# Patient Record
Sex: FEMALE | Race: WHITE | NOT HISPANIC OR LATINO
[De-identification: names, ages, dates, MRNs, and addresses within clinical notes are randomized per-mention and may not be internally consistent; named-entity substitution may affect disease eponyms.]

---

## 2017-03-09 ENCOUNTER — RX RENEWAL (OUTPATIENT)
Age: 77
End: 2017-03-09

## 2017-03-13 ENCOUNTER — RX RENEWAL (OUTPATIENT)
Age: 77
End: 2017-03-13

## 2017-04-03 ENCOUNTER — APPOINTMENT (OUTPATIENT)
Dept: FAMILY MEDICINE | Facility: CLINIC | Age: 77
End: 2017-04-03

## 2017-04-03 VITALS
HEIGHT: 70 IN | OXYGEN SATURATION: 98 % | RESPIRATION RATE: 15 BRPM | WEIGHT: 147 LBS | DIASTOLIC BLOOD PRESSURE: 79 MMHG | BODY MASS INDEX: 21.05 KG/M2 | SYSTOLIC BLOOD PRESSURE: 153 MMHG | HEART RATE: 56 BPM | TEMPERATURE: 98.1 F

## 2017-04-07 ENCOUNTER — RX RENEWAL (OUTPATIENT)
Age: 77
End: 2017-04-07

## 2017-04-24 ENCOUNTER — APPOINTMENT (OUTPATIENT)
Dept: CARDIOLOGY | Facility: CLINIC | Age: 77
End: 2017-04-24

## 2017-04-24 ENCOUNTER — NON-APPOINTMENT (OUTPATIENT)
Age: 77
End: 2017-04-24

## 2017-04-24 VITALS
SYSTOLIC BLOOD PRESSURE: 156 MMHG | BODY MASS INDEX: 20.47 KG/M2 | RESPIRATION RATE: 17 BRPM | WEIGHT: 143 LBS | DIASTOLIC BLOOD PRESSURE: 72 MMHG | OXYGEN SATURATION: 100 % | HEIGHT: 70 IN | HEART RATE: 50 BPM

## 2017-04-24 LAB
ALBUMIN SERPL ELPH-MCNC: 4.4 G/DL
ANION GAP SERPL CALC-SCNC: 15 MMOL/L
BUN SERPL-MCNC: 13 MG/DL
CALCIUM SERPL-MCNC: 10.5 MG/DL
CHLORIDE SERPL-SCNC: 100 MMOL/L
CO2 SERPL-SCNC: 26 MMOL/L
CREAT SERPL-MCNC: 0.98 MG/DL
GLUCOSE SERPL-MCNC: 116 MG/DL
HBA1C MFR BLD HPLC: 5.7 %
PHOSPHATE SERPL-MCNC: 3.6 MG/DL
POTASSIUM SERPL-SCNC: 4.6 MMOL/L
SODIUM SERPL-SCNC: 141 MMOL/L
TSH SERPL-ACNC: 1.65 UIU/ML

## 2017-06-14 ENCOUNTER — APPOINTMENT (OUTPATIENT)
Dept: OBGYN | Facility: CLINIC | Age: 77
End: 2017-06-14

## 2017-06-14 VITALS
HEIGHT: 70 IN | DIASTOLIC BLOOD PRESSURE: 82 MMHG | BODY MASS INDEX: 21.19 KG/M2 | OXYGEN SATURATION: 96 % | RESPIRATION RATE: 16 BRPM | SYSTOLIC BLOOD PRESSURE: 140 MMHG | WEIGHT: 148 LBS | HEART RATE: 67 BPM

## 2017-06-14 DIAGNOSIS — R45.4 IRRITABILITY AND ANGER: ICD-10-CM

## 2017-07-03 ENCOUNTER — RX RENEWAL (OUTPATIENT)
Age: 77
End: 2017-07-03

## 2017-07-06 ENCOUNTER — RX RENEWAL (OUTPATIENT)
Age: 77
End: 2017-07-06

## 2017-07-24 ENCOUNTER — APPOINTMENT (OUTPATIENT)
Dept: CARDIOLOGY | Facility: CLINIC | Age: 77
End: 2017-07-24

## 2017-07-24 ENCOUNTER — NON-APPOINTMENT (OUTPATIENT)
Age: 77
End: 2017-07-24

## 2017-07-24 VITALS — DIASTOLIC BLOOD PRESSURE: 80 MMHG | SYSTOLIC BLOOD PRESSURE: 158 MMHG

## 2017-07-25 LAB
ALBUMIN SERPL ELPH-MCNC: 4.7 G/DL
ALP BLD-CCNC: 69 U/L
ALT SERPL-CCNC: 21 U/L
AST SERPL-CCNC: 23 U/L
BILIRUB DIRECT SERPL-MCNC: 0.2 MG/DL
BILIRUB INDIRECT SERPL-MCNC: 0.5 MG/DL
BILIRUB SERPL-MCNC: 0.7 MG/DL
CHOLEST SERPL-MCNC: 173 MG/DL
CHOLEST/HDLC SERPL: 2.9 RATIO
HDLC SERPL-MCNC: 60 MG/DL
LDLC SERPL CALC-MCNC: 84 MG/DL
PROT SERPL-MCNC: 7.1 G/DL
TRIGL SERPL-MCNC: 146 MG/DL

## 2017-07-31 ENCOUNTER — RX RENEWAL (OUTPATIENT)
Age: 77
End: 2017-07-31

## 2017-09-06 ENCOUNTER — APPOINTMENT (OUTPATIENT)
Dept: FAMILY MEDICINE | Facility: CLINIC | Age: 77
End: 2017-09-06
Payer: MEDICARE

## 2017-09-06 VITALS
OXYGEN SATURATION: 97 % | DIASTOLIC BLOOD PRESSURE: 92 MMHG | RESPIRATION RATE: 16 BRPM | HEIGHT: 70 IN | WEIGHT: 156.31 LBS | HEART RATE: 64 BPM | SYSTOLIC BLOOD PRESSURE: 130 MMHG | BODY MASS INDEX: 22.38 KG/M2

## 2017-09-06 PROCEDURE — 99213 OFFICE O/P EST LOW 20 MIN: CPT

## 2017-09-11 ENCOUNTER — RX RENEWAL (OUTPATIENT)
Age: 77
End: 2017-09-11

## 2017-10-03 ENCOUNTER — RX RENEWAL (OUTPATIENT)
Age: 77
End: 2017-10-03

## 2017-10-04 ENCOUNTER — RX RENEWAL (OUTPATIENT)
Age: 77
End: 2017-10-04

## 2017-10-18 ENCOUNTER — APPOINTMENT (OUTPATIENT)
Dept: FAMILY MEDICINE | Facility: CLINIC | Age: 77
End: 2017-10-18
Payer: MEDICARE

## 2017-10-18 VITALS
WEIGHT: 156 LBS | OXYGEN SATURATION: 97 % | HEIGHT: 70 IN | TEMPERATURE: 97.9 F | RESPIRATION RATE: 12 BRPM | HEART RATE: 59 BPM | BODY MASS INDEX: 22.33 KG/M2 | DIASTOLIC BLOOD PRESSURE: 80 MMHG | SYSTOLIC BLOOD PRESSURE: 156 MMHG

## 2017-10-18 PROCEDURE — G0008: CPT

## 2017-10-18 PROCEDURE — 90686 IIV4 VACC NO PRSV 0.5 ML IM: CPT

## 2017-10-18 PROCEDURE — 99212 OFFICE O/P EST SF 10 MIN: CPT | Mod: 25

## 2017-10-24 LAB — BACTERIA SPEC CULT: NORMAL

## 2017-11-19 ENCOUNTER — FORM ENCOUNTER (OUTPATIENT)
Age: 77
End: 2017-11-19

## 2017-11-20 ENCOUNTER — OUTPATIENT (OUTPATIENT)
Dept: OUTPATIENT SERVICES | Facility: HOSPITAL | Age: 77
LOS: 1 days | End: 2017-11-20
Payer: MEDICARE

## 2017-11-20 ENCOUNTER — APPOINTMENT (OUTPATIENT)
Dept: MAMMOGRAPHY | Facility: HOSPITAL | Age: 77
End: 2017-11-20
Payer: MEDICARE

## 2017-11-20 ENCOUNTER — APPOINTMENT (OUTPATIENT)
Dept: ULTRASOUND IMAGING | Facility: HOSPITAL | Age: 77
End: 2017-11-20
Payer: MEDICARE

## 2017-11-20 DIAGNOSIS — Z98.89 OTHER SPECIFIED POSTPROCEDURAL STATES: Chronic | ICD-10-CM

## 2017-11-20 DIAGNOSIS — M53.9 DORSOPATHY, UNSPECIFIED: Chronic | ICD-10-CM

## 2017-11-20 LAB
BASOPHILS # BLD AUTO: 0.02 K/UL
BASOPHILS NFR BLD AUTO: 0.4 %
EOSINOPHIL # BLD AUTO: 0.11 K/UL
EOSINOPHIL NFR BLD AUTO: 2.1 %
ERYTHROCYTE [SEDIMENTATION RATE] IN BLOOD BY WESTERGREN METHOD: 15 MM/HR
HCT VFR BLD CALC: 41.9 %
HGB BLD-MCNC: 14 G/DL
IMM GRANULOCYTES NFR BLD AUTO: 0.2 %
LYMPHOCYTES # BLD AUTO: 1.44 K/UL
LYMPHOCYTES NFR BLD AUTO: 27.1 %
MAN DIFF?: NORMAL
MCHC RBC-ENTMCNC: 33.2 PG
MCHC RBC-ENTMCNC: 33.4 GM/DL
MCV RBC AUTO: 99.3 FL
MONOCYTES # BLD AUTO: 0.39 K/UL
MONOCYTES NFR BLD AUTO: 7.3 %
NEUTROPHILS # BLD AUTO: 3.34 K/UL
NEUTROPHILS NFR BLD AUTO: 62.9 %
PLATELET # BLD AUTO: 238 K/UL
RBC # BLD: 4.22 M/UL
RBC # FLD: 12.5 %
WBC # FLD AUTO: 5.31 K/UL

## 2017-11-20 PROCEDURE — 77063 BREAST TOMOSYNTHESIS BI: CPT | Mod: 26

## 2017-11-20 PROCEDURE — 77067 SCR MAMMO BI INCL CAD: CPT

## 2017-11-20 PROCEDURE — 76641 ULTRASOUND BREAST COMPLETE: CPT | Mod: 26

## 2017-11-20 PROCEDURE — G0202: CPT | Mod: 26

## 2017-11-20 PROCEDURE — 76641 ULTRASOUND BREAST COMPLETE: CPT

## 2017-11-20 PROCEDURE — 77063 BREAST TOMOSYNTHESIS BI: CPT

## 2017-11-21 LAB
ALBUMIN SERPL ELPH-MCNC: 4.6 G/DL
ANION GAP SERPL CALC-SCNC: 12 MMOL/L
BUN SERPL-MCNC: 12 MG/DL
CALCIUM SERPL-MCNC: 10.1 MG/DL
CHLORIDE SERPL-SCNC: 100 MMOL/L
CO2 SERPL-SCNC: 26 MMOL/L
CREAT SERPL-MCNC: 0.89 MG/DL
FOLATE SERPL-MCNC: >20 NG/ML
GLUCOSE SERPL-MCNC: 110 MG/DL
HBA1C MFR BLD HPLC: 5.5 %
PHOSPHATE SERPL-MCNC: 3.8 MG/DL
POTASSIUM SERPL-SCNC: 4 MMOL/L
SODIUM SERPL-SCNC: 138 MMOL/L
VIT B12 SERPL-MCNC: 737 PG/ML

## 2017-11-29 DIAGNOSIS — Z01.419 ENCOUNTER FOR GYNECOLOGICAL EXAMINATION (GENERAL) (ROUTINE) WITHOUT ABNORMAL FINDINGS: ICD-10-CM

## 2017-12-15 ENCOUNTER — APPOINTMENT (OUTPATIENT)
Dept: FAMILY MEDICINE | Facility: CLINIC | Age: 77
End: 2017-12-15
Payer: MEDICARE

## 2017-12-15 VITALS
WEIGHT: 157.56 LBS | HEIGHT: 70 IN | DIASTOLIC BLOOD PRESSURE: 80 MMHG | OXYGEN SATURATION: 97 % | SYSTOLIC BLOOD PRESSURE: 120 MMHG | RESPIRATION RATE: 16 BRPM | HEART RATE: 66 BPM | BODY MASS INDEX: 22.56 KG/M2

## 2017-12-15 PROCEDURE — 99213 OFFICE O/P EST LOW 20 MIN: CPT

## 2017-12-18 ENCOUNTER — APPOINTMENT (OUTPATIENT)
Dept: HEMATOLOGY ONCOLOGY | Facility: CLINIC | Age: 77
End: 2017-12-18

## 2017-12-19 ENCOUNTER — MEDICATION RENEWAL (OUTPATIENT)
Age: 77
End: 2017-12-19

## 2017-12-19 ENCOUNTER — APPOINTMENT (OUTPATIENT)
Dept: HEMATOLOGY ONCOLOGY | Facility: CLINIC | Age: 77
End: 2017-12-19

## 2017-12-19 ENCOUNTER — APPOINTMENT (OUTPATIENT)
Dept: INFUSION THERAPY | Facility: HOSPITAL | Age: 77
End: 2017-12-19

## 2017-12-20 ENCOUNTER — APPOINTMENT (OUTPATIENT)
Dept: INFUSION THERAPY | Facility: HOSPITAL | Age: 77
End: 2017-12-20

## 2018-01-02 ENCOUNTER — RX RENEWAL (OUTPATIENT)
Age: 78
End: 2018-01-02

## 2018-01-05 ENCOUNTER — RX RENEWAL (OUTPATIENT)
Age: 78
End: 2018-01-05

## 2018-01-29 ENCOUNTER — RX RENEWAL (OUTPATIENT)
Age: 78
End: 2018-01-29

## 2018-03-14 ENCOUNTER — RX RENEWAL (OUTPATIENT)
Age: 78
End: 2018-03-14

## 2018-04-02 ENCOUNTER — RX RENEWAL (OUTPATIENT)
Age: 78
End: 2018-04-02

## 2018-04-03 ENCOUNTER — RX RENEWAL (OUTPATIENT)
Age: 78
End: 2018-04-03

## 2018-04-11 ENCOUNTER — NON-APPOINTMENT (OUTPATIENT)
Age: 78
End: 2018-04-11

## 2018-04-11 ENCOUNTER — APPOINTMENT (OUTPATIENT)
Dept: CARDIOLOGY | Facility: CLINIC | Age: 78
End: 2018-04-11
Payer: MEDICARE

## 2018-04-11 VITALS
BODY MASS INDEX: 22.62 KG/M2 | WEIGHT: 158 LBS | RESPIRATION RATE: 17 BRPM | DIASTOLIC BLOOD PRESSURE: 80 MMHG | HEART RATE: 53 BPM | OXYGEN SATURATION: 99 % | SYSTOLIC BLOOD PRESSURE: 170 MMHG | HEIGHT: 70 IN

## 2018-04-11 LAB
ALBUMIN SERPL ELPH-MCNC: 4.4 G/DL
ALP BLD-CCNC: 80 U/L
ALT SERPL-CCNC: 22 U/L
ANION GAP SERPL CALC-SCNC: 11 MMOL/L
AST SERPL-CCNC: 25 U/L
BILIRUB SERPL-MCNC: 0.6 MG/DL
BUN SERPL-MCNC: 11 MG/DL
CALCIUM SERPL-MCNC: 10 MG/DL
CHLORIDE SERPL-SCNC: 101 MMOL/L
CHOLEST SERPL-MCNC: 157 MG/DL
CHOLEST/HDLC SERPL: 2.6 RATIO
CO2 SERPL-SCNC: 27 MMOL/L
CREAT SERPL-MCNC: 0.91 MG/DL
GLUCOSE SERPL-MCNC: 107 MG/DL
HDLC SERPL-MCNC: 61 MG/DL
LDLC SERPL CALC-MCNC: 74 MG/DL
POTASSIUM SERPL-SCNC: 4.7 MMOL/L
PROT SERPL-MCNC: 7 G/DL
SODIUM SERPL-SCNC: 139 MMOL/L
TRIGL SERPL-MCNC: 112 MG/DL

## 2018-04-11 PROCEDURE — 93000 ELECTROCARDIOGRAM COMPLETE: CPT

## 2018-04-11 PROCEDURE — 99214 OFFICE O/P EST MOD 30 MIN: CPT

## 2018-06-04 ENCOUNTER — APPOINTMENT (OUTPATIENT)
Dept: FAMILY MEDICINE | Facility: CLINIC | Age: 78
End: 2018-06-04
Payer: MEDICARE

## 2018-06-04 VITALS
SYSTOLIC BLOOD PRESSURE: 138 MMHG | RESPIRATION RATE: 14 BRPM | HEIGHT: 70 IN | DIASTOLIC BLOOD PRESSURE: 70 MMHG | BODY MASS INDEX: 23.41 KG/M2 | WEIGHT: 163.5 LBS | OXYGEN SATURATION: 98 % | HEART RATE: 65 BPM

## 2018-06-04 PROCEDURE — 99213 OFFICE O/P EST LOW 20 MIN: CPT

## 2018-06-04 NOTE — HISTORY OF PRESENT ILLNESS
[FreeTextEntry1] : hypothyroidism, hptn [de-identified] : Patient is here for a followup visit on her hypertension hyperlipidemia and hypothyroidism she feels well several months ago saw cardiology and had a good evaluation laboratory work done at that time showed good cholesterol control review of systems is unremarkable she is quite active has no new issues it has been about a year or so since we've checked her TSH level she is on thyroid replacement she is up-to-date nonproductive the procedures she had a colonoscopy approximately 3 years ago and due to multiple polyps has been on every five-year regimen.\par

## 2018-06-04 NOTE — PHYSICAL EXAM
[No Acute Distress] : no acute distress [Well Nourished] : well nourished [Well Developed] : well developed [Well-Appearing] : well-appearing [Normal Sclera/Conjunctiva] : normal sclera/conjunctiva [PERRL] : pupils equal round and reactive to light [Normal Oropharynx] : the oropharynx was normal [No JVD] : no jugular venous distention [Supple] : supple [No Lymphadenopathy] : no lymphadenopathy [Thyroid Normal, No Nodules] : the thyroid was normal and there were no nodules present [No Respiratory Distress] : no respiratory distress  [Clear to Auscultation] : lungs were clear to auscultation bilaterally [Normal Rate] : normal rate  [Regular Rhythm] : with a regular rhythm [Normal S1, S2] : normal S1 and S2 [No Murmur] : no murmur heard [Soft] : abdomen soft [Non-distended] : non-distended [No Masses] : no abdominal mass palpated [No HSM] : no HSM [Normal Bowel Sounds] : normal bowel sounds [Normal Supraclavicular Nodes] : no supraclavicular lymphadenopathy [Normal Posterior Cervical Nodes] : no posterior cervical lymphadenopathy [Normal Anterior Cervical Nodes] : no anterior cervical lymphadenopathy [No CVA Tenderness] : no CVA  tenderness [No Spinal Tenderness] : no spinal tenderness [No Rash] : no rash [de-identified] : Trace edema about the ankles

## 2018-06-04 NOTE — ASSESSMENT
[FreeTextEntry1] : The patient seems to be doing quite well she is very functional alert oriented going about all of her activities in instrumental activities of daily living well she looks younger than her stated age of 78. We will recheck her TSH should be followed up here in 3-4 months or as needed

## 2018-06-20 ENCOUNTER — APPOINTMENT (OUTPATIENT)
Dept: OBGYN | Facility: CLINIC | Age: 78
End: 2018-06-20
Payer: MEDICARE

## 2018-06-20 VITALS
HEIGHT: 70 IN | WEIGHT: 159 LBS | SYSTOLIC BLOOD PRESSURE: 136 MMHG | DIASTOLIC BLOOD PRESSURE: 80 MMHG | HEART RATE: 64 BPM | BODY MASS INDEX: 22.76 KG/M2 | OXYGEN SATURATION: 97 %

## 2018-06-20 DIAGNOSIS — L84 CORNS AND CALLOSITIES: ICD-10-CM

## 2018-06-20 DIAGNOSIS — Z01.419 ENCOUNTER FOR GYNECOLOGICAL EXAMINATION (GENERAL) (ROUTINE) W/OUT ABNORMAL FINDINGS: ICD-10-CM

## 2018-06-20 PROCEDURE — G0101: CPT

## 2018-06-21 LAB — TSH SERPL-ACNC: 1.29 UIU/ML

## 2018-06-22 LAB — HPV HIGH+LOW RISK DNA PNL CVX: NOT DETECTED

## 2018-06-25 ENCOUNTER — APPOINTMENT (OUTPATIENT)
Dept: MRI IMAGING | Facility: HOSPITAL | Age: 78
End: 2018-06-25
Payer: MEDICARE

## 2018-06-25 ENCOUNTER — OUTPATIENT (OUTPATIENT)
Dept: OUTPATIENT SERVICES | Facility: HOSPITAL | Age: 78
LOS: 1 days | End: 2018-06-25
Payer: MEDICARE

## 2018-06-25 DIAGNOSIS — Z98.89 OTHER SPECIFIED POSTPROCEDURAL STATES: Chronic | ICD-10-CM

## 2018-06-25 DIAGNOSIS — M53.9 DORSOPATHY, UNSPECIFIED: Chronic | ICD-10-CM

## 2018-06-25 DIAGNOSIS — Z00.8 ENCOUNTER FOR OTHER GENERAL EXAMINATION: ICD-10-CM

## 2018-06-25 PROCEDURE — 73720 MRI LWR EXTREMITY W/O&W/DYE: CPT

## 2018-06-25 PROCEDURE — 73720 MRI LWR EXTREMITY W/O&W/DYE: CPT | Mod: 26,RT

## 2018-06-25 PROCEDURE — A9579: CPT

## 2018-06-28 LAB — CYTOLOGY CVX/VAG DOC THIN PREP: NORMAL

## 2018-07-03 ENCOUNTER — RX RENEWAL (OUTPATIENT)
Age: 78
End: 2018-07-03

## 2018-07-26 ENCOUNTER — RX RENEWAL (OUTPATIENT)
Age: 78
End: 2018-07-26

## 2018-09-10 ENCOUNTER — RX RENEWAL (OUTPATIENT)
Age: 78
End: 2018-09-10

## 2018-10-02 ENCOUNTER — RX RENEWAL (OUTPATIENT)
Age: 78
End: 2018-10-02

## 2018-10-10 ENCOUNTER — APPOINTMENT (OUTPATIENT)
Dept: CARDIOLOGY | Facility: CLINIC | Age: 78
End: 2018-10-10
Payer: MEDICARE

## 2018-10-10 VITALS — DIASTOLIC BLOOD PRESSURE: 86 MMHG | SYSTOLIC BLOOD PRESSURE: 144 MMHG

## 2018-10-10 PROCEDURE — 93306 TTE W/DOPPLER COMPLETE: CPT

## 2018-10-10 PROCEDURE — 99214 OFFICE O/P EST MOD 30 MIN: CPT

## 2018-10-10 PROCEDURE — 93000 ELECTROCARDIOGRAM COMPLETE: CPT

## 2018-10-26 ENCOUNTER — RX RENEWAL (OUTPATIENT)
Age: 78
End: 2018-10-26

## 2018-10-26 ENCOUNTER — APPOINTMENT (OUTPATIENT)
Dept: FAMILY MEDICINE | Facility: CLINIC | Age: 78
End: 2018-10-26
Payer: MEDICARE

## 2018-10-26 VITALS
HEART RATE: 63 BPM | TEMPERATURE: 97.7 F | SYSTOLIC BLOOD PRESSURE: 168 MMHG | DIASTOLIC BLOOD PRESSURE: 75 MMHG | OXYGEN SATURATION: 97 % | HEIGHT: 70 IN | RESPIRATION RATE: 17 BRPM | WEIGHT: 157 LBS | BODY MASS INDEX: 22.48 KG/M2

## 2018-10-26 PROCEDURE — G0439: CPT

## 2018-10-26 RX ORDER — FUROSEMIDE 20 MG/1
20 TABLET ORAL
Qty: 30 | Refills: 3 | Status: DISCONTINUED | COMMUNITY
Start: 2018-04-11 | End: 2018-10-26

## 2018-10-26 NOTE — ASSESSMENT
[FreeTextEntry1] : Patient is doing quite well she describes her health as excellent and indeed it seems to be so she is up-to-date on all preventative services as recorded. She will be given a flu shot today her mental status is excellent no sign of cognitive dysfunction she takes care of all of her ADLs and IADLs her nutrition is excellent her weight is excellent and she continues to exercise regularly we have discussed the further plan which includes following up with her other physicians and Dr. Pacheco of cardiology Dr. Ramos gynecology, and Dr. Eric of podiatry

## 2018-10-26 NOTE — PHYSICAL EXAM
[No Acute Distress] : no acute distress [Well Nourished] : well nourished [Well Developed] : well developed [Well-Appearing] : well-appearing [Normal Sclera/Conjunctiva] : normal sclera/conjunctiva [PERRL] : pupils equal round and reactive to light [No JVD] : no jugular venous distention [Supple] : supple [No Respiratory Distress] : no respiratory distress  [Normal Rate] : normal rate  [Regular Rhythm] : with a regular rhythm

## 2018-10-26 NOTE — HEALTH RISK ASSESSMENT
[Excellent] : ~his/her~ current health as excellent [Very Good] : ~his/her~  mood as very good [No falls in past year] : Patient reported no falls in the past year [0] : 2) Feeling down, depressed, or hopeless: Not at all (0) [Patient reported mammogram was normal] : Patient reported mammogram was normal [Patient reported PAP Smear was normal] : Patient reported PAP Smear was normal [Patient reported bone density results were normal] : Patient reported bone density results were normal [Patient reported colonoscopy was normal] : Patient reported colonoscopy was normal [HIV test declined] : HIV test declined [Hepatitis C test declined] : Hepatitis C test declined [Alone] : lives alone [Single] : single [Fully functional (bathing, dressing, toileting, transferring, walking, feeding)] : Fully functional (bathing, dressing, toileting, transferring, walking, feeding) [Fully functional (using the telephone, shopping, preparing meals, housekeeping, doing laundry, using] : Fully functional and needs no help or supervision to perform IADLs (using the telephone, shopping, preparing meals, housekeeping, doing laundry, using transportation, managing medications and managing finances) [Smoke Detector] : smoke detector [Carbon Monoxide Detector] : carbon monoxide detector [Seat Belt] :  uses seat belt [Sunscreen] : uses sunscreen [Discussed at today's visit] : Advance Directives Discussed at today's visit [DNR] : DNR [DNI] : DNI [] : No [[id number]] : 0 [Change in mental status noted] : No change in mental status noted [Language] : denies difficulty with language [Behavior] : denies difficulty with behavior [Learning/Retaining New Information] : denies difficulty learning/retaining new information [Handling Complex Tasks] : denies difficulty handling complex tasks [Reasoning] : denies difficulty with reasoning [Spatial Ability and Orientation] : denies difficulty with spatial ability and orientation [Reports changes in hearing] : Reports no changes in hearing [Reports changes in vision] : Reports no changes in vision [Reports changes in dental health] : Reports no changes in dental health [Guns at Home] : no guns at home [MammogramDate] : 11/2017 [PapSmearDate] : 6/2018 [BoneDensityDate] : 6/2008 [ColonoscopyDate] : 5/2015

## 2018-11-20 ENCOUNTER — FORM ENCOUNTER (OUTPATIENT)
Age: 78
End: 2018-11-20

## 2018-11-21 ENCOUNTER — APPOINTMENT (OUTPATIENT)
Dept: ULTRASOUND IMAGING | Facility: HOSPITAL | Age: 78
End: 2018-11-21
Payer: MEDICARE

## 2018-11-21 ENCOUNTER — APPOINTMENT (OUTPATIENT)
Dept: MAMMOGRAPHY | Facility: HOSPITAL | Age: 78
End: 2018-11-21
Payer: MEDICARE

## 2018-11-21 ENCOUNTER — OUTPATIENT (OUTPATIENT)
Dept: OUTPATIENT SERVICES | Facility: HOSPITAL | Age: 78
LOS: 1 days | End: 2018-11-21
Payer: MEDICARE

## 2018-11-21 DIAGNOSIS — Z00.8 ENCOUNTER FOR OTHER GENERAL EXAMINATION: ICD-10-CM

## 2018-11-21 DIAGNOSIS — M53.9 DORSOPATHY, UNSPECIFIED: Chronic | ICD-10-CM

## 2018-11-21 DIAGNOSIS — Z98.89 OTHER SPECIFIED POSTPROCEDURAL STATES: Chronic | ICD-10-CM

## 2018-11-21 LAB
ALBUMIN SERPL ELPH-MCNC: 4.6 G/DL
ALP BLD-CCNC: 73 U/L
ALT SERPL-CCNC: 21 U/L
ANION GAP SERPL CALC-SCNC: 12 MMOL/L
AST SERPL-CCNC: 25 U/L
BASOPHILS # BLD AUTO: 0.03 K/UL
BASOPHILS NFR BLD AUTO: 0.5 %
BILIRUB SERPL-MCNC: 0.7 MG/DL
BUN SERPL-MCNC: 13 MG/DL
CALCIUM SERPL-MCNC: 10 MG/DL
CHLORIDE SERPL-SCNC: 100 MMOL/L
CO2 SERPL-SCNC: 26 MMOL/L
CREAT SERPL-MCNC: 0.97 MG/DL
EOSINOPHIL # BLD AUTO: 0.06 K/UL
EOSINOPHIL NFR BLD AUTO: 1.1 %
GLUCOSE SERPL-MCNC: 110 MG/DL
HBA1C MFR BLD HPLC: 5.6 %
HCT VFR BLD CALC: 43.2 %
HGB BLD-MCNC: 14.3 G/DL
IMM GRANULOCYTES NFR BLD AUTO: 0 %
LYMPHOCYTES # BLD AUTO: 1.55 K/UL
LYMPHOCYTES NFR BLD AUTO: 27.6 %
MAN DIFF?: NORMAL
MCHC RBC-ENTMCNC: 32.7 PG
MCHC RBC-ENTMCNC: 33.1 GM/DL
MCV RBC AUTO: 98.9 FL
MONOCYTES # BLD AUTO: 0.44 K/UL
MONOCYTES NFR BLD AUTO: 7.8 %
NEUTROPHILS # BLD AUTO: 3.54 K/UL
NEUTROPHILS NFR BLD AUTO: 63 %
PLATELET # BLD AUTO: 226 K/UL
POTASSIUM SERPL-SCNC: 4.3 MMOL/L
PROT SERPL-MCNC: 7.1 G/DL
RBC # BLD: 4.37 M/UL
RBC # FLD: 12.8 %
SODIUM SERPL-SCNC: 138 MMOL/L
TSH SERPL-ACNC: 2.47 UIU/ML
WBC # FLD AUTO: 5.62 K/UL

## 2018-11-21 PROCEDURE — 77065 DX MAMMO INCL CAD UNI: CPT | Mod: 26,GG,RT

## 2018-11-21 PROCEDURE — 77063 BREAST TOMOSYNTHESIS BI: CPT | Mod: 26,59

## 2018-11-21 PROCEDURE — 77063 BREAST TOMOSYNTHESIS BI: CPT

## 2018-11-21 PROCEDURE — 77067 SCR MAMMO BI INCL CAD: CPT | Mod: 26,59

## 2018-11-21 PROCEDURE — 76642 ULTRASOUND BREAST LIMITED: CPT | Mod: 26,50

## 2018-11-21 PROCEDURE — 77065 DX MAMMO INCL CAD UNI: CPT

## 2018-11-21 PROCEDURE — 77067 SCR MAMMO BI INCL CAD: CPT | Mod: XU

## 2018-11-21 PROCEDURE — 76641 ULTRASOUND BREAST COMPLETE: CPT

## 2019-01-04 ENCOUNTER — RX RENEWAL (OUTPATIENT)
Age: 79
End: 2019-01-04

## 2019-01-26 ENCOUNTER — RX RENEWAL (OUTPATIENT)
Age: 79
End: 2019-01-26

## 2019-03-14 ENCOUNTER — RX RENEWAL (OUTPATIENT)
Age: 79
End: 2019-03-14

## 2019-04-03 ENCOUNTER — RX RENEWAL (OUTPATIENT)
Age: 79
End: 2019-04-03

## 2019-04-05 ENCOUNTER — RX RENEWAL (OUTPATIENT)
Age: 79
End: 2019-04-05

## 2019-04-15 ENCOUNTER — NON-APPOINTMENT (OUTPATIENT)
Age: 79
End: 2019-04-15

## 2019-04-15 ENCOUNTER — APPOINTMENT (OUTPATIENT)
Dept: CARDIOLOGY | Facility: CLINIC | Age: 79
End: 2019-04-15
Payer: MEDICARE

## 2019-04-15 VITALS
SYSTOLIC BLOOD PRESSURE: 172 MMHG | RESPIRATION RATE: 17 BRPM | HEIGHT: 70 IN | WEIGHT: 155 LBS | DIASTOLIC BLOOD PRESSURE: 74 MMHG | HEART RATE: 66 BPM | BODY MASS INDEX: 22.19 KG/M2 | OXYGEN SATURATION: 95 %

## 2019-04-15 VITALS — DIASTOLIC BLOOD PRESSURE: 88 MMHG | SYSTOLIC BLOOD PRESSURE: 150 MMHG

## 2019-04-15 LAB
ALBUMIN SERPL ELPH-MCNC: 4.8 G/DL
ALP BLD-CCNC: 70 U/L
ALT SERPL-CCNC: 27 U/L
ANION GAP SERPL CALC-SCNC: 11 MMOL/L
AST SERPL-CCNC: 28 U/L
BILIRUB SERPL-MCNC: 0.8 MG/DL
BUN SERPL-MCNC: 12 MG/DL
CALCIUM SERPL-MCNC: 10.1 MG/DL
CHLORIDE SERPL-SCNC: 102 MMOL/L
CHOLEST SERPL-MCNC: 158 MG/DL
CHOLEST/HDLC SERPL: 2.7 RATIO
CO2 SERPL-SCNC: 28 MMOL/L
CREAT SERPL-MCNC: 0.81 MG/DL
GLUCOSE SERPL-MCNC: 108 MG/DL
HDLC SERPL-MCNC: 58 MG/DL
LDLC SERPL CALC-MCNC: 76 MG/DL
POTASSIUM SERPL-SCNC: 4.6 MMOL/L
PROT SERPL-MCNC: 7.3 G/DL
SODIUM SERPL-SCNC: 141 MMOL/L
TRIGL SERPL-MCNC: 119 MG/DL

## 2019-04-15 PROCEDURE — 93000 ELECTROCARDIOGRAM COMPLETE: CPT

## 2019-04-15 PROCEDURE — 99214 OFFICE O/P EST MOD 30 MIN: CPT

## 2019-04-15 NOTE — PHYSICAL EXAM
[General Appearance - Well Developed] : well developed [Normal Appearance] : normal appearance [Well Groomed] : well groomed [General Appearance - Well Nourished] : well nourished [No Deformities] : no deformities [General Appearance - In No Acute Distress] : no acute distress [Eyelids - No Xanthelasma] : the eyelids demonstrated no xanthelasmas [Normal Conjunctiva] : the conjunctiva exhibited no abnormalities [Normal Oral Mucosa] : normal oral mucosa [No Oral Pallor] : no oral pallor [Normal Jugular Venous A Waves Present] : normal jugular venous A waves present [No Oral Cyanosis] : no oral cyanosis [Normal Jugular Venous V Waves Present] : normal jugular venous V waves present [No Jugular Venous Rivas A Waves] : no jugular venous rivas A waves [Exaggerated Use Of Accessory Muscles For Inspiration] : no accessory muscle use [Respiration, Rhythm And Depth] : normal respiratory rhythm and effort [Auscultation Breath Sounds / Voice Sounds] : lungs were clear to auscultation bilaterally [Abdomen Soft] : soft [Abdomen Tenderness] : non-tender [Abdomen Mass (___ Cm)] : no abdominal mass palpated [Abnormal Walk] : normal gait [Gait - Sufficient For Exercise Testing] : the gait was sufficient for exercise testing [Nail Clubbing] : no clubbing of the fingernails [Cyanosis, Localized] : no localized cyanosis [Petechial Hemorrhages (___cm)] : no petechial hemorrhages [No Venous Stasis] : no venous stasis [] : no rash [Skin Color & Pigmentation] : normal skin color and pigmentation [Skin Lesions] : no skin lesions [No Xanthoma] : no  xanthoma was observed [No Skin Ulcers] : no skin ulcer [Oriented To Time, Place, And Person] : oriented to person, place, and time [Mood] : the mood was normal [Affect] : the affect was normal [No Anxiety] : not feeling anxious [Normal Rate] : normal [Normal S1] : normal S1 [Normal S2] : normal S2 [No Murmur] : no murmurs heard [No Abnormalities] : the abdominal aorta was not enlarged and no bruit was heard [2+] : left 2+ [___ +] : bilateral [unfilled]U+ pitting edema to the knees [S3] : no S3 [S4] : no S4 [Right Carotid Bruit] : no bruit heard over the right carotid [Left Carotid Bruit] : no bruit heard over the left carotid [Right Femoral Bruit] : no bruit heard over the right femoral artery [Left Femoral Bruit] : no bruit heard over the left femoral artery

## 2019-04-15 NOTE — DISCUSSION/SUMMARY
[ECG Normal Variant] : ECG normal variant [Non-specific ECG Changes] : abnormal ECG [Mild Aortic Stenosis] : mild aortic stenosis [Hypertension] : hypertension [Responding to Treatment] : responding to treatment [Ambulatory BP Monitoring] : ambulatory blood pressure monitoring [Echocardiogram] : echocardiogram [Sodium Restriction] : sodium restriction [PVCs] : ectopic ventricular beats [SVT] : paroxysmal supraventricular tachycardia [Improving] : improving [None] : There are no changes in medication management [Venous Insufficiency] : venous insufficiency [Doppler Lower Extremity] : a Doppler exam of the lower extremities [Stable] : stable [Patient] : the patient [de-identified] : anxiety [de-identified] : white coat syndrome, did not take losarten today [de-identified] : can take extra losarten 25mg prn [de-identified] : no recent palps off caffeine [de-identified] : less caffeine,green tea [de-identified] : ankle edema [de-identified] : pt defers [de-identified] : low salt

## 2019-04-30 ENCOUNTER — RX RENEWAL (OUTPATIENT)
Age: 79
End: 2019-04-30

## 2019-05-02 ENCOUNTER — APPOINTMENT (OUTPATIENT)
Dept: FAMILY MEDICINE | Facility: CLINIC | Age: 79
End: 2019-05-02
Payer: MEDICARE

## 2019-05-02 VITALS
HEIGHT: 70 IN | OXYGEN SATURATION: 99 % | HEART RATE: 66 BPM | SYSTOLIC BLOOD PRESSURE: 130 MMHG | WEIGHT: 159.06 LBS | BODY MASS INDEX: 22.77 KG/M2 | DIASTOLIC BLOOD PRESSURE: 72 MMHG | RESPIRATION RATE: 14 BRPM

## 2019-05-02 PROCEDURE — 99213 OFFICE O/P EST LOW 20 MIN: CPT

## 2019-05-02 NOTE — HISTORY OF PRESENT ILLNESS
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

## 2019-05-02 NOTE — PHYSICAL EXAM
[No Acute Distress] : no acute distress [Well Nourished] : well nourished [Well Developed] : well developed [Normal Sclera/Conjunctiva] : normal sclera/conjunctiva [Normal Outer Ear/Nose] : the outer ears and nose were normal in appearance [Normal Oropharynx] : the oropharynx was normal [No JVD] : no jugular venous distention [No Lymphadenopathy] : no lymphadenopathy [Thyroid Normal, No Nodules] : the thyroid was normal and there were no nodules present [No Respiratory Distress] : no respiratory distress  [Regular Rhythm] : with a regular rhythm [Normal Rate] : normal rate  [No Murmur] : no murmur heard [No Edema] : there was no peripheral edema [Soft] : abdomen soft [Non Tender] : non-tender [Non-distended] : non-distended [No HSM] : no HSM [Normal Bowel Sounds] : normal bowel sounds [Normal Supraclavicular Nodes] : no supraclavicular lymphadenopathy [No Hernias] : no hernias [Normal Inguinal Nodes] : no inguinal lymphadenopathy [Normal Anterior Cervical Nodes] : no anterior cervical lymphadenopathy [No Spinal Tenderness] : no spinal tenderness [No CVA Tenderness] : no CVA  tenderness [No Rash] : no rash

## 2019-05-02 NOTE — ASSESSMENT
[FreeTextEntry1] : The patient is doing well she is advised to continue her activity level as much as she can follow up visit here in 4 months or as needed

## 2019-07-02 ENCOUNTER — RX RENEWAL (OUTPATIENT)
Age: 79
End: 2019-07-02

## 2019-09-10 ENCOUNTER — RX RENEWAL (OUTPATIENT)
Age: 79
End: 2019-09-10

## 2019-09-30 ENCOUNTER — RX RENEWAL (OUTPATIENT)
Age: 79
End: 2019-09-30

## 2019-10-02 ENCOUNTER — RX RENEWAL (OUTPATIENT)
Age: 79
End: 2019-10-02

## 2019-10-07 ENCOUNTER — APPOINTMENT (OUTPATIENT)
Dept: FAMILY MEDICINE | Facility: CLINIC | Age: 79
End: 2019-10-07
Payer: MEDICARE

## 2019-10-07 PROCEDURE — 90686 IIV4 VACC NO PRSV 0.5 ML IM: CPT

## 2019-10-07 PROCEDURE — G0008: CPT

## 2019-10-14 ENCOUNTER — NON-APPOINTMENT (OUTPATIENT)
Age: 79
End: 2019-10-14

## 2019-10-14 ENCOUNTER — APPOINTMENT (OUTPATIENT)
Dept: CARDIOLOGY | Facility: CLINIC | Age: 79
End: 2019-10-14
Payer: MEDICARE

## 2019-10-14 VITALS
SYSTOLIC BLOOD PRESSURE: 156 MMHG | DIASTOLIC BLOOD PRESSURE: 78 MMHG | OXYGEN SATURATION: 97 % | RESPIRATION RATE: 17 BRPM | WEIGHT: 154 LBS | BODY MASS INDEX: 22.05 KG/M2 | HEIGHT: 70 IN | HEART RATE: 57 BPM

## 2019-10-14 LAB
ALBUMIN SERPL ELPH-MCNC: 4.7 G/DL
ALP BLD-CCNC: 74 U/L
ALT SERPL-CCNC: 23 U/L
ANION GAP SERPL CALC-SCNC: 12 MMOL/L
AST SERPL-CCNC: 25 U/L
BASOPHILS # BLD AUTO: 0.04 K/UL
BASOPHILS NFR BLD AUTO: 0.6 %
BILIRUB SERPL-MCNC: 0.7 MG/DL
BUN SERPL-MCNC: 16 MG/DL
CALCIUM SERPL-MCNC: 10.1 MG/DL
CHLORIDE SERPL-SCNC: 103 MMOL/L
CHOLEST SERPL-MCNC: 155 MG/DL
CHOLEST/HDLC SERPL: 2.7 RATIO
CO2 SERPL-SCNC: 26 MMOL/L
CREAT SERPL-MCNC: 0.93 MG/DL
EOSINOPHIL # BLD AUTO: 0.12 K/UL
EOSINOPHIL NFR BLD AUTO: 1.8 %
ESTIMATED AVERAGE GLUCOSE: 114 MG/DL
GLUCOSE SERPL-MCNC: 103 MG/DL
HBA1C MFR BLD HPLC: 5.6 %
HCT VFR BLD CALC: 43.7 %
HDLC SERPL-MCNC: 57 MG/DL
HGB BLD-MCNC: 14.3 G/DL
IMM GRANULOCYTES NFR BLD AUTO: 0.1 %
LDLC SERPL CALC-MCNC: 76 MG/DL
LYMPHOCYTES # BLD AUTO: 1.83 K/UL
LYMPHOCYTES NFR BLD AUTO: 27.4 %
MAN DIFF?: NORMAL
MCHC RBC-ENTMCNC: 32.5 PG
MCHC RBC-ENTMCNC: 32.7 GM/DL
MCV RBC AUTO: 99.3 FL
MONOCYTES # BLD AUTO: 0.68 K/UL
MONOCYTES NFR BLD AUTO: 10.2 %
NEUTROPHILS # BLD AUTO: 3.99 K/UL
NEUTROPHILS NFR BLD AUTO: 59.9 %
PLATELET # BLD AUTO: 223 K/UL
POTASSIUM SERPL-SCNC: 5.1 MMOL/L
PROT SERPL-MCNC: 6.8 G/DL
RBC # BLD: 4.4 M/UL
RBC # FLD: 12 %
SODIUM SERPL-SCNC: 141 MMOL/L
TRIGL SERPL-MCNC: 109 MG/DL
TSH SERPL-ACNC: 1.48 UIU/ML
WBC # FLD AUTO: 6.67 K/UL

## 2019-10-14 PROCEDURE — 93000 ELECTROCARDIOGRAM COMPLETE: CPT

## 2019-10-14 PROCEDURE — 99214 OFFICE O/P EST MOD 30 MIN: CPT

## 2019-10-15 NOTE — DISCUSSION/SUMMARY
[ECG Normal Variant] : ECG normal variant [Non-specific ECG Changes] : abnormal ECG [Mild Aortic Stenosis] : mild aortic stenosis [Hypertension] : hypertension [Responding to Treatment] : responding to treatment [Ambulatory BP Monitoring] : ambulatory blood pressure monitoring [Echocardiogram] : echocardiogram [Sodium Restriction] : sodium restriction [PVCs] : ectopic ventricular beats [SVT] : paroxysmal supraventricular tachycardia [Improving] : improving [None] : There are no changes in medication management [Venous Insufficiency] : venous insufficiency [Stable] : stable [Doppler Lower Extremity] : a Doppler exam of the lower extremities [Patient] : the patient [de-identified] : anxiety [de-identified] : white coat syndrome, did not take losarten today [de-identified] : no recent palps off caffeine [de-identified] : can take extra losarten 25mg prn [de-identified] : less caffeine,green tea [de-identified] : ankle edema [de-identified] : pt defers [de-identified] : low salt

## 2019-10-15 NOTE — PHYSICAL EXAM
[General Appearance - Well Developed] : well developed [Normal Appearance] : normal appearance [General Appearance - Well Nourished] : well nourished [Well Groomed] : well groomed [No Deformities] : no deformities [Normal Conjunctiva] : the conjunctiva exhibited no abnormalities [General Appearance - In No Acute Distress] : no acute distress [Eyelids - No Xanthelasma] : the eyelids demonstrated no xanthelasmas [Normal Oral Mucosa] : normal oral mucosa [No Oral Pallor] : no oral pallor [No Oral Cyanosis] : no oral cyanosis [Normal Jugular Venous A Waves Present] : normal jugular venous A waves present [Normal Jugular Venous V Waves Present] : normal jugular venous V waves present [No Jugular Venous Rivas A Waves] : no jugular venous rivas A waves [Exaggerated Use Of Accessory Muscles For Inspiration] : no accessory muscle use [Respiration, Rhythm And Depth] : normal respiratory rhythm and effort [Auscultation Breath Sounds / Voice Sounds] : lungs were clear to auscultation bilaterally [Abdomen Soft] : soft [Abdomen Tenderness] : non-tender [Abdomen Mass (___ Cm)] : no abdominal mass palpated [Abnormal Walk] : normal gait [Gait - Sufficient For Exercise Testing] : the gait was sufficient for exercise testing [Nail Clubbing] : no clubbing of the fingernails [Cyanosis, Localized] : no localized cyanosis [Petechial Hemorrhages (___cm)] : no petechial hemorrhages [Skin Color & Pigmentation] : normal skin color and pigmentation [] : no rash [No Venous Stasis] : no venous stasis [Skin Lesions] : no skin lesions [No Skin Ulcers] : no skin ulcer [No Xanthoma] : no  xanthoma was observed [Oriented To Time, Place, And Person] : oriented to person, place, and time [Affect] : the affect was normal [Mood] : the mood was normal [No Anxiety] : not feeling anxious [Normal Rate] : normal [Normal S1] : normal S1 [Normal S2] : normal S2 [No Murmur] : no murmurs heard [2+] : left 2+ [No Abnormalities] : the abdominal aorta was not enlarged and no bruit was heard [___ +] : bilateral [unfilled]U+ pitting edema to the knees [S3] : no S3 [S4] : no S4 [Right Carotid Bruit] : no bruit heard over the right carotid [Left Carotid Bruit] : no bruit heard over the left carotid [Right Femoral Bruit] : no bruit heard over the right femoral artery [Left Femoral Bruit] : no bruit heard over the left femoral artery

## 2019-10-15 NOTE — REASON FOR VISIT
[Follow-Up - Clinic] : a clinic follow-up of [Hypertension] : hypertension [FreeTextEntry1] : denies significant cp or sob

## 2019-10-30 ENCOUNTER — APPOINTMENT (OUTPATIENT)
Dept: FAMILY MEDICINE | Facility: CLINIC | Age: 79
End: 2019-10-30
Payer: MEDICARE

## 2019-10-30 VITALS
SYSTOLIC BLOOD PRESSURE: 110 MMHG | HEIGHT: 70 IN | OXYGEN SATURATION: 97 % | WEIGHT: 158.25 LBS | BODY MASS INDEX: 22.65 KG/M2 | HEART RATE: 67 BPM | DIASTOLIC BLOOD PRESSURE: 72 MMHG | RESPIRATION RATE: 14 BRPM

## 2019-10-30 DIAGNOSIS — Z00.00 ENCOUNTER FOR GENERAL ADULT MEDICAL EXAMINATION W/OUT ABNORMAL FINDINGS: ICD-10-CM

## 2019-10-30 PROCEDURE — G0439: CPT

## 2019-10-30 NOTE — HEALTH RISK ASSESSMENT
[Excellent] : ~his/her~ current health as excellent [Very Good] : ~his/her~  mood as very good [No] : In the past 12 months have you used drugs other than those required for medical reasons? No [No falls in past year] : Patient reported no falls in the past year [0] : 2) Feeling down, depressed, or hopeless: Not at all (0) [Patient reported mammogram was normal] : Patient reported mammogram was normal [Patient reported PAP Smear was normal] : Patient reported PAP Smear was normal [Patient reported bone density results were normal] : Patient reported bone density results were normal [Patient reported colonoscopy was normal] : Patient reported colonoscopy was normal [HIV test declined] : HIV test declined [Hepatitis C test declined] : Hepatitis C test declined [None] : None [Alone] : lives alone [Retired] : retired [Single] : single [Feels Safe at Home] : Feels safe at home [Fully functional (bathing, dressing, toileting, transferring, walking, feeding)] : Fully functional (bathing, dressing, toileting, transferring, walking, feeding) [Fully functional (using the telephone, shopping, preparing meals, housekeeping, doing laundry, using] : Fully functional and needs no help or supervision to perform IADLs (using the telephone, shopping, preparing meals, housekeeping, doing laundry, using transportation, managing medications and managing finances) [Smoke Detector] : smoke detector [Carbon Monoxide Detector] : carbon monoxide detector [Seat Belt] :  uses seat belt [With Patient/Caregiver] : With Patient/Caregiver [Designated Healthcare Proxy] : Designated healthcare proxy [Name: ___] : Health Care Proxy's Name: [unfilled]  [Relationship: ___] : Relationship: [unfilled] [DNR] : DNR [DNI] : DNI [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

## 2019-10-30 NOTE — PLAN
[FreeTextEntry1] : Patient is doing quite well medications and reconciled she is up-to-date on all of her preventive services and immunizations but recent laboratory work is all within acceptable levels she is very active her mental status is heard and there is no meaningful advice that needs to be given other than to continue her lifestyle as it is she is active with a good diet regular for function and excellent preventive care

## 2019-11-21 ENCOUNTER — FORM ENCOUNTER (OUTPATIENT)
Age: 79
End: 2019-11-21

## 2019-11-22 ENCOUNTER — APPOINTMENT (OUTPATIENT)
Dept: MAMMOGRAPHY | Facility: HOSPITAL | Age: 79
End: 2019-11-22
Payer: MEDICARE

## 2019-11-22 ENCOUNTER — OUTPATIENT (OUTPATIENT)
Dept: OUTPATIENT SERVICES | Facility: HOSPITAL | Age: 79
LOS: 1 days | End: 2019-11-22
Payer: MEDICARE

## 2019-11-22 ENCOUNTER — APPOINTMENT (OUTPATIENT)
Dept: ULTRASOUND IMAGING | Facility: HOSPITAL | Age: 79
End: 2019-11-22
Payer: MEDICARE

## 2019-11-22 DIAGNOSIS — Z98.89 OTHER SPECIFIED POSTPROCEDURAL STATES: Chronic | ICD-10-CM

## 2019-11-22 DIAGNOSIS — Z00.8 ENCOUNTER FOR OTHER GENERAL EXAMINATION: ICD-10-CM

## 2019-11-22 DIAGNOSIS — M53.9 DORSOPATHY, UNSPECIFIED: Chronic | ICD-10-CM

## 2019-11-22 PROCEDURE — 77063 BREAST TOMOSYNTHESIS BI: CPT | Mod: 26

## 2019-11-22 PROCEDURE — 77063 BREAST TOMOSYNTHESIS BI: CPT

## 2019-11-22 PROCEDURE — 77067 SCR MAMMO BI INCL CAD: CPT | Mod: 26

## 2019-11-22 PROCEDURE — 77067 SCR MAMMO BI INCL CAD: CPT

## 2020-08-12 ENCOUNTER — APPOINTMENT (OUTPATIENT)
Dept: CARDIOLOGY | Facility: CLINIC | Age: 80
End: 2020-08-12
Payer: MEDICARE

## 2020-08-12 ENCOUNTER — NON-APPOINTMENT (OUTPATIENT)
Age: 80
End: 2020-08-12

## 2020-08-12 VITALS
BODY MASS INDEX: 21.19 KG/M2 | HEIGHT: 70 IN | OXYGEN SATURATION: 98 % | DIASTOLIC BLOOD PRESSURE: 81 MMHG | HEART RATE: 50 BPM | WEIGHT: 148 LBS | SYSTOLIC BLOOD PRESSURE: 170 MMHG | TEMPERATURE: 97.8 F | RESPIRATION RATE: 19 BRPM

## 2020-08-12 LAB
ALBUMIN SERPL ELPH-MCNC: 4.8 G/DL
ALP BLD-CCNC: 74 U/L
ALT SERPL-CCNC: 18 U/L
ANION GAP SERPL CALC-SCNC: 11 MMOL/L
AST SERPL-CCNC: 23 U/L
BILIRUB SERPL-MCNC: 0.6 MG/DL
BUN SERPL-MCNC: 7 MG/DL
CALCIUM SERPL-MCNC: 9.9 MG/DL
CHLORIDE SERPL-SCNC: 105 MMOL/L
CHOLEST SERPL-MCNC: 158 MG/DL
CHOLEST/HDLC SERPL: 2.6 RATIO
CO2 SERPL-SCNC: 26 MMOL/L
CREAT SERPL-MCNC: 0.83 MG/DL
GLUCOSE SERPL-MCNC: 105 MG/DL
HDLC SERPL-MCNC: 60 MG/DL
LDLC SERPL CALC-MCNC: 72 MG/DL
POTASSIUM SERPL-SCNC: 4.4 MMOL/L
PROT SERPL-MCNC: 6.7 G/DL
SODIUM SERPL-SCNC: 142 MMOL/L
TRIGL SERPL-MCNC: 130 MG/DL

## 2020-08-12 PROCEDURE — 93000 ELECTROCARDIOGRAM COMPLETE: CPT

## 2020-08-12 PROCEDURE — 93306 TTE W/DOPPLER COMPLETE: CPT

## 2020-08-12 PROCEDURE — 99214 OFFICE O/P EST MOD 30 MIN: CPT

## 2020-08-12 NOTE — REASON FOR VISIT
[Follow-Up - Clinic] : a clinic follow-up of [Hypertension] : hypertension [FreeTextEntry1] : denies significant cp or sob [FreeTextEntry2] : no sscp or sob

## 2020-08-12 NOTE — PHYSICAL EXAM
[Normal Appearance] : normal appearance [General Appearance - Well Developed] : well developed [Well Groomed] : well groomed [General Appearance - Well Nourished] : well nourished [No Deformities] : no deformities [General Appearance - In No Acute Distress] : no acute distress [Normal Conjunctiva] : the conjunctiva exhibited no abnormalities [Eyelids - No Xanthelasma] : the eyelids demonstrated no xanthelasmas [No Oral Cyanosis] : no oral cyanosis [No Oral Pallor] : no oral pallor [Normal Oral Mucosa] : normal oral mucosa [No Jugular Venous Rivas A Waves] : no jugular venous rivas A waves [Normal Jugular Venous A Waves Present] : normal jugular venous A waves present [Normal Jugular Venous V Waves Present] : normal jugular venous V waves present [Respiration, Rhythm And Depth] : normal respiratory rhythm and effort [Exaggerated Use Of Accessory Muscles For Inspiration] : no accessory muscle use [Auscultation Breath Sounds / Voice Sounds] : lungs were clear to auscultation bilaterally [Abdomen Soft] : soft [Abdomen Tenderness] : non-tender [Abdomen Mass (___ Cm)] : no abdominal mass palpated [Abnormal Walk] : normal gait [Gait - Sufficient For Exercise Testing] : the gait was sufficient for exercise testing [Nail Clubbing] : no clubbing of the fingernails [Cyanosis, Localized] : no localized cyanosis [Petechial Hemorrhages (___cm)] : no petechial hemorrhages [Skin Color & Pigmentation] : normal skin color and pigmentation [] : no rash [No Venous Stasis] : no venous stasis [Skin Lesions] : no skin lesions [No Skin Ulcers] : no skin ulcer [No Xanthoma] : no  xanthoma was observed [Oriented To Time, Place, And Person] : oriented to person, place, and time [Affect] : the affect was normal [Mood] : the mood was normal [No Anxiety] : not feeling anxious [Normal S2] : normal S2 [Normal S1] : normal S1 [Normal Rate] : normal [No Murmur] : no murmurs heard [2+] : left 2+ [___ +] : bilateral [unfilled]U+ pitting edema to the knees [No Abnormalities] : the abdominal aorta was not enlarged and no bruit was heard [S3] : no S3 [Right Carotid Bruit] : no bruit heard over the right carotid [S4] : no S4 [Right Femoral Bruit] : no bruit heard over the right femoral artery [Left Carotid Bruit] : no bruit heard over the left carotid [Left Femoral Bruit] : no bruit heard over the left femoral artery

## 2020-08-12 NOTE — DISCUSSION/SUMMARY
[ECG Normal Variant] : ECG normal variant [Non-specific ECG Changes] : abnormal ECG [Mild Aortic Stenosis] : mild aortic stenosis [Hypertension] : hypertension [Responding to Treatment] : responding to treatment [Echocardiogram] : echocardiogram [Ambulatory BP Monitoring] : ambulatory blood pressure monitoring [Sodium Restriction] : sodium restriction [PVCs] : ectopic ventricular beats [Improving] : improving [SVT] : paroxysmal supraventricular tachycardia [Venous Insufficiency] : venous insufficiency [None] : There are no changes in medication management [Stable] : stable [Doppler Lower Extremity] : a Doppler exam of the lower extremities [Patient] : the patient [de-identified] : white coat syndrome [de-identified] : anxiety [de-identified] : no recent palps off caffeine [de-identified] : can take extra losarten 25mg prn [de-identified] : ankle edema [de-identified] : less caffeine,green tea [de-identified] : low salt [de-identified] : pt defers

## 2020-10-26 ENCOUNTER — RX RENEWAL (OUTPATIENT)
Age: 80
End: 2020-10-26

## 2020-10-30 ENCOUNTER — APPOINTMENT (OUTPATIENT)
Dept: FAMILY MEDICINE | Facility: CLINIC | Age: 80
End: 2020-10-30
Payer: MEDICARE

## 2020-10-30 VITALS
TEMPERATURE: 97.8 F | DIASTOLIC BLOOD PRESSURE: 78 MMHG | HEART RATE: 70 BPM | RESPIRATION RATE: 16 BRPM | SYSTOLIC BLOOD PRESSURE: 130 MMHG | OXYGEN SATURATION: 100 % | HEIGHT: 70 IN | WEIGHT: 150 LBS | BODY MASS INDEX: 21.47 KG/M2

## 2020-10-30 PROCEDURE — 90662 IIV NO PRSV INCREASED AG IM: CPT

## 2020-10-30 PROCEDURE — G0439: CPT

## 2020-10-30 PROCEDURE — G0008: CPT

## 2020-10-30 NOTE — HISTORY OF PRESENT ILLNESS
[FreeTextEntry1] : hypothyroidism,hptn [de-identified] : Patient with a history of hypertension hypothyroidism supraventricular tachycardia here for a annual wellness evaluation recently saw cardiology with a good report recent laboratory work is all within good levels cholesterolemia very well controlled review of systems is unremarkable she is in need of a flu shot

## 2020-10-30 NOTE — PHYSICAL EXAM
[No Acute Distress] : no acute distress [Well Nourished] : well nourished [Well Developed] : well developed [Well-Appearing] : well-appearing [Normal Sclera/Conjunctiva] : normal sclera/conjunctiva [PERRL] : pupils equal round and reactive to light [Normal Outer Ear/Nose] : the outer ears and nose were normal in appearance [Normal Oropharynx] : the oropharynx was normal [No JVD] : no jugular venous distention [No Lymphadenopathy] : no lymphadenopathy [No Respiratory Distress] : no respiratory distress  [Normal Rate] : normal rate  [Regular Rhythm] : with a regular rhythm [No Murmur] : no murmur heard [No Edema] : there was no peripheral edema [Soft] : abdomen soft [Non Tender] : non-tender [Non-distended] : non-distended [No HSM] : no HSM

## 2020-10-30 NOTE — HEALTH RISK ASSESSMENT
[Very Good] : ~his/her~  mood as very good [] : No [No] : In the past 12 months have you used drugs other than those required for medical reasons? No [No falls in past year] : Patient reported no falls in the past year [0] : 2) Feeling down, depressed, or hopeless: Not at all (0) [de-identified] : active [de-identified] : good [[id number]] : 0 [Patient reported mammogram was normal] : Patient reported mammogram was normal [Patient declined PAP Smear] : Patient declined PAP Smear [Patient reported colonoscopy was normal] : Patient reported colonoscopy was normal [Change in mental status noted] : No change in mental status noted [Language] : denies difficulty with language [Behavior] : denies difficulty with behavior [Learning/Retaining New Information] : denies difficulty learning/retaining new information [Handling Complex Tasks] : denies difficulty handling complex tasks [Reasoning] : denies difficulty with reasoning [Spatial Ability and Orientation] : denies difficulty with spatial ability and orientation [None] : None [Alone] : lives alone [Retired] : retired [Single] : single [Feels Safe at Home] : Feels safe at home [Fully functional (bathing, dressing, toileting, transferring, walking, feeding)] : Fully functional (bathing, dressing, toileting, transferring, walking, feeding) [Fully functional (using the telephone, shopping, preparing meals, housekeeping, doing laundry, using] : Fully functional and needs no help or supervision to perform IADLs (using the telephone, shopping, preparing meals, housekeeping, doing laundry, using transportation, managing medications and managing finances) [Reports changes in hearing] : Reports no changes in hearing [Reports changes in vision] : Reports no changes in vision [Reports changes in dental health] : Reports no changes in dental health [Smoke Detector] : smoke detector [Carbon Monoxide Detector] : carbon monoxide detector [Seat Belt] :  uses seat belt [MammogramDate] : 11/2019 [BoneDensityDate] : 11/08 [BoneDensityComments] : osteopenia [ColonoscopyDate] : 11/2015 [Designated Healthcare Proxy] : Designated healthcare proxy [Name: ___] : Health Care Proxy's Name: [unfilled]  [Relationship: ___] : Relationship: [unfilled] [AdvancecareDate] : 11/2020

## 2020-12-23 ENCOUNTER — RX RENEWAL (OUTPATIENT)
Age: 80
End: 2020-12-23

## 2021-02-10 ENCOUNTER — APPOINTMENT (OUTPATIENT)
Dept: CARDIOLOGY | Facility: CLINIC | Age: 81
End: 2021-02-10
Payer: MEDICARE

## 2021-02-10 ENCOUNTER — NON-APPOINTMENT (OUTPATIENT)
Age: 81
End: 2021-02-10

## 2021-02-10 VITALS
HEIGHT: 70 IN | OXYGEN SATURATION: 97 % | BODY MASS INDEX: 21.76 KG/M2 | WEIGHT: 152 LBS | SYSTOLIC BLOOD PRESSURE: 187 MMHG | TEMPERATURE: 96.3 F | RESPIRATION RATE: 20 BRPM | HEART RATE: 69 BPM | DIASTOLIC BLOOD PRESSURE: 84 MMHG

## 2021-02-10 PROCEDURE — 93000 ELECTROCARDIOGRAM COMPLETE: CPT

## 2021-02-10 PROCEDURE — 99214 OFFICE O/P EST MOD 30 MIN: CPT

## 2021-02-10 NOTE — PHYSICAL EXAM
[General Appearance - Well Developed] : well developed [Well Groomed] : well groomed [Normal Appearance] : normal appearance [General Appearance - Well Nourished] : well nourished [No Deformities] : no deformities [General Appearance - In No Acute Distress] : no acute distress [Normal Conjunctiva] : the conjunctiva exhibited no abnormalities [Eyelids - No Xanthelasma] : the eyelids demonstrated no xanthelasmas [Normal Oral Mucosa] : normal oral mucosa [No Oral Pallor] : no oral pallor [No Oral Cyanosis] : no oral cyanosis [Normal Jugular Venous A Waves Present] : normal jugular venous A waves present [Normal Jugular Venous V Waves Present] : normal jugular venous V waves present [No Jugular Venous Rivas A Waves] : no jugular venous rivas A waves [Respiration, Rhythm And Depth] : normal respiratory rhythm and effort [Exaggerated Use Of Accessory Muscles For Inspiration] : no accessory muscle use [Auscultation Breath Sounds / Voice Sounds] : lungs were clear to auscultation bilaterally [Abdomen Soft] : soft [Abdomen Tenderness] : non-tender [Abdomen Mass (___ Cm)] : no abdominal mass palpated [Abnormal Walk] : normal gait [Gait - Sufficient For Exercise Testing] : the gait was sufficient for exercise testing [Nail Clubbing] : no clubbing of the fingernails [Petechial Hemorrhages (___cm)] : no petechial hemorrhages [Cyanosis, Localized] : no localized cyanosis [Skin Color & Pigmentation] : normal skin color and pigmentation [] : no rash [No Venous Stasis] : no venous stasis [Skin Lesions] : no skin lesions [No Skin Ulcers] : no skin ulcer [No Xanthoma] : no  xanthoma was observed [Oriented To Time, Place, And Person] : oriented to person, place, and time [Affect] : the affect was normal [Mood] : the mood was normal [No Anxiety] : not feeling anxious [Normal Rate] : normal [Normal S1] : normal S1 [Normal S2] : normal S2 [No Murmur] : no murmurs heard [2+] : left 2+ [No Abnormalities] : the abdominal aorta was not enlarged and no bruit was heard [___ +] : bilateral [unfilled]U+ pitting edema to the knees [S4] : no S4 [S3] : no S3 [Right Carotid Bruit] : no bruit heard over the right carotid [Left Carotid Bruit] : no bruit heard over the left carotid [Right Femoral Bruit] : no bruit heard over the right femoral artery [Left Femoral Bruit] : no bruit heard over the left femoral artery

## 2021-02-10 NOTE — DISCUSSION/SUMMARY
[ECG Normal Variant] : ECG normal variant [Non-specific ECG Changes] : abnormal ECG [Mild Aortic Stenosis] : mild aortic stenosis [Hypertension] : hypertension [Responding to Treatment] : responding to treatment [Ambulatory BP Monitoring] : ambulatory blood pressure monitoring [Echocardiogram] : echocardiogram [Sodium Restriction] : sodium restriction [PVCs] : ectopic ventricular beats [SVT] : paroxysmal supraventricular tachycardia [Improving] : improving [None] : There are no changes in medication management [Venous Insufficiency] : venous insufficiency [Stable] : stable [Doppler Lower Extremity] : a Doppler exam of the lower extremities [Patient] : the patient [de-identified] : white coat syndrome [de-identified] : anxiety [de-identified] : can take extra losarten 25mg prn [de-identified] : no recent palps off caffeine [de-identified] : less caffeine,green tea [de-identified] : ankle edema [de-identified] : pt defers [de-identified] : low salt

## 2021-02-10 NOTE — REASON FOR VISIT
[Follow-Up - Clinic] : a clinic follow-up of [Hypertension] : hypertension [FreeTextEntry2] : no sscp or sob [FreeTextEntry1] : denies significant cp or sob

## 2021-05-13 ENCOUNTER — APPOINTMENT (OUTPATIENT)
Dept: MRI IMAGING | Facility: HOSPITAL | Age: 81
End: 2021-05-13
Payer: MEDICARE

## 2021-05-13 ENCOUNTER — OUTPATIENT (OUTPATIENT)
Dept: OUTPATIENT SERVICES | Facility: HOSPITAL | Age: 81
LOS: 1 days | End: 2021-05-13
Payer: MEDICARE

## 2021-05-13 DIAGNOSIS — Z98.89 OTHER SPECIFIED POSTPROCEDURAL STATES: Chronic | ICD-10-CM

## 2021-05-13 DIAGNOSIS — M53.9 DORSOPATHY, UNSPECIFIED: Chronic | ICD-10-CM

## 2021-05-13 DIAGNOSIS — Z00.8 ENCOUNTER FOR OTHER GENERAL EXAMINATION: ICD-10-CM

## 2021-05-13 PROCEDURE — 73720 MRI LWR EXTREMITY W/O&W/DYE: CPT | Mod: 26,RT,MH

## 2021-05-13 PROCEDURE — A9579: CPT

## 2021-05-13 PROCEDURE — 73720 MRI LWR EXTREMITY W/O&W/DYE: CPT

## 2021-05-24 ENCOUNTER — APPOINTMENT (OUTPATIENT)
Dept: FAMILY MEDICINE | Facility: CLINIC | Age: 81
End: 2021-05-24
Payer: MEDICARE

## 2021-05-24 VITALS
TEMPERATURE: 97.8 F | BODY MASS INDEX: 20.69 KG/M2 | WEIGHT: 144.56 LBS | HEART RATE: 83 BPM | DIASTOLIC BLOOD PRESSURE: 84 MMHG | OXYGEN SATURATION: 97 % | SYSTOLIC BLOOD PRESSURE: 132 MMHG | HEIGHT: 70 IN | RESPIRATION RATE: 14 BRPM

## 2021-05-24 DIAGNOSIS — I47.1 SUPRAVENTRICULAR TACHYCARDIA: Chronic | ICD-10-CM

## 2021-05-24 PROCEDURE — 99215 OFFICE O/P EST HI 40 MIN: CPT

## 2021-05-24 NOTE — REVIEW OF SYSTEMS
[Fever] : no fever [Chills] : no chills [Fatigue] : fatigue [Night Sweats] : no night sweats [Vision Problems] : no vision problems [Earache] : no earache [Nasal Discharge] : no nasal discharge [Sore Throat] : no sore throat [Chest Pain] : no chest pain [Palpitations] : no palpitations [Orthopnea] : no orthopnea [Paroxysmal Nocturnal Dyspnea] : no paroxysmal nocturnal dyspnea [Shortness Of Breath] : no shortness of breath [Wheezing] : no wheezing [Cough] : no cough [Dyspnea on Exertion] : no dyspnea on exertion [Abdominal Pain] : no abdominal pain [Nausea] : no nausea [Constipation] : constipation [Diarrhea] : diarrhea [Vomiting] : no vomiting [Heartburn] : no heartburn [Melena] : no melena [Dysuria] : dysuria [Joint Pain] : no joint pain [Joint Stiffness] : no joint stiffness [Muscle Pain] : no muscle pain [Skin Rash] : skin rash [Headache] : no headache [Dizziness] : no dizziness [Fainting] : no fainting [Confusion] : no confusion [Memory Loss] : no memory loss [Unsteady Walking] : no ataxia [Anxiety] : anxiety

## 2021-05-24 NOTE — PHYSICAL EXAM
[No Acute Distress] : no acute distress [PERRL] : pupils equal round and reactive to light [Normal Outer Ear/Nose] : the outer ears and nose were normal in appearance [Normal Oropharynx] : the oropharynx was normal [No JVD] : no jugular venous distention [No Lymphadenopathy] : no lymphadenopathy [Supple] : supple [Thyroid Normal, No Nodules] : the thyroid was normal and there were no nodules present [No Respiratory Distress] : no respiratory distress  [No Accessory Muscle Use] : no accessory muscle use [Clear to Auscultation] : lungs were clear to auscultation bilaterally [Normal Rate] : normal rate  [Regular Rhythm] : with a regular rhythm [No Edema] : there was no peripheral edema [No Murmur] : no murmur heard [Soft] : abdomen soft [Non Tender] : non-tender [Non-distended] : non-distended [No Masses] : no abdominal mass palpated [No HSM] : no HSM [Normal Bowel Sounds] : normal bowel sounds [Normal Supraclavicular Nodes] : no supraclavicular lymphadenopathy [Normal Anterior Cervical Nodes] : no anterior cervical lymphadenopathy [No CVA Tenderness] : no CVA  tenderness [No Spinal Tenderness] : no spinal tenderness [No Joint Swelling] : no joint swelling [Coordination Grossly Intact] : coordination grossly intact [No Focal Deficits] : no focal deficits [Normal Gait] : normal gait [Speech Grossly Normal] : speech grossly normal [Memory Grossly Normal] : memory grossly normal [de-identified] : Thin and in mild distress somewhat ill-appearing [de-identified] : Diffuse maculopapular erythematous rash on extremities

## 2021-05-24 NOTE — ASSESSMENT
[FreeTextEntry1] : Patient here with severe allergic reaction to perhaps multiple medications starting with linezolid and doxycycline which included a urogenital and oral discriminative type rash by history and now a skin rash after taking liquid she also has had systemic symptoms in the sense of weight loss of almost 10pounds and poor appetite she is able to consume fluids and stay hydrated her urogenital symptoms have subsided as have her oral symptoms we have const contacted her ID physician he is advised stopping the Levaquin which she had been continuing to take.  Recent laboratory has been reviewed there were minor elevations of platelet count and sed rate laboratory work will be repeated we have had a conversation with ID concerning her future management she will be seen in the ID office on Friday laboratory work will be performed tomorrow nothing will be given for the constipation as her abdomen is soft and nontender with no mass or other abnormality she has been encouraged to continue fluids and we will speak with her telephonically tomorrow after laboratory work is drawn

## 2021-05-24 NOTE — HISTORY OF PRESENT ILLNESS
[FreeTextEntry8] : Patient is here with issues revolving around recent treatment for osteomyelitis of the left foot about 10 days ago she was started on a combination of doxycycline and  linazelid by her podiatrist.  This resulted in profuse diarrhea and also burning in the vaginal area and blisters on the lips and mouth the medication was discontinued she was seen by an ID expert and started on Levaquin this has resulted in a diffuse maculopapular rash she also has not had a bowel movement she states in almost 2 weeks although there is no abdominal pain no cramping no nausea or vomiting she is able to consume fluids but she has very poor appetite her weight is down about 10 pounds no fever chills or other systemic symptoms laboratory work performed 5 days ago revealed elevated sed rate elevated platelet count otherwise was fairly unremarkable she apparently grew out Pseudomonas from culture

## 2021-05-25 ENCOUNTER — LABORATORY RESULT (OUTPATIENT)
Age: 81
End: 2021-05-25

## 2021-05-27 ENCOUNTER — APPOINTMENT (OUTPATIENT)
Dept: FAMILY MEDICINE | Facility: CLINIC | Age: 81
End: 2021-05-27
Payer: MEDICARE

## 2021-05-27 VITALS
TEMPERATURE: 98 F | HEART RATE: 72 BPM | RESPIRATION RATE: 14 BRPM | WEIGHT: 144 LBS | OXYGEN SATURATION: 98 % | DIASTOLIC BLOOD PRESSURE: 70 MMHG | HEIGHT: 70 IN | SYSTOLIC BLOOD PRESSURE: 130 MMHG | BODY MASS INDEX: 20.62 KG/M2

## 2021-05-27 PROCEDURE — 99213 OFFICE O/P EST LOW 20 MIN: CPT

## 2021-05-27 NOTE — ASSESSMENT
[FreeTextEntry1] : She has been reassured she will be revisit visit revisited by her infectious disease physician tomorrow we have given her a copy of her lab work to take to him she will be is be prescribed MiraLAX for the constipation and will follow up with us telephonically in several days exam is unremarkable the abdomen is soft and nontender there is no mass

## 2021-05-27 NOTE — HISTORY OF PRESENT ILLNESS
[FreeTextEntry1] : Allergic reaction [de-identified] : Patient is here in follow-up on her severe allergic reaction to multiple antibiotics her rash is greatly faded now she is feeling well no fever no chills laboratory work really revealed only inflammation no systemic findings liver kidneys working well she still has not moved her bowels well she has no pain or cramping or distress

## 2021-05-28 ENCOUNTER — APPOINTMENT (OUTPATIENT)
Dept: FAMILY MEDICINE | Facility: CLINIC | Age: 81
End: 2021-05-28
Payer: MEDICARE

## 2021-05-28 VITALS
WEIGHT: 144 LBS | SYSTOLIC BLOOD PRESSURE: 122 MMHG | OXYGEN SATURATION: 99 % | DIASTOLIC BLOOD PRESSURE: 80 MMHG | HEART RATE: 71 BPM | HEIGHT: 70 IN | RESPIRATION RATE: 14 BRPM | TEMPERATURE: 97.1 F | BODY MASS INDEX: 20.62 KG/M2

## 2021-05-28 PROCEDURE — 99212 OFFICE O/P EST SF 10 MIN: CPT

## 2021-05-28 NOTE — HISTORY OF PRESENT ILLNESS
[FreeTextEntry1] : Osteo-myelitis [de-identified] : Patient is here in follow-up on her osteomyelitis allergic reaction and constipation she saw her infectious disease expert today and wanted to speak to me about her situation she has been set placed on cefuroxime axetil 500 mg twice daily by mouth and will be followed by ID she had a small bowel movement today has not yet taken the MiraLAX her rash is fading and her general condition improving

## 2021-05-28 NOTE — PHYSICAL EXAM
[No Acute Distress] : no acute distress [Well Nourished] : well nourished [Well Developed] : well developed [Well-Appearing] : well-appearing [Normal Sclera/Conjunctiva] : normal sclera/conjunctiva [PERRL] : pupils equal round and reactive to light [No Edema] : there was no peripheral edema [de-identified] : Lately improved

## 2021-06-11 ENCOUNTER — APPOINTMENT (OUTPATIENT)
Dept: FAMILY MEDICINE | Facility: CLINIC | Age: 81
End: 2021-06-11
Payer: MEDICARE

## 2021-06-11 VITALS
TEMPERATURE: 97.1 F | DIASTOLIC BLOOD PRESSURE: 80 MMHG | HEIGHT: 70 IN | WEIGHT: 144.19 LBS | OXYGEN SATURATION: 99 % | BODY MASS INDEX: 20.64 KG/M2 | HEART RATE: 63 BPM | SYSTOLIC BLOOD PRESSURE: 138 MMHG | RESPIRATION RATE: 16 BRPM

## 2021-06-11 DIAGNOSIS — T78.40XA ALLERGY, UNSPECIFIED, INITIAL ENCOUNTER: ICD-10-CM

## 2021-06-11 PROCEDURE — 99213 OFFICE O/P EST LOW 20 MIN: CPT

## 2021-06-13 NOTE — HISTORY OF PRESENT ILLNESS
[FreeTextEntry1] : Osteomyelitis and allergic reaction [de-identified] : Patient is seen here in follow-up on her osteomyelitis and allergic reaction she is much improved she has followed up with podiatry and infectious disease is now on Ceftin 500 twice a day long-term and will be following up with these specialists she has had no fever or chills all of her dermatological and mucosal symptoms have abated but were quite severe early on bowels now have returned almost to normal she is eating and her weight is staying stable skin changes are gone

## 2021-06-13 NOTE — PHYSICAL EXAM
[No Acute Distress] : no acute distress [Well Nourished] : well nourished [Well Developed] : well developed [Well-Appearing] : well-appearing [Normal Voice/Communication] : normal voice/communication [Normal Sclera/Conjunctiva] : normal sclera/conjunctiva [Normal Outer Ear/Nose] : the outer ears and nose were normal in appearance [Supple] : supple [No Respiratory Distress] : no respiratory distress  [No Accessory Muscle Use] : no accessory muscle use [Normal Rate] : normal rate  [No Edema] : there was no peripheral edema [No Murmur] : no murmur heard [Soft] : abdomen soft [Non Tender] : non-tender [Non-distended] : non-distended [No Masses] : no abdominal mass palpated [No HSM] : no HSM [Normal Bowel Sounds] : normal bowel sounds [No Hernias] : no hernias [No CVA Tenderness] : no CVA  tenderness [No Spinal Tenderness] : no spinal tenderness [No Rash] : no rash [de-identified] : Signs of some exfoliation on the wrists and arms

## 2021-06-13 NOTE — REVIEW OF SYSTEMS
[Leg Claudication] : leg claudication [Shortness Of Breath] : shortness of breath [Negative] : Integumentary [Fever] : no fever [Chills] : no chills [Fatigue] : no fatigue [Night Sweats] : no night sweats [Sore Throat] : no sore throat [Chest Pain] : no chest pain [Palpitations] : no palpitations [Orthopnea] : no orthopnea [Paroxysmal Nocturnal Dyspnea] : no paroxysmal nocturnal dyspnea [Wheezing] : no wheezing [Cough] : no cough [Dyspnea on Exertion] : no dyspnea on exertion [Abdominal Pain] : no abdominal pain [Vomiting] : no vomiting [Dysuria] : no dysuria [Incontinence] : no incontinence [Hematuria] : no hematuria [Frequency] : no frequency

## 2021-06-13 NOTE — ASSESSMENT
[FreeTextEntry1] : Improving on all parameters skin rash is almost gone sense of wellbeing returned no mucosal issues osteomyelitis seems to be under control we will continue on Ceftin will follow up with infectious disease and podiatry on a continuing basis follow-up here in 2 weeks will also see gastroenterology for follow-up colonoscopy

## 2021-06-24 ENCOUNTER — APPOINTMENT (OUTPATIENT)
Dept: FAMILY MEDICINE | Facility: CLINIC | Age: 81
End: 2021-06-24
Payer: MEDICARE

## 2021-06-24 VITALS
HEIGHT: 70 IN | SYSTOLIC BLOOD PRESSURE: 142 MMHG | TEMPERATURE: 97.1 F | RESPIRATION RATE: 12 BRPM | OXYGEN SATURATION: 97 % | BODY MASS INDEX: 20.9 KG/M2 | WEIGHT: 146 LBS | HEART RATE: 62 BPM | DIASTOLIC BLOOD PRESSURE: 80 MMHG

## 2021-06-24 DIAGNOSIS — T78.40XA ALLERGY, UNSPECIFIED, INITIAL ENCOUNTER: ICD-10-CM

## 2021-06-24 PROCEDURE — 99213 OFFICE O/P EST LOW 20 MIN: CPT

## 2021-06-24 NOTE — ASSESSMENT
[FreeTextEntry1] : Patient improved in all areas will continue following with ID expert and a podiatry continue Ceftin follow-up here in 3 months or as needed

## 2021-06-24 NOTE — PHYSICAL EXAM
[No Acute Distress] : no acute distress [Well Nourished] : well nourished [Well Developed] : well developed [Well-Appearing] : well-appearing [Normal Sclera/Conjunctiva] : normal sclera/conjunctiva [PERRL] : pupils equal round and reactive to light [Normal Outer Ear/Nose] : the outer ears and nose were normal in appearance [Normal Oropharynx] : the oropharynx was normal [No JVD] : no jugular venous distention [No Respiratory Distress] : no respiratory distress  [Regular Rhythm] : with a regular rhythm [No Murmur] : no murmur heard [Soft] : abdomen soft [Non Tender] : non-tender [Non-distended] : non-distended [No HSM] : no HSM [Normal Bowel Sounds] : normal bowel sounds [No Hernias] : no hernias [Normal Anterior Cervical Nodes] : no anterior cervical lymphadenopathy [No CVA Tenderness] : no CVA  tenderness [No Spinal Tenderness] : no spinal tenderness [Coordination Grossly Intact] : coordination grossly intact [No Focal Deficits] : no focal deficits

## 2021-06-24 NOTE — HISTORY OF PRESENT ILLNESS
[de-identified] : Is seen here in follow-up on her osteomyelitis of the left foot and her severe systemic allergic reaction which included the dermatitis and also inflammation of the oral and genital mucosa and diarrhea this most likely resulted from her Levaquin but the doxycycline and linezolid cannot be ruled out as they were also will board at a similar time she now has been on Ceftin 500 twice a day for the osteomyelitis and is doing well she is following up with both her podiatrist and infectious disease expert will be ordering follow-up laboratory work in the near future back in May she did have elevated thrombocytes platelets and also elevated inflammatory markers currently she has no symptoms her bowels returned to normal she has lost 11 pounds and is gaining some of that weight back

## 2021-10-04 ENCOUNTER — RX RENEWAL (OUTPATIENT)
Age: 81
End: 2021-10-04

## 2021-10-14 ENCOUNTER — NON-APPOINTMENT (OUTPATIENT)
Age: 81
End: 2021-10-14

## 2021-10-14 ENCOUNTER — APPOINTMENT (OUTPATIENT)
Dept: CARDIOLOGY | Facility: CLINIC | Age: 81
End: 2021-10-14
Payer: MEDICARE

## 2021-10-14 VITALS
WEIGHT: 145 LBS | SYSTOLIC BLOOD PRESSURE: 163 MMHG | HEIGHT: 70 IN | HEART RATE: 69 BPM | RESPIRATION RATE: 16 BRPM | BODY MASS INDEX: 20.76 KG/M2 | DIASTOLIC BLOOD PRESSURE: 80 MMHG | TEMPERATURE: 98 F | OXYGEN SATURATION: 99 %

## 2021-10-14 PROCEDURE — 99214 OFFICE O/P EST MOD 30 MIN: CPT

## 2021-10-14 PROCEDURE — 93000 ELECTROCARDIOGRAM COMPLETE: CPT

## 2021-10-14 NOTE — PHYSICAL EXAM
[General Appearance - Well Developed] : well developed [Normal Appearance] : normal appearance [Well Groomed] : well groomed [General Appearance - Well Nourished] : well nourished [No Deformities] : no deformities [General Appearance - In No Acute Distress] : no acute distress [Normal Conjunctiva] : the conjunctiva exhibited no abnormalities [Eyelids - No Xanthelasma] : the eyelids demonstrated no xanthelasmas [Normal Oral Mucosa] : normal oral mucosa [No Oral Pallor] : no oral pallor [No Oral Cyanosis] : no oral cyanosis [Normal Jugular Venous A Waves Present] : normal jugular venous A waves present [Normal Jugular Venous V Waves Present] : normal jugular venous V waves present [No Jugular Venous Rivas A Waves] : no jugular venous rivas A waves [Respiration, Rhythm And Depth] : normal respiratory rhythm and effort [Exaggerated Use Of Accessory Muscles For Inspiration] : no accessory muscle use [Auscultation Breath Sounds / Voice Sounds] : lungs were clear to auscultation bilaterally [Abdomen Soft] : soft [Abdomen Tenderness] : non-tender [Abdomen Mass (___ Cm)] : no abdominal mass palpated [Abnormal Walk] : normal gait [Gait - Sufficient For Exercise Testing] : the gait was sufficient for exercise testing [Nail Clubbing] : no clubbing of the fingernails [Cyanosis, Localized] : no localized cyanosis [Petechial Hemorrhages (___cm)] : no petechial hemorrhages [Skin Color & Pigmentation] : normal skin color and pigmentation [] : no rash [No Venous Stasis] : no venous stasis [Skin Lesions] : no skin lesions [No Skin Ulcers] : no skin ulcer [No Xanthoma] : no  xanthoma was observed [Oriented To Time, Place, And Person] : oriented to person, place, and time [Affect] : the affect was normal [Mood] : the mood was normal [No Anxiety] : not feeling anxious [Normal Rate] : normal [Normal S1] : normal S1 [Normal S2] : normal S2 [No Murmur] : no murmurs heard [2+] : left 2+ [No Abnormalities] : the abdominal aorta was not enlarged and no bruit was heard [___ +] : bilateral [unfilled]U+ pitting edema to the knees [S3] : no S3 [S4] : no S4 [Right Carotid Bruit] : no bruit heard over the right carotid [Left Carotid Bruit] : no bruit heard over the left carotid [Right Femoral Bruit] : no bruit heard over the right femoral artery [Left Femoral Bruit] : no bruit heard over the left femoral artery

## 2021-10-14 NOTE — DISCUSSION/SUMMARY
[ECG Normal Variant] : ECG normal variant [Non-specific ECG Changes] : abnormal ECG [Mild Aortic Stenosis] : mild aortic stenosis [Hypertension] : hypertension [Responding to Treatment] : responding to treatment [Ambulatory BP Monitoring] : ambulatory blood pressure monitoring [Echocardiogram] : echocardiogram [PVCs] : ectopic ventricular beats [SVT] : paroxysmal supraventricular tachycardia [Improving] : improving [None] : There are no changes in medication management [Venous Insufficiency] : venous insufficiency [Stable] : stable [Doppler Lower Extremity] : a Doppler exam of the lower extremities [Patient] : the patient [de-identified] : anxiety [de-identified] : white coat syndrome [de-identified] : no recent palps off caffeine [de-identified] : less caffeine,green tea [de-identified] : ankle edema [de-identified] : pt defers [de-identified] : low salt

## 2021-11-01 ENCOUNTER — LABORATORY RESULT (OUTPATIENT)
Age: 81
End: 2021-11-01

## 2021-11-01 ENCOUNTER — APPOINTMENT (OUTPATIENT)
Dept: FAMILY MEDICINE | Facility: CLINIC | Age: 81
End: 2021-11-01
Payer: MEDICARE

## 2021-11-01 VITALS
DIASTOLIC BLOOD PRESSURE: 72 MMHG | OXYGEN SATURATION: 98 % | HEART RATE: 79 BPM | HEIGHT: 70 IN | TEMPERATURE: 96.2 F | SYSTOLIC BLOOD PRESSURE: 150 MMHG | RESPIRATION RATE: 15 BRPM | WEIGHT: 149.2 LBS | BODY MASS INDEX: 21.36 KG/M2

## 2021-11-01 LAB
BILIRUB UR QL STRIP: NEGATIVE
CLARITY UR: ABNORMAL
COLLECTION METHOD: NORMAL
GLUCOSE UR-MCNC: NEGATIVE
HCG UR QL: 0.2 EU/DL
HGB UR QL STRIP.AUTO: NEGATIVE
KETONES UR-MCNC: ABNORMAL
LEUKOCYTE ESTERASE UR QL STRIP: ABNORMAL
NITRITE UR QL STRIP: ABNORMAL
PH UR STRIP: 6
PROT UR STRIP-MCNC: NEGATIVE
SP GR UR STRIP: 1.02

## 2021-11-01 PROCEDURE — 81003 URINALYSIS AUTO W/O SCOPE: CPT | Mod: QW

## 2021-11-01 PROCEDURE — G0008: CPT

## 2021-11-01 PROCEDURE — 90662 IIV NO PRSV INCREASED AG IM: CPT

## 2021-11-01 PROCEDURE — 99213 OFFICE O/P EST LOW 20 MIN: CPT | Mod: 25

## 2021-11-01 NOTE — ASSESSMENT
[FreeTextEntry1] : Patient is given a high-dose flu shot she also will have urine culture done this may or may not represent a urinary tract infection she will be referred to Dr. Robin of gynecology for GYN exam to rule out atrophic vaginitis or other gynecological pathology follow-up after urinary culture results are available to us patient is given a flu shot

## 2021-11-01 NOTE — HISTORY OF PRESENT ILLNESS
[FreeTextEntry1] : Urinary frequency and burning [de-identified] : Patient here today for a flu shot however she is complaining of urinary frequency and burning off again on again burning not always related to her urination no fever chills or systemic symptoms otherwise she feels well recent visit with cardiology was unremarkable

## 2021-11-02 DIAGNOSIS — N30.00 ACUTE CYSTITIS W/OUT HEMATURIA: ICD-10-CM

## 2021-11-02 LAB
APPEARANCE: ABNORMAL
BILIRUBIN URINE: NEGATIVE
BLOOD URINE: NEGATIVE
COLOR: YELLOW
GLUCOSE QUALITATIVE U: NEGATIVE
KETONES URINE: NEGATIVE
LEUKOCYTE ESTERASE URINE: ABNORMAL
NITRITE URINE: POSITIVE
PH URINE: 6
PROTEIN URINE: NORMAL
SPECIFIC GRAVITY URINE: 1.02
UROBILINOGEN URINE: NORMAL

## 2021-11-03 ENCOUNTER — APPOINTMENT (OUTPATIENT)
Dept: OBGYN | Facility: CLINIC | Age: 81
End: 2021-11-03
Payer: MEDICARE

## 2021-11-03 VITALS
HEIGHT: 70 IN | WEIGHT: 146 LBS | HEART RATE: 71 BPM | SYSTOLIC BLOOD PRESSURE: 142 MMHG | DIASTOLIC BLOOD PRESSURE: 70 MMHG | OXYGEN SATURATION: 100 % | BODY MASS INDEX: 20.9 KG/M2 | TEMPERATURE: 97.2 F

## 2021-11-03 PROCEDURE — 99204 OFFICE O/P NEW MOD 45 MIN: CPT | Mod: 25

## 2021-11-03 PROCEDURE — G0101: CPT

## 2021-11-03 NOTE — PLAN
[FreeTextEntry1] : I spent the time noted on the day of this patient encounter preparing for, providing and documenting the above E/M service and counseling and educate patient on differential, workup, disease course, and treatment/management. Education was provided to the patient during this encounter. All questions and concerns were answered and addressed in detail.\par \par Raisa Orourke MD\par

## 2021-11-03 NOTE — HISTORY OF PRESENT ILLNESS
[FreeTextEntry1] : 80 yo P0 with LMP 50 presents today for well woman exam. Recently treated for UTI by Dr. Calixto. Follows with Dr. Ramos for routine GYN care, patient doesn't know why she is here today and stated that she wants to continue seeing Dr. Escobar but since she is here today, she will have a vaginal exam and pap. Denies complaints. Declines breast and abdominal exam.\par \par \par POB: denies\par PGYN: 50, denies pelvic infections or abl pap\par PMH: HTN, hypothyroid, HLD, \par PSH: Neck, thyroid, hernia repair, spine surg\par Med: per MAR\par All: DOXY, levaquin, linezolid\par SH: former smoker\par

## 2021-11-08 LAB
C TRACH RRNA SPEC QL NAA+PROBE: NOT DETECTED
CANDIDA VAG CYTO: NOT DETECTED
CYTOLOGY CVX/VAG DOC THIN PREP: ABNORMAL
G VAGINALIS+PREV SP MTYP VAG QL MICRO: NOT DETECTED
HPV HIGH+LOW RISK DNA PNL CVX: NOT DETECTED
N GONORRHOEA RRNA SPEC QL NAA+PROBE: NOT DETECTED
SOURCE AMPLIFICATION: NORMAL
T VAGINALIS VAG QL WET PREP: NOT DETECTED

## 2021-11-29 ENCOUNTER — RX RENEWAL (OUTPATIENT)
Age: 81
End: 2021-11-29

## 2021-11-30 ENCOUNTER — RX RENEWAL (OUTPATIENT)
Age: 81
End: 2021-11-30

## 2021-12-06 ENCOUNTER — APPOINTMENT (OUTPATIENT)
Dept: FAMILY MEDICINE | Facility: CLINIC | Age: 81
End: 2021-12-06
Payer: MEDICARE

## 2021-12-06 VITALS
OXYGEN SATURATION: 98 % | DIASTOLIC BLOOD PRESSURE: 62 MMHG | HEIGHT: 70 IN | BODY MASS INDEX: 21.36 KG/M2 | WEIGHT: 149.2 LBS | HEART RATE: 94 BPM | RESPIRATION RATE: 18 BRPM | SYSTOLIC BLOOD PRESSURE: 142 MMHG | TEMPERATURE: 96.9 F

## 2021-12-06 LAB
BILIRUB UR QL STRIP: NEGATIVE
CLARITY UR: ABNORMAL
COLLECTION METHOD: NORMAL
GLUCOSE UR-MCNC: NEGATIVE
HCG UR QL: 0.2 EU/DL
HGB UR QL STRIP.AUTO: ABNORMAL
KETONES UR-MCNC: ABNORMAL
LEUKOCYTE ESTERASE UR QL STRIP: ABNORMAL
NITRITE UR QL STRIP: ABNORMAL
PH UR STRIP: 5.5
PROT UR STRIP-MCNC: ABNORMAL
SP GR UR STRIP: 1.02

## 2021-12-06 PROCEDURE — 81003 URINALYSIS AUTO W/O SCOPE: CPT | Mod: QW

## 2021-12-06 PROCEDURE — 99213 OFFICE O/P EST LOW 20 MIN: CPT | Mod: 25

## 2021-12-07 LAB
APPEARANCE: ABNORMAL
BACTERIA: ABNORMAL
BILIRUBIN URINE: NEGATIVE
BLOOD URINE: NEGATIVE
COLOR: YELLOW
GLUCOSE QUALITATIVE U: NEGATIVE
HYALINE CASTS: 2 /LPF
KETONES URINE: NORMAL
LEUKOCYTE ESTERASE URINE: ABNORMAL
MICROSCOPIC-UA: NORMAL
NITRITE URINE: POSITIVE
PH URINE: 6
PROTEIN URINE: ABNORMAL
RED BLOOD CELLS URINE: 12 /HPF
SPECIFIC GRAVITY URINE: 1.03
SQUAMOUS EPITHELIAL CELLS: 1 /HPF
UROBILINOGEN URINE: NORMAL
WHITE BLOOD CELLS URINE: 618 /HPF

## 2021-12-12 ENCOUNTER — NON-APPOINTMENT (OUTPATIENT)
Age: 81
End: 2021-12-12

## 2021-12-12 LAB — BACTERIA UR CULT: ABNORMAL

## 2022-04-14 ENCOUNTER — NON-APPOINTMENT (OUTPATIENT)
Age: 82
End: 2022-04-14

## 2022-04-14 ENCOUNTER — APPOINTMENT (OUTPATIENT)
Dept: CARDIOLOGY | Facility: CLINIC | Age: 82
End: 2022-04-14
Payer: MEDICARE

## 2022-04-14 VITALS
HEIGHT: 70 IN | HEART RATE: 63 BPM | DIASTOLIC BLOOD PRESSURE: 83 MMHG | BODY MASS INDEX: 20.9 KG/M2 | RESPIRATION RATE: 18 BRPM | OXYGEN SATURATION: 97 % | SYSTOLIC BLOOD PRESSURE: 180 MMHG | TEMPERATURE: 98.2 F | WEIGHT: 146 LBS

## 2022-04-14 VITALS — DIASTOLIC BLOOD PRESSURE: 85 MMHG | SYSTOLIC BLOOD PRESSURE: 160 MMHG

## 2022-04-14 LAB
ALBUMIN SERPL ELPH-MCNC: 4.6 G/DL
ALP BLD-CCNC: 89 U/L
ALT SERPL-CCNC: 22 U/L
ANION GAP SERPL CALC-SCNC: 14 MMOL/L
AST SERPL-CCNC: 29 U/L
BILIRUB SERPL-MCNC: 0.8 MG/DL
BUN SERPL-MCNC: 9 MG/DL
CALCIUM SERPL-MCNC: 10.1 MG/DL
CHLORIDE SERPL-SCNC: 101 MMOL/L
CHOLEST SERPL-MCNC: 171 MG/DL
CO2 SERPL-SCNC: 25 MMOL/L
CREAT SERPL-MCNC: 0.89 MG/DL
EGFR: 64 ML/MIN/1.73M2
GLUCOSE SERPL-MCNC: 99 MG/DL
HDLC SERPL-MCNC: 61 MG/DL
LDLC SERPL CALC-MCNC: 86 MG/DL
NONHDLC SERPL-MCNC: 109 MG/DL
POTASSIUM SERPL-SCNC: 4.5 MMOL/L
PROT SERPL-MCNC: 7 G/DL
SODIUM SERPL-SCNC: 141 MMOL/L
TRIGL SERPL-MCNC: 118 MG/DL

## 2022-04-14 PROCEDURE — 99214 OFFICE O/P EST MOD 30 MIN: CPT

## 2022-04-14 PROCEDURE — 93000 ELECTROCARDIOGRAM COMPLETE: CPT

## 2022-04-14 PROCEDURE — 93306 TTE W/DOPPLER COMPLETE: CPT

## 2022-04-14 NOTE — DISCUSSION/SUMMARY
[ECG Normal Variant] : ECG normal variant [Non-specific ECG Changes] : abnormal ECG [Mild Aortic Stenosis] : mild aortic stenosis [Hypertension] : hypertension [Responding to Treatment] : responding to treatment [Ambulatory BP Monitoring] : ambulatory blood pressure monitoring [Echocardiogram] : echocardiogram [PVCs] : ectopic ventricular beats [SVT] : paroxysmal supraventricular tachycardia [Improving] : improving [None] : There are no changes in medication management [Venous Insufficiency] : venous insufficiency [Stable] : stable [Doppler Lower Extremity] : a Doppler exam of the lower extremities [Patient] : the patient [de-identified] : anxiety [de-identified] : white coat syndrome [de-identified] : no recent palps off caffeine [de-identified] : less caffeine,green tea [de-identified] : ankle edema [de-identified] : pt defers [de-identified] : low salt

## 2022-04-14 NOTE — PHYSICAL EXAM
[General Appearance - Well Developed] : well developed [Normal Appearance] : normal appearance [Well Groomed] : well groomed [General Appearance - Well Nourished] : well nourished [No Deformities] : no deformities [General Appearance - In No Acute Distress] : no acute distress [Normal Conjunctiva] : the conjunctiva exhibited no abnormalities [Eyelids - No Xanthelasma] : the eyelids demonstrated no xanthelasmas [Normal Oral Mucosa] : normal oral mucosa [No Oral Pallor] : no oral pallor [No Oral Cyanosis] : no oral cyanosis [Normal Jugular Venous A Waves Present] : normal jugular venous A waves present [Normal Jugular Venous V Waves Present] : normal jugular venous V waves present [No Jugular Venous Rivas A Waves] : no jugular venous rivas A waves [Respiration, Rhythm And Depth] : normal respiratory rhythm and effort [Auscultation Breath Sounds / Voice Sounds] : lungs were clear to auscultation bilaterally [Exaggerated Use Of Accessory Muscles For Inspiration] : no accessory muscle use [Abdomen Soft] : soft [Abdomen Tenderness] : non-tender [Abdomen Mass (___ Cm)] : no abdominal mass palpated [Abnormal Walk] : normal gait [Gait - Sufficient For Exercise Testing] : the gait was sufficient for exercise testing [Nail Clubbing] : no clubbing of the fingernails [Cyanosis, Localized] : no localized cyanosis [Petechial Hemorrhages (___cm)] : no petechial hemorrhages [Skin Color & Pigmentation] : normal skin color and pigmentation [] : no rash [No Venous Stasis] : no venous stasis [No Skin Ulcers] : no skin ulcer [Skin Lesions] : no skin lesions [No Xanthoma] : no  xanthoma was observed [Oriented To Time, Place, And Person] : oriented to person, place, and time [Affect] : the affect was normal [Mood] : the mood was normal [No Anxiety] : not feeling anxious [Normal Rate] : normal [Normal S1] : normal S1 [Normal S2] : normal S2 [No Murmur] : no murmurs heard [2+] : left 2+ [No Abnormalities] : the abdominal aorta was not enlarged and no bruit was heard [___ +] : bilateral [unfilled]U+ pitting edema to the knees [S3] : no S3 [S4] : no S4 [Right Carotid Bruit] : no bruit heard over the right carotid [Right Femoral Bruit] : no bruit heard over the right femoral artery [Left Carotid Bruit] : no bruit heard over the left carotid [Left Femoral Bruit] : no bruit heard over the left femoral artery

## 2022-08-18 ENCOUNTER — APPOINTMENT (OUTPATIENT)
Dept: FAMILY MEDICINE | Facility: CLINIC | Age: 82
End: 2022-08-18

## 2022-08-18 VITALS
DIASTOLIC BLOOD PRESSURE: 74 MMHG | RESPIRATION RATE: 18 BRPM | WEIGHT: 144 LBS | BODY MASS INDEX: 20.62 KG/M2 | OXYGEN SATURATION: 98 % | HEIGHT: 70 IN | TEMPERATURE: 98.4 F | HEART RATE: 70 BPM | SYSTOLIC BLOOD PRESSURE: 132 MMHG

## 2022-08-18 PROCEDURE — 99214 OFFICE O/P EST MOD 30 MIN: CPT

## 2022-08-18 NOTE — HISTORY OF PRESENT ILLNESS
[No Pertinent Cardiac History] : no history of aortic stenosis, atrial fibrillation, coronary artery disease, recent myocardial infarction, or implantable device/pacemaker [No Pertinent Pulmonary History] : no history of asthma, COPD, sleep apnea, or smoking [No Adverse Anesthesia Reaction] : no adverse anesthesia reaction in self or family member [Chronic Anticoagulation] : no chronic anticoagulation [Chronic Kidney Disease] : no chronic kidney disease [Diabetes] : no diabetes [(Patient denies any chest pain, claudication, dyspnea on exertion, orthopnea, palpitations or syncope)] : Patient denies any chest pain, claudication, dyspnea on exertion, orthopnea, palpitations or syncope [Moderate (4-6 METs)] : Moderate (4-6 METs) [FreeTextEntry1] : left cataract [FreeTextEntry2] : 8/23/22 [FreeTextEntry3] : Angeline [FreeTextEntry4] : Patient here for preoperative evaluation prior to left cataract surgery scheduled for August 31 of this year with Dr. Bhatia she has  hypothyroidism and a history of supraventricular tachycardia, central hypertension and hyperlipidemia her meds medicines include digoxin 0.125 daily levothyroxine 88 mcg daily losartan 25 mg daily metoprolol ER 25 mg daily, and simvastatin 40 mg daily.  She saw cardiology on April 14 of this year with a good report.  Patient has had several operations in the past the last one being shoulder surgery about 8 years ago none of them were associated with any postoperative complications [FreeTextEntry7] : EKG and echo April 14, 2022 no acute abnormalities

## 2022-08-18 NOTE — ASSESSMENT
[High Risk Surgery - Intraperitoneal, Intrathoracic or Supringuinal Vascular Procedures] : High Risk Surgery - Intraperitoneal, Intrathoracic or Supringuinal Vascular Procedures - No (0) [Ischemic Heart Disease] : Ischemic Heart Disease - No (0) [Congestive Heart Failure] : Congestive Heart Failure - No (0) [Creatinine >= 2mg/dL (1 Point)] : Creatinine >= 2mg/dL - No (0) [0] : 0 , RCRI Class: I, Risk of Post-Op Cardiac Complications: 3.9%, 95% CI for Risk Estimate: 2.8% - 5.4% [Patient Optimized for Surgery] : Patient optimized for surgery [No Further Testing Recommended] : no further testing recommended [FreeTextEntry4] : Low risk surgery low risk patient proceed with surgery [FreeTextEntry2] : Not applicable

## 2022-08-18 NOTE — PHYSICAL EXAM
[No Acute Distress] : no acute distress [Well Nourished] : well nourished [Well Developed] : well developed [Well-Appearing] : well-appearing [Normal Sclera/Conjunctiva] : normal sclera/conjunctiva [Normal Outer Ear/Nose] : the outer ears and nose were normal in appearance [Normal Oropharynx] : the oropharynx was normal [No JVD] : no jugular venous distention [No Lymphadenopathy] : no lymphadenopathy [Supple] : supple [Thyroid Normal, No Nodules] : the thyroid was normal and there were no nodules present [No Respiratory Distress] : no respiratory distress  [No Accessory Muscle Use] : no accessory muscle use [Normal Rate] : normal rate  [Regular Rhythm] : with a regular rhythm [No Edema] : there was no peripheral edema [Soft] : abdomen soft [Non-distended] : non-distended [No Masses] : no abdominal mass palpated [No HSM] : no HSM [Normal Bowel Sounds] : normal bowel sounds [Normal Supraclavicular Nodes] : no supraclavicular lymphadenopathy [Normal Posterior Cervical Nodes] : no posterior cervical lymphadenopathy [Normal Anterior Cervical Nodes] : no anterior cervical lymphadenopathy [No CVA Tenderness] : no CVA  tenderness [No Spinal Tenderness] : no spinal tenderness [No Joint Swelling] : no joint swelling [Grossly Normal Strength/Tone] : grossly normal strength/tone [No Rash] : no rash [Normal] : affect was normal and insight and judgment were intact

## 2022-08-22 ENCOUNTER — LABORATORY RESULT (OUTPATIENT)
Age: 82
End: 2022-08-22

## 2022-08-22 ENCOUNTER — RESULT CHARGE (OUTPATIENT)
Age: 82
End: 2022-08-22

## 2022-08-22 ENCOUNTER — APPOINTMENT (OUTPATIENT)
Dept: GERIATRICS | Facility: CLINIC | Age: 82
End: 2022-08-22

## 2022-08-22 VITALS
RESPIRATION RATE: 18 BRPM | OXYGEN SATURATION: 98 % | TEMPERATURE: 96.2 F | DIASTOLIC BLOOD PRESSURE: 90 MMHG | HEART RATE: 75 BPM | BODY MASS INDEX: 20.62 KG/M2 | WEIGHT: 144 LBS | SYSTOLIC BLOOD PRESSURE: 132 MMHG | HEIGHT: 70 IN

## 2022-08-22 DIAGNOSIS — N39.0 URINARY TRACT INFECTION, SITE NOT SPECIFIED: ICD-10-CM

## 2022-08-22 DIAGNOSIS — R30.0 DYSURIA: ICD-10-CM

## 2022-08-22 DIAGNOSIS — E03.9 HYPOTHYROIDISM, UNSPECIFIED: ICD-10-CM

## 2022-08-22 DIAGNOSIS — H26.9 UNSPECIFIED CATARACT: ICD-10-CM

## 2022-08-22 PROCEDURE — 99203 OFFICE O/P NEW LOW 30 MIN: CPT

## 2022-08-22 NOTE — HISTORY OF PRESENT ILLNESS
[FreeTextEntry1] : 82yof with pmhx of hypothyroidism, HLD, and HTN who is seen for new symtpoms of dysuria.\par \par states had similar presentation in dec\par denies nausea, ab pain, diarrhea, flank pain. no hematuria or vaginal discharge\par dysuria associted with increased urinary frequency \par planning for cataract surgery next week\par \par follows with podiatry for hx of foot ulcer, now healed on right foot\par \par does not drive\par \par gets help for driving service\par \par HCP is sister Janiya Bustillo (form on file) [No falls in past year] : Patient reported no falls in the past year [Completely Independent] : Completely independent. 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

## 2022-08-22 NOTE — ASSESSMENT
[FreeTextEntry1] : acute uti:\par will treat with bactrim\par f/u ua with culture\par Ua in office + for LE and blood\par \par htn: controlled c/w current meds\par \par planning for cataract surgery\par \par

## 2022-08-22 NOTE — PHYSICAL EXAM
[Normal] : alert, in no acute distress [Sclera] : the sclera and conjunctiva were normal [EOMI] : extraocular movements were intact [Normal Outer Ear/Nose] : the ears and nose were normal in appearance [Normal Appearance] : the appearance of the neck was normal [Supple] : the neck was supple [No Respiratory Distress] : no respiratory distress [No Acc Muscle Use] : no accessory muscle use [Respiration, Rhythm And Depth] : normal respiratory rhythm and effort [Auscultation Breath Sounds / Voice Sounds] : lungs were clear to auscultation bilaterally [Normal S1, S2] : normal S1 and S2 [Heart Rate And Rhythm] : heart rate was normal and rhythm regular [Edema] : edema was not present [Bowel Sounds] : normal bowel sounds [Abdomen Soft] : soft [No Clubbing, Cyanosis] : no clubbing or cyanosis of the fingernails [Involuntary Movements] : no involuntary movements were seen [Normal Color / Pigmentation] : normal skin color and pigmentation [No Focal Deficits] : no focal deficits [Normal Affect] : the affect was normal [Normal Mood] : the mood was normal [de-identified] : suprapubic tenderness [de-identified] : no kyphosis

## 2022-08-23 LAB
BILIRUB UR QL STRIP: NEGATIVE
CLARITY UR: CLEAR
COLLECTION METHOD: NORMAL
GLUCOSE UR-MCNC: NEGATIVE
HCG UR QL: 0.2 EU/DL
HGB UR QL STRIP.AUTO: NORMAL
KETONES UR-MCNC: NEGATIVE
LEUKOCYTE ESTERASE UR QL STRIP: NORMAL
NITRITE UR QL STRIP: NEGATIVE
PH UR STRIP: 5.5
PROT UR STRIP-MCNC: NEGATIVE
SP GR UR STRIP: 1.01

## 2022-08-25 LAB
APPEARANCE: CLEAR
BILIRUBIN URINE: NEGATIVE
BLOOD URINE: ABNORMAL
COLOR: NORMAL
GLUCOSE QUALITATIVE U: NEGATIVE
KETONES URINE: NEGATIVE
LEUKOCYTE ESTERASE URINE: ABNORMAL
NITRITE URINE: NEGATIVE
PH URINE: 6
PROTEIN URINE: NEGATIVE
SPECIFIC GRAVITY URINE: 1.02
UROBILINOGEN URINE: NORMAL

## 2022-09-28 ENCOUNTER — RX RENEWAL (OUTPATIENT)
Age: 82
End: 2022-09-28

## 2022-10-13 ENCOUNTER — NON-APPOINTMENT (OUTPATIENT)
Age: 82
End: 2022-10-13

## 2022-10-13 ENCOUNTER — APPOINTMENT (OUTPATIENT)
Dept: CARDIOLOGY | Facility: CLINIC | Age: 82
End: 2022-10-13

## 2022-10-13 VITALS
SYSTOLIC BLOOD PRESSURE: 177 MMHG | HEART RATE: 55 BPM | RESPIRATION RATE: 16 BRPM | OXYGEN SATURATION: 99 % | BODY MASS INDEX: 20.04 KG/M2 | TEMPERATURE: 98 F | WEIGHT: 140 LBS | HEIGHT: 70 IN | DIASTOLIC BLOOD PRESSURE: 80 MMHG

## 2022-10-13 VITALS — SYSTOLIC BLOOD PRESSURE: 150 MMHG | DIASTOLIC BLOOD PRESSURE: 70 MMHG

## 2022-10-13 PROCEDURE — 93000 ELECTROCARDIOGRAM COMPLETE: CPT

## 2022-10-13 PROCEDURE — 99214 OFFICE O/P EST MOD 30 MIN: CPT

## 2022-10-13 NOTE — DISCUSSION/SUMMARY
[ECG Normal Variant] : ECG normal variant [Non-specific ECG Changes] : abnormal ECG [Mild Aortic Stenosis] : mild aortic stenosis [Hypertension] : hypertension [Responding to Treatment] : responding to treatment [Outpatient Evaluation] : outpatient evaluation [Ambulatory BP Monitoring] : ambulatory blood pressure monitoring [Echocardiogram] : echocardiogram [PVCs] : ectopic ventricular beats [SVT] : paroxysmal supraventricular tachycardia [Improving] : improving [None] : There are no changes in medication management [Venous Insufficiency] : venous insufficiency [Stable] : stable [Doppler Lower Extremity] : a Doppler exam of the lower extremities [Patient] : the patient [de-identified] : anxiety [de-identified] : white coat syndrome [de-identified] : no recent palps off caffeine [de-identified] : less caffeine,green tea [de-identified] : ankle edema [de-identified] : pt defers [de-identified] : low salt

## 2022-10-13 NOTE — PHYSICAL EXAM
[General Appearance - Well Developed] : well developed [Normal Appearance] : normal appearance [Well Groomed] : well groomed [General Appearance - Well Nourished] : well nourished [No Deformities] : no deformities [General Appearance - In No Acute Distress] : no acute distress [Normal Conjunctiva] : the conjunctiva exhibited no abnormalities [Eyelids - No Xanthelasma] : the eyelids demonstrated no xanthelasmas [Normal Oral Mucosa] : normal oral mucosa [No Oral Pallor] : no oral pallor [No Oral Cyanosis] : no oral cyanosis [Normal Jugular Venous A Waves Present] : normal jugular venous A waves present [Normal Jugular Venous V Waves Present] : normal jugular venous V waves present [No Jugular Venous Rivas A Waves] : no jugular venous rivas A waves [Respiration, Rhythm And Depth] : normal respiratory rhythm and effort [Exaggerated Use Of Accessory Muscles For Inspiration] : no accessory muscle use [Auscultation Breath Sounds / Voice Sounds] : lungs were clear to auscultation bilaterally [Abdomen Soft] : soft [Abdomen Tenderness] : non-tender [Abdomen Mass (___ Cm)] : no abdominal mass palpated [Abnormal Walk] : normal gait [Gait - Sufficient For Exercise Testing] : the gait was sufficient for exercise testing [Nail Clubbing] : no clubbing of the fingernails [Cyanosis, Localized] : no localized cyanosis [Petechial Hemorrhages (___cm)] : no petechial hemorrhages [Skin Color & Pigmentation] : normal skin color and pigmentation [] : no rash [No Venous Stasis] : no venous stasis [Skin Lesions] : no skin lesions [No Skin Ulcers] : no skin ulcer [No Xanthoma] : no  xanthoma was observed [Oriented To Time, Place, And Person] : oriented to person, place, and time [Affect] : the affect was normal [Mood] : the mood was normal [No Anxiety] : not feeling anxious [Normal Rate] : normal [Normal S1] : normal S1 [Normal S2] : normal S2 [S3] : no S3 [S4] : no S4 [No Murmur] : no murmurs heard [Right Carotid Bruit] : no bruit heard over the right carotid [Left Carotid Bruit] : no bruit heard over the left carotid [Right Femoral Bruit] : no bruit heard over the right femoral artery [Left Femoral Bruit] : no bruit heard over the left femoral artery [2+] : left 2+ [No Abnormalities] : the abdominal aorta was not enlarged and no bruit was heard [___ +] : bilateral [unfilled]U+ pitting edema to the knees

## 2022-10-14 ENCOUNTER — NON-APPOINTMENT (OUTPATIENT)
Age: 82
End: 2022-10-14

## 2022-10-14 ENCOUNTER — RX RENEWAL (OUTPATIENT)
Age: 82
End: 2022-10-14

## 2022-10-14 ENCOUNTER — APPOINTMENT (OUTPATIENT)
Dept: FAMILY MEDICINE | Facility: CLINIC | Age: 82
End: 2022-10-14

## 2022-10-14 ENCOUNTER — MED ADMIN CHARGE (OUTPATIENT)
Age: 82
End: 2022-10-14

## 2022-10-14 DIAGNOSIS — E78.5 HYPERLIPIDEMIA, UNSPECIFIED: ICD-10-CM

## 2022-10-14 DIAGNOSIS — Z23 ENCOUNTER FOR IMMUNIZATION: ICD-10-CM

## 2022-10-14 PROCEDURE — 90662 IIV NO PRSV INCREASED AG IM: CPT

## 2022-10-14 PROCEDURE — G0008: CPT

## 2022-11-04 ENCOUNTER — OUTPATIENT (OUTPATIENT)
Dept: OUTPATIENT SERVICES | Facility: HOSPITAL | Age: 82
LOS: 1 days | End: 2022-11-04
Payer: MEDICARE

## 2022-11-04 ENCOUNTER — APPOINTMENT (OUTPATIENT)
Dept: MRI IMAGING | Facility: HOSPITAL | Age: 82
End: 2022-11-04

## 2022-11-04 DIAGNOSIS — M53.9 DORSOPATHY, UNSPECIFIED: Chronic | ICD-10-CM

## 2022-11-04 DIAGNOSIS — Z00.8 ENCOUNTER FOR OTHER GENERAL EXAMINATION: ICD-10-CM

## 2022-11-04 DIAGNOSIS — Z98.89 OTHER SPECIFIED POSTPROCEDURAL STATES: Chronic | ICD-10-CM

## 2022-11-04 PROCEDURE — A9579: CPT

## 2022-11-04 PROCEDURE — 73720 MRI LWR EXTREMITY W/O&W/DYE: CPT

## 2022-11-04 PROCEDURE — 73720 MRI LWR EXTREMITY W/O&W/DYE: CPT | Mod: 26,RT,MH

## 2022-11-26 ENCOUNTER — RX RENEWAL (OUTPATIENT)
Age: 82
End: 2022-11-26

## 2022-11-27 ENCOUNTER — RX RENEWAL (OUTPATIENT)
Age: 82
End: 2022-11-27

## 2022-12-27 ENCOUNTER — RX RENEWAL (OUTPATIENT)
Age: 82
End: 2022-12-27

## 2023-02-16 ENCOUNTER — OUTPATIENT (OUTPATIENT)
Dept: OUTPATIENT SERVICES | Facility: HOSPITAL | Age: 83
LOS: 1 days | End: 2023-02-16
Payer: MEDICARE

## 2023-02-16 ENCOUNTER — APPOINTMENT (OUTPATIENT)
Dept: MRI IMAGING | Facility: HOSPITAL | Age: 83
End: 2023-02-16
Payer: MEDICARE

## 2023-02-16 DIAGNOSIS — Z98.89 OTHER SPECIFIED POSTPROCEDURAL STATES: Chronic | ICD-10-CM

## 2023-02-16 DIAGNOSIS — M53.9 DORSOPATHY, UNSPECIFIED: Chronic | ICD-10-CM

## 2023-02-16 DIAGNOSIS — Z00.8 ENCOUNTER FOR OTHER GENERAL EXAMINATION: ICD-10-CM

## 2023-02-16 PROCEDURE — A9579: CPT

## 2023-02-16 PROCEDURE — 73720 MRI LWR EXTREMITY W/O&W/DYE: CPT | Mod: MH

## 2023-02-16 PROCEDURE — 73720 MRI LWR EXTREMITY W/O&W/DYE: CPT | Mod: 26,RT,MH

## 2023-02-23 ENCOUNTER — RX RENEWAL (OUTPATIENT)
Age: 83
End: 2023-02-23

## 2023-03-27 ENCOUNTER — RX RENEWAL (OUTPATIENT)
Age: 83
End: 2023-03-27

## 2023-04-13 ENCOUNTER — APPOINTMENT (OUTPATIENT)
Dept: CARDIOLOGY | Facility: CLINIC | Age: 83
End: 2023-04-13
Payer: MEDICARE

## 2023-04-13 ENCOUNTER — NON-APPOINTMENT (OUTPATIENT)
Age: 83
End: 2023-04-13

## 2023-04-13 VITALS
OXYGEN SATURATION: 97 % | RESPIRATION RATE: 19 BRPM | BODY MASS INDEX: 20.04 KG/M2 | TEMPERATURE: 96.2 F | HEIGHT: 70 IN | HEART RATE: 83 BPM | WEIGHT: 140 LBS | SYSTOLIC BLOOD PRESSURE: 167 MMHG | DIASTOLIC BLOOD PRESSURE: 79 MMHG

## 2023-04-13 PROCEDURE — 93000 ELECTROCARDIOGRAM COMPLETE: CPT

## 2023-04-13 PROCEDURE — 99214 OFFICE O/P EST MOD 30 MIN: CPT

## 2023-04-13 NOTE — DISCUSSION/SUMMARY
[ECG Normal Variant] : ECG normal variant [Non-specific ECG Changes] : abnormal ECG [Mild Aortic Stenosis] : mild aortic stenosis [Hypertension] : hypertension [Responding to Treatment] : responding to treatment [Outpatient Evaluation] : outpatient evaluation [Ambulatory BP Monitoring] : ambulatory blood pressure monitoring [Echocardiogram] : echocardiogram [PVCs] : ectopic ventricular beats [SVT] : paroxysmal supraventricular tachycardia [Improving] : improving [None] : There are no changes in medication management [Venous Insufficiency] : venous insufficiency [Stable] : stable [Doppler Lower Extremity] : a Doppler exam of the lower extremities [Patient] : the patient [de-identified] : anxiety [de-identified] : white coat syndrome [de-identified] : no recent palps off caffeine [de-identified] : less caffeine,green tea [de-identified] : ankle edema [de-identified] : pt defers [de-identified] : low salt

## 2023-05-24 ENCOUNTER — RX RENEWAL (OUTPATIENT)
Age: 83
End: 2023-05-24

## 2023-05-30 ENCOUNTER — APPOINTMENT (OUTPATIENT)
Dept: GERIATRICS | Facility: CLINIC | Age: 83
End: 2023-05-30
Payer: MEDICARE

## 2023-05-30 VITALS
WEIGHT: 138 LBS | BODY MASS INDEX: 19.76 KG/M2 | RESPIRATION RATE: 16 BRPM | OXYGEN SATURATION: 96 % | HEIGHT: 70 IN | TEMPERATURE: 98.4 F | DIASTOLIC BLOOD PRESSURE: 72 MMHG | HEART RATE: 81 BPM | SYSTOLIC BLOOD PRESSURE: 120 MMHG

## 2023-05-30 DIAGNOSIS — L21.9 SEBORRHEIC DERMATITIS, UNSPECIFIED: ICD-10-CM

## 2023-05-30 PROCEDURE — 99212 OFFICE O/P EST SF 10 MIN: CPT

## 2023-05-30 NOTE — PHYSICAL EXAM
[Alert] : alert [No Acute Distress] : in no acute distress [de-identified] : erythema of scalp without vesicles or wounds, redness with scaly flakes around hair line

## 2023-05-30 NOTE — ASSESSMENT
[FreeTextEntry1] : recommend OTC shampoo Tgel\par if not improving, advised to follow up\par if not improved along forehead will send for cream as well\par \par foot ulcer: c/w close follow up with podiatry\par

## 2023-05-30 NOTE — HISTORY OF PRESENT ILLNESS
[FreeTextEntry1] : 83yoF seen for acute visit for rash on scalp.\par \par tried to get hair colored, when fady found the rash and recommend she see pcp.\par she uses fructis shampoo\par also reports rash on her forehead/hairline\par \par still follows regularly with podiatry for chronic wound of foot\par saw Dr. Moore ID and required IV abx with home nurse which has completed.\par she is otherwise without complaints\par

## 2023-06-27 ENCOUNTER — RX RENEWAL (OUTPATIENT)
Age: 83
End: 2023-06-27

## 2023-06-30 ENCOUNTER — APPOINTMENT (OUTPATIENT)
Dept: FAMILY MEDICINE | Facility: CLINIC | Age: 83
End: 2023-06-30
Payer: MEDICARE

## 2023-06-30 VITALS
WEIGHT: 142 LBS | OXYGEN SATURATION: 99 % | RESPIRATION RATE: 16 BRPM | SYSTOLIC BLOOD PRESSURE: 130 MMHG | HEART RATE: 74 BPM | BODY MASS INDEX: 20.33 KG/M2 | DIASTOLIC BLOOD PRESSURE: 72 MMHG | TEMPERATURE: 97.8 F | HEIGHT: 70 IN

## 2023-06-30 DIAGNOSIS — H61.20 IMPACTED CERUMEN, UNSPECIFIED EAR: ICD-10-CM

## 2023-06-30 PROCEDURE — 99213 OFFICE O/P EST LOW 20 MIN: CPT

## 2023-06-30 RX ORDER — SULFAMETHOXAZOLE AND TRIMETHOPRIM 800; 160 MG/1; MG/1
800-160 TABLET ORAL TWICE DAILY
Qty: 10 | Refills: 0 | Status: DISCONTINUED | COMMUNITY
Start: 2021-11-02 | End: 2023-06-30

## 2023-06-30 RX ORDER — POLYETHYLENE GLYCOL 3350 17 G/17G
17 POWDER, FOR SOLUTION ORAL DAILY
Qty: 10 | Refills: 3 | Status: DISCONTINUED | COMMUNITY
Start: 2021-05-27 | End: 2023-06-30

## 2023-06-30 NOTE — HISTORY OF PRESENT ILLNESS
[FreeTextEntry1] : Hypertension SVT [de-identified] : Patient here in follow-up on her hypertension and supraventricular tachycardia she feels well recently she has been having issues with ulcers on her feet and had been on IV antibiotics now is on p.o. antibiotics as per podiatry Dr. Eric recently saw cardiology with a good report he is complaining of a clogged feeling in her right ear

## 2023-06-30 NOTE — PHYSICAL EXAM
[No Acute Distress] : no acute distress [Well Nourished] : well nourished [Well-Appearing] : well-appearing [PERRL] : pupils equal round and reactive to light [No JVD] : no jugular venous distention [No Lymphadenopathy] : no lymphadenopathy [Supple] : supple [Normal Rate] : normal rate  [No Murmur] : no murmur heard [Soft] : abdomen soft [No HSM] : no HSM [No CVA Tenderness] : no CVA  tenderness [No Spinal Tenderness] : no spinal tenderness [de-identified] : Wax plug right ear deep in canal

## 2023-06-30 NOTE — ASSESSMENT
[FreeTextEntry1] : Patient has a wax plug she will be sent to ENT as this seems to be quite deep in the canal and would be difficult to irrigate she will follow-up with podiatry as she has been for treatment of foot ulcers she has recently seen cardiology with a good report she states she has had laboratory work done recently we will obtain seek to obtain the results and if they are not appropriate to our needs we will order additional studies follow-up in 3 to 4 months or as needed will be referred to ENT

## 2023-07-06 LAB
CHOLEST SERPL-MCNC: 158 MG/DL
HDLC SERPL-MCNC: 54 MG/DL
LDLC SERPL CALC-MCNC: 85 MG/DL
NONHDLC SERPL-MCNC: 104 MG/DL
TRIGL SERPL-MCNC: 93 MG/DL
TSH SERPL-ACNC: 0.25 UIU/ML

## 2023-07-26 ENCOUNTER — APPOINTMENT (OUTPATIENT)
Dept: MRI IMAGING | Facility: HOSPITAL | Age: 83
End: 2023-07-26
Payer: MEDICARE

## 2023-07-26 ENCOUNTER — OUTPATIENT (OUTPATIENT)
Dept: OUTPATIENT SERVICES | Facility: HOSPITAL | Age: 83
LOS: 1 days | End: 2023-07-26
Payer: MEDICARE

## 2023-07-26 DIAGNOSIS — M53.9 DORSOPATHY, UNSPECIFIED: Chronic | ICD-10-CM

## 2023-07-26 DIAGNOSIS — Z98.89 OTHER SPECIFIED POSTPROCEDURAL STATES: Chronic | ICD-10-CM

## 2023-07-26 DIAGNOSIS — Z00.8 ENCOUNTER FOR OTHER GENERAL EXAMINATION: ICD-10-CM

## 2023-07-26 PROCEDURE — 73720 MRI LWR EXTREMITY W/O&W/DYE: CPT | Mod: 26,RT,MH

## 2023-07-26 PROCEDURE — 73720 MRI LWR EXTREMITY W/O&W/DYE: CPT | Mod: MH

## 2023-07-26 PROCEDURE — A9579: CPT

## 2023-08-22 ENCOUNTER — RX RENEWAL (OUTPATIENT)
Age: 83
End: 2023-08-22

## 2023-10-19 ENCOUNTER — OUTPATIENT (OUTPATIENT)
Dept: OUTPATIENT SERVICES | Facility: HOSPITAL | Age: 83
LOS: 1 days | End: 2023-10-19
Payer: MEDICARE

## 2023-10-19 ENCOUNTER — APPOINTMENT (OUTPATIENT)
Dept: MRI IMAGING | Facility: CLINIC | Age: 83
End: 2023-10-19
Payer: MEDICARE

## 2023-10-19 ENCOUNTER — APPOINTMENT (OUTPATIENT)
Dept: CT IMAGING | Facility: CLINIC | Age: 83
End: 2023-10-19
Payer: MEDICARE

## 2023-10-19 DIAGNOSIS — Z98.89 OTHER SPECIFIED POSTPROCEDURAL STATES: Chronic | ICD-10-CM

## 2023-10-19 DIAGNOSIS — M53.9 DORSOPATHY, UNSPECIFIED: Chronic | ICD-10-CM

## 2023-10-19 DIAGNOSIS — Z00.8 ENCOUNTER FOR OTHER GENERAL EXAMINATION: ICD-10-CM

## 2023-10-19 PROCEDURE — A9585: CPT

## 2023-10-19 PROCEDURE — 73720 MRI LWR EXTREMITY W/O&W/DYE: CPT | Mod: 26,RT,MH

## 2023-10-19 PROCEDURE — 73720 MRI LWR EXTREMITY W/O&W/DYE: CPT

## 2023-10-26 ENCOUNTER — APPOINTMENT (OUTPATIENT)
Dept: CARDIOLOGY | Facility: CLINIC | Age: 83
End: 2023-10-26
Payer: MEDICARE

## 2023-10-26 ENCOUNTER — NON-APPOINTMENT (OUTPATIENT)
Age: 83
End: 2023-10-26

## 2023-10-26 VITALS
BODY MASS INDEX: 20.76 KG/M2 | RESPIRATION RATE: 19 BRPM | WEIGHT: 145 LBS | DIASTOLIC BLOOD PRESSURE: 66 MMHG | TEMPERATURE: 95.4 F | HEIGHT: 70 IN | OXYGEN SATURATION: 96 % | HEART RATE: 61 BPM | SYSTOLIC BLOOD PRESSURE: 170 MMHG

## 2023-10-26 VITALS — DIASTOLIC BLOOD PRESSURE: 70 MMHG | SYSTOLIC BLOOD PRESSURE: 150 MMHG

## 2023-10-26 DIAGNOSIS — R94.31 ABNORMAL ELECTROCARDIOGRAM [ECG] [EKG]: ICD-10-CM

## 2023-10-26 PROCEDURE — 99215 OFFICE O/P EST HI 40 MIN: CPT

## 2023-10-26 PROCEDURE — 93000 ELECTROCARDIOGRAM COMPLETE: CPT

## 2023-10-30 ENCOUNTER — APPOINTMENT (OUTPATIENT)
Dept: FAMILY MEDICINE | Facility: CLINIC | Age: 83
End: 2023-10-30
Payer: MEDICARE

## 2023-10-30 PROCEDURE — G0008: CPT

## 2023-10-30 PROCEDURE — 90662 IIV NO PRSV INCREASED AG IM: CPT

## 2023-11-20 ENCOUNTER — RX RENEWAL (OUTPATIENT)
Age: 83
End: 2023-11-20

## 2023-12-01 ENCOUNTER — EMERGENCY (EMERGENCY)
Facility: HOSPITAL | Age: 83
LOS: 1 days | Discharge: HOME | End: 2023-12-01
Attending: STUDENT IN AN ORGANIZED HEALTH CARE EDUCATION/TRAINING PROGRAM | Admitting: STUDENT IN AN ORGANIZED HEALTH CARE EDUCATION/TRAINING PROGRAM
Payer: MEDICARE

## 2023-12-01 VITALS
HEART RATE: 71 BPM | SYSTOLIC BLOOD PRESSURE: 181 MMHG | TEMPERATURE: 98 F | OXYGEN SATURATION: 98 % | WEIGHT: 145.06 LBS | HEIGHT: 70 IN | DIASTOLIC BLOOD PRESSURE: 78 MMHG | RESPIRATION RATE: 16 BRPM

## 2023-12-01 VITALS — SYSTOLIC BLOOD PRESSURE: 151 MMHG | OXYGEN SATURATION: 100 % | RESPIRATION RATE: 19 BRPM | HEART RATE: 84 BPM

## 2023-12-01 DIAGNOSIS — Z98.89 OTHER SPECIFIED POSTPROCEDURAL STATES: Chronic | ICD-10-CM

## 2023-12-01 DIAGNOSIS — M53.9 DORSOPATHY, UNSPECIFIED: Chronic | ICD-10-CM

## 2023-12-01 LAB
ALBUMIN SERPL ELPH-MCNC: 3.7 G/DL — SIGNIFICANT CHANGE UP (ref 3.3–5)
ALBUMIN SERPL ELPH-MCNC: 3.7 G/DL — SIGNIFICANT CHANGE UP (ref 3.3–5)
ALP SERPL-CCNC: 100 U/L — SIGNIFICANT CHANGE UP (ref 40–120)
ALP SERPL-CCNC: 100 U/L — SIGNIFICANT CHANGE UP (ref 40–120)
ALT FLD-CCNC: 13 U/L — SIGNIFICANT CHANGE UP (ref 10–45)
ALT FLD-CCNC: 13 U/L — SIGNIFICANT CHANGE UP (ref 10–45)
ANION GAP SERPL CALC-SCNC: 8 MMOL/L — SIGNIFICANT CHANGE UP (ref 5–17)
ANION GAP SERPL CALC-SCNC: 8 MMOL/L — SIGNIFICANT CHANGE UP (ref 5–17)
AST SERPL-CCNC: 27 U/L — SIGNIFICANT CHANGE UP (ref 10–40)
AST SERPL-CCNC: 27 U/L — SIGNIFICANT CHANGE UP (ref 10–40)
BASOPHILS # BLD AUTO: 0.08 K/UL — SIGNIFICANT CHANGE UP (ref 0–0.2)
BASOPHILS # BLD AUTO: 0.08 K/UL — SIGNIFICANT CHANGE UP (ref 0–0.2)
BASOPHILS NFR BLD AUTO: 1.1 % — SIGNIFICANT CHANGE UP (ref 0–2)
BASOPHILS NFR BLD AUTO: 1.1 % — SIGNIFICANT CHANGE UP (ref 0–2)
BILIRUB SERPL-MCNC: 0.6 MG/DL — SIGNIFICANT CHANGE UP (ref 0.2–1.2)
BILIRUB SERPL-MCNC: 0.6 MG/DL — SIGNIFICANT CHANGE UP (ref 0.2–1.2)
BUN SERPL-MCNC: 7 MG/DL — SIGNIFICANT CHANGE UP (ref 7–23)
BUN SERPL-MCNC: 7 MG/DL — SIGNIFICANT CHANGE UP (ref 7–23)
CALCIUM SERPL-MCNC: 9.4 MG/DL — SIGNIFICANT CHANGE UP (ref 8.4–10.5)
CALCIUM SERPL-MCNC: 9.4 MG/DL — SIGNIFICANT CHANGE UP (ref 8.4–10.5)
CHLORIDE SERPL-SCNC: 102 MMOL/L — SIGNIFICANT CHANGE UP (ref 96–108)
CHLORIDE SERPL-SCNC: 102 MMOL/L — SIGNIFICANT CHANGE UP (ref 96–108)
CO2 SERPL-SCNC: 28 MMOL/L — SIGNIFICANT CHANGE UP (ref 22–31)
CO2 SERPL-SCNC: 28 MMOL/L — SIGNIFICANT CHANGE UP (ref 22–31)
CREAT SERPL-MCNC: 0.91 MG/DL — SIGNIFICANT CHANGE UP (ref 0.5–1.3)
CREAT SERPL-MCNC: 0.91 MG/DL — SIGNIFICANT CHANGE UP (ref 0.5–1.3)
CRP SERPL-MCNC: 5 MG/L — HIGH
CRP SERPL-MCNC: 5 MG/L — HIGH
EGFR: 62 ML/MIN/1.73M2 — SIGNIFICANT CHANGE UP
EGFR: 62 ML/MIN/1.73M2 — SIGNIFICANT CHANGE UP
EOSINOPHIL # BLD AUTO: 0.16 K/UL — SIGNIFICANT CHANGE UP (ref 0–0.5)
EOSINOPHIL # BLD AUTO: 0.16 K/UL — SIGNIFICANT CHANGE UP (ref 0–0.5)
EOSINOPHIL NFR BLD AUTO: 2.2 % — SIGNIFICANT CHANGE UP (ref 0–6)
EOSINOPHIL NFR BLD AUTO: 2.2 % — SIGNIFICANT CHANGE UP (ref 0–6)
ERYTHROCYTE [SEDIMENTATION RATE] IN BLOOD: 26 MM/HR — HIGH (ref 0–20)
ERYTHROCYTE [SEDIMENTATION RATE] IN BLOOD: 26 MM/HR — HIGH (ref 0–20)
GLUCOSE SERPL-MCNC: 100 MG/DL — HIGH (ref 70–99)
GLUCOSE SERPL-MCNC: 100 MG/DL — HIGH (ref 70–99)
HCT VFR BLD CALC: 39.9 % — SIGNIFICANT CHANGE UP (ref 34.5–45)
HCT VFR BLD CALC: 39.9 % — SIGNIFICANT CHANGE UP (ref 34.5–45)
HGB BLD-MCNC: 13.4 G/DL — SIGNIFICANT CHANGE UP (ref 11.5–15.5)
HGB BLD-MCNC: 13.4 G/DL — SIGNIFICANT CHANGE UP (ref 11.5–15.5)
IMM GRANULOCYTES NFR BLD AUTO: 0.5 % — SIGNIFICANT CHANGE UP (ref 0–0.9)
IMM GRANULOCYTES NFR BLD AUTO: 0.5 % — SIGNIFICANT CHANGE UP (ref 0–0.9)
LYMPHOCYTES # BLD AUTO: 1.54 K/UL — SIGNIFICANT CHANGE UP (ref 1–3.3)
LYMPHOCYTES # BLD AUTO: 1.54 K/UL — SIGNIFICANT CHANGE UP (ref 1–3.3)
LYMPHOCYTES # BLD AUTO: 20.8 % — SIGNIFICANT CHANGE UP (ref 13–44)
LYMPHOCYTES # BLD AUTO: 20.8 % — SIGNIFICANT CHANGE UP (ref 13–44)
MCHC RBC-ENTMCNC: 33.1 PG — SIGNIFICANT CHANGE UP (ref 27–34)
MCHC RBC-ENTMCNC: 33.1 PG — SIGNIFICANT CHANGE UP (ref 27–34)
MCHC RBC-ENTMCNC: 33.6 GM/DL — SIGNIFICANT CHANGE UP (ref 32–36)
MCHC RBC-ENTMCNC: 33.6 GM/DL — SIGNIFICANT CHANGE UP (ref 32–36)
MCV RBC AUTO: 98.5 FL — SIGNIFICANT CHANGE UP (ref 80–100)
MCV RBC AUTO: 98.5 FL — SIGNIFICANT CHANGE UP (ref 80–100)
MONOCYTES # BLD AUTO: 0.74 K/UL — SIGNIFICANT CHANGE UP (ref 0–0.9)
MONOCYTES # BLD AUTO: 0.74 K/UL — SIGNIFICANT CHANGE UP (ref 0–0.9)
MONOCYTES NFR BLD AUTO: 10 % — SIGNIFICANT CHANGE UP (ref 2–14)
MONOCYTES NFR BLD AUTO: 10 % — SIGNIFICANT CHANGE UP (ref 2–14)
NEUTROPHILS # BLD AUTO: 4.85 K/UL — SIGNIFICANT CHANGE UP (ref 1.8–7.4)
NEUTROPHILS # BLD AUTO: 4.85 K/UL — SIGNIFICANT CHANGE UP (ref 1.8–7.4)
NEUTROPHILS NFR BLD AUTO: 65.4 % — SIGNIFICANT CHANGE UP (ref 43–77)
NEUTROPHILS NFR BLD AUTO: 65.4 % — SIGNIFICANT CHANGE UP (ref 43–77)
NRBC # BLD: 0 /100 WBCS — SIGNIFICANT CHANGE UP (ref 0–0)
NRBC # BLD: 0 /100 WBCS — SIGNIFICANT CHANGE UP (ref 0–0)
PLATELET # BLD AUTO: 266 K/UL — SIGNIFICANT CHANGE UP (ref 150–400)
PLATELET # BLD AUTO: 266 K/UL — SIGNIFICANT CHANGE UP (ref 150–400)
POTASSIUM SERPL-MCNC: 3.8 MMOL/L — SIGNIFICANT CHANGE UP (ref 3.5–5.3)
POTASSIUM SERPL-MCNC: 3.8 MMOL/L — SIGNIFICANT CHANGE UP (ref 3.5–5.3)
POTASSIUM SERPL-SCNC: 3.8 MMOL/L — SIGNIFICANT CHANGE UP (ref 3.5–5.3)
POTASSIUM SERPL-SCNC: 3.8 MMOL/L — SIGNIFICANT CHANGE UP (ref 3.5–5.3)
PROT SERPL-MCNC: 7.4 G/DL — SIGNIFICANT CHANGE UP (ref 6–8.3)
PROT SERPL-MCNC: 7.4 G/DL — SIGNIFICANT CHANGE UP (ref 6–8.3)
RBC # BLD: 4.05 M/UL — SIGNIFICANT CHANGE UP (ref 3.8–5.2)
RBC # BLD: 4.05 M/UL — SIGNIFICANT CHANGE UP (ref 3.8–5.2)
RBC # FLD: 12.1 % — SIGNIFICANT CHANGE UP (ref 10.3–14.5)
RBC # FLD: 12.1 % — SIGNIFICANT CHANGE UP (ref 10.3–14.5)
SODIUM SERPL-SCNC: 138 MMOL/L — SIGNIFICANT CHANGE UP (ref 135–145)
SODIUM SERPL-SCNC: 138 MMOL/L — SIGNIFICANT CHANGE UP (ref 135–145)
WBC # BLD: 7.41 K/UL — SIGNIFICANT CHANGE UP (ref 3.8–10.5)
WBC # BLD: 7.41 K/UL — SIGNIFICANT CHANGE UP (ref 3.8–10.5)
WBC # FLD AUTO: 7.41 K/UL — SIGNIFICANT CHANGE UP (ref 3.8–10.5)
WBC # FLD AUTO: 7.41 K/UL — SIGNIFICANT CHANGE UP (ref 3.8–10.5)

## 2023-12-01 PROCEDURE — 96365 THER/PROPH/DIAG IV INF INIT: CPT

## 2023-12-01 PROCEDURE — 80053 COMPREHEN METABOLIC PANEL: CPT

## 2023-12-01 PROCEDURE — 99284 EMERGENCY DEPT VISIT MOD MDM: CPT | Mod: 25

## 2023-12-01 PROCEDURE — 99285 EMERGENCY DEPT VISIT HI MDM: CPT

## 2023-12-01 PROCEDURE — 36415 COLL VENOUS BLD VENIPUNCTURE: CPT

## 2023-12-01 PROCEDURE — 85025 COMPLETE CBC W/AUTO DIFF WBC: CPT

## 2023-12-01 PROCEDURE — 85652 RBC SED RATE AUTOMATED: CPT

## 2023-12-01 PROCEDURE — 96367 TX/PROPH/DG ADDL SEQ IV INF: CPT

## 2023-12-01 PROCEDURE — 86140 C-REACTIVE PROTEIN: CPT

## 2023-12-01 RX ORDER — PIPERACILLIN AND TAZOBACTAM 4; .5 G/20ML; G/20ML
3.38 INJECTION, POWDER, LYOPHILIZED, FOR SOLUTION INTRAVENOUS ONCE
Refills: 0 | Status: COMPLETED | OUTPATIENT
Start: 2023-12-01 | End: 2023-12-01

## 2023-12-01 RX ORDER — SODIUM CHLORIDE 9 MG/ML
1000 INJECTION INTRAMUSCULAR; INTRAVENOUS; SUBCUTANEOUS ONCE
Refills: 0 | Status: COMPLETED | OUTPATIENT
Start: 2023-12-01 | End: 2023-12-01

## 2023-12-01 RX ORDER — VANCOMYCIN HCL 1 G
1000 VIAL (EA) INTRAVENOUS ONCE
Refills: 0 | Status: COMPLETED | OUTPATIENT
Start: 2023-12-01 | End: 2023-12-01

## 2023-12-01 RX ORDER — CIPROFLOXACIN LACTATE 400MG/40ML
1 VIAL (ML) INTRAVENOUS
Qty: 60 | Refills: 0
Start: 2023-12-01 | End: 2023-12-30

## 2023-12-01 RX ADMIN — PIPERACILLIN AND TAZOBACTAM 3.38 GRAM(S): 4; .5 INJECTION, POWDER, LYOPHILIZED, FOR SOLUTION INTRAVENOUS at 13:03

## 2023-12-01 RX ADMIN — PIPERACILLIN AND TAZOBACTAM 200 GRAM(S): 4; .5 INJECTION, POWDER, LYOPHILIZED, FOR SOLUTION INTRAVENOUS at 12:43

## 2023-12-01 RX ADMIN — SODIUM CHLORIDE 1000 MILLILITER(S): 9 INJECTION INTRAMUSCULAR; INTRAVENOUS; SUBCUTANEOUS at 12:43

## 2023-12-01 RX ADMIN — Medication 250 MILLIGRAM(S): at 14:10

## 2023-12-01 RX ADMIN — Medication 1000 MILLIGRAM(S): at 15:21

## 2023-12-01 RX ADMIN — SODIUM CHLORIDE 1000 MILLILITER(S): 9 INJECTION INTRAMUSCULAR; INTRAVENOUS; SUBCUTANEOUS at 13:10

## 2023-12-01 NOTE — ED PROVIDER NOTE - PATIENT PORTAL LINK FT
You can access the FollowMyHealth Patient Portal offered by [facility] by registering at the following website: [email or web address]. By joining [facility]’s FollowMyHealth portal, you will also be able to view your health information using other applications (apps) compatible with our system.

## 2023-12-01 NOTE — ED ADULT TRIAGE NOTE - CHIEF COMPLAINT QUOTE
Pt has right heel osteomyelitis for 2 years.  Pt states the infection keeps returning and she requires and ID consult. Dr Eric podiatrist.  Dr Ferreira is infectious disease MD.

## 2023-12-01 NOTE — ED ADULT NURSE NOTE - OBJECTIVE STATEMENT
83 year old Female comes to the ER for wound on bottom of right foot which is reported to be infected. Wound noted to bottom of left foot as well. Reacted to one of the two PO antibiotics patient was taking by mouth. Unknown which one. No drainage noted from foot at this time. Patient denies fevers. Alert and oriented, ambulating with steady gait noted, breathing spontaneous and unlabored, speaking coherently, skin color normal for ethnicity. Denies recent falls. Call bell at bedside, bed locked and in lowest position for safety.

## 2023-12-01 NOTE — ED PROVIDER NOTE - NSFOLLOWUPINSTRUCTIONS_ED_ALL_ED_FT
Rest, drink plenty of fluids.  Advance activity as tolerated.  Continue all previously prescribed medications as directed.  Follow up with your PMD 2-3 days and bring copies of your results.  Return to the ER for worsening symptoms, fevers, redness from wound, swelling, increased yellow drainage, pain, weakness, numbness/tingling or new concerning symptoms.    Please fill the prescription of the antibiotics and take as directed. Please finish the entire course of the medication as prescribed. Do not use any alcohol or grapefruit juice with any antibiotics.     Take acetaminophen 650 mg orally every 6-8 hours for pain control as needed. Please do not exceed 4,000 mg of acetaminophen during a 24 hours period. Acetaminophen can be found in many over-the-counter cold medications as well as opioid medications that may be given for pain.    Take ibuprofen (also known as MOTRIN or ADVIL) 400 mg orally every 6-8 hours for pain control as needed with food to avoid an upset stomach. Ibuprofen can be found in many over-the-counter medications. Please do not take ibuprofen if you have a bleeding disorder, stomach or gastrointestinal ulcer, or liver disease.    If needed, you can alternate these medications so that you can take one medication every 3 hours. For example, at noon take ibuprofen, then at 3PM take acetaminophen, then at 6PM take ibuprofen.

## 2023-12-01 NOTE — ED ADULT NURSE REASSESSMENT NOTE - NS ED NURSE REASSESS COMMENT FT1
Wheelchair to restroom, tolerated well. Patient reports increased heart rate on monitor when chest feels fluttering feeling. Patient aware to call for RN when fluttering occurs next.

## 2023-12-01 NOTE — ED PROVIDER NOTE - CLINICAL SUMMARY MEDICAL DECISION MAKING FREE TEXT BOX
Pt w/ PMHx of chronic right heel OM presenting with sent in by podiatrist (Dr. Eric) for IV abx and evaluation. Otherwise, denies any fevers, chills, chest pain, shortness of breath, abdominal pain, headaches, swelling, erythema from wound, trauma, falls, difficulty walking. Ambulating without difficulty. Has been seeing ID outpatient, finished cipro.   History obtained from independent historian: Dr. Eric  External note reviewed: Prior admits  DDx: chronic OM of the right heel.  ED course, interpretation of imaging studies, and consults:   - Labs, ESR/CRP sent, known OM, given IV vanco/zosyn  - Shared decision making with the patient about admission vs outpatient f/u with the vascular surgery, she would like to follow up with her podiatrist and vascular surgeon on monday.   - D/w Dr. Eric, recommend patient be placed on cipro 750mg BID x 30 days if discharged.  - Rx cipro  Disposition: DC

## 2023-12-04 NOTE — CHART NOTE - NSCHARTNOTEFT_GEN_A_CORE
83-year-old female presenting to the ED on 12/1 complaining of wound check. SW made a courtesy call and left message with patient. Contact info was provided for further assistance if needed.

## 2023-12-05 ENCOUNTER — INPATIENT (INPATIENT)
Facility: HOSPITAL | Age: 83
LOS: 5 days | Discharge: HOME | DRG: 475 | End: 2023-12-11
Attending: STUDENT IN AN ORGANIZED HEALTH CARE EDUCATION/TRAINING PROGRAM | Admitting: HOSPITALIST
Payer: MEDICARE

## 2023-12-05 VITALS
SYSTOLIC BLOOD PRESSURE: 185 MMHG | HEIGHT: 70 IN | OXYGEN SATURATION: 98 % | HEART RATE: 75 BPM | DIASTOLIC BLOOD PRESSURE: 81 MMHG | TEMPERATURE: 98 F | WEIGHT: 145.06 LBS | RESPIRATION RATE: 20 BRPM

## 2023-12-05 DIAGNOSIS — Z98.89 OTHER SPECIFIED POSTPROCEDURAL STATES: Chronic | ICD-10-CM

## 2023-12-05 DIAGNOSIS — M53.9 DORSOPATHY, UNSPECIFIED: Chronic | ICD-10-CM

## 2023-12-05 LAB
ALBUMIN SERPL ELPH-MCNC: 4.3 G/DL — SIGNIFICANT CHANGE UP (ref 3.3–5)
ALBUMIN SERPL ELPH-MCNC: 4.3 G/DL — SIGNIFICANT CHANGE UP (ref 3.3–5)
ALP SERPL-CCNC: 95 U/L — SIGNIFICANT CHANGE UP (ref 40–120)
ALP SERPL-CCNC: 95 U/L — SIGNIFICANT CHANGE UP (ref 40–120)
ALT FLD-CCNC: 11 U/L — SIGNIFICANT CHANGE UP (ref 10–45)
ALT FLD-CCNC: 11 U/L — SIGNIFICANT CHANGE UP (ref 10–45)
ANION GAP SERPL CALC-SCNC: 12 MMOL/L — SIGNIFICANT CHANGE UP (ref 5–17)
ANION GAP SERPL CALC-SCNC: 12 MMOL/L — SIGNIFICANT CHANGE UP (ref 5–17)
APTT BLD: 41.9 SEC — HIGH (ref 24.5–35.6)
APTT BLD: 41.9 SEC — HIGH (ref 24.5–35.6)
AST SERPL-CCNC: 20 U/L — SIGNIFICANT CHANGE UP (ref 10–40)
AST SERPL-CCNC: 20 U/L — SIGNIFICANT CHANGE UP (ref 10–40)
BASE EXCESS BLDV CALC-SCNC: 4 MMOL/L — HIGH (ref -2–3)
BASE EXCESS BLDV CALC-SCNC: 4 MMOL/L — HIGH (ref -2–3)
BASOPHILS # BLD AUTO: 0.05 K/UL — SIGNIFICANT CHANGE UP (ref 0–0.2)
BASOPHILS # BLD AUTO: 0.05 K/UL — SIGNIFICANT CHANGE UP (ref 0–0.2)
BASOPHILS NFR BLD AUTO: 0.8 % — SIGNIFICANT CHANGE UP (ref 0–2)
BASOPHILS NFR BLD AUTO: 0.8 % — SIGNIFICANT CHANGE UP (ref 0–2)
BILIRUB SERPL-MCNC: 0.7 MG/DL — SIGNIFICANT CHANGE UP (ref 0.2–1.2)
BILIRUB SERPL-MCNC: 0.7 MG/DL — SIGNIFICANT CHANGE UP (ref 0.2–1.2)
BUN SERPL-MCNC: 8 MG/DL — SIGNIFICANT CHANGE UP (ref 7–23)
BUN SERPL-MCNC: 8 MG/DL — SIGNIFICANT CHANGE UP (ref 7–23)
CA-I SERPL-SCNC: 1.21 MMOL/L — SIGNIFICANT CHANGE UP (ref 1.15–1.33)
CA-I SERPL-SCNC: 1.21 MMOL/L — SIGNIFICANT CHANGE UP (ref 1.15–1.33)
CALCIUM SERPL-MCNC: 9.7 MG/DL — SIGNIFICANT CHANGE UP (ref 8.4–10.5)
CALCIUM SERPL-MCNC: 9.7 MG/DL — SIGNIFICANT CHANGE UP (ref 8.4–10.5)
CHLORIDE BLDV-SCNC: 103 MMOL/L — SIGNIFICANT CHANGE UP (ref 96–108)
CHLORIDE BLDV-SCNC: 103 MMOL/L — SIGNIFICANT CHANGE UP (ref 96–108)
CHLORIDE SERPL-SCNC: 102 MMOL/L — SIGNIFICANT CHANGE UP (ref 96–108)
CHLORIDE SERPL-SCNC: 102 MMOL/L — SIGNIFICANT CHANGE UP (ref 96–108)
CO2 BLDV-SCNC: 31 MMOL/L — HIGH (ref 22–26)
CO2 BLDV-SCNC: 31 MMOL/L — HIGH (ref 22–26)
CO2 SERPL-SCNC: 24 MMOL/L — SIGNIFICANT CHANGE UP (ref 22–31)
CO2 SERPL-SCNC: 24 MMOL/L — SIGNIFICANT CHANGE UP (ref 22–31)
CREAT SERPL-MCNC: 0.75 MG/DL — SIGNIFICANT CHANGE UP (ref 0.5–1.3)
CREAT SERPL-MCNC: 0.75 MG/DL — SIGNIFICANT CHANGE UP (ref 0.5–1.3)
EGFR: 78 ML/MIN/1.73M2 — SIGNIFICANT CHANGE UP
EGFR: 78 ML/MIN/1.73M2 — SIGNIFICANT CHANGE UP
EOSINOPHIL # BLD AUTO: 0.14 K/UL — SIGNIFICANT CHANGE UP (ref 0–0.5)
EOSINOPHIL # BLD AUTO: 0.14 K/UL — SIGNIFICANT CHANGE UP (ref 0–0.5)
EOSINOPHIL NFR BLD AUTO: 2.2 % — SIGNIFICANT CHANGE UP (ref 0–6)
EOSINOPHIL NFR BLD AUTO: 2.2 % — SIGNIFICANT CHANGE UP (ref 0–6)
GAS PNL BLDV: 135 MMOL/L — LOW (ref 136–145)
GAS PNL BLDV: 135 MMOL/L — LOW (ref 136–145)
GAS PNL BLDV: SIGNIFICANT CHANGE UP
GAS PNL BLDV: SIGNIFICANT CHANGE UP
GLUCOSE BLDV-MCNC: 98 MG/DL — SIGNIFICANT CHANGE UP (ref 70–99)
GLUCOSE BLDV-MCNC: 98 MG/DL — SIGNIFICANT CHANGE UP (ref 70–99)
GLUCOSE SERPL-MCNC: 95 MG/DL — SIGNIFICANT CHANGE UP (ref 70–99)
GLUCOSE SERPL-MCNC: 95 MG/DL — SIGNIFICANT CHANGE UP (ref 70–99)
HCO3 BLDV-SCNC: 29 MMOL/L — SIGNIFICANT CHANGE UP (ref 22–29)
HCO3 BLDV-SCNC: 29 MMOL/L — SIGNIFICANT CHANGE UP (ref 22–29)
HCT VFR BLD CALC: 41.3 % — SIGNIFICANT CHANGE UP (ref 34.5–45)
HCT VFR BLD CALC: 41.3 % — SIGNIFICANT CHANGE UP (ref 34.5–45)
HCT VFR BLDA CALC: 41 % — SIGNIFICANT CHANGE UP (ref 34.5–46.5)
HCT VFR BLDA CALC: 41 % — SIGNIFICANT CHANGE UP (ref 34.5–46.5)
HGB BLD CALC-MCNC: 13.8 G/DL — SIGNIFICANT CHANGE UP (ref 11.7–16.1)
HGB BLD CALC-MCNC: 13.8 G/DL — SIGNIFICANT CHANGE UP (ref 11.7–16.1)
HGB BLD-MCNC: 13.6 G/DL — SIGNIFICANT CHANGE UP (ref 11.5–15.5)
HGB BLD-MCNC: 13.6 G/DL — SIGNIFICANT CHANGE UP (ref 11.5–15.5)
IMM GRANULOCYTES NFR BLD AUTO: 0.2 % — SIGNIFICANT CHANGE UP (ref 0–0.9)
IMM GRANULOCYTES NFR BLD AUTO: 0.2 % — SIGNIFICANT CHANGE UP (ref 0–0.9)
INR BLD: 1.09 RATIO — SIGNIFICANT CHANGE UP (ref 0.85–1.18)
INR BLD: 1.09 RATIO — SIGNIFICANT CHANGE UP (ref 0.85–1.18)
LACTATE BLDV-MCNC: 1.5 MMOL/L — SIGNIFICANT CHANGE UP (ref 0.5–2)
LACTATE BLDV-MCNC: 1.5 MMOL/L — SIGNIFICANT CHANGE UP (ref 0.5–2)
LYMPHOCYTES # BLD AUTO: 1.84 K/UL — SIGNIFICANT CHANGE UP (ref 1–3.3)
LYMPHOCYTES # BLD AUTO: 1.84 K/UL — SIGNIFICANT CHANGE UP (ref 1–3.3)
LYMPHOCYTES # BLD AUTO: 28.7 % — SIGNIFICANT CHANGE UP (ref 13–44)
LYMPHOCYTES # BLD AUTO: 28.7 % — SIGNIFICANT CHANGE UP (ref 13–44)
MCHC RBC-ENTMCNC: 32.3 PG — SIGNIFICANT CHANGE UP (ref 27–34)
MCHC RBC-ENTMCNC: 32.3 PG — SIGNIFICANT CHANGE UP (ref 27–34)
MCHC RBC-ENTMCNC: 32.9 GM/DL — SIGNIFICANT CHANGE UP (ref 32–36)
MCHC RBC-ENTMCNC: 32.9 GM/DL — SIGNIFICANT CHANGE UP (ref 32–36)
MCV RBC AUTO: 98.1 FL — SIGNIFICANT CHANGE UP (ref 80–100)
MCV RBC AUTO: 98.1 FL — SIGNIFICANT CHANGE UP (ref 80–100)
MONOCYTES # BLD AUTO: 0.51 K/UL — SIGNIFICANT CHANGE UP (ref 0–0.9)
MONOCYTES # BLD AUTO: 0.51 K/UL — SIGNIFICANT CHANGE UP (ref 0–0.9)
MONOCYTES NFR BLD AUTO: 8 % — SIGNIFICANT CHANGE UP (ref 2–14)
MONOCYTES NFR BLD AUTO: 8 % — SIGNIFICANT CHANGE UP (ref 2–14)
NEUTROPHILS # BLD AUTO: 3.86 K/UL — SIGNIFICANT CHANGE UP (ref 1.8–7.4)
NEUTROPHILS # BLD AUTO: 3.86 K/UL — SIGNIFICANT CHANGE UP (ref 1.8–7.4)
NEUTROPHILS NFR BLD AUTO: 60.1 % — SIGNIFICANT CHANGE UP (ref 43–77)
NEUTROPHILS NFR BLD AUTO: 60.1 % — SIGNIFICANT CHANGE UP (ref 43–77)
NRBC # BLD: 0 /100 WBCS — SIGNIFICANT CHANGE UP (ref 0–0)
NRBC # BLD: 0 /100 WBCS — SIGNIFICANT CHANGE UP (ref 0–0)
PCO2 BLDV: 45 MMHG — HIGH (ref 39–42)
PCO2 BLDV: 45 MMHG — HIGH (ref 39–42)
PH BLDV: 7.42 — SIGNIFICANT CHANGE UP (ref 7.32–7.43)
PH BLDV: 7.42 — SIGNIFICANT CHANGE UP (ref 7.32–7.43)
PLATELET # BLD AUTO: 278 K/UL — SIGNIFICANT CHANGE UP (ref 150–400)
PLATELET # BLD AUTO: 278 K/UL — SIGNIFICANT CHANGE UP (ref 150–400)
PO2 BLDV: 25 MMHG — SIGNIFICANT CHANGE UP (ref 25–45)
PO2 BLDV: 25 MMHG — SIGNIFICANT CHANGE UP (ref 25–45)
POTASSIUM BLDV-SCNC: 3.9 MMOL/L — SIGNIFICANT CHANGE UP (ref 3.5–5.1)
POTASSIUM BLDV-SCNC: 3.9 MMOL/L — SIGNIFICANT CHANGE UP (ref 3.5–5.1)
POTASSIUM SERPL-MCNC: 3.8 MMOL/L — SIGNIFICANT CHANGE UP (ref 3.5–5.3)
POTASSIUM SERPL-MCNC: 3.8 MMOL/L — SIGNIFICANT CHANGE UP (ref 3.5–5.3)
POTASSIUM SERPL-SCNC: 3.8 MMOL/L — SIGNIFICANT CHANGE UP (ref 3.5–5.3)
POTASSIUM SERPL-SCNC: 3.8 MMOL/L — SIGNIFICANT CHANGE UP (ref 3.5–5.3)
PROT SERPL-MCNC: 7.1 G/DL — SIGNIFICANT CHANGE UP (ref 6–8.3)
PROT SERPL-MCNC: 7.1 G/DL — SIGNIFICANT CHANGE UP (ref 6–8.3)
PROTHROM AB SERPL-ACNC: 12 SEC — SIGNIFICANT CHANGE UP (ref 9.5–13)
PROTHROM AB SERPL-ACNC: 12 SEC — SIGNIFICANT CHANGE UP (ref 9.5–13)
RBC # BLD: 4.21 M/UL — SIGNIFICANT CHANGE UP (ref 3.8–5.2)
RBC # BLD: 4.21 M/UL — SIGNIFICANT CHANGE UP (ref 3.8–5.2)
RBC # FLD: 12.1 % — SIGNIFICANT CHANGE UP (ref 10.3–14.5)
RBC # FLD: 12.1 % — SIGNIFICANT CHANGE UP (ref 10.3–14.5)
SAO2 % BLDV: 22.6 % — LOW (ref 67–88)
SAO2 % BLDV: 22.6 % — LOW (ref 67–88)
SODIUM SERPL-SCNC: 138 MMOL/L — SIGNIFICANT CHANGE UP (ref 135–145)
SODIUM SERPL-SCNC: 138 MMOL/L — SIGNIFICANT CHANGE UP (ref 135–145)
WBC # BLD: 6.41 K/UL — SIGNIFICANT CHANGE UP (ref 3.8–10.5)
WBC # BLD: 6.41 K/UL — SIGNIFICANT CHANGE UP (ref 3.8–10.5)
WBC # FLD AUTO: 6.41 K/UL — SIGNIFICANT CHANGE UP (ref 3.8–10.5)
WBC # FLD AUTO: 6.41 K/UL — SIGNIFICANT CHANGE UP (ref 3.8–10.5)

## 2023-12-05 PROCEDURE — 73630 X-RAY EXAM OF FOOT: CPT | Mod: 26,50

## 2023-12-05 PROCEDURE — 99285 EMERGENCY DEPT VISIT HI MDM: CPT

## 2023-12-05 PROCEDURE — 71045 X-RAY EXAM CHEST 1 VIEW: CPT | Mod: 26

## 2023-12-05 RX ORDER — VANCOMYCIN HCL 1 G
1000 VIAL (EA) INTRAVENOUS ONCE
Refills: 0 | Status: COMPLETED | OUTPATIENT
Start: 2023-12-05 | End: 2023-12-05

## 2023-12-05 RX ORDER — PIPERACILLIN AND TAZOBACTAM 4; .5 G/20ML; G/20ML
3.38 INJECTION, POWDER, LYOPHILIZED, FOR SOLUTION INTRAVENOUS ONCE
Refills: 0 | Status: COMPLETED | OUTPATIENT
Start: 2023-12-05 | End: 2023-12-05

## 2023-12-05 RX ADMIN — PIPERACILLIN AND TAZOBACTAM 200 GRAM(S): 4; .5 INJECTION, POWDER, LYOPHILIZED, FOR SOLUTION INTRAVENOUS at 21:33

## 2023-12-05 NOTE — ED PROVIDER NOTE - PHYSICAL EXAMINATION
right foot: bottom of foot at ball of foot there is an ulcer with purulence, no erythema  left foot: bottom of foot at ball of foot, mild ulcer, no drainage, no ttp

## 2023-12-05 NOTE — CONSULT NOTE ADULT - ASSESSMENT
83F with right foot sub met 2 wound to bone w/ acute on chronic OM  - Pt seen and evaluated   - Afebrile, no leukocytosis   - R foot sub 2nd metatarsal head wound to bone, serous drainage, scant purulence, erythema extending dorsal midfoot, no edema, periwound hyperkeratosis. L foot sub first metatarsal head callus with no signs of infection.   - Right foot XR pending   - Rec IV Vanco/Zosyn  - Rec Admit to medicine   - Rec Right foot MRI  - Rec ID consult  - Right foot wound Cultures obtained   - Pod plan: right foot partial second ray resection  - Please document medical clearance for podiatric surgical intervention   - Discussed with attending   83F with right foot sub met 2 wound to bone w/ acute on chronic OM  - Pt seen and evaluated   - Afebrile, no leukocytosis   - R foot sub 2nd metatarsal head wound to bone, serous drainage, scant purulence, erythema extending dorsal midfoot, no edema, periwound hyperkeratosis, no fluctuance, no soft tissue crepitus, no tracking or tunneling, no malodor. L foot sub first metatarsal head callus with no signs of infection.   - Right foot XR pending   - Rec IV Vanco/Zosyn  - Rec Admit to medicine   - Rec Right foot MRI  - Rec ID consult  - Right foot wound Cultures obtained   - Pod plan: right foot partial second ray resection  - Please document medical clearance for podiatric surgical intervention   - Discussed with attending   83F with right foot sub met 2 wound to bone w/ acute on chronic OM  - Pt seen and evaluated   - Afebrile, no leukocytosis   - R foot sub 2nd metatarsal head wound to bone, serous drainage, scant purulence, erythema extending dorsal midfoot, no edema, periwound hyperkeratosis, no fluctuance, no soft tissue crepitus, no tracking or tunneling, no malodor. L foot sub first metatarsal head callus with no signs of infection.   - Right foot XR: OM of 2nd metatarsal head, no gas.   - Rec IV Vanco/Zosyn  - Rec Admit to medicine   - Rec Right foot MRI  - Rec ID consult  - Right foot wound Cultures obtained   - Pod plan: right foot partial second ray resection  - Please document medical clearance for podiatric surgical intervention   - Discussed with attending

## 2023-12-05 NOTE — CONSULT NOTE ADULT - SUBJECTIVE AND OBJECTIVE BOX
Patient is a 83y old  Female who presents with a chief complaint of     HPI:      PAST MEDICAL & SURGICAL HISTORY:  HTN (hypertension)      Hyperlipidemia      Back problem  s/p back surgery      S/P thyroid surgery      S/P hernia surgery      S/P rotator cuff surgery          MEDICATIONS  (STANDING):  vancomycin  IVPB. 1000 milliGRAM(s) IV Intermittent once    MEDICATIONS  (PRN):      Allergies    No Known Allergies    Intolerances        VITALS:    Vital Signs Last 24 Hrs  T(C): 36.8 (05 Dec 2023 19:21), Max: 36.8 (05 Dec 2023 19:21)  T(F): 98.2 (05 Dec 2023 19:21), Max: 98.2 (05 Dec 2023 19:21)  HR: 56 (05 Dec 2023 19:21) (56 - 75)  BP: 178/92 (05 Dec 2023 19:21) (178/92 - 185/81)  BP(mean): --  RR: 18 (05 Dec 2023 19:21) (18 - 20)  SpO2: 100% (05 Dec 2023 19:21) (98% - 100%)    Parameters below as of 05 Dec 2023 19:21  Patient On (Oxygen Delivery Method): room air        LABS:                          13.6   6.41  )-----------( 278      ( 05 Dec 2023 18:28 )             41.3       12-05    138  |  102  |  8   ----------------------------<  95  3.8   |  24  |  0.75    Ca    9.7      05 Dec 2023 18:28    TPro  7.1  /  Alb  4.3  /  TBili  0.7  /  DBili  x   /  AST  20  /  ALT  11  /  AlkPhos  95  12-05      CAPILLARY BLOOD GLUCOSE          PT/INR - ( 05 Dec 2023 18:28 )   PT: 12.0 sec;   INR: 1.09 ratio         PTT - ( 05 Dec 2023 18:28 )  PTT:41.9 sec    LOWER EXTREMITY PHYSICAL EXAM:    Vascular: DP/PT 2/4, B/L, CFT <3 seconds B/L, Temperature gradient warm to warm, B/L.   Neuro: Epicritic sensation diminished  to the level of digits , B/L.  Musculoskeletal/Ortho: R foot clawing digits 2,3,4.  Skin: R foot sub 2nd metatarsal head wound to bone, serous drainage, scant purulence, erythema extending dorsal midfoot, no edema, periowund hyperkeratosis. L foot sub first metatrsal luciano callus with no signs of infection.     RADIOLOGY & ADDITIONAL STUDIES:     Patient is a 83y old  Female who presents with a chief complaint of     HPI:  84 yo F presents with bilateral heel ulcers x years. States "they keep getting infected." Saw her Podiatrist yest, Dr. Montes who informed pt he was concerned she had a "bone infection" in her right heel and advised she come to the ED to be admitted for surgery on Friday. Denies nausea, vomiting, fever, chills. No pain. Ambulating w/o difficulty    PAST MEDICAL & SURGICAL HISTORY:  HTN (hypertension)      Hyperlipidemia      Back problem  s/p back surgery      S/P thyroid surgery      S/P hernia surgery      S/P rotator cuff surgery          MEDICATIONS  (STANDING):  vancomycin  IVPB. 1000 milliGRAM(s) IV Intermittent once    MEDICATIONS  (PRN):      Allergies    No Known Allergies    Intolerances        VITALS:    Vital Signs Last 24 Hrs  T(C): 36.8 (05 Dec 2023 19:21), Max: 36.8 (05 Dec 2023 19:21)  T(F): 98.2 (05 Dec 2023 19:21), Max: 98.2 (05 Dec 2023 19:21)  HR: 56 (05 Dec 2023 19:21) (56 - 75)  BP: 178/92 (05 Dec 2023 19:21) (178/92 - 185/81)  BP(mean): --  RR: 18 (05 Dec 2023 19:21) (18 - 20)  SpO2: 100% (05 Dec 2023 19:21) (98% - 100%)    Parameters below as of 05 Dec 2023 19:21  Patient On (Oxygen Delivery Method): room air        LABS:                          13.6   6.41  )-----------( 278      ( 05 Dec 2023 18:28 )             41.3       12-05    138  |  102  |  8   ----------------------------<  95  3.8   |  24  |  0.75    Ca    9.7      05 Dec 2023 18:28    TPro  7.1  /  Alb  4.3  /  TBili  0.7  /  DBili  x   /  AST  20  /  ALT  11  /  AlkPhos  95  12-05      CAPILLARY BLOOD GLUCOSE          PT/INR - ( 05 Dec 2023 18:28 )   PT: 12.0 sec;   INR: 1.09 ratio         PTT - ( 05 Dec 2023 18:28 )  PTT:41.9 sec    LOWER EXTREMITY PHYSICAL EXAM:    Vascular: DP/PT 2/4, B/L, CFT <3 seconds B/L, Temperature gradient warm to warm, B/L.   Neuro: Epicritic sensation diminished  to the level of digits , B/L.  Musculoskeletal/Ortho: R foot clawing digits 2,3,4.  Skin: R foot sub 2nd metatarsal head wound to bone, serous drainage, scant purulence, erythema extending dorsal midfoot, no edema, periowund hyperkeratosis. L foot sub first metatrsal luciano callus with no signs of infection.     RADIOLOGY & ADDITIONAL STUDIES:     Patient is a 83y old  Female who presents with a chief complaint of     HPI:  82 yo F presents with bilateral heel ulcers x years. States "they keep getting infected." Saw her Podiatrist yest, Dr. Montes who informed pt he was concerned she had a "bone infection" in her right heel and advised she come to the ED to be admitted for surgery on Friday. Denies nausea, vomiting, fever, chills. No pain. Ambulating w/o difficulty    PAST MEDICAL & SURGICAL HISTORY:  HTN (hypertension)      Hyperlipidemia      Back problem  s/p back surgery      S/P thyroid surgery      S/P hernia surgery      S/P rotator cuff surgery          MEDICATIONS  (STANDING):  vancomycin  IVPB. 1000 milliGRAM(s) IV Intermittent once    MEDICATIONS  (PRN):      Allergies    No Known Allergies    Intolerances        VITALS:    Vital Signs Last 24 Hrs  T(C): 36.8 (05 Dec 2023 19:21), Max: 36.8 (05 Dec 2023 19:21)  T(F): 98.2 (05 Dec 2023 19:21), Max: 98.2 (05 Dec 2023 19:21)  HR: 56 (05 Dec 2023 19:21) (56 - 75)  BP: 178/92 (05 Dec 2023 19:21) (178/92 - 185/81)  BP(mean): --  RR: 18 (05 Dec 2023 19:21) (18 - 20)  SpO2: 100% (05 Dec 2023 19:21) (98% - 100%)    Parameters below as of 05 Dec 2023 19:21  Patient On (Oxygen Delivery Method): room air        LABS:                          13.6   6.41  )-----------( 278      ( 05 Dec 2023 18:28 )             41.3       12-05    138  |  102  |  8   ----------------------------<  95  3.8   |  24  |  0.75    Ca    9.7      05 Dec 2023 18:28    TPro  7.1  /  Alb  4.3  /  TBili  0.7  /  DBili  x   /  AST  20  /  ALT  11  /  AlkPhos  95  12-05      CAPILLARY BLOOD GLUCOSE          PT/INR - ( 05 Dec 2023 18:28 )   PT: 12.0 sec;   INR: 1.09 ratio         PTT - ( 05 Dec 2023 18:28 )  PTT:41.9 sec    LOWER EXTREMITY PHYSICAL EXAM:    Vascular: DP/PT 2/4, B/L, CFT <3 seconds B/L, Temperature gradient warm to warm, B/L.   Neuro: Epicritic sensation diminished  to the level of digits , B/L.  Musculoskeletal/Ortho: R foot clawing digits 2,3,4.  Skin: R foot sub 2nd metatarsal head wound to bone, serous drainage, scant purulence, erythema extending dorsal midfoot, no edema, periowund hyperkeratosis. L foot sub first metatrsal luciano callus with no signs of infection.     RADIOLOGY & ADDITIONAL STUDIES:     Patient is a 83y old  Female who presents with a chief complaint of     HPI:  84 yo F presents with bilateral heel ulcers x years. States "they keep getting infected." Saw her Podiatrist yest, Dr. Montes who informed pt he was concerned she had a "bone infection" in her right heel and advised she come to the ED to be admitted for surgery on Friday. Denies nausea, vomiting, fever, chills. No pain. Ambulating w/o difficulty    PAST MEDICAL & SURGICAL HISTORY:  HTN (hypertension)      Hyperlipidemia      Back problem  s/p back surgery      S/P thyroid surgery      S/P hernia surgery      S/P rotator cuff surgery          MEDICATIONS  (STANDING):  vancomycin  IVPB. 1000 milliGRAM(s) IV Intermittent once    MEDICATIONS  (PRN):      Allergies    No Known Allergies    Intolerances        VITALS:    Vital Signs Last 24 Hrs  T(C): 36.8 (05 Dec 2023 19:21), Max: 36.8 (05 Dec 2023 19:21)  T(F): 98.2 (05 Dec 2023 19:21), Max: 98.2 (05 Dec 2023 19:21)  HR: 56 (05 Dec 2023 19:21) (56 - 75)  BP: 178/92 (05 Dec 2023 19:21) (178/92 - 185/81)  BP(mean): --  RR: 18 (05 Dec 2023 19:21) (18 - 20)  SpO2: 100% (05 Dec 2023 19:21) (98% - 100%)    Parameters below as of 05 Dec 2023 19:21  Patient On (Oxygen Delivery Method): room air        LABS:                          13.6   6.41  )-----------( 278      ( 05 Dec 2023 18:28 )             41.3       12-05    138  |  102  |  8   ----------------------------<  95  3.8   |  24  |  0.75    Ca    9.7      05 Dec 2023 18:28    TPro  7.1  /  Alb  4.3  /  TBili  0.7  /  DBili  x   /  AST  20  /  ALT  11  /  AlkPhos  95  12-05      CAPILLARY BLOOD GLUCOSE          PT/INR - ( 05 Dec 2023 18:28 )   PT: 12.0 sec;   INR: 1.09 ratio         PTT - ( 05 Dec 2023 18:28 )  PTT:41.9 sec    LOWER EXTREMITY PHYSICAL EXAM:    Vascular: DP/PT 2/4, B/L, CFT <3 seconds B/L, Temperature gradient warm to warm, B/L.   Neuro: Epicritic sensation diminished  to the level of digits , B/L.  Musculoskeletal/Ortho: R foot clawing digits 2,3,4.  Skin: R foot sub 2nd metatarsal head wound to bone, serous drainage, scant purulence, erythema extending dorsal midfoot, no edema, periowund hyperkeratosis. L foot sub first metatrsal luciano callus with no signs of infection.     RADIOLOGY & ADDITIONAL STUDIES:  < from: Xray Foot AP + Lateral + Oblique, Bilat (12.05.23 @ 22:00) >    ******PRELIMINARY REPORT******      ******PRELIMINARY REPORT******         ACC: [id number] EXAM:  XR FOOT COMP MIN 3 VIEWS BI   ORDERED BY:  PAWAN KWOK     PROCEDURE DATE:  12/05/2023    ******PRELIMINARY REPORT******      ******PRELIMINARY REPORT******           INTERPRETATION:  CLINICAL INFORMATION: Bilateral heel ulcers. Right foot   subsequent metatarsal head wound to bone with serous drainage. Evaluate   for osteomyelitis    EXAM: 3 views of the bilateral feet, 6 images total.    COMPARISON: MR right foot 10/19/2023    FINDINGS:    Left foot:  Chronic deformity of the third and fourth proximal phalanges.  No acute fracture or dislocation. No discrete cortical erosions.  Calcaneal enthesophytes.  Soft tissue swelling about the ankle and proximal foot. No subcutaneous   tracking gas.    Right foot:  Soft tissue defect at the plantar aspect adjacent to the MTP joints, best   appreciated on lateral view. No subcutaneous tracking gas. Soft tissues   swelling about the ankle and proximal foot.  Cortical erosion about the head of the second metatarsal/base of the   second proximal phalanx.  No acute fracture or dislocation.  Arthrosis with erosive changes at the first MTP joint. Lateral   subluxation of the third proximal phalanxon the metatarsal. Calcaneal   enthesophytes.    IMPRESSION:    Left foot: No subcutaneous tracking gas or discrete cortical erosions to   suggest acute osteomyelitis.    Right foot: Soft tissue defect at the plantar aspect of the foot adjacent   to the MTP joints on lateral view, with cortical erosion about the head   of the second metatarsal/base of the second proximal phalanx. Findings   may be seen with osteomyelitis. No subcutaneous tracking gas.        ******PRELIMINARY REPORT******      ******PRELIMINARY REPORT******        DU MEHTA MD; Resident Radiologist  This document is a PRELIMINARY interpretation and is pending final   attending approval. Dec  5 2023 10:13PM    < end of copied text >     Patient is a 83y old  Female who presents with a chief complaint of     HPI:  84 yo F presents with bilateral heel ulcers x years. States "they keep getting infected." Saw her Podiatrist yest, Dr. Montes who informed pt he was concerned she had a "bone infection" in her right heel and advised she come to the ED to be admitted for surgery on Friday. Denies nausea, vomiting, fever, chills. No pain. Ambulating w/o difficulty    PAST MEDICAL & SURGICAL HISTORY:  HTN (hypertension)      Hyperlipidemia      Back problem  s/p back surgery      S/P thyroid surgery      S/P hernia surgery      S/P rotator cuff surgery          MEDICATIONS  (STANDING):  vancomycin  IVPB. 1000 milliGRAM(s) IV Intermittent once    MEDICATIONS  (PRN):      Allergies    No Known Allergies    Intolerances        VITALS:    Vital Signs Last 24 Hrs  T(C): 36.8 (05 Dec 2023 19:21), Max: 36.8 (05 Dec 2023 19:21)  T(F): 98.2 (05 Dec 2023 19:21), Max: 98.2 (05 Dec 2023 19:21)  HR: 56 (05 Dec 2023 19:21) (56 - 75)  BP: 178/92 (05 Dec 2023 19:21) (178/92 - 185/81)  BP(mean): --  RR: 18 (05 Dec 2023 19:21) (18 - 20)  SpO2: 100% (05 Dec 2023 19:21) (98% - 100%)    Parameters below as of 05 Dec 2023 19:21  Patient On (Oxygen Delivery Method): room air        LABS:                          13.6   6.41  )-----------( 278      ( 05 Dec 2023 18:28 )             41.3       12-05    138  |  102  |  8   ----------------------------<  95  3.8   |  24  |  0.75    Ca    9.7      05 Dec 2023 18:28    TPro  7.1  /  Alb  4.3  /  TBili  0.7  /  DBili  x   /  AST  20  /  ALT  11  /  AlkPhos  95  12-05      CAPILLARY BLOOD GLUCOSE          PT/INR - ( 05 Dec 2023 18:28 )   PT: 12.0 sec;   INR: 1.09 ratio         PTT - ( 05 Dec 2023 18:28 )  PTT:41.9 sec    LOWER EXTREMITY PHYSICAL EXAM:    Vascular: DP/PT 2/4, B/L, CFT <3 seconds B/L, Temperature gradient warm to warm, B/L.   Neuro: Epicritic sensation diminished  to the level of digits , B/L.  Musculoskeletal/Ortho: R foot clawing digits 2,3,4.  Skin: R foot sub 2nd metatarsal head wound to bone, serous drainage, scant purulence, erythema extending dorsal midfoot, no edema, periowund hyperkeratosis. L foot sub first metatrsal luciano callus with no signs of infection.     RADIOLOGY & ADDITIONAL STUDIES:  < from: Xray Foot AP + Lateral + Oblique, Bilat (12.05.23 @ 22:00) >    ******PRELIMINARY REPORT******      ******PRELIMINARY REPORT******         ACC: [id number] EXAM:  XR FOOT COMP MIN 3 VIEWS BI   ORDERED BY:  PAWAN KWOK     PROCEDURE DATE:  12/05/2023    ******PRELIMINARY REPORT******      ******PRELIMINARY REPORT******           INTERPRETATION:  CLINICAL INFORMATION: Bilateral heel ulcers. Right foot   subsequent metatarsal head wound to bone with serous drainage. Evaluate   for osteomyelitis    EXAM: 3 views of the bilateral feet, 6 images total.    COMPARISON: MR right foot 10/19/2023    FINDINGS:    Left foot:  Chronic deformity of the third and fourth proximal phalanges.  No acute fracture or dislocation. No discrete cortical erosions.  Calcaneal enthesophytes.  Soft tissue swelling about the ankle and proximal foot. No subcutaneous   tracking gas.    Right foot:  Soft tissue defect at the plantar aspect adjacent to the MTP joints, best   appreciated on lateral view. No subcutaneous tracking gas. Soft tissues   swelling about the ankle and proximal foot.  Cortical erosion about the head of the second metatarsal/base of the   second proximal phalanx.  No acute fracture or dislocation.  Arthrosis with erosive changes at the first MTP joint. Lateral   subluxation of the third proximal phalanxon the metatarsal. Calcaneal   enthesophytes.    IMPRESSION:    Left foot: No subcutaneous tracking gas or discrete cortical erosions to   suggest acute osteomyelitis.    Right foot: Soft tissue defect at the plantar aspect of the foot adjacent   to the MTP joints on lateral view, with cortical erosion about the head   of the second metatarsal/base of the second proximal phalanx. Findings   may be seen with osteomyelitis. No subcutaneous tracking gas.        ******PRELIMINARY REPORT******      ******PRELIMINARY REPORT******        DU MEHTA MD; Resident Radiologist  This document is a PRELIMINARY interpretation and is pending final   attending approval. Dec  5 2023 10:13PM    < end of copied text >

## 2023-12-05 NOTE — ED PROVIDER NOTE - OBJECTIVE STATEMENT
Sanaz HODGSON: Patient is 89-year-old female with a history of hyperlipidemia high cholesterol sent in by Dr. Montes for concern of acute on chronic osteomyelitis of the right foot.  Patient reports she has had infections for months and has been on antibiotics off-and-on for months.  Most recently she was on 2 antibiotics and discontinued 1 secondary to her not suiting her.  She cannot name which when this was.  She states she completed Cipro about 2 days ago.  She denies any fevers, chills, nausea, vomiting.  She denies any worsening pain.  She was sent in by podiatry physician, Dr. Montes  for admission for concern of acute on chronic osteomyelitis and possible need for surgical debridement.

## 2023-12-05 NOTE — ED PROVIDER NOTE - RAPID ASSESSMENT
82 yo F presents with bilateral heel ulcers x years. States "they keep getting infected." Saw her Podiatrist yest, Dr. Montes who informed pt he was concerned she had a "bone infection" in her right heel and advised she come to the ED to be admitted for surgery on Friday. Denies nausea, vomiting, fever, chills. No pain. Ambulating w/o difficulty.    **Patient was rapidly assessed via telemedicine encounter by me, Coretta Sandoval PA-C. A limited history was obtained. The patient will be seen and further examined and worked up in the main ED and their care will be completed by the main ED team. Receiving team will follow up on labs, medications, any clinical imaging, and perform reassessment and disposition of the patient as clinically indicated. All decisions regarding the progression of care will be made at their discretion. 84 yo F presents with bilateral heel ulcers x years. States "they keep getting infected." Saw her Podiatrist yest, Dr. Montes who informed pt he was concerned she had a "bone infection" in her right heel and advised she come to the ED to be admitted for surgery on Friday. Denies nausea, vomiting, fever, chills. No pain. Ambulating w/o difficulty.    **Patient was rapidly assessed via telemedicine encounter by me, Coretta Sandoval PA-C. A limited history was obtained. The patient will be seen and further examined and worked up in the main ED and their care will be completed by the main ED team. Receiving team will follow up on labs, medications, any clinical imaging, and perform reassessment and disposition of the patient as clinically indicated. All decisions regarding the progression of care will be made at their discretion.    Attending MD Felipe: This patient was seen and orders were placed by the PA as per our department's QPA model.  I was not consulted in regards to this patient although I was present and available in the Emergency Department to the PA.  Patient was to be sent to main ED for full medical evaluation and receiving team was to follow up on any labs, analgesia, clinical imaging ordered by the PA.  Any reassessment and disposition decisions were to be made by receiving team as clinically indicated, all decisions regarding the progression of care to be made at their discretion.  I did not perform a comprehensive history and physical on this patient.

## 2023-12-05 NOTE — ED PROVIDER NOTE - CARE PLAN
Okay to refill ?    Rajinder UE shoulder flexion to 90 degrees/bilateral lower extremity was ROM was WNL (within normal limits) 1 Principal Discharge DX:	Osteomyelitis

## 2023-12-05 NOTE — ED PROVIDER NOTE - NSICDXPASTSURGICALHX_GEN_ALL_CORE_FT
PAST SURGICAL HISTORY:  Back problem s/p back surgery    S/P hernia surgery     S/P rotator cuff surgery     S/P thyroid surgery

## 2023-12-05 NOTE — ED PROVIDER NOTE - CLINICAL SUMMARY MEDICAL DECISION MAKING FREE TEXT BOX
Sanaz HODGSON:   83-year-old female sent in by podiatry for concern of acute on chronic osteomyelitis of right foot ulcer.  On exam, the ulcer is located at the bottom of the right foot at the ball of the foot.  There is mild purulent drainage.  She denies any fevers, chills, nausea, vomiting.  Plan for labs, Vanco, Zosyn, x-ray imaging, podiatry consult.  Patient was told that she may need surgical debridement.  Patient to be admitted.

## 2023-12-06 DIAGNOSIS — I10 ESSENTIAL (PRIMARY) HYPERTENSION: ICD-10-CM

## 2023-12-06 DIAGNOSIS — M86.9 OSTEOMYELITIS, UNSPECIFIED: ICD-10-CM

## 2023-12-06 DIAGNOSIS — Z29.9 ENCOUNTER FOR PROPHYLACTIC MEASURES, UNSPECIFIED: ICD-10-CM

## 2023-12-06 DIAGNOSIS — E78.5 HYPERLIPIDEMIA, UNSPECIFIED: ICD-10-CM

## 2023-12-06 DIAGNOSIS — E03.9 HYPOTHYROIDISM, UNSPECIFIED: ICD-10-CM

## 2023-12-06 DIAGNOSIS — M86.10 OTHER ACUTE OSTEOMYELITIS, UNSPECIFIED SITE: ICD-10-CM

## 2023-12-06 LAB
CRP SERPL-MCNC: <3 MG/L — SIGNIFICANT CHANGE UP (ref 0–4)
CRP SERPL-MCNC: <3 MG/L — SIGNIFICANT CHANGE UP (ref 0–4)
ERYTHROCYTE [SEDIMENTATION RATE] IN BLOOD: 12 MM/HR — SIGNIFICANT CHANGE UP (ref 0–20)
ERYTHROCYTE [SEDIMENTATION RATE] IN BLOOD: 12 MM/HR — SIGNIFICANT CHANGE UP (ref 0–20)

## 2023-12-06 PROCEDURE — 73719 MRI LOWER EXTREMITY W/DYE: CPT | Mod: 26,RT

## 2023-12-06 PROCEDURE — 99497 ADVNCD CARE PLAN 30 MIN: CPT | Mod: 25

## 2023-12-06 PROCEDURE — 99223 1ST HOSP IP/OBS HIGH 75: CPT | Mod: 25

## 2023-12-06 PROCEDURE — 93923 UPR/LXTR ART STDY 3+ LVLS: CPT | Mod: 26

## 2023-12-06 RX ORDER — DIGOXIN 250 MCG
125 TABLET ORAL DAILY
Refills: 0 | Status: DISCONTINUED | OUTPATIENT
Start: 2023-12-06 | End: 2023-12-11

## 2023-12-06 RX ORDER — LOSARTAN POTASSIUM 100 MG/1
25 TABLET, FILM COATED ORAL DAILY
Refills: 0 | Status: DISCONTINUED | OUTPATIENT
Start: 2023-12-06 | End: 2023-12-11

## 2023-12-06 RX ORDER — CHLORHEXIDINE GLUCONATE 213 G/1000ML
1 SOLUTION TOPICAL
Refills: 0 | Status: DISCONTINUED | OUTPATIENT
Start: 2023-12-06 | End: 2023-12-11

## 2023-12-06 RX ORDER — PIPERACILLIN AND TAZOBACTAM 4; .5 G/20ML; G/20ML
3.38 INJECTION, POWDER, LYOPHILIZED, FOR SOLUTION INTRAVENOUS EVERY 8 HOURS
Refills: 0 | Status: DISCONTINUED | OUTPATIENT
Start: 2023-12-06 | End: 2023-12-10

## 2023-12-06 RX ORDER — LEVOTHYROXINE SODIUM 125 MCG
75 TABLET ORAL DAILY
Refills: 0 | Status: DISCONTINUED | OUTPATIENT
Start: 2023-12-06 | End: 2023-12-11

## 2023-12-06 RX ORDER — VANCOMYCIN HCL 1 G
750 VIAL (EA) INTRAVENOUS EVERY 12 HOURS
Refills: 0 | Status: DISCONTINUED | OUTPATIENT
Start: 2023-12-06 | End: 2023-12-10

## 2023-12-06 RX ORDER — SIMVASTATIN 20 MG/1
40 TABLET, FILM COATED ORAL AT BEDTIME
Refills: 0 | Status: DISCONTINUED | OUTPATIENT
Start: 2023-12-06 | End: 2023-12-11

## 2023-12-06 RX ORDER — METOPROLOL TARTRATE 50 MG
25 TABLET ORAL
Refills: 0 | Status: DISCONTINUED | OUTPATIENT
Start: 2023-12-06 | End: 2023-12-11

## 2023-12-06 RX ORDER — ACETAMINOPHEN 500 MG
650 TABLET ORAL EVERY 6 HOURS
Refills: 0 | Status: DISCONTINUED | OUTPATIENT
Start: 2023-12-06 | End: 2023-12-11

## 2023-12-06 RX ORDER — ENOXAPARIN SODIUM 100 MG/ML
40 INJECTION SUBCUTANEOUS EVERY 24 HOURS
Refills: 0 | Status: DISCONTINUED | OUTPATIENT
Start: 2023-12-06 | End: 2023-12-11

## 2023-12-06 RX ADMIN — LOSARTAN POTASSIUM 25 MILLIGRAM(S): 100 TABLET, FILM COATED ORAL at 06:27

## 2023-12-06 RX ADMIN — Medication 25 MILLIGRAM(S): at 06:27

## 2023-12-06 RX ADMIN — Medication 75 MICROGRAM(S): at 06:27

## 2023-12-06 RX ADMIN — Medication 25 MILLIGRAM(S): at 18:34

## 2023-12-06 RX ADMIN — Medication 250 MILLIGRAM(S): at 06:28

## 2023-12-06 RX ADMIN — Medication 250 MILLIGRAM(S): at 18:34

## 2023-12-06 RX ADMIN — PIPERACILLIN AND TAZOBACTAM 25 GRAM(S): 4; .5 INJECTION, POWDER, LYOPHILIZED, FOR SOLUTION INTRAVENOUS at 15:54

## 2023-12-06 RX ADMIN — SIMVASTATIN 40 MILLIGRAM(S): 20 TABLET, FILM COATED ORAL at 21:22

## 2023-12-06 RX ADMIN — PIPERACILLIN AND TAZOBACTAM 25 GRAM(S): 4; .5 INJECTION, POWDER, LYOPHILIZED, FOR SOLUTION INTRAVENOUS at 21:22

## 2023-12-06 RX ADMIN — ENOXAPARIN SODIUM 40 MILLIGRAM(S): 100 INJECTION SUBCUTANEOUS at 18:33

## 2023-12-06 RX ADMIN — Medication 125 MICROGRAM(S): at 06:27

## 2023-12-06 RX ADMIN — PIPERACILLIN AND TAZOBACTAM 25 GRAM(S): 4; .5 INJECTION, POWDER, LYOPHILIZED, FOR SOLUTION INTRAVENOUS at 08:04

## 2023-12-06 RX ADMIN — Medication 250 MILLIGRAM(S): at 02:10

## 2023-12-06 NOTE — CHART NOTE - NSCHARTNOTEFT_GEN_A_CORE
Patient to undergo debridement today with podiatry. Continue to remain NPO. Continue zosyn + van for abx

## 2023-12-06 NOTE — PATIENT PROFILE ADULT - FALL HARM RISK - UNIVERSAL INTERVENTIONS
Bed in lowest position, wheels locked, appropriate side rails in place/Call bell, personal items and telephone in reach/Instruct patient to call for assistance before getting out of bed or chair/Non-slip footwear when patient is out of bed/[place] to call system/Physically safe environment - no spills, clutter or unnecessary equipment/Purposeful Proactive Rounding/Room/bathroom lighting operational, light cord in reach Bed in lowest position, wheels locked, appropriate side rails in place/Call bell, personal items and telephone in reach/Instruct patient to call for assistance before getting out of bed or chair/Non-slip footwear when patient is out of bed/[place] to call system/Physically safe environment - no spills, clutter or unnecessary equipment/Purposeful Proactive Rounding/Room/bathroom lighting operational, light cord in reach

## 2023-12-06 NOTE — ED ADULT NURSE NOTE - OBJECTIVE STATEMENT
89Y female, A&Ox4, PM of [facility] sent in by Dr. Montes for concern of acute on chronic osteomyelitis of the right foot.  Patient reports she has had infections for months and has been on antibiotics off-and-on for months.  Most recently she was on 2 antibiotics and discontinued 1 secondary to her not suiting her.  She cannot name which when this was.  She states she completed Cipro about 2 days ago.  She denies any fevers, chills, nausea, vomiting.  She denies any worsening pain.  She was sent in by podiatry physician, Dr. Montes  for admission for concern of acute on chronic osteomyelitis and possible need for surgical debridement. 89Y female, A&Ox4, PM of [facility] sent in by Dr. Montes for concern of acute on chronic osteomyelitis of the right foot.  Patient reports she has had infections for months and has been on antibiotics off-and-on for months.  Most recently she was on 2 antibiotics and discontinued 1 secondary to her not suiting her.  She cannot name which when this was.  She states she completed Cipro about 2 days ago.  She denies any fevers, chills, nausea, vomiting.  She denies any worsening pain.  She was sent in by podiatry physician, Dr. Montes  for admission for concern of acute on chronic osteomyelitis and possible need for surgical debridement.

## 2023-12-06 NOTE — ED ADULT NURSE REASSESSMENT NOTE - NS ED NURSE REASSESS COMMENT FT1
Patient resting comfortably, breathing unlabored, VSS, no signs of acute distress, side rails raised, call bell within reach, comfort and safety maintained. MRI form faxed, awaiting bed on floor. No complaints at this time.

## 2023-12-06 NOTE — H&P ADULT - PROBLEM SELECTOR PLAN 1
- will treat with Vanco and Zosyn pending wound culture  - will check MRI right foot to eval OM, DAVID/PVR  - Podiatry appreciated.  Case discussed with Dr. Bibiana Burton.  Patient is to undergo surgical debridement under conscious IV sedation with local block.  Patient has no bleeding disorder, denies chest pain/PAGE/syncope/stroke, > 4METS (goes up  and down 3 stories walk-up every day) and no toxic habits (smoking/alcohol).  BP elevated due to not eating all day and missed both BP medications, thus will resume.  EKG - NSR with LBBB (old)  - Patient is medically optimized for planned procedure.  NPO after midnight - will treat with Vanco and Zosyn pending wound culture  - will check MRI right foot to eval OM, DAVID/PVR  - Podiatry appreciated.  Case discussed with Dr. Bibiana Burton.  Patient is to undergo surgical debridement under conscious IV sedation with local block.  Patient has no bleeding disorder, denies chest pain/PAGE/syncope/stroke, > 4METS (goes up  and down 3 stories walk-up every day) and no toxic habits (smoking/alcohol).  BP elevated due to not eating all day and missed both BP medications, thus will resume.  EKG - SB with LBBB (old)  - Patient is medically optimized for planned procedure.  NPO after midnight

## 2023-12-06 NOTE — H&P ADULT - ADDITIONAL PE
T(F): 98.2 (05 Dec 2023 19:21), Max: 98.2 (05 Dec 2023 19:21)  HR: 56 (05 Dec 2023 19:21) (56 - 75)  BP: 178/92 (05 Dec 2023 19:21) (178/92 - 185/81)  RR: 18 (05 Dec 2023 19:21) (18 - 20)  SpO2: 100% (05 Dec 2023 19:21) (98% - 100%) room air

## 2023-12-06 NOTE — H&P ADULT - NSHPSOCIALHISTORY_GEN_ALL_CORE
single, retired , lives alone on 3rd floor walk-up apartment, ambulates without assist device, independent  no smoking or alcohol

## 2023-12-06 NOTE — H&P ADULT - HISTORY OF PRESENT ILLNESS
83 year-old female with history of HTN, HLD presents with bilateral heel ulcers x years. States "they keep getting infected." Saw her Podiatrist yest, Dr. Montes who informed pt he was concerned she had a "bone infection" in her right heel and advised she come to the ED to be admitted for surgery on Friday. Denies nausea, vomiting, fever, chills. No pain. Ambulating w/o difficulty 83 year-old female with history of HTN, HLD, chronic right plantar foot ulcers/OM for 2 years (on and off antibiotics) sent in by Podiatrist with concern for acute on chronic osteomyelitis of the right foot.  Patient has been on multiple antibiotics, most recently Doxy, then Cipro 500mg for 30 days (completed 12/1), wound check at [place] ED and received Vanco/Zosyn on 12/1, discharged same day with Cipro 750mg for additional 30 days without improvement, came to ED for possible surgical debridement and IV antibiotics.  She denies any fevers, chills, nausea, vomiting, worsening pain/redness, or difficulty ambulation. 83 year-old female with history of HTN, HLD, chronic right plantar foot ulcers/OM for 2 years (on and off antibiotics) sent in by Podiatrist with concern for acute on chronic osteomyelitis of the right foot.  Patient has been on multiple antibiotics, most recently Doxy, then Cipro 500mg for 30 days (completed 12/1), wound check at [place] ED and received Vanco/Zosyn on 12/1, discharged same day with Cipro 750mg for additional 30 days without improvement, came to ED for possible surgical debridement and IV antibiotics.  She denies any fevers, chills, nausea, vomiting, worsening pain/redness, or difficulty ambulation.

## 2023-12-06 NOTE — ED ADULT NURSE NOTE - NSFALLHARMRISKINTERV_ED_ALL_ED
Assistance OOB with selected safe patient handling equipment if applicable/Assistance with ambulation/Communicate risk of Fall with Harm to all staff, patient, and family/Monitor gait and stability/Provide visual cue: red socks, yellow wristband, yellow gown, etc/Reinforce activity limits and safety measures with patient and family/Bed in lowest position, wheels locked, appropriate side rails in place/Call bell, personal items and telephone in reach/Instruct patient to call for assistance before getting out of bed/chair/stretcher/Non-slip footwear applied when patient is off stretcher/[place] to call system/Physically safe environment - no spills, clutter or unnecessary equipment/Purposeful Proactive Rounding/Room/bathroom lighting operational, light cord in reach Assistance OOB with selected safe patient handling equipment if applicable/Assistance with ambulation/Communicate risk of Fall with Harm to all staff, patient, and family/Monitor gait and stability/Provide visual cue: red socks, yellow wristband, yellow gown, etc/Reinforce activity limits and safety measures with patient and family/Bed in lowest position, wheels locked, appropriate side rails in place/Call bell, personal items and telephone in reach/Instruct patient to call for assistance before getting out of bed/chair/stretcher/Non-slip footwear applied when patient is off stretcher/[place] to call system/Physically safe environment - no spills, clutter or unnecessary equipment/Purposeful Proactive Rounding/Room/bathroom lighting operational, light cord in reach

## 2023-12-06 NOTE — PROGRESS NOTE ADULT - ASSESSMENT
83F with right foot sub met 2 wound to bone w/ acute on chronic OM  - Pt seen and evaluated   - Afebrile, no leukocytosis   - R foot sub 2nd metatarsal head wound to bone, serous drainage, scant purulence, erythema extending dorsal midfoot, no edema, periwound hyperkeratosis, no fluctuance, no soft tissue crepitus, no tracking or tunneling, no malodor. L foot sub first metatarsal head callus with no signs of infection.   - Right foot wound culture pending  - Continue with IV abx  - Right foot XR: OM of 2nd metatarsal head, no gas.   - Right foot MRI pending   - Recommended ID consult  - Booked for right foot partial second ray resection on Friday 12/8 at 2:00 PM w Dr Montes  - Document medical clearance for podiatric surgical intervention appreciated  - Seen with attending   83F with right foot sub met 2 wound to bone w/ acute on chronic OM  - Pt seen and evaluated   - Afebrile, no leukocytosis   - R foot sub 2nd metatarsal head wound to bone, serous drainage, scant purulence, erythema extending dorsal midfoot, no edema, periwound hyperkeratosis, no fluctuance, no soft tissue crepitus, no tracking or tunneling, no malodor. L foot sub first metatarsal head callus with no signs of infection.   - Right foot wound culture pending  - Continue with IV abx  - Right foot XR: OM of 2nd metatarsal head, no gas.   - Right foot MRI pending   - Recommended ID consult  - Booked for right foot partial second ray resection on Friday 12/8 at 2:00 PM w Dr Montes  - Document medical clearance for podiatric surgical intervention appreciated  - Please document cards clearance for podiatry procedure under anesthesia   - Seen with attending

## 2023-12-06 NOTE — H&P ADULT - NSHPOUTPATIENTPROVIDERS_GEN_ALL_CORE
Dr. Gustavo Calixto -PMD  Dr. Jc aPcheco - Cardio  Dr. Feliciano Moore - ID Dr. Gustavo Calixto -PMD  Dr. Jc Pacheco - Cardio  Dr. Feliciano Moore - ID

## 2023-12-06 NOTE — H&P ADULT - ASSESSMENT
83 year-old female with history of HTN, HLD, chronic right plantar foot ulcers/OM for 2 years (on and off antibiotics) admitted with acute on chronic osteomyelitis of the right foot wound for surgical debridement and IV antibiotics

## 2023-12-06 NOTE — ED ADULT NURSE REASSESSMENT NOTE - NS ED NURSE REASSESS COMMENT FT1
Handoff taken from DONOVAN Forrest in [place]. Introduced self to patient resting comfortably, breathing unlabored, VSS, no signs of acute distress, side rails raised, call bell within reach, comfort and safety maintained. Awaiting bed on floor, updated on POC, no complaints at this time. Handoff taken from DONOVAN Forrest in [facility]. Introduced self to patient resting comfortably, breathing unlabored, VSS, no signs of acute distress, side rails raised, call bell within reach, comfort and safety maintained. Awaiting bed on floor, updated on POC, no complaints at this time.

## 2023-12-06 NOTE — PROGRESS NOTE ADULT - SUBJECTIVE AND OBJECTIVE BOX
Podiatry pager #: [phone] ([place])/ [phone] ([facility])    Patient is a 83y old  Female who presents with a chief complaint of right foot infection (06 Dec 2023 00:08)       INTERVAL HPI/OVERNIGHT EVENTS:  Patient seen and evaluated at bedside.  Pt is resting comfortable in NAD. Denies N/V/F/C.     Allergies    No Known Allergies    Intolerances    Levaquin (Stomach Upset; Nausea)      Vital Signs Last 24 Hrs  T(C): 36.7 (06 Dec 2023 07:17), Max: 36.8 (05 Dec 2023 19:21)  T(F): 98.1 (06 Dec 2023 07:17), Max: 98.2 (05 Dec 2023 19:21)  HR: 63 (06 Dec 2023 07:17) (56 - 75)  BP: 175/87 (06 Dec 2023 07:17) (161/72 - 185/81)  BP(mean): 116 (06 Dec 2023 07:17) (116 - 116)  RR: 18 (06 Dec 2023 07:17) (17 - 20)  SpO2: 99% (06 Dec 2023 07:17) (98% - 100%)    Parameters below as of 06 Dec 2023 07:17  Patient On (Oxygen Delivery Method): room air        LABS:                        13.6   6.41  )-----------( 278      ( 05 Dec 2023 18:28 )             41.3     12-05    138  |  102  |  8   ----------------------------<  95  3.8   |  24  |  0.75    Ca    9.7      05 Dec 2023 18:28    TPro  7.1  /  Alb  4.3  /  TBili  0.7  /  DBili  x   /  AST  20  /  ALT  11  /  AlkPhos  95  12-05    PT/INR - ( 05 Dec 2023 18:28 )   PT: 12.0 sec;   INR: 1.09 ratio         PTT - ( 05 Dec 2023 18:28 )  PTT:41.9 sec  Urinalysis Basic - ( 05 Dec 2023 18:28 )    Color: x / Appearance: x / SG: x / pH: x  Gluc: 95 mg/dL / Ketone: x  / Bili: x / Urobili: x   Blood: x / Protein: x / Nitrite: x   Leuk Esterase: x / RBC: x / WBC x   Sq Epi: x / Non Sq Epi: x / Bacteria: x      CAPILLARY BLOOD GLUCOSE          Lower Extremity Physical Exam:  Vascular: DP/PT 2/4, B/L, CFT <3 seconds B/L, Temperature gradient warm to warm, B/L.   Neuro: Epicritic sensation diminished  to the level of digits , B/L.  Musculoskeletal/Ortho: R foot clawing digits 2,3,4.  Skin: R foot sub 2nd metatarsal head wound to bone, serous drainage, scant purulence, erythema extending dorsal midfoot, no edema, periowund hyperkeratosis. L foot sub first metatrsal luciano callus with no signs of infection.     RADIOLOGY & ADDITIONAL TESTS:   Podiatry pager #: [phone] ([place])/ [phone] ([facility])    Patient is a 83y old  Female who presents with a chief complaint of right foot infection (06 Dec 2023 00:08)       INTERVAL HPI/OVERNIGHT EVENTS:  Patient seen and evaluated at bedside.  Pt is resting comfortable in NAD. Denies N/V/F/C.     Allergies    No Known Allergies    Intolerances    Levaquin (Stomach Upset; Nausea)      Vital Signs Last 24 Hrs  T(C): 36.7 (06 Dec 2023 07:17), Max: 36.8 (05 Dec 2023 19:21)  T(F): 98.1 (06 Dec 2023 07:17), Max: 98.2 (05 Dec 2023 19:21)  HR: 63 (06 Dec 2023 07:17) (56 - 75)  BP: 175/87 (06 Dec 2023 07:17) (161/72 - 185/81)  BP(mean): 116 (06 Dec 2023 07:17) (116 - 116)  RR: 18 (06 Dec 2023 07:17) (17 - 20)  SpO2: 99% (06 Dec 2023 07:17) (98% - 100%)    Parameters below as of 06 Dec 2023 07:17  Patient On (Oxygen Delivery Method): room air        LABS:                        13.6   6.41  )-----------( 278      ( 05 Dec 2023 18:28 )             41.3     12-05    138  |  102  |  8   ----------------------------<  95  3.8   |  24  |  0.75    Ca    9.7      05 Dec 2023 18:28    TPro  7.1  /  Alb  4.3  /  TBili  0.7  /  DBili  x   /  AST  20  /  ALT  11  /  AlkPhos  95  12-05    PT/INR - ( 05 Dec 2023 18:28 )   PT: 12.0 sec;   INR: 1.09 ratio         PTT - ( 05 Dec 2023 18:28 )  PTT:41.9 sec  Urinalysis Basic - ( 05 Dec 2023 18:28 )    Color: x / Appearance: x / SG: x / pH: x  Gluc: 95 mg/dL / Ketone: x  / Bili: x / Urobili: x   Blood: x / Protein: x / Nitrite: x   Leuk Esterase: x / RBC: x / WBC x   Sq Epi: x / Non Sq Epi: x / Bacteria: x      CAPILLARY BLOOD GLUCOSE          Lower Extremity Physical Exam:  Vascular: DP/PT 2/4, B/L, CFT <3 seconds B/L, Temperature gradient warm to warm, B/L.   Neuro: Epicritic sensation diminished  to the level of digits , B/L.  Musculoskeletal/Ortho: R foot clawing digits 2,3,4.  Skin: R foot sub 2nd metatarsal head wound to bone, serous drainage, scant purulence, erythema extending dorsal midfoot, no edema, periowund hyperkeratosis. L foot sub first metatrsal luciano callus with no signs of infection.     RADIOLOGY & ADDITIONAL TESTS:

## 2023-12-06 NOTE — H&P ADULT - CONVERSATION DETAILS
I personally spent 20 minutes in face-to-face discussion of advance directives including but not limited to DNR/DNI and plan of care in details with patient.  Patient is single and lives alone independently with older sister now in nursing home who can no longer be patient's HCP.  Visiting nurse, Jess Collins (phone number left at patient's home), offered to be her HCP and patient accepted as patient's extended family members are in [place].  MOLST form completed with patient and all questions answered.  Patient is DNR/DNI, no feeding tube and no dialysis.  The original MOLST given to patient and a copy placed in chart.  Patient agree to rescind DNR/DNI temporarily for upcoming right foot debridement procedure.

## 2023-12-06 NOTE — H&P ADULT - SKIN COMMENTS
plantar right foot - 2nd MTP base with clean ulcer with serosanguineous drainage, no surrounding erythema or edema.  non tender

## 2023-12-06 NOTE — PHYSICAL THERAPY INITIAL EVALUATION ADULT - ACTIVE RANGE OF MOTION EXAMINATION, REHAB EVAL
flex. upper extremity Active ROM was WNL (within normal limits)/bilateral lower extremity Active ROM was WNL (within normal limits)

## 2023-12-06 NOTE — PATIENT PROFILE ADULT - FUNCTIONAL ASSESSMENT - BASIC MOBILITY 6.
81 YO M with a sig PMHx of HTN, and prostate cancer 2010 (was an radiation and went into remission). Consult called for urinary retention. 16F [place] placed in ED, with 30cc dark red blood returned. Proceeded to irrigate [place] and 800cc of continued dark red urine.    Admit to urology  Follow up CT abdomen and pelvis with IV contrast  [place] care  Continue Flomax  Follow up AM labs, monitor H/H  Discussed with Dr. Taylor
4 = No assist / stand by assistance

## 2023-12-06 NOTE — ED ADULT NURSE NOTE - NS_SISCREENINGSR_GEN_ALL_ED
Medication refill received for Diclofenac    LOV: 3-13-21  NOV: 3-15-22   Last refilled: 7-16-21  Pharmacy and RX pending, please sign if approved.  
Refilled.  
Negative

## 2023-12-06 NOTE — H&P ADULT - NSICDXPASTMEDICALHX_GEN_ALL_CORE_FT
PAST MEDICAL HISTORY:  HTN (hypertension)     Hyperlipidemia      PAST MEDICAL HISTORY:  HTN (hypertension)     Hyperlipidemia     Hypothyroid     Paroxysmal SVT (supraventricular tachycardia)     Thyroid cyst

## 2023-12-06 NOTE — H&P ADULT - TIME BILLING
reviewing prior documentation, reviewing available recent outpatient records, independently obtaining a history and interpreting results of tests, performing a physical examination, reviewing tests/imaging, discussing the plan with the patient, counseling and educating the patient/family member(s), ordering medications/tests, documenting clinical information in the electronic health record, and coordinating care. reviewing prior documentation, reviewing available recent outpatient records, independently obtaining a history and interpreting results of tests, performing a physical examination, reviewing tests/imaging, discussing the plan with the patient, counseling and educating the patient, ordering medications/tests, documenting clinical information in the electronic health record, and coordinating care.

## 2023-12-07 ENCOUNTER — TRANSCRIPTION ENCOUNTER (OUTPATIENT)
Age: 83
End: 2023-12-07

## 2023-12-07 LAB
ANION GAP SERPL CALC-SCNC: 10 MMOL/L — SIGNIFICANT CHANGE UP (ref 5–17)
ANION GAP SERPL CALC-SCNC: 10 MMOL/L — SIGNIFICANT CHANGE UP (ref 5–17)
BASOPHILS # BLD AUTO: 0.05 K/UL — SIGNIFICANT CHANGE UP (ref 0–0.2)
BASOPHILS # BLD AUTO: 0.05 K/UL — SIGNIFICANT CHANGE UP (ref 0–0.2)
BASOPHILS NFR BLD AUTO: 0.9 % — SIGNIFICANT CHANGE UP (ref 0–2)
BASOPHILS NFR BLD AUTO: 0.9 % — SIGNIFICANT CHANGE UP (ref 0–2)
BUN SERPL-MCNC: 9 MG/DL — SIGNIFICANT CHANGE UP (ref 7–23)
BUN SERPL-MCNC: 9 MG/DL — SIGNIFICANT CHANGE UP (ref 7–23)
CALCIUM SERPL-MCNC: 8.9 MG/DL — SIGNIFICANT CHANGE UP (ref 8.4–10.5)
CALCIUM SERPL-MCNC: 8.9 MG/DL — SIGNIFICANT CHANGE UP (ref 8.4–10.5)
CHLORIDE SERPL-SCNC: 103 MMOL/L — SIGNIFICANT CHANGE UP (ref 96–108)
CHLORIDE SERPL-SCNC: 103 MMOL/L — SIGNIFICANT CHANGE UP (ref 96–108)
CO2 SERPL-SCNC: 23 MMOL/L — SIGNIFICANT CHANGE UP (ref 22–31)
CO2 SERPL-SCNC: 23 MMOL/L — SIGNIFICANT CHANGE UP (ref 22–31)
CREAT SERPL-MCNC: 0.83 MG/DL — SIGNIFICANT CHANGE UP (ref 0.5–1.3)
CREAT SERPL-MCNC: 0.83 MG/DL — SIGNIFICANT CHANGE UP (ref 0.5–1.3)
CRP SERPL-MCNC: 4 MG/L — SIGNIFICANT CHANGE UP (ref 0–4)
CRP SERPL-MCNC: 4 MG/L — SIGNIFICANT CHANGE UP (ref 0–4)
EGFR: 69 ML/MIN/1.73M2 — SIGNIFICANT CHANGE UP
EGFR: 69 ML/MIN/1.73M2 — SIGNIFICANT CHANGE UP
EOSINOPHIL # BLD AUTO: 0.23 K/UL — SIGNIFICANT CHANGE UP (ref 0–0.5)
EOSINOPHIL # BLD AUTO: 0.23 K/UL — SIGNIFICANT CHANGE UP (ref 0–0.5)
EOSINOPHIL NFR BLD AUTO: 4.4 % — SIGNIFICANT CHANGE UP (ref 0–6)
EOSINOPHIL NFR BLD AUTO: 4.4 % — SIGNIFICANT CHANGE UP (ref 0–6)
ERYTHROCYTE [SEDIMENTATION RATE] IN BLOOD: 18 MM/HR — SIGNIFICANT CHANGE UP (ref 0–20)
ERYTHROCYTE [SEDIMENTATION RATE] IN BLOOD: 18 MM/HR — SIGNIFICANT CHANGE UP (ref 0–20)
GLUCOSE SERPL-MCNC: 89 MG/DL — SIGNIFICANT CHANGE UP (ref 70–99)
GLUCOSE SERPL-MCNC: 89 MG/DL — SIGNIFICANT CHANGE UP (ref 70–99)
HCT VFR BLD CALC: 38.1 % — SIGNIFICANT CHANGE UP (ref 34.5–45)
HCT VFR BLD CALC: 38.1 % — SIGNIFICANT CHANGE UP (ref 34.5–45)
HGB BLD-MCNC: 12.9 G/DL — SIGNIFICANT CHANGE UP (ref 11.5–15.5)
HGB BLD-MCNC: 12.9 G/DL — SIGNIFICANT CHANGE UP (ref 11.5–15.5)
IMM GRANULOCYTES NFR BLD AUTO: 0.2 % — SIGNIFICANT CHANGE UP (ref 0–0.9)
IMM GRANULOCYTES NFR BLD AUTO: 0.2 % — SIGNIFICANT CHANGE UP (ref 0–0.9)
LYMPHOCYTES # BLD AUTO: 1.23 K/UL — SIGNIFICANT CHANGE UP (ref 1–3.3)
LYMPHOCYTES # BLD AUTO: 1.23 K/UL — SIGNIFICANT CHANGE UP (ref 1–3.3)
LYMPHOCYTES # BLD AUTO: 23.3 % — SIGNIFICANT CHANGE UP (ref 13–44)
LYMPHOCYTES # BLD AUTO: 23.3 % — SIGNIFICANT CHANGE UP (ref 13–44)
MCHC RBC-ENTMCNC: 32.7 PG — SIGNIFICANT CHANGE UP (ref 27–34)
MCHC RBC-ENTMCNC: 32.7 PG — SIGNIFICANT CHANGE UP (ref 27–34)
MCHC RBC-ENTMCNC: 33.9 GM/DL — SIGNIFICANT CHANGE UP (ref 32–36)
MCHC RBC-ENTMCNC: 33.9 GM/DL — SIGNIFICANT CHANGE UP (ref 32–36)
MCV RBC AUTO: 96.5 FL — SIGNIFICANT CHANGE UP (ref 80–100)
MCV RBC AUTO: 96.5 FL — SIGNIFICANT CHANGE UP (ref 80–100)
MONOCYTES # BLD AUTO: 0.54 K/UL — SIGNIFICANT CHANGE UP (ref 0–0.9)
MONOCYTES # BLD AUTO: 0.54 K/UL — SIGNIFICANT CHANGE UP (ref 0–0.9)
MONOCYTES NFR BLD AUTO: 10.2 % — SIGNIFICANT CHANGE UP (ref 2–14)
MONOCYTES NFR BLD AUTO: 10.2 % — SIGNIFICANT CHANGE UP (ref 2–14)
MRSA PCR RESULT.: SIGNIFICANT CHANGE UP
MRSA PCR RESULT.: SIGNIFICANT CHANGE UP
NEUTROPHILS # BLD AUTO: 3.22 K/UL — SIGNIFICANT CHANGE UP (ref 1.8–7.4)
NEUTROPHILS # BLD AUTO: 3.22 K/UL — SIGNIFICANT CHANGE UP (ref 1.8–7.4)
NEUTROPHILS NFR BLD AUTO: 61 % — SIGNIFICANT CHANGE UP (ref 43–77)
NEUTROPHILS NFR BLD AUTO: 61 % — SIGNIFICANT CHANGE UP (ref 43–77)
NRBC # BLD: 0 /100 WBCS — SIGNIFICANT CHANGE UP (ref 0–0)
NRBC # BLD: 0 /100 WBCS — SIGNIFICANT CHANGE UP (ref 0–0)
PLATELET # BLD AUTO: 230 K/UL — SIGNIFICANT CHANGE UP (ref 150–400)
PLATELET # BLD AUTO: 230 K/UL — SIGNIFICANT CHANGE UP (ref 150–400)
POTASSIUM SERPL-MCNC: 3.6 MMOL/L — SIGNIFICANT CHANGE UP (ref 3.5–5.3)
POTASSIUM SERPL-MCNC: 3.6 MMOL/L — SIGNIFICANT CHANGE UP (ref 3.5–5.3)
POTASSIUM SERPL-SCNC: 3.6 MMOL/L — SIGNIFICANT CHANGE UP (ref 3.5–5.3)
POTASSIUM SERPL-SCNC: 3.6 MMOL/L — SIGNIFICANT CHANGE UP (ref 3.5–5.3)
RBC # BLD: 3.95 M/UL — SIGNIFICANT CHANGE UP (ref 3.8–5.2)
RBC # BLD: 3.95 M/UL — SIGNIFICANT CHANGE UP (ref 3.8–5.2)
RBC # FLD: 12 % — SIGNIFICANT CHANGE UP (ref 10.3–14.5)
RBC # FLD: 12 % — SIGNIFICANT CHANGE UP (ref 10.3–14.5)
S AUREUS DNA NOSE QL NAA+PROBE: SIGNIFICANT CHANGE UP
S AUREUS DNA NOSE QL NAA+PROBE: SIGNIFICANT CHANGE UP
SODIUM SERPL-SCNC: 136 MMOL/L — SIGNIFICANT CHANGE UP (ref 135–145)
SODIUM SERPL-SCNC: 136 MMOL/L — SIGNIFICANT CHANGE UP (ref 135–145)
VANCOMYCIN TROUGH SERPL-MCNC: 11.2 UG/ML — SIGNIFICANT CHANGE UP (ref 10–20)
VANCOMYCIN TROUGH SERPL-MCNC: 11.2 UG/ML — SIGNIFICANT CHANGE UP (ref 10–20)
WBC # BLD: 5.28 K/UL — SIGNIFICANT CHANGE UP (ref 3.8–10.5)
WBC # BLD: 5.28 K/UL — SIGNIFICANT CHANGE UP (ref 3.8–10.5)
WBC # FLD AUTO: 5.28 K/UL — SIGNIFICANT CHANGE UP (ref 3.8–10.5)
WBC # FLD AUTO: 5.28 K/UL — SIGNIFICANT CHANGE UP (ref 3.8–10.5)

## 2023-12-07 PROCEDURE — 99233 SBSQ HOSP IP/OBS HIGH 50: CPT

## 2023-12-07 PROCEDURE — 99497 ADVNCD CARE PLAN 30 MIN: CPT

## 2023-12-07 RX ADMIN — PIPERACILLIN AND TAZOBACTAM 25 GRAM(S): 4; .5 INJECTION, POWDER, LYOPHILIZED, FOR SOLUTION INTRAVENOUS at 14:44

## 2023-12-07 RX ADMIN — LOSARTAN POTASSIUM 25 MILLIGRAM(S): 100 TABLET, FILM COATED ORAL at 06:25

## 2023-12-07 RX ADMIN — PIPERACILLIN AND TAZOBACTAM 25 GRAM(S): 4; .5 INJECTION, POWDER, LYOPHILIZED, FOR SOLUTION INTRAVENOUS at 21:31

## 2023-12-07 RX ADMIN — Medication 250 MILLIGRAM(S): at 06:25

## 2023-12-07 RX ADMIN — ENOXAPARIN SODIUM 40 MILLIGRAM(S): 100 INJECTION SUBCUTANEOUS at 18:36

## 2023-12-07 RX ADMIN — SIMVASTATIN 40 MILLIGRAM(S): 20 TABLET, FILM COATED ORAL at 21:31

## 2023-12-07 RX ADMIN — Medication 75 MICROGRAM(S): at 06:30

## 2023-12-07 RX ADMIN — Medication 250 MILLIGRAM(S): at 18:00

## 2023-12-07 RX ADMIN — Medication 25 MILLIGRAM(S): at 06:30

## 2023-12-07 RX ADMIN — Medication 125 MICROGRAM(S): at 06:30

## 2023-12-07 RX ADMIN — PIPERACILLIN AND TAZOBACTAM 25 GRAM(S): 4; .5 INJECTION, POWDER, LYOPHILIZED, FOR SOLUTION INTRAVENOUS at 07:37

## 2023-12-07 RX ADMIN — Medication 25 MILLIGRAM(S): at 18:36

## 2023-12-07 NOTE — CONSULT NOTE ADULT - ASSESSMENT
83 year-old female with history of HTN, HLD, chronic right plantar foot ulcers/OM for 2 years (on and off antibiotics) sent in by Podiatrist with concern for acute on chronic osteomyelitis of the right foot, admitted with acute on chronic osteomyelitis of the right foot wound for surgical debridement and IV antibiotics. MRI right foot showed OM of 3rd and 2nd toe, patient is planned for right foot partial second ray resection with Podiatry tentatively on 12/8. Cardiology consulted for pre-op risk stratification.      83 year-old female with history of HTN, HLD, chronic right plantar foot ulcers/OM for 2 years (on and off antibiotics) sent in by Podiatrist with concern for acute on chronic osteomyelitis of the right foot, admitted with acute on chronic osteomyelitis of the right foot wound for surgical debridement and IV antibiotics. MRI right foot showed OM of 3rd and 2nd toe, patient is planned for right foot partial second ray resection with Podiatry tentatively on 12/8. Cardiology consulted for pre-op risk stratification.     Revised RCRI Score: 0    Exercise capacity: 4METS    ECG: Personally reviewed  Normal sinus rhythm with LBBB compared to prior ECGs on file outpatient ECG from 10/26/2023 showed NSRQRS duration of 88ms with anterior Q-waves and non-specific ST changes including 1mm downsloping t-wave depressions in lead III)    TTE: 4/14/2022: Normal Left ventricular systolic and diastolic function with estimated EF of 69% without regional wall motion abnormalities. Normal RV size and function    Stress Testing: NST 4/24/2014: No evidence of ischemia      Patient is scheduled for a low-med risk surgery and is a medium risk candidate with a 3.9% risk of cardiac complications after noncardiac surgery. Patient has no active chest pain, no evidence of decompensated heart failure or cardiac arrhythmias and has no contraindications to proceed with planned surgery.   - Repeat ECG for now    Recommendations are preliminary until attending attestation.    Xavier Sheldon MD  Cardiology Fellow     83 year-old female with history of HTN, HLD, chronic right plantar foot ulcers/OM for 2 years (on and off antibiotics) sent in by Podiatrist with concern for acute on chronic osteomyelitis of the right foot, admitted with acute on chronic osteomyelitis of the right foot wound for surgical debridement and IV antibiotics. MRI right foot showed OM of 3rd and 2nd toe, patient is planned for right foot partial second ray resection with Podiatry tentatively on 12/8. Cardiology consulted for pre-op risk stratification.     Revised RCRI Score: 0    Exercise capacity: 4METS    ECG: Normal sinus rhythm with LBBB compared to prior ECGs on file outpatient ECG from 10/26/2023 showed NSRQRS duration of 88ms with anterior Q-waves and non-specific ST changes including 1mm downsloping t-wave depressions in lead III)    TTE: 4/14/2022: Normal Left ventricular systolic and diastolic function with estimated EF of 69% without regional wall motion abnormalities. Normal RV size and function    Stress Testing: NST 4/24/2014: No evidence of ischemia      Patient is scheduled for a low-med risk surgery and is a medium risk candidate with a 3.9% risk of cardiac complications after noncardiac surgery. Patient has no active chest pain, no evidence of decompensated heart failure or cardiac arrhythmias and has no contraindications to proceed with planned surgery.   - Repeat ECG    Recommendations are preliminary until attending attestation.    Xavier Sheldon MD  Cardiology Fellow     83 year-old female with history of HTN, HLD, chronic right plantar foot ulcers/OM for 2 years (on and off antibiotics) sent in by Podiatrist with concern for acute on chronic osteomyelitis of the right foot, admitted with acute on chronic osteomyelitis of the right foot wound for surgical debridement and IV antibiotics. MRI right foot showed OM of 3rd and 2nd toe, patient is planned for right foot partial second ray resection with Podiatry tentatively on 12/8. Cardiology consulted for pre-op risk stratification.     Revised RCRI Score: 0    Exercise capacity: 4METS    ECG: Normal sinus rhythm with LBBB compared to prior ECGs on file outpatient ECG from 10/26/2023 showed NSRQRS duration of 88ms with anterior Q-waves and non-specific ST changes including 1mm downsloping t-wave depressions in lead III)    TTE: 4/14/2022: Normal Left ventricular systolic and diastolic function with estimated EF of 69% without regional wall motion abnormalities. Normal RV size and function    Stress Testing: NST 4/24/2014: No evidence of ischemia      Patient is scheduled for a low-med risk surgery and is a medium risk candidate with a 3.9% risk of cardiac complications after noncardiac surgery. Patient has no active chest pain, no evidence of decompensated heart failure or cardiac arrhythmias and has no contraindications to proceed with planned surgery.   - Repeat ECG    Recommendations are preliminary until attending attestation.    Xavier Sheldon MD  Cardiology Fellow    This patient was seen and examined personally by me and the plan was discussed with the fellow and/or resident above. Amendments were made as necessary to the above. Agree with the excellent note and plan above. 83F w HTN, HL here for R acute on chronic osteo. Low CV risk. LBBB on ECG not previously seen, however in the absence of cardiac symptoms or decompensation with otherwise RCRI 0 and good exercise capacity, no indication for further workup prior to OR.    Emeterio Alicea MD, MPhil, [facility]  Cardiologist, [facility]  ; [facility] and \[facility] [place] [facility]\""/[facility]  Email: [email or web address] Cardiology and Cardiovascular Surgery on-service contact/call information, go to [email or web address] and use "[email or web address]" to login.  Outpatient Cardiology appointments, call [phone] to arrange with a colleague; I do not have outpatient Cardiology clinic. 83 year-old female with history of HTN, HLD, chronic right plantar foot ulcers/OM for 2 years (on and off antibiotics) sent in by Podiatrist with concern for acute on chronic osteomyelitis of the right foot, admitted with acute on chronic osteomyelitis of the right foot wound for surgical debridement and IV antibiotics. MRI right foot showed OM of 3rd and 2nd toe, patient is planned for right foot partial second ray resection with Podiatry tentatively on 12/8. Cardiology consulted for pre-op risk stratification.     Revised RCRI Score: 0    Exercise capacity: 4METS    ECG: Normal sinus rhythm with LBBB compared to prior ECGs on file outpatient ECG from 10/26/2023 showed NSRQRS duration of 88ms with anterior Q-waves and non-specific ST changes including 1mm downsloping t-wave depressions in lead III)    TTE: 4/14/2022: Normal Left ventricular systolic and diastolic function with estimated EF of 69% without regional wall motion abnormalities. Normal RV size and function    Stress Testing: NST 4/24/2014: No evidence of ischemia      Patient is scheduled for a low-med risk surgery and is a medium risk candidate with a 3.9% risk of cardiac complications after noncardiac surgery. Patient has no active chest pain, no evidence of decompensated heart failure or cardiac arrhythmias and has no contraindications to proceed with planned surgery.   - Repeat ECG    Recommendations are preliminary until attending attestation.    Xavier Sheldon MD  Cardiology Fellow    This patient was seen and examined personally by me and the plan was discussed with the fellow and/or resident above. Amendments were made as necessary to the above. Agree with the excellent note and plan above. 83F w HTN, HL here for R acute on chronic osteo. Low CV risk. LBBB on ECG not previously seen, however in the absence of cardiac symptoms or decompensation with otherwise RCRI 0 and good exercise capacity, no indication for further workup prior to OR.    Emeterio Alicea MD, MPhil, [facility]  Cardiologist, [facility]  ; [facility]  Email: [email or web address] Cardiology and Cardiovascular Surgery on-service contact/call information, go to [email or web address] and use "[email or web address]" to login.  Outpatient Cardiology appointments, call [phone] to arrange with a colleague; I do not have outpatient Cardiology clinic.

## 2023-12-07 NOTE — PROGRESS NOTE ADULT - ASSESSMENT
83F with right foot sub met 2 wound to bone w/ acute on chronic OM  - Pt seen and evaluated   - Afebrile, no leukocytosis   - R foot sub 2nd metatarsal head wound to bone, serous drainage, scant purulence, erythema extending dorsal midfoot, no edema, periwound hyperkeratosis, no fluctuance, no soft tissue crepitus, no tracking or tunneling, no malodor. L foot sub first metatarsal head callus with no signs of infection.   - Right foot wound culture pending  - Continue with IV abx  - Right foot XR: OM of 2nd metatarsal head, no gas.   - Right foot MR: second metatarsophalangeal joint, most consistent with septic arthritis with osteomyelitis of the second MPJ and 3rd distal metatarsal osteitis vs early OM.  - Recommended ID consult  - Booked for right foot partial second ray resection tomorrow Friday 12/8 at 2:00 PM w Dr Montes  - Documented medical clearance for podiatric surgical intervention, appreciated  - Please document cards clearance for podiatry procedure under anesthesia   - NPO after midnight  - CBC, BMP, Coags, type & screen in AM  - Discussed with attending

## 2023-12-07 NOTE — CONSULT NOTE ADULT - SUBJECTIVE AND OBJECTIVE BOX
CARDIOLOGY FELLOW CONSULT NOTE    HPI:  83 year-old female with history of HTN, HLD, chronic right plantar foot ulcers/OM for 2 years (on and off antibiotics) sent in by Podiatrist with concern for acute on chronic osteomyelitis of the right foot.  Patient has been on multiple antibiotics, most recently Doxy, then Cipro 500mg for 30 days (completed 12/1), wound check at [facility] and received Vanco/Zosyn on 12/1, discharged same day with Cipro 750mg for additional 30 days without improvement, came to ED for possible surgical debridement and IV antibiotics.  She denies any fevers, chills, nausea, vomiting, worsening pain/redness, or difficulty ambulation. (06 Dec 2023 00:08)      PMHx:   HTN (hypertension)    Hyperlipidemia    Hypothyroid    Paroxysmal SVT (supraventricular tachycardia)    Thyroid cyst        PSHx:   Back problem    S/P thyroid surgery    S/P hernia surgery    S/P rotator cuff surgery        Allergies:  No Known Allergies  Levaquin (Stomach Upset; Nausea)      Home Meds:    Current Medications:   acetaminophen     Tablet .. 650 milliGRAM(s) Oral every 6 hours PRN  chlorhexidine 2% Cloths 1 Application(s) Topical <User Schedule>  digoxin     Tablet 125 MICROGram(s) Oral daily  enoxaparin Injectable 40 milliGRAM(s) SubCutaneous every 24 hours  levothyroxine 75 MICROGram(s) Oral daily  losartan 25 milliGRAM(s) Oral daily  metoprolol succinate ER 25 milliGRAM(s) Oral two times a day  piperacillin/tazobactam IVPB.. 3.375 Gram(s) IV Intermittent every 8 hours  simvastatin 40 milliGRAM(s) Oral at bedtime  vancomycin  IVPB 750 milliGRAM(s) IV Intermittent every 12 hours      FAMILY HISTORY:  Family history of early CAD (Father)        ROS:  CV: chest pain (-), palpitation (-), orthopnea (-), PND (-), edema (-)  PULM: SOB (-), cough (-), wheezing (-), hemoptysis (-).   CONST: fever (-), chills (-) or fatigue (-)  GI: abdominal distension (-), abdominal pain (-) , nausea/vomiting (-), hematemesis, (-), melena (-), hematochezia (-)  : dysuria (-), frequency (-), hematuria (-).   NEURO: numbness (-), weakness (-), dizziness (-)  SKIN: itching (-), rash (-)  HEENT:  visual changes (-); vertigo or throat pain (-);  neck stiffness (-)     Physical Exam:  T(F): 97.4 (12-07), Max: 97.7 (12-07)  HR: 66 (12-07) (66 - 69)  BP: 160/83 (12-07) (150/82 - 160/83)  RR: 18 (12-07)  SpO2: 100% (12-07)    GENERAL: NAD  HEAD:  Atraumatic, Normocephalic.  EYES: EOMI, PERRLA, conjunctiva and sclera clear.  NECK: Supple, No JVD.  CHEST/LUNG: Clear to auscultation bilaterally; No rales, rhonchi, wheezing, or rubs. Speaking in full sentences  HEART: Regular rate and rhythm; No murmurs, rubs, or gallops.  ABDOMEN: Bowel sounds present; Soft, Nontender, Nondistended.   EXTREMITIES:  2+ Peripheral Pulses, brisk capillary refill. No clubbing, cyanosis, or edema.  PSYCH: Normal affect.  SKIN: No rashes or lesions.    ECG: Personally reviewed    Echo: Personally reviewed    Stress Testing: Personally reviewed    Cath: Personally reviewed    CXR: Personally reviewed    Labs: Personally reviewed                        12.9   5.28  )-----------( 230      ( 07 Dec 2023 05:26 )             38.1     12-07    136  |  103  |  9   ----------------------------<  89  3.6   |  23  |  0.83    Ca    8.9      07 Dec 2023 05:26    TPro  7.1  /  Alb  4.3  /  TBili  0.7  /  DBili  x   /  AST  20  /  ALT  11  /  AlkPhos  95  12-05    PT/INR - ( 05 Dec 2023 18:28 )   PT: 12.0 sec;   INR: 1.09 ratio         PTT - ( 05 Dec 2023 18:28 )  PTT:41.9 sec                   CARDIOLOGY FELLOW CONSULT NOTE    HPI:  83 year-old female with history of HTN, HLD, chronic right plantar foot ulcers/OM for 2 years (on and off antibiotics) sent in by Podiatrist with concern for acute on chronic osteomyelitis of the right foot.  Patient has been on multiple antibiotics, most recently Doxy, then Cipro 500mg for 30 days (completed 12/1), wound check at [facility] [facility] and received Vanco/Zosyn on 12/1, discharged same day with Cipro 750mg for additional 30 days without improvement, came to ED for possible surgical debridement and IV antibiotics.  She denies any fevers, chills, nausea, vomiting, worsening pain/redness, or difficulty ambulation. (06 Dec 2023 00:08)      PMHx:   HTN (hypertension)    Hyperlipidemia    Hypothyroid    Paroxysmal SVT (supraventricular tachycardia)    Thyroid cyst        PSHx:   Back problem    S/P thyroid surgery    S/P hernia surgery    S/P rotator cuff surgery        Allergies:  No Known Allergies  Levaquin (Stomach Upset; Nausea)      Home Meds:    Current Medications:   acetaminophen     Tablet .. 650 milliGRAM(s) Oral every 6 hours PRN  chlorhexidine 2% Cloths 1 Application(s) Topical <User Schedule>  digoxin     Tablet 125 MICROGram(s) Oral daily  enoxaparin Injectable 40 milliGRAM(s) SubCutaneous every 24 hours  levothyroxine 75 MICROGram(s) Oral daily  losartan 25 milliGRAM(s) Oral daily  metoprolol succinate ER 25 milliGRAM(s) Oral two times a day  piperacillin/tazobactam IVPB.. 3.375 Gram(s) IV Intermittent every 8 hours  simvastatin 40 milliGRAM(s) Oral at bedtime  vancomycin  IVPB 750 milliGRAM(s) IV Intermittent every 12 hours      FAMILY HISTORY:  Family history of early CAD (Father)        ROS:  CV: chest pain (-), palpitation (-), orthopnea (-), PND (-), edema (-)  PULM: SOB (-), cough (-), wheezing (-), hemoptysis (-).   CONST: fever (-), chills (-) or fatigue (-)  GI: abdominal distension (-), abdominal pain (-) , nausea/vomiting (-), hematemesis, (-), melena (-), hematochezia (-)  : dysuria (-), frequency (-), hematuria (-).   NEURO: numbness (-), weakness (-), dizziness (-)  SKIN: itching (-), rash (-)  HEENT:  visual changes (-); vertigo or throat pain (-);  neck stiffness (-)     Physical Exam:  T(F): 97.4 (12-07), Max: 97.7 (12-07)  HR: 66 (12-07) (66 - 69)  BP: 160/83 (12-07) (150/82 - 160/83)  RR: 18 (12-07)  SpO2: 100% (12-07)    GENERAL: NAD  HEAD:  Atraumatic, Normocephalic.  EYES: EOMI, PERRLA, conjunctiva and sclera clear.  NECK: Supple, No JVD.  CHEST/LUNG: Clear to auscultation bilaterally; No rales, rhonchi, wheezing, or rubs. Speaking in full sentences  HEART: Regular rate and rhythm; No murmurs, rubs, or gallops.  ABDOMEN: Bowel sounds present; Soft, Nontender, Nondistended.   EXTREMITIES:  2+ Peripheral Pulses, brisk capillary refill. No clubbing, cyanosis, or edema.  PSYCH: Normal affect.  SKIN: No rashes or lesions.    ECG: Personally reviewed    Echo: Personally reviewed    Stress Testing: Personally reviewed    Cath: Personally reviewed    CXR: Personally reviewed    Labs: Personally reviewed                        12.9   5.28  )-----------( 230      ( 07 Dec 2023 05:26 )             38.1     12-07    136  |  103  |  9   ----------------------------<  89  3.6   |  23  |  0.83    Ca    8.9      07 Dec 2023 05:26    TPro  7.1  /  Alb  4.3  /  TBili  0.7  /  DBili  x   /  AST  20  /  ALT  11  /  AlkPhos  95  12-05    PT/INR - ( 05 Dec 2023 18:28 )   PT: 12.0 sec;   INR: 1.09 ratio         PTT - ( 05 Dec 2023 18:28 )  PTT:41.9 sec                   CARDIOLOGY FELLOW CONSULT NOTE    HPI:  83 year-old female with history of HTN, HLD, chronic right plantar foot ulcers/OM for 2 years (on and off antibiotics) sent in by Podiatrist with concern for acute on chronic osteomyelitis of the right foot.  Patient has been on multiple antibiotics, most recently Doxy, then Cipro 500mg for 30 days (completed 12/1), wound check at [place] [facility] and received Vanco/Zosyn on 12/1, discharged same day with Cipro 750mg for additional 30 days without improvement, admitted with acute on chronic osteomyelitis of the right foot wound for surgical debridement and IV antibiotics. MRI right foot showed OM of 3rd and 2nd toe, patient is planned for right foot partial second ray resection with Podiatry tentatively on 12/8. Cardiology consulted for pre-op risk stratification.     PMHx:   HTN (hypertension)  Hyperlipidemia  Hypothyroid  Paroxysmal SVT (supraventricular tachycardia)  Thyroid cyst    PSHx:   Back problem  S/P thyroid surgery  S/P hernia surgery  S/P rotator cuff surgery    Allergies:  No Known Allergies  Levaquin (Stomach Upset; Nausea)    Home Meds:    Current Medications:   acetaminophen     Tablet .. 650 milliGRAM(s) Oral every 6 hours PRN  chlorhexidine 2% Cloths 1 Application(s) Topical <User Schedule>  digoxin     Tablet 125 MICROGram(s) Oral daily  enoxaparin Injectable 40 milliGRAM(s) SubCutaneous every 24 hours  levothyroxine 75 MICROGram(s) Oral daily  losartan 25 milliGRAM(s) Oral daily  metoprolol succinate ER 25 milliGRAM(s) Oral two times a day  piperacillin/tazobactam IVPB.. 3.375 Gram(s) IV Intermittent every 8 hours  simvastatin 40 milliGRAM(s) Oral at bedtime  vancomycin  IVPB 750 milliGRAM(s) IV Intermittent every 12 hours    FAMILY HISTORY:  Family history of early CAD (Father)    ROS:  CV: chest pain (-), palpitation (-), orthopnea (-), PND (-), edema (-)  PULM: SOB (-), cough (-), wheezing (-), hemoptysis (-).   CONST: fever (-), chills (-) or fatigue (-)  GI: abdominal distension (-), abdominal pain (-) , nausea/vomiting (-), hematemesis, (-), melena (-), hematochezia (-)  : dysuria (-), frequency (-), hematuria (-).   NEURO: numbness (-), weakness (-), dizziness (-)  SKIN: itching (-), rash (-)  HEENT:  visual changes (-); vertigo or throat pain (-);  neck stiffness (-)     Physical Exam:  T(F): 97.4 (12-07), Max: 97.7 (12-07)  HR: 66 (12-07) (66 - 69)  BP: 160/83 (12-07) (150/82 - 160/83)  RR: 18 (12-07)  SpO2: 100% (12-07)    GENERAL: NAD  HEAD:  Atraumatic, Normocephalic.  EYES: EOMI, PERRLA, conjunctiva and sclera clear.  NECK: Supple, No JVD.  CHEST/LUNG: Clear to auscultation bilaterally; No rales, rhonchi, wheezing, or rubs. Speaking in full sentences  HEART: Regular rate and rhythm; No murmurs, rubs, or gallops.  ABDOMEN: Bowel sounds present; Soft, Nontender, Nondistended.   EXTREMITIES:  2+ Peripheral Pulses, brisk capillary refill. No clubbing, cyanosis, or edema.  PSYCH: Normal affect.  SKIN: No rashes or lesions.    ECG: Personally reviewed  Normal sinus rhythm with LBBB compared to prior ECGs on file outpatient ECG from 10/26/2023 showed NSRQRS duration of 88ms with anterior Q-waves and non-specific ST changes including 1mm downsloping t-wave depressions in lead III)    Echo: Personally reviewed  TTE: 4/14/2022: Normal Left ventricular systolic and diastolic function with estimated EF of 69% without regional wall motion abnormalities. Normal RV size and function    Stress Testing: Personally reviewed  NST 4/24/2014: No evidence of ischemia    Labs: Personally reviewed                        12.9   5.28  )-----------( 230      ( 07 Dec 2023 05:26 )             38.1     12-07    136  |  103  |  9   ----------------------------<  89  3.6   |  23  |  0.83    Ca    8.9      07 Dec 2023 05:26    TPro  7.1  /  Alb  4.3  /  TBili  0.7  /  DBili  x   /  AST  20  /  ALT  11  /  AlkPhos  95  12-05    PT/INR - ( 05 Dec 2023 18:28 )   PT: 12.0 sec;   INR: 1.09 ratio         PTT - ( 05 Dec 2023 18:28 )  PTT:41.9 sec                   CARDIOLOGY FELLOW CONSULT NOTE    HPI:  83 year-old female with history of HTN, HLD, chronic right plantar foot ulcers/OM for 2 years (on and off antibiotics) sent in by Podiatrist with concern for acute on chronic osteomyelitis of the right foot.  Patient has been on multiple antibiotics, most recently Doxy, then Cipro 500mg for 30 days (completed 12/1), wound check at [facility] and received Vanco/Zosyn on 12/1, discharged same day with Cipro 750mg for additional 30 days without improvement, admitted with acute on chronic osteomyelitis of the right foot wound for surgical debridement and IV antibiotics. MRI right foot showed OM of 3rd and 2nd toe, patient is planned for right foot partial second ray resection with Podiatry tentatively on 12/8. Cardiology consulted for pre-op risk stratification.     PMHx:   HTN (hypertension)  Hyperlipidemia  Hypothyroid  Paroxysmal SVT (supraventricular tachycardia)  Thyroid cyst    PSHx:   Back problem  S/P thyroid surgery  S/P hernia surgery  S/P rotator cuff surgery    Allergies:  No Known Allergies  Levaquin (Stomach Upset; Nausea)    Home Meds:    Current Medications:   acetaminophen     Tablet .. 650 milliGRAM(s) Oral every 6 hours PRN  chlorhexidine 2% Cloths 1 Application(s) Topical <User Schedule>  digoxin     Tablet 125 MICROGram(s) Oral daily  enoxaparin Injectable 40 milliGRAM(s) SubCutaneous every 24 hours  levothyroxine 75 MICROGram(s) Oral daily  losartan 25 milliGRAM(s) Oral daily  metoprolol succinate ER 25 milliGRAM(s) Oral two times a day  piperacillin/tazobactam IVPB.. 3.375 Gram(s) IV Intermittent every 8 hours  simvastatin 40 milliGRAM(s) Oral at bedtime  vancomycin  IVPB 750 milliGRAM(s) IV Intermittent every 12 hours    FAMILY HISTORY:  Family history of early CAD (Father)    ROS:  CV: chest pain (-), palpitation (-), orthopnea (-), PND (-), edema (-)  PULM: SOB (-), cough (-), wheezing (-), hemoptysis (-).   CONST: fever (-), chills (-) or fatigue (-)  GI: abdominal distension (-), abdominal pain (-) , nausea/vomiting (-), hematemesis, (-), melena (-), hematochezia (-)  : dysuria (-), frequency (-), hematuria (-).   NEURO: numbness (-), weakness (-), dizziness (-)  SKIN: itching (-), rash (-)  HEENT:  visual changes (-); vertigo or throat pain (-);  neck stiffness (-)     Physical Exam:  T(F): 97.4 (12-07), Max: 97.7 (12-07)  HR: 66 (12-07) (66 - 69)  BP: 160/83 (12-07) (150/82 - 160/83)  RR: 18 (12-07)  SpO2: 100% (12-07)    GENERAL: NAD  HEAD:  Atraumatic, Normocephalic.  EYES: EOMI, PERRLA, conjunctiva and sclera clear.  NECK: Supple, No JVD.  CHEST/LUNG: Clear to auscultation bilaterally; No rales, rhonchi, wheezing, or rubs. Speaking in full sentences  HEART: Regular rate and rhythm; No murmurs, rubs, or gallops.  ABDOMEN: Bowel sounds present; Soft, Nontender, Nondistended.   EXTREMITIES:  2+ Peripheral Pulses, brisk capillary refill. No clubbing, cyanosis, or edema.  PSYCH: Normal affect.  SKIN: No rashes or lesions.    ECG: Personally reviewed  Normal sinus rhythm with LBBB compared to prior ECGs on file outpatient ECG from 10/26/2023 showed NSRQRS duration of 88ms with anterior Q-waves and non-specific ST changes including 1mm downsloping t-wave depressions in lead III)    Echo: Personally reviewed  TTE: 4/14/2022: Normal Left ventricular systolic and diastolic function with estimated EF of 69% without regional wall motion abnormalities. Normal RV size and function    Stress Testing: Personally reviewed  NST 4/24/2014: No evidence of ischemia    Labs: Personally reviewed                        12.9   5.28  )-----------( 230      ( 07 Dec 2023 05:26 )             38.1     12-07    136  |  103  |  9   ----------------------------<  89  3.6   |  23  |  0.83    Ca    8.9      07 Dec 2023 05:26    TPro  7.1  /  Alb  4.3  /  TBili  0.7  /  DBili  x   /  AST  20  /  ALT  11  /  AlkPhos  95  12-05    PT/INR - ( 05 Dec 2023 18:28 )   PT: 12.0 sec;   INR: 1.09 ratio         PTT - ( 05 Dec 2023 18:28 )  PTT:41.9 sec                   CARDIOLOGY FELLOW CONSULT NOTE    HPI:  83 year-old female with history of HTN, HLD, chronic right plantar foot ulcers/OM for 2 years (on and off antibiotics) sent in by Podiatrist with concern for acute on chronic osteomyelitis of the right foot.  Patient has been on multiple antibiotics, most recently Doxy, then Cipro 500mg for 30 days (completed 12/1), wound check at [facility] and received Vanco/Zosyn on 12/1, discharged same day with Cipro 750mg for additional 30 days without improvement, admitted with acute on chronic osteomyelitis of the right foot wound for surgical debridement and IV antibiotics. MRI right foot showed OM of 3rd and 2nd toe, patient is planned for right foot partial second ray resection with Podiatry tentatively on 12/8. Cardiology consulted for pre-op risk stratification.     Patient seen at bedside, she had no acute complaints denies any chest pain, lightheadedness of syncope, has not had any PND or orthopnea. Is able to climb a flight or stairs at home without limiting dyspnea or anginal symptoms.     PMHx:   HTN (hypertension)  Hyperlipidemia  Hypothyroid  Paroxysmal SVT (supraventricular tachycardia)  Thyroid cyst    PSHx:   Back problem  S/P thyroid surgery  S/P hernia surgery  S/P rotator cuff surgery    Allergies:  No Known Allergies  Levaquin (Stomach Upset; Nausea)    Home Meds:    Current Medications:   acetaminophen     Tablet .. 650 milliGRAM(s) Oral every 6 hours PRN  chlorhexidine 2% Cloths 1 Application(s) Topical <User Schedule>  digoxin     Tablet 125 MICROGram(s) Oral daily  enoxaparin Injectable 40 milliGRAM(s) SubCutaneous every 24 hours  levothyroxine 75 MICROGram(s) Oral daily  losartan 25 milliGRAM(s) Oral daily  metoprolol succinate ER 25 milliGRAM(s) Oral two times a day  piperacillin/tazobactam IVPB.. 3.375 Gram(s) IV Intermittent every 8 hours  simvastatin 40 milliGRAM(s) Oral at bedtime  vancomycin  IVPB 750 milliGRAM(s) IV Intermittent every 12 hours    FAMILY HISTORY:  Family history of early CAD (Father)    ROS:  CV: chest pain (-), palpitation (-), orthopnea (-), PND (-), edema (-)  PULM: SOB (-), cough (-), wheezing (-), hemoptysis (-).   CONST: fever (-), chills (-) or fatigue (-)  GI: abdominal distension (-), abdominal pain (-) , nausea/vomiting (-), hematemesis, (-), melena (-), hematochezia (-)  : dysuria (-), frequency (-), hematuria (-).   NEURO: numbness (-), weakness (-), dizziness (-)  SKIN: itching (-), rash (-)  HEENT:  visual changes (-); vertigo or throat pain (-);  neck stiffness (-)     Physical Exam:  T(F): 97.4 (12-07), Max: 97.7 (12-07)  HR: 66 (12-07) (66 - 69)  BP: 160/83 (12-07) (150/82 - 160/83)  RR: 18 (12-07)  SpO2: 100% (12-07)    GENERAL: NAD  HEAD:  Atraumatic, Normocephalic.  EYES: EOMI, PERRLA, conjunctiva and sclera clear.  NECK: Supple, No JVD.  CHEST/LUNG: Clear to auscultation bilaterally; No rales, rhonchi, wheezing, or rubs. Speaking in full sentences  HEART: Regular rate and rhythm; No murmurs, rubs, or gallops.  ABDOMEN: Bowel sounds present; Soft, Nontender, Nondistended.   EXTREMITIES:  2+ Peripheral Pulses, brisk capillary refill. No clubbing, cyanosis, or edema.  PSYCH: Normal affect.  SKIN: No rashes or lesions.    ECG: Personally reviewed  Normal sinus rhythm with LBBB compared to prior ECGs on file outpatient ECG from 10/26/2023 showed NSRQRS duration of 88ms with anterior Q-waves and non-specific ST changes including 1mm downsloping t-wave depressions in lead III)    Echo: Personally reviewed  TTE: 4/14/2022: Normal Left ventricular systolic and diastolic function with estimated EF of 69% without regional wall motion abnormalities. Normal RV size and function    Stress Testing: Personally reviewed  NST 4/24/2014: No evidence of ischemia    Labs: Personally reviewed                        12.9   5.28  )-----------( 230      ( 07 Dec 2023 05:26 )             38.1     12-07    136  |  103  |  9   ----------------------------<  89  3.6   |  23  |  0.83    Ca    8.9      07 Dec 2023 05:26    TPro  7.1  /  Alb  4.3  /  TBili  0.7  /  DBili  x   /  AST  20  /  ALT  11  /  AlkPhos  95  12-05    PT/INR - ( 05 Dec 2023 18:28 )   PT: 12.0 sec;   INR: 1.09 ratio         PTT - ( 05 Dec 2023 18:28 )  PTT:41.9 sec   CARDIOLOGY FELLOW CONSULT NOTE    HPI:  83 year-old female with history of HTN, HLD, chronic right plantar foot ulcers/OM for 2 years (on and off antibiotics) sent in by Podiatrist with concern for acute on chronic osteomyelitis of the right foot.  Patient has been on multiple antibiotics, most recently Doxy, then Cipro 500mg for 30 days (completed 12/1), wound check at [place] [facility] and received Vanco/Zosyn on 12/1, discharged same day with Cipro 750mg for additional 30 days without improvement, admitted with acute on chronic osteomyelitis of the right foot wound for surgical debridement and IV antibiotics. MRI right foot showed OM of 3rd and 2nd toe, patient is planned for right foot partial second ray resection with Podiatry tentatively on 12/8. Cardiology consulted for pre-op risk stratification.     Patient seen at bedside, she had no acute complaints denies any chest pain, lightheadedness of syncope, has not had any PND or orthopnea. Is able to climb a flight or stairs at home without limiting dyspnea or anginal symptoms.     PMHx:   HTN (hypertension)  Hyperlipidemia  Hypothyroid  Paroxysmal SVT (supraventricular tachycardia)  Thyroid cyst    PSHx:   Back problem  S/P thyroid surgery  S/P hernia surgery  S/P rotator cuff surgery    Allergies:  No Known Allergies  Levaquin (Stomach Upset; Nausea)    Home Meds:    Current Medications:   acetaminophen     Tablet .. 650 milliGRAM(s) Oral every 6 hours PRN  chlorhexidine 2% Cloths 1 Application(s) Topical <User Schedule>  digoxin     Tablet 125 MICROGram(s) Oral daily  enoxaparin Injectable 40 milliGRAM(s) SubCutaneous every 24 hours  levothyroxine 75 MICROGram(s) Oral daily  losartan 25 milliGRAM(s) Oral daily  metoprolol succinate ER 25 milliGRAM(s) Oral two times a day  piperacillin/tazobactam IVPB.. 3.375 Gram(s) IV Intermittent every 8 hours  simvastatin 40 milliGRAM(s) Oral at bedtime  vancomycin  IVPB 750 milliGRAM(s) IV Intermittent every 12 hours    FAMILY HISTORY:  Family history of early CAD (Father)    ROS:  CV: chest pain (-), palpitation (-), orthopnea (-), PND (-), edema (-)  PULM: SOB (-), cough (-), wheezing (-), hemoptysis (-).   CONST: fever (-), chills (-) or fatigue (-)  GI: abdominal distension (-), abdominal pain (-) , nausea/vomiting (-), hematemesis, (-), melena (-), hematochezia (-)  : dysuria (-), frequency (-), hematuria (-).   NEURO: numbness (-), weakness (-), dizziness (-)  SKIN: itching (-), rash (-)  HEENT:  visual changes (-); vertigo or throat pain (-);  neck stiffness (-)     Physical Exam:  T(F): 97.4 (12-07), Max: 97.7 (12-07)  HR: 66 (12-07) (66 - 69)  BP: 160/83 (12-07) (150/82 - 160/83)  RR: 18 (12-07)  SpO2: 100% (12-07)    GENERAL: NAD  HEAD:  Atraumatic, Normocephalic.  EYES: EOMI, PERRLA, conjunctiva and sclera clear.  NECK: Supple, No JVD.  CHEST/LUNG: Clear to auscultation bilaterally; No rales, rhonchi, wheezing, or rubs. Speaking in full sentences  HEART: Regular rate and rhythm; No murmurs, rubs, or gallops.  ABDOMEN: Bowel sounds present; Soft, Nontender, Nondistended.   EXTREMITIES:  2+ Peripheral Pulses, brisk capillary refill. No clubbing, cyanosis, or edema.  PSYCH: Normal affect.  SKIN: No rashes or lesions.    ECG: Personally reviewed  Normal sinus rhythm with LBBB compared to prior ECGs on file outpatient ECG from 10/26/2023 showed NSRQRS duration of 88ms with anterior Q-waves and non-specific ST changes including 1mm downsloping t-wave depressions in lead III)    Echo: Personally reviewed  TTE: 4/14/2022: Normal Left ventricular systolic and diastolic function with estimated EF of 69% without regional wall motion abnormalities. Normal RV size and function    Stress Testing: Personally reviewed  NST 4/24/2014: No evidence of ischemia    Labs: Personally reviewed                        12.9   5.28  )-----------( 230      ( 07 Dec 2023 05:26 )             38.1     12-07    136  |  103  |  9   ----------------------------<  89  3.6   |  23  |  0.83    Ca    8.9      07 Dec 2023 05:26    TPro  7.1  /  Alb  4.3  /  TBili  0.7  /  DBili  x   /  AST  20  /  ALT  11  /  AlkPhos  95  12-05    PT/INR - ( 05 Dec 2023 18:28 )   PT: 12.0 sec;   INR: 1.09 ratio         PTT - ( 05 Dec 2023 18:28 )  PTT:41.9 sec

## 2023-12-07 NOTE — PROGRESS NOTE ADULT - SUBJECTIVE AND OBJECTIVE BOX
SUBJECTIVE / OVERNIGHT EVENTS:  Today is hospital day 1d. There are no new issues or overnight events.   Denies any headache, lightheadedness, vertigo, shortness of breathe, cough, chest pain, palpitations, tachycardia, abdominal pain, nausea, vomiting, diarrhea or constipation currently    HPI:  83 year-old female with history of HTN, HLD, chronic right plantar foot ulcers/OM for 2 years (on and off antibiotics) sent in by Podiatrist with concern for acute on chronic osteomyelitis of the right foot.  Patient has been on multiple antibiotics, most recently Doxy, then Cipro 500mg for 30 days (completed 12/1), wound check at [place] [facility] and received Vanco/Zosyn on 12/1, discharged same day with Cipro 750mg for additional 30 days without improvement, came to ED for possible surgical debridement and IV antibiotics.  She denies any fevers, chills, nausea, vomiting, worsening pain/redness, or difficulty ambulation. (06 Dec 2023 00:08)    MEDICATIONS  (STANDING):  chlorhexidine 2% Cloths 1 Application(s) Topical <User Schedule>  digoxin     Tablet 125 MICROGram(s) Oral daily  enoxaparin Injectable 40 milliGRAM(s) SubCutaneous every 24 hours  levothyroxine 75 MICROGram(s) Oral daily  losartan 25 milliGRAM(s) Oral daily  metoprolol succinate ER 25 milliGRAM(s) Oral two times a day  piperacillin/tazobactam IVPB.. 3.375 Gram(s) IV Intermittent every 8 hours  simvastatin 40 milliGRAM(s) Oral at bedtime  vancomycin  IVPB 750 milliGRAM(s) IV Intermittent every 12 hours    MEDICATIONS  (PRN):  acetaminophen     Tablet .. 650 milliGRAM(s) Oral every 6 hours PRN Temp greater or equal to 38C (100.4F), Mild Pain (1 - 3)    HOME MEDICATIONS:  Digitek 125 mcg (0.125 mg) oral tablet: 1 tab(s) orally once a day (06 Dec 2023 04:52)  levothyroxine 75 mcg (0.075 mg) oral tablet: 1 tab(s) orally once a day (06 Dec 2023 04:52)  losartan 25 mg oral tablet: 1 tab(s) orally once a day (06 Dec 2023 04:52)  metoprolol succinate 25 mg oral tablet, extended release: 1 tab(s) orally 2 times a day (06 Dec 2023 04:52)  simvastatin 40 mg oral tablet: 1 tab(s) orally once a day (at bedtime) (06 Dec 2023 04:52)    PHYSICAL EXAM:  Vital Signs Last 24 Hrs  T(C): 36.3 (07 Dec 2023 11:37), Max: 36.5 (07 Dec 2023 04:59)  T(F): 97.4 (07 Dec 2023 11:37), Max: 97.7 (07 Dec 2023 04:59)  HR: 66 (07 Dec 2023 11:37) (66 - 69)  BP: 160/83 (07 Dec 2023 11:37) (150/82 - 160/83)  BP(mean): --  RR: 18 (07 Dec 2023 11:37) (18 - 18)  SpO2: 100% (07 Dec 2023 11:37) (97% - 100%)    Parameters below as of 07 Dec 2023 11:37  Patient On (Oxygen Delivery Method): room air      I&O's Summary    06 Dec 2023 07:01  -  07 Dec 2023 07:00  --------------------------------------------------------  IN: 830 mL / OUT: 0 mL / NET: 830 mL    07 Dec 2023 07:01  -  07 Dec 2023 17:03  --------------------------------------------------------  IN: 680 mL / OUT: 0 mL / NET: 680 mL      CONSTITUTIONAL: Well-groomed, in no apparent distress  EYES: No conjunctival or scleral injection, non-icteric  ENMT: No external nasal lesions; Normal outer ears  NECK: Supple; Trachea midline  RESPIRATORY: Normal respiratory effort; lungs are clear to auscultation bilaterally without wheeze/rhonchi/rales  CARDIOVASCULAR: Regular rate and rhythm, normal S1 and S2, no murmur/rub/gallop; No lower extremity edema  GASTROINTESTINAL: Non-distended; No palpable masses; No tenderness; No rebound/guarding  MUSCULOSKELETAL:  Normal gait;  NEUROLOGY: A+O to person, place, and time; no gross motor deficits   PSYCHIATRY: Mood and Affect appropriate    LABS:                        12.9   5.28  )-----------( 230      ( 07 Dec 2023 05:26 )             38.1     12-07    136  |  103  |  9   ----------------------------<  89  3.6   |  23  |  0.83    Ca    8.9      07 Dec 2023 05:26    TPro  7.1  /  Alb  4.3  /  TBili  0.7  /  DBili  x   /  AST  20  /  ALT  11  /  AlkPhos  95  12-05    PT/INR - ( 05 Dec 2023 18:28 )   PT: 12.0 sec;   INR: 1.09 ratio         PTT - ( 05 Dec 2023 18:28 )  PTT:41.9 sec      Urinalysis Basic - ( 07 Dec 2023 05:26 )    Color: x / Appearance: x / SG: x / pH: x  Gluc: 89 mg/dL / Ketone: x  / Bili: x / Urobili: x   Blood: x / Protein: x / Nitrite: x   Leuk Esterase: x / RBC: x / WBC x   Sq Epi: x / Non Sq Epi: x / Bacteria: x            RADIOLOGY & ADDITIONAL TESTS:  EKG  12 Lead ECG:   Ventricular Rate 56 BPM    Atrial Rate 56 BPM    P-R Interval 194 ms    QRS Duration 128 ms    Q-T Interval 466 ms    QTC Calculation(Bazett) 449 ms    P Axis 82 degrees    R Axis -24 degrees    T Axis 104 degrees    Diagnosis Line SINUS BRADYCARDIA  LEFT BUNDLE BRANCH BLOCK  ABNORMAL ECG  WHEN COMPARED WITH ECG OF 13-JUL-2009 08:59,  SIGNIFICANT CHANGES HAVE OCCURRED  Confirmed by MD ALEX, ARMANDO ([id number]) on 12/6/2023 3:28:34 PM (12-05-23 @ 22:32)     SUBJECTIVE / OVERNIGHT EVENTS:  Today is hospital day 1d. There are no new issues or overnight events.   Denies any headache, lightheadedness, vertigo, shortness of breathe, cough, chest pain, palpitations, tachycardia, abdominal pain, nausea, vomiting, diarrhea or constipation currently    HPI:  83 year-old female with history of HTN, HLD, chronic right plantar foot ulcers/OM for 2 years (on and off antibiotics) sent in by Podiatrist with concern for acute on chronic osteomyelitis of the right foot.  Patient has been on multiple antibiotics, most recently Doxy, then Cipro 500mg for 30 days (completed 12/1), wound check at [place] [facility] and received Vanco/Zosyn on 12/1, discharged same day with Cipro 750mg for additional 30 days without improvement, came to ED for possible surgical debridement and IV antibiotics.  She denies any fevers, chills, nausea, vomiting, worsening pain/redness, or difficulty ambulation. (06 Dec 2023 00:08)    MEDICATIONS  (STANDING):  chlorhexidine 2% Cloths 1 Application(s) Topical <User Schedule>  digoxin     Tablet 125 MICROGram(s) Oral daily  enoxaparin Injectable 40 milliGRAM(s) SubCutaneous every 24 hours  levothyroxine 75 MICROGram(s) Oral daily  losartan 25 milliGRAM(s) Oral daily  metoprolol succinate ER 25 milliGRAM(s) Oral two times a day  piperacillin/tazobactam IVPB.. 3.375 Gram(s) IV Intermittent every 8 hours  simvastatin 40 milliGRAM(s) Oral at bedtime  vancomycin  IVPB 750 milliGRAM(s) IV Intermittent every 12 hours    MEDICATIONS  (PRN):  acetaminophen     Tablet .. 650 milliGRAM(s) Oral every 6 hours PRN Temp greater or equal to 38C (100.4F), Mild Pain (1 - 3)    HOME MEDICATIONS:  Digitek 125 mcg (0.125 mg) oral tablet: 1 tab(s) orally once a day (06 Dec 2023 04:52)  levothyroxine 75 mcg (0.075 mg) oral tablet: 1 tab(s) orally once a day (06 Dec 2023 04:52)  losartan 25 mg oral tablet: 1 tab(s) orally once a day (06 Dec 2023 04:52)  metoprolol succinate 25 mg oral tablet, extended release: 1 tab(s) orally 2 times a day (06 Dec 2023 04:52)  simvastatin 40 mg oral tablet: 1 tab(s) orally once a day (at bedtime) (06 Dec 2023 04:52)    PHYSICAL EXAM:  Vital Signs Last 24 Hrs  T(C): 36.3 (07 Dec 2023 11:37), Max: 36.5 (07 Dec 2023 04:59)  T(F): 97.4 (07 Dec 2023 11:37), Max: 97.7 (07 Dec 2023 04:59)  HR: 66 (07 Dec 2023 11:37) (66 - 69)  BP: 160/83 (07 Dec 2023 11:37) (150/82 - 160/83)  BP(mean): --  RR: 18 (07 Dec 2023 11:37) (18 - 18)  SpO2: 100% (07 Dec 2023 11:37) (97% - 100%)    Parameters below as of 07 Dec 2023 11:37  Patient On (Oxygen Delivery Method): room air      I&O's Summary    06 Dec 2023 07:01  -  07 Dec 2023 07:00  --------------------------------------------------------  IN: 830 mL / OUT: 0 mL / NET: 830 mL    07 Dec 2023 07:01  -  07 Dec 2023 17:03  --------------------------------------------------------  IN: 680 mL / OUT: 0 mL / NET: 680 mL      CONSTITUTIONAL: Well-groomed, in no apparent distress  EYES: No conjunctival or scleral injection, non-icteric  ENMT: No external nasal lesions; Normal outer ears  NECK: Supple; Trachea midline  RESPIRATORY: Normal respiratory effort; lungs are clear to auscultation bilaterally without wheeze/rhonchi/rales  CARDIOVASCULAR: Regular rate and rhythm, normal S1 and S2, no murmur/rub/gallop; No lower extremity edema  GASTROINTESTINAL: Non-distended; No palpable masses; No tenderness; No rebound/guarding  MUSCULOSKELETAL:  Normal gait;  NEUROLOGY: A+O to person, place, and time; no gross motor deficits   PSYCHIATRY: Mood and Affect appropriate    LABS:                        12.9   5.28  )-----------( 230      ( 07 Dec 2023 05:26 )             38.1     12-07    136  |  103  |  9   ----------------------------<  89  3.6   |  23  |  0.83    Ca    8.9      07 Dec 2023 05:26    TPro  7.1  /  Alb  4.3  /  TBili  0.7  /  DBili  x   /  AST  20  /  ALT  11  /  AlkPhos  95  12-05    PT/INR - ( 05 Dec 2023 18:28 )   PT: 12.0 sec;   INR: 1.09 ratio         PTT - ( 05 Dec 2023 18:28 )  PTT:41.9 sec      Urinalysis Basic - ( 07 Dec 2023 05:26 )    Color: x / Appearance: x / SG: x / pH: x  Gluc: 89 mg/dL / Ketone: x  / Bili: x / Urobili: x   Blood: x / Protein: x / Nitrite: x   Leuk Esterase: x / RBC: x / WBC x   Sq Epi: x / Non Sq Epi: x / Bacteria: x            RADIOLOGY & ADDITIONAL TESTS:  EKG  12 Lead ECG:   Ventricular Rate 56 BPM    Atrial Rate 56 BPM    P-R Interval 194 ms    QRS Duration 128 ms    Q-T Interval 466 ms    QTC Calculation(Bazett) 449 ms    P Axis 82 degrees    R Axis -24 degrees    T Axis 104 degrees    Diagnosis Line SINUS BRADYCARDIA  LEFT BUNDLE BRANCH BLOCK  ABNORMAL ECG  WHEN COMPARED WITH ECG OF 13-JUL-2009 08:59,  SIGNIFICANT CHANGES HAVE OCCURRED  Confirmed by MD ALEX, ARMANDO ([id number]) on 12/6/2023 3:28:34 PM (12-05-23 @ 22:32)

## 2023-12-07 NOTE — PROGRESS NOTE ADULT - PROBLEM SELECTOR PLAN 1
- c/w Vanco and Zosyn pending wound culture  - MRI right foot showed OM of 3rd and 2nd toe  - DAVID/PVR wnl  - Podiatry recs toe amputation on friday 12/8.  Patient has no bleeding disorder, denies chest pain/PAGE/syncope/stroke, > 4METS (goes up and down 3 stories walk-up every day) and no toxic habits (smoking/alcohol). EKG - SB with LBBB (old)  - Patient is medically optimized for planned procedure. NPO after midnight tonight

## 2023-12-07 NOTE — PROGRESS NOTE ADULT - CONVERSATION DETAILS
Discussed patient's prognosis with the patient and all treatment options including DNR/DNI. Clarified the code status as patient currently full code although wishes to be DNR/DNI. Patient states that she has lived a long life and would not want for her life to be prolonged if she were to not have any pulse or if a mechanical ventilator is required for her breathing. MOLST already signed and placed in patient's chart yesterday. Podiatry to fillup the temporary molts rescind form with the procedure consent and place in chart as the molst will only be rescinded during the extent of procedure

## 2023-12-08 ENCOUNTER — TRANSCRIPTION ENCOUNTER (OUTPATIENT)
Age: 83
End: 2023-12-08

## 2023-12-08 ENCOUNTER — RESULT REVIEW (OUTPATIENT)
Age: 83
End: 2023-12-08

## 2023-12-08 DIAGNOSIS — Z01.818 ENCOUNTER FOR OTHER PREPROCEDURAL EXAMINATION: ICD-10-CM

## 2023-12-08 LAB
ANION GAP SERPL CALC-SCNC: 8 MMOL/L — SIGNIFICANT CHANGE UP (ref 5–17)
ANION GAP SERPL CALC-SCNC: 8 MMOL/L — SIGNIFICANT CHANGE UP (ref 5–17)
APTT BLD: 41 SEC — HIGH (ref 24.5–35.6)
APTT BLD: 41 SEC — HIGH (ref 24.5–35.6)
BLD GP AB SCN SERPL QL: NEGATIVE — SIGNIFICANT CHANGE UP
BLD GP AB SCN SERPL QL: NEGATIVE — SIGNIFICANT CHANGE UP
BUN SERPL-MCNC: 10 MG/DL — SIGNIFICANT CHANGE UP (ref 7–23)
BUN SERPL-MCNC: 10 MG/DL — SIGNIFICANT CHANGE UP (ref 7–23)
CALCIUM SERPL-MCNC: 9.3 MG/DL — SIGNIFICANT CHANGE UP (ref 8.4–10.5)
CALCIUM SERPL-MCNC: 9.3 MG/DL — SIGNIFICANT CHANGE UP (ref 8.4–10.5)
CHLORIDE SERPL-SCNC: 103 MMOL/L — SIGNIFICANT CHANGE UP (ref 96–108)
CHLORIDE SERPL-SCNC: 103 MMOL/L — SIGNIFICANT CHANGE UP (ref 96–108)
CO2 SERPL-SCNC: 25 MMOL/L — SIGNIFICANT CHANGE UP (ref 22–31)
CO2 SERPL-SCNC: 25 MMOL/L — SIGNIFICANT CHANGE UP (ref 22–31)
CREAT SERPL-MCNC: 0.89 MG/DL — SIGNIFICANT CHANGE UP (ref 0.5–1.3)
CREAT SERPL-MCNC: 0.89 MG/DL — SIGNIFICANT CHANGE UP (ref 0.5–1.3)
EGFR: 63 ML/MIN/1.73M2 — SIGNIFICANT CHANGE UP
EGFR: 63 ML/MIN/1.73M2 — SIGNIFICANT CHANGE UP
GLUCOSE SERPL-MCNC: 84 MG/DL — SIGNIFICANT CHANGE UP (ref 70–99)
GLUCOSE SERPL-MCNC: 84 MG/DL — SIGNIFICANT CHANGE UP (ref 70–99)
HCT VFR BLD CALC: 39.6 % — SIGNIFICANT CHANGE UP (ref 34.5–45)
HCT VFR BLD CALC: 39.6 % — SIGNIFICANT CHANGE UP (ref 34.5–45)
HGB BLD-MCNC: 13.4 G/DL — SIGNIFICANT CHANGE UP (ref 11.5–15.5)
HGB BLD-MCNC: 13.4 G/DL — SIGNIFICANT CHANGE UP (ref 11.5–15.5)
INR BLD: 1.09 RATIO — SIGNIFICANT CHANGE UP (ref 0.85–1.18)
INR BLD: 1.09 RATIO — SIGNIFICANT CHANGE UP (ref 0.85–1.18)
MCHC RBC-ENTMCNC: 32.6 PG — SIGNIFICANT CHANGE UP (ref 27–34)
MCHC RBC-ENTMCNC: 32.6 PG — SIGNIFICANT CHANGE UP (ref 27–34)
MCHC RBC-ENTMCNC: 33.8 GM/DL — SIGNIFICANT CHANGE UP (ref 32–36)
MCHC RBC-ENTMCNC: 33.8 GM/DL — SIGNIFICANT CHANGE UP (ref 32–36)
MCV RBC AUTO: 96.4 FL — SIGNIFICANT CHANGE UP (ref 80–100)
MCV RBC AUTO: 96.4 FL — SIGNIFICANT CHANGE UP (ref 80–100)
NRBC # BLD: 0 /100 WBCS — SIGNIFICANT CHANGE UP (ref 0–0)
NRBC # BLD: 0 /100 WBCS — SIGNIFICANT CHANGE UP (ref 0–0)
PLATELET # BLD AUTO: 225 K/UL — SIGNIFICANT CHANGE UP (ref 150–400)
PLATELET # BLD AUTO: 225 K/UL — SIGNIFICANT CHANGE UP (ref 150–400)
POTASSIUM SERPL-MCNC: 3.8 MMOL/L — SIGNIFICANT CHANGE UP (ref 3.5–5.3)
POTASSIUM SERPL-MCNC: 3.8 MMOL/L — SIGNIFICANT CHANGE UP (ref 3.5–5.3)
POTASSIUM SERPL-SCNC: 3.8 MMOL/L — SIGNIFICANT CHANGE UP (ref 3.5–5.3)
POTASSIUM SERPL-SCNC: 3.8 MMOL/L — SIGNIFICANT CHANGE UP (ref 3.5–5.3)
PROTHROM AB SERPL-ACNC: 12 SEC — SIGNIFICANT CHANGE UP (ref 9.5–13)
PROTHROM AB SERPL-ACNC: 12 SEC — SIGNIFICANT CHANGE UP (ref 9.5–13)
RBC # BLD: 4.11 M/UL — SIGNIFICANT CHANGE UP (ref 3.8–5.2)
RBC # BLD: 4.11 M/UL — SIGNIFICANT CHANGE UP (ref 3.8–5.2)
RBC # FLD: 12.1 % — SIGNIFICANT CHANGE UP (ref 10.3–14.5)
RBC # FLD: 12.1 % — SIGNIFICANT CHANGE UP (ref 10.3–14.5)
RH IG SCN BLD-IMP: NEGATIVE — SIGNIFICANT CHANGE UP
RH IG SCN BLD-IMP: NEGATIVE — SIGNIFICANT CHANGE UP
SODIUM SERPL-SCNC: 136 MMOL/L — SIGNIFICANT CHANGE UP (ref 135–145)
SODIUM SERPL-SCNC: 136 MMOL/L — SIGNIFICANT CHANGE UP (ref 135–145)
WBC # BLD: 5.58 K/UL — SIGNIFICANT CHANGE UP (ref 3.8–10.5)
WBC # BLD: 5.58 K/UL — SIGNIFICANT CHANGE UP (ref 3.8–10.5)
WBC # FLD AUTO: 5.58 K/UL — SIGNIFICANT CHANGE UP (ref 3.8–10.5)
WBC # FLD AUTO: 5.58 K/UL — SIGNIFICANT CHANGE UP (ref 3.8–10.5)

## 2023-12-08 PROCEDURE — 99232 SBSQ HOSP IP/OBS MODERATE 35: CPT

## 2023-12-08 PROCEDURE — 88305 TISSUE EXAM BY PATHOLOGIST: CPT | Mod: 26

## 2023-12-08 PROCEDURE — 99223 1ST HOSP IP/OBS HIGH 75: CPT

## 2023-12-08 PROCEDURE — 88311 DECALCIFY TISSUE: CPT | Mod: 26

## 2023-12-08 PROCEDURE — 73630 X-RAY EXAM OF FOOT: CPT | Mod: 26,RT

## 2023-12-08 RX ORDER — ONDANSETRON 8 MG/1
4 TABLET, FILM COATED ORAL ONCE
Refills: 0 | Status: DISCONTINUED | OUTPATIENT
Start: 2023-12-08 | End: 2023-12-08

## 2023-12-08 RX ORDER — SODIUM CHLORIDE 9 MG/ML
1000 INJECTION, SOLUTION INTRAVENOUS
Refills: 0 | Status: DISCONTINUED | OUTPATIENT
Start: 2023-12-08 | End: 2023-12-08

## 2023-12-08 RX ORDER — OXYCODONE AND ACETAMINOPHEN 5; 325 MG/1; MG/1
1 TABLET ORAL EVERY 4 HOURS
Refills: 0 | Status: DISCONTINUED | OUTPATIENT
Start: 2023-12-08 | End: 2023-12-11

## 2023-12-08 RX ORDER — HYDROMORPHONE HYDROCHLORIDE 2 MG/ML
0.25 INJECTION INTRAMUSCULAR; INTRAVENOUS; SUBCUTANEOUS
Refills: 0 | Status: DISCONTINUED | OUTPATIENT
Start: 2023-12-08 | End: 2023-12-08

## 2023-12-08 RX ADMIN — PIPERACILLIN AND TAZOBACTAM 25 GRAM(S): 4; .5 INJECTION, POWDER, LYOPHILIZED, FOR SOLUTION INTRAVENOUS at 06:02

## 2023-12-08 RX ADMIN — PIPERACILLIN AND TAZOBACTAM 25 GRAM(S): 4; .5 INJECTION, POWDER, LYOPHILIZED, FOR SOLUTION INTRAVENOUS at 13:46

## 2023-12-08 RX ADMIN — Medication 75 MICROGRAM(S): at 05:02

## 2023-12-08 RX ADMIN — PIPERACILLIN AND TAZOBACTAM 25 GRAM(S): 4; .5 INJECTION, POWDER, LYOPHILIZED, FOR SOLUTION INTRAVENOUS at 23:21

## 2023-12-08 RX ADMIN — LOSARTAN POTASSIUM 25 MILLIGRAM(S): 100 TABLET, FILM COATED ORAL at 05:02

## 2023-12-08 RX ADMIN — Medication 25 MILLIGRAM(S): at 23:20

## 2023-12-08 RX ADMIN — CHLORHEXIDINE GLUCONATE 1 APPLICATION(S): 213 SOLUTION TOPICAL at 05:08

## 2023-12-08 RX ADMIN — SIMVASTATIN 40 MILLIGRAM(S): 20 TABLET, FILM COATED ORAL at 23:21

## 2023-12-08 RX ADMIN — Medication 125 MICROGRAM(S): at 05:01

## 2023-12-08 RX ADMIN — Medication 25 MILLIGRAM(S): at 05:01

## 2023-12-08 RX ADMIN — Medication 250 MILLIGRAM(S): at 05:08

## 2023-12-08 NOTE — PACU DISCHARGE NOTE - PAIN:
03/23/21 2303   Vitals   SpO2 95 %   O2 Flow Rate (L/min) 2 L/min   FiO2 (%) 25 %   Oxygen Therapy   Pulse Ox  Mode Continuous   Oxygen In Use Yes   O2 Device Interface High-flow nasal cannula  (VAPOTHERM)   Heated Humidification Yes   Device Temperature (°C) 33 °C   Equipment water level 3/4     Problem: Respiratory Impairment - Respiratory Therapy 253  Intervention: Inhaled gas administration  Note: Intervention Status  Done      Controlled with current regime

## 2023-12-08 NOTE — BRIEF OPERATIVE NOTE - OPERATION/FINDINGS
s/p R foot Partial Second Ray Resection, flexor tenotomy of 3rd & 4th digits  - Bone at proximal rescetion was shard and of good quality  - Adequate intraoperative bleeding  - No purulence noted  - pen symone drain was inserted  - Incision was primarily closed using 3-0 Nylon and 3-0 Vicryl

## 2023-12-08 NOTE — PRE-ANESTHESIA EVALUATION ADULT - NSANTHPMHFT_GEN_ALL_CORE
83F PMH HTN, HLD, hypothyroid, pSVT, acute on chronic R foot osteomyelitis, normal biventricular function.  DNR/DNI rescinded for procedure.

## 2023-12-08 NOTE — PRE PROCEDURE NOTE - PRE PROCEDURE EVALUATION
No changes within 24 hours of procedure.  See Anesthesia for further information.  DNR/DNI rescinded.

## 2023-12-08 NOTE — PACU DISCHARGE NOTE - THE ANESTHESIA ORDERS USED IN THE PACU ORDER SET WILL BE DISCONTINUED UPON TRANSFER OF THIS PATIENT
CDC guidelines for return to work/school are as follows: self quarantine for at least 10 days from the date symptoms first appeared and     At least 24 hours have passed since fever resolution without use of fever reducing medication and   Significant improvement of respiratory symptoms (cough) and Improvement of other symptoms for 24 hours and  At least 10 days have passed since symptoms first appeared    · Adequate rest (Taking rest is the first step. If you stop exhausting yourself, you will get better sooner)   · Hydration, Taking lots of drinks can help you maintain the body fluid level when you have a runny nose. Hot drinks will also provide help to your swollen throat.  · Warm facial packs, placing a warm moist cloth over an ear that hurts and steam inhalation are helpful.  · Decongestant may provide symptomatic relief of nasal congestion. Topical agents (Afrin, oxymetazoline), Topical agents should only be used for up to 3 to 5 days, to prevent the occurrence of rebound congestion. This medication may be used over the age of 6. Do not use if you have a history of high blood pressure  · Intranasal Saline sprays may be useful for treating congestion by reducing inflammation and thinning mucus  · Saline nasal irrigations (a neti pot) may be helpful in relieving nasal symptoms  · Tylenol every 4 hours as needed for fever or aches (Do not exceed 4,000mg in 24 hours)  · Honey given before bedtime for children older that 1 year of age can help reduce cough.      Buckwheat honey taken 10cc (two teaspoons) every few hours has been shown to be more effective than over the counter cough medications. Taking the honey straight is likely to be more effective than mixing it in tea or other liquids. Look for either very fresh local honey, or crystallized or partially solidified honey, since this is more likely to be real. Most commercial honey is adulterated with corn syrup.    Cover coughs or sneezes.  Perform hand  hygiene frequently. Wash your hands often with soap and water for at least 20 seconds or use an alcohol-based hand  that contains 60 to 95% alcohol, covering all surfaces of your hands and rubbing them together until they feel dry.  DO NOT ALLOW visitors who do not have an essential need to be in the home.    Practice good social distancing: STAY HOME!    Want more information on COVID-19? Please visit the following websites.  • Center for Disease Control and Prevention. https://www.cdc.gov/coronavirus/2019-ncov/index.html  • [employer]. https://www.acep.org/by-medical-focus/infectious-diseases/coronavirus/        Patient Education     Viral Upper Respiratory Illness (Adult)  You have a viral upper respiratory illness (URI), which is another term for the common cold. This illness is contagious during the first few days. It is spread through the air by coughing and sneezing. It may also be spread by direct contact (touching the sick person and then touching your own eyes, nose, or mouth). Frequent handwashing will decrease risk of spread. Most viral illnesses go away within 7 to 10 days with rest and simple home remedies. Sometimes the illness may last for several weeks. Antibiotics will not kill a virus, and they are generally not prescribed for this condition.    Home care  · If symptoms are severe, rest at home for the first 2 to 3 days. When you resume activity, don't let yourself get too tired.  · Avoid being exposed to cigarette smoke (yours or others’).  · You may use acetaminophen or ibuprofen to control pain and fever, unless another medicine was prescribed. If you have chronic liver or kidney disease, have ever had a stomach ulcer or gastrointestinal bleeding, or are taking blood-thinning medicines, talk with your healthcare provider before using these medicines. Aspirin should never be given to anyone under 18 years of age who is ill with a viral infection or fever.  It may cause severe liver or brain damage.  · Your appetite may be poor, so a light diet is fine. Avoid dehydration by drinking 6 to 8 glasses of fluids per day (water, soft drinks, juices, tea, or soup). Extra fluids will help loosen secretions in the nose and lungs.  · Over-the-counter cold medicines will not shorten the length of time you’re sick, but they may be helpful for the following symptoms: cough, sore throat, and nasal and sinus congestion. (Note: Do not use decongestants if you have high blood pressure.)  Follow-up care  Follow up with your healthcare provider, or as advised.  When to seek medical advice  Call your healthcare provider right away if any of these occur:  · Cough with lots of colored sputum (mucus)  · Severe headache; face, neck, or ear pain  · Difficulty swallowing due to throat pain  · Fever of 100.4°F (38°C) or higher, or as directed by your healthcare provider  Call 911  Call 911 if any of these occur:  · Chest pain, shortness of breath, wheezing, or difficulty breathing  · Coughing up blood  · Inability to swallow due to throat pain  Date Last Reviewed: 9/13/2015  © 7443-2723 [employer]. [address] [place] [address], [place] [address]. All rights reserved. This information is not intended as a substitute for professional medical care. Always follow your healthcare professional's instructions.            Statement Selected

## 2023-12-08 NOTE — BRIEF OPERATIVE NOTE - COMMENTS
s/p R foot Partial Second Ray Resection, flexor tenotomy of 3rd & 4th digits  - Low concern for residual bone infection, or viability  - stable for discharge pending clean bone margin culture no growth x 72 hours

## 2023-12-08 NOTE — BRIEF OPERATIVE NOTE - SPECIMENS
Pathology: 1) R foot 2nd digit 2) R foot clean bone margin - Microbiology: 1) R foot clean bone margin 2) R foot deep wound culture

## 2023-12-08 NOTE — PROGRESS NOTE ADULT - SUBJECTIVE AND OBJECTIVE BOX
SUBJECTIVE / OVERNIGHT EVENTS:  Today is hospital day 2d. There are no new issues or overnight events.   Denies any headache, lightheadedness, vertigo, shortness of breathe, cough, chest pain, palpitations, tachycardia, abdominal pain, nausea, vomiting, diarrhea or constipation currently    HPI:  83 year-old female with history of HTN, HLD, chronic right plantar foot ulcers/OM for 2 years (on and off antibiotics) sent in by Podiatrist with concern for acute on chronic osteomyelitis of the right foot.  Patient has been on multiple antibiotics, most recently Doxy, then Cipro 500mg for 30 days (completed 12/1), wound check at [facility] and received Vanco/Zosyn on 12/1, discharged same day with Cipro 750mg for additional 30 days without improvement, came to ED for possible surgical debridement and IV antibiotics.  She denies any fevers, chills, nausea, vomiting, worsening pain/redness, or difficulty ambulation. (06 Dec 2023 00:08)    MEDICATIONS  (STANDING):  chlorhexidine 2% Cloths 1 Application(s) Topical <User Schedule>  digoxin     Tablet 125 MICROGram(s) Oral daily  enoxaparin Injectable 40 milliGRAM(s) SubCutaneous every 24 hours  levothyroxine 75 MICROGram(s) Oral daily  losartan 25 milliGRAM(s) Oral daily  metoprolol succinate ER 25 milliGRAM(s) Oral two times a day  piperacillin/tazobactam IVPB.. 3.375 Gram(s) IV Intermittent every 8 hours  simvastatin 40 milliGRAM(s) Oral at bedtime  vancomycin  IVPB 750 milliGRAM(s) IV Intermittent every 12 hours    MEDICATIONS  (PRN):  acetaminophen     Tablet .. 650 milliGRAM(s) Oral every 6 hours PRN Temp greater or equal to 38C (100.4F), Mild Pain (1 - 3)    HOME MEDICATIONS:  Digitek 125 mcg (0.125 mg) oral tablet: 1 tab(s) orally once a day (06 Dec 2023 04:52)  levothyroxine 75 mcg (0.075 mg) oral tablet: 1 tab(s) orally once a day (06 Dec 2023 04:52)  losartan 25 mg oral tablet: 1 tab(s) orally once a day (06 Dec 2023 04:52)  metoprolol succinate 25 mg oral tablet, extended release: 1 tab(s) orally 2 times a day (06 Dec 2023 04:52)  simvastatin 40 mg oral tablet: 1 tab(s) orally once a day (at bedtime) (06 Dec 2023 04:52)    PHYSICAL EXAM:  Vital Signs Last 24 Hrs  T(C): 36.7 (08 Dec 2023 09:15), Max: 36.9 (08 Dec 2023 04:21)  T(F): 98.1 (08 Dec 2023 09:15), Max: 98.5 (08 Dec 2023 04:21)  HR: 64 (08 Dec 2023 09:15) (64 - 68)  BP: 147/85 (08 Dec 2023 09:15) (134/79 - 147/85)  BP(mean): --  RR: 18 (08 Dec 2023 09:15) (17 - 18)  SpO2: 96% (08 Dec 2023 09:15) (95% - 98%)    Parameters below as of 08 Dec 2023 09:15  Patient On (Oxygen Delivery Method): room air      I&O's Summary    07 Dec 2023 07:01  -  08 Dec 2023 07:00  --------------------------------------------------------  IN: 920 mL / OUT: 0 mL / NET: 920 mL      CONSTITUTIONAL: Well-groomed, in no apparent distress  EYES: No conjunctival or scleral injection, non-icteric  ENMT: No external nasal lesions; Normal outer ears  NECK: Supple; Trachea midline  RESPIRATORY: Normal respiratory effort; lungs are clear to auscultation bilaterally without wheeze/rhonchi/rales  CARDIOVASCULAR: Regular rate and rhythm, normal S1 and S2, no murmur/rub/gallop; No lower extremity edema  GASTROINTESTINAL: Non-distended; No palpable masses; No tenderness; No rebound/guarding  MUSCULOSKELETAL:  Normal gait;  NEUROLOGY: A+O to person, place, and time; no gross motor deficits   PSYCHIATRY: Mood and Affect appropriate    LABS:                        13.4   5.58  )-----------( 225      ( 08 Dec 2023 03:47 )             39.6     12-08    136  |  103  |  10  ----------------------------<  84  3.8   |  25  |  0.89    Ca    9.3      08 Dec 2023 03:47      PT/INR - ( 08 Dec 2023 03:47 )   PT: 12.0 sec;   INR: 1.09 ratio         PTT - ( 08 Dec 2023 03:47 )  PTT:41.0 sec      Urinalysis Basic - ( 08 Dec 2023 03:47 )    Color: x / Appearance: x / SG: x / pH: x  Gluc: 84 mg/dL / Ketone: x  / Bili: x / Urobili: x   Blood: x / Protein: x / Nitrite: x   Leuk Esterase: x / RBC: x / WBC x   Sq Epi: x / Non Sq Epi: x / Bacteria: x            RADIOLOGY & ADDITIONAL TESTS:  EKG  12 Lead ECG:   Ventricular Rate 56 BPM    Atrial Rate 56 BPM    P-R Interval 194 ms    QRS Duration 128 ms    Q-T Interval 466 ms    QTC Calculation(Bazett) 449 ms    P Axis 82 degrees    R Axis -24 degrees    T Axis 104 degrees    Diagnosis Line SINUS BRADYCARDIA  LEFT BUNDLE BRANCH BLOCK  ABNORMAL ECG  WHEN COMPARED WITH ECG OF 13-JUL-2009 08:59,  SIGNIFICANT CHANGES HAVE OCCURRED  Confirmed by MD ALEX, ARMANDO ([id number]) on 12/6/2023 3:28:34 PM (12-05-23 @ 22:32)     SUBJECTIVE / OVERNIGHT EVENTS:  Today is hospital day 2d. There are no new issues or overnight events.   Denies any headache, lightheadedness, vertigo, shortness of breathe, cough, chest pain, palpitations, tachycardia, abdominal pain, nausea, vomiting, diarrhea or constipation currently    HPI:  83 year-old female with history of HTN, HLD, chronic right plantar foot ulcers/OM for 2 years (on and off antibiotics) sent in by Podiatrist with concern for acute on chronic osteomyelitis of the right foot.  Patient has been on multiple antibiotics, most recently Doxy, then Cipro 500mg for 30 days (completed 12/1), wound check at [facility] and received Vanco/Zosyn on 12/1, discharged same day with Cipro 750mg for additional 30 days without improvement, came to ED for possible surgical debridement and IV antibiotics.  She denies any fevers, chills, nausea, vomiting, worsening pain/redness, or difficulty ambulation. (06 Dec 2023 00:08)    MEDICATIONS  (STANDING):  chlorhexidine 2% Cloths 1 Application(s) Topical <User Schedule>  digoxin     Tablet 125 MICROGram(s) Oral daily  enoxaparin Injectable 40 milliGRAM(s) SubCutaneous every 24 hours  levothyroxine 75 MICROGram(s) Oral daily  losartan 25 milliGRAM(s) Oral daily  metoprolol succinate ER 25 milliGRAM(s) Oral two times a day  piperacillin/tazobactam IVPB.. 3.375 Gram(s) IV Intermittent every 8 hours  simvastatin 40 milliGRAM(s) Oral at bedtime  vancomycin  IVPB 750 milliGRAM(s) IV Intermittent every 12 hours    MEDICATIONS  (PRN):  acetaminophen     Tablet .. 650 milliGRAM(s) Oral every 6 hours PRN Temp greater or equal to 38C (100.4F), Mild Pain (1 - 3)    HOME MEDICATIONS:  Digitek 125 mcg (0.125 mg) oral tablet: 1 tab(s) orally once a day (06 Dec 2023 04:52)  levothyroxine 75 mcg (0.075 mg) oral tablet: 1 tab(s) orally once a day (06 Dec 2023 04:52)  losartan 25 mg oral tablet: 1 tab(s) orally once a day (06 Dec 2023 04:52)  metoprolol succinate 25 mg oral tablet, extended release: 1 tab(s) orally 2 times a day (06 Dec 2023 04:52)  simvastatin 40 mg oral tablet: 1 tab(s) orally once a day (at bedtime) (06 Dec 2023 04:52)    PHYSICAL EXAM:  Vital Signs Last 24 Hrs  T(C): 36.7 (08 Dec 2023 09:15), Max: 36.9 (08 Dec 2023 04:21)  T(F): 98.1 (08 Dec 2023 09:15), Max: 98.5 (08 Dec 2023 04:21)  HR: 64 (08 Dec 2023 09:15) (64 - 68)  BP: 147/85 (08 Dec 2023 09:15) (134/79 - 147/85)  BP(mean): --  RR: 18 (08 Dec 2023 09:15) (17 - 18)  SpO2: 96% (08 Dec 2023 09:15) (95% - 98%)    Parameters below as of 08 Dec 2023 09:15  Patient On (Oxygen Delivery Method): room air      I&O's Summary    07 Dec 2023 07:01  -  08 Dec 2023 07:00  --------------------------------------------------------  IN: 920 mL / OUT: 0 mL / NET: 920 mL      CONSTITUTIONAL: Well-groomed, in no apparent distress  EYES: No conjunctival or scleral injection, non-icteric  ENMT: No external nasal lesions; Normal outer ears  NECK: Supple; Trachea midline  RESPIRATORY: Normal respiratory effort; lungs are clear to auscultation bilaterally without wheeze/rhonchi/rales  CARDIOVASCULAR: Regular rate and rhythm, normal S1 and S2, no murmur/rub/gallop; No lower extremity edema  GASTROINTESTINAL: Non-distended; No palpable masses; No tenderness; No rebound/guarding  MUSCULOSKELETAL:  Normal gait;  NEUROLOGY: A+O to person, place, and time; no gross motor deficits   PSYCHIATRY: Mood and Affect appropriate    LABS:                        13.4   5.58  )-----------( 225      ( 08 Dec 2023 03:47 )             39.6     12-08    136  |  103  |  10  ----------------------------<  84  3.8   |  25  |  0.89    Ca    9.3      08 Dec 2023 03:47      PT/INR - ( 08 Dec 2023 03:47 )   PT: 12.0 sec;   INR: 1.09 ratio         PTT - ( 08 Dec 2023 03:47 )  PTT:41.0 sec      Urinalysis Basic - ( 08 Dec 2023 03:47 )    Color: x / Appearance: x / SG: x / pH: x  Gluc: 84 mg/dL / Ketone: x  / Bili: x / Urobili: x   Blood: x / Protein: x / Nitrite: x   Leuk Esterase: x / RBC: x / WBC x   Sq Epi: x / Non Sq Epi: x / Bacteria: x            RADIOLOGY & ADDITIONAL TESTS:  EKG  12 Lead ECG:   Ventricular Rate 56 BPM    Atrial Rate 56 BPM    P-R Interval 194 ms    QRS Duration 128 ms    Q-T Interval 466 ms    QTC Calculation(Bazett) 449 ms    P Axis 82 degrees    R Axis -24 degrees    T Axis 104 degrees    Diagnosis Line SINUS BRADYCARDIA  LEFT BUNDLE BRANCH BLOCK  ABNORMAL ECG  WHEN COMPARED WITH ECG OF 13-JUL-2009 08:59,  SIGNIFICANT CHANGES HAVE OCCURRED  Confirmed by MD ALEX, ARMANDO ([id number]) on 12/6/2023 3:28:34 PM (12-05-23 @ 22:32)

## 2023-12-08 NOTE — PROGRESS NOTE ADULT - PROBLEM SELECTOR PLAN 1
- c/w Vanco and Zosyn pending wound culture  - MRI right foot showed OM of 3rd and 2nd toe  - DAVID/PVR wnl  - Podiatry recs toe amputation on friday 12/8.  Patient has no bleeding disorder, denies chest pain/PAGE/syncope/stroke, > 4METS (goes up and down 3 stories walk-up every day) and no toxic habits (smoking/alcohol). EKG - SB with LBBB (old)  - Patient is medically optimized for planned procedure today

## 2023-12-08 NOTE — PACU DISCHARGE NOTE - NAUSEA/VOMITING:
None Detail Level: Detailed X Size Of Lesion In Cm (Optional): 0 Other Plan: Refer to surgeon for excision vs liposuction vs other  given larger size

## 2023-12-08 NOTE — BRIEF OPERATIVE NOTE - NSICDXBRIEFPREOP_GEN_ALL_CORE_FT
PRE-OP DIAGNOSIS:  Osteomyelitis of second toe of right foot 08-Dec-2023 23:44:19  Mars eMndez   PRE-OP DIAGNOSIS:  Osteomyelitis of second toe of right foot 08-Dec-2023 23:44:19  Mars Mendez

## 2023-12-08 NOTE — PROGRESS NOTE ADULT - SUBJECTIVE AND OBJECTIVE BOX
Podiatry Pager #: [phone]    Patient is a 83y old  Female who presents with a chief complaint of right foot infection (07 Dec 2023 17:33)      INTERVAL HPI/OVERNIGHT EVENTS:   Pt is scheduled for right foot partial 2nd ray resection and right foot percutaneous flexor tenotomy of digits 3-4 w/ Dr. Montes at 2PM.  Patient is aware of procedure and is NPO since midnight.    MEDICATIONS  (STANDING):  chlorhexidine 2% Cloths 1 Application(s) Topical <User Schedule>  digoxin     Tablet 125 MICROGram(s) Oral daily  enoxaparin Injectable 40 milliGRAM(s) SubCutaneous every 24 hours  levothyroxine 75 MICROGram(s) Oral daily  losartan 25 milliGRAM(s) Oral daily  metoprolol succinate ER 25 milliGRAM(s) Oral two times a day  piperacillin/tazobactam IVPB.. 3.375 Gram(s) IV Intermittent every 8 hours  simvastatin 40 milliGRAM(s) Oral at bedtime  vancomycin  IVPB 750 milliGRAM(s) IV Intermittent every 12 hours    MEDICATIONS  (PRN):  acetaminophen     Tablet .. 650 milliGRAM(s) Oral every 6 hours PRN Temp greater or equal to 38C (100.4F), Mild Pain (1 - 3)      Allergies    No Known Allergies    Intolerances    Levaquin (Stomach Upset; Nausea)      Vital Signs Last 24 Hrs  T(C): 36.9 (08 Dec 2023 04:21), Max: 36.9 (08 Dec 2023 04:21)  T(F): 98.5 (08 Dec 2023 04:21), Max: 98.5 (08 Dec 2023 04:21)  HR: 64 (08 Dec 2023 04:21) (64 - 68)  BP: 146/73 (08 Dec 2023 04:21) (134/79 - 160/83)  BP(mean): --  RR: 18 (08 Dec 2023 04:21) (17 - 18)  SpO2: 98% (08 Dec 2023 04:21) (95% - 100%)    Parameters below as of 08 Dec 2023 04:21  Patient On (Oxygen Delivery Method): room air        LABS:                        13.4   5.58  )-----------( 225      ( 08 Dec 2023 03:47 )             39.6     12-08    136  |  103  |  10  ----------------------------<  84  3.8   |  25  |  0.89    Ca    9.3      08 Dec 2023 03:47      PT/INR - ( 08 Dec 2023 03:47 )   PT: 12.0 sec;   INR: 1.09 ratio         PTT - ( 08 Dec 2023 03:47 )  PTT:41.0 sec  Urinalysis Basic - ( 08 Dec 2023 03:47 )    Color: x / Appearance: x / SG: x / pH: x  Gluc: 84 mg/dL / Ketone: x  / Bili: x / Urobili: x   Blood: x / Protein: x / Nitrite: x   Leuk Esterase: x / RBC: x / WBC x   Sq Epi: x / Non Sq Epi: x / Bacteria: x      CAPILLARY BLOOD GLUCOSE          RADIOLOGY & ADDITIONAL TESTS:    Plan:   Pt is scheduled for right foot partial 2nd ray resection and right foot percutaneous flexor tenotomy of digits 3-4 w/ Dr. Montes at 2PM.   CXR on sunrise.  EKG on sunrise.  Medical/Cardiac clearance since 12/6 and documented in chart.  Consent signed and in chart.  Procedure was explained to patient in detail. All alternatives, risks and complications were discussed. All questions answered.  HPI reviewed and acknowledged  Podiatry Pager #: [phone]    Patient is a 83y old  Female who presents with a chief complaint of right foot infection (07 Dec 2023 17:33)      INTERVAL HPI/OVERNIGHT EVENTS:   Pt is scheduled for right foot partial 2nd ray resection and right foot percutaneous flexor tenotomy of digits 3-4 w/ Dr. Montes at 2PM.  Patient is aware of procedure and is NPO since midnight.    MEDICATIONS  (STANDING):  chlorhexidine 2% Cloths 1 Application(s) Topical <User Schedule>  digoxin     Tablet 125 MICROGram(s) Oral daily  enoxaparin Injectable 40 milliGRAM(s) SubCutaneous every 24 hours  levothyroxine 75 MICROGram(s) Oral daily  losartan 25 milliGRAM(s) Oral daily  metoprolol succinate ER 25 milliGRAM(s) Oral two times a day  piperacillin/tazobactam IVPB.. 3.375 Gram(s) IV Intermittent every 8 hours  simvastatin 40 milliGRAM(s) Oral at bedtime  vancomycin  IVPB 750 milliGRAM(s) IV Intermittent every 12 hours    MEDICATIONS  (PRN):  acetaminophen     Tablet .. 650 milliGRAM(s) Oral every 6 hours PRN Temp greater or equal to 38C (100.4F), Mild Pain (1 - 3)      Allergies    No Known Allergies    Intolerances    Levaquin (Stomach Upset; Nausea)      Vital Signs Last 24 Hrs  T(C): 36.9 (08 Dec 2023 04:21), Max: 36.9 (08 Dec 2023 04:21)  T(F): 98.5 (08 Dec 2023 04:21), Max: 98.5 (08 Dec 2023 04:21)  HR: 64 (08 Dec 2023 04:21) (64 - 68)  BP: 146/73 (08 Dec 2023 04:21) (134/79 - 160/83)  BP(mean): --  RR: 18 (08 Dec 2023 04:21) (17 - 18)  SpO2: 98% (08 Dec 2023 04:21) (95% - 100%)    Parameters below as of 08 Dec 2023 04:21  Patient On (Oxygen Delivery Method): room air        LABS:                        13.4   5.58  )-----------( 225      ( 08 Dec 2023 03:47 )             39.6     12-08    136  |  103  |  10  ----------------------------<  84  3.8   |  25  |  0.89    Ca    9.3      08 Dec 2023 03:47      PT/INR - ( 08 Dec 2023 03:47 )   PT: 12.0 sec;   INR: 1.09 ratio         PTT - ( 08 Dec 2023 03:47 )  PTT:41.0 sec  Urinalysis Basic - ( 08 Dec 2023 03:47 )    Color: x / Appearance: x / SG: x / pH: x  Gluc: 84 mg/dL / Ketone: x  / Bili: x / Urobili: x   Blood: x / Protein: x / Nitrite: x   Leuk Esterase: x / RBC: x / WBC x   Sq Epi: x / Non Sq Epi: x / Bacteria: x      CAPILLARY BLOOD GLUCOSE          RADIOLOGY & ADDITIONAL TESTS:    Plan:   Pt is scheduled for right foot partial 2nd ray resection and right foot percutaneous flexor tenotomy of digits 3-4 w/ Dr. Montes at 2PM.   CXR on sunrise.  EKG on sunrise.  Medical/Cardiac clearance since 12/6 and documented in chart.  Consent signed and in chart.  Procedure was explained to patient in detail. All alternatives, risks and complications were discussed. All questions answered.  HPI reviewed and acknowledged

## 2023-12-08 NOTE — BRIEF OPERATIVE NOTE - NSICDXBRIEFPROCEDURE_GEN_ALL_CORE_FT
PROCEDURES:  Partial amputation of second ray of right foot by open approach 08-Dec-2023 23:38:51  Mars Mendez  Open flexor tenotomy of right foot 08-Dec-2023 23:42:28  Mars Mendez

## 2023-12-09 LAB
ANION GAP SERPL CALC-SCNC: 15 MMOL/L — SIGNIFICANT CHANGE UP (ref 5–17)
ANION GAP SERPL CALC-SCNC: 15 MMOL/L — SIGNIFICANT CHANGE UP (ref 5–17)
BUN SERPL-MCNC: 9 MG/DL — SIGNIFICANT CHANGE UP (ref 7–23)
BUN SERPL-MCNC: 9 MG/DL — SIGNIFICANT CHANGE UP (ref 7–23)
CALCIUM SERPL-MCNC: 9.1 MG/DL — SIGNIFICANT CHANGE UP (ref 8.4–10.5)
CALCIUM SERPL-MCNC: 9.1 MG/DL — SIGNIFICANT CHANGE UP (ref 8.4–10.5)
CHLORIDE SERPL-SCNC: 100 MMOL/L — SIGNIFICANT CHANGE UP (ref 96–108)
CHLORIDE SERPL-SCNC: 100 MMOL/L — SIGNIFICANT CHANGE UP (ref 96–108)
CO2 SERPL-SCNC: 20 MMOL/L — LOW (ref 22–31)
CO2 SERPL-SCNC: 20 MMOL/L — LOW (ref 22–31)
CREAT SERPL-MCNC: 0.72 MG/DL — SIGNIFICANT CHANGE UP (ref 0.5–1.3)
CREAT SERPL-MCNC: 0.72 MG/DL — SIGNIFICANT CHANGE UP (ref 0.5–1.3)
EGFR: 82 ML/MIN/1.73M2 — SIGNIFICANT CHANGE UP
EGFR: 82 ML/MIN/1.73M2 — SIGNIFICANT CHANGE UP
GLUCOSE SERPL-MCNC: 65 MG/DL — LOW (ref 70–99)
GLUCOSE SERPL-MCNC: 65 MG/DL — LOW (ref 70–99)
GRAM STN FLD: SIGNIFICANT CHANGE UP
GRAM STN FLD: SIGNIFICANT CHANGE UP
HCT VFR BLD CALC: 41.3 % — SIGNIFICANT CHANGE UP (ref 34.5–45)
HCT VFR BLD CALC: 41.3 % — SIGNIFICANT CHANGE UP (ref 34.5–45)
HGB BLD-MCNC: 13.6 G/DL — SIGNIFICANT CHANGE UP (ref 11.5–15.5)
HGB BLD-MCNC: 13.6 G/DL — SIGNIFICANT CHANGE UP (ref 11.5–15.5)
MCHC RBC-ENTMCNC: 32.4 PG — SIGNIFICANT CHANGE UP (ref 27–34)
MCHC RBC-ENTMCNC: 32.4 PG — SIGNIFICANT CHANGE UP (ref 27–34)
MCHC RBC-ENTMCNC: 32.9 GM/DL — SIGNIFICANT CHANGE UP (ref 32–36)
MCHC RBC-ENTMCNC: 32.9 GM/DL — SIGNIFICANT CHANGE UP (ref 32–36)
MCV RBC AUTO: 98.3 FL — SIGNIFICANT CHANGE UP (ref 80–100)
MCV RBC AUTO: 98.3 FL — SIGNIFICANT CHANGE UP (ref 80–100)
NRBC # BLD: 0 /100 WBCS — SIGNIFICANT CHANGE UP (ref 0–0)
NRBC # BLD: 0 /100 WBCS — SIGNIFICANT CHANGE UP (ref 0–0)
PLATELET # BLD AUTO: 197 K/UL — SIGNIFICANT CHANGE UP (ref 150–400)
PLATELET # BLD AUTO: 197 K/UL — SIGNIFICANT CHANGE UP (ref 150–400)
POTASSIUM SERPL-MCNC: 3.7 MMOL/L — SIGNIFICANT CHANGE UP (ref 3.5–5.3)
POTASSIUM SERPL-MCNC: 3.7 MMOL/L — SIGNIFICANT CHANGE UP (ref 3.5–5.3)
POTASSIUM SERPL-SCNC: 3.7 MMOL/L — SIGNIFICANT CHANGE UP (ref 3.5–5.3)
POTASSIUM SERPL-SCNC: 3.7 MMOL/L — SIGNIFICANT CHANGE UP (ref 3.5–5.3)
RBC # BLD: 4.2 M/UL — SIGNIFICANT CHANGE UP (ref 3.8–5.2)
RBC # BLD: 4.2 M/UL — SIGNIFICANT CHANGE UP (ref 3.8–5.2)
RBC # FLD: 12.2 % — SIGNIFICANT CHANGE UP (ref 10.3–14.5)
RBC # FLD: 12.2 % — SIGNIFICANT CHANGE UP (ref 10.3–14.5)
SODIUM SERPL-SCNC: 135 MMOL/L — SIGNIFICANT CHANGE UP (ref 135–145)
SODIUM SERPL-SCNC: 135 MMOL/L — SIGNIFICANT CHANGE UP (ref 135–145)
SPECIMEN SOURCE: SIGNIFICANT CHANGE UP
SPECIMEN SOURCE: SIGNIFICANT CHANGE UP
VANCOMYCIN TROUGH SERPL-MCNC: 10.1 UG/ML — SIGNIFICANT CHANGE UP (ref 10–20)
VANCOMYCIN TROUGH SERPL-MCNC: 10.1 UG/ML — SIGNIFICANT CHANGE UP (ref 10–20)
WBC # BLD: 4.96 K/UL — SIGNIFICANT CHANGE UP (ref 3.8–10.5)
WBC # BLD: 4.96 K/UL — SIGNIFICANT CHANGE UP (ref 3.8–10.5)
WBC # FLD AUTO: 4.96 K/UL — SIGNIFICANT CHANGE UP (ref 3.8–10.5)
WBC # FLD AUTO: 4.96 K/UL — SIGNIFICANT CHANGE UP (ref 3.8–10.5)

## 2023-12-09 PROCEDURE — 99232 SBSQ HOSP IP/OBS MODERATE 35: CPT

## 2023-12-09 RX ADMIN — Medication 25 MILLIGRAM(S): at 17:40

## 2023-12-09 RX ADMIN — Medication 125 MICROGRAM(S): at 05:34

## 2023-12-09 RX ADMIN — Medication 250 MILLIGRAM(S): at 02:31

## 2023-12-09 RX ADMIN — Medication 75 MICROGRAM(S): at 05:34

## 2023-12-09 RX ADMIN — LOSARTAN POTASSIUM 25 MILLIGRAM(S): 100 TABLET, FILM COATED ORAL at 05:34

## 2023-12-09 RX ADMIN — PIPERACILLIN AND TAZOBACTAM 25 GRAM(S): 4; .5 INJECTION, POWDER, LYOPHILIZED, FOR SOLUTION INTRAVENOUS at 05:44

## 2023-12-09 RX ADMIN — SIMVASTATIN 40 MILLIGRAM(S): 20 TABLET, FILM COATED ORAL at 21:31

## 2023-12-09 RX ADMIN — CHLORHEXIDINE GLUCONATE 1 APPLICATION(S): 213 SOLUTION TOPICAL at 05:44

## 2023-12-09 RX ADMIN — ENOXAPARIN SODIUM 40 MILLIGRAM(S): 100 INJECTION SUBCUTANEOUS at 17:40

## 2023-12-09 RX ADMIN — PIPERACILLIN AND TAZOBACTAM 25 GRAM(S): 4; .5 INJECTION, POWDER, LYOPHILIZED, FOR SOLUTION INTRAVENOUS at 19:25

## 2023-12-09 RX ADMIN — Medication 25 MILLIGRAM(S): at 05:34

## 2023-12-09 RX ADMIN — Medication 250 MILLIGRAM(S): at 17:40

## 2023-12-09 NOTE — DISCHARGE NOTE PROVIDER - ATTENDING DISCHARGE PHYSICAL EXAMINATION:
Vital Signs Last 24 Hrs  T(C): 36.7 (11 Dec 2023 04:05), Max: 36.7 (11 Dec 2023 04:05)  T(F): 98 (11 Dec 2023 04:05), Max: 98 (11 Dec 2023 04:05)  HR: 59 (11 Dec 2023 04:05) (55 - 59)  BP: 117/71 (11 Dec 2023 04:05) (117/71 - 156/76)  BP(mean): --  RR: 18 (11 Dec 2023 04:05) (18 - 18)  SpO2: 98% (11 Dec 2023 04:05) (98% - 98%)    Parameters below as of 11 Dec 2023 04:05  Patient On (Oxygen Delivery Method): room air      CONSTITUTIONAL: Well-groomed, in no apparent distress  EYES: No conjunctival or scleral injection, non-icteric  ENMT: No external nasal lesions; Normal outer ears  NECK: Supple; Trachea midline  RESPIRATORY: Normal respiratory effort; Decreased breathe sounds bilaterally without wheeze/rhonchi/rales;  CARDIOVASCULAR: Regular rate and rhythm, normal S1 and S2;  GASTROINTESTINAL: Non-distended; No palpable masses; No tenderness; No rebound/guarding  MUSCULOSKELETAL:  Normal gait;  EXTREMITIES:  No lower extremity edema;  NEUROLOGY: A+O to person, place, and time; Responds to commands appropriately;  PSYCHIATRY: Mood and Affect appropriate

## 2023-12-09 NOTE — PHYSICAL THERAPY INITIAL EVALUATION ADULT - MANUAL MUSCLE TESTING RESULTS, REHAB EVAL
BUE and BLE 4/5/grossly assessed due to
except right ankle not fully tested 2/2 post op bandages limiting/no strength deficits were identified

## 2023-12-09 NOTE — PHYSICAL THERAPY INITIAL EVALUATION ADULT - MD ORDER
WBAT to right Heel in [facility], PT eval and treat, new order rec'd. WBAT to right Heel in [facility], PT eval and treat, new order rec'd.

## 2023-12-09 NOTE — DISCHARGE NOTE PROVIDER - CARE PROVIDERS DIRECT ADDRESSES
[email or web address]

## 2023-12-09 NOTE — DISCHARGE NOTE PROVIDER - NSDCMRMEDTOKEN_GEN_ALL_CORE_FT
ciprofloxacin 750 mg oral tablet: 1 tab(s) orally 2 times a day x 30 days  Digitek 125 mcg (0.125 mg) oral tablet: 1 tab(s) orally once a day  levothyroxine 75 mcg (0.075 mg) oral tablet: 1 tab(s) orally once a day  losartan 25 mg oral tablet: 1 tab(s) orally once a day  metoprolol succinate 25 mg oral tablet, extended release: 1 tab(s) orally 2 times a day  simvastatin 40 mg oral tablet: 1 tab(s) orally once a day (at bedtime)   acetaminophen 325 mg oral tablet: 2 tab(s) orally every 6 hours As needed Temp greater or equal to 38C (100.4F), Mild Pain (1 - 3)  amoxicillin-clavulanate 875 mg-125 mg oral tablet: 875 milligram(s) orally 2 times a day  Digitek 125 mcg (0.125 mg) oral tablet: 1 tab(s) orally once a day  levothyroxine 75 mcg (0.075 mg) oral tablet: 1 tab(s) orally once a day  losartan 25 mg oral tablet: 1 tab(s) orally once a day  metoprolol succinate 25 mg oral tablet, extended release: 1 tab(s) orally 2 times a day  simvastatin 40 mg oral tablet: 1 tab(s) orally once a day (at bedtime)   acetaminophen 325 mg oral tablet: 2 tab(s) orally every 6 hours As needed Temp greater or equal to 38C (100.4F), Mild Pain (1 - 3)  amoxicillin-clavulanate 875 mg-125 mg oral tablet: 875 milligram(s) orally 2 times a day  Digitek 125 mcg (0.125 mg) oral tablet: 1 tab(s) orally once a day  levothyroxine 75 mcg (0.075 mg) oral tablet: 1 tab(s) orally once a day  losartan 25 mg oral tablet: 1 tab(s) orally once a day  metoprolol succinate 25 mg oral tablet, extended release: 1 tab(s) orally 2 times a day  outpatient physical therapy: 1  simvastatin 40 mg oral tablet: 1 tab(s) orally once a day (at bedtime)

## 2023-12-09 NOTE — PHYSICAL THERAPY INITIAL EVALUATION ADULT - GENERAL OBSERVATIONS, REHAB EVAL
Pt rec'd supine in bed with bilat LEs elevated.  Purwick removed by patient prior to PT session. no lines attached otherwise. N/A

## 2023-12-09 NOTE — DISCHARGE NOTE PROVIDER - CARE PROVIDER_API CALL
Justin Montes  Podiatric Medicine and Surgery  [address] [place] [id number]  Phone: [phone]  Fax: [phone]  Follow Up Time: 1 week   Justin Montes  Podiatric Medicine and Surgery  [address] [place] [id number]  Phone: [phone]  Fax: [phone]  Follow Up Time: 1 week

## 2023-12-09 NOTE — DISCHARGE NOTE PROVIDER - NSDCCPCAREPLAN_GEN_ALL_CORE_FT
PRINCIPAL DISCHARGE DIAGNOSIS  Diagnosis: Osteomyelitis  Assessment and Plan of Treatment: Treated for acute on chonic osteomyelitis with IV antibiotics while inpatient. You were taken to the operating room on dec 8 for a partial 2nd ray resection. Cultures with clean margins till now, you can complete oral antibiotics two times a day as instructed until 12/15. Follow up with podiatrist as instructed, you have a penrose drain in place, leave in until follow up      SECONDARY DISCHARGE DIAGNOSES  Diagnosis: HLD (hyperlipidemia)  Assessment and Plan of Treatment: continue your regular medications    Diagnosis: HTN (hypertension)  Assessment and Plan of Treatment: continue with your usual medications

## 2023-12-09 NOTE — PHYSICAL THERAPY INITIAL EVALUATION ADULT - PERTINENT HX OF CURRENT PROBLEM, REHAB EVAL
pt is a 83 year-old female with history of HTN, HLD, chronic right plantar foot ulcers/OM for 2 years (on and off antibiotics) admitted with acute on chronic osteomyelitis of the right foot wound for surgical debridement and IV antibiotics.  XRay Left foot: No subcutaneous tracking gas or discrete cortical erosions to suggest acute osteomyelitis.  Right foot: Soft tissue defect at the plantar aspect of the foot adjacent to the MTP joints on lateral view, with cortical erosion about the head of the second metatarsal/base of the second proximal phalanx. Findings   may be seen with osteomyelitis. No subcutaneous tracking gas.   CXR: Clear lungs.  [facility] Doppler:  The right DAVID of 1.22 and the left DAVID of 1.24 are normal. Lower extremity arterial disease is not identified.
HPI per initial PT Evaluation, 12/6/23 pt is a 83 year-old female with history of HTN, HLD, chronic right plantar foot ulcers/OM for 2 years (on and off antibiotics) admitted with acute on chronic osteomyelitis of the right foot wound for surgical debridement and IV antibiotics.  XRay Left foot: No subcutaneous tracking gas or discrete cortical erosions to suggest acute osteomyelitis.  Right foot: Soft tissue defect at the plantar aspect of the foot adjacent to the MTP joints on lateral view, with cortical erosion about the head of the second metatarsal/base of the second proximal phalanx. Findings may be seen with osteomyelitis. No subcutaneous tracking gas.   CXR: Clear lungs.  VA Doppler:  The right DAVID of 1.22 and the left DAVID of 1.24 are normal. Lower extremity arterial disease is not identified  Chart reveiwed, events noted. ** Pt now s/p R foot Partial Second Ray Resection, flexor tenotomy of 3rd & 4th digits 12/8/23.

## 2023-12-09 NOTE — DISCHARGE NOTE PROVIDER - HOSPITAL COURSE
83 year-old female with history of HTN, HLD, chronic right plantar foot ulcers/OM for 2 years (on and off antibiotics) admitted with acute on chronic osteomyelitis of the right foot wound for surgical debridement and IV antibiotics       Problem/Plan - 1:  ·  Problem: Acute on chronic osteomyelitis.   ·  Plan: - c/w Vanco and Zosyn pending wound culture  - MRI right foot showed OM of 3rd and 2nd toe  - DAVID/PVR wnl  - s/p 2nd TMA on friday 12/8  - surgical pathology results: ngtd  - ID recs for discharge abx: augmentin for 1 week     Problem/Plan - 2:  ·  Problem: HTN (hypertension).   ·  Plan: - continue Losartan and Metoprolol.     Problem/Plan - 3:  ·  Problem: HLD (hyperlipidemia).   ·  Plan: - continue statin.     Problem/Plan - 4:  ·  Problem: Hypothyroid.   ·  Plan: - continue Levothyroxine.     Problem/Plan - 5:  ·  Problem: Prophylactic measure.   ·  Plan: Lovenox 40mg sc daily.    pt cleared for discharge.

## 2023-12-09 NOTE — PROGRESS NOTE ADULT - PROBLEM SELECTOR PLAN 1
- c/w Vanco and Zosyn pending wound culture  - MRI right foot showed OM of 3rd and 2nd toe  - DAVID/PVR wnl  - s/p 2nd TMA on friday 12/8  - f/u surgical pathology recs - c/w Vanco and Zosyn pending wound culture  - MRI right foot showed OM of 3rd and 2nd toe  - DAVID/PVR wnl  - s/p 2nd TMA on friday 12/8  - f/u surgical pathology results

## 2023-12-09 NOTE — PHYSICAL THERAPY INITIAL EVALUATION ADULT - NSPTDISCHREC_GEN_A_CORE
Home PT
pt requires no skilled PT at this time. pt is functionally independent, BRIAN Gonzales aware./No skilled PT needs

## 2023-12-09 NOTE — PROGRESS NOTE ADULT - SUBJECTIVE AND OBJECTIVE BOX
SUBJECTIVE / OVERNIGHT EVENTS:  Today is hospital day 3d. There are no new issues or overnight events.   Denies any headache, lightheadedness, vertigo, shortness of breathe, cough, chest pain, palpitations, tachycardia, abdominal pain, nausea, vomiting, diarrhea or constipation currently    HPI:  83 year-old female with history of HTN, HLD, chronic right plantar foot ulcers/OM for 2 years (on and off antibiotics) sent in by Podiatrist with concern for acute on chronic osteomyelitis of the right foot.  Patient has been on multiple antibiotics, most recently Doxy, then Cipro 500mg for 30 days (completed 12/1), wound check at [place] [facility] and received Vanco/Zosyn on 12/1, discharged same day with Cipro 750mg for additional 30 days without improvement, came to ED for possible surgical debridement and IV antibiotics.  She denies any fevers, chills, nausea, vomiting, worsening pain/redness, or difficulty ambulation. (06 Dec 2023 00:08)    MEDICATIONS  (STANDING):  chlorhexidine 2% Cloths 1 Application(s) Topical <User Schedule>  digoxin     Tablet 125 MICROGram(s) Oral daily  enoxaparin Injectable 40 milliGRAM(s) SubCutaneous every 24 hours  levothyroxine 75 MICROGram(s) Oral daily  losartan 25 milliGRAM(s) Oral daily  metoprolol succinate ER 25 milliGRAM(s) Oral two times a day  piperacillin/tazobactam IVPB.. 3.375 Gram(s) IV Intermittent every 8 hours  simvastatin 40 milliGRAM(s) Oral at bedtime  vancomycin  IVPB 750 milliGRAM(s) IV Intermittent every 12 hours    MEDICATIONS  (PRN):  acetaminophen     Tablet .. 650 milliGRAM(s) Oral every 6 hours PRN Temp greater or equal to 38C (100.4F), Mild Pain (1 - 3)  oxycodone    5 mG/acetaminophen 325 mG 1 Tablet(s) Oral every 4 hours PRN Moderate Pain (4 - 6)    HOME MEDICATIONS:  Digitek 125 mcg (0.125 mg) oral tablet: 1 tab(s) orally once a day (06 Dec 2023 04:52)  levothyroxine 75 mcg (0.075 mg) oral tablet: 1 tab(s) orally once a day (06 Dec 2023 04:52)  losartan 25 mg oral tablet: 1 tab(s) orally once a day (06 Dec 2023 04:52)  metoprolol succinate 25 mg oral tablet, extended release: 1 tab(s) orally 2 times a day (06 Dec 2023 04:52)  simvastatin 40 mg oral tablet: 1 tab(s) orally once a day (at bedtime) (06 Dec 2023 04:52)    PHYSICAL EXAM:  Vital Signs Last 24 Hrs  T(C): 36.9 (09 Dec 2023 13:08), Max: 37.2 (08 Dec 2023 17:23)  T(F): 98.4 (09 Dec 2023 13:08), Max: 98.4 (08 Dec 2023 17:01)  HR: 55 (09 Dec 2023 13:08) (55 - 74)  BP: 146/75 (09 Dec 2023 13:08) (135/66 - 175/85)  BP(mean): 112 (08 Dec 2023 22:00) (94 - 112)  RR: 18 (09 Dec 2023 13:08) (14 - 18)  SpO2: 100% (09 Dec 2023 13:08) (96% - 100%)    Parameters below as of 09 Dec 2023 13:08  Patient On (Oxygen Delivery Method): room air      I&O's Summary    08 Dec 2023 07:01  -  09 Dec 2023 07:00  --------------------------------------------------------  IN: 150 mL / OUT: 0 mL / NET: 150 mL      CONSTITUTIONAL: Well-groomed, in no apparent distress  EYES: No conjunctival or scleral injection, non-icteric  ENMT: No external nasal lesions; Normal outer ears  NECK: Supple; Trachea midline  RESPIRATORY: Normal respiratory effort; lungs are clear to auscultation bilaterally without wheeze/rhonchi/rales  CARDIOVASCULAR: Regular rate and rhythm, normal S1 and S2, no murmur/rub/gallop; No lower extremity edema  GASTROINTESTINAL: Non-distended; No palpable masses; No tenderness; No rebound/guarding  MUSCULOSKELETAL:  Normal gait;  NEUROLOGY: A+O to person, place, and time; no gross motor deficits   PSYCHIATRY: Mood and Affect appropriate    LABS:                        13.6   4.96  )-----------( 197      ( 09 Dec 2023 07:09 )             41.3     12-09    135  |  100  |  9   ----------------------------<  65<L>  3.7   |  20<L>  |  0.72    Ca    9.1      09 Dec 2023 07:09      PT/INR - ( 08 Dec 2023 03:47 )   PT: 12.0 sec;   INR: 1.09 ratio         PTT - ( 08 Dec 2023 03:47 )  PTT:41.0 sec      Urinalysis Basic - ( 09 Dec 2023 07:09 )    Color: x / Appearance: x / SG: x / pH: x  Gluc: 65 mg/dL / Ketone: x  / Bili: x / Urobili: x   Blood: x / Protein: x / Nitrite: x   Leuk Esterase: x / RBC: x / WBC x   Sq Epi: x / Non Sq Epi: x / Bacteria: x        Culture - Tissue with Gram Stain (collected 08 Dec 2023 22:21)  Source: Bone 2nd metatarsal right  Gram Stain (09 Dec 2023 02:42):    No polymorphonuclear cells seen per low power field    No organisms seen per oil power field          RADIOLOGY & ADDITIONAL TESTS:  EKG  12 Lead ECG:   Ventricular Rate 56 BPM    Atrial Rate 56 BPM    P-R Interval 194 ms    QRS Duration 128 ms    Q-T Interval 466 ms    QTC Calculation(Bazett) 449 ms    P Axis 82 degrees    R Axis -24 degrees    T Axis 104 degrees    Diagnosis Line SINUS BRADYCARDIA  LEFT BUNDLE BRANCH BLOCK  ABNORMAL ECG  WHEN COMPARED WITH ECG OF 13-JUL-2009 08:59,  SIGNIFICANT CHANGES HAVE OCCURRED  Confirmed by MD ALEX, ARMANDO ([id number]) on 12/6/2023 3:28:34 PM (12-05-23 @ 22:32)     SUBJECTIVE / OVERNIGHT EVENTS:  Today is hospital day 3d. There are no new issues or overnight events.   Denies any headache, lightheadedness, vertigo, shortness of breathe, cough, chest pain, palpitations, tachycardia, abdominal pain, nausea, vomiting, diarrhea or constipation currently    HPI:  83 year-old female with history of HTN, HLD, chronic right plantar foot ulcers/OM for 2 years (on and off antibiotics) sent in by Podiatrist with concern for acute on chronic osteomyelitis of the right foot.  Patient has been on multiple antibiotics, most recently Doxy, then Cipro 500mg for 30 days (completed 12/1), wound check at [place] ED and received Vanco/Zosyn on 12/1, discharged same day with Cipro 750mg for additional 30 days without improvement, came to ED for possible surgical debridement and IV antibiotics.  She denies any fevers, chills, nausea, vomiting, worsening pain/redness, or difficulty ambulation. (06 Dec 2023 00:08)    MEDICATIONS  (STANDING):  chlorhexidine 2% Cloths 1 Application(s) Topical <User Schedule>  digoxin     Tablet 125 MICROGram(s) Oral daily  enoxaparin Injectable 40 milliGRAM(s) SubCutaneous every 24 hours  levothyroxine 75 MICROGram(s) Oral daily  losartan 25 milliGRAM(s) Oral daily  metoprolol succinate ER 25 milliGRAM(s) Oral two times a day  piperacillin/tazobactam IVPB.. 3.375 Gram(s) IV Intermittent every 8 hours  simvastatin 40 milliGRAM(s) Oral at bedtime  vancomycin  IVPB 750 milliGRAM(s) IV Intermittent every 12 hours    MEDICATIONS  (PRN):  acetaminophen     Tablet .. 650 milliGRAM(s) Oral every 6 hours PRN Temp greater or equal to 38C (100.4F), Mild Pain (1 - 3)  oxycodone    5 mG/acetaminophen 325 mG 1 Tablet(s) Oral every 4 hours PRN Moderate Pain (4 - 6)    HOME MEDICATIONS:  Digitek 125 mcg (0.125 mg) oral tablet: 1 tab(s) orally once a day (06 Dec 2023 04:52)  levothyroxine 75 mcg (0.075 mg) oral tablet: 1 tab(s) orally once a day (06 Dec 2023 04:52)  losartan 25 mg oral tablet: 1 tab(s) orally once a day (06 Dec 2023 04:52)  metoprolol succinate 25 mg oral tablet, extended release: 1 tab(s) orally 2 times a day (06 Dec 2023 04:52)  simvastatin 40 mg oral tablet: 1 tab(s) orally once a day (at bedtime) (06 Dec 2023 04:52)    PHYSICAL EXAM:  Vital Signs Last 24 Hrs  T(C): 36.9 (09 Dec 2023 13:08), Max: 37.2 (08 Dec 2023 17:23)  T(F): 98.4 (09 Dec 2023 13:08), Max: 98.4 (08 Dec 2023 17:01)  HR: 55 (09 Dec 2023 13:08) (55 - 74)  BP: 146/75 (09 Dec 2023 13:08) (135/66 - 175/85)  BP(mean): 112 (08 Dec 2023 22:00) (94 - 112)  RR: 18 (09 Dec 2023 13:08) (14 - 18)  SpO2: 100% (09 Dec 2023 13:08) (96% - 100%)    Parameters below as of 09 Dec 2023 13:08  Patient On (Oxygen Delivery Method): room air      I&O's Summary    08 Dec 2023 07:01  -  09 Dec 2023 07:00  --------------------------------------------------------  IN: 150 mL / OUT: 0 mL / NET: 150 mL      CONSTITUTIONAL: Well-groomed, in no apparent distress  EYES: No conjunctival or scleral injection, non-icteric  ENMT: No external nasal lesions; Normal outer ears  NECK: Supple; Trachea midline  RESPIRATORY: Normal respiratory effort; lungs are clear to auscultation bilaterally without wheeze/rhonchi/rales  CARDIOVASCULAR: Regular rate and rhythm, normal S1 and S2, no murmur/rub/gallop; No lower extremity edema  GASTROINTESTINAL: Non-distended; No palpable masses; No tenderness; No rebound/guarding  MUSCULOSKELETAL:  Normal gait;  NEUROLOGY: A+O to person, place, and time; no gross motor deficits   PSYCHIATRY: Mood and Affect appropriate    LABS:                        13.6   4.96  )-----------( 197      ( 09 Dec 2023 07:09 )             41.3     12-09    135  |  100  |  9   ----------------------------<  65<L>  3.7   |  20<L>  |  0.72    Ca    9.1      09 Dec 2023 07:09      PT/INR - ( 08 Dec 2023 03:47 )   PT: 12.0 sec;   INR: 1.09 ratio         PTT - ( 08 Dec 2023 03:47 )  PTT:41.0 sec      Urinalysis Basic - ( 09 Dec 2023 07:09 )    Color: x / Appearance: x / SG: x / pH: x  Gluc: 65 mg/dL / Ketone: x  / Bili: x / Urobili: x   Blood: x / Protein: x / Nitrite: x   Leuk Esterase: x / RBC: x / WBC x   Sq Epi: x / Non Sq Epi: x / Bacteria: x        Culture - Tissue with Gram Stain (collected 08 Dec 2023 22:21)  Source: Bone 2nd metatarsal right  Gram Stain (09 Dec 2023 02:42):    No polymorphonuclear cells seen per low power field    No organisms seen per oil power field          RADIOLOGY & ADDITIONAL TESTS:  EKG  12 Lead ECG:   Ventricular Rate 56 BPM    Atrial Rate 56 BPM    P-R Interval 194 ms    QRS Duration 128 ms    Q-T Interval 466 ms    QTC Calculation(Bazett) 449 ms    P Axis 82 degrees    R Axis -24 degrees    T Axis 104 degrees    Diagnosis Line SINUS BRADYCARDIA  LEFT BUNDLE BRANCH BLOCK  ABNORMAL ECG  WHEN COMPARED WITH ECG OF 13-JUL-2009 08:59,  SIGNIFICANT CHANGES HAVE OCCURRED  Confirmed by MD ALEX, ARMANDO ([id number]) on 12/6/2023 3:28:34 PM (12-05-23 @ 22:32)

## 2023-12-09 NOTE — PHYSICAL THERAPY INITIAL EVALUATION ADULT - ADDITIONAL COMMENTS
PT lives alone in a 3rd floor walk up apartment, with 3 flights of stairs to enter with single HR.  pt does not own any DME or Assistive devices, and reports being fairly physical active prior to admission.
pt lives in an apartment alone, 3 flights to enter with HR. pt independent with all ADLs and ambulates without AD. pt owns cane, rolling walker and grab bars in shower.

## 2023-12-09 NOTE — DISCHARGE NOTE PROVIDER - NSDCFUADDAPPT_GEN_ALL_CORE_FT
Podiatry Discharge Instructions:  Follow up: Please follow up with Dr. Montes within 1 week of discharge from the hospital, please call [phone] for appointment and discuss that you recently were seen in the hospital.  Wound Care: Please leave your dressing clean dry intact until your follow up appointment   Weight bearing: Please weight bear as tolerated to the right heel in a surgical shoe.  Antibiotics: Please continue as instructed. Podiatry Discharge Instructions:  Follow up: Please follow up with Dr. Montes within 1 week of discharge from the hospital, please call [phone] for appointment and discuss that you recently were seen in the hospital.  Wound Care: Please leave your dressing clean dry intact until your follow up appointment   Weight bearing: Please weight bear as tolerated to the right heel in a surgical shoe.  Antibiotics: Please continue as instructed. Podiatry Discharge Instructions:  Follow up: Please follow up with Dr. Montes within 1 week of discharge from the hospital, please call [phone]  for appointment and discuss that you recently were seen in the hospital.  Wound Care: Please leave your dressing clean dry intact until your follow up appointment   Weight bearing: Please weight bear as tolerated to the right heel in a surgical shoe.  Antibiotics: Please continue as instructed. Podiatry Discharge Instructions:  Follow up: Please follow up with Dr. Montes within 1 week of discharge from the hospital, please call [phone]  for appointment and discuss that you recently were seen in the hospital.  Wound Care: Please leave your dressing clean dry intact until your follow up appointment   Weight bearing: Please weight bear as tolerated to the right heel in a surgical shoe.  Antibiotics: Please continue as instructed.

## 2023-12-09 NOTE — PHYSICAL THERAPY INITIAL EVALUATION ADULT - GAIT TRAINING, PT EVAL
GOAL: Pt will be able to ambulate 250 feet with independence with appropriate assistive device in 1-2 weeks.

## 2023-12-09 NOTE — PHYSICAL THERAPY INITIAL EVALUATION ADULT - FOLLOWS COMMANDS/ANSWERS QUESTIONS, REHAB EVAL
100% of the time/able to follow multistep instructions
100% of the time/unable to follow multi-step instructions

## 2023-12-10 LAB
ANION GAP SERPL CALC-SCNC: 11 MMOL/L — SIGNIFICANT CHANGE UP (ref 5–17)
ANION GAP SERPL CALC-SCNC: 11 MMOL/L — SIGNIFICANT CHANGE UP (ref 5–17)
BUN SERPL-MCNC: 12 MG/DL — SIGNIFICANT CHANGE UP (ref 7–23)
BUN SERPL-MCNC: 12 MG/DL — SIGNIFICANT CHANGE UP (ref 7–23)
CALCIUM SERPL-MCNC: 9.2 MG/DL — SIGNIFICANT CHANGE UP (ref 8.4–10.5)
CALCIUM SERPL-MCNC: 9.2 MG/DL — SIGNIFICANT CHANGE UP (ref 8.4–10.5)
CHLORIDE SERPL-SCNC: 97 MMOL/L — SIGNIFICANT CHANGE UP (ref 96–108)
CHLORIDE SERPL-SCNC: 97 MMOL/L — SIGNIFICANT CHANGE UP (ref 96–108)
CO2 SERPL-SCNC: 24 MMOL/L — SIGNIFICANT CHANGE UP (ref 22–31)
CO2 SERPL-SCNC: 24 MMOL/L — SIGNIFICANT CHANGE UP (ref 22–31)
CREAT SERPL-MCNC: 0.83 MG/DL — SIGNIFICANT CHANGE UP (ref 0.5–1.3)
CREAT SERPL-MCNC: 0.83 MG/DL — SIGNIFICANT CHANGE UP (ref 0.5–1.3)
EGFR: 69 ML/MIN/1.73M2 — SIGNIFICANT CHANGE UP
EGFR: 69 ML/MIN/1.73M2 — SIGNIFICANT CHANGE UP
GLUCOSE SERPL-MCNC: 85 MG/DL — SIGNIFICANT CHANGE UP (ref 70–99)
GLUCOSE SERPL-MCNC: 85 MG/DL — SIGNIFICANT CHANGE UP (ref 70–99)
POTASSIUM SERPL-MCNC: 3.7 MMOL/L — SIGNIFICANT CHANGE UP (ref 3.5–5.3)
POTASSIUM SERPL-MCNC: 3.7 MMOL/L — SIGNIFICANT CHANGE UP (ref 3.5–5.3)
POTASSIUM SERPL-SCNC: 3.7 MMOL/L — SIGNIFICANT CHANGE UP (ref 3.5–5.3)
POTASSIUM SERPL-SCNC: 3.7 MMOL/L — SIGNIFICANT CHANGE UP (ref 3.5–5.3)
SODIUM SERPL-SCNC: 132 MMOL/L — LOW (ref 135–145)
SODIUM SERPL-SCNC: 132 MMOL/L — LOW (ref 135–145)

## 2023-12-10 PROCEDURE — 99222 1ST HOSP IP/OBS MODERATE 55: CPT

## 2023-12-10 PROCEDURE — 99232 SBSQ HOSP IP/OBS MODERATE 35: CPT

## 2023-12-10 RX ORDER — MEROPENEM 1 G/30ML
1000 INJECTION INTRAVENOUS EVERY 12 HOURS
Refills: 0 | Status: DISCONTINUED | OUTPATIENT
Start: 2023-12-10 | End: 2023-12-11

## 2023-12-10 RX ADMIN — Medication 250 MILLIGRAM(S): at 14:11

## 2023-12-10 RX ADMIN — Medication 25 MILLIGRAM(S): at 05:31

## 2023-12-10 RX ADMIN — PIPERACILLIN AND TAZOBACTAM 25 GRAM(S): 4; .5 INJECTION, POWDER, LYOPHILIZED, FOR SOLUTION INTRAVENOUS at 12:06

## 2023-12-10 RX ADMIN — CHLORHEXIDINE GLUCONATE 1 APPLICATION(S): 213 SOLUTION TOPICAL at 05:31

## 2023-12-10 RX ADMIN — Medication 250 MILLIGRAM(S): at 01:21

## 2023-12-10 RX ADMIN — MEROPENEM 100 MILLIGRAM(S): 1 INJECTION INTRAVENOUS at 17:31

## 2023-12-10 RX ADMIN — ENOXAPARIN SODIUM 40 MILLIGRAM(S): 100 INJECTION SUBCUTANEOUS at 22:34

## 2023-12-10 RX ADMIN — PIPERACILLIN AND TAZOBACTAM 25 GRAM(S): 4; .5 INJECTION, POWDER, LYOPHILIZED, FOR SOLUTION INTRAVENOUS at 03:09

## 2023-12-10 RX ADMIN — SIMVASTATIN 40 MILLIGRAM(S): 20 TABLET, FILM COATED ORAL at 21:25

## 2023-12-10 RX ADMIN — LOSARTAN POTASSIUM 25 MILLIGRAM(S): 100 TABLET, FILM COATED ORAL at 05:31

## 2023-12-10 RX ADMIN — Medication 25 MILLIGRAM(S): at 17:31

## 2023-12-10 RX ADMIN — Medication 75 MICROGRAM(S): at 05:31

## 2023-12-10 RX ADMIN — Medication 125 MICROGRAM(S): at 05:32

## 2023-12-10 NOTE — PROGRESS NOTE ADULT - SUBJECTIVE AND OBJECTIVE BOX
SUBJECTIVE / OVERNIGHT EVENTS:  Today is hospital day 4d. There are no new issues or overnight events.   Did not endorse any headache, lightheadedness, vertigo, shortness of breathe, cough, chest pain, palpitations, tachycardia, abdominal pain, nausea, vomiting, diarrhea or constipation currently    HPI:  83 year-old female with history of HTN, HLD, chronic right plantar foot ulcers/OM for 2 years (on and off antibiotics) sent in by Podiatrist with concern for acute on chronic osteomyelitis of the right foot.  Patient has been on multiple antibiotics, most recently Doxy, then Cipro 500mg for 30 days (completed 12/1), wound check at [place] [facility] and received Vanco/Zosyn on 12/1, discharged same day with Cipro 750mg for additional 30 days without improvement, came to ED for possible surgical debridement and IV antibiotics.  She denies any fevers, chills, nausea, vomiting, worsening pain/redness, or difficulty ambulation. (06 Dec 2023 00:08)    MEDICATIONS  (STANDING):  chlorhexidine 2% Cloths 1 Application(s) Topical <User Schedule>  digoxin     Tablet 125 MICROGram(s) Oral daily  enoxaparin Injectable 40 milliGRAM(s) SubCutaneous every 24 hours  levothyroxine 75 MICROGram(s) Oral daily  losartan 25 milliGRAM(s) Oral daily  metoprolol succinate ER 25 milliGRAM(s) Oral two times a day  piperacillin/tazobactam IVPB.. 3.375 Gram(s) IV Intermittent every 8 hours  simvastatin 40 milliGRAM(s) Oral at bedtime  vancomycin  IVPB 750 milliGRAM(s) IV Intermittent every 12 hours    MEDICATIONS  (PRN):  acetaminophen     Tablet .. 650 milliGRAM(s) Oral every 6 hours PRN Temp greater or equal to 38C (100.4F), Mild Pain (1 - 3)  oxycodone    5 mG/acetaminophen 325 mG 1 Tablet(s) Oral every 4 hours PRN Moderate Pain (4 - 6)    HOME MEDICATIONS:  Digitek 125 mcg (0.125 mg) oral tablet: 1 tab(s) orally once a day (06 Dec 2023 04:52)  levothyroxine 75 mcg (0.075 mg) oral tablet: 1 tab(s) orally once a day (06 Dec 2023 04:52)  losartan 25 mg oral tablet: 1 tab(s) orally once a day (06 Dec 2023 04:52)  metoprolol succinate 25 mg oral tablet, extended release: 1 tab(s) orally 2 times a day (06 Dec 2023 04:52)  simvastatin 40 mg oral tablet: 1 tab(s) orally once a day (at bedtime) (06 Dec 2023 04:52)    PHYSICAL EXAM:  I&O's Summary    09 Dec 2023 07:01  -  10 Dec 2023 07:00  --------------------------------------------------------  IN: 480 mL / OUT: 350 mL / NET: 130 mL    10 Dec 2023 07:01  -  10 Dec 2023 11:03  --------------------------------------------------------  IN: 0 mL / OUT: 500 mL / NET: -500 mL      Vital Signs Last 24 Hrs  T(C): 36.6 (10 Dec 2023 09:19), Max: 37.2 (09 Dec 2023 20:15)  T(F): 97.9 (10 Dec 2023 09:19), Max: 98.9 (09 Dec 2023 20:15)  HR: 60 (10 Dec 2023 09:19) (55 - 81)  BP: 159/80 (10 Dec 2023 09:21) (143/73 - 171/84)  BP(mean): --  RR: 18 (10 Dec 2023 09:19) (18 - 18)  SpO2: 100% (10 Dec 2023 09:19) (96% - 100%)    Parameters below as of 10 Dec 2023 09:19  Patient On (Oxygen Delivery Method): room air      CONSTITUTIONAL: Well-groomed, in no apparent distress  EYES: No conjunctival or scleral injection, non-icteric  ENMT: No external nasal lesions; Normal outer ears  NECK: Supple; Trachea midline  RESPIRATORY: Normal respiratory effort; Decreased breathe sounds bilaterally without wheeze/rhonchi/rales;  CARDIOVASCULAR: Regular rate and rhythm, normal S1 and S2;  GASTROINTESTINAL: Non-distended; No palpable masses; No tenderness; No rebound/guarding  MUSCULOSKELETAL:  Normal gait;  EXTREMITIES:  No lower extremity edema;  NEUROLOGY: A+O to person, place, and time; Responds to commands appropriately;  PSYCHIATRY: Mood and Affect appropriate    LABS:                        13.6   4.96  )-----------( 197      ( 09 Dec 2023 07:09 )             41.3     12-10    132<L>  |  97  |  12  ----------------------------<  85  3.7   |  24  |  0.83    Ca    9.2      10 Dec 2023 07:28            Urinalysis Basic - ( 10 Dec 2023 07:28 )    Color: x / Appearance: x / SG: x / pH: x  Gluc: 85 mg/dL / Ketone: x  / Bili: x / Urobili: x   Blood: x / Protein: x / Nitrite: x   Leuk Esterase: x / RBC: x / WBC x   Sq Epi: x / Non Sq Epi: x / Bacteria: x        Culture - Tissue with Gram Stain (collected 08 Dec 2023 22:21)  Source: Bone 2nd metatarsal right  Gram Stain (09 Dec 2023 02:42):    No polymorphonuclear cells seen per low power field    No organisms seen per oil power field  Preliminary Report (09 Dec 2023 20:27):    No growth    Culture - Surgical Swab (collected 08 Dec 2023 22:21)  Source: .Surgical Swab right foot deep  Preliminary Report (10 Dec 2023 10:31):    No growth to date.          RADIOLOGY & ADDITIONAL TESTS:  EKG  12 Lead ECG:   Ventricular Rate 56 BPM    Atrial Rate 56 BPM    P-R Interval 194 ms    QRS Duration 128 ms    Q-T Interval 466 ms    QTC Calculation(Bazett) 449 ms    P Axis 82 degrees    R Axis -24 degrees    T Axis 104 degrees    Diagnosis Line SINUS BRADYCARDIA  LEFT BUNDLE BRANCH BLOCK  ABNORMAL ECG  WHEN COMPARED WITH ECG OF 13-JUL-2009 08:59,  SIGNIFICANT CHANGES HAVE OCCURRED  Confirmed by MD ALEX, ARMANDO ([id number]) on 12/6/2023 3:28:34 PM (12-05-23 @ 22:32)     SUBJECTIVE / OVERNIGHT EVENTS:  Today is hospital day 4d. There are no new issues or overnight events.   Did not endorse any headache, lightheadedness, vertigo, shortness of breathe, cough, chest pain, palpitations, tachycardia, abdominal pain, nausea, vomiting, diarrhea or constipation currently    HPI:  83 year-old female with history of HTN, HLD, chronic right plantar foot ulcers/OM for 2 years (on and off antibiotics) sent in by Podiatrist with concern for acute on chronic osteomyelitis of the right foot.  Patient has been on multiple antibiotics, most recently Doxy, then Cipro 500mg for 30 days (completed 12/1), wound check at [place] [facility] and received Vanco/Zosyn on 12/1, discharged same day with Cipro 750mg for additional 30 days without improvement, came to ED for possible surgical debridement and IV antibiotics.  She denies any fevers, chills, nausea, vomiting, worsening pain/redness, or difficulty ambulation. (06 Dec 2023 00:08)    MEDICATIONS  (STANDING):  chlorhexidine 2% Cloths 1 Application(s) Topical <User Schedule>  digoxin     Tablet 125 MICROGram(s) Oral daily  enoxaparin Injectable 40 milliGRAM(s) SubCutaneous every 24 hours  levothyroxine 75 MICROGram(s) Oral daily  losartan 25 milliGRAM(s) Oral daily  metoprolol succinate ER 25 milliGRAM(s) Oral two times a day  piperacillin/tazobactam IVPB.. 3.375 Gram(s) IV Intermittent every 8 hours  simvastatin 40 milliGRAM(s) Oral at bedtime  vancomycin  IVPB 750 milliGRAM(s) IV Intermittent every 12 hours    MEDICATIONS  (PRN):  acetaminophen     Tablet .. 650 milliGRAM(s) Oral every 6 hours PRN Temp greater or equal to 38C (100.4F), Mild Pain (1 - 3)  oxycodone    5 mG/acetaminophen 325 mG 1 Tablet(s) Oral every 4 hours PRN Moderate Pain (4 - 6)    HOME MEDICATIONS:  Digitek 125 mcg (0.125 mg) oral tablet: 1 tab(s) orally once a day (06 Dec 2023 04:52)  levothyroxine 75 mcg (0.075 mg) oral tablet: 1 tab(s) orally once a day (06 Dec 2023 04:52)  losartan 25 mg oral tablet: 1 tab(s) orally once a day (06 Dec 2023 04:52)  metoprolol succinate 25 mg oral tablet, extended release: 1 tab(s) orally 2 times a day (06 Dec 2023 04:52)  simvastatin 40 mg oral tablet: 1 tab(s) orally once a day (at bedtime) (06 Dec 2023 04:52)    PHYSICAL EXAM:  I&O's Summary    09 Dec 2023 07:01  -  10 Dec 2023 07:00  --------------------------------------------------------  IN: 480 mL / OUT: 350 mL / NET: 130 mL    10 Dec 2023 07:01  -  10 Dec 2023 11:03  --------------------------------------------------------  IN: 0 mL / OUT: 500 mL / NET: -500 mL      Vital Signs Last 24 Hrs  T(C): 36.6 (10 Dec 2023 09:19), Max: 37.2 (09 Dec 2023 20:15)  T(F): 97.9 (10 Dec 2023 09:19), Max: 98.9 (09 Dec 2023 20:15)  HR: 60 (10 Dec 2023 09:19) (55 - 81)  BP: 159/80 (10 Dec 2023 09:21) (143/73 - 171/84)  BP(mean): --  RR: 18 (10 Dec 2023 09:19) (18 - 18)  SpO2: 100% (10 Dec 2023 09:19) (96% - 100%)    Parameters below as of 10 Dec 2023 09:19  Patient On (Oxygen Delivery Method): room air      CONSTITUTIONAL: Well-groomed, in no apparent distress  EYES: No conjunctival or scleral injection, non-icteric  ENMT: No external nasal lesions; Normal outer ears  NECK: Supple; Trachea midline  RESPIRATORY: Normal respiratory effort; Decreased breathe sounds bilaterally without wheeze/rhonchi/rales;  CARDIOVASCULAR: Regular rate and rhythm, normal S1 and S2;  GASTROINTESTINAL: Non-distended; No palpable masses; No tenderness; No rebound/guarding  MUSCULOSKELETAL:  Normal gait;  EXTREMITIES:  No lower extremity edema;  NEUROLOGY: A+O to person, place, and time; Responds to commands appropriately;  PSYCHIATRY: Mood and Affect appropriate    LABS:                        13.6   4.96  )-----------( 197      ( 09 Dec 2023 07:09 )             41.3     12-10    132<L>  |  97  |  12  ----------------------------<  85  3.7   |  24  |  0.83    Ca    9.2      10 Dec 2023 07:28            Urinalysis Basic - ( 10 Dec 2023 07:28 )    Color: x / Appearance: x / SG: x / pH: x  Gluc: 85 mg/dL / Ketone: x  / Bili: x / Urobili: x   Blood: x / Protein: x / Nitrite: x   Leuk Esterase: x / RBC: x / WBC x   Sq Epi: x / Non Sq Epi: x / Bacteria: x        Culture - Tissue with Gram Stain (collected 08 Dec 2023 22:21)  Source: Bone 2nd metatarsal right  Gram Stain (09 Dec 2023 02:42):    No polymorphonuclear cells seen per low power field    No organisms seen per oil power field  Preliminary Report (09 Dec 2023 20:27):    No growth    Culture - Surgical Swab (collected 08 Dec 2023 22:21)  Source: .Surgical Swab right foot deep  Preliminary Report (10 Dec 2023 10:31):    No growth to date.          RADIOLOGY & ADDITIONAL TESTS:  EKG  12 Lead ECG:   Ventricular Rate 56 BPM    Atrial Rate 56 BPM    P-R Interval 194 ms    QRS Duration 128 ms    Q-T Interval 466 ms    QTC Calculation(Bazett) 449 ms    P Axis 82 degrees    R Axis -24 degrees    T Axis 104 degrees    Diagnosis Line SINUS BRADYCARDIA  LEFT BUNDLE BRANCH BLOCK  ABNORMAL ECG  WHEN COMPARED WITH ECG OF 13-JUL-2009 08:59,  SIGNIFICANT CHANGES HAVE OCCURRED  Confirmed by MD ALEX, ARMANDO ([id number]) on 12/6/2023 3:28:34 PM (12-05-23 @ 22:32)

## 2023-12-10 NOTE — CONSULT NOTE ADULT - ASSESSMENT
84 yo F HTN, HLD, chronic wounds intermittent abx, initially with wound on R foot  No fever, no leukocytosis  Here with RLE wound  MR with tissue ulcer along 2nd MTP joint, joint effusion, OM of second MTP; 3rd metatarsal mild marrow edema osteitis vs OM  12/8 s/p R foot Partial Second Ray Resection, flexor tenotomy of 3rd & 4th digits  Per OR note, low concern for residual infection  No other culture data available  Overall, OM, post operative state  - Continue Vanco 750mg q 12 (monitor levels)  - Zosyn 3.375g q 8  - When DC planning, can send out on PO Augmentin 875mg po q 12 to complete 7 days from OR (as long as OR cultures unchanged, and no objection from podiatry)  - F/U podiatry  - F/U OR cultures/path  - Monitor for alternate signs infection    Vinay Yuan MD  Contact on TEAMS messaging from 9am - 5pm  From 5pm-9am, on weekends, or if no response call [phone] 82 yo F HTN, HLD, chronic wounds intermittent abx, initially with wound on R foot  No fever, no leukocytosis  Here with RLE wound  MR with tissue ulcer along 2nd MTP joint, joint effusion, OM of second MTP; 3rd metatarsal mild marrow edema osteitis vs OM  12/8 s/p R foot Partial Second Ray Resection, flexor tenotomy of 3rd & 4th digits  Per OR note, low concern for residual infection  No other culture data available  Overall, OM, post operative state  - Continue Vanco 750mg q 12 (monitor levels)  - Zosyn 3.375g q 8  - When DC planning, can send out on PO Augmentin 875mg po q 12 to complete 7 days from OR (as long as OR cultures unchanged, and no objection from podiatry)  - F/U podiatry  - F/U OR cultures/path  - Monitor for alternate signs infection    Vinay Yuan MD  Contact on TEAMS messaging from 9am - 5pm  From 5pm-9am, on weekends, or if no response call [phone] 84 yo F HTN, HLD, chronic wounds intermittent abx, initially with wound on R foot  No fever, no leukocytosis  Here with RLE wound  MR with tissue ulcer along 2nd MTP joint, joint effusion, OM of second MTP; 3rd metatarsal mild marrow edema osteitis vs OM  12/8 s/p R foot Partial Second Ray Resection, flexor tenotomy of 3rd & 4th digits  Per OR note, low concern for residual infection  No other culture data available  ESBL noted on outpatient culture--HIE  Overall, OM, post operative state  - Meropenem 1g q 12 while inpatient  - When DC planning, can send out on PO Augmentin 875mg po q 12 to complete 7 days from OR (as long as OR cultures unchanged, and no objection from podiatry)  - F/U podiatry  - F/U OR cultures/path  - Monitor for alternate signs infection    Vinay Yuan MD  Contact on TEAMS messaging from 9am - 5pm  From 5pm-9am, on weekends, or if no response call [phone] 84 yo F HTN, HLD, chronic wounds intermittent abx, initially with wound on R foot  No fever, no leukocytosis  Here with RLE wound  MR with tissue ulcer along 2nd MTP joint, joint effusion, OM of second MTP; 3rd metatarsal mild marrow edema osteitis vs OM  12/8 s/p R foot Partial Second Ray Resection, flexor tenotomy of 3rd & 4th digits  Per OR note, low concern for residual infection  No other culture data available  ESBL noted on outpatient culture--HIE  Overall, OM, post operative state  - Meropenem 1g q 12 while inpatient  - When DC planning, can send out on PO Augmentin 875mg po q 12 to complete 7 days from OR (as long as OR cultures unchanged, and no objection from podiatry)  - F/U podiatry  - F/U OR cultures/path  - Monitor for alternate signs infection    Vinay Yuan MD  Contact on TEAMS messaging from 9am - 5pm  From 5pm-9am, on weekends, or if no response call [phone]

## 2023-12-10 NOTE — PROGRESS NOTE ADULT - PROBLEM SELECTOR PROBLEM 1
Acute on chronic osteomyelitis

## 2023-12-10 NOTE — PROGRESS NOTE ADULT - PROBLEM SELECTOR PLAN 2
- continue Losartan and Metoprolol

## 2023-12-10 NOTE — PROGRESS NOTE ADULT - ASSESSMENT
83 y.o F s/p Right foot partial second ray resection, closed, & percutaneous flexor tenotomies of digits 3 and 4  - Pt seen and evaluated  - Afebrile, no leukocytosis   - s/p Right foot partial second ray resection, closed, & percutaneous flexor tenotomies of digits 3 and 4 sutures intact, penrose in place, skin well coapted, no hematoma expressed, mild josh-wound erythema within normal post-op course, no pus or drainage noted, skin flap appears warm, viable with normal cap refill time.   - Low concern for residual bone infection, bone was hard at level of resection and appeared vitalized intra-op  - Low concern for viability, adequate intro-op bleeding.    - OR culture showing no growth (prelim)  - Pod plan d/c pending clean bone margins x48 hours  - Discussed with attending

## 2023-12-10 NOTE — CONSULT NOTE ADULT - SUBJECTIVE AND OBJECTIVE BOX
"HPI:  83 year-old female with history of HTN, HLD, chronic right plantar foot ulcers/OM for 2 years (on and off antibiotics) sent in by Podiatrist with concern for acute on chronic osteomyelitis of the right foot.  Patient has been on multiple antibiotics, most recently Doxy, then Cipro 500mg for 30 days (completed 12/1), wound check at [place] ED and received Vanco/Zosyn on 12/1, discharged same day with Cipro 750mg for additional 30 days without improvement, came to ED for possible surgical debridement and IV antibiotics.  She denies any fevers, chills, nausea, vomiting, worsening pain/redness, or difficulty ambulation. (06 Dec 2023 00:08)"    Above reviewed. 84 yo F HTN, HLD, chronic wounds intermittent abx, initially with wound on R foot  Here was evaluated for OM foot  Found to have OM on 2nd digit  Now is s/p OR resection of bone  Patient has had multiple prior antibiotic courses  Here, doing well post op  ID called for further eval    PAST MEDICAL & SURGICAL HISTORY:  HTN (hypertension)      Hyperlipidemia      Hypothyroid      Paroxysmal SVT (supraventricular tachycardia)      Thyroid cyst      Back problem  s/p back surgery      S/P thyroid surgery      S/P hernia surgery      S/P rotator cuff surgery    Allergies    No Known Allergies    Intolerances    Levaquin (Stomach Upset; Nausea)    ANTIMICROBIALS:  piperacillin/tazobactam IVPB.. 3.375 every 8 hours  vancomycin  IVPB 750 every 12 hours    OTHER MEDS:  acetaminophen     Tablet .. 650 milliGRAM(s) Oral every 6 hours PRN  chlorhexidine 2% Cloths 1 Application(s) Topical <User Schedule>  digoxin     Tablet 125 MICROGram(s) Oral daily  enoxaparin Injectable 40 milliGRAM(s) SubCutaneous every 24 hours  levothyroxine 75 MICROGram(s) Oral daily  losartan 25 milliGRAM(s) Oral daily  metoprolol succinate ER 25 milliGRAM(s) Oral two times a day  oxycodone    5 mG/acetaminophen 325 mG 1 Tablet(s) Oral every 4 hours PRN  simvastatin 40 milliGRAM(s) Oral at bedtime    SOCIAL HISTORY: No tobacco, no alcohol, no illicit drugs    FAMILY HISTORY:  Family history of early CAD (Father)    Drug Dosing Weight  Height (cm): 177.8 (08 Dec 2023 17:23)  Weight (kg): 65.8 (08 Dec 2023 17:23)  BMI (kg/m2): 20.8 (08 Dec 2023 17:23)  BSA (m2): 1.82 (08 Dec 2023 17:23)    PE:    Vital Signs Last 24 Hrs  T(C): 36.6 (10 Dec 2023 09:19), Max: 37.2 (09 Dec 2023 20:15)  T(F): 97.9 (10 Dec 2023 09:19), Max: 98.9 (09 Dec 2023 20:15)  HR: 60 (10 Dec 2023 09:19) (60 - 81)  BP: 159/80 (10 Dec 2023 09:21) (143/73 - 171/84)  RR: 18 (10 Dec 2023 09:19) (18 - 18)  SpO2: 100% (10 Dec 2023 09:19) (96% - 100%)    Gen: AOx3, NAD, non-toxic, pleasant  CV: S1+S2 normal, nontachycardic  Resp: Clear bilat, no resp distress, no crackles/wheezes  Abd: Soft, nontender, +BS  Ext: Bandaged RLE wound  : No Verduzco  IV/Skin: No thrombophlebitis  Msk: No low back pain, no arthralgias, no joint swelling  Neuro: No sensory deficits, no motor deficits    LABS:                        13.6   4.96  )-----------( 197      ( 09 Dec 2023 07:09 )             41.3     12-10    132<L>  |  97  |  12  ----------------------------<  85  3.7   |  24  |  0.83    Ca    9.2      10 Dec 2023 07:28    Urinalysis Basic - ( 10 Dec 2023 07:28 )    Color: x / Appearance: x / SG: x / pH: x  Gluc: 85 mg/dL / Ketone: x  / Bili: x / Urobili: x   Blood: x / Protein: x / Nitrite: x   Leuk Esterase: x / RBC: x / WBC x   Sq Epi: x / Non Sq Epi: x / Bacteria: x    MICROBIOLOGY:  Vancomycin Level, Trough: 10.1 ug/mL (12-09-23 @ 17:24)    .Surgical Swab right foot deep  12-08-23   No growth to date.  --    No polymorphonuclear cells seen per low power field  No organisms seen per oil power field    RADIOLOGY:    12/6    IMPRESSION:    Soft tissue ulcer along the plantar aspect of the second   metatarsophalangeal joint, with sinus tract leading to the second   metatarsophalangeal joint, where there is a small joint effusion with   synovitis. Osseous edema with T1 marrow signal abnormality, as well is   erosive changes surrounding the second metatarsophalangeal joint, most   consistent with septic arthritis with osteomyelitis of the second   metatarsophalangeal joint.    Mild marrow edema within the distal third metatarsal without T1 marrow   signal abnormality, which may be related to reactive osteitis or be   related to early osteomyelitis.    Other findings as above. "HPI:  83 year-old female with history of HTN, HLD, chronic right plantar foot ulcers/OM for 2 years (on and off antibiotics) sent in by Podiatrist with concern for acute on chronic osteomyelitis of the right foot.  Patient has been on multiple antibiotics, most recently Doxy, then Cipro 500mg for 30 days (completed 12/1), wound check at [place] ED and received Vanco/Zosyn on 12/1, discharged same day with Cipro 750mg for additional 30 days without improvement, came to ED for possible surgical debridement and IV antibiotics.  She denies any fevers, chills, nausea, vomiting, worsening pain/redness, or difficulty ambulation. (06 Dec 2023 00:08)"    Above reviewed. 84 yo F HTN, HLD, chronic wounds intermittent abx, initially with wound on R foot  Here was evaluated for OM foot  Found to have OM on 2nd digit  Now is s/p OR resection of bone  Patient has had multiple prior antibiotic courses  Here, doing well post op  ID called for further eval    PAST MEDICAL & SURGICAL HISTORY:  HTN (hypertension)      Hyperlipidemia      Hypothyroid      Paroxysmal SVT (supraventricular tachycardia)      Thyroid cyst      Back problem  s/p back surgery      S/P thyroid surgery      S/P hernia surgery      S/P rotator cuff surgery    Allergies    No Known Allergies    Intolerances    Levaquin (Stomach Upset; Nausea)    ANTIMICROBIALS:  piperacillin/tazobactam IVPB.. 3.375 every 8 hours  vancomycin  IVPB 750 every 12 hours    OTHER MEDS:  acetaminophen     Tablet .. 650 milliGRAM(s) Oral every 6 hours PRN  chlorhexidine 2% Cloths 1 Application(s) Topical <User Schedule>  digoxin     Tablet 125 MICROGram(s) Oral daily  enoxaparin Injectable 40 milliGRAM(s) SubCutaneous every 24 hours  levothyroxine 75 MICROGram(s) Oral daily  losartan 25 milliGRAM(s) Oral daily  metoprolol succinate ER 25 milliGRAM(s) Oral two times a day  oxycodone    5 mG/acetaminophen 325 mG 1 Tablet(s) Oral every 4 hours PRN  simvastatin 40 milliGRAM(s) Oral at bedtime    SOCIAL HISTORY: No tobacco, no alcohol, no illicit drugs    FAMILY HISTORY:  Family history of early CAD (Father)    Drug Dosing Weight  Height (cm): 177.8 (08 Dec 2023 17:23)  Weight (kg): 65.8 (08 Dec 2023 17:23)  BMI (kg/m2): 20.8 (08 Dec 2023 17:23)  BSA (m2): 1.82 (08 Dec 2023 17:23)    PE:    Vital Signs Last 24 Hrs  T(C): 36.6 (10 Dec 2023 09:19), Max: 37.2 (09 Dec 2023 20:15)  T(F): 97.9 (10 Dec 2023 09:19), Max: 98.9 (09 Dec 2023 20:15)  HR: 60 (10 Dec 2023 09:19) (60 - 81)  BP: 159/80 (10 Dec 2023 09:21) (143/73 - 171/84)  RR: 18 (10 Dec 2023 09:19) (18 - 18)  SpO2: 100% (10 Dec 2023 09:19) (96% - 100%)    Gen: AOx3, NAD, non-toxic, pleasant  CV: S1+S2 normal, nontachycardic  Resp: Clear bilat, no resp distress, no crackles/wheezes  Abd: Soft, nontender, +BS  Ext: Bandaged RLE wound  : No Verduzco  IV/Skin: No thrombophlebitis  Msk: No low back pain, no arthralgias, no joint swelling  Neuro: No sensory deficits, no motor deficits    LABS:                        13.6   4.96  )-----------( 197      ( 09 Dec 2023 07:09 )             41.3     12-10    132<L>  |  97  |  12  ----------------------------<  85  3.7   |  24  |  0.83    Ca    9.2      10 Dec 2023 07:28    Urinalysis Basic - ( 10 Dec 2023 07:28 )    Color: x / Appearance: x / SG: x / pH: x  Gluc: 85 mg/dL / Ketone: x  / Bili: x / Urobili: x   Blood: x / Protein: x / Nitrite: x   Leuk Esterase: x / RBC: x / WBC x   Sq Epi: x / Non Sq Epi: x / Bacteria: x    MICROBIOLOGY:  Vancomycin Level, Trough: 10.1 ug/mL (12-09-23 @ 17:24)    .Surgical Swab right foot deep  12-08-23   No growth to date.  --    No polymorphonuclear cells seen per low power field  No organisms seen per oil power field    RADIOLOGY:    12/6    IMPRESSION:    Soft tissue ulcer along the plantar aspect of the second   metatarsophalangeal joint, with sinus tract leading to the second   metatarsophalangeal joint, where there is a small joint effusion with   synovitis. Osseous edema with T1 marrow signal abnormality, as well is   erosive changes surrounding the second metatarsophalangeal joint, most   consistent with septic arthritis with osteomyelitis of the second   metatarsophalangeal joint.    Mild marrow edema within the distal third metatarsal without T1 marrow   signal abnormality, which may be related to reactive osteitis or be   related to early osteomyelitis.    Other findings as above.

## 2023-12-10 NOTE — PROGRESS NOTE ADULT - PROBLEM SELECTOR PLAN 1
- c/w Vanco and Zosyn pending wound culture  - MRI right foot showed OM of 3rd and 2nd toe  - DAVID/PVR wnl  - s/p 2nd TMA on friday 12/8  - f/u surgical pathology results  - f/u ID recs for discharge abx

## 2023-12-10 NOTE — PROGRESS NOTE ADULT - SUBJECTIVE AND OBJECTIVE BOX
Patient is a 83y old  Female who presents with a chief complaint of right foot infection (09 Dec 2023 14:33)       INTERVAL HPI/OVERNIGHT EVENTS:  Patient seen and evaluated at bedside.  Pt is resting comfortable in NAD. Denies N/V/F/C.    Allergies    No Known Allergies    Intolerances    Levaquin (Stomach Upset; Nausea)      Vital Signs Last 24 Hrs  T(C): 36.6 (10 Dec 2023 09:19), Max: 37.2 (09 Dec 2023 20:15)  T(F): 97.9 (10 Dec 2023 09:19), Max: 98.9 (09 Dec 2023 20:15)  HR: 60 (10 Dec 2023 09:19) (55 - 81)  BP: 159/80 (10 Dec 2023 09:21) (143/73 - 171/84)  BP(mean): --  RR: 18 (10 Dec 2023 09:19) (18 - 18)  SpO2: 100% (10 Dec 2023 09:19) (96% - 100%)    Parameters below as of 10 Dec 2023 09:19  Patient On (Oxygen Delivery Method): room air        LABS:                        13.6   4.96  )-----------( 197      ( 09 Dec 2023 07:09 )             41.3     12-10    132<L>  |  97  |  12  ----------------------------<  85  3.7   |  24  |  0.83    Ca    9.2      10 Dec 2023 07:28        Urinalysis Basic - ( 10 Dec 2023 07:28 )    Color: x / Appearance: x / SG: x / pH: x  Gluc: 85 mg/dL / Ketone: x  / Bili: x / Urobili: x   Blood: x / Protein: x / Nitrite: x   Leuk Esterase: x / RBC: x / WBC x   Sq Epi: x / Non Sq Epi: x / Bacteria: x      CAPILLARY BLOOD GLUCOSE          Lower Extremity Physical Exam:  Vascular: DP/PT 2/4, B/L, CFT <3 seconds B/L, Temperature gradient warm to warm, B/L.   Neuro: Epicritic sensation diminished  to the level of digits , B/L.  Musculoskeletal/Ortho: unremarkable   Skin: s/p Right foot partial second ray resection, closed, & percutaneous flexor tenotomies of digits 3 and 4 sutures intact, penrose in place, skin well coapted, no hematoma expressed, mild josh-wound erythema within normal post-op course, no pus or drainage noted, skin flap appears warm, viable with normal cap refill time.     RADIOLOGY & ADDITIONAL TESTS:

## 2023-12-10 NOTE — PROGRESS NOTE ADULT - TIME BILLING
- Ordering, reviewing, and/or interpreting labs, testing, and/or imaging  - Independently obtaining a review of systems and performing a physical exam  - Reviewing prior records and where necessary, outpatient records  - Counselling and educating patient and/or family regarding interpretation of aforementioned items and plan of care
- Ordering, reviewing, and/or interpreting labs, testing, and/or imaging  - Independently obtaining a review of systems and performing a physical exam  - Reviewing prior records and where necessary, outpatient records  - Counselling and educating patient and/or family regarding interpretation of aforementioned items and plan of care
21 minutes spent on Goals of Care conversation and additional 58 minutes spent on patient care for following:  - Ordering, reviewing, and/or interpreting labs, testing, and/or imaging  - Independently obtaining a review of systems and performing a physical exam  - Reviewing prior records and where necessary, outpatient records  - Counselling and educating patient and/or family regarding interpretation of aforementioned items and plan of care
- Ordering, reviewing, and/or interpreting labs, testing, and/or imaging  - Independently obtaining a review of systems and performing a physical exam  - Reviewing prior records and where necessary, outpatient records  - Counselling and educating patient and/or family regarding interpretation of aforementioned items and plan of care

## 2023-12-11 ENCOUNTER — TRANSCRIPTION ENCOUNTER (OUTPATIENT)
Age: 83
End: 2023-12-11

## 2023-12-11 VITALS
TEMPERATURE: 98 F | SYSTOLIC BLOOD PRESSURE: 117 MMHG | RESPIRATION RATE: 18 BRPM | DIASTOLIC BLOOD PRESSURE: 71 MMHG | HEART RATE: 59 BPM | OXYGEN SATURATION: 98 %

## 2023-12-11 PROCEDURE — 73630 X-RAY EXAM OF FOOT: CPT

## 2023-12-11 PROCEDURE — 87641 MR-STAPH DNA AMP PROBE: CPT

## 2023-12-11 PROCEDURE — 82947 ASSAY GLUCOSE BLOOD QUANT: CPT

## 2023-12-11 PROCEDURE — A9585: CPT

## 2023-12-11 PROCEDURE — 84295 ASSAY OF SERUM SODIUM: CPT

## 2023-12-11 PROCEDURE — 80202 ASSAY OF VANCOMYCIN: CPT

## 2023-12-11 PROCEDURE — 85027 COMPLETE CBC AUTOMATED: CPT

## 2023-12-11 PROCEDURE — 88311 DECALCIFY TISSUE: CPT

## 2023-12-11 PROCEDURE — 86900 BLOOD TYPING SEROLOGIC ABO: CPT

## 2023-12-11 PROCEDURE — 82435 ASSAY OF BLOOD CHLORIDE: CPT

## 2023-12-11 PROCEDURE — 86901 BLOOD TYPING SEROLOGIC RH(D): CPT

## 2023-12-11 PROCEDURE — 85025 COMPLETE CBC W/AUTO DIFF WBC: CPT

## 2023-12-11 PROCEDURE — 71045 X-RAY EXAM CHEST 1 VIEW: CPT

## 2023-12-11 PROCEDURE — 85018 HEMOGLOBIN: CPT

## 2023-12-11 PROCEDURE — 82803 BLOOD GASES ANY COMBINATION: CPT

## 2023-12-11 PROCEDURE — 96374 THER/PROPH/DIAG INJ IV PUSH: CPT

## 2023-12-11 PROCEDURE — 85730 THROMBOPLASTIN TIME PARTIAL: CPT

## 2023-12-11 PROCEDURE — 99285 EMERGENCY DEPT VISIT HI MDM: CPT | Mod: 25

## 2023-12-11 PROCEDURE — 87075 CULTR BACTERIA EXCEPT BLOOD: CPT

## 2023-12-11 PROCEDURE — 36415 COLL VENOUS BLD VENIPUNCTURE: CPT

## 2023-12-11 PROCEDURE — 85652 RBC SED RATE AUTOMATED: CPT

## 2023-12-11 PROCEDURE — 80048 BASIC METABOLIC PNL TOTAL CA: CPT

## 2023-12-11 PROCEDURE — 88305 TISSUE EXAM BY PATHOLOGIST: CPT

## 2023-12-11 PROCEDURE — 87077 CULTURE AEROBIC IDENTIFY: CPT

## 2023-12-11 PROCEDURE — 99232 SBSQ HOSP IP/OBS MODERATE 35: CPT

## 2023-12-11 PROCEDURE — 84132 ASSAY OF SERUM POTASSIUM: CPT

## 2023-12-11 PROCEDURE — 87640 STAPH A DNA AMP PROBE: CPT

## 2023-12-11 PROCEDURE — 97110 THERAPEUTIC EXERCISES: CPT

## 2023-12-11 PROCEDURE — 93923 UPR/LXTR ART STDY 3+ LVLS: CPT

## 2023-12-11 PROCEDURE — 97164 PT RE-EVAL EST PLAN CARE: CPT

## 2023-12-11 PROCEDURE — 82330 ASSAY OF CALCIUM: CPT

## 2023-12-11 PROCEDURE — 85014 HEMATOCRIT: CPT

## 2023-12-11 PROCEDURE — 97161 PT EVAL LOW COMPLEX 20 MIN: CPT

## 2023-12-11 PROCEDURE — 87070 CULTURE OTHR SPECIMN AEROBIC: CPT

## 2023-12-11 PROCEDURE — 73719 MRI LOWER EXTREMITY W/DYE: CPT

## 2023-12-11 PROCEDURE — 97116 GAIT TRAINING THERAPY: CPT

## 2023-12-11 PROCEDURE — 86140 C-REACTIVE PROTEIN: CPT

## 2023-12-11 PROCEDURE — 80053 COMPREHEN METABOLIC PANEL: CPT

## 2023-12-11 PROCEDURE — 85610 PROTHROMBIN TIME: CPT

## 2023-12-11 PROCEDURE — 83605 ASSAY OF LACTIC ACID: CPT

## 2023-12-11 PROCEDURE — 99239 HOSP IP/OBS DSCHRG MGMT >30: CPT

## 2023-12-11 PROCEDURE — 86850 RBC ANTIBODY SCREEN: CPT

## 2023-12-11 RX ORDER — ACETAMINOPHEN 500 MG
2 TABLET ORAL
Qty: 0 | Refills: 0 | DISCHARGE
Start: 2023-12-11

## 2023-12-11 RX ADMIN — CHLORHEXIDINE GLUCONATE 1 APPLICATION(S): 213 SOLUTION TOPICAL at 05:26

## 2023-12-11 RX ADMIN — Medication 125 MICROGRAM(S): at 05:20

## 2023-12-11 RX ADMIN — Medication 75 MICROGRAM(S): at 05:20

## 2023-12-11 RX ADMIN — LOSARTAN POTASSIUM 25 MILLIGRAM(S): 100 TABLET, FILM COATED ORAL at 05:20

## 2023-12-11 RX ADMIN — MEROPENEM 100 MILLIGRAM(S): 1 INJECTION INTRAVENOUS at 05:26

## 2023-12-11 RX ADMIN — Medication 25 MILLIGRAM(S): at 05:20

## 2023-12-11 NOTE — DISCHARGE NOTE NURSING/CASE MANAGEMENT/SOCIAL WORK - NSDCFUADDAPPT_GEN_ALL_CORE_FT
Podiatry Discharge Instructions:  Follow up: Please follow up with Dr. Montes within 1 week of discharge from the hospital, please call [phone]  for appointment and discuss that you recently were seen in the hospital.  Wound Care: Please leave your dressing clean dry intact until your follow up appointment   Weight bearing: Please weight bear as tolerated to the right heel in a surgical shoe.  Antibiotics: Please continue as instructed. Podiatry Discharge Instructions:  Follow up: Please follow up with Dr. Montes within 1 week of discharge from the hospital, please call [phone]  for appointment and discuss that you recently were seen in the hospital.  Wound Care: Please leave your dressing clean dry intact until your follow up appointment   Weight bearing: Please weight bear as tolerated to the right heel in a surgical shoe.  Antibiotics: Please continue as instructed.

## 2023-12-11 NOTE — PROGRESS NOTE ADULT - REASON FOR ADMISSION
right foot infection

## 2023-12-11 NOTE — PROGRESS NOTE ADULT - PROVIDER SPECIALTY LIST ADULT
Infectious Disease
Podiatry
Podiatry
Hospitalist
Podiatry
Hospitalist

## 2023-12-11 NOTE — PROGRESS NOTE ADULT - ASSESSMENT
83 y.o F s/p Right foot partial second ray resection, closed, & percutaneous flexor tenotomies of digits 3 and 4  - Pt seen and evaluated  - Afebrile, no leukocytosis   - s/p Right foot partial second ray resection, closed, & percutaneous flexor tenotomies of digits 3 and 4 sutures intact, penrose in place, skin well coapted, no hematoma expressed, mild josh-wound erythema within normal post-op course, no pus or drainage noted, skin flap appears warm, viable with normal cap refill time.   - Low concern for residual bone infection, bone was hard at level of resection and appeared vitalized intra-op  - Low concern for viability, adequate intro-op bleeding.    - OR culture: no growth (prelim)  - Penrose drain pulled and dry sterile dressing applied.   - Stable for discharge from podiatry standpoint  - Follow up and wound care information listed in discharge note provider.   - Seen with attending 83 y.o F s/p Right foot partial second ray resection, closed, & percutaneous flexor tenotomies of digits 3 and 4  - Pt seen and evaluated  - Afebrile, no leukocytosis   - s/p Right foot partial second ray resection, closed, & percutaneous flexor tenotomies of digits 3 and 4 sutures intact, penrose in place, skin well coapted, no hematoma expressed, mild josh-wound erythema within normal post-op course, no pus or drainage noted, skin flap appears warm, viable with normal cap refill time.   - Low concern for residual bone infection, bone was hard at level of resection and appeared vitalized intra-op  - Low concern for viability, adequate intro-op bleeding.    - OR culture: no growth (prelim)  - ID recs appreciated   - Penrose drain pulled and dry sterile dressing applied.   - Stable for discharge from podiatry standpoint  - Follow up and wound care information listed in discharge note provider.   - Seen with attending

## 2023-12-11 NOTE — PROGRESS NOTE ADULT - SUBJECTIVE AND OBJECTIVE BOX
CC: F/U for OM    Saw/spoke to patient. No fevers, no chills. No new complaints.    Allergies  No Known Allergies    ANTIMICROBIALS:  meropenem  IVPB 1000 every 12 hours    PE:    Vital Signs Last 24 Hrs  T(C): 36.7 (11 Dec 2023 04:05), Max: 36.7 (11 Dec 2023 04:05)  T(F): 98 (11 Dec 2023 04:05), Max: 98 (11 Dec 2023 04:05)  HR: 59 (11 Dec 2023 04:05) (55 - 59)  BP: 117/71 (11 Dec 2023 04:05) (117/71 - 156/76)  RR: 18 (11 Dec 2023 04:05) (18 - 18)  SpO2: 98% (11 Dec 2023 04:05) (98% - 98%)    Gen: AOx3, NAD, non-toxic  CV: Nontachycardic  Resp: Breathing comfortably, RA  Abd: Soft, nontender    IV/Skin: No thrombophlebitis    LABS:    12-10    132<L>  |  97  |  12  ----------------------------<  85  3.7   |  24  |  0.83    Ca    9.2      10 Dec 2023 07:28    Urinalysis Basic - ( 10 Dec 2023 07:28 )    Color: x / Appearance: x / SG: x / pH: x  Gluc: 85 mg/dL / Ketone: x  / Bili: x / Urobili: x   Blood: x / Protein: x / Nitrite: x   Leuk Esterase: x / RBC: x / WBC x   Sq Epi: x / Non Sq Epi: x / Bacteria: x    MICROBIOLOGY:    .Surgical Swab right foot deep  12-08-23   No growth to date.  --    No polymorphonuclear cells seen per low power field  No organisms seen per oil power field      RADIOLOGY:    12/6     IMPRESSION:    Soft tissue ulcer along the plantar aspect of the second   metatarsophalangeal joint, with sinus tract leading to the second   metatarsophalangeal joint, where there is a small joint effusion with   synovitis. Osseous edema with T1 marrow signal abnormality, as well is   erosive changes surrounding the second metatarsophalangeal joint, most   consistent with septic arthritis with osteomyelitis of the second   metatarsophalangeal joint.    Mild marrow edema within the distal third metatarsal without T1 marrow   signal abnormality, which may be related to reactive osteitis or be   related to early osteomyelitis.    Other findings as above.

## 2023-12-11 NOTE — DISCHARGE NOTE NURSING/CASE MANAGEMENT/SOCIAL WORK - PATIENT PORTAL LINK FT
You can access the FollowMyHealth Patient Portal offered by [facility] by registering at the following website: [email or web address]. By joining [employer]’s FollowMyHealth portal, you will also be able to view your health information using other applications (apps) compatible with our system. You can access the FollowMyHealth Patient Portal offered by [facility] by registering at the following website: [email or web address]. By joining [employer]’s FollowMyHealth portal, you will also be able to view your health information using other applications (apps) compatible with our system.

## 2023-12-11 NOTE — DISCHARGE NOTE NURSING/CASE MANAGEMENT/SOCIAL WORK - NSDCPEFALRISK_GEN_ALL_CORE
For information on Fall & Injury Prevention, visit: [email or web address] OR  [email or web address] OR  https://www.cdc.gov/steadi/patient.html For information on Fall & Injury Prevention, visit: [email or web address] OR  [email or web address] OR  https://www.cdc.gov/steadi/patient.html

## 2023-12-11 NOTE — PROGRESS NOTE ADULT - ASSESSMENT
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 Please call with further questions or change in status.    Vinay Yuan MD  Contact on TEAMS messaging from 9am - 5pm  From 5pm-9am, on weekends, or if no response call [phone] 84 yo F HTN, HLD, chronic wounds intermittent abx, initially with wound on R foot  No fever, no leukocytosis  Here with RLE wound  MR with tissue ulcer along 2nd MTP joint, joint effusion, OM of second MTP; 3rd metatarsal mild marrow edema osteitis vs OM  12/8 s/p R foot Partial Second Ray Resection, flexor tenotomy of 3rd & 4th digits  Per OR note, low concern for residual infection  No other culture data available  ESBL noted on outpatient culture--HIE  Overall, OM, post operative state  - Meropenem 1g q 12 while inpatient  - When DC planning, can send out on PO Augmentin 875mg po q 12 to complete 7 days from OR (as long as OR cultures unchanged, and no objection from podiatry)  - F/U podiatry  - F/U OR cultures/path  - Monitor for alternate signs infection    Signing off. Please call with further questions or change in status.    Vinay Yuan MD  Contact on TEAMS messaging from 9am - 5pm  From 5pm-9am, on weekends, or if no response call [phone]

## 2023-12-11 NOTE — PROGRESS NOTE ADULT - SUBJECTIVE AND OBJECTIVE BOX
Patient is a 83y old  Female who presents with a chief complaint of right foot infection (10 Dec 2023 14:16)       INTERVAL HPI/OVERNIGHT EVENTS:  Patient seen and evaluated at bedside.  Pt is resting comfortable in NAD. Denies N/V/F/C.      Allergies    No Known Allergies    Intolerances    Levaquin (Stomach Upset; Nausea)      Vital Signs Last 24 Hrs  T(C): 36.7 (11 Dec 2023 04:05), Max: 36.7 (11 Dec 2023 04:05)  T(F): 98 (11 Dec 2023 04:05), Max: 98 (11 Dec 2023 04:05)  HR: 59 (11 Dec 2023 04:05) (55 - 59)  BP: 117/71 (11 Dec 2023 04:05) (117/71 - 156/76)  BP(mean): --  RR: 18 (11 Dec 2023 04:05) (18 - 18)  SpO2: 98% (11 Dec 2023 04:05) (98% - 98%)    Parameters below as of 11 Dec 2023 04:05  Patient On (Oxygen Delivery Method): room air        LABS:    12-10    132<L>  |  97  |  12  ----------------------------<  85  3.7   |  24  |  0.83    Ca    9.2      10 Dec 2023 07:28        Urinalysis Basic - ( 10 Dec 2023 07:28 )    Color: x / Appearance: x / SG: x / pH: x  Gluc: 85 mg/dL / Ketone: x  / Bili: x / Urobili: x   Blood: x / Protein: x / Nitrite: x   Leuk Esterase: x / RBC: x / WBC x   Sq Epi: x / Non Sq Epi: x / Bacteria: x      CAPILLARY BLOOD GLUCOSE          Lower Extremity Physical Exam:  Vascular: DP/PT 2/4, B/L, CFT <3 seconds B/L, Temperature gradient warm to warm, B/L.   Neuro: Epicritic sensation diminished  to the level of digits , B/L.  Musculoskeletal/Ortho: unremarkable   Skin: s/p Right foot partial second ray resection, closed, & percutaneous flexor tenotomies of digits 3 and 4 sutures intact, penrose in place, skin well coapted, no hematoma expressed, mild josh-wound erythema within normal post-op course, no pus or drainage noted, skin flap appears warm, viable with normal cap refill time.     RADIOLOGY & ADDITIONAL TESTS:

## 2023-12-13 LAB
CULTURE RESULTS: SIGNIFICANT CHANGE UP
SPECIMEN SOURCE: SIGNIFICANT CHANGE UP

## 2023-12-14 LAB
CULTURE RESULTS: ABNORMAL
CULTURE RESULTS: ABNORMAL
SPECIMEN SOURCE: SIGNIFICANT CHANGE UP
SPECIMEN SOURCE: SIGNIFICANT CHANGE UP
SURGICAL PATHOLOGY STUDY: SIGNIFICANT CHANGE UP
SURGICAL PATHOLOGY STUDY: SIGNIFICANT CHANGE UP

## 2023-12-15 ENCOUNTER — RX RENEWAL (OUTPATIENT)
Age: 83
End: 2023-12-15

## 2023-12-15 PROBLEM — E04.1 NONTOXIC SINGLE THYROID NODULE: Chronic | Status: ACTIVE | Noted: 2023-12-06

## 2023-12-15 PROBLEM — E03.9 HYPOTHYROIDISM, UNSPECIFIED: Chronic | Status: ACTIVE | Noted: 2023-12-06

## 2023-12-15 PROBLEM — I47.10 SUPRAVENTRICULAR TACHYCARDIA, UNSPECIFIED: Chronic | Status: ACTIVE | Noted: 2023-12-06

## 2023-12-19 ENCOUNTER — APPOINTMENT (OUTPATIENT)
Dept: FAMILY MEDICINE | Facility: CLINIC | Age: 83
End: 2023-12-19
Payer: MEDICARE

## 2023-12-19 VITALS
SYSTOLIC BLOOD PRESSURE: 132 MMHG | HEIGHT: 70 IN | HEART RATE: 74 BPM | BODY MASS INDEX: 18.61 KG/M2 | WEIGHT: 130 LBS | OXYGEN SATURATION: 97 % | DIASTOLIC BLOOD PRESSURE: 72 MMHG | RESPIRATION RATE: 16 BRPM | TEMPERATURE: 97.7 F

## 2023-12-19 DIAGNOSIS — K59.00 CONSTIPATION, UNSPECIFIED: ICD-10-CM

## 2023-12-19 PROCEDURE — 99495 TRANSJ CARE MGMT MOD F2F 14D: CPT

## 2023-12-19 RX ORDER — LORATADINE 10 MG
17 TABLET,DISINTEGRATING ORAL DAILY
Qty: 1 | Refills: 3 | Status: ACTIVE | COMMUNITY
Start: 2023-12-19 | End: 1900-01-01

## 2023-12-19 RX ORDER — DIGOXIN 125 UG/1
125 TABLET ORAL
Qty: 90 | Refills: 1 | Status: DISCONTINUED | COMMUNITY
Start: 2018-10-02 | End: 2023-12-19

## 2023-12-19 NOTE — PHYSICAL EXAM
[Normal Sclera/Conjunctiva] : normal sclera/conjunctiva [PERRL] : pupils equal round and reactive to light [Normal Outer Ear/Nose] : the outer ears and nose were normal in appearance [Normal Oropharynx] : the oropharynx was normal [No JVD] : no jugular venous distention [No Lymphadenopathy] : no lymphadenopathy [No Respiratory Distress] : no respiratory distress  [No Accessory Muscle Use] : no accessory muscle use [Clear to Auscultation] : lungs were clear to auscultation bilaterally [Normal Rate] : normal rate  [No Edema] : there was no peripheral edema [Soft] : abdomen soft [Non Tender] : non-tender [Normal Bowel Sounds] : normal bowel sounds [Normal Supraclavicular Nodes] : no supraclavicular lymphadenopathy [Normal Posterior Cervical Nodes] : no posterior cervical lymphadenopathy [Normal Affect] : the affect was normal [de-identified] : 1 out of 6 systolicMurmur in the aortic area [de-identified] : Patient in the moderate distress ill-appearing [de-identified] : Boot on right foot

## 2023-12-19 NOTE — ASSESSMENT
[FreeTextEntry1] : Patient has lost considerable weight.  According to our records about 15 pounds she has been instructed to call her surgeon who has ordered the antibiotics and tell him about the distress she has she had an appointment with him that was apparently canceled due to weather and is due to see him in the next several days we will have a home draw laboratory done to assess her metabolic status we will give her MiraLAX to take for her constipation examination is fairly unremarkable though she does appear thin and in moderate distress from this nausea she has been vomiting up most of the antibiotics that she has been taking follow-up visit after laboratory work is performed

## 2023-12-19 NOTE — HISTORY OF PRESENT ILLNESS
[Post-hospitalization from ___ Hospital] : Post-hospitalization from [unfilled] Hospital [Admitted on: ___] : The patient was admitted on [unfilled] [Discharged on ___] : discharged on [unfilled] [Discharge Summary] : discharge summary [Patient Contacted By: ____] : and contacted by [unfilled] [FreeTextEntry2] : Patient was admitted to [facility] on December 6 through 11 for amputation of the part of the right second toe due to osteomyelitis she was given antibiotics in the hospital and discharged since that time however she has felt poorly due to nausea and vomits up most of her medications and has lost considerable weight.  Any fever chills headache chest pain shortness of breath abdominal pain she feels this is a reaction to the medication she is taking which is Cipro and ampicillin

## 2023-12-19 NOTE — REVIEW OF SYSTEMS
[Fever] : no fever [Chills] : no chills [Vision Problems] : no vision problems [Sore Throat] : no sore throat [Chest Pain] : no chest pain [Palpitations] : no palpitations [Leg Claudication] : no leg claudication [Lower Ext Edema] : no lower extremity edema [Shortness Of Breath] : no shortness of breath [Wheezing] : no wheezing [Dyspnea on Exertion] : no dyspnea on exertion [Nausea] : nausea [Constipation] : constipation [Diarrhea] : diarrhea [Vomiting] : vomiting [Headache] : no headache [Dizziness] : no dizziness [Fainting] : no fainting [Memory Loss] : no memory loss

## 2023-12-20 ENCOUNTER — LABORATORY RESULT (OUTPATIENT)
Age: 83
End: 2023-12-20

## 2023-12-21 ENCOUNTER — INPATIENT (INPATIENT)
Facility: HOSPITAL | Age: 83
LOS: 6 days | Discharge: HOME | DRG: 682 | End: 2023-12-28
Attending: INTERNAL MEDICINE | Admitting: STUDENT IN AN ORGANIZED HEALTH CARE EDUCATION/TRAINING PROGRAM
Payer: MEDICARE

## 2023-12-21 VITALS
WEIGHT: 119.93 LBS | TEMPERATURE: 98 F | DIASTOLIC BLOOD PRESSURE: 70 MMHG | HEIGHT: 70 IN | OXYGEN SATURATION: 99 % | RESPIRATION RATE: 16 BRPM | SYSTOLIC BLOOD PRESSURE: 175 MMHG | HEART RATE: 67 BPM

## 2023-12-21 DIAGNOSIS — M53.9 DORSOPATHY, UNSPECIFIED: Chronic | ICD-10-CM

## 2023-12-21 DIAGNOSIS — N17.9 ACUTE KIDNEY FAILURE, UNSPECIFIED: ICD-10-CM

## 2023-12-21 DIAGNOSIS — Z98.89 OTHER SPECIFIED POSTPROCEDURAL STATES: Chronic | ICD-10-CM

## 2023-12-21 LAB
ALBUMIN SERPL ELPH-MCNC: 3.2 G/DL — LOW (ref 3.3–5)
ALBUMIN SERPL ELPH-MCNC: 3.2 G/DL — LOW (ref 3.3–5)
ALP SERPL-CCNC: 84 U/L — SIGNIFICANT CHANGE UP (ref 40–120)
ALP SERPL-CCNC: 84 U/L — SIGNIFICANT CHANGE UP (ref 40–120)
ALT FLD-CCNC: 14 U/L — SIGNIFICANT CHANGE UP (ref 10–45)
ALT FLD-CCNC: 14 U/L — SIGNIFICANT CHANGE UP (ref 10–45)
ANION GAP SERPL CALC-SCNC: 13 MMOL/L — SIGNIFICANT CHANGE UP (ref 5–17)
ANION GAP SERPL CALC-SCNC: 13 MMOL/L — SIGNIFICANT CHANGE UP (ref 5–17)
ANION GAP SERPL CALC-SCNC: 14 MMOL/L — SIGNIFICANT CHANGE UP (ref 5–17)
ANION GAP SERPL CALC-SCNC: 14 MMOL/L — SIGNIFICANT CHANGE UP (ref 5–17)
APPEARANCE UR: CLEAR — SIGNIFICANT CHANGE UP
APPEARANCE UR: CLEAR — SIGNIFICANT CHANGE UP
AST SERPL-CCNC: 25 U/L — SIGNIFICANT CHANGE UP (ref 10–40)
AST SERPL-CCNC: 25 U/L — SIGNIFICANT CHANGE UP (ref 10–40)
BASOPHILS # BLD AUTO: 0.04 K/UL — SIGNIFICANT CHANGE UP (ref 0–0.2)
BASOPHILS # BLD AUTO: 0.04 K/UL — SIGNIFICANT CHANGE UP (ref 0–0.2)
BASOPHILS NFR BLD AUTO: 0.5 % — SIGNIFICANT CHANGE UP (ref 0–2)
BASOPHILS NFR BLD AUTO: 0.5 % — SIGNIFICANT CHANGE UP (ref 0–2)
BILIRUB SERPL-MCNC: 0.7 MG/DL — SIGNIFICANT CHANGE UP (ref 0.2–1.2)
BILIRUB SERPL-MCNC: 0.7 MG/DL — SIGNIFICANT CHANGE UP (ref 0.2–1.2)
BILIRUB UR-MCNC: NEGATIVE — SIGNIFICANT CHANGE UP
BILIRUB UR-MCNC: NEGATIVE — SIGNIFICANT CHANGE UP
BUN SERPL-MCNC: 65 MG/DL — HIGH (ref 7–23)
BUN SERPL-MCNC: 65 MG/DL — HIGH (ref 7–23)
BUN SERPL-MCNC: 70 MG/DL — HIGH (ref 7–23)
BUN SERPL-MCNC: 70 MG/DL — HIGH (ref 7–23)
CALCIUM SERPL-MCNC: 8.6 MG/DL — SIGNIFICANT CHANGE UP (ref 8.4–10.5)
CALCIUM SERPL-MCNC: 8.6 MG/DL — SIGNIFICANT CHANGE UP (ref 8.4–10.5)
CALCIUM SERPL-MCNC: 9.4 MG/DL — SIGNIFICANT CHANGE UP (ref 8.4–10.5)
CALCIUM SERPL-MCNC: 9.4 MG/DL — SIGNIFICANT CHANGE UP (ref 8.4–10.5)
CHLORIDE SERPL-SCNC: 103 MMOL/L — SIGNIFICANT CHANGE UP (ref 96–108)
CHLORIDE SERPL-SCNC: 103 MMOL/L — SIGNIFICANT CHANGE UP (ref 96–108)
CHLORIDE SERPL-SCNC: 99 MMOL/L — SIGNIFICANT CHANGE UP (ref 96–108)
CHLORIDE SERPL-SCNC: 99 MMOL/L — SIGNIFICANT CHANGE UP (ref 96–108)
CO2 SERPL-SCNC: 20 MMOL/L — LOW (ref 22–31)
CO2 SERPL-SCNC: 20 MMOL/L — LOW (ref 22–31)
CO2 SERPL-SCNC: 21 MMOL/L — LOW (ref 22–31)
CO2 SERPL-SCNC: 21 MMOL/L — LOW (ref 22–31)
COLOR SPEC: YELLOW — SIGNIFICANT CHANGE UP
COLOR SPEC: YELLOW — SIGNIFICANT CHANGE UP
CREAT SERPL-MCNC: 7.72 MG/DL — HIGH (ref 0.5–1.3)
CREAT SERPL-MCNC: 7.72 MG/DL — HIGH (ref 0.5–1.3)
CREAT SERPL-MCNC: 8.23 MG/DL — HIGH (ref 0.5–1.3)
CREAT SERPL-MCNC: 8.23 MG/DL — HIGH (ref 0.5–1.3)
DIFF PNL FLD: NEGATIVE — SIGNIFICANT CHANGE UP
DIFF PNL FLD: NEGATIVE — SIGNIFICANT CHANGE UP
DIGOXIN SERPL-MCNC: 1 NG/ML — SIGNIFICANT CHANGE UP (ref 0.8–2)
DIGOXIN SERPL-MCNC: 1 NG/ML — SIGNIFICANT CHANGE UP (ref 0.8–2)
EGFR: 4 ML/MIN/1.73M2 — LOW
EGFR: 4 ML/MIN/1.73M2 — LOW
EGFR: 5 ML/MIN/1.73M2 — LOW
EGFR: 5 ML/MIN/1.73M2 — LOW
EOSINOPHIL # BLD AUTO: 0.13 K/UL — SIGNIFICANT CHANGE UP (ref 0–0.5)
EOSINOPHIL # BLD AUTO: 0.13 K/UL — SIGNIFICANT CHANGE UP (ref 0–0.5)
EOSINOPHIL NFR BLD AUTO: 1.7 % — SIGNIFICANT CHANGE UP (ref 0–6)
EOSINOPHIL NFR BLD AUTO: 1.7 % — SIGNIFICANT CHANGE UP (ref 0–6)
GLUCOSE SERPL-MCNC: 72 MG/DL — SIGNIFICANT CHANGE UP (ref 70–99)
GLUCOSE SERPL-MCNC: 72 MG/DL — SIGNIFICANT CHANGE UP (ref 70–99)
GLUCOSE SERPL-MCNC: 85 MG/DL — SIGNIFICANT CHANGE UP (ref 70–99)
GLUCOSE SERPL-MCNC: 85 MG/DL — SIGNIFICANT CHANGE UP (ref 70–99)
GLUCOSE UR QL: NEGATIVE MG/DL — SIGNIFICANT CHANGE UP
GLUCOSE UR QL: NEGATIVE MG/DL — SIGNIFICANT CHANGE UP
HCT VFR BLD CALC: 33.6 % — LOW (ref 34.5–45)
HCT VFR BLD CALC: 33.6 % — LOW (ref 34.5–45)
HGB BLD-MCNC: 11.8 G/DL — SIGNIFICANT CHANGE UP (ref 11.5–15.5)
HGB BLD-MCNC: 11.8 G/DL — SIGNIFICANT CHANGE UP (ref 11.5–15.5)
IMM GRANULOCYTES NFR BLD AUTO: 0.7 % — SIGNIFICANT CHANGE UP (ref 0–0.9)
IMM GRANULOCYTES NFR BLD AUTO: 0.7 % — SIGNIFICANT CHANGE UP (ref 0–0.9)
INR BLD: 1.05 RATIO — SIGNIFICANT CHANGE UP (ref 0.85–1.18)
INR BLD: 1.05 RATIO — SIGNIFICANT CHANGE UP (ref 0.85–1.18)
KETONES UR-MCNC: NEGATIVE MG/DL — SIGNIFICANT CHANGE UP
KETONES UR-MCNC: NEGATIVE MG/DL — SIGNIFICANT CHANGE UP
LEUKOCYTE ESTERASE UR-ACNC: NEGATIVE — SIGNIFICANT CHANGE UP
LEUKOCYTE ESTERASE UR-ACNC: NEGATIVE — SIGNIFICANT CHANGE UP
LIDOCAIN IGE QN: >375 U/L — HIGH (ref 16–77)
LIDOCAIN IGE QN: >375 U/L — HIGH (ref 16–77)
LYMPHOCYTES # BLD AUTO: 0.92 K/UL — LOW (ref 1–3.3)
LYMPHOCYTES # BLD AUTO: 0.92 K/UL — LOW (ref 1–3.3)
LYMPHOCYTES # BLD AUTO: 12.3 % — LOW (ref 13–44)
LYMPHOCYTES # BLD AUTO: 12.3 % — LOW (ref 13–44)
MCHC RBC-ENTMCNC: 32.6 PG — SIGNIFICANT CHANGE UP (ref 27–34)
MCHC RBC-ENTMCNC: 32.6 PG — SIGNIFICANT CHANGE UP (ref 27–34)
MCHC RBC-ENTMCNC: 35.1 GM/DL — SIGNIFICANT CHANGE UP (ref 32–36)
MCHC RBC-ENTMCNC: 35.1 GM/DL — SIGNIFICANT CHANGE UP (ref 32–36)
MCV RBC AUTO: 92.8 FL — SIGNIFICANT CHANGE UP (ref 80–100)
MCV RBC AUTO: 92.8 FL — SIGNIFICANT CHANGE UP (ref 80–100)
MONOCYTES # BLD AUTO: 0.63 K/UL — SIGNIFICANT CHANGE UP (ref 0–0.9)
MONOCYTES # BLD AUTO: 0.63 K/UL — SIGNIFICANT CHANGE UP (ref 0–0.9)
MONOCYTES NFR BLD AUTO: 8.4 % — SIGNIFICANT CHANGE UP (ref 2–14)
MONOCYTES NFR BLD AUTO: 8.4 % — SIGNIFICANT CHANGE UP (ref 2–14)
NEUTROPHILS # BLD AUTO: 5.69 K/UL — SIGNIFICANT CHANGE UP (ref 1.8–7.4)
NEUTROPHILS # BLD AUTO: 5.69 K/UL — SIGNIFICANT CHANGE UP (ref 1.8–7.4)
NEUTROPHILS NFR BLD AUTO: 76.4 % — SIGNIFICANT CHANGE UP (ref 43–77)
NEUTROPHILS NFR BLD AUTO: 76.4 % — SIGNIFICANT CHANGE UP (ref 43–77)
NITRITE UR-MCNC: NEGATIVE — SIGNIFICANT CHANGE UP
NITRITE UR-MCNC: NEGATIVE — SIGNIFICANT CHANGE UP
NRBC # BLD: 0 /100 WBCS — SIGNIFICANT CHANGE UP (ref 0–0)
NRBC # BLD: 0 /100 WBCS — SIGNIFICANT CHANGE UP (ref 0–0)
PH UR: 5.5 — SIGNIFICANT CHANGE UP (ref 5–8)
PH UR: 5.5 — SIGNIFICANT CHANGE UP (ref 5–8)
PLATELET # BLD AUTO: 284 K/UL — SIGNIFICANT CHANGE UP (ref 150–400)
PLATELET # BLD AUTO: 284 K/UL — SIGNIFICANT CHANGE UP (ref 150–400)
POTASSIUM SERPL-MCNC: 4.9 MMOL/L — SIGNIFICANT CHANGE UP (ref 3.5–5.3)
POTASSIUM SERPL-MCNC: 4.9 MMOL/L — SIGNIFICANT CHANGE UP (ref 3.5–5.3)
POTASSIUM SERPL-MCNC: 5.2 MMOL/L — SIGNIFICANT CHANGE UP (ref 3.5–5.3)
POTASSIUM SERPL-MCNC: 5.2 MMOL/L — SIGNIFICANT CHANGE UP (ref 3.5–5.3)
POTASSIUM SERPL-SCNC: 4.9 MMOL/L — SIGNIFICANT CHANGE UP (ref 3.5–5.3)
POTASSIUM SERPL-SCNC: 4.9 MMOL/L — SIGNIFICANT CHANGE UP (ref 3.5–5.3)
POTASSIUM SERPL-SCNC: 5.2 MMOL/L — SIGNIFICANT CHANGE UP (ref 3.5–5.3)
POTASSIUM SERPL-SCNC: 5.2 MMOL/L — SIGNIFICANT CHANGE UP (ref 3.5–5.3)
PROT SERPL-MCNC: 7.1 G/DL — SIGNIFICANT CHANGE UP (ref 6–8.3)
PROT SERPL-MCNC: 7.1 G/DL — SIGNIFICANT CHANGE UP (ref 6–8.3)
PROT UR-MCNC: NEGATIVE MG/DL — SIGNIFICANT CHANGE UP
PROT UR-MCNC: NEGATIVE MG/DL — SIGNIFICANT CHANGE UP
PROTHROM AB SERPL-ACNC: 12.3 SEC — SIGNIFICANT CHANGE UP (ref 9.5–13)
PROTHROM AB SERPL-ACNC: 12.3 SEC — SIGNIFICANT CHANGE UP (ref 9.5–13)
RBC # BLD: 3.62 M/UL — LOW (ref 3.8–5.2)
RBC # BLD: 3.62 M/UL — LOW (ref 3.8–5.2)
RBC # FLD: 11.9 % — SIGNIFICANT CHANGE UP (ref 10.3–14.5)
RBC # FLD: 11.9 % — SIGNIFICANT CHANGE UP (ref 10.3–14.5)
SODIUM SERPL-SCNC: 134 MMOL/L — LOW (ref 135–145)
SODIUM SERPL-SCNC: 134 MMOL/L — LOW (ref 135–145)
SODIUM SERPL-SCNC: 136 MMOL/L — SIGNIFICANT CHANGE UP (ref 135–145)
SODIUM SERPL-SCNC: 136 MMOL/L — SIGNIFICANT CHANGE UP (ref 135–145)
SP GR SPEC: 1.01 — SIGNIFICANT CHANGE UP (ref 1–1.03)
SP GR SPEC: 1.01 — SIGNIFICANT CHANGE UP (ref 1–1.03)
UROBILINOGEN FLD QL: 0.2 MG/DL — SIGNIFICANT CHANGE UP (ref 0.2–1)
UROBILINOGEN FLD QL: 0.2 MG/DL — SIGNIFICANT CHANGE UP (ref 0.2–1)
WBC # BLD: 7.46 K/UL — SIGNIFICANT CHANGE UP (ref 3.8–10.5)
WBC # BLD: 7.46 K/UL — SIGNIFICANT CHANGE UP (ref 3.8–10.5)
WBC # FLD AUTO: 7.46 K/UL — SIGNIFICANT CHANGE UP (ref 3.8–10.5)
WBC # FLD AUTO: 7.46 K/UL — SIGNIFICANT CHANGE UP (ref 3.8–10.5)

## 2023-12-21 PROCEDURE — 99223 1ST HOSP IP/OBS HIGH 75: CPT

## 2023-12-21 PROCEDURE — 99285 EMERGENCY DEPT VISIT HI MDM: CPT

## 2023-12-21 PROCEDURE — 71045 X-RAY EXAM CHEST 1 VIEW: CPT | Mod: 26

## 2023-12-21 PROCEDURE — 93010 ELECTROCARDIOGRAM REPORT: CPT

## 2023-12-21 PROCEDURE — 71250 CT THORAX DX C-: CPT | Mod: 26

## 2023-12-21 PROCEDURE — 74176 CT ABD & PELVIS W/O CONTRAST: CPT | Mod: 26,MA

## 2023-12-21 RX ORDER — SODIUM CHLORIDE 9 MG/ML
1000 INJECTION, SOLUTION INTRAVENOUS
Refills: 0 | Status: DISCONTINUED | OUTPATIENT
Start: 2023-12-21 | End: 2023-12-23

## 2023-12-21 RX ORDER — DIGOXIN 250 MCG
125 TABLET ORAL DAILY
Refills: 0 | Status: DISCONTINUED | OUTPATIENT
Start: 2023-12-21 | End: 2023-12-21

## 2023-12-21 RX ORDER — SODIUM CHLORIDE 9 MG/ML
1000 INJECTION, SOLUTION INTRAVENOUS
Refills: 0 | Status: DISCONTINUED | OUTPATIENT
Start: 2023-12-21 | End: 2023-12-21

## 2023-12-21 RX ORDER — ACETAMINOPHEN 500 MG
650 TABLET ORAL EVERY 6 HOURS
Refills: 0 | Status: DISCONTINUED | OUTPATIENT
Start: 2023-12-21 | End: 2023-12-22

## 2023-12-21 RX ORDER — SIMVASTATIN 20 MG/1
40 TABLET, FILM COATED ORAL AT BEDTIME
Refills: 0 | Status: DISCONTINUED | OUTPATIENT
Start: 2023-12-21 | End: 2023-12-28

## 2023-12-21 RX ORDER — ONDANSETRON 8 MG/1
4 TABLET, FILM COATED ORAL ONCE
Refills: 0 | Status: COMPLETED | OUTPATIENT
Start: 2023-12-21 | End: 2023-12-21

## 2023-12-21 RX ORDER — METOPROLOL TARTRATE 50 MG
25 TABLET ORAL
Refills: 0 | Status: DISCONTINUED | OUTPATIENT
Start: 2023-12-21 | End: 2023-12-22

## 2023-12-21 RX ORDER — HEPARIN SODIUM 5000 [USP'U]/ML
5000 INJECTION INTRAVENOUS; SUBCUTANEOUS EVERY 8 HOURS
Refills: 0 | Status: DISCONTINUED | OUTPATIENT
Start: 2023-12-21 | End: 2023-12-28

## 2023-12-21 RX ORDER — LEVOTHYROXINE SODIUM 125 MCG
75 TABLET ORAL DAILY
Refills: 0 | Status: DISCONTINUED | OUTPATIENT
Start: 2023-12-21 | End: 2023-12-23

## 2023-12-21 RX ORDER — SODIUM CHLORIDE 9 MG/ML
500 INJECTION INTRAMUSCULAR; INTRAVENOUS; SUBCUTANEOUS ONCE
Refills: 0 | Status: COMPLETED | OUTPATIENT
Start: 2023-12-21 | End: 2023-12-21

## 2023-12-21 RX ADMIN — SODIUM CHLORIDE 500 MILLILITER(S): 9 INJECTION INTRAMUSCULAR; INTRAVENOUS; SUBCUTANEOUS at 13:11

## 2023-12-21 RX ADMIN — Medication 25 MILLIGRAM(S): at 19:08

## 2023-12-21 RX ADMIN — SODIUM CHLORIDE 500 MILLILITER(S): 9 INJECTION INTRAMUSCULAR; INTRAVENOUS; SUBCUTANEOUS at 12:11

## 2023-12-21 RX ADMIN — SODIUM CHLORIDE 75 MILLILITER(S): 9 INJECTION, SOLUTION INTRAVENOUS at 19:49

## 2023-12-21 RX ADMIN — Medication 650 MILLIGRAM(S): at 19:08

## 2023-12-21 RX ADMIN — HEPARIN SODIUM 5000 UNIT(S): 5000 INJECTION INTRAVENOUS; SUBCUTANEOUS at 21:49

## 2023-12-21 RX ADMIN — SODIUM CHLORIDE 75 MILLILITER(S): 9 INJECTION, SOLUTION INTRAVENOUS at 19:09

## 2023-12-21 RX ADMIN — SODIUM CHLORIDE 100 MILLILITER(S): 9 INJECTION, SOLUTION INTRAVENOUS at 13:52

## 2023-12-21 RX ADMIN — Medication 650 MILLIGRAM(S): at 19:50

## 2023-12-21 RX ADMIN — SIMVASTATIN 40 MILLIGRAM(S): 20 TABLET, FILM COATED ORAL at 21:49

## 2023-12-21 RX ADMIN — SODIUM CHLORIDE 500 MILLILITER(S): 9 INJECTION INTRAMUSCULAR; INTRAVENOUS; SUBCUTANEOUS at 12:21

## 2023-12-21 RX ADMIN — ONDANSETRON 4 MILLIGRAM(S): 8 TABLET, FILM COATED ORAL at 11:22

## 2023-12-21 RX ADMIN — SODIUM CHLORIDE 1000 MILLILITER(S): 9 INJECTION INTRAMUSCULAR; INTRAVENOUS; SUBCUTANEOUS at 11:21

## 2023-12-21 NOTE — H&P ADULT - NSHPLABSRESULTS_GEN_ALL_CORE
LABS:                        11.8   7.46  )-----------( 284      ( 21 Dec 2023 11:26 )             33.6     12-21    134<L>  |  99  |  70<H>  ----------------------------<  85  5.2   |  21<L>  |  8.23<H>    Ca    9.4      21 Dec 2023 11:26    TPro  7.1  /  Alb  3.2<L>  /  TBili  0.7  /  DBili  x   /  AST  25  /  ALT  14  /  AlkPhos  84  12-21    PT/INR - ( 21 Dec 2023 11:26 )   PT: 12.3 sec;   INR: 1.05 ratio           Urinalysis Basic - ( 21 Dec 2023 12:20 )    Color: Yellow / Appearance: Clear / S.009 / pH: x  Gluc: x / Ketone: Negative mg/dL  / Bili: Negative / Urobili: 0.2 mg/dL   Blood: x / Protein: Negative mg/dL / Nitrite: Negative   Leuk Esterase: Negative / RBC: x / WBC x   Sq Epi: x / Non Sq Epi: x / Bacteria: x       CAPILLARY BLOOD GLUCOSE            Urinalysis Basic - ( 21 Dec 2023 12:20 )    Color: Yellow / Appearance: Clear / S.009 / pH: x  Gluc: x / Ketone: Negative mg/dL  / Bili: Negative / Urobili: 0.2 mg/dL   Blood: x / Protein: Negative mg/dL / Nitrite: Negative   Leuk Esterase: Negative / RBC: x / WBC x   Sq Epi: x / Non Sq Epi: x / Bacteria: x        RADIOLOGY & ADDITIONAL TESTS:    Consultant(s) Notes Reviewed:  [x ] YES  [ ] NO  Care Discussed with Consultants/Other Providers [ x] YES  [ ] NO  Imaging Personally Reviewed:  [ ] YES  [ ] NO LABS:                        11.8   7.46  )-----------( 284      ( 21 Dec 2023 11:26 )             33.6     12-21    134<L>  |  99  |  70<H>  ----------------------------<  85  5.2   |  21<L>  |  8.23<H>    Ca    9.4      21 Dec 2023 11:26    TPro  7.1  /  Alb  3.2<L>  /  TBili  0.7  /  DBili  x   /  AST  25  /  ALT  14  /  AlkPhos  84  12-21    PT/INR - ( 21 Dec 2023 11:26 )   PT: 12.3 sec;   INR: 1.05 ratio           Urinalysis Basic - ( 21 Dec 2023 12:20 )    Color: Yellow / Appearance: Clear / S.009 / pH: x  Gluc: x / Ketone: Negative mg/dL  / Bili: Negative / Urobili: 0.2 mg/dL   Blood: x / Protein: Negative mg/dL / Nitrite: Negative   Leuk Esterase: Negative / RBC: x / WBC x   Sq Epi: x / Non Sq Epi: x / Bacteria: x       CAPILLARY BLOOD GLUCOSE            Urinalysis Basic - ( 21 Dec 2023 12:20 )    Color: Yellow / Appearance: Clear / S.009 / pH: x  Gluc: x / Ketone: Negative mg/dL  / Bili: Negative / Urobili: 0.2 mg/dL   Blood: x / Protein: Negative mg/dL / Nitrite: Negative   Leuk Esterase: Negative / RBC: x / WBC x   Sq Epi: x / Non Sq Epi: x / Bacteria: x        RADIOLOGY & ADDITIONAL TESTS:  < from: CT Renal Stone Hunt (23 @ 12:35) >    FINDINGS:  LOWER CHEST: Bibasilar reticular scarring/atelectasis.    LIVER: Within normal limits.  BILE DUCTS: Normal caliber.  GALLBLADDER: Within normal limits.  SPLEEN: Within normal limits.  PANCREAS: Within normal limits.  ADRENALS: Within normal limits.  KIDNEYS/URETERS: No renal stones or hydronephrosis.    BLADDER: Within normal limits.  REPRODUCTIVE ORGANS: Uterus and adnexa within normal limits.    BOWEL: No bowel obstruction. Appendix is normal. Uncomplicated   diverticula, predominantly involving the sigmoid colon.  PERITONEUM: No ascites.  VESSELS: Atherosclerotic changes.  RETROPERITONEUM/LYMPH NODES: No lymphadenopathy.  ABDOMINAL WALL: Within normal limits.  BONES: Degenerative changes. Prior surgical fixation at L5-S1 level.    IMPRESSION:  No kidney stones or hydronephrosis.  Additional findings as above.        --- End of Report ---    < end of copied text >    < from: Xray Chest 1 View- PORTABLE-Urgent (12..23 @ 11:16) >        < end of copied text >    < from: Xray Chest 1 View- PORTABLE-Urgent (23 @ 11:16) >    IMPRESSION: COPD and bilateral chronic apical thickening again noted.    Increasing amorphous density possibly a mass in the left upper outer   lung. CAT scan chest recommended. Case discussed with Dr. Cuevas with read   back at 12:42 PM.    --- End of Report ---    Consultant(s) Notes Reviewed:  [x ] YES  [ ] NO  Care Discussed with Consultants/Other Providers [ x] YES  [ ] NO  Imaging Personally Reviewed:  [x ] YES  [ ] NO

## 2023-12-21 NOTE — ED PROVIDER NOTE - PHYSICAL EXAMINATION
General:     NAD, well-nourished, well-appearing  Head:     NC/AT, EOMI, oral mucosa dry   Neck:     trachea midline  Lungs:     CTA b/l, no w/r/r  CVS:     S1S2, RRR, no m/g/r  Abd:     +BS, s/nt/nd, no organomegaly  Ext:    R foot Dressing in place from postop amputation  Neuro: AAOx3, no sensory/motor deficits

## 2023-12-21 NOTE — H&P ADULT - NSHPPHYSICALEXAM_GEN_ALL_CORE
T(C): 36.8 (12-21-23 @ 10:52), Max: 36.8 (12-21-23 @ 10:52)  HR: 67 (12-21-23 @ 10:52) (67 - 67)  BP: 175/70 (12-21-23 @ 10:52) (175/70 - 175/70)  RR: 16 (12-21-23 @ 10:52) (16 - 16)  SpO2: 99% (12-21-23 @ 10:52) (99% - 99%)  Wt(kg): --Vital Signs Last 24 Hrs  T(C): 36.8 (21 Dec 2023 10:52), Max: 36.8 (21 Dec 2023 10:52)  T(F): 98.3 (21 Dec 2023 10:52), Max: 98.3 (21 Dec 2023 10:52)  HR: 67 (21 Dec 2023 10:52) (67 - 67)  BP: 175/70 (21 Dec 2023 10:52) (175/70 - 175/70)  BP(mean): --  RR: 16 (21 Dec 2023 10:52) (16 - 16)  SpO2: 99% (21 Dec 2023 10:52) (99% - 99%)    Parameters below as of 21 Dec 2023 10:52  Patient On (Oxygen Delivery Method): room air        PHYSICAL EXAM:  GENERAL: NAD, well-groomed, well-developed  HEAD:  Atraumatic, Normocephalic  EYES: EOMI, PERRLA, conjunctiva and sclera clear  ENMT: No tonsillar erythema, exudates, or enlargement; Moist mucous membranes, Good dentition, No lesions  NECK: Supple, No JVD, Normal thyroid  NERVOUS SYSTEM:  Alert & Oriented X3, Good concentration; Motor Strength 5/5 B/L upper and lower extremities; DTRs 2+ intact and symmetric  CHEST/LUNG: Clear to percussion bilaterally; No rales, rhonchi, wheezing, or rubs  HEART: Regular rate and rhythm; No murmurs, rubs, or gallops  ABDOMEN: Soft, Nontender, Nondistended; Bowel sounds present  EXTREMITIES:  2+ Peripheral Pulses, No clubbing, cyanosis, or edema  LYMPH: No lymphadenopathy noted  SKIN: No rashes or lesions T(C): 36.8 (12-21-23 @ 10:52), Max: 36.8 (12-21-23 @ 10:52)  HR: 67 (12-21-23 @ 10:52) (67 - 67)  BP: 175/70 (12-21-23 @ 10:52) (175/70 - 175/70)  RR: 16 (12-21-23 @ 10:52) (16 - 16)  SpO2: 99% (12-21-23 @ 10:52) (99% - 99%)  Wt(kg): --Vital Signs Last 24 Hrs  T(C): 36.8 (21 Dec 2023 10:52), Max: 36.8 (21 Dec 2023 10:52)  T(F): 98.3 (21 Dec 2023 10:52), Max: 98.3 (21 Dec 2023 10:52)  HR: 67 (21 Dec 2023 10:52) (67 - 67)  BP: 175/70 (21 Dec 2023 10:52) (175/70 - 175/70)  BP(mean): --  RR: 16 (21 Dec 2023 10:52) (16 - 16)  SpO2: 99% (21 Dec 2023 10:52) (99% - 99%)    Parameters below as of 21 Dec 2023 10:52  Patient On (Oxygen Delivery Method): room air        PHYSICAL EXAM:  GENERAL: NAD, well-groomed, well-developed  HEAD:  Atraumatic, Normocephalic  EYES: EOMI, PERRLA, conjunctiva and sclera clear  ENMT: No tonsillar erythema, exudates, or enlargement; Moist mucous membranes, Good dentition, No lesions  NECK: Supple, No JVD, Normal thyroid  NERVOUS SYSTEM:  Alert & Oriented X3, Good concentration; Motor Strength 5/5 B/L upper and lower extremities; DTRs 2+ intact and symmetric  CHEST/LUNG: Clear to percussion bilaterally; No rales, rhonchi, wheezing, or rubs  HEART: RRR, S1S2  ABDOMEN: Soft, Nontender, Nondistended; Bowel sounds present  EXTREMITIES:  2+ Peripheral Pulses, No clubbing, cyanosis, or edema. +Rt foot dressing intact.   LYMPH: No lymphadenopathy noted  SKIN: No rashes or lesions

## 2023-12-21 NOTE — CONSULT NOTE ADULT - SUBJECTIVE AND OBJECTIVE BOX
S : 83y year old Female seen at bedside approximately 1 week s/p partial 2nd ray amputation.  Patient states the surgery was performed by podiatrist, Dr. Montes. Patient denies any pain to the right foot. Patient states she had nausea after taking antibiotics.     Chief Complaint : Patient is a 83y old  Female who presents with a chief complaint of acute renal failure (21 Dec 2023 13:01)    HPI : HPI:  84 y/o F with PMH HTN, HLD, chronic right plantar foot ulcers/OM recently admitted to [facility] 12/6-12/11 for acute on chronic osteomyelitis of the right foot that required partial amputation of 2nd ray of right foot 12/8/23. Patient was treated with vanco/zosyn, switched to merrem per ID recc, and transitioned to PO augmentin x7 days post op upon discharge. Patient also took PO cipro upon discharge - last dose for both augmentin and cipro approx 3 days ago, self stopped due to nausea, vomiting, inability to tolerate PO. Presents to [facility] ED for malaise, nausea, emesis, unable to tolerate PO, including home meds x 3-4 days. Referred by PCP for further eval. ED course significant for acute renal failure, BUN/Cr 70/8.23, baseline wnl (BUN 12, Cr 0.83 on 12/10/23) (21 Dec 2023 12:36)      Patient admits to  (-) Fevers, (-) Chills, (-) Nausea, (-) Vomiting, (-) Shortness of Breath      PMH: HTN (hypertension)    Hyperlipidemia    Hypothyroid    Paroxysmal SVT (supraventricular tachycardia)    Thyroid cyst      PSH:Back problem    S/P thyroid surgery    S/P hernia surgery    S/P rotator cuff surgery        Allergies:Levaquin (Stomach Upset; Nausea)  doxycycline (Unknown)  linezolid (Unknown)      Labs:                          11.8   7.46  )-----------( 284      ( 21 Dec 2023 11:26 )             33.6     WBC Trend  7.46 Date (12-21 @ 11:26)  4.96 Date (12-09 @ 07:09)  5.58 Date (12-08 @ 03:47)  5.28 Date (12-07 @ 05:26)  6.41 Date (12-05 @ 18:28)  7.41 Date (12-01 @ 12:45)      Chem  12-21    136  |  103  |  65<H>  ----------------------------<  72  4.9   |  20<L>  |  7.72<H>    Ca    8.6      21 Dec 2023 13:55    TPro  7.1  /  Alb  3.2<L>  /  TBili  0.7  /  DBili  x   /  AST  25  /  ALT  14  /  AlkPhos  84  12-21          T(F): 98.1 (12-21-23 @ 19:46), Max: 98.3 (12-21-23 @ 10:52)  HR: 55 (12-21-23 @ 19:46) (55 - 72)  BP: 180/74 (12-21-23 @ 19:46) (138/69 - 180/74)  RR: 16 (12-21-23 @ 19:46) (16 - 16)  SpO2: 100% (12-21-23 @ 19:46) (99% - 100%)  Wt(kg): --    O:   General: Pleasant  female NAD & AOX3.    Integument:  Skin warm, dry and supple bilateral.    Surgical site right foot: sutures intact, no drainage, no erythema, no edema, no clinical signs of infection.  Vascular: Dorsalis Pedis and Posterior Tibial pulses 1/4.  Capillary re-fill time less then 3 seconds digits 1-5 bilateral.    Neuro: Protective sensation diminished to the level of the digits bilateral.  MSK: Muscle strength 5/5 all major muscle groups bilateral.    A:   Right foot s/p partial 2nd ray amputation        P:   Chart reviewed and Patient evaluated  Discussed diagnosis and treatment with patient  Applied DSD with ACE bandage to the right foot surgical site  Heel weight bearing to the right lower extremity with a surgical shoe.  Patient to keep dressing clean dry and intact for 1 week until she sees her podiatrist for follow up evaluation.      22 minutes spent on total encounter. Time of visit was spent providing, counselling and/or coordinating care. S : 83y year old Female seen at bedside approximately 1 week s/p partial 2nd ray amputation.  Patient states the surgery was performed by podiatrist, Dr. Montes. Patient denies any pain to the right foot. Patient states she had nausea after taking antibiotics.     Chief Complaint : Patient is a 83y old  Female who presents with a chief complaint of acute renal failure (21 Dec 2023 13:01)    HPI : HPI:  84 y/o F with PMH HTN, HLD, chronic right plantar foot ulcers/OM recently admitted to [facility] 12/6-12/11 for acute on chronic osteomyelitis of the right foot that required partial amputation of 2nd ray of right foot 12/8/23. Patient was treated with vanco/zosyn, switched to merrem per ID recc, and transitioned to PO augmentin x7 days post op upon discharge. Patient also took PO cipro upon discharge - last dose for both augmentin and cipro approx 3 days ago, self stopped due to nausea, vomiting, inability to tolerate PO. Presents to [facility] ED for malaise, nausea, emesis, unable to tolerate PO, including home meds x 3-4 days. Referred by PCP for further eval. ED course significant for acute renal failure, BUN/Cr 70/8.23, baseline wnl (BUN 12, Cr 0.83 on 12/10/23) (21 Dec 2023 12:36)      Patient admits to  (-) Fevers, (-) Chills, (-) Nausea, (-) Vomiting, (-) Shortness of Breath      PMH: HTN (hypertension)    Hyperlipidemia    Hypothyroid    Paroxysmal SVT (supraventricular tachycardia)    Thyroid cyst      PSH:Back problem    S/P thyroid surgery    S/P hernia surgery    S/P rotator cuff surgery        Allergies:Levaquin (Stomach Upset; Nausea)  doxycycline (Unknown)  linezolid (Unknown)      Labs:                          11.8   7.46  )-----------( 284      ( 21 Dec 2023 11:26 )             33.6     WBC Trend  7.46 Date (12-21 @ 11:26)  4.96 Date (12-09 @ 07:09)  5.58 Date (12-08 @ 03:47)  5.28 Date (12-07 @ 05:26)  6.41 Date (12-05 @ 18:28)  7.41 Date (12-01 @ 12:45)      Chem  12-21    136  |  103  |  65<H>  ----------------------------<  72  4.9   |  20<L>  |  7.72<H>    Ca    8.6      21 Dec 2023 13:55    TPro  7.1  /  Alb  3.2<L>  /  TBili  0.7  /  DBili  x   /  AST  25  /  ALT  14  /  AlkPhos  84  12-21          T(F): 98.1 (12-21-23 @ 19:46), Max: 98.3 (12-21-23 @ 10:52)  HR: 55 (12-21-23 @ 19:46) (55 - 72)  BP: 180/74 (12-21-23 @ 19:46) (138/69 - 180/74)  RR: 16 (12-21-23 @ 19:46) (16 - 16)  SpO2: 100% (12-21-23 @ 19:46) (99% - 100%)  Wt(kg): --    O:   General: Pleasant  female NAD & AOX3.    Integument:  Skin warm, dry and supple bilateral.    Surgical site right foot: sutures intact, no drainage, no erythema, no edema, no clinical signs of infection.  Vascular: Dorsalis Pedis and Posterior Tibial pulses 1/4.  Capillary re-fill time less then 3 seconds digits 1-5 bilateral.    Neuro: Protective sensation diminished to the level of the digits bilateral.  MSK: Muscle strength 5/5 all major muscle groups bilateral.    A:   Right foot s/p partial 2nd ray amputation        P:   Chart reviewed and Patient evaluated  Discussed diagnosis and treatment with patient  Applied DSD with ACE bandage to the right foot surgical site  Heel weight bearing to the right lower extremity with a surgical shoe.  Patient to keep dressing clean dry and intact for 1 week until she sees her podiatrist for follow up evaluation.      22 minutes spent on total encounter. Time of visit was spent providing, counselling and/or coordinating care.

## 2023-12-21 NOTE — CONSULT NOTE ADULT - SUBJECTIVE AND OBJECTIVE BOX
NEPHROLOGY CONSULTATION    CHIEF COMPLAINT: N/V    HPI:  Pt is 82 yo F with PMH HTN, HLD, chronic R plantar foot ulcer/OM recently admitted to [facility] 12/6-12/11 for acute on chronic osteomyelitis of the R foot that required surgical debridement. Patient was treated with vanco/zosyn, and transitioned to meropenem per ID recc, with transition to PO augmentin x 7 days. Pt presented to [facility] for nausea, unable to tolerate PO abx. Noted to have CARLOS. On ARB as op.     ROS:  as above    Allergies:  No Known Allergies    Intolerances:  Levaquin (Stomach Upset; Nausea)    PAST MEDICAL & SURGICAL HISTORY:  HTN (hypertension)  Hyperlipidemia  Hypothyroid  Paroxysmal SVT (supraventricular tachycardia)  Thyroid cyst  Back problem  s/p back surgery  S/P thyroid surgery  S/P hernia surgery  S/P rotator cuff surgery    SOCIAL HISTORY:  negative    FAMILY HISTORY:  Family history of early CAD (Father)    Home Medications:  acetaminophen 325 mg oral tablet: 2 tab(s) orally every 6 hours As needed Temp greater or equal to 38C (100.4F), Mild Pain (1 - 3) (11 Dec 2023 10:12)  Digitek 125 mcg (0.125 mg) oral tablet: 1 tab(s) orally once a day (06 Dec 2023 04:52)  levothyroxine 75 mcg (0.075 mg) oral tablet: 1 tab(s) orally once a day (06 Dec 2023 04:52)  losartan 25 mg oral tablet: 1 tab(s) orally once a day (06 Dec 2023 04:52)  metoprolol succinate 25 mg oral tablet, extended release: 1 tab(s) orally 2 times a day (06 Dec 2023 04:52)  simvastatin 40 mg oral tablet: 1 tab(s) orally once a day (at bedtime) (06 Dec 2023 04:52)    Vital Signs Last 24 Hrs  T(C): 36.8 (12-21-23 @ 10:52), Max: 36.8 (12-21-23 @ 10:52)  T(F): 98.3 (12-21-23 @ 10:52), Max: 98.3 (12-21-23 @ 10:52)  HR: 67 (12-21-23 @ 10:52) (67 - 67)  BP: 175/70 (12-21-23 @ 10:52) (175/70 - 175/70)  RR: 16 (12-21-23 @ 10:52) (16 - 16)  SpO2: 99% (12-21-23 @ 10:52) (99% - 99%)    LABS:                        11.8   7.46  )-----------( 284      ( 21 Dec 2023 11:26 )             33.6     12-21    134<L>  |  99  |  70<H>  ----------------------------<  85  5.2   |  21<L>  |  8.23<H>    Ca    9.4      21 Dec 2023 11:26    TPro  7.1  /  Alb  3.2<L>  /  TBili  0.7  /  DBili  x   /  AST  25  /  ALT  14  /  AlkPhos  84  12-21    LIVER FUNCTIONS - ( 21 Dec 2023 11:26 )  Alb: 3.2 g/dL / Pro: 7.1 g/dL / ALK PHOS: 84 U/L / ALT: 14 U/L / AST: 25 U/L / GGT: x           PT/INR - ( 21 Dec 2023 11:26 )   PT: 12.3 sec;   INR: 1.05 ratio      A/P:    full consult to follow    [phone]       NEPHROLOGY CONSULTATION    CHIEF COMPLAINT: N/V    HPI:  Pt is 82 yo F with PMH HTN, HLD, chronic R plantar foot ulcer/OM recently admitted to [facility] 12/6-12/11 for acute on chronic osteomyelitis of the R foot that required surgical debridement. Patient was treated with vanco/zosyn, and transitioned to meropenem per ID recc, with transition to PO augmentin x 7 days. Pt presented to [facility] for nausea, unable to tolerate PO abx. Noted to have CARLOS. On ARB as op.     ROS:  as above    Allergies:  No Known Allergies    Intolerances:  Levaquin (Stomach Upset; Nausea)    PAST MEDICAL & SURGICAL HISTORY:  HTN (hypertension)  Hyperlipidemia  Hypothyroid  Paroxysmal SVT (supraventricular tachycardia)  Thyroid cyst  Back problem  s/p back surgery  S/P thyroid surgery  S/P hernia surgery  S/P rotator cuff surgery    SOCIAL HISTORY:  negative    FAMILY HISTORY:  Family history of early CAD (Father)    Home Medications:  acetaminophen 325 mg oral tablet: 2 tab(s) orally every 6 hours As needed Temp greater or equal to 38C (100.4F), Mild Pain (1 - 3) (11 Dec 2023 10:12)  Digitek 125 mcg (0.125 mg) oral tablet: 1 tab(s) orally once a day (06 Dec 2023 04:52)  levothyroxine 75 mcg (0.075 mg) oral tablet: 1 tab(s) orally once a day (06 Dec 2023 04:52)  losartan 25 mg oral tablet: 1 tab(s) orally once a day (06 Dec 2023 04:52)  metoprolol succinate 25 mg oral tablet, extended release: 1 tab(s) orally 2 times a day (06 Dec 2023 04:52)  simvastatin 40 mg oral tablet: 1 tab(s) orally once a day (at bedtime) (06 Dec 2023 04:52)    Vital Signs Last 24 Hrs  T(C): 36.8 (12-21-23 @ 10:52), Max: 36.8 (12-21-23 @ 10:52)  T(F): 98.3 (12-21-23 @ 10:52), Max: 98.3 (12-21-23 @ 10:52)  HR: 67 (12-21-23 @ 10:52) (67 - 67)  BP: 175/70 (12-21-23 @ 10:52) (175/70 - 175/70)  RR: 16 (12-21-23 @ 10:52) (16 - 16)  SpO2: 99% (12-21-23 @ 10:52) (99% - 99%)    LABS:                        11.8   7.46  )-----------( 284      ( 21 Dec 2023 11:26 )             33.6     12-21    134<L>  |  99  |  70<H>  ----------------------------<  85  5.2   |  21<L>  |  8.23<H>    Ca    9.4      21 Dec 2023 11:26    TPro  7.1  /  Alb  3.2<L>  /  TBili  0.7  /  DBili  x   /  AST  25  /  ALT  14  /  AlkPhos  84  12-21    LIVER FUNCTIONS - ( 21 Dec 2023 11:26 )  Alb: 3.2 g/dL / Pro: 7.1 g/dL / ALK PHOS: 84 U/L / ALT: 14 U/L / AST: 25 U/L / GGT: x           PT/INR - ( 21 Dec 2023 11:26 )   PT: 12.3 sec;   INR: 1.05 ratio      A/P:    full consult to follow    [phone]       NEPHROLOGY CONSULTATION    CHIEF COMPLAINT: N/V    HPI:  Pt is 84 yo F with PMH HTN, HLD, chronic R foot ulcer/OM recently admitted to [facility] 12/6-12/11 for acute on chronic osteomyelitis that required surgical debridement. Patient was treated with vanco/zosyn, and transitioned to meropenem, with transition to PO augmentin x 7 days. Pt presented to [facility] today for nausea, poor PO and decreased UO. Noted to have CARLOS. Was on ARB as op, also reports taking Cipro. Denies hx of CKD or NSAID's use. No CP, SOB, D/C/F/C.     ROS:  as above    Allergies:  No Known Allergies    Intolerances:  Levaquin (Stomach Upset; Nausea)    PAST MEDICAL & SURGICAL HISTORY:  HTN (hypertension)  Hyperlipidemia  Hypothyroid  Paroxysmal SVT (supraventricular tachycardia)  Thyroid cyst  Back problem  s/p back surgery  S/P thyroid surgery  S/P hernia surgery  S/P rotator cuff surgery    SOCIAL HISTORY:  negative    FAMILY HISTORY:  Family history of early CAD (Father)    Home Medications:  acetaminophen 325 mg oral tablet: 2 tab(s) orally every 6 hours As needed Temp greater or equal to 38C (100.4F), Mild Pain (1 - 3) (11 Dec 2023 10:12)  Digitek 125 mcg (0.125 mg) oral tablet: 1 tab(s) orally once a day (06 Dec 2023 04:52)  levothyroxine 75 mcg (0.075 mg) oral tablet: 1 tab(s) orally once a day (06 Dec 2023 04:52)  losartan 25 mg oral tablet: 1 tab(s) orally once a day (06 Dec 2023 04:52)  metoprolol succinate 25 mg oral tablet, extended release: 1 tab(s) orally 2 times a day (06 Dec 2023 04:52)  simvastatin 40 mg oral tablet: 1 tab(s) orally once a day (at bedtime) (06 Dec 2023 04:52)    Vital Signs Last 24 Hrs  T(C): 36.8 (12-21-23 @ 10:52), Max: 36.8 (12-21-23 @ 10:52)  T(F): 98.3 (12-21-23 @ 10:52), Max: 98.3 (12-21-23 @ 10:52)  HR: 67 (12-21-23 @ 10:52) (67 - 67)  BP: 175/70 (12-21-23 @ 10:52) (175/70 - 175/70)  RR: 16 (12-21-23 @ 10:52) (16 - 16)  SpO2: 99% (12-21-23 @ 10:52) (99% - 99%)    s1s2  b/l air entry  soft, ND  no edema  AO                         11.8   7.46  )-----------( 284      ( 21 Dec 2023 11:26 )             33.6     21 Dec 2023 13:55    136    |  103    |  65     ----------------------------<  72     4.9     |  20     |  7.72     Ca    8.6        21 Dec 2023 13:55    TPro  7.1    /  Alb  3.2    /  TBili  0.7    /  DBili  x      /  AST  25     /  ALT  14     /  AlkPhos  84     21 Dec 2023 11:26    LIVER FUNCTIONS - ( 21 Dec 2023 11:26 )  Alb: 3.2 g/dL / Pro: 7.1 g/dL / ALK PHOS: 84 U/L / ALT: 14 U/L / AST: 25 U/L / GGT: x           PT/INR - ( 21 Dec 2023 11:26 )   PT: 12.3 sec;   INR: 1.05 ratio      A/P:    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, exacerbated by ARB  CT A/P w/o hydro, UA negative  CT Chest - emphysema, COLTEN mass  Pulm eval   D5 NS at 75cc/hr  Avoid nephrotoxins  CBC, BMP in am  Will follow     [phone]       NEPHROLOGY CONSULTATION    CHIEF COMPLAINT: N/V    HPI:  Pt is 82 yo F with PMH HTN, HLD, chronic R foot ulcer/OM recently admitted to [facility] 12/6-12/11 for acute on chronic osteomyelitis that required surgical debridement. Patient was treated with vanco/zosyn, and transitioned to meropenem, with transition to PO augmentin x 7 days. Pt presented to [facility] today for nausea, poor PO and decreased UO. Noted to have CARLOS. Was on ARB as op, also reports taking Cipro. Denies hx of CKD or NSAID's use. No CP, SOB, D/C/F/C.     ROS:  as above    Allergies:  No Known Allergies    Intolerances:  Levaquin (Stomach Upset; Nausea)    PAST MEDICAL & SURGICAL HISTORY:  HTN (hypertension)  Hyperlipidemia  Hypothyroid  Paroxysmal SVT (supraventricular tachycardia)  Thyroid cyst  Back problem  s/p back surgery  S/P thyroid surgery  S/P hernia surgery  S/P rotator cuff surgery    SOCIAL HISTORY:  negative    FAMILY HISTORY:  Family history of early CAD (Father)    Home Medications:  acetaminophen 325 mg oral tablet: 2 tab(s) orally every 6 hours As needed Temp greater or equal to 38C (100.4F), Mild Pain (1 - 3) (11 Dec 2023 10:12)  Digitek 125 mcg (0.125 mg) oral tablet: 1 tab(s) orally once a day (06 Dec 2023 04:52)  levothyroxine 75 mcg (0.075 mg) oral tablet: 1 tab(s) orally once a day (06 Dec 2023 04:52)  losartan 25 mg oral tablet: 1 tab(s) orally once a day (06 Dec 2023 04:52)  metoprolol succinate 25 mg oral tablet, extended release: 1 tab(s) orally 2 times a day (06 Dec 2023 04:52)  simvastatin 40 mg oral tablet: 1 tab(s) orally once a day (at bedtime) (06 Dec 2023 04:52)    Vital Signs Last 24 Hrs  T(C): 36.8 (12-21-23 @ 10:52), Max: 36.8 (12-21-23 @ 10:52)  T(F): 98.3 (12-21-23 @ 10:52), Max: 98.3 (12-21-23 @ 10:52)  HR: 67 (12-21-23 @ 10:52) (67 - 67)  BP: 175/70 (12-21-23 @ 10:52) (175/70 - 175/70)  RR: 16 (12-21-23 @ 10:52) (16 - 16)  SpO2: 99% (12-21-23 @ 10:52) (99% - 99%)    s1s2  b/l air entry  soft, ND  no edema  AO                         11.8   7.46  )-----------( 284      ( 21 Dec 2023 11:26 )             33.6     21 Dec 2023 13:55    136    |  103    |  65     ----------------------------<  72     4.9     |  20     |  7.72     Ca    8.6        21 Dec 2023 13:55    TPro  7.1    /  Alb  3.2    /  TBili  0.7    /  DBili  x      /  AST  25     /  ALT  14     /  AlkPhos  84     21 Dec 2023 11:26    LIVER FUNCTIONS - ( 21 Dec 2023 11:26 )  Alb: 3.2 g/dL / Pro: 7.1 g/dL / ALK PHOS: 84 U/L / ALT: 14 U/L / AST: 25 U/L / GGT: x           PT/INR - ( 21 Dec 2023 11:26 )   PT: 12.3 sec;   INR: 1.05 ratio      A/P:    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, exacerbated by ARB  CT A/P w/o hydro, UA negative  CT Chest - emphysema, COLTEN mass  Pulm eval   D5 NS at 75cc/hr  Avoid nephrotoxins  CBC, BMP in am  Will follow     [phone]

## 2023-12-21 NOTE — ED ADULT NURSE NOTE - OBJECTIVE STATEMENT
Pt came from home, sent in from PCP for evaluation. Per PCP, pts on 12/6/23 pts Cr= 8.5. Pt reports undergoing toe amputation on the right foot on 12/8 due to   osteomyelitis. Since then, pt reports being placed on cipro and amoxicillin po abx, but stopped taking them before finishing the course of abx due to nausea and vomiting. Pt states that she is urinating as usual. Pt with hx LBBB. Denies any other hx. Pt reports receiving a call from her PCP today advising her to come to the ED for further evaluation.

## 2023-12-21 NOTE — ED ADULT NURSE NOTE - NSFALLHARMRISKINTERV_ED_ALL_ED
Assistance OOB with selected safe patient handling equipment if applicable/Assistance with ambulation/Communicate risk of Fall with Harm to all staff, patient, and family/Monitor gait and stability/Provide patient with walking aids/Provide visual cue: red socks, yellow wristband, yellow gown, etc/Reinforce activity limits and safety measures with patient and family/Bed in lowest position, wheels locked, appropriate side rails in place/Call bell, personal items and telephone in reach/Instruct patient to call for assistance before getting out of bed/chair/stretcher/Non-slip footwear applied when patient is off stretcher/[place] to call system/Physically safe environment - no spills, clutter or unnecessary equipment/Purposeful Proactive Rounding/Room/bathroom lighting operational, light cord in reach Assistance OOB with selected safe patient handling equipment if applicable/Assistance with ambulation/Communicate risk of Fall with Harm to all staff, patient, and family/Monitor gait and stability/Provide patient with walking aids/Provide visual cue: red socks, yellow wristband, yellow gown, etc/Reinforce activity limits and safety measures with patient and family/Bed in lowest position, wheels locked, appropriate side rails in place/Call bell, personal items and telephone in reach/Instruct patient to call for assistance before getting out of bed/chair/stretcher/Non-slip footwear applied when patient is off stretcher/[place] to call system/Physically safe environment - no spills, clutter or unnecessary equipment/Purposeful Proactive Rounding/Room/bathroom lighting operational, light cord in reach

## 2023-12-21 NOTE — ED PROVIDER NOTE - CLINICAL SUMMARY MEDICAL DECISION MAKING FREE TEXT BOX
83-year-old female recently had a surgery on the right foot to amputation because she developed osteomyelitis patient was placed on IV antibiotics and patient is unable to tolerate p.o. antibiotics as she is supposed to be on Cipro and amoxicillin has been vomiting for about 4 to 5 days patient was referred by Dr. Peterson the primary care doctor who reported the patient's renal function is deteriorated the creatinine on December 6, 2023 was and creatinine recently was 8.5 Patient has a history of left bundle branch block from the previous EKG for comparison  ekg Sinus bradycardia left bundle branch block no changes from prior  Patient's creatinine today is 8.3 potassium normal Verduzco placed IV fluids CT renal Dr. eLwis nephrologist made aware plan to admit the patient for CARLOS 83-year-old female recently had a surgery on the right foot to amputation because she developed osteomyelitis patient was placed on IV antibiotics and patient is unable to tolerate p.o. antibiotics as she is supposed to be on Cipro and amoxicillin has been vomiting for about 4 to 5 days patient was referred by Dr. Peterson the primary care doctor who reported the patient's renal function is deteriorated the creatinine on December 6, 2023 was and creatinine recently was 8.5 Patient has a history of left bundle branch block from the previous EKG for comparison  ekg Sinus bradycardia left bundle branch block no changes from prior  Patient's creatinine today is 8.3 potassium normal Verduzco placed IV fluids CT renal Dr. Lewis nephrologist made aware plan to admit the patient for CARLOS

## 2023-12-21 NOTE — ED ADULT TRIAGE NOTE - CHIEF COMPLAINT QUOTE
Pt has toe amputation 12/8. Pt placed on post op antibiotics cipro and amoxicillin. Pt states she cannot tolerate oral antibiotics. they are causing n/v/d and poor appetite.  PCP Dr Peterson.  Pt stopped antibiotics 3 days ago.  Podiatrist Angela surgeon.

## 2023-12-21 NOTE — GOALS OF CARE CONVERSATION - ADVANCED CARE PLANNING - FUTURE HOSPITALIZATION/TRANSFER
Send to hospital, if necessary, based on [facility] orders Patient/Caregiver provided printed discharge information.

## 2023-12-21 NOTE — ED PROVIDER NOTE - OBJECTIVE STATEMENT
83-year-old female recently had a surgery on the right foot to amputation because she developed osteomyelitis patient was placed on IV antibiotics and patient is unable to tolerate p.o. antibiotics as she is supposed to be on Cipro and amoxicillin has been vomiting for about 4 to 5 days patient was referred by Dr. Peterson the primary care doctor who reported the patient's renal function is deteriorated the creatinine on December 6, 2023 was and creatinine recently was 8.5 Patient has a history of left bundle branch block from the previous EKG for comparison

## 2023-12-21 NOTE — PATIENT PROFILE ADULT - FALL HARM RISK - HARM RISK INTERVENTIONS
Assistance with ambulation/Assistance OOB with selected safe patient handling equipment/Communicate Risk of Fall with Harm to all staff/Discuss with provider need for PT consult/Monitor gait and stability/Provide patient with walking aids - walker, cane, crutches/Reinforce activity limits and safety measures with patient and family/Tailored Fall Risk Interventions/Visual Cue: Yellow wristband and red socks/Bed in lowest position, wheels locked, appropriate side rails in place/Call bell, personal items and telephone in reach/Instruct patient to call for assistance before getting out of bed or chair/Non-slip footwear when patient is out of bed/[place] to call system/Physically safe environment - no spills, clutter or unnecessary equipment/Purposeful Proactive Rounding/Room/bathroom lighting operational, light cord in reach Assistance with ambulation/Assistance OOB with selected safe patient handling equipment/Communicate Risk of Fall with Harm to all staff/Discuss with provider need for PT consult/Monitor gait and stability/Provide patient with walking aids - walker, cane, crutches/Reinforce activity limits and safety measures with patient and family/Tailored Fall Risk Interventions/Visual Cue: Yellow wristband and red socks/Bed in lowest position, wheels locked, appropriate side rails in place/Call bell, personal items and telephone in reach/Instruct patient to call for assistance before getting out of bed or chair/Non-slip footwear when patient is out of bed/[place] to call system/Physically safe environment - no spills, clutter or unnecessary equipment/Purposeful Proactive Rounding/Room/bathroom lighting operational, light cord in reach

## 2023-12-21 NOTE — H&P ADULT - HIV OFFER
Last office visit 5/12/23  Upcoming appointment 6/30/23
Medication Refill Request    Funmi Mcqueen is requesting a refill of the following medication(s):   Atorvastatin 80mg Qty: 90  Please send refill to:     [facility]/pharmacy #[id number]- [place], [address] [place] - P [phone] [place] [phone]  [place]  [address] [place] [address]  Phone: [phone] Fax: [phone]
Opt out

## 2023-12-21 NOTE — H&P ADULT - NSHPREVIEWOFSYSTEMS_GEN_ALL_CORE
REVIEW OF SYSTEMS:  CONSTITUTIONAL: No fever, weight loss, or fatigue  EYES: No eye pain, visual disturbances, or discharge  ENMT:  No difficulty hearing, tinnitus, vertigo; No sinus or throat pain  NECK: No pain or stiffness  RESPIRATORY: No cough, wheezing, chills or hemoptysis; No shortness of breath  CARDIOVASCULAR: No chest pain, palpitations, dizziness, or leg swelling  GASTROINTESTINAL: No abdominal or epigastric pain. No nausea, vomiting, or hematemesis; No diarrhea or constipation. No melena or hematochezia.  GENITOURINARY: No dysuria, frequency, hematuria, or incontinence  NEUROLOGICAL: No headaches, memory loss, loss of strength, numbness, or tremors  SKIN: No itching, burning, rashes, or lesions   LYMPH NODES: No enlarged glands  ENDOCRINE: No heat or cold intolerance; No hair loss  MUSCULOSKELETAL: No joint pain or swelling; No muscle, back, or extremity pain  PSYCHIATRIC: No depression, anxiety, mood swings, or difficulty sleeping  HEME/LYMPH: No easy bruising, or bleeding gums  ALLERGY AND IMMUNOLOGIC: No hives or eczema    ALL ROS REVIEWED AND NORMAL EXCEPT AS STATED ABOVE REVIEW OF SYSTEMS:  CONSTITUTIONAL: +malaise. no fever  EYES: No eye pain, visual disturbances, or discharge  ENMT:  No difficulty hearing, tinnitus, vertigo; No sinus or throat pain  NECK: No pain or stiffness  RESPIRATORY: No cough, wheezing, chills or hemoptysis; No shortness of breath  CARDIOVASCULAR: No chest pain, palpitations, dizziness, or leg swelling  GASTROINTESTINAL: +nausea, vomiting  GENITOURINARY: No dysuria, frequency, hematuria, or incontinence  NEUROLOGICAL: No headaches, memory loss, loss of strength, numbness, or tremors  SKIN: No itching, burning, rashes, or lesions   LYMPH NODES: No enlarged glands  ENDOCRINE: No heat or cold intolerance; No hair loss  MUSCULOSKELETAL: No joint pain or swelling; No muscle, back, or extremity pain  PSYCHIATRIC: No depression, anxiety, mood swings, or difficulty sleeping  HEME/LYMPH: No easy bruising, or bleeding gums  ALLERGY AND IMMUNOLOGIC: No hives or eczema    ALL ROS REVIEWED AND NORMAL EXCEPT AS STATED ABOVE

## 2023-12-21 NOTE — H&P ADULT - ASSESSMENT
82 y/o F with PMH HTN, HLD, chronic right plantar foot ulcers/OM recently admitted to [facility] 12/6-12/11 for acute on chronic osteomyelitis of the right foot that required surgical debridement, now admitted for acute renal failure, likely due to recent vanco course.       Acute renal failure  -Admit to telemetry  -Nephro consulted per ED  -Check dig level    HTN  HLD    Vitals q8h  Diet: DASH  Activity: Bedrest   PT/OT as tolerated  DVT ppx: Lovenox  GI ppx: No indication for GI prophylaxis  Standard precautions: Aspiration, fall, safety, seizure, skin  Code Status  HCP  84 y/o F with PMH HTN, HLD, chronic right plantar foot ulcers/OM recently admitted to [facility] 12/6-12/11 for acute on chronic osteomyelitis of the right foot that required surgical debridement, now admitted for acute renal failure, likely due to recent vanco course.       Acute renal failure  -Admit to telemetry  -Nephro consulted per ED  -Check dig level    HTN  HLD    Vitals q8h  Diet: DASH  Activity: Bedrest   PT/OT as tolerated  DVT ppx: Lovenox  GI ppx: No indication for GI prophylaxis  Standard precautions: Aspiration, fall, safety, seizure, skin  Code Status  HCP  82 y/o F with PMH HTN, HLD, chronic right plantar foot ulcers/OM recently admitted to [facility] 12/6-12/11 for acute on chronic osteomyelitis of the right foot that required partial amputation of 2nd ray of right foot 12/8/23, c/o nausea, vomiting, found with acute renal failure, likely due to recent vanco course.     Acute renal failure, likely drug induced  -Admit to telemetry  -Nephro consulted per ED  -Check dig level    s/p partial amputation of 2nd ray of right foot 12/8/23  -Due for outpatient podiatry f/u this week regarding dressing   -Podiatry consulted  -PT eval     HTN  HLD    Vitals q8h  Diet: DASH  Activity: Bedrest   PT/OT as tolerated  DVT ppx: Lovenox  Standard precautions: Aspiration, fall, safety, seizure, skin  Code Status - DNR/DNI reaffirmed  HCP - none  84 y/o F with PMH HTN, HLD, chronic right plantar foot ulcers/OM recently admitted to [facility] 12/6-12/11 for acute on chronic osteomyelitis of the right foot that required partial amputation of 2nd ray of right foot 12/8/23, c/o nausea, vomiting, found with acute renal failure, likely due to recent vanco course.     Acute renal failure, likely drug induced  -Admit to telemetry  -Nephro consulted per ED  -Check dig level    s/p partial amputation of 2nd ray of right foot 12/8/23  -Due for outpatient podiatry f/u this week regarding dressing   -Podiatry consulted  -PT eval     HTN  HLD    Vitals q8h  Diet: DASH  Activity: Bedrest   PT/OT as tolerated  DVT ppx: Lovenox  Standard precautions: Aspiration, fall, safety, seizure, skin  Code Status - DNR/DNI reaffirmed  HCP - none  84 y/o F with PMH HTN, HLD, chronic right plantar foot ulcers/OM recently admitted to [facility] 12/6-12/11 for acute on chronic osteomyelitis of the right foot that required partial amputation of 2nd ray of right foot 12/8/23, c/o nausea, vomiting, found with acute renal failure, likely due to recent vanco course.     Acute renal failure, likely drug induced  -Admit to telemetry  -Cardiac monitoring  -Nephro consulted per ED  -Check dig level  -CT, UA reviewed, unremarkable. Follow urine cx  -IVF    s/p partial amputation of 2nd ray of right foot 12/8/23  -Due for outpatient podiatry f/u this week regarding dressing   -Podiatry consulted  -PT eval   -Afebrile, non toxic appearing    HTN  -Hold home losartan  -Continue home metoprolol 25mg BID, digoxin 125mcg qd pending dig level    HLD  -Continue home simvastatin 40mg qd    Hypothyroidism  -Continue home synthroid 75mcg qd    ?left upper lung mass  -F/u CT chest    Vitals q8h  Diet: DASH  Activity: Bedrest   PT/OT as tolerated  DVT ppx: Lovenox  Standard precautions: Aspiration, fall, safety, seizure, skin  Code Status - DNR/DNI reaffirmed  HCP - none  84 y/o F with PMH HTN, HLD, chronic right plantar foot ulcers/OM recently admitted to [facility] 12/6-12/11 for acute on chronic osteomyelitis of the right foot that required partial amputation of 2nd ray of right foot 12/8/23, c/o nausea, vomiting, found with acute renal failure, likely due to recent vanco course.     Acute renal failure, likely drug induced  -Admit to telemetry  -Cardiac monitoring  -Nephro consulted per ED  -Check dig level  -CT, UA reviewed, unremarkable. Follow urine cx  -IVF    s/p partial amputation of 2nd ray of right foot 12/8/23  -Due for outpatient podiatry f/u this week regarding dressing   -Podiatry consulted  -PT eval   -Afebrile, non toxic appearing    HTN  -Hold home losartan  -Continue home metoprolol 25mg BID, digoxin 125mcg qd pending dig level    HLD  -Continue home simvastatin 40mg qd    Hypothyroidism  -Continue home synthroid 75mcg qd    ?left upper lung mass  -F/u CT chest    Vitals q8h  Diet: DASH  Activity: Bedrest   PT/OT as tolerated  DVT ppx: Lovenox  Standard precautions: Aspiration, fall, safety, seizure, skin  Code Status - DNR/DNI reaffirmed  HCP - none  84 y/o F with PMH HTN, HLD, chronic right plantar foot ulcers/OM recently admitted to [facility] 12/6-12/11 for acute on chronic osteomyelitis of the right foot that required partial amputation of 2nd ray of right foot 12/8/23, c/o nausea, vomiting, found with acute renal failure, likely due to recent vanco course.     Acute renal failure, likely drug induced  -Admit to telemetry  -Cardiac monitoring  -Nephro consulted per ED  -Check dig level  -CT, UA reviewed, unremarkable. Follow urine cx  -IVF  -Follow repeat BMP    s/p partial amputation of 2nd ray of right foot 12/8/23  -Due for outpatient podiatry f/u this week regarding dressing   -Podiatry consulted  -PT eval   -Afebrile, non toxic appearing    HTN  -Hold home losartan  -Continue home metoprolol 25mg BID, digoxin 125mcg qd pending dig level    HLD  -Continue home simvastatin 40mg qd    Hypothyroidism  -Continue home synthroid 75mcg qd    ?left upper lung mass  -F/u CT chest    Vitals q8h  Diet: DASH  Activity: Bedrest   PT/OT as tolerated  DVT ppx: HSQ  Standard precautions: Aspiration, fall, safety, seizure, skin  Code Status - DNR/DNI reaffirmed  HCP - none  82 y/o F with PMH HTN, HLD, chronic right plantar foot ulcers/OM recently admitted to [facility] 12/6-12/11 for acute on chronic osteomyelitis of the right foot that required partial amputation of 2nd ray of right foot 12/8/23, c/o nausea, vomiting, found with acute renal failure, likely due to recent vanco course.     Acute renal failure, likely drug induced  -Admit to telemetry  -Cardiac monitoring  -Nephro consulted per ED  -Check dig level  -CT, UA reviewed, unremarkable. Follow urine cx  -IVF  -Follow repeat BMP    s/p partial amputation of 2nd ray of right foot 12/8/23  -Due for outpatient podiatry f/u this week regarding dressing   -Podiatry consulted  -PT eval   -Afebrile, non toxic appearing    HTN  -Hold home losartan  -Continue home metoprolol 25mg BID, digoxin 125mcg qd pending dig level    HLD  -Continue home simvastatin 40mg qd    Hypothyroidism  -Continue home synthroid 75mcg qd    ?left upper lung mass  -F/u CT chest    Vitals q8h  Diet: DASH  Activity: Bedrest   PT/OT as tolerated  DVT ppx: HSQ  Standard precautions: Aspiration, fall, safety, seizure, skin  Code Status - DNR/DNI reaffirmed  HCP - none

## 2023-12-21 NOTE — GOALS OF CARE CONVERSATION - ADVANCED CARE PLANNING - CONVERSATION DETAILS
Pt. here from home with vomitting, unable to tolerate IV abx. r/tright foot surgery. pt. is A&Ox3. She signed DNR/I MOLST 12/3/2023, This was reviewed with her and she confirms these are still her GOC. Copy of MOLST placed in chart.

## 2023-12-21 NOTE — H&P ADULT - HISTORY OF PRESENT ILLNESS
84 y/o F with PMH HTN, HLD, chronic right plantar foot ulcers/OM recently admitted to [facility] 12/6-12/11 for acute on chronic osteomyelitis of the right foot that required surgical debridement. Patient was treated with vanco/zosyn, and transitioned to merrem per ID recc, with transition to PO augmentin x7 days.     Presents to [facility] for nausea, unable to tolerate PO abx.  84 y/o F with PMH HTN, HLD, chronic right plantar foot ulcers/OM recently admitted to [facility] 12/6-12/11 for acute on chronic osteomyelitis of the right foot that required surgical debridement. Patient was treated with vanco/zosyn, and transitioned to merrem per ID recc, with transition to PO augmentin x7 days.     Presents to [facility] for nausea, unable to tolerate PO abx.  84 y/o F with PMH HTN, HLD, chronic right plantar foot ulcers/OM recently admitted to [facility] 12/6-12/11 for acute on chronic osteomyelitis of the right foot that required partial amputation of 2nd ray of right foot 12/8/23. Patient was treated with vanco/zosyn, switched to merrem per ID recc, and transitioned to PO augmentin x7 days post op upon discharge. Patient also took PO cipro upon discharge - last dose for both augmentin and cipro approx 3 days ago, self stopped due to nausea, vomiting, inability to tolerate PO. Presents to [facility] ED for malaise, nausea, emesis, unable to tolerate PO, including home meds x 3-4 days. Referred by PCP for further eval. ED course significant for acute renal failure, BUN/Cr 70/8.23, baseline wnl (BUN 12, Cr 0.83 on 12/10/23)    Presents to [facility] for nausea, unable to tolerate PO abx.  84 y/o F with PMH HTN, HLD, chronic right plantar foot ulcers/OM recently admitted to [facility] 12/6-12/11 for acute on chronic osteomyelitis of the right foot that required partial amputation of 2nd ray of right foot 12/8/23. Patient was treated with vanco/zosyn, switched to merrem per ID recc, and transitioned to PO augmentin x7 days post op upon discharge. Patient also took PO cipro upon discharge - last dose for both augmentin and cipro approx 3 days ago, self stopped due to nausea, vomiting, inability to tolerate PO. Presents to [facility] ED for malaise, nausea, emesis, unable to tolerate PO, including home meds x 3-4 days. Referred by PCP for further eval. ED course significant for acute renal failure, BUN/Cr 70/8.23, baseline wnl (BUN 12, Cr 0.83 on 12/10/23)    Presents to [facility] for nausea, unable to tolerate PO abx.  84 y/o F with PMH HTN, HLD, chronic right plantar foot ulcers/OM recently admitted to [facility] 12/6-12/11 for acute on chronic osteomyelitis of the right foot that required partial amputation of 2nd ray of right foot 12/8/23. Patient was treated with vanco/zosyn, switched to merrem per ID recc, and transitioned to PO augmentin x7 days post op upon discharge. Patient also took PO cipro upon discharge - last dose for both augmentin and cipro approx 3 days ago, self stopped due to nausea, vomiting, inability to tolerate PO. Presents to [facility] ED for malaise, nausea, emesis, unable to tolerate PO, including home meds x 3-4 days. Referred by PCP for further eval. ED course significant for acute renal failure, BUN/Cr 70/8.23, baseline wnl (BUN 12, Cr 0.83 on 12/10/23) 82 y/o F with PMH HTN, HLD, chronic right plantar foot ulcers/OM recently admitted to [facility] 12/6-12/11 for acute on chronic osteomyelitis of the right foot that required partial amputation of 2nd ray of right foot 12/8/23. Patient was treated with vanco/zosyn, switched to merrem per ID recc, and transitioned to PO augmentin x7 days post op upon discharge. Patient also took PO cipro upon discharge - last dose for both augmentin and cipro approx 3 days ago, self stopped due to nausea, vomiting, inability to tolerate PO. Presents to [facility] ED for malaise, nausea, emesis, unable to tolerate PO, including home meds x 3-4 days. Referred by PCP for further eval. ED course significant for acute renal failure, BUN/Cr 70/8.23, baseline wnl (BUN 12, Cr 0.83 on 12/10/23)

## 2023-12-21 NOTE — ED ADULT NURSE REASSESSMENT NOTE - NS ED NURSE REASSESS COMMENT FT1
16 Syriac romano catheter placed as per MD orders. Pt tolerated well. UA and UC sent to lab at this time. 16 Mongolian romano catheter placed as per MD orders. Pt tolerated well. UA and UC sent to lab at this time.

## 2023-12-21 NOTE — H&P ADULT - NSHPSOCIALHISTORY_GEN_ALL_CORE
Denies tobacco, EtOH, or illicit drug use. Lives alone in apartment. Recommended to use RW but uses SC. PTA independent in ADLs, IADLs, and ambulation.

## 2023-12-21 NOTE — H&P ADULT - CONVERSATION DETAILS
Current diagnosis and treatment plan reviewed with patient at bedside. GOC reviewed - DNR/DNI reaffirmed. Reviewed HCP - patient states she does not currently have an assigned HCP - does not have a partner, or children. Closest living relative is her sister, though she "does not want to bother her". Patient to consider HCP. All questions/concerns addressed.

## 2023-12-21 NOTE — PATIENT PROFILE ADULT - INTERNATIONAL TRAVEL
Implemented All Universal Safety Interventions:  [place] to call system. Call bell, personal items and telephone within reach. Instruct patient to call for assistance. Room bathroom lighting operational. Non-slip footwear when patient is off stretcher. Physically safe environment: no spills, clutter or unnecessary equipment. Stretcher in lowest position, wheels locked, appropriate side rails in place. No

## 2023-12-21 NOTE — ED ADULT NURSE NOTE - NSICDXPASTMEDICALHX_GEN_ALL_CORE_FT
PAST MEDICAL HISTORY:  HTN (hypertension)     Hyperlipidemia     Hypothyroid     Paroxysmal SVT (supraventricular tachycardia)     Thyroid cyst

## 2023-12-22 DIAGNOSIS — R74.8 ABNORMAL LEVELS OF OTHER SERUM ENZYMES: ICD-10-CM

## 2023-12-22 LAB
ALBUMIN SERPL ELPH-MCNC: 2.5 G/DL — LOW (ref 3.3–5)
ALBUMIN SERPL ELPH-MCNC: 2.5 G/DL — LOW (ref 3.3–5)
ALP SERPL-CCNC: 69 U/L — SIGNIFICANT CHANGE UP (ref 40–120)
ALP SERPL-CCNC: 69 U/L — SIGNIFICANT CHANGE UP (ref 40–120)
ALT FLD-CCNC: 11 U/L — SIGNIFICANT CHANGE UP (ref 10–45)
ALT FLD-CCNC: 11 U/L — SIGNIFICANT CHANGE UP (ref 10–45)
AMYLASE P1 CFR SERPL: 138 U/L — HIGH (ref 25–125)
AMYLASE P1 CFR SERPL: 138 U/L — HIGH (ref 25–125)
ANION GAP SERPL CALC-SCNC: 11 MMOL/L — SIGNIFICANT CHANGE UP (ref 5–17)
ANION GAP SERPL CALC-SCNC: 11 MMOL/L — SIGNIFICANT CHANGE UP (ref 5–17)
AST SERPL-CCNC: 16 U/L — SIGNIFICANT CHANGE UP (ref 10–40)
AST SERPL-CCNC: 16 U/L — SIGNIFICANT CHANGE UP (ref 10–40)
BILIRUB SERPL-MCNC: 0.6 MG/DL — SIGNIFICANT CHANGE UP (ref 0.2–1.2)
BILIRUB SERPL-MCNC: 0.6 MG/DL — SIGNIFICANT CHANGE UP (ref 0.2–1.2)
BUN SERPL-MCNC: 60 MG/DL — HIGH (ref 7–23)
BUN SERPL-MCNC: 60 MG/DL — HIGH (ref 7–23)
CALCIUM SERPL-MCNC: 8.6 MG/DL — SIGNIFICANT CHANGE UP (ref 8.4–10.5)
CALCIUM SERPL-MCNC: 8.6 MG/DL — SIGNIFICANT CHANGE UP (ref 8.4–10.5)
CHLORIDE SERPL-SCNC: 105 MMOL/L — SIGNIFICANT CHANGE UP (ref 96–108)
CHLORIDE SERPL-SCNC: 105 MMOL/L — SIGNIFICANT CHANGE UP (ref 96–108)
CK SERPL-CCNC: 32 U/L — SIGNIFICANT CHANGE UP (ref 25–170)
CK SERPL-CCNC: 32 U/L — SIGNIFICANT CHANGE UP (ref 25–170)
CO2 SERPL-SCNC: 19 MMOL/L — LOW (ref 22–31)
CO2 SERPL-SCNC: 19 MMOL/L — LOW (ref 22–31)
CREAT SERPL-MCNC: 7.86 MG/DL — HIGH (ref 0.5–1.3)
CREAT SERPL-MCNC: 7.86 MG/DL — HIGH (ref 0.5–1.3)
CULTURE RESULTS: NO GROWTH — SIGNIFICANT CHANGE UP
CULTURE RESULTS: NO GROWTH — SIGNIFICANT CHANGE UP
DIGOXIN SERPL-MCNC: 1.1 NG/ML — SIGNIFICANT CHANGE UP (ref 0.8–2)
DIGOXIN SERPL-MCNC: 1.1 NG/ML — SIGNIFICANT CHANGE UP (ref 0.8–2)
EGFR: 5 ML/MIN/1.73M2 — LOW
EGFR: 5 ML/MIN/1.73M2 — LOW
GLUCOSE SERPL-MCNC: 81 MG/DL — SIGNIFICANT CHANGE UP (ref 70–99)
GLUCOSE SERPL-MCNC: 81 MG/DL — SIGNIFICANT CHANGE UP (ref 70–99)
HCT VFR BLD CALC: 29.8 % — LOW (ref 34.5–45)
HCT VFR BLD CALC: 29.8 % — LOW (ref 34.5–45)
HGB BLD-MCNC: 10.1 G/DL — LOW (ref 11.5–15.5)
HGB BLD-MCNC: 10.1 G/DL — LOW (ref 11.5–15.5)
LIDOCAIN IGE QN: >375 U/L — HIGH (ref 16–77)
LIDOCAIN IGE QN: >375 U/L — HIGH (ref 16–77)
MCHC RBC-ENTMCNC: 32.6 PG — SIGNIFICANT CHANGE UP (ref 27–34)
MCHC RBC-ENTMCNC: 32.6 PG — SIGNIFICANT CHANGE UP (ref 27–34)
MCHC RBC-ENTMCNC: 33.9 GM/DL — SIGNIFICANT CHANGE UP (ref 32–36)
MCHC RBC-ENTMCNC: 33.9 GM/DL — SIGNIFICANT CHANGE UP (ref 32–36)
MCV RBC AUTO: 96.1 FL — SIGNIFICANT CHANGE UP (ref 80–100)
MCV RBC AUTO: 96.1 FL — SIGNIFICANT CHANGE UP (ref 80–100)
NRBC # BLD: 0 /100 WBCS — SIGNIFICANT CHANGE UP (ref 0–0)
NRBC # BLD: 0 /100 WBCS — SIGNIFICANT CHANGE UP (ref 0–0)
PLATELET # BLD AUTO: 279 K/UL — SIGNIFICANT CHANGE UP (ref 150–400)
PLATELET # BLD AUTO: 279 K/UL — SIGNIFICANT CHANGE UP (ref 150–400)
POTASSIUM SERPL-MCNC: 4.9 MMOL/L — SIGNIFICANT CHANGE UP (ref 3.5–5.3)
POTASSIUM SERPL-MCNC: 4.9 MMOL/L — SIGNIFICANT CHANGE UP (ref 3.5–5.3)
POTASSIUM SERPL-SCNC: 4.9 MMOL/L — SIGNIFICANT CHANGE UP (ref 3.5–5.3)
POTASSIUM SERPL-SCNC: 4.9 MMOL/L — SIGNIFICANT CHANGE UP (ref 3.5–5.3)
PROT SERPL-MCNC: 5.6 G/DL — LOW (ref 6–8.3)
PROT SERPL-MCNC: 5.6 G/DL — LOW (ref 6–8.3)
RBC # BLD: 3.1 M/UL — LOW (ref 3.8–5.2)
RBC # BLD: 3.1 M/UL — LOW (ref 3.8–5.2)
RBC # FLD: 11.9 % — SIGNIFICANT CHANGE UP (ref 10.3–14.5)
RBC # FLD: 11.9 % — SIGNIFICANT CHANGE UP (ref 10.3–14.5)
SODIUM SERPL-SCNC: 135 MMOL/L — SIGNIFICANT CHANGE UP (ref 135–145)
SODIUM SERPL-SCNC: 135 MMOL/L — SIGNIFICANT CHANGE UP (ref 135–145)
SPECIMEN SOURCE: SIGNIFICANT CHANGE UP
SPECIMEN SOURCE: SIGNIFICANT CHANGE UP
WBC # BLD: 6.3 K/UL — SIGNIFICANT CHANGE UP (ref 3.8–10.5)
WBC # BLD: 6.3 K/UL — SIGNIFICANT CHANGE UP (ref 3.8–10.5)
WBC # FLD AUTO: 6.3 K/UL — SIGNIFICANT CHANGE UP (ref 3.8–10.5)
WBC # FLD AUTO: 6.3 K/UL — SIGNIFICANT CHANGE UP (ref 3.8–10.5)

## 2023-12-22 PROCEDURE — 99223 1ST HOSP IP/OBS HIGH 75: CPT

## 2023-12-22 PROCEDURE — 74176 CT ABD & PELVIS W/O CONTRAST: CPT | Mod: 26

## 2023-12-22 PROCEDURE — 99233 SBSQ HOSP IP/OBS HIGH 50: CPT

## 2023-12-22 RX ORDER — ACETAMINOPHEN 500 MG
650 TABLET ORAL EVERY 6 HOURS
Refills: 0 | Status: DISCONTINUED | OUTPATIENT
Start: 2023-12-22 | End: 2023-12-28

## 2023-12-22 RX ORDER — IOHEXOL 300 MG/ML
30 INJECTION, SOLUTION INTRAVENOUS ONCE
Refills: 0 | Status: COMPLETED | OUTPATIENT
Start: 2023-12-22 | End: 2023-12-22

## 2023-12-22 RX ORDER — METOPROLOL TARTRATE 50 MG
25 TABLET ORAL
Refills: 0 | Status: DISCONTINUED | OUTPATIENT
Start: 2023-12-22 | End: 2023-12-23

## 2023-12-22 RX ORDER — METOPROLOL TARTRATE 50 MG
25 TABLET ORAL DAILY
Refills: 0 | Status: DISCONTINUED | OUTPATIENT
Start: 2023-12-22 | End: 2023-12-22

## 2023-12-22 RX ADMIN — HEPARIN SODIUM 5000 UNIT(S): 5000 INJECTION INTRAVENOUS; SUBCUTANEOUS at 21:15

## 2023-12-22 RX ADMIN — SIMVASTATIN 40 MILLIGRAM(S): 20 TABLET, FILM COATED ORAL at 21:14

## 2023-12-22 RX ADMIN — SODIUM CHLORIDE 75 MILLILITER(S): 9 INJECTION, SOLUTION INTRAVENOUS at 15:09

## 2023-12-22 RX ADMIN — HEPARIN SODIUM 5000 UNIT(S): 5000 INJECTION INTRAVENOUS; SUBCUTANEOUS at 15:10

## 2023-12-22 RX ADMIN — SODIUM CHLORIDE 75 MILLILITER(S): 9 INJECTION, SOLUTION INTRAVENOUS at 19:08

## 2023-12-22 RX ADMIN — HEPARIN SODIUM 5000 UNIT(S): 5000 INJECTION INTRAVENOUS; SUBCUTANEOUS at 05:28

## 2023-12-22 RX ADMIN — Medication 75 MICROGRAM(S): at 05:28

## 2023-12-22 RX ADMIN — Medication 650 MILLIGRAM(S): at 01:18

## 2023-12-22 RX ADMIN — IOHEXOL 30 MILLILITER(S): 300 INJECTION, SOLUTION INTRAVENOUS at 12:29

## 2023-12-22 RX ADMIN — Medication 650 MILLIGRAM(S): at 00:16

## 2023-12-22 RX ADMIN — Medication 25 MILLIGRAM(S): at 17:24

## 2023-12-22 NOTE — PHYSICAL THERAPY INITIAL EVALUATION ADULT - ADDITIONAL COMMENTS
Pt lives in an apartment 2 flgiths of steps with rail to apartment, apartment on 1 level, ambulates with SAC.

## 2023-12-22 NOTE — CONSULT NOTE ADULT - SUBJECTIVE AND OBJECTIVE BOX
INTERVAL HPI/OVERNIGHT EVENTS:  HPI:  84 y/o F with PMH HTN, HLD, chronic right plantar foot ulcers/OM recently admitted to [facility] 12/6-12/11 for acute on chronic osteomyelitis of the right foot that required partial amputation of 2nd ray of right foot 12/8/23. Patient was treated with vanco/zosyn, switched to merrem per ID recc, and transitioned to PO augmentin x7 days post op upon discharge. Patient also took PO cipro upon discharge - last dose for both augmentin and cipro approx 3 days ago, self stopped due to nausea, vomiting, inability to tolerate PO. Presents to [facility] ED for malaise, nausea, emesis, unable to tolerate PO, including home meds x 3-4 days. Referred by PCP for further eval. ED course significant for acute renal failure, BUN/Cr 70/8.23, baseline wnl (BUN 12, Cr 0.83 on 12/10/23) (21 Dec 2023 12:36)    MEDICATIONS  (STANDING):  dextrose 5% + sodium chloride 0.9%. 1000 milliLiter(s) (75 mL/Hr) IV Continuous <Continuous>  heparin   Injectable 5000 Unit(s) SubCutaneous every 8 hours  levothyroxine 75 MICROGram(s) Oral daily  metoprolol succinate ER 25 milliGRAM(s) Oral two times a day  simvastatin 40 milliGRAM(s) Oral at bedtime    MEDICATIONS  (PRN):  acetaminophen     Tablet .. 650 milliGRAM(s) Oral every 6 hours PRN Moderate Pain (4 - 6)      Allergies    doxycycline (Unknown)  linezolid (Unknown)    Intolerances    Levaquin (Stomach Upset; Nausea)      PAST MEDICAL & SURGICAL HISTORY:  HTN (hypertension)      Hyperlipidemia      Hypothyroid      Paroxysmal SVT (supraventricular tachycardia)      Thyroid cyst      Back problem  s/p back surgery      S/P thyroid surgery      S/P hernia surgery      S/P rotator cuff surgery      REVIEW OF SYSTEMS	 - See HPI    PHYSICAL EXAM:   Vital Signs:  Vital Signs Last 24 Hrs  T(C): 36.4 (22 Dec 2023 16:35), Max: 36.7 (21 Dec 2023 19:46)  T(F): 97.5 (22 Dec 2023 16:35), Max: 98.1 (21 Dec 2023 19:46)  HR: 49 (22 Dec 2023 16:35) (48 - 64)  BP: 177/69 (22 Dec 2023 16:35) (121/66 - 180/74)  BP(mean): --  RR: 16 (22 Dec 2023 16:35) (16 - 18)  SpO2: 100% (22 Dec 2023 16:35) (98% - 100%)    Parameters below as of 22 Dec 2023 16:35  Patient On (Oxygen Delivery Method): room air      Daily Height in cm: 177.8 (21 Dec 2023 19:46)    Daily I&O's Summary    21 Dec 2023 07:01  -  22 Dec 2023 07:00  --------------------------------------------------------  IN: 1095 mL / OUT: 800 mL / NET: 295 mL    22 Dec 2023 07:01  -  22 Dec 2023 16:51  --------------------------------------------------------  IN: 0 mL / OUT: 1100 mL / NET: -1100 mL        GENERAL:  Appears stated age, well-groomed, well-nourished, no distress  HEENT:  Moist and clear sclera and conjuctivae  CHEST:  Full & symmetric excursion, no increased effort, breath sounds clear  HEART:  Regular rhythm, S1, S2  ABDOMEN:  Soft, non-tender, non-distended, normoactive bowel sounds,  no masses   EXTREMITIES:  no edema  SKIN:  No rash  NEURO:  Alert, oriented, no tremor, no encephalopathy      LABS:                        10.1   6.30  )-----------( 279      ( 22 Dec 2023 06:54 )             29.8     12-22    135  |  105  |  60<H>  ----------------------------<  81  4.9   |  19<L>  |  7.86<H>    Ca    8.6      22 Dec 2023 06:54    TPro  5.6<L>  /  Alb  2.5<L>  /  TBili  0.6  /  DBili  x   /  AST  16  /  ALT  11  /  AlkPhos  69  12-22    PT/INR - ( 21 Dec 2023 11:26 )   PT: 12.3 sec;   INR: 1.05 ratio           Urinalysis Basic - ( 22 Dec 2023 06:54 )    Color: x / Appearance: x / SG: x / pH: x  Gluc: 81 mg/dL / Ketone: x  / Bili: x / Urobili: x   Blood: x / Protein: x / Nitrite: x   Leuk Esterase: x / RBC: x / WBC x   Sq Epi: x / Non Sq Epi: x / Bacteria: x      Amylase: 138 U/L (12-22 @ 06:37)    Lipase: >375 U/L (12-22 @ 06:37)    CT 12/22  IMPRESSION: No CT evidence for intra-abdominal or pelvic malignancy on   this noncontrast CT evaluation.   INTERVAL HPI/OVERNIGHT EVENTS:  HPI:  84 y/o F with PMH HTN, HLD, chronic right plantar foot ulcers/OM recently admitted to [facility] 12/6-12/11 for acute on chronic osteomyelitis of the right foot that required partial amputation of 2nd ray of right foot 12/8/23. Patient was treated with vanco/zosyn, switched to merrem per ID recc, and transitioned to PO augmentin x7 days post op upon discharge. Patient also took PO cipro upon discharge - last dose for both augmentin and cipro approx 3 days ago, self stopped due to nausea, vomiting, inability to tolerate PO. Presents to [facility] ED for malaise, nausea, emesis, unable to tolerate PO, including home meds x 3-4 days. Referred by PCP for further eval. ED course significant for acute renal failure, BUN/Cr 70/8.23, baseline wnl (BUN 12, Cr 0.83 on 12/10/23) (21 Dec 2023 12:36)    MEDICATIONS  (STANDING):  dextrose 5% + sodium chloride 0.9%. 1000 milliLiter(s) (75 mL/Hr) IV Continuous <Continuous>  heparin   Injectable 5000 Unit(s) SubCutaneous every 8 hours  levothyroxine 75 MICROGram(s) Oral daily  metoprolol succinate ER 25 milliGRAM(s) Oral two times a day  simvastatin 40 milliGRAM(s) Oral at bedtime    MEDICATIONS  (PRN):  acetaminophen     Tablet .. 650 milliGRAM(s) Oral every 6 hours PRN Moderate Pain (4 - 6)      Allergies    doxycycline (Unknown)  linezolid (Unknown)    Intolerances    Levaquin (Stomach Upset; Nausea)      PAST MEDICAL & SURGICAL HISTORY:  HTN (hypertension)      Hyperlipidemia      Hypothyroid      Paroxysmal SVT (supraventricular tachycardia)      Thyroid cyst      Back problem  s/p back surgery      S/P thyroid surgery      S/P hernia surgery      S/P rotator cuff surgery      REVIEW OF SYSTEMS	 - See HPI    PHYSICAL EXAM:   Vital Signs:  Vital Signs Last 24 Hrs  T(C): 36.4 (22 Dec 2023 16:35), Max: 36.7 (21 Dec 2023 19:46)  T(F): 97.5 (22 Dec 2023 16:35), Max: 98.1 (21 Dec 2023 19:46)  HR: 49 (22 Dec 2023 16:35) (48 - 64)  BP: 177/69 (22 Dec 2023 16:35) (121/66 - 180/74)  BP(mean): --  RR: 16 (22 Dec 2023 16:35) (16 - 18)  SpO2: 100% (22 Dec 2023 16:35) (98% - 100%)    Parameters below as of 22 Dec 2023 16:35  Patient On (Oxygen Delivery Method): room air      Daily Height in cm: 177.8 (21 Dec 2023 19:46)    Daily I&O's Summary    21 Dec 2023 07:01  -  22 Dec 2023 07:00  --------------------------------------------------------  IN: 1095 mL / OUT: 800 mL / NET: 295 mL    22 Dec 2023 07:01  -  22 Dec 2023 16:51  --------------------------------------------------------  IN: 0 mL / OUT: 1100 mL / NET: -1100 mL        GENERAL:  Appears stated age, well-groomed, well-nourished, no distress  HEENT:  Moist and clear sclera and conjuctivae  CHEST:  Full & symmetric excursion, no increased effort, breath sounds clear  HEART:  Regular rhythm, S1, S2  ABDOMEN:  Soft, non-tender, non-distended, normoactive bowel sounds,  no masses   EXTREMITIES:  no edema  SKIN:  No rash  NEURO:  Alert, oriented, no tremor, no encephalopathy      LABS:                        10.1   6.30  )-----------( 279      ( 22 Dec 2023 06:54 )             29.8     12-22    135  |  105  |  60<H>  ----------------------------<  81  4.9   |  19<L>  |  7.86<H>    Ca    8.6      22 Dec 2023 06:54    TPro  5.6<L>  /  Alb  2.5<L>  /  TBili  0.6  /  DBili  x   /  AST  16  /  ALT  11  /  AlkPhos  69  12-22    PT/INR - ( 21 Dec 2023 11:26 )   PT: 12.3 sec;   INR: 1.05 ratio           Urinalysis Basic - ( 22 Dec 2023 06:54 )    Color: x / Appearance: x / SG: x / pH: x  Gluc: 81 mg/dL / Ketone: x  / Bili: x / Urobili: x   Blood: x / Protein: x / Nitrite: x   Leuk Esterase: x / RBC: x / WBC x   Sq Epi: x / Non Sq Epi: x / Bacteria: x      Amylase: 138 U/L (12-22 @ 06:37)    Lipase: >375 U/L (12-22 @ 06:37)    CT 12/22  IMPRESSION: No CT evidence for intra-abdominal or pelvic malignancy on   this noncontrast CT evaluation.

## 2023-12-22 NOTE — PROGRESS NOTE ADULT - SUBJECTIVE AND OBJECTIVE BOX
Feels better today    Vital Signs Last 24 Hrs  T(C): 36.4 (12-22-23 @ 16:35), Max: 36.7 (12-21-23 @ 19:46)  T(F): 97.5 (12-22-23 @ 16:35), Max: 98.1 (12-21-23 @ 19:46)  HR: 49 (12-22-23 @ 16:35) (48 - 64)  BP: 177/69 (12-22-23 @ 16:35) (121/66 - 180/74)  RR: 16 (12-22-23 @ 16:35) (16 - 18)  SpO2: 100% (12-22-23 @ 16:35) (98% - 100%)    I&O's Detail    21 Dec 2023 07:01  -  22 Dec 2023 07:00  --------------------------------------------------------  IN:    dextrose 5% + sodium chloride 0.9%: 975 mL    Oral Fluid: 120 mL  Total IN: 1095 mL    OUT:    Indwelling Catheter - Urethral (mL): 800 mL  Total OUT: 800 mL    22 Dec 2023 07:01  -  22 Dec 2023 17:13  --------------------------------------------------------  OUT:    Indwelling Catheter - Urethral (mL): 1100 mL  Total OUT: 1100 mL    s1s2  b/l air entry  soft, ND  no edema  AO                                10.1   6.30  )-----------( 279      ( 22 Dec 2023 06:54 )             29.8     22 Dec 2023 06:54    135    |  105    |  60     ----------------------------<  81     4.9     |  19     |  7.86     Ca    8.6        22 Dec 2023 06:54    TPro  5.6    /  Alb  2.5    /  TBili  0.6    /  DBili  x      /  AST  16     /  ALT  11     /  AlkPhos  69     22 Dec 2023 06:54    LIVER FUNCTIONS - ( 22 Dec 2023 06:54 )  Alb: 2.5 g/dL / Pro: 5.6 g/dL / ALK PHOS: 69 U/L / ALT: 11 U/L / AST: 16 U/L / GGT: x           PT/INR - ( 21 Dec 2023 11:26 )   PT: 12.3 sec;   INR: 1.05 ratio      Culture - Urine (collected 21 Dec 2023 12:20)  Source: Clean Catch Clean Catch (Midstream)  Final Report (22 Dec 2023 13:20):    No growth    acetaminophen     Tablet .. 650 milliGRAM(s) Oral every 6 hours PRN  dextrose 5% + sodium chloride 0.9%. 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, exacerbated by ARB as op  CT A/P w/o hydro, UA negative  CT Chest - emphysema, COLTEN mass  Pulm eval   Continue romano, IVF today   Avoid nephrotoxins  CBC, BMP in am  Will follow     [phone]       Feels better today    Vital Signs Last 24 Hrs  T(C): 36.4 (12-22-23 @ 16:35), Max: 36.7 (12-21-23 @ 19:46)  T(F): 97.5 (12-22-23 @ 16:35), Max: 98.1 (12-21-23 @ 19:46)  HR: 49 (12-22-23 @ 16:35) (48 - 64)  BP: 177/69 (12-22-23 @ 16:35) (121/66 - 180/74)  RR: 16 (12-22-23 @ 16:35) (16 - 18)  SpO2: 100% (12-22-23 @ 16:35) (98% - 100%)    I&O's Detail    21 Dec 2023 07:01  -  22 Dec 2023 07:00  --------------------------------------------------------  IN:    dextrose 5% + sodium chloride 0.9%: 975 mL    Oral Fluid: 120 mL  Total IN: 1095 mL    OUT:    Indwelling Catheter - Urethral (mL): 800 mL  Total OUT: 800 mL    22 Dec 2023 07:01  -  22 Dec 2023 17:13  --------------------------------------------------------  OUT:    Indwelling Catheter - Urethral (mL): 1100 mL  Total OUT: 1100 mL    s1s2  b/l air entry  soft, ND  no edema  AO                                10.1   6.30  )-----------( 279      ( 22 Dec 2023 06:54 )             29.8     22 Dec 2023 06:54    135    |  105    |  60     ----------------------------<  81     4.9     |  19     |  7.86     Ca    8.6        22 Dec 2023 06:54    TPro  5.6    /  Alb  2.5    /  TBili  0.6    /  DBili  x      /  AST  16     /  ALT  11     /  AlkPhos  69     22 Dec 2023 06:54    LIVER FUNCTIONS - ( 22 Dec 2023 06:54 )  Alb: 2.5 g/dL / Pro: 5.6 g/dL / ALK PHOS: 69 U/L / ALT: 11 U/L / AST: 16 U/L / GGT: x           PT/INR - ( 21 Dec 2023 11:26 )   PT: 12.3 sec;   INR: 1.05 ratio      Culture - Urine (collected 21 Dec 2023 12:20)  Source: Clean Catch Clean Catch (Midstream)  Final Report (22 Dec 2023 13:20):    No growth    acetaminophen     Tablet .. 650 milliGRAM(s) Oral every 6 hours PRN  dextrose 5% + sodium chloride 0.9%. 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

## 2023-12-22 NOTE — CONSULT NOTE ADULT - SUBJECTIVE AND OBJECTIVE BOX
Initial HPI on admission:  HPI:  82 y/o F with PMH HTN, HLD, chronic right plantar foot ulcers/OM recently admitted to [facility] 12/6-12/11 for acute on chronic osteomyelitis of the right foot that required partial amputation of 2nd ray of right foot 12/8/23. Patient was treated with vanco/zosyn, switched to merrem per ID recc, and transitioned to PO augmentin x7 days post op upon discharge. Patient also took PO cipro upon discharge - last dose for both augmentin and cipro approx 3 days ago, self stopped due to nausea, vomiting, inability to tolerate PO. Presents to [facility] ED for malaise, nausea, emesis, unable to tolerate PO, including home meds x 3-4 days. Referred by PCP for further eval. ED course significant for acute renal failure, BUN/Cr 70/8.23, baseline wnl (BUN 12, Cr 0.83 on 12/10/23) (21 Dec 2023 12:36)      BRIEF HOSPITAL COURSE: Denies any history of malignancy. States her mother had lung nodule which was benign. Has history of smoking. + weight loss due to poor appetite.     PAST MEDICAL & SURGICAL HISTORY:  HTN (hypertension)      Hyperlipidemia      Hypothyroid      Paroxysmal SVT (supraventricular tachycardia)      Thyroid cyst      Back problem  s/p back surgery      S/P thyroid surgery      S/P hernia surgery      S/P rotator cuff surgery        Allergies    doxycycline (Unknown)  linezolid (Unknown)    Intolerances    Levaquin (Stomach Upset; Nausea)    FAMILY HISTORY:  Family history of early CAD (Father)    Social History:  Former smoker, EtOH, or illicit drug use. Lives alone in apartment. Recommended to use RW but uses SC. PTA independent in ADLs, IADLs, and ambulation. (21 Dec 2023 12:36)      Review of Systems:  Negative except for above    Medications:  acetaminophen     Tablet .. 650 milliGRAM(s) Oral every 6 hours PRN Moderate Pain (4 - 6)  dextrose 5% + sodium chloride 0.9%. 1000 milliLiter(s) (75 mL/Hr) IV Continuous <Continuous>  heparin   Injectable 5000 Unit(s) SubCutaneous every 8 hours  levothyroxine 75 MICROGram(s) Oral daily  metoprolol succinate ER 25 milliGRAM(s) Oral two times a day  simvastatin 40 milliGRAM(s) Oral at bedtime    MEDICATIONS  (STANDING):  dextrose 5% + sodium chloride 0.9%. 1000 milliLiter(s) (75 mL/Hr) IV Continuous <Continuous>  heparin   Injectable 5000 Unit(s) SubCutaneous every 8 hours  levothyroxine 75 MICROGram(s) Oral daily  metoprolol succinate ER 25 milliGRAM(s) Oral two times a day  simvastatin 40 milliGRAM(s) Oral at bedtime    MEDICATIONS  (PRN):  acetaminophen     Tablet .. 650 milliGRAM(s) Oral every 6 hours PRN Moderate Pain (4 - 6)      Antibiotics History      Heme Medications   heparin   Injectable 5000 Unit(s) SubCutaneous every 8 hours, 12-21-23 @ 13:47      GI Medications        Home Medications:  Last Order Reconciliation Date: Not Done  acetaminophen 325 mg oral tablet: 2 tab(s) orally every 6 hours As needed Temp greater or equal to 38C (100.4F), Mild Pain (1 - 3) (12-22-23 @ 11:13)  amoxicillin-clavulanate 875 mg-125 mg oral tablet: 875 milligram(s) orally 2 times a day (12-22-23 @ 11:13)  ciprofloxacin 750 mg oral tablet: 1 tab(s) orally 2 times a day x 30 days (12-11-23 @ 10:12)  Digitek 125 mcg (0.125 mg) oral tablet: 1 tab(s) orally once a day (12-22-23 @ 11:13)  levothyroxine 75 mcg (0.075 mg) oral tablet: 1 tab(s) orally once a day (12-22-23 @ 11:13)  losartan 25 mg oral tablet: 1 tab(s) orally once a day (12-22-23 @ 11:13)  metoprolol succinate 25 mg oral tablet, extended release: 1 tab(s) orally 2 times a day (12-22-23 @ 11:13)  simvastatin 40 mg oral tablet: 1 tab(s) orally once a day (at bedtime) (12-22-23 @ 11:13)      LABS:                        10.1   6.30  )-----------( 279      ( 22 Dec 2023 06:54 )             29.8     12-22    135  |  105  |  60<H>  ----------------------------<  81  4.9   |  19<L>  |  7.86<H>    Ca    8.6      22 Dec 2023 06:54    TPro  5.6<L>  /  Alb  2.5<L>  /  TBili  0.6  /  DBili  x   /  AST  16  /  ALT  11  /  AlkPhos  69  12-22      CARDIAC MARKERS ( 22 Dec 2023 06:37 )  x     / x     / 32 U/L / x     / x            PT/INR - ( 21 Dec 2023 11:26 )   PT: 12.3 sec;   INR: 1.05 ratio           Urinalysis Basic - ( 22 Dec 2023 06:54 )    Color: x / Appearance: x / SG: x / pH: x  Gluc: 81 mg/dL / Ketone: x  / Bili: x / Urobili: x   Blood: x / Protein: x / Nitrite: x   Leuk Esterase: x / RBC: x / WBC x   Sq Epi: x / Non Sq Epi: x / Bacteria: x              CULTURES: (if applicable)    Culture - Urine (collected 12-21-23 @ 12:20)  Source: Clean Catch Clean Catch (Midstream)  Final Report (12-22-23 @ 13:20):    No growth    Culture - Tissue with Gram Stain (collected 12-08-23 @ 22:21)  Source: Bone 2nd metatarsal right  Gram Stain (12-09-23 @ 02:42):    No polymorphonuclear cells seen per low power field    No organisms seen per oil power field  Final Report (12-13-23 @ 16:25):    No growth at 5 days    Culture - Surgical Swab (collected 12-08-23 @ 22:21)  Source: .Surgical Swab right foot deep  Final Report (12-14-23 @ 08:38):    Rare Corynebacterium striatum group "Susceptibilities not performed"    VITALS:  T(C): 36.6 (12-22-23 @ 10:20), Max: 36.7 (12-21-23 @ 19:46)  T(F): 97.9 (12-22-23 @ 10:20), Max: 98.1 (12-21-23 @ 19:46)  HR: 50 (12-22-23 @ 10:20) (48 - 72)  BP: 135/63 (12-22-23 @ 10:20) (121/66 - 180/74)  BP(mean): --  ABP: --  ABP(mean): --  RR: 16 (12-22-23 @ 10:20) (16 - 18)  SpO2: 100% (12-22-23 @ 10:20) (98% - 100%)  CVP(mm Hg): --  CVP(cm H2O): --    Ins and Outs     12-21-23 @ 07:01  -  12-22-23 @ 07:00  --------------------------------------------------------  IN: 1095 mL / OUT: 800 mL / NET: 295 mL    12-22-23 @ 07:01  -  12-22-23 @ 13:41  --------------------------------------------------------  IN: 0 mL / OUT: 800 mL / NET: -800 mL        Height (cm): 177.8 (12-21-23 @ 19:46)  Weight (kg): 65.9 (12-21-23 @ 19:46)  BMI (kg/m2): 20.8 (12-21-23 @ 19:46)        I&O's Detail    21 Dec 2023 07:01  -  22 Dec 2023 07:00  --------------------------------------------------------  IN:    dextrose 5% + sodium chloride 0.9%: 975 mL    Oral Fluid: 120 mL  Total IN: 1095 mL    OUT:    Indwelling Catheter - Urethral (mL): 800 mL  Total OUT: 800 mL    Total NET: 295 mL      22 Dec 2023 07:01  -  22 Dec 2023 13:41  --------------------------------------------------------  IN:  Total IN: 0 mL    OUT:    Indwelling Catheter - Urethral (mL): 800 mL  Total OUT: 800 mL    Total NET: -800 mL          Physical Examination:  GENERAL:               Alert, Oriented, No acute distress.    HEENT:                    Pupils equal, reactive to light.  Symmetric. No JVD, Moist MM, no palpable lymphadenopathy  PULM:                     Bilateral air entry,No Rales, No Rhonchi, No Wheezing  CVS:                         S1, S2,  No Murmur  ABD:                        Soft, nondistended, nontender, normoactive bowel sounds,   EXT:                         No edema, nontender, No Cyanosis or Clubbing, right foot dressing  Vascular:                Warm Extremities, Normal Capillary refill, Normal Distal Pulses  SKIN:                       Warm and well perfused, no rashes noted.   NEURO:                  Alert, oriented, interactive, nonfocal, follows commands  PSYC:                      Calm, + Insight.     Initial HPI on admission:  HPI:  84 y/o F with PMH HTN, HLD, chronic right plantar foot ulcers/OM recently admitted to [facility] 12/6-12/11 for acute on chronic osteomyelitis of the right foot that required partial amputation of 2nd ray of right foot 12/8/23. Patient was treated with vanco/zosyn, switched to merrem per ID recc, and transitioned to PO augmentin x7 days post op upon discharge. Patient also took PO cipro upon discharge - last dose for both augmentin and cipro approx 3 days ago, self stopped due to nausea, vomiting, inability to tolerate PO. Presents to [facility] ED for malaise, nausea, emesis, unable to tolerate PO, including home meds x 3-4 days. Referred by PCP for further eval. ED course significant for acute renal failure, BUN/Cr 70/8.23, baseline wnl (BUN 12, Cr 0.83 on 12/10/23) (21 Dec 2023 12:36)      BRIEF HOSPITAL COURSE: Denies any history of malignancy. States her mother had lung nodule which was benign. Has history of smoking. + weight loss due to poor appetite.     PAST MEDICAL & SURGICAL HISTORY:  HTN (hypertension)      Hyperlipidemia      Hypothyroid      Paroxysmal SVT (supraventricular tachycardia)      Thyroid cyst      Back problem  s/p back surgery      S/P thyroid surgery      S/P hernia surgery      S/P rotator cuff surgery        Allergies    doxycycline (Unknown)  linezolid (Unknown)    Intolerances    Levaquin (Stomach Upset; Nausea)    FAMILY HISTORY:  Family history of early CAD (Father)    Social History:  Former smoker, EtOH, or illicit drug use. Lives alone in apartment. Recommended to use RW but uses SC. PTA independent in ADLs, IADLs, and ambulation. (21 Dec 2023 12:36)      Review of Systems:  Negative except for above    Medications:  acetaminophen     Tablet .. 650 milliGRAM(s) Oral every 6 hours PRN Moderate Pain (4 - 6)  dextrose 5% + sodium chloride 0.9%. 1000 milliLiter(s) (75 mL/Hr) IV Continuous <Continuous>  heparin   Injectable 5000 Unit(s) SubCutaneous every 8 hours  levothyroxine 75 MICROGram(s) Oral daily  metoprolol succinate ER 25 milliGRAM(s) Oral two times a day  simvastatin 40 milliGRAM(s) Oral at bedtime    MEDICATIONS  (STANDING):  dextrose 5% + sodium chloride 0.9%. 1000 milliLiter(s) (75 mL/Hr) IV Continuous <Continuous>  heparin   Injectable 5000 Unit(s) SubCutaneous every 8 hours  levothyroxine 75 MICROGram(s) Oral daily  metoprolol succinate ER 25 milliGRAM(s) Oral two times a day  simvastatin 40 milliGRAM(s) Oral at bedtime    MEDICATIONS  (PRN):  acetaminophen     Tablet .. 650 milliGRAM(s) Oral every 6 hours PRN Moderate Pain (4 - 6)      Antibiotics History      Heme Medications   heparin   Injectable 5000 Unit(s) SubCutaneous every 8 hours, 12-21-23 @ 13:47      GI Medications        Home Medications:  Last Order Reconciliation Date: Not Done  acetaminophen 325 mg oral tablet: 2 tab(s) orally every 6 hours As needed Temp greater or equal to 38C (100.4F), Mild Pain (1 - 3) (12-22-23 @ 11:13)  amoxicillin-clavulanate 875 mg-125 mg oral tablet: 875 milligram(s) orally 2 times a day (12-22-23 @ 11:13)  ciprofloxacin 750 mg oral tablet: 1 tab(s) orally 2 times a day x 30 days (12-11-23 @ 10:12)  Digitek 125 mcg (0.125 mg) oral tablet: 1 tab(s) orally once a day (12-22-23 @ 11:13)  levothyroxine 75 mcg (0.075 mg) oral tablet: 1 tab(s) orally once a day (12-22-23 @ 11:13)  losartan 25 mg oral tablet: 1 tab(s) orally once a day (12-22-23 @ 11:13)  metoprolol succinate 25 mg oral tablet, extended release: 1 tab(s) orally 2 times a day (12-22-23 @ 11:13)  simvastatin 40 mg oral tablet: 1 tab(s) orally once a day (at bedtime) (12-22-23 @ 11:13)      LABS:                        10.1   6.30  )-----------( 279      ( 22 Dec 2023 06:54 )             29.8     12-22    135  |  105  |  60<H>  ----------------------------<  81  4.9   |  19<L>  |  7.86<H>    Ca    8.6      22 Dec 2023 06:54    TPro  5.6<L>  /  Alb  2.5<L>  /  TBili  0.6  /  DBili  x   /  AST  16  /  ALT  11  /  AlkPhos  69  12-22      CARDIAC MARKERS ( 22 Dec 2023 06:37 )  x     / x     / 32 U/L / x     / x            PT/INR - ( 21 Dec 2023 11:26 )   PT: 12.3 sec;   INR: 1.05 ratio           Urinalysis Basic - ( 22 Dec 2023 06:54 )    Color: x / Appearance: x / SG: x / pH: x  Gluc: 81 mg/dL / Ketone: x  / Bili: x / Urobili: x   Blood: x / Protein: x / Nitrite: x   Leuk Esterase: x / RBC: x / WBC x   Sq Epi: x / Non Sq Epi: x / Bacteria: x              CULTURES: (if applicable)    Culture - Urine (collected 12-21-23 @ 12:20)  Source: Clean Catch Clean Catch (Midstream)  Final Report (12-22-23 @ 13:20):    No growth    Culture - Tissue with Gram Stain (collected 12-08-23 @ 22:21)  Source: Bone 2nd metatarsal right  Gram Stain (12-09-23 @ 02:42):    No polymorphonuclear cells seen per low power field    No organisms seen per oil power field  Final Report (12-13-23 @ 16:25):    No growth at 5 days    Culture - Surgical Swab (collected 12-08-23 @ 22:21)  Source: .Surgical Swab right foot deep  Final Report (12-14-23 @ 08:38):    Rare Corynebacterium striatum group "Susceptibilities not performed"    VITALS:  T(C): 36.6 (12-22-23 @ 10:20), Max: 36.7 (12-21-23 @ 19:46)  T(F): 97.9 (12-22-23 @ 10:20), Max: 98.1 (12-21-23 @ 19:46)  HR: 50 (12-22-23 @ 10:20) (48 - 72)  BP: 135/63 (12-22-23 @ 10:20) (121/66 - 180/74)  BP(mean): --  ABP: --  ABP(mean): --  RR: 16 (12-22-23 @ 10:20) (16 - 18)  SpO2: 100% (12-22-23 @ 10:20) (98% - 100%)  CVP(mm Hg): --  CVP(cm H2O): --    Ins and Outs     12-21-23 @ 07:01  -  12-22-23 @ 07:00  --------------------------------------------------------  IN: 1095 mL / OUT: 800 mL / NET: 295 mL    12-22-23 @ 07:01  -  12-22-23 @ 13:41  --------------------------------------------------------  IN: 0 mL / OUT: 800 mL / NET: -800 mL        Height (cm): 177.8 (12-21-23 @ 19:46)  Weight (kg): 65.9 (12-21-23 @ 19:46)  BMI (kg/m2): 20.8 (12-21-23 @ 19:46)        I&O's Detail    21 Dec 2023 07:01  -  22 Dec 2023 07:00  --------------------------------------------------------  IN:    dextrose 5% + sodium chloride 0.9%: 975 mL    Oral Fluid: 120 mL  Total IN: 1095 mL    OUT:    Indwelling Catheter - Urethral (mL): 800 mL  Total OUT: 800 mL    Total NET: 295 mL      22 Dec 2023 07:01  -  22 Dec 2023 13:41  --------------------------------------------------------  IN:  Total IN: 0 mL    OUT:    Indwelling Catheter - Urethral (mL): 800 mL  Total OUT: 800 mL    Total NET: -800 mL          Physical Examination:  GENERAL:               Alert, Oriented, No acute distress.    HEENT:                    Pupils equal, reactive to light.  Symmetric. No JVD, Moist MM, no palpable lymphadenopathy  PULM:                     Bilateral air entry,No Rales, No Rhonchi, No Wheezing  CVS:                         S1, S2,  No Murmur  ABD:                        Soft, nondistended, nontender, normoactive bowel sounds,   EXT:                         No edema, nontender, No Cyanosis or Clubbing, right foot dressing  Vascular:                Warm Extremities, Normal Capillary refill, Normal Distal Pulses  SKIN:                       Warm and well perfused, no rashes noted.   NEURO:                  Alert, oriented, interactive, nonfocal, follows commands  PSYC:                      Calm, + Insight.

## 2023-12-22 NOTE — DIETITIAN INITIAL EVALUATION ADULT - NS FNS DIET ORDER
Diet, Renal Restrictions:   For patients receiving Renal Replacement - No Protein Restr, No Conc K, No Conc Phos, Low Sodium (12-21-23 @ 13:43)

## 2023-12-22 NOTE — OCCUPATIONAL THERAPY INITIAL EVALUATION ADULT - ADDITIONAL COMMENTS
Per pt, pt lives alone in an apartment with 2 1/2 flights to enter, 2 HR, 1st floor set-up once inside, +bathtub/curtain. Prior to admission pt reports being independent in all ADLS and functional transfers, and requires some assist for IADLS from friends/neighbors within the apartment complex. PTA, pt reports use of cane for functional mobility. Pt reports she no longer drives and has private transportation services from a friend who assists with grocery shopping/laundry tasks. Pt reports friends/neighbors will be able to assist upon D/C.

## 2023-12-22 NOTE — PHYSICAL THERAPY INITIAL EVALUATION ADULT - PERTINENT HX OF CURRENT PROBLEM, REHAB EVAL
84 y/o F with PMH HTN, HLD, chronic right plantar foot ulcers/OM recently admitted to [facility] 12/6-12/11 for acute on chronic osteomyelitis of the right foot that required partial amputation of 2nd ray of right foot 12/8/23. Patient was treated with vanco/zosyn, switched to merrem per ID recc, and transitioned to PO augmentin x7 days post op upon discharge. Patient also took PO cipro upon discharge - last dose for both augmentin and cipro approx 3 days ago, self stopped due to nausea, vomiting, inability to tolerate PO. Presents to [facility] ED for malaise, nausea, emesis, unable to tolerate PO, including home meds x 3-4 days. Referred by PCP for further eval. ED course significant for acute renal failure, BUN/Cr 70/8.23, baseline wnl (BUN 12, Cr 0.83 on 12/10/23) 84 y/o F with PMH HTN, HLD, chronic right plantar foot ulcers/OM recently admitted to [facility] 12/6-12/11 for acute on chronic osteomyelitis of the right foot that required partial amputation of 2nd ray of right foot 12/8/23. Patient was treated with vanco/zosyn, switched to merrem per ID recc, and transitioned to PO augmentin x7 days post op upon discharge. Patient also took PO cipro upon discharge - last dose for both augmentin and cipro approx 3 days ago, self stopped due to nausea, vomiting, inability to tolerate PO. Presents to [facility] ED for malaise, nausea, emesis, unable to tolerate PO, including home meds x 3-4 days. Referred by PCP for further eval. ED course significant for acute renal failure, BUN/Cr 70/8.23, baseline wnl (BUN 12, Cr 0.83 on 12/10/23)

## 2023-12-22 NOTE — PROGRESS NOTE ADULT - SUBJECTIVE AND OBJECTIVE BOX
Medicine Progress Note    Patient is a 83y old  Female who presents with a chief complaint of acute renal failure (22 Dec 2023 13:41)      SUBJECTIVE / OVERNIGHT EVENTS:  seen and examined  Chart reviewed  No overnight events  BM+  No pain  No complaints    ADDITIONAL REVIEW OF SYSTEMS  denied fever/chills/CP/SOB/cough/palpitation/dizziness/abdominal pian/nausea/vomiting/diarrhoea/constipation/dysuria/leg or calf pain/headaches.all other ROS neg    MEDICATIONS  (STANDING):  dextrose 5% + sodium chloride 0.9%. 1000 milliLiter(s) (75 mL/Hr) IV Continuous <Continuous>  heparin   Injectable 5000 Unit(s) SubCutaneous every 8 hours  levothyroxine 75 MICROGram(s) Oral daily  metoprolol succinate ER 25 milliGRAM(s) Oral two times a day  simvastatin 40 milliGRAM(s) Oral at bedtime    MEDICATIONS  (PRN):  acetaminophen     Tablet .. 650 milliGRAM(s) Oral every 6 hours PRN Moderate Pain (4 - 6)    CAPILLARY BLOOD GLUCOSE        I&O's Summary    21 Dec 2023 07:01  -  22 Dec 2023 07:00  --------------------------------------------------------  IN: 1095 mL / OUT: 800 mL / NET: 295 mL    22 Dec 2023 07:01  -  22 Dec 2023 14:35  --------------------------------------------------------  IN: 0 mL / OUT: 800 mL / NET: -800 mL        PHYSICAL EXAM:  Vital Signs Last 24 Hrs  T(C): 36.6 (22 Dec 2023 10:20), Max: 36.7 (21 Dec 2023 19:46)  T(F): 97.9 (22 Dec 2023 10:20), Max: 98.1 (21 Dec 2023 19:46)  HR: 50 (22 Dec 2023 10:20) (48 - 72)  BP: 135/63 (22 Dec 2023 10:20) (121/66 - 180/74)  BP(mean): --  RR: 16 (22 Dec 2023 10:20) (16 - 18)  SpO2: 100% (22 Dec 2023 10:20) (98% - 100%)    Parameters below as of 22 Dec 2023 10:20  Patient On (Oxygen Delivery Method): room air    GENERAL: Not in distress. Alert    HEENT: clear conjuctiva, MMM. no pallor or icterus  CARDIOVASCULAR: RRR S1, S2. No murmur/rubs/gallop  LUNGS: BLAE+, no rales, no wheezing, no rhonchi.    ABDOMEN: ND. Soft,  NT, no guarding / rebound / rigidity. BS normoactive  BACK: No spine tenderness.  EXTREMITIES: no edema. no leg or calf TP.  SKIN: warm and dry  PSYCHIATRIC: Calm.  No agitation.  CNS: AAO. moves limbs, follows commands. no FND    LABS:                        10.1   6.30  )-----------( 279      ( 22 Dec 2023 06:54 )             29.8     12-22    135  |  105  |  60<H>  ----------------------------<  81  4.9   |  19<L>  |  7.86<H>    Ca    8.6      22 Dec 2023 06:54    TPro  5.6<L>  /  Alb  2.5<L>  /  TBili  0.6  /  DBili  x   /  AST  16  /  ALT  11  /  AlkPhos  69  12-22    PT/INR - ( 21 Dec 2023 11:26 )   PT: 12.3 sec;   INR: 1.05 ratio           CARDIAC MARKERS ( 22 Dec 2023 06:37 )  x     / x     / 32 U/L / x     / x          Urinalysis Basic - ( 22 Dec 2023 06:54 )    Color: x / Appearance: x / SG: x / pH: x  Gluc: 81 mg/dL / Ketone: x  / Bili: x / Urobili: x   Blood: x / Protein: x / Nitrite: x   Leuk Esterase: x / RBC: x / WBC x   Sq Epi: x / Non Sq Epi: x / Bacteria: x        Culture - Urine (collected 21 Dec 2023 12:20)  Source: Clean Catch Clean Catch (Midstream)  Final Report (22 Dec 2023 13:20):    No growth          RADIOLOGY & ADDITIONAL TESTS:  Imaging from Last 24 Hours:    Electrocardiogram/QTc Interval:    COORDINATION OF CARE:  Care Discussed with Consultants/Other Providers:

## 2023-12-22 NOTE — PHYSICAL THERAPY INITIAL EVALUATION ADULT - REHAB POTENTIAL, PT EVAL
good, to achieve stated therapy goals
Breasts of normal contour, size, color and symmetry, without milk, signs of inflammation or tenderness; nipples with normal size, shape, number and spacing.  Axillary exam normal.

## 2023-12-22 NOTE — DIETITIAN INITIAL EVALUATION ADULT - ADD RECOMMEND
1. Add Nepro 8oz BID (Provides 850kcal & 38grams of Protein)   2. Encourage PO intakes and honor patients daily food preferences as feasible with prescribed diet   3. Monitor daily PO intakes, GI tolerance, labs, weights, skin integrity, & BM regularity

## 2023-12-22 NOTE — DIETITIAN INITIAL EVALUATION ADULT - PERTINENT MEDS FT
MEDICATIONS  (STANDING):  dextrose 5% + sodium chloride 0.9%. 1000 milliLiter(s) (75 mL/Hr) IV Continuous <Continuous>  heparin   Injectable 5000 Unit(s) SubCutaneous every 8 hours  levothyroxine 75 MICROGram(s) Oral daily  metoprolol succinate ER 25 milliGRAM(s) Oral two times a day  simvastatin 40 milliGRAM(s) Oral at bedtime    MEDICATIONS  (PRN):  acetaminophen     Tablet .. 650 milliGRAM(s) Oral every 6 hours PRN Moderate Pain (4 - 6)

## 2023-12-22 NOTE — PHYSICAL THERAPY INITIAL EVALUATION ADULT - NSACTIVITYREC_GEN_A_PT
ambulate with RW and surgical shoe with standby assist  The patient has a mobility limitation that significantly impairs their ability to perform Mobility Related Activities of Daily Living (MRADLs) in the home. The patient can safely use the rolling walker and their functional mobility deficit is sufficiently resolved with the use of a rolling walker

## 2023-12-22 NOTE — OCCUPATIONAL THERAPY INITIAL EVALUATION ADULT - GENERAL OBSERVATIONS, REHAB EVAL
MD orders received. Chart reviewed, conferred with RN Aminta. Pt received supine in bed, alert and orientedX3-4, +PIV, +romano catheter, +telemonitor, no c/o pain, +right LE ace wrap. Pt tolerated OT initial evaluation, treatment plan and goals discussed and agreed upon. Pt left seated in bedside chair, +chair alarm activated, NAD, VSS, all lines intact, and call bell in reach, handoff to RN.

## 2023-12-22 NOTE — OCCUPATIONAL THERAPY INITIAL EVALUATION ADULT - PERTINENT HX OF CURRENT PROBLEM, REHAB EVAL
84 y/o F with PMH HTN, HLD, chronic right plantar foot ulcers/OM recently admitted to [facility] 12/6-12/11 for acute on chronic osteomyelitis of the right foot that required partial amputation of 2nd ray of right foot 12/8/23. Patient was treated with vanco/zosyn, switched to merrem per ID recc, and transitioned to PO augmentin x7 days post op upon discharge. Patient also took PO cipro upon discharge - last dose for both augmentin and cipro approx 3 days ago, self stopped due to nausea, vomiting, inability to tolerate PO. Presents to [facility] ED for malaise, nausea, emesis, unable to tolerate PO, including home meds x 3-4 days. Referred by PCP for further eval. ED course significant for acute renal failure, BUN/Cr 70/8.23, baseline wnl (BUN 12, Cr 0.83 on 12/10/23) 84 y/o F with PMH HTN, HLD, chronic right plantar foot ulcers/OM recently admitted to [facility] 12/6-12/11 for acute on chronic osteomyelitis of the right foot that required partial amputation of 2nd ray of right foot 12/8/23. Patient was treated with vanco/zosyn, switched to merrem per ID recc, and transitioned to PO augmentin x7 days post op upon discharge. Patient also took PO cipro upon discharge - last dose for both augmentin and cipro approx 3 days ago, self stopped due to nausea, vomiting, inability to tolerate PO. Presents to [facility] ED for malaise, nausea, emesis, unable to tolerate PO, including home meds x 3-4 days. Referred by PCP for further eval. ED course significant for acute renal failure, BUN/Cr 70/8.23, baseline wnl (BUN 12, Cr 0.83 on 12/10/23)

## 2023-12-22 NOTE — CONSULT NOTE ADULT - ASSESSMENT
82 y/o F with PMH HTN, HLD, chronic right plantar foot ulcers/OM recently admitted to [facility] 12/6-12/11 for acute on chronic osteomyelitis of the right foot that required partial amputation of 2nd ray of right foot 12/8/23.    Currently, tolerating po intake, no NDV, last BM 12/20 soft brown  GI Consult: Elevated lipase 82 y/o F with PMH HTN, HLD, chronic right plantar foot ulcers/OM recently admitted to [facility] 12/6-12/11 for acute on chronic osteomyelitis of the right foot that required partial amputation of 2nd ray of right foot 12/8/23.    Currently, tolerating po intake, no NDV, last BM 12/20 soft brown  GI Consult: Elevated lipase

## 2023-12-22 NOTE — DIETITIAN NUTRITION RISK NOTIFICATION - TREATMENT: THE FOLLOWING DIET HAS BEEN RECOMMENDED
Diet, Renal Restrictions:   For patients receiving Renal Replacement - No Protein Restr, No Conc K, No Conc Phos, Low Sodium  Supplement Feeding Modality:  Oral  Nepro Cans or Servings Per Day:  1       Frequency:  Two Times a day (12-22-23 @ 10:20) [Pending Verification By Attending]  Diet, Renal Restrictions:   For patients receiving Renal Replacement - No Protein Restr, No Conc K, No Conc Phos, Low Sodium (12-21-23 @ 13:43) [Active]

## 2023-12-22 NOTE — PROGRESS NOTE ADULT - ASSESSMENT
82 y/o F with PMH HTN, HLD, chronic right plantar foot ulcers/OM recently admitted to [facility] 12/6-12/11 for acute on chronic osteomyelitis of the right foot that required partial amputation of 2nd ray of right foot 12/8/23, c/o nausea, vomiting, found with acute renal failure, likely due to recent vanco course.     Acute renal failure, likely drug induced [ cipro, ARBs]  -Cardiac monitoring. reasses. C if no events  -Nephro consulted. rec appreciated.   - on IVF  -Check dig level--> 1.1  -CT, UA reviewed, unremarkable. no hydro  - neg urine cx  -Follow repeat BMP    s/p partial amputation of 2nd ray of right foot 12/8/23  -Due for outpatient podiatry f/u this week regarding dressing   -Podiatry consulted. no surgical intervention needed  -PT eval --> home PT  -Afebrile, non toxic appearing    HTN  -Hold home losartan  -Continue home metoprolol 25mg BID, digoxin 125mcg qd     HLD  -Continue home simvastatin 40mg qd    Hypothyroidism  -Continue home synthroid 75mcg qd    left upper lung mass on CT chest  Emphysema  - no h/o lung mass or COPD. + smoking  - pulm consulted likely primary malignancy. needs biopsy. suggested Ct a/p ordered without IC with oral contrast.  - needs PFT OP    Elevated amylase and lipase  - no ssx of pancreatitis  - CT renal stone hunt: showed normal hepatobiliary system and pancrease.   - CT a/p with OC pending  - GI cx called. informed Dr. Damon and ACP      DVT ppx: HSQ  Code Status - DNR/DNI reaffirmed    will call family 84 y/o F with PMH HTN, HLD, chronic right plantar foot ulcers/OM recently admitted to [facility] 12/6-12/11 for acute on chronic osteomyelitis of the right foot that required partial amputation of 2nd ray of right foot 12/8/23, c/o nausea, vomiting, found with acute renal failure, likely due to recent vanco course.     Acute renal failure, likely drug induced [ cipro, ARBs]  -Cardiac monitoring. reasses. C if no events  -Nephro consulted. rec appreciated.   - on IVF  -Check dig level--> 1.1  -CT, UA reviewed, unremarkable. no hydro  - neg urine cx  -Follow repeat BMP    s/p partial amputation of 2nd ray of right foot 12/8/23  -Due for outpatient podiatry f/u this week regarding dressing   -Podiatry consulted. no surgical intervention needed  -PT eval --> home PT  -Afebrile, non toxic appearing    HTN  -Hold home losartan  -Continue home metoprolol 25mg BID, digoxin 125mcg qd     HLD  -Continue home simvastatin 40mg qd    Hypothyroidism  -Continue home synthroid 75mcg qd    left upper lung mass on CT chest  Emphysema  - no h/o lung mass or COPD. + smoking  - pulm consulted likely primary malignancy. needs biopsy. suggested Ct a/p ordered without IC with oral contrast.  - needs PFT OP    Elevated amylase and lipase  - no ssx of pancreatitis  - CT renal stone hunt: showed normal hepatobiliary system and pancrease.   - CT a/p with OC pending  - GI cx called. informed Dr. Damon and ACP      DVT ppx: HSQ  Code Status - DNR/DNI reaffirmed    will call family

## 2023-12-22 NOTE — DIETITIAN INITIAL EVALUATION ADULT - ORAL INTAKE PTA/DIET HISTORY
Patient endorses poor appetite prior to admission. Admits to 15-20 lb unintentional weight loss x 3 weeks. No known food allergies/intolerances.

## 2023-12-22 NOTE — DIETITIAN INITIAL EVALUATION ADULT - OTHER INFO
Per H&P 12/12/23: 82 y/o F with PMH HTN, HLD, chronic right plantar foot ulcers/OM recently admitted to [facility] 12/6-12/11 for acute on chronic osteomyelitis of the right foot that required partial amputation of 2nd ray of right foot 12/8/23. Patient was treated with vanco/zosyn, switched to merrem per ID recc, and transitioned to PO augmentin x7 days post op upon discharge. Patient also took PO cipro upon discharge - last dose for both augmentin and cipro approx 3 days ago, self stopped due to nausea, vomiting, inability to tolerate PO. Presents to [facility] ED for malaise, nausea, emesis, unable to tolerate PO, including home meds x 3-4 days. Referred by PCP for further eval. ED course significant for acute renal failure, BUN/Cr 70/8.23, baseline wnl (BUN 12, Cr 0.83 on 12/10/23)    Patient with poor PO intakes at this time. Patient consumed <50% of breakfast this AM per meal tray observation. Patient currently on renal diet. Nephro following. Food preferences obtained and noted on patients file with nutrition office. Receptive to receive Nepro 8oz BID (Provides 850kcal & 38grams of Protein) to enhance PO intakes and optimize nutritional status. No pressure injuries or edema noted. Last BM reported to be yesterday 12/21/23. Per H&P 12/12/23: 82 y/o F with PMH HTN, HLD, chronic right plantar foot ulcers/OM recently admitted to [facility] 12/6-12/11 for acute on chronic osteomyelitis of the right foot that required partial amputation of 2nd ray of right foot 12/8/23. Patient was treated with vanco/zosyn, switched to merrem per ID recc, and transitioned to PO augmentin x7 days post op upon discharge. Patient also took PO cipro upon discharge - last dose for both augmentin and cipro approx 3 days ago, self stopped due to nausea, vomiting, inability to tolerate PO. Presents to [facility] ED for malaise, nausea, emesis, unable to tolerate PO, including home meds x 3-4 days. Referred by PCP for further eval. ED course significant for acute renal failure, BUN/Cr 70/8.23, baseline wnl (BUN 12, Cr 0.83 on 12/10/23)    Patient with poor PO intakes at this time. Patient consumed <50% of breakfast this AM per meal tray observation. Patient currently on renal diet. Nephro following. Food preferences obtained and noted on patients file with nutrition office. Receptive to receive Nepro 8oz BID (Provides 850kcal & 38grams of Protein) to enhance PO intakes and optimize nutritional status. No pressure injuries or edema noted. Last BM reported to be yesterday 12/21/23.

## 2023-12-22 NOTE — CONSULT NOTE ADULT - ASSESSMENT
84 y/o F with PMH HTN, HLD, chronic right plantar foot ulcers/OM recently admitted to [facility] 12/6-12/11 for acute on chronic osteomyelitis of the right foot that required partial amputation of 2nd ray of right foot 12/8/23, c/o nausea, vomiting, found with acute renal failure. Pulmonary consulted for left upper lobe 4.6 cm mass.     Assessment:  1. Left upper lobe mass   2. Acute renal failure    Plan:  - Given history of smoking and feathers of lesions, high suspicion for primary pulmonary malignancy  - Obtain CT scan of the abdomen/pelvis to evaluate for further lesions for target biopsy  - CT scan also showing evidence of emphysema   - Likely has underlying COPD  - Will need out patient PFTs   - Monitor respiratory status   - Discussed with Dr. Hadley 84 y/o F with PMH HTN, HLD, chronic right plantar foot ulcers/OM recently admitted to [facility] 12/6-12/11 for acute on chronic osteomyelitis of the right foot that required partial amputation of 2nd ray of right foot 12/8/23, c/o nausea, vomiting, found with acute renal failure. Pulmonary consulted for left upper lobe 4.6 cm mass.     Assessment:  1. Left upper lobe mass   2. Acute renal failure    Plan:  - Given history of smoking and feathers of lesions, high suspicion for primary pulmonary malignancy  - Obtain CT scan of the abdomen/pelvis to evaluate for further lesions for target biopsy  - CT scan also showing evidence of emphysema   - Likely has underlying COPD  - Will need out patient PFTs   - Monitor respiratory status   - Discussed with Dr. Hadley

## 2023-12-23 ENCOUNTER — RX RENEWAL (OUTPATIENT)
Age: 83
End: 2023-12-23

## 2023-12-23 LAB
ANION GAP SERPL CALC-SCNC: 12 MMOL/L — SIGNIFICANT CHANGE UP (ref 5–17)
ANION GAP SERPL CALC-SCNC: 12 MMOL/L — SIGNIFICANT CHANGE UP (ref 5–17)
BUN SERPL-MCNC: 58 MG/DL — HIGH (ref 7–23)
BUN SERPL-MCNC: 58 MG/DL — HIGH (ref 7–23)
CALCIUM SERPL-MCNC: 8.6 MG/DL — SIGNIFICANT CHANGE UP (ref 8.4–10.5)
CALCIUM SERPL-MCNC: 8.6 MG/DL — SIGNIFICANT CHANGE UP (ref 8.4–10.5)
CHLORIDE SERPL-SCNC: 106 MMOL/L — SIGNIFICANT CHANGE UP (ref 96–108)
CHLORIDE SERPL-SCNC: 106 MMOL/L — SIGNIFICANT CHANGE UP (ref 96–108)
CO2 SERPL-SCNC: 19 MMOL/L — LOW (ref 22–31)
CO2 SERPL-SCNC: 19 MMOL/L — LOW (ref 22–31)
CREAT SERPL-MCNC: 7.16 MG/DL — HIGH (ref 0.5–1.3)
CREAT SERPL-MCNC: 7.16 MG/DL — HIGH (ref 0.5–1.3)
EGFR: 5 ML/MIN/1.73M2 — LOW
EGFR: 5 ML/MIN/1.73M2 — LOW
GLUCOSE SERPL-MCNC: 80 MG/DL — SIGNIFICANT CHANGE UP (ref 70–99)
GLUCOSE SERPL-MCNC: 80 MG/DL — SIGNIFICANT CHANGE UP (ref 70–99)
HCT VFR BLD CALC: 31.4 % — LOW (ref 34.5–45)
HCT VFR BLD CALC: 31.4 % — LOW (ref 34.5–45)
HGB BLD-MCNC: 10.5 G/DL — LOW (ref 11.5–15.5)
HGB BLD-MCNC: 10.5 G/DL — LOW (ref 11.5–15.5)
MCHC RBC-ENTMCNC: 31.6 PG — SIGNIFICANT CHANGE UP (ref 27–34)
MCHC RBC-ENTMCNC: 31.6 PG — SIGNIFICANT CHANGE UP (ref 27–34)
MCHC RBC-ENTMCNC: 33.4 GM/DL — SIGNIFICANT CHANGE UP (ref 32–36)
MCHC RBC-ENTMCNC: 33.4 GM/DL — SIGNIFICANT CHANGE UP (ref 32–36)
MCV RBC AUTO: 94.6 FL — SIGNIFICANT CHANGE UP (ref 80–100)
MCV RBC AUTO: 94.6 FL — SIGNIFICANT CHANGE UP (ref 80–100)
NRBC # BLD: 0 /100 WBCS — SIGNIFICANT CHANGE UP (ref 0–0)
NRBC # BLD: 0 /100 WBCS — SIGNIFICANT CHANGE UP (ref 0–0)
PLATELET # BLD AUTO: 248 K/UL — SIGNIFICANT CHANGE UP (ref 150–400)
PLATELET # BLD AUTO: 248 K/UL — SIGNIFICANT CHANGE UP (ref 150–400)
POTASSIUM SERPL-MCNC: 4.9 MMOL/L — SIGNIFICANT CHANGE UP (ref 3.5–5.3)
POTASSIUM SERPL-MCNC: 4.9 MMOL/L — SIGNIFICANT CHANGE UP (ref 3.5–5.3)
POTASSIUM SERPL-SCNC: 4.9 MMOL/L — SIGNIFICANT CHANGE UP (ref 3.5–5.3)
POTASSIUM SERPL-SCNC: 4.9 MMOL/L — SIGNIFICANT CHANGE UP (ref 3.5–5.3)
RBC # BLD: 3.32 M/UL — LOW (ref 3.8–5.2)
RBC # BLD: 3.32 M/UL — LOW (ref 3.8–5.2)
RBC # FLD: 12 % — SIGNIFICANT CHANGE UP (ref 10.3–14.5)
RBC # FLD: 12 % — SIGNIFICANT CHANGE UP (ref 10.3–14.5)
SODIUM SERPL-SCNC: 137 MMOL/L — SIGNIFICANT CHANGE UP (ref 135–145)
SODIUM SERPL-SCNC: 137 MMOL/L — SIGNIFICANT CHANGE UP (ref 135–145)
T4 FREE SERPL-MCNC: 1.2 NG/DL — SIGNIFICANT CHANGE UP (ref 0.9–1.8)
T4 FREE SERPL-MCNC: 1.2 NG/DL — SIGNIFICANT CHANGE UP (ref 0.9–1.8)
TSH SERPL-MCNC: 16.16 UIU/ML — HIGH (ref 0.36–3.74)
TSH SERPL-MCNC: 16.16 UIU/ML — HIGH (ref 0.36–3.74)
WBC # BLD: 6.39 K/UL — SIGNIFICANT CHANGE UP (ref 3.8–10.5)
WBC # BLD: 6.39 K/UL — SIGNIFICANT CHANGE UP (ref 3.8–10.5)
WBC # FLD AUTO: 6.39 K/UL — SIGNIFICANT CHANGE UP (ref 3.8–10.5)
WBC # FLD AUTO: 6.39 K/UL — SIGNIFICANT CHANGE UP (ref 3.8–10.5)

## 2023-12-23 PROCEDURE — 93010 ELECTROCARDIOGRAM REPORT: CPT

## 2023-12-23 PROCEDURE — 99233 SBSQ HOSP IP/OBS HIGH 50: CPT

## 2023-12-23 RX ORDER — SODIUM CHLORIDE 9 MG/ML
1000 INJECTION, SOLUTION INTRAVENOUS
Refills: 0 | Status: DISCONTINUED | OUTPATIENT
Start: 2023-12-23 | End: 2023-12-24

## 2023-12-23 RX ORDER — SENNA PLUS 8.6 MG/1
2 TABLET ORAL AT BEDTIME
Refills: 0 | Status: DISCONTINUED | OUTPATIENT
Start: 2023-12-23 | End: 2023-12-28

## 2023-12-23 RX ORDER — POLYETHYLENE GLYCOL 3350 17 G/17G
17 POWDER, FOR SOLUTION ORAL ONCE
Refills: 0 | Status: COMPLETED | OUTPATIENT
Start: 2023-12-23 | End: 2023-12-23

## 2023-12-23 RX ORDER — LEVOTHYROXINE SODIUM 125 MCG
88 TABLET ORAL DAILY
Refills: 0 | Status: DISCONTINUED | OUTPATIENT
Start: 2023-12-24 | End: 2023-12-28

## 2023-12-23 RX ORDER — AMLODIPINE BESYLATE 2.5 MG/1
10 TABLET ORAL
Refills: 0 | Status: DISCONTINUED | OUTPATIENT
Start: 2023-12-24 | End: 2023-12-28

## 2023-12-23 RX ORDER — METOPROLOL TARTRATE 50 MG
12.5 TABLET ORAL
Refills: 0 | Status: DISCONTINUED | OUTPATIENT
Start: 2023-12-23 | End: 2023-12-24

## 2023-12-23 RX ORDER — AMLODIPINE BESYLATE 2.5 MG/1
5 TABLET ORAL
Refills: 0 | Status: DISCONTINUED | OUTPATIENT
Start: 2023-12-23 | End: 2023-12-23

## 2023-12-23 RX ORDER — POLYETHYLENE GLYCOL 3350 17 G/17G
17 POWDER, FOR SOLUTION ORAL DAILY
Refills: 0 | Status: DISCONTINUED | OUTPATIENT
Start: 2023-12-24 | End: 2023-12-25

## 2023-12-23 RX ORDER — HYDRALAZINE HCL 50 MG
25 TABLET ORAL EVERY 6 HOURS
Refills: 0 | Status: DISCONTINUED | OUTPATIENT
Start: 2023-12-23 | End: 2023-12-28

## 2023-12-23 RX ADMIN — HEPARIN SODIUM 5000 UNIT(S): 5000 INJECTION INTRAVENOUS; SUBCUTANEOUS at 21:10

## 2023-12-23 RX ADMIN — HEPARIN SODIUM 5000 UNIT(S): 5000 INJECTION INTRAVENOUS; SUBCUTANEOUS at 14:28

## 2023-12-23 RX ADMIN — Medication 12.5 MILLIGRAM(S): at 18:16

## 2023-12-23 RX ADMIN — Medication 75 MICROGRAM(S): at 05:47

## 2023-12-23 RX ADMIN — AMLODIPINE BESYLATE 5 MILLIGRAM(S): 2.5 TABLET ORAL at 08:54

## 2023-12-23 RX ADMIN — POLYETHYLENE GLYCOL 3350 17 GRAM(S): 17 POWDER, FOR SOLUTION ORAL at 18:15

## 2023-12-23 RX ADMIN — SIMVASTATIN 40 MILLIGRAM(S): 20 TABLET, FILM COATED ORAL at 21:10

## 2023-12-23 RX ADMIN — SENNA PLUS 2 TABLET(S): 8.6 TABLET ORAL at 21:10

## 2023-12-23 RX ADMIN — HEPARIN SODIUM 5000 UNIT(S): 5000 INJECTION INTRAVENOUS; SUBCUTANEOUS at 05:47

## 2023-12-23 RX ADMIN — SODIUM CHLORIDE 50 MILLILITER(S): 9 INJECTION, SOLUTION INTRAVENOUS at 19:45

## 2023-12-23 NOTE — PROGRESS NOTE ADULT - SUBJECTIVE AND OBJECTIVE BOX
Follow-up Pulmonary Progress Note  Chief Complaint : Acute renal failure      patient seen and examined  discussed abnromral CT findings  need out patient f/u and possible biopsy  patient states she is unsure if wants to follow up and wants to find out if she has cancer  states she is 83 years old, and she has no family and would not want to follow up for further evaluation of lesion.   states understands risk of cancer  understands risk of death       Allergies :Levaquin (Stomach Upset; Nausea)  doxycycline (Unknown)  linezolid (Unknown)      PAST MEDICAL & SURGICAL HISTORY:  HTN (hypertension)    Hyperlipidemia    Hypothyroid    Paroxysmal SVT (supraventricular tachycardia)    Thyroid cyst    Back problem  s/p back surgery    S/P thyroid surgery    S/P hernia surgery    S/P rotator cuff surgery        Medications:  MEDICATIONS  (STANDING):  amLODIPine   Tablet 5 milliGRAM(s) Oral with breakfast  dextrose 5% + sodium chloride 0.9%. 1000 milliLiter(s) (50 mL/Hr) IV Continuous <Continuous>  heparin   Injectable 5000 Unit(s) SubCutaneous every 8 hours  levothyroxine 88 MICROGram(s) Oral daily  metoprolol succinate ER 12.5 milliGRAM(s) Oral two times a day  polyethylene glycol 3350 17 Gram(s) Oral once  senna 2 Tablet(s) Oral at bedtime  simvastatin 40 milliGRAM(s) Oral at bedtime    MEDICATIONS  (PRN):  acetaminophen     Tablet .. 650 milliGRAM(s) Oral every 6 hours PRN Moderate Pain (4 - 6)      Antibiotics History      Heme Medications   heparin   Injectable 5000 Unit(s) SubCutaneous every 8 hours, 12-21-23 @ 13:47      GI Medications  polyethylene glycol 3350 17 Gram(s) Oral once, 12-23-23 @ 16:17, Routine  senna 2 Tablet(s) Oral at bedtime, 12-23-23 @ 16:17, Routine        LABS:                        10.5   6.39  )-----------( 248      ( 23 Dec 2023 07:36 )             31.4     12-23    137  |  106  |  58<H>  ----------------------------<  80  4.9   |  19<L>  |  7.16<H>    Ca    8.6      23 Dec 2023 07:36    TPro  5.6<L>  /  Alb  2.5<L>  /  TBili  0.6  /  DBili  x   /  AST  16  /  ALT  11  /  AlkPhos  69  12-22      CARDIAC MARKERS ( 22 Dec 2023 06:37 )  x     / x     / 32 U/L / x     / x              Urinalysis Basic - ( 23 Dec 2023 07:36 )    Color: x / Appearance: x / SG: x / pH: x  Gluc: 80 mg/dL / Ketone: x  / Bili: x / Urobili: x   Blood: x / Protein: x / Nitrite: x   Leuk Esterase: x / RBC: x / WBC x   Sq Epi: x / Non Sq Epi: x / Bacteria: x        < from: CT Chest No Cont (12.21.23 @ 13:30) >    FINDINGS:    LUNGS AND AIRWAYS: Patent central airways.  4.6 x 3 cm sized spiculated   mass left upper lobe concerning for a primary pulmonary malignancy.   PET/CT and biopsy can be considered for further evaluation. Pulmonary   emphysema. Suspect bronchial impaction in the right lung base.  PLEURA: No pleural effusion.  MEDIASTINUM AND RAFAEL: No lymphadenopathy.  VESSELS: Atherosclerotic calcification including the coronary arteries.  HEART: Heart size is normal. No pericardial effusion.  CHEST WALL AND LOWER NECK: Within normal limits.  VISUALIZED UPPER ABDOMEN: Within normal limits.  BONES: Degenerative changes    IMPRESSION:  Approximately 4.6 cm sized left upper lobe mass concerning for a primary   pulmonary malignancy.  Pulmonary emphysema.        < end of copied text >       VITALS:  T(C): 36.9 (12-23-23 @ 17:08), Max: 36.9 (12-23-23 @ 17:08)  T(F): 98.4 (12-23-23 @ 17:08), Max: 98.4 (12-23-23 @ 17:08)  HR: 54 (12-23-23 @ 17:08) (48 - 54)  BP: 165/75 (12-23-23 @ 17:08) (139/73 - 184/84)  BP(mean): --  ABP: --  ABP(mean): --  RR: 17 (12-23-23 @ 08:58) (16 - 17)  SpO2: 96% (12-23-23 @ 08:58) (96% - 100%)  CVP(mm Hg): --  CVP(cm H2O): --    Ins and Outs     12-22-23 @ 07:01  -  12-23-23 @ 07:00  --------------------------------------------------------  IN: 1275 mL / OUT: 2550 mL / NET: -1275 mL    12-23-23 @ 07:01  -  12-23-23 @ 18:06  --------------------------------------------------------  IN: 550 mL / OUT: 700 mL / NET: -150 mL        Height (cm): 177.8 (12-21-23 @ 19:46)  Weight (kg): 65.9 (12-21-23 @ 19:46)  BMI (kg/m2): 20.8 (12-21-23 @ 19:46)        I&O's Detail    22 Dec 2023 07:01  -  23 Dec 2023 07:00  --------------------------------------------------------  IN:    dextrose 5% + sodium chloride 0.9%: 975 mL    Oral Fluid: 300 mL  Total IN: 1275 mL    OUT:    Indwelling Catheter - Urethral (mL): 2550 mL  Total OUT: 2550 mL    Total NET: -1275 mL      23 Dec 2023 07:01  -  23 Dec 2023 18:06  --------------------------------------------------------  IN:    dextrose 5% + sodium chloride 0.9%: 150 mL    Oral Fluid: 400 mL  Total IN: 550 mL    OUT:    Indwelling Catheter - Urethral (mL): 700 mL  Total OUT: 700 mL    Total NET: -150 mL

## 2023-12-23 NOTE — PROGRESS NOTE ADULT - SUBJECTIVE AND OBJECTIVE BOX
Feels better     Vital Signs Last 24 Hrs  T(C): 36.9 (12-23-23 @ 19:47), Max: 36.9 (12-23-23 @ 17:08)  T(F): 98.5 (12-23-23 @ 19:47), Max: 98.5 (12-23-23 @ 19:47)  HR: 52 (12-23-23 @ 19:47) (48 - 58)  BP: 160/67 (12-23-23 @ 19:47) (139/73 - 167/71)  RR: 16 (12-23-23 @ 19:47) (16 - 17)  SpO2: 95% (12-23-23 @ 19:47) (95% - 99%)    I&O's Detail    22 Dec 2023 07:01  -  23 Dec 2023 07:00  --------------------------------------------------------  IN:    dextrose 5% + sodium chloride 0.9%: 975 mL    Oral Fluid: 300 mL  Total IN: 1275 mL    OUT:    Indwelling Catheter - Urethral (mL): 2550 mL  Total OUT: 2550 mL    23 Dec 2023 07:01  -  23 Dec 2023 21:19  --------------------------------------------------------  IN:    dextrose 5% + sodium chloride 0.9%: 375 mL    dextrose 5% + sodium chloride 0.9%: 450 mL    Oral Fluid: 600 mL  Total IN: 1425 mL    OUT:    Indwelling Catheter - Urethral (mL): 700 mL  Total OUT: 700 mL    s1s2  b/l air entry  soft, ND  no edema  AO                                        10.5   6.39  )-----------( 248      ( 23 Dec 2023 07:36 )             31.4     23 Dec 2023 07:36    137    |  106    |  58     ----------------------------<  80     4.9     |  19     |  7.16     Ca    8.6        23 Dec 2023 07:36    TPro  5.6    /  Alb  2.5    /  TBili  0.6    /  DBili  x      /  AST  16     /  ALT  11     /  AlkPhos  69     22 Dec 2023 06:54    LIVER FUNCTIONS - ( 22 Dec 2023 06:54 )  Alb: 2.5 g/dL / Pro: 5.6 g/dL / ALK PHOS: 69 U/L / ALT: 11 U/L / AST: 16 U/L / GGT: x           Culture - Urine (collected 21 Dec 2023 12:20)  Source: Clean Catch Clean Catch (Midstream)  Final Report (22 Dec 2023 13:20):    No growth    acetaminophen     Tablet .. 650 milliGRAM(s) Oral every 6 hours PRN  amLODIPine   Tablet 5 milliGRAM(s) Oral with breakfast  dextrose 5% + sodium chloride 0.9%. 1000 milliLiter(s) IV Continuous <Continuous>  heparin   Injectable 5000 Unit(s) SubCutaneous every 8 hours  levothyroxine 88 MICROGram(s) Oral daily  metoprolol succinate ER 12.5 milliGRAM(s) Oral two times a day  senna 2 Tablet(s) Oral at bedtime  simvastatin 40 milliGRAM(s) Oral at bedtime    A/P:    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, exacerbated by ARB as op  CT A/P w/o hydro, UA negative  CT Chest - emphysema, COLTEN mass  Pulm eval   Cr is improving   Avoid nephrotoxins  F/u CBC, BMP, serologies     [phone]       Feels better     Vital Signs Last 24 Hrs  T(C): 36.9 (12-23-23 @ 19:47), Max: 36.9 (12-23-23 @ 17:08)  T(F): 98.5 (12-23-23 @ 19:47), Max: 98.5 (12-23-23 @ 19:47)  HR: 52 (12-23-23 @ 19:47) (48 - 58)  BP: 160/67 (12-23-23 @ 19:47) (139/73 - 167/71)  RR: 16 (12-23-23 @ 19:47) (16 - 17)  SpO2: 95% (12-23-23 @ 19:47) (95% - 99%)    I&O's Detail    22 Dec 2023 07:01  -  23 Dec 2023 07:00  --------------------------------------------------------  IN:    dextrose 5% + sodium chloride 0.9%: 975 mL    Oral Fluid: 300 mL  Total IN: 1275 mL    OUT:    Indwelling Catheter - Urethral (mL): 2550 mL  Total OUT: 2550 mL    23 Dec 2023 07:01  -  23 Dec 2023 21:19  --------------------------------------------------------  IN:    dextrose 5% + sodium chloride 0.9%: 375 mL    dextrose 5% + sodium chloride 0.9%: 450 mL    Oral Fluid: 600 mL  Total IN: 1425 mL    OUT:    Indwelling Catheter - Urethral (mL): 700 mL  Total OUT: 700 mL    s1s2  b/l air entry  soft, ND  no edema  AO                                        10.5   6.39  )-----------( 248      ( 23 Dec 2023 07:36 )             31.4     23 Dec 2023 07:36    137    |  106    |  58     ----------------------------<  80     4.9     |  19     |  7.16     Ca    8.6        23 Dec 2023 07:36    TPro  5.6    /  Alb  2.5    /  TBili  0.6    /  DBili  x      /  AST  16     /  ALT  11     /  AlkPhos  69     22 Dec 2023 06:54    LIVER FUNCTIONS - ( 22 Dec 2023 06:54 )  Alb: 2.5 g/dL / Pro: 5.6 g/dL / ALK PHOS: 69 U/L / ALT: 11 U/L / AST: 16 U/L / GGT: x           Culture - Urine (collected 21 Dec 2023 12:20)  Source: Clean Catch Clean Catch (Midstream)  Final Report (22 Dec 2023 13:20):    No growth    acetaminophen     Tablet .. 650 milliGRAM(s) Oral every 6 hours PRN  amLODIPine   Tablet 5 milliGRAM(s) Oral with breakfast  dextrose 5% + sodium chloride 0.9%. 1000 milliLiter(s) IV Continuous <Continuous>  heparin   Injectable 5000 Unit(s) SubCutaneous every 8 hours  levothyroxine 88 MICROGram(s) Oral daily  metoprolol succinate ER 12.5 milliGRAM(s) Oral two times a day  senna 2 Tablet(s) Oral at bedtime  simvastatin 40 milliGRAM(s) Oral at bedtime    A/P:    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, exacerbated by ARB as op  CT A/P w/o hydro, UA negative  CT Chest - emphysema, COLTEN mass  Pulm eval   Cr is improving   Avoid nephrotoxins  F/u CBC, BMP, serologies     [phone]

## 2023-12-23 NOTE — PROGRESS NOTE ADULT - ASSESSMENT
Physical Examination:  GENERAL:               Alert, Oriented, No acute distress.    HEENT:                    Pupils equal, reactive to light.  Symmetric. No JVD, Moist MM, no palpable lymphadenopathy  PULM:                     Bilateral air entry,No Rales, No Rhonchi, No Wheezing  CVS:                         S1, S2,  No Murmur  ABD:                        Soft, nondistended, nontender, normoactive bowel sounds,   EXT:                         No edema, nontender, No Cyanosis or Clubbing, right foot dressing  NEURO:                  Alert, oriented, interactive, nonfocal, follows commands  PSYC:                      Calm, + Insight.           82 y/o F with PMH HTN, HLD, chronic right plantar foot ulcers/OM recently admitted to [facility] 12/6-12/11 for acute on chronic osteomyelitis of the right foot that required partial amputation of 2nd ray of right foot 12/8/23, c/o nausea, vomiting, found with acute renal failure. Pulmonary consulted for left upper lobe 4.6 cm mass.     Assessment:  1. Left upper lobe mass   2. Acute renal failure    Plan:  - Given history of smoking and feathers of lesions, high suspicion for primary pulmonary malignancy  - CT scan also showing evidence of emphysema   - CT abd and pelvis no acute finidngs    - Likely has underlying COPD  - Will need out patient IR Guided biopsy and PET  - Monitor respiratory status   - Discussed with Dr. Hadley   Physical Examination:  GENERAL:               Alert, Oriented, No acute distress.    HEENT:                    Pupils equal, reactive to light.  Symmetric. No JVD, Moist MM, no palpable lymphadenopathy  PULM:                     Bilateral air entry,No Rales, No Rhonchi, No Wheezing  CVS:                         S1, S2,  No Murmur  ABD:                        Soft, nondistended, nontender, normoactive bowel sounds,   EXT:                         No edema, nontender, No Cyanosis or Clubbing, right foot dressing  NEURO:                  Alert, oriented, interactive, nonfocal, follows commands  PSYC:                      Calm, + Insight.           84 y/o F with PMH HTN, HLD, chronic right plantar foot ulcers/OM recently admitted to [facility] 12/6-12/11 for acute on chronic osteomyelitis of the right foot that required partial amputation of 2nd ray of right foot 12/8/23, c/o nausea, vomiting, found with acute renal failure. Pulmonary consulted for left upper lobe 4.6 cm mass.     Assessment:  1. Left upper lobe mass   2. Acute renal failure    Plan:  - Given history of smoking and feathers of lesions, high suspicion for primary pulmonary malignancy  - CT scan also showing evidence of emphysema   - CT abd and pelvis no acute finidngs    - Likely has underlying COPD  - Will need out patient IR Guided biopsy and PET  - Monitor respiratory status   - Discussed with Dr. Hadley

## 2023-12-23 NOTE — PROGRESS NOTE ADULT - ASSESSMENT
82 y/o F with PMH HTN, HLD, chronic right plantar foot ulcers/OM recently admitted to [facility] 12/6-12/11 for acute on chronic osteomyelitis of the right foot that required partial amputation of 2nd ray of right foot 12/8/23, c/o nausea, vomiting, found with acute renal failure, likely due to recent vanco course.     Acute renal failure, likely drug induced [ cipro, ARBs]  - on Cardiac monitoring. reasses. DC if no events  -Nephro consulted. rec appreciated.   - on IVF  - on Verduzco. continue for now per renal  -Check dig level--> 1.1  -CT, UA reviewed, unremarkable. no hydro  - neg urine cx  -Follow repeat BMP    s/p partial amputation of 2nd ray of right foot 12/8/23  -Due for outpatient podiatry f/u this week regarding dressing   -Podiatry consulted. no surgical intervention needed  -PT eval --> home PT  -Afebrile, non toxic appearing    HTN    - Home doses: metoprolol ER BID, losartan  - Losartan was held due to CARLOS  - reduced metoprolol ERE to 12.5 mg BID for bradycardia. added amlodipine 5 mg at 8 am.   - monitor VS including OH and adjust meds  - check EKG and TTE and TSH    HLD  -Continue home simvastatin 40mg qd    Hypothyroidism  -Continue home synthroid 75mcg qd    left upper lung mass on CT chest  Emphysema  - no h/o lung mass or COPD. + smoking  - pulm consulted likely primary malignancy. needs biopsy.   - CT a/p with OC: 1.5 cm parainguinal LN. no sign of malignancy  - needs PFT OP    Elevated amylase and lipase  - no ssx of pancreatitis  - CT renal stone hunt: and CT a/p with OC showed normal hepatobiliary system and pancrease.   - GI cx noted: non-pancreatic source. trend    Severe protein-calorie malnutrition.  - nutrition on board      DVT ppx: HSQ    Code Status - DNR/DNI reaffirmed  Dispo: oending improvement of renal function. TOV as soon as cleared by renal     82 y/o F with PMH HTN, HLD, chronic right plantar foot ulcers/OM recently admitted to [facility] 12/6-12/11 for acute on chronic osteomyelitis of the right foot that required partial amputation of 2nd ray of right foot 12/8/23, c/o nausea, vomiting, found with acute renal failure, likely due to recent vanco course.     Acute renal failure, likely drug induced [ cipro, ARBs]  - on Cardiac monitoring. reasses. DC if no events  -Nephro consulted. rec appreciated.   - on IVF  - on Verduzco. continue for now per renal  -Check dig level--> 1.1  -CT, UA reviewed, unremarkable. no hydro  - neg urine cx  -Follow repeat BMP    s/p partial amputation of 2nd ray of right foot 12/8/23  -Due for outpatient podiatry f/u this week regarding dressing   -Podiatry consulted. no surgical intervention needed  -PT eval --> home PT  -Afebrile, non toxic appearing    HTN    - Home doses: metoprolol ER BID, losartan  - Losartan was held due to CARLOS  - reduced metoprolol ERE to 12.5 mg BID for bradycardia. added amlodipine 5 mg at 8 am.   - monitor VS including OH and adjust meds  - check EKG and TTE and TSH    HLD  -Continue home simvastatin 40mg qd    Hypothyroidism  -Continue home synthroid 75mcg qd    left upper lung mass on CT chest  Emphysema  - no h/o lung mass or COPD. + smoking  - pulm consulted likely primary malignancy. needs biopsy.   - CT a/p with OC: 1.5 cm parainguinal LN. no sign of malignancy  - needs PFT OP    Elevated amylase and lipase  - no ssx of pancreatitis  - CT renal stone hunt: and CT a/p with OC showed normal hepatobiliary system and pancrease.   - GI cx noted: non-pancreatic source. trend    Severe protein-calorie malnutrition.  - nutrition on board      DVT ppx: HSQ    Code Status - DNR/DNI reaffirmed  Dispo: oending improvement of renal function. TOV as soon as cleared by renal     84 y/o F with PMH HTN, HLD, chronic right plantar foot ulcers/OM recently admitted to [facility] 12/6-12/11 for acute on chronic osteomyelitis of the right foot that required partial amputation of 2nd ray of right foot 12/8/23, c/o nausea, vomiting, found with acute renal failure, likely due to recent vanco course.     Bradycardia  - likely due to metoprolol also TSH is high. h/o Hypothyroid, on LT4 75 mcg daily  - reduced metoprolol ER dose from 35 mg BID to 12.5 mg BID  - increased LT4 from 75 mcg to 88 mcg daily\  - will monitor on tele for now    Acute renal failure, likely drug induced [ cipro, ARBs]  -Nephro consulted. rec appreciated.   - on IVF  - on Verduzco. continue for now per renal  -Check dig level--> 1.1  -CT, UA reviewed, unremarkable. no hydro  - neg urine cx  -Follow repeat BMP    s/p partial amputation of 2nd ray of right foot 12/8/23  -Due for outpatient podiatry f/u this week regarding dressing   -Podiatry consulted. no surgical intervention needed  -PT eval --> home PT  -Afebrile, non toxic appearing    HTN    - Home doses: metoprolol ER BID, losartan  - Losartan was held due to CARLOS  - reduced metoprolol ERE to 12.5 mg BID for bradycardia. added amlodipine 5 mg at 8 am.   - monitor VS including OH and adjust meds  - check EKG and TTE and TSH    HLD  -Continue home simvastatin 40mg qd    Hypothyroidism  -Continue home synthroid 75mcg qd    left upper lung mass on CT chest  Emphysema  - no h/o lung mass or COPD. + smoking  - pulm consulted likely primary malignancy. needs biopsy.   - CT a/p with OC: 1.5 cm parainguinal LN. no sign of malignancy  - needs PFT OP    Elevated amylase and lipase  - no ssx of pancreatitis  - CT renal stone hunt: and CT a/p with OC showed normal hepatobiliary system and pancrease.   - GI cx noted: non-pancreatic source. trend    Severe protein-calorie malnutrition.  - nutrition on board      DVT ppx: HSQ    Code Status - DNR/DNI reaffirmed    Dispo: Pending improvement of renal function. TOV as soon as cleared by renal    Updated Dr. Eric [ patient's friend and emergency contact]. Dr. Gustavo Peterson is PCP      84 y/o F with PMH HTN, HLD, chronic right plantar foot ulcers/OM recently admitted to [facility] 12/6-12/11 for acute on chronic osteomyelitis of the right foot that required partial amputation of 2nd ray of right foot 12/8/23, c/o nausea, vomiting, found with acute renal failure, likely due to recent vanco course.     Bradycardia  - likely due to metoprolol also TSH is high. h/o Hypothyroid, on LT4 75 mcg daily  - reduced metoprolol ER dose from 35 mg BID to 12.5 mg BID  - increased LT4 from 75 mcg to 88 mcg daily\  - will monitor on tele for now    Acute renal failure, likely drug induced [ cipro, ARBs]  -Nephro consulted. rec appreciated.   - on IVF  - on Verduzco. continue for now per renal  -Check dig level--> 1.1  -CT, UA reviewed, unremarkable. no hydro  - neg urine cx  -Follow repeat BMP    s/p partial amputation of 2nd ray of right foot 12/8/23  -Due for outpatient podiatry f/u this week regarding dressing   -Podiatry consulted. no surgical intervention needed  -PT eval --> home PT  -Afebrile, non toxic appearing    HTN    - Home doses: metoprolol ER BID, losartan  - Losartan was held due to CARLOS  - reduced metoprolol ERE to 12.5 mg BID for bradycardia. added amlodipine 5 mg at 8 am.   - monitor VS including OH and adjust meds  - check EKG and TTE and TSH    HLD  -Continue home simvastatin 40mg qd    Hypothyroidism  -Continue home synthroid 75mcg qd    left upper lung mass on CT chest  Emphysema  - no h/o lung mass or COPD. + smoking  - pulm consulted likely primary malignancy. needs biopsy.   - CT a/p with OC: 1.5 cm parainguinal LN. no sign of malignancy  - needs PFT OP    Elevated amylase and lipase  - no ssx of pancreatitis  - CT renal stone hunt: and CT a/p with OC showed normal hepatobiliary system and pancrease.   - GI cx noted: non-pancreatic source. trend    Severe protein-calorie malnutrition.  - nutrition on board      DVT ppx: HSQ    Code Status - DNR/DNI reaffirmed    Dispo: Pending improvement of renal function. TOV as soon as cleared by renal    Updated Dr. Eric [ patient's friend and emergency contact]. Dr. Gustavo Peterson is PCP      82 y/o F with PMH HTN, HLD, chronic right plantar foot ulcers/OM recently admitted to [facility] 12/6-12/11 for acute on chronic osteomyelitis of the right foot that required partial amputation of 2nd ray of right foot 12/8/23, c/o nausea, vomiting, found with acute renal failure, likely due to recent vanco course.     Bradycardia  - likely due to metoprolol also TSH is high. h/o Hypothyroid, on LT4 75 mcg daily  - reduced metoprolol ER dose from 35 mg BID to 12.5 mg BID  - increased LT4 from 75 mcg to 88 mcg daily\  - will monitor on tele for now    Acute renal failure, likely drug induced [ cipro, ARBs]  -Nephro consulted. rec appreciated.   - on IVF  - on Verduzco. continue for now per renal  -Check dig level--> 1.1  -CT, UA reviewed, unremarkable. no hydro  - neg urine cx  -Follow repeat BMP    s/p partial amputation of 2nd ray of right foot 12/8/23  -Due for outpatient podiatry f/u this week regarding dressing   -Podiatry consulted. no surgical intervention needed  -PT eval --> home PT  -Afebrile, non toxic appearing    HTN    - Home doses: metoprolol ER BID, losartan  - Losartan was held due to CARLOS  - reduced metoprolol ERE to 12.5 mg BID for bradycardia. added amlodipine 5 mg at 8 am.   - monitor VS including OH and adjust meds  - check EKG and TTE and TSH    HLD  -Continue home simvastatin 40mg qd    Hypothyroidism  -Continue home synthroid 75mcg qd    left upper lung mass on CT chest  Emphysema  - no h/o lung mass or COPD. + smoking  - pulm consulted likely primary malignancy. needs biopsy.   - CT a/p with OC: 1.5 cm parainguinal LN. no sign of malignancy  - needs PFT OP    Elevated amylase and lipase  - no ssx of pancreatitis  - CT renal stone hunt: and CT a/p with OC showed normal hepatobiliary system and pancrease.   - GI cx noted: non-pancreatic source. trend    Constipation  - miralax, senna    Severe protein-calorie malnutrition.  - nutrition on board      DVT ppx: HSQ    Code Status - DNR/DNI reaffirmed    Dispo: Pending improvement of renal function. TOV as soon as cleared by renal    Updated Dr. Eric [ patient's friend and emergency contact]. Dr. Gustavo Peterson is PCP      82 y/o F with PMH HTN, HLD, chronic right plantar foot ulcers/OM recently admitted to [facility] 12/6-12/11 for acute on chronic osteomyelitis of the right foot that required partial amputation of 2nd ray of right foot 12/8/23, c/o nausea, vomiting, found with acute renal failure, likely due to recent vanco course.     Bradycardia  - likely due to metoprolol also TSH is high. h/o Hypothyroid, on LT4 75 mcg daily  - reduced metoprolol ER dose from 35 mg BID to 12.5 mg BID  - increased LT4 from 75 mcg to 88 mcg daily\  - will monitor on tele for now    Acute renal failure, likely drug induced [ cipro, ARBs]  -Nephro consulted. rec appreciated.   - on IVF  - on Verduzco. continue for now per renal  -Check dig level--> 1.1  -CT, UA reviewed, unremarkable. no hydro  - neg urine cx  -Follow repeat BMP    s/p partial amputation of 2nd ray of right foot 12/8/23  -Due for outpatient podiatry f/u this week regarding dressing   -Podiatry consulted. no surgical intervention needed  -PT eval --> home PT  -Afebrile, non toxic appearing    HTN    - Home doses: metoprolol ER BID, losartan  - Losartan was held due to CARLOS  - reduced metoprolol ERE to 12.5 mg BID for bradycardia. added amlodipine 5 mg at 8 am.   - monitor VS including OH and adjust meds  - check EKG and TTE and TSH    HLD  -Continue home simvastatin 40mg qd    Hypothyroidism  -Continue home synthroid 75mcg qd    left upper lung mass on CT chest  Emphysema  - no h/o lung mass or COPD. + smoking  - pulm consulted likely primary malignancy. needs biopsy.   - CT a/p with OC: 1.5 cm parainguinal LN. no sign of malignancy  - needs PFT OP    Elevated amylase and lipase  - no ssx of pancreatitis  - CT renal stone hunt: and CT a/p with OC showed normal hepatobiliary system and pancrease.   - GI cx noted: non-pancreatic source. trend    Constipation  - miralax, senna    Severe protein-calorie malnutrition.  - nutrition on board      DVT ppx: HSQ    Code Status - DNR/DNI reaffirmed    Dispo: Pending improvement of renal function. TOV as soon as cleared by renal    Updated Dr. Eric [ patient's friend and emergency contact]. Dr. Gustavo Peterson is PCP

## 2023-12-23 NOTE — PROGRESS NOTE ADULT - SUBJECTIVE AND OBJECTIVE BOX
Medicine Progress Note    Patient is a 83y old  Female who presents with a chief complaint of acute renal failure (22 Dec 2023 17:13)      SUBJECTIVE / OVERNIGHT EVENTS:    ADDITIONAL REVIEW OF SYSTEMS:    MEDICATIONS  (STANDING):  amLODIPine   Tablet 5 milliGRAM(s) Oral with breakfast  dextrose 5% + sodium chloride 0.9%. 1000 milliLiter(s) (75 mL/Hr) IV Continuous <Continuous>  heparin   Injectable 5000 Unit(s) SubCutaneous every 8 hours  levothyroxine 75 MICROGram(s) Oral daily  metoprolol succinate ER 12.5 milliGRAM(s) Oral two times a day  simvastatin 40 milliGRAM(s) Oral at bedtime    MEDICATIONS  (PRN):  acetaminophen     Tablet .. 650 milliGRAM(s) Oral every 6 hours PRN Moderate Pain (4 - 6)    CAPILLARY BLOOD GLUCOSE        I&O's Summary    22 Dec 2023 07:01  -  23 Dec 2023 07:00  --------------------------------------------------------  IN: 1275 mL / OUT: 2550 mL / NET: -1275 mL        PHYSICAL EXAM:  Vital Signs Last 24 Hrs  T(C): 36.7 (23 Dec 2023 05:07), Max: 36.7 (23 Dec 2023 05:07)  T(F): 98.1 (23 Dec 2023 05:07), Max: 98.1 (23 Dec 2023 05:07)  HR: 50 (23 Dec 2023 05:07) (49 - 60)  BP: 139/73 (23 Dec 2023 05:07) (135/63 - 184/84)  BP(mean): --  RR: 17 (23 Dec 2023 05:07) (16 - 17)  SpO2: 99% (23 Dec 2023 05:07) (99% - 100%)    Parameters below as of 23 Dec 2023 05:07  Patient On (Oxygen Delivery Method): room air    GENERAL: Not in distress. Alert    HEENT: clear conjuctiva, MMM. no pallor or icterus  CARDIOVASCULAR: RRR S1, S2. No murmur/rubs/gallop  LUNGS: BLAE+, no rales, no wheezing, no rhonchi.    ABDOMEN: ND. Soft,  NT, no guarding / rebound / rigidity. BS normoactive  BACK: No spine tenderness.  EXTREMITIES: no edema. no leg or calf TP.  SKIN: warm and dry  PSYCHIATRIC: Calm.  No agitation.  CNS: AAO. moves limbs, follows commands. no FND    LABS:                        10.1   6.30  )-----------( 279      ( 22 Dec 2023 06:54 )             29.8     12-22    135  |  105  |  60<H>  ----------------------------<  81  4.9   |  19<L>  |  7.86<H>    Ca    8.6      22 Dec 2023 06:54    TPro  5.6<L>  /  Alb  2.5<L>  /  TBili  0.6  /  DBili  x   /  AST  16  /  ALT  11  /  AlkPhos  69  12-22    PT/INR - ( 21 Dec 2023 11:26 )   PT: 12.3 sec;   INR: 1.05 ratio           CARDIAC MARKERS ( 22 Dec 2023 06:37 )  x     / x     / 32 U/L / x     / x          Urinalysis Basic - ( 22 Dec 2023 06:54 )    Color: x / Appearance: x / SG: x / pH: x  Gluc: 81 mg/dL / Ketone: x  / Bili: x / Urobili: x   Blood: x / Protein: x / Nitrite: x   Leuk Esterase: x / RBC: x / WBC x   Sq Epi: x / Non Sq Epi: x / Bacteria: x        Culture - Urine (collected 21 Dec 2023 12:20)  Source: Clean Catch Clean Catch (Midstream)  Final Report (22 Dec 2023 13:20):    No growth          RADIOLOGY & ADDITIONAL TESTS:  Imaging from Last 24 Hours:    Electrocardiogram/QTc Interval:    COORDINATION OF CARE:  Care Discussed with Consultants/Other Providers:   Medicine Progress Note    Patient is a 83y old  Female who presents with a chief complaint of acute renal failure (22 Dec 2023 17:13)      SUBJECTIVE / OVERNIGHT EVENTS:  seen and examined  Chart reviewed  No overnight events  Limb weakness improving with therapy  BM+  No pain  No complaints  HR slow. patient reported no slow HR before. reduced metoprolol dose. no symptoms    ADDITIONAL REVIEW OF SYSTEMS:  denied fever/chills/CP/SOB/cough/palpitation/dizziness/abdominal pian/nausea/vomiting/diarrhoea/constipation/dysuria/leg or calf pain/headaches.all other ROS neg    MEDICATIONS  (STANDING):  amLODIPine   Tablet 5 milliGRAM(s) Oral with breakfast  dextrose 5% + sodium chloride 0.9%. 1000 milliLiter(s) (75 mL/Hr) IV Continuous <Continuous>  heparin   Injectable 5000 Unit(s) SubCutaneous every 8 hours  levothyroxine 75 MICROGram(s) Oral daily  metoprolol succinate ER 12.5 milliGRAM(s) Oral two times a day  simvastatin 40 milliGRAM(s) Oral at bedtime    MEDICATIONS  (PRN):  acetaminophen     Tablet .. 650 milliGRAM(s) Oral every 6 hours PRN Moderate Pain (4 - 6)    CAPILLARY BLOOD GLUCOSE        I&O's Summary    22 Dec 2023 07:01  -  23 Dec 2023 07:00  --------------------------------------------------------  IN: 1275 mL / OUT: 2550 mL / NET: -1275 mL        PHYSICAL EXAM:  Vital Signs Last 24 Hrs  T(C): 36.7 (23 Dec 2023 05:07), Max: 36.7 (23 Dec 2023 05:07)  T(F): 98.1 (23 Dec 2023 05:07), Max: 98.1 (23 Dec 2023 05:07)  HR: 50 (23 Dec 2023 05:07) (49 - 60)  BP: 139/73 (23 Dec 2023 05:07) (135/63 - 184/84)  BP(mean): --  RR: 17 (23 Dec 2023 05:07) (16 - 17)  SpO2: 99% (23 Dec 2023 05:07) (99% - 100%)    Parameters below as of 23 Dec 2023 05:07  Patient On (Oxygen Delivery Method): room air    GENERAL: Not in distress. Alert    HEENT: clear conjuctiva, MMM. no pallor or icterus  CARDIOVASCULAR: RRR S1, S2. No murmur/rubs/gallop  LUNGS: BLAE+, no rales, no wheezing, no rhonchi.    ABDOMEN: ND. Soft,  NT, no guarding / rebound / rigidity. BS normoactive  BACK: No spine tenderness.  EXTREMITIES: no edema. no leg or calf TP.  SKIN: warm and dry  PSYCHIATRIC: Calm.  No agitation.  CNS: AAO. moves limbs, follows commands. no FND    LABS:                        10.1   6.30  )-----------( 279      ( 22 Dec 2023 06:54 )             29.8     12-22    135  |  105  |  60<H>  ----------------------------<  81  4.9   |  19<L>  |  7.86<H>    Ca    8.6      22 Dec 2023 06:54    TPro  5.6<L>  /  Alb  2.5<L>  /  TBili  0.6  /  DBili  x   /  AST  16  /  ALT  11  /  AlkPhos  69  12-22    PT/INR - ( 21 Dec 2023 11:26 )   PT: 12.3 sec;   INR: 1.05 ratio           CARDIAC MARKERS ( 22 Dec 2023 06:37 )  x     / x     / 32 U/L / x     / x          Urinalysis Basic - ( 22 Dec 2023 06:54 )    Color: x / Appearance: x / SG: x / pH: x  Gluc: 81 mg/dL / Ketone: x  / Bili: x / Urobili: x   Blood: x / Protein: x / Nitrite: x   Leuk Esterase: x / RBC: x / WBC x   Sq Epi: x / Non Sq Epi: x / Bacteria: x        Culture - Urine (collected 21 Dec 2023 12:20)  Source: Clean Catch Clean Catch (Midstream)  Final Report (22 Dec 2023 13:20):    No growth          RADIOLOGY & ADDITIONAL TESTS:  Imaging from Last 24 Hours:    Electrocardiogram/QTc Interval:    COORDINATION OF CARE:  Care Discussed with Consultants/Other Providers:   Medicine Progress Note    Patient is a 83y old  Female who presents with a chief complaint of acute renal failure (22 Dec 2023 17:13)      SUBJECTIVE / OVERNIGHT EVENTS:  seen and examined  Chart reviewed  No overnight events  Limb weakness improving with therapy  BM 12/21  No pain  No complaints  HR slow. patient reported no slow HR before. reduced metoprolol dose. no symptoms    ADDITIONAL REVIEW OF SYSTEMS:  denied fever/chills/CP/SOB/cough/palpitation/dizziness/abdominal pian/nausea/vomiting/diarrhoea/constipation/dysuria/leg or calf pain/headaches.all other ROS neg    MEDICATIONS  (STANDING):  amLODIPine   Tablet 5 milliGRAM(s) Oral with breakfast  dextrose 5% + sodium chloride 0.9%. 1000 milliLiter(s) (75 mL/Hr) IV Continuous <Continuous>  heparin   Injectable 5000 Unit(s) SubCutaneous every 8 hours  levothyroxine 75 MICROGram(s) Oral daily  metoprolol succinate ER 12.5 milliGRAM(s) Oral two times a day  simvastatin 40 milliGRAM(s) Oral at bedtime    MEDICATIONS  (PRN):  acetaminophen     Tablet .. 650 milliGRAM(s) Oral every 6 hours PRN Moderate Pain (4 - 6)    CAPILLARY BLOOD GLUCOSE        I&O's Summary    22 Dec 2023 07:01  -  23 Dec 2023 07:00  --------------------------------------------------------  IN: 1275 mL / OUT: 2550 mL / NET: -1275 mL        PHYSICAL EXAM:  Vital Signs Last 24 Hrs  T(C): 36.7 (23 Dec 2023 05:07), Max: 36.7 (23 Dec 2023 05:07)  T(F): 98.1 (23 Dec 2023 05:07), Max: 98.1 (23 Dec 2023 05:07)  HR: 50 (23 Dec 2023 05:07) (49 - 60)  BP: 139/73 (23 Dec 2023 05:07) (135/63 - 184/84)  BP(mean): --  RR: 17 (23 Dec 2023 05:07) (16 - 17)  SpO2: 99% (23 Dec 2023 05:07) (99% - 100%)    Parameters below as of 23 Dec 2023 05:07  Patient On (Oxygen Delivery Method): room air    GENERAL: Not in distress. Alert    HEENT: clear conjuctiva, MMM. no pallor or icterus  CARDIOVASCULAR: RRR S1, S2. No murmur/rubs/gallop  LUNGS: BLAE+, no rales, no wheezing, no rhonchi.    ABDOMEN: ND. Soft,  NT, no guarding / rebound / rigidity. BS normoactive  BACK: No spine tenderness.  EXTREMITIES: no edema. no leg or calf TP.  SKIN: warm and dry  PSYCHIATRIC: Calm.  No agitation.  CNS: AAO. moves limbs, follows commands. no FND    LABS:                        10.1   6.30  )-----------( 279      ( 22 Dec 2023 06:54 )             29.8     12-22    135  |  105  |  60<H>  ----------------------------<  81  4.9   |  19<L>  |  7.86<H>    Ca    8.6      22 Dec 2023 06:54    TPro  5.6<L>  /  Alb  2.5<L>  /  TBili  0.6  /  DBili  x   /  AST  16  /  ALT  11  /  AlkPhos  69  12-22    PT/INR - ( 21 Dec 2023 11:26 )   PT: 12.3 sec;   INR: 1.05 ratio           CARDIAC MARKERS ( 22 Dec 2023 06:37 )  x     / x     / 32 U/L / x     / x          Urinalysis Basic - ( 22 Dec 2023 06:54 )    Color: x / Appearance: x / SG: x / pH: x  Gluc: 81 mg/dL / Ketone: x  / Bili: x / Urobili: x   Blood: x / Protein: x / Nitrite: x   Leuk Esterase: x / RBC: x / WBC x   Sq Epi: x / Non Sq Epi: x / Bacteria: x        Culture - Urine (collected 21 Dec 2023 12:20)  Source: Clean Catch Clean Catch (Midstream)  Final Report (22 Dec 2023 13:20):    No growth          RADIOLOGY & ADDITIONAL TESTS:  Imaging from Last 24 Hours:    Electrocardiogram/QTc Interval:    COORDINATION OF CARE:  Care Discussed with Consultants/Other Providers:

## 2023-12-24 LAB
ALBUMIN SERPL ELPH-MCNC: 2.5 G/DL — LOW (ref 3.3–5)
ALBUMIN SERPL ELPH-MCNC: 2.5 G/DL — LOW (ref 3.3–5)
ALP SERPL-CCNC: 78 U/L — SIGNIFICANT CHANGE UP (ref 40–120)
ALP SERPL-CCNC: 78 U/L — SIGNIFICANT CHANGE UP (ref 40–120)
ALT FLD-CCNC: 15 U/L — SIGNIFICANT CHANGE UP (ref 10–45)
ALT FLD-CCNC: 15 U/L — SIGNIFICANT CHANGE UP (ref 10–45)
AMYLASE P1 CFR SERPL: 119 U/L — SIGNIFICANT CHANGE UP (ref 25–125)
AMYLASE P1 CFR SERPL: 119 U/L — SIGNIFICANT CHANGE UP (ref 25–125)
ANION GAP SERPL CALC-SCNC: 13 MMOL/L — SIGNIFICANT CHANGE UP (ref 5–17)
ANION GAP SERPL CALC-SCNC: 13 MMOL/L — SIGNIFICANT CHANGE UP (ref 5–17)
AST SERPL-CCNC: 18 U/L — SIGNIFICANT CHANGE UP (ref 10–40)
AST SERPL-CCNC: 18 U/L — SIGNIFICANT CHANGE UP (ref 10–40)
BILIRUB SERPL-MCNC: 0.6 MG/DL — SIGNIFICANT CHANGE UP (ref 0.2–1.2)
BILIRUB SERPL-MCNC: 0.6 MG/DL — SIGNIFICANT CHANGE UP (ref 0.2–1.2)
BUN SERPL-MCNC: 48 MG/DL — HIGH (ref 7–23)
BUN SERPL-MCNC: 48 MG/DL — HIGH (ref 7–23)
CALCIUM SERPL-MCNC: 8.8 MG/DL — SIGNIFICANT CHANGE UP (ref 8.4–10.5)
CALCIUM SERPL-MCNC: 8.8 MG/DL — SIGNIFICANT CHANGE UP (ref 8.4–10.5)
CHLORIDE SERPL-SCNC: 106 MMOL/L — SIGNIFICANT CHANGE UP (ref 96–108)
CHLORIDE SERPL-SCNC: 106 MMOL/L — SIGNIFICANT CHANGE UP (ref 96–108)
CO2 SERPL-SCNC: 19 MMOL/L — LOW (ref 22–31)
CO2 SERPL-SCNC: 19 MMOL/L — LOW (ref 22–31)
CREAT SERPL-MCNC: 6.44 MG/DL — HIGH (ref 0.5–1.3)
CREAT SERPL-MCNC: 6.44 MG/DL — HIGH (ref 0.5–1.3)
EGFR: 6 ML/MIN/1.73M2 — LOW
EGFR: 6 ML/MIN/1.73M2 — LOW
GLUCOSE SERPL-MCNC: 71 MG/DL — SIGNIFICANT CHANGE UP (ref 70–99)
GLUCOSE SERPL-MCNC: 71 MG/DL — SIGNIFICANT CHANGE UP (ref 70–99)
HCT VFR BLD CALC: 31.1 % — LOW (ref 34.5–45)
HCT VFR BLD CALC: 31.1 % — LOW (ref 34.5–45)
HGB BLD-MCNC: 10.5 G/DL — LOW (ref 11.5–15.5)
HGB BLD-MCNC: 10.5 G/DL — LOW (ref 11.5–15.5)
LIDOCAIN IGE QN: 242 U/L — HIGH (ref 16–77)
LIDOCAIN IGE QN: 242 U/L — HIGH (ref 16–77)
MCHC RBC-ENTMCNC: 32.5 PG — SIGNIFICANT CHANGE UP (ref 27–34)
MCHC RBC-ENTMCNC: 32.5 PG — SIGNIFICANT CHANGE UP (ref 27–34)
MCHC RBC-ENTMCNC: 33.8 GM/DL — SIGNIFICANT CHANGE UP (ref 32–36)
MCHC RBC-ENTMCNC: 33.8 GM/DL — SIGNIFICANT CHANGE UP (ref 32–36)
MCV RBC AUTO: 96.3 FL — SIGNIFICANT CHANGE UP (ref 80–100)
MCV RBC AUTO: 96.3 FL — SIGNIFICANT CHANGE UP (ref 80–100)
NRBC # BLD: 0 /100 WBCS — SIGNIFICANT CHANGE UP (ref 0–0)
NRBC # BLD: 0 /100 WBCS — SIGNIFICANT CHANGE UP (ref 0–0)
PLATELET # BLD AUTO: 235 K/UL — SIGNIFICANT CHANGE UP (ref 150–400)
PLATELET # BLD AUTO: 235 K/UL — SIGNIFICANT CHANGE UP (ref 150–400)
POTASSIUM SERPL-MCNC: 4.7 MMOL/L — SIGNIFICANT CHANGE UP (ref 3.5–5.3)
POTASSIUM SERPL-MCNC: 4.7 MMOL/L — SIGNIFICANT CHANGE UP (ref 3.5–5.3)
POTASSIUM SERPL-SCNC: 4.7 MMOL/L — SIGNIFICANT CHANGE UP (ref 3.5–5.3)
POTASSIUM SERPL-SCNC: 4.7 MMOL/L — SIGNIFICANT CHANGE UP (ref 3.5–5.3)
PROT SERPL-MCNC: 5.8 G/DL — LOW (ref 6–8.3)
PROT SERPL-MCNC: 5.8 G/DL — LOW (ref 6–8.3)
RBC # BLD: 3.23 M/UL — LOW (ref 3.8–5.2)
RBC # BLD: 3.23 M/UL — LOW (ref 3.8–5.2)
RBC # FLD: 11.9 % — SIGNIFICANT CHANGE UP (ref 10.3–14.5)
RBC # FLD: 11.9 % — SIGNIFICANT CHANGE UP (ref 10.3–14.5)
SODIUM SERPL-SCNC: 138 MMOL/L — SIGNIFICANT CHANGE UP (ref 135–145)
SODIUM SERPL-SCNC: 138 MMOL/L — SIGNIFICANT CHANGE UP (ref 135–145)
T4 FREE SERPL-MCNC: 0.9 NG/DL — SIGNIFICANT CHANGE UP (ref 0.9–1.8)
T4 FREE SERPL-MCNC: 0.9 NG/DL — SIGNIFICANT CHANGE UP (ref 0.9–1.8)
WBC # BLD: 6.67 K/UL — SIGNIFICANT CHANGE UP (ref 3.8–10.5)
WBC # BLD: 6.67 K/UL — SIGNIFICANT CHANGE UP (ref 3.8–10.5)
WBC # FLD AUTO: 6.67 K/UL — SIGNIFICANT CHANGE UP (ref 3.8–10.5)
WBC # FLD AUTO: 6.67 K/UL — SIGNIFICANT CHANGE UP (ref 3.8–10.5)

## 2023-12-24 PROCEDURE — 99233 SBSQ HOSP IP/OBS HIGH 50: CPT

## 2023-12-24 PROCEDURE — 93306 TTE W/DOPPLER COMPLETE: CPT | Mod: 26

## 2023-12-24 RX ADMIN — HEPARIN SODIUM 5000 UNIT(S): 5000 INJECTION INTRAVENOUS; SUBCUTANEOUS at 22:05

## 2023-12-24 RX ADMIN — HEPARIN SODIUM 5000 UNIT(S): 5000 INJECTION INTRAVENOUS; SUBCUTANEOUS at 05:51

## 2023-12-24 RX ADMIN — AMLODIPINE BESYLATE 10 MILLIGRAM(S): 2.5 TABLET ORAL at 07:52

## 2023-12-24 RX ADMIN — SENNA PLUS 2 TABLET(S): 8.6 TABLET ORAL at 22:05

## 2023-12-24 RX ADMIN — HEPARIN SODIUM 5000 UNIT(S): 5000 INJECTION INTRAVENOUS; SUBCUTANEOUS at 13:45

## 2023-12-24 RX ADMIN — SIMVASTATIN 40 MILLIGRAM(S): 20 TABLET, FILM COATED ORAL at 22:05

## 2023-12-24 RX ADMIN — Medication 88 MICROGRAM(S): at 05:51

## 2023-12-24 RX ADMIN — POLYETHYLENE GLYCOL 3350 17 GRAM(S): 17 POWDER, FOR SOLUTION ORAL at 13:46

## 2023-12-24 NOTE — PROGRESS NOTE ADULT - SUBJECTIVE AND OBJECTIVE BOX
82 y/o F with PMH HTN, HLD, chronic right plantar foot ulcers/OM recently admitted to [facility] 12/6-12/11 for acute on chronic osteomyelitis of the right foot that required partial amputation of 2nd ray of right foot 12/8/23. Patient seen and examined at [facility], patient in bed resting comfortably in no acute distress. Currently, tolerating po intake trenal restriction diet, no NDV, last BM 12/22 soft brown. GI consulted for elevated amylase/lipase.        MEDICATIONS  (STANDING):    MEDICATIONS  (STANDING):  amLODIPine   Tablet 10 milliGRAM(s) Oral with breakfast  dextrose 5% + sodium chloride 0.9%. 1000 milliLiter(s) (50 mL/Hr) IV Continuous <Continuous>  heparin   Injectable 5000 Unit(s) SubCutaneous every 8 hours  levothyroxine 88 MICROGram(s) Oral daily  polyethylene glycol 3350 17 Gram(s) Oral daily  senna 2 Tablet(s) Oral at bedtime  simvastatin 40 milliGRAM(s) Oral at bedtime    MEDICATIONS  (PRN):  acetaminophen     Tablet .. 650 milliGRAM(s) Oral every 6 hours PRN Moderate Pain (4 - 6)  hydrALAZINE 25 milliGRAM(s) Oral every 6 hours PRN Systolic blood pressure >160 or DBP>100    Allergies    doxycycline (Unknown)  linezolid (Unknown)    Intolerances    Levaquin (Stomach Upset; Nausea)      PAST MEDICAL & SURGICAL HISTORY:  HTN (hypertension)      Hyperlipidemia      Hypothyroid      Paroxysmal SVT (supraventricular tachycardia)      Thyroid cyst      Back problem  s/p back surgery      S/P thyroid surgery      S/P hernia surgery      S/P rotator cuff surgery      REVIEW OF SYSTEMS	 - See HPI    PHYSICAL EXAM:   Vital Signs Last 24 Hrs  T(C): 36.9 (24 Dec 2023 05:47), Max: 36.9 (23 Dec 2023 17:08)  T(F): 98.5 (24 Dec 2023 05:47), Max: 98.5 (23 Dec 2023 19:47)  HR: 51 (24 Dec 2023 05:47) (51 - 58)  BP: 145/65 (24 Dec 2023 05:47) (145/65 - 167/71)  BP(mean): --  RR: 16 (24 Dec 2023 05:47) (16 - 16)  SpO2: 96% (24 Dec 2023 05:47) (95% - 98%)    Parameters below as of 24 Dec 2023 05:47  Patient On (Oxygen Delivery Method): room air        Daily Height in cm: 177.8 (21 Dec 2023 19:46)    I&O's Summary    23 Dec 2023 07:01  -  24 Dec 2023 07:00  --------------------------------------------------------  IN: 1825 mL / OUT: 3100 mL / NET: -1275 mL    24 Dec 2023 07:01  -  24 Dec 2023 10:39  --------------------------------------------------------  IN: 100 mL / OUT: 400 mL / NET: -300 mL      GENERAL:  Appears stated age, well-groomed, well-nourished, no distress  HEENT:  Moist and clear sclera and conjuctivae  CHEST:  Full & symmetric excursion, no increased effort, breath sounds clear  HEART:  Regular rhythm, S1, S2  ABDOMEN:  Soft, non-tender, non-distended, normoactive bowel sounds,  no masses   EXTREMITIES:  no edema. right foot with ace bandage on.   SKIN:  No rash  NEURO:  Alert, oriented, no tremor, no encephalopathy      LABS:                        10.5   6.67  )-----------( 235      ( 24 Dec 2023 06:14 )             31.1              12-24    138  |  106  |  48<H>  ----------------------------<  71  4.7   |  19<L>  |  6.44<H>    Ca    8.8      24 Dec 2023 06:14    TPro  5.8<L>  /  Alb  2.5<L>  /  TBili  0.6  /  DBili  x   /  AST  18  /  ALT  15  /  AlkPhos  78  12-24      /PT/INR - ( 21 Dec 2023 11:26 )   PT: 12.3 sec;   INR: 1.05 ratio      Amylase: 138 U/L (12-22 @ 06:37)    Lipase: >375 U/L (12-22 @ 06:37)    CT 12/22  IMPRESSION: No CT evidence for intra-abdominal or pelvic malignancy on   this noncontrast CT evaluation.       84 y/o F with PMH HTN, HLD, chronic right plantar foot ulcers/OM recently admitted to [facility] 12/6-12/11 for acute on chronic osteomyelitis of the right foot that required partial amputation of 2nd ray of right foot 12/8/23. Patient seen and examined at [facility], patient in bed resting comfortably in no acute distress. Currently, tolerating po intake trenal restriction diet, no NDV, last BM 12/22 soft brown. GI consulted for elevated amylase/lipase.        MEDICATIONS  (STANDING):    MEDICATIONS  (STANDING):  amLODIPine   Tablet 10 milliGRAM(s) Oral with breakfast  dextrose 5% + sodium chloride 0.9%. 1000 milliLiter(s) (50 mL/Hr) IV Continuous <Continuous>  heparin   Injectable 5000 Unit(s) SubCutaneous every 8 hours  levothyroxine 88 MICROGram(s) Oral daily  polyethylene glycol 3350 17 Gram(s) Oral daily  senna 2 Tablet(s) Oral at bedtime  simvastatin 40 milliGRAM(s) Oral at bedtime    MEDICATIONS  (PRN):  acetaminophen     Tablet .. 650 milliGRAM(s) Oral every 6 hours PRN Moderate Pain (4 - 6)  hydrALAZINE 25 milliGRAM(s) Oral every 6 hours PRN Systolic blood pressure >160 or DBP>100    Allergies    doxycycline (Unknown)  linezolid (Unknown)    Intolerances    Levaquin (Stomach Upset; Nausea)      PAST MEDICAL & SURGICAL HISTORY:  HTN (hypertension)      Hyperlipidemia      Hypothyroid      Paroxysmal SVT (supraventricular tachycardia)      Thyroid cyst      Back problem  s/p back surgery      S/P thyroid surgery      S/P hernia surgery      S/P rotator cuff surgery      REVIEW OF SYSTEMS	 - See HPI    PHYSICAL EXAM:   Vital Signs Last 24 Hrs  T(C): 36.9 (24 Dec 2023 05:47), Max: 36.9 (23 Dec 2023 17:08)  T(F): 98.5 (24 Dec 2023 05:47), Max: 98.5 (23 Dec 2023 19:47)  HR: 51 (24 Dec 2023 05:47) (51 - 58)  BP: 145/65 (24 Dec 2023 05:47) (145/65 - 167/71)  BP(mean): --  RR: 16 (24 Dec 2023 05:47) (16 - 16)  SpO2: 96% (24 Dec 2023 05:47) (95% - 98%)    Parameters below as of 24 Dec 2023 05:47  Patient On (Oxygen Delivery Method): room air        Daily Height in cm: 177.8 (21 Dec 2023 19:46)    I&O's Summary    23 Dec 2023 07:01  -  24 Dec 2023 07:00  --------------------------------------------------------  IN: 1825 mL / OUT: 3100 mL / NET: -1275 mL    24 Dec 2023 07:01  -  24 Dec 2023 10:39  --------------------------------------------------------  IN: 100 mL / OUT: 400 mL / NET: -300 mL      GENERAL:  Appears stated age, well-groomed, well-nourished, no distress  HEENT:  Moist and clear sclera and conjuctivae  CHEST:  Full & symmetric excursion, no increased effort, breath sounds clear  HEART:  Regular rhythm, S1, S2  ABDOMEN:  Soft, non-tender, non-distended, normoactive bowel sounds,  no masses   EXTREMITIES:  no edema. right foot with ace bandage on.   SKIN:  No rash  NEURO:  Alert, oriented, no tremor, no encephalopathy      LABS:                        10.5   6.67  )-----------( 235      ( 24 Dec 2023 06:14 )             31.1              12-24    138  |  106  |  48<H>  ----------------------------<  71  4.7   |  19<L>  |  6.44<H>    Ca    8.8      24 Dec 2023 06:14    TPro  5.8<L>  /  Alb  2.5<L>  /  TBili  0.6  /  DBili  x   /  AST  18  /  ALT  15  /  AlkPhos  78  12-24      /PT/INR - ( 21 Dec 2023 11:26 )   PT: 12.3 sec;   INR: 1.05 ratio      Amylase: 138 U/L (12-22 @ 06:37)    Lipase: >375 U/L (12-22 @ 06:37)    CT 12/22  IMPRESSION: No CT evidence for intra-abdominal or pelvic malignancy on   this noncontrast CT evaluation.

## 2023-12-24 NOTE — PROGRESS NOTE ADULT - ASSESSMENT
84 y/o F with PMH HTN, HLD, chronic right plantar foot ulcers/OM recently admitted to [facility] 12/6-12/11 for acute on chronic osteomyelitis of the right foot that required partial amputation of 2nd ray of right foot 12/8/23, c/o nausea, vomiting, found with acute renal failure, likely due to recent vanco course.     Bradycardia  - likley combination of BB and hypothyroid  - BB dced. LT4 dose increased    Acute renal failure, likely drug induced [ cipro, ARBs]  -Nephro consulted. rec appreciated.   - on IVF  - on TOV since this am. bladder scan/SC per protocol.   - dig level--> 1.1. now off dig. unclear why patient is on dig. reported no afib  -CT, UA reviewed, unremarkable. no hydro  - neg urine cx  -Follow repeat BMP    s/p partial amputation of 2nd ray of right foot 12/8/23  -Due for outpatient podiatry f/u this week regarding dressing   -Podiatry consulted. no surgical intervention needed  -PT eval --> home PT  -Afebrile, non toxic appearing    HTN  - Home doses: metoprolol ER BID, losartan  - Losartan was held due to CARLOS  - off metoprolol for bradycardia  - amlodipine was increased form 5 mg to 10 mg this am.  - no OH noted  - monitor VS including OH and adjust meds    HLD  -Continue home simvastatin 40mg qd    Hypothyroidism  - TSH 16  - in creased LT4 from 75 to 88 mcg 12/23  - needs repeat TFT in 4-6 weeks    left upper lung mass on CT chest  Emphysema  - no h/o lung mass or COPD. + smoking  - pulm consulted likely primary malignancy. needs biopsy.   - CT a/p with OC: 1.5 cm parainguinal LN. no sign of malignancy  - needs PFT OP    Elevated amylase and lipase  - improving  - no ssx of pancreatitis  - CT renal stone hunt: and CT a/p with OC showed normal hepatobiliary system and pancrease.   - GI cx noted: non-pancreatic source. trend    Constipation  - miralax, senna    Severe protein-calorie malnutrition.  - nutrition on board      DVT ppx: HSQ    Code Status - DNR/DNI     Dispo: Pending improvement of renal function. TOV as soon as cleared by renal    12/24: Updated Dr. Eric [ patient's friend and emergency contact]. Dr. Gustavo Peterson is PCP      82 y/o F with PMH HTN, HLD, chronic right plantar foot ulcers/OM recently admitted to [facility] 12/6-12/11 for acute on chronic osteomyelitis of the right foot that required partial amputation of 2nd ray of right foot 12/8/23, c/o nausea, vomiting, found with acute renal failure, likely due to recent vanco course.     Bradycardia  - likley combination of BB and hypothyroid  - BB dced. LT4 dose increased    Acute renal failure, likely drug induced [ cipro, ARBs]  -Nephro consulted. rec appreciated.   - on IVF  - on TOV since this am. bladder scan/SC per protocol.   - dig level--> 1.1. now off dig. unclear why patient is on dig. reported no afib  -CT, UA reviewed, unremarkable. no hydro  - neg urine cx  -Follow repeat BMP    s/p partial amputation of 2nd ray of right foot 12/8/23  -Due for outpatient podiatry f/u this week regarding dressing   -Podiatry consulted. no surgical intervention needed  -PT eval --> home PT  -Afebrile, non toxic appearing    HTN  - Home doses: metoprolol ER BID, losartan  - Losartan was held due to CARLOS  - off metoprolol for bradycardia  - amlodipine was increased form 5 mg to 10 mg this am.  - no OH noted  - monitor VS including OH and adjust meds    HLD  -Continue home simvastatin 40mg qd    Hypothyroidism  - TSH 16  - in creased LT4 from 75 to 88 mcg 12/23  - needs repeat TFT in 4-6 weeks    left upper lung mass on CT chest  Emphysema  - no h/o lung mass or COPD. + smoking  - pulm consulted likely primary malignancy. needs biopsy.   - CT a/p with OC: 1.5 cm parainguinal LN. no sign of malignancy  - needs PFT OP    Elevated amylase and lipase  - improving  - no ssx of pancreatitis  - CT renal stone hunt: and CT a/p with OC showed normal hepatobiliary system and pancrease.   - GI cx noted: non-pancreatic source. trend    Constipation  - miralax, senna    Severe protein-calorie malnutrition.  - nutrition on board      DVT ppx: HSQ    Code Status - DNR/DNI     Dispo: Pending improvement of renal function. TOV as soon as cleared by renal    12/24: Updated Dr. Eric [ patient's friend and emergency contact]. Dr. Gustavo Peterson is PCP

## 2023-12-24 NOTE — PROGRESS NOTE ADULT - SUBJECTIVE AND OBJECTIVE BOX
No distress    Vital Signs Last 24 Hrs  T(C): 36.9 (12-24-23 @ 05:47), Max: 36.9 (12-23-23 @ 17:08)  T(F): 98.5 (12-24-23 @ 05:47), Max: 98.5 (12-23-23 @ 19:47)  HR: 51 (12-24-23 @ 05:47) (51 - 58)  BP: 145/65 (12-24-23 @ 05:47) (145/65 - 167/71)  RR: 16 (12-24-23 @ 05:47) (16 - 16)  SpO2: 96% (12-24-23 @ 05:47) (95% - 98%)    I&O's Detail    23 Dec 2023 07:01  -  24 Dec 2023 07:00  --------------------------------------------------------  IN:    dextrose 5% + sodium chloride 0.9%: 375 mL    dextrose 5% + sodium chloride 0.9%: 850 mL    Oral Fluid: 600 mL  Total IN: 1825 mL    OUT:    Indwelling Catheter - Urethral (mL): 3100 mL  Total OUT: 3100 mL    24 Dec 2023 07:01  -  24 Dec 2023 15:33  --------------------------------------------------------  IN:    dextrose 5% + sodium chloride 0.9%: 150 mL  Total IN: 150 mL    OUT:    Indwelling Catheter - Urethral (mL): 400 mL  Total OUT: 400 mL    s1s2  b/l air entry  soft, ND  no edema  AO                                                       10.5   6.67  )-----------( 235      ( 24 Dec 2023 06:14 )             31.1     24 Dec 2023 06:14    138    |  106    |  48     ----------------------------<  71     4.7     |  19     |  6.44     Ca    8.8        24 Dec 2023 06:14    TPro  5.8    /  Alb  2.5    /  TBili  0.6    /  DBili  x      /  AST  18     /  ALT  15     /  AlkPhos  78     24 Dec 2023 06:14    LIVER FUNCTIONS - ( 24 Dec 2023 06:14 )  Alb: 2.5 g/dL / Pro: 5.8 g/dL / ALK PHOS: 78 U/L / ALT: 15 U/L / AST: 18 U/L / GGT: x           acetaminophen     Tablet  650 milliGRAM(s) Oral every 6 hours PRN  amLODIPine   Tablet 10 milliGRAM(s) Oral with breakfast  heparin   Injectable 5000 Unit(s) SubCutaneous every 8 hours  hydrALAZINE 25 milliGRAM(s) Oral every 6 hours PRN  levothyroxine 88 MICROGram(s) Oral daily  polyethylene glycol 3350 17 Gram(s) Oral daily  senna 2 Tablet(s) Oral at bedtime  simvastatin 40 milliGRAM(s) Oral at bedtime    A/P:    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, exacerbated by ARB as op  CT A/P w/o hydro, UA negative  CT Chest - emphysema, COLTEN mass  Pulm eval appr  Cr is improving   Will d/c IVF  Avoid nephrotoxins  F/u CBC, BMP, serologies     [phone]       No distress    Vital Signs Last 24 Hrs  T(C): 36.9 (12-24-23 @ 05:47), Max: 36.9 (12-23-23 @ 17:08)  T(F): 98.5 (12-24-23 @ 05:47), Max: 98.5 (12-23-23 @ 19:47)  HR: 51 (12-24-23 @ 05:47) (51 - 58)  BP: 145/65 (12-24-23 @ 05:47) (145/65 - 167/71)  RR: 16 (12-24-23 @ 05:47) (16 - 16)  SpO2: 96% (12-24-23 @ 05:47) (95% - 98%)    I&O's Detail    23 Dec 2023 07:01  -  24 Dec 2023 07:00  --------------------------------------------------------  IN:    dextrose 5% + sodium chloride 0.9%: 375 mL    dextrose 5% + sodium chloride 0.9%: 850 mL    Oral Fluid: 600 mL  Total IN: 1825 mL    OUT:    Indwelling Catheter - Urethral (mL): 3100 mL  Total OUT: 3100 mL    24 Dec 2023 07:01  -  24 Dec 2023 15:33  --------------------------------------------------------  IN:    dextrose 5% + sodium chloride 0.9%: 150 mL  Total IN: 150 mL    OUT:    Indwelling Catheter - Urethral (mL): 400 mL  Total OUT: 400 mL    s1s2  b/l air entry  soft, ND  no edema  AO                                                       10.5   6.67  )-----------( 235      ( 24 Dec 2023 06:14 )             31.1     24 Dec 2023 06:14    138    |  106    |  48     ----------------------------<  71     4.7     |  19     |  6.44     Ca    8.8        24 Dec 2023 06:14    TPro  5.8    /  Alb  2.5    /  TBili  0.6    /  DBili  x      /  AST  18     /  ALT  15     /  AlkPhos  78     24 Dec 2023 06:14    LIVER FUNCTIONS - ( 24 Dec 2023 06:14 )  Alb: 2.5 g/dL / Pro: 5.8 g/dL / ALK PHOS: 78 U/L / ALT: 15 U/L / AST: 18 U/L / GGT: x           acetaminophen     Tablet  650 milliGRAM(s) Oral every 6 hours PRN  amLODIPine   Tablet 10 milliGRAM(s) Oral with breakfast  heparin   Injectable 5000 Unit(s) SubCutaneous every 8 hours  hydrALAZINE 25 milliGRAM(s) Oral every 6 hours PRN  levothyroxine 88 MICROGram(s) Oral daily  polyethylene glycol 3350 17 Gram(s) Oral daily  senna 2 Tablet(s) Oral at bedtime  simvastatin 40 milliGRAM(s) Oral at bedtime    A/P:    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

## 2023-12-24 NOTE — PROGRESS NOTE ADULT - ASSESSMENT
82 y/o F with PMH HTN, HLD, chronic right plantar foot ulcers/OM recently admitted to [facility] 12/6-12/11 for acute on chronic osteomyelitis of the right foot that required partial amputation of 2nd ray of right foot 12/8/23. Patient seen and examined at [facility], patient in bed resting comfortably in no acute distress. Currently, tolerating po intake trenal restriction diet, no NDV, no abd pain, last BM 12/22 soft brown. GI consulted for elevated amylase/lipase.  82 y/o F with PMH HTN, HLD, chronic right plantar foot ulcers/OM recently admitted to [facility] 12/6-12/11 for acute on chronic osteomyelitis of the right foot that required partial amputation of 2nd ray of right foot 12/8/23. Patient seen and examined at [facility], patient in bed resting comfortably in no acute distress. Currently, tolerating po intake trenal restriction diet, no NDV, no abd pain, last BM 12/22 soft brown. GI consulted for elevated amylase/lipase.

## 2023-12-24 NOTE — PROGRESS NOTE ADULT - SUBJECTIVE AND OBJECTIVE BOX
Medicine Progress Note    Patient is a 83y old  Female who presents with a chief complaint of acute renal failure (24 Dec 2023 10:32)      SUBJECTIVE / OVERNIGHT EVENTS:  seen and examined  Chart reviewed  No overnight events  Limb weakness improving with therapy  last BM 2 days back.   No pain  No other complaints    ADDITIONAL REVIEW OF SYSTEMS:  denied fever/chills/CP/SOB/cough/palpitation/dizziness/abdominal pian/nausea/vomiting/diarrhoea/dysuria/leg or calf pain/headaches.all other ROS neg    MEDICATIONS  (STANDING):  amLODIPine   Tablet 10 milliGRAM(s) Oral with breakfast  heparin   Injectable 5000 Unit(s) SubCutaneous every 8 hours  levothyroxine 88 MICROGram(s) Oral daily  polyethylene glycol 3350 17 Gram(s) Oral daily  senna 2 Tablet(s) Oral at bedtime  simvastatin 40 milliGRAM(s) Oral at bedtime    MEDICATIONS  (PRN):  acetaminophen     Tablet .. 650 milliGRAM(s) Oral every 6 hours PRN Moderate Pain (4 - 6)  hydrALAZINE 25 milliGRAM(s) Oral every 6 hours PRN Systolic blood pressure >160 or DBP>100    CAPILLARY BLOOD GLUCOSE        I&O's Summary    23 Dec 2023 07:01  -  24 Dec 2023 07:00  --------------------------------------------------------  IN: 1825 mL / OUT: 3100 mL / NET: -1275 mL    24 Dec 2023 07:01  -  24 Dec 2023 14:38  --------------------------------------------------------  IN: 150 mL / OUT: 400 mL / NET: -250 mL        PHYSICAL EXAM:  Vital Signs Last 24 Hrs  T(C): 36.9 (24 Dec 2023 05:47), Max: 36.9 (23 Dec 2023 17:08)  T(F): 98.5 (24 Dec 2023 05:47), Max: 98.5 (23 Dec 2023 19:47)  HR: 51 (24 Dec 2023 05:47) (51 - 58)  BP: 145/65 (24 Dec 2023 05:47) (145/65 - 167/71)  BP(mean): --  RR: 16 (24 Dec 2023 05:47) (16 - 16)  SpO2: 96% (24 Dec 2023 05:47) (95% - 98%)    Parameters below as of 24 Dec 2023 05:47  Patient On (Oxygen Delivery Method): room air      CONSTITUTIONAL: NAD, well-developed, well-groomed  ENMT: Moist oral mucosa, no pharyngeal injection or exudates; normal dentition  RESPIRATORY: Normal respiratory effort; lungs are clear to auscultation bilaterally  CARDIOVASCULAR: Regular rate and rhythm, normal S1 and S2, no murmur/rub/gallop; No lower extremity edema; Peripheral pulses are 2+ bilaterally  ABDOMEN: Nontender to palpation, normoactive bowel sounds, no rebound/guarding; No hepatosplenomegaly  PSYCH: A+O to person, place, and time; affect appropriate  NEUROLOGY: CN 2-12 are intact and symmetric; no gross sensory deficits   SKIN: No rashes; no palpable lesions    LABS:                        10.5   6.67  )-----------( 235      ( 24 Dec 2023 06:14 )             31.1     12-24    138  |  106  |  48<H>  ----------------------------<  71  4.7   |  19<L>  |  6.44<H>    Ca    8.8      24 Dec 2023 06:14    TPro  5.8<L>  /  Alb  2.5<L>  /  TBili  0.6  /  DBili  x   /  AST  18  /  ALT  15  /  AlkPhos  78  12-24          Urinalysis Basic - ( 24 Dec 2023 06:14 )    Color: x / Appearance: x / SG: x / pH: x  Gluc: 71 mg/dL / Ketone: x  / Bili: x / Urobili: x   Blood: x / Protein: x / Nitrite: x   Leuk Esterase: x / RBC: x / WBC x   Sq Epi: x / Non Sq Epi: x / Bacteria: x            RADIOLOGY & ADDITIONAL TESTS:  Imaging from Last 24 Hours:    Electrocardiogram/QTc Interval:    COORDINATION OF CARE:  Care Discussed with Consultants/Other Providers:

## 2023-12-25 LAB
ALBUMIN SERPL ELPH-MCNC: 2.8 G/DL — LOW (ref 3.3–5)
ALBUMIN SERPL ELPH-MCNC: 2.8 G/DL — LOW (ref 3.3–5)
ALP SERPL-CCNC: 84 U/L — SIGNIFICANT CHANGE UP (ref 40–120)
ALP SERPL-CCNC: 84 U/L — SIGNIFICANT CHANGE UP (ref 40–120)
ALT FLD-CCNC: 13 U/L — SIGNIFICANT CHANGE UP (ref 10–45)
ALT FLD-CCNC: 13 U/L — SIGNIFICANT CHANGE UP (ref 10–45)
ANION GAP SERPL CALC-SCNC: 13 MMOL/L — SIGNIFICANT CHANGE UP (ref 5–17)
ANION GAP SERPL CALC-SCNC: 13 MMOL/L — SIGNIFICANT CHANGE UP (ref 5–17)
APPEARANCE UR: ABNORMAL
APPEARANCE UR: ABNORMAL
AST SERPL-CCNC: 17 U/L — SIGNIFICANT CHANGE UP (ref 10–40)
AST SERPL-CCNC: 17 U/L — SIGNIFICANT CHANGE UP (ref 10–40)
BACTERIA # UR AUTO: ABNORMAL /HPF
BACTERIA # UR AUTO: ABNORMAL /HPF
BILIRUB SERPL-MCNC: 0.8 MG/DL — SIGNIFICANT CHANGE UP (ref 0.2–1.2)
BILIRUB SERPL-MCNC: 0.8 MG/DL — SIGNIFICANT CHANGE UP (ref 0.2–1.2)
BILIRUB UR-MCNC: NEGATIVE — SIGNIFICANT CHANGE UP
BILIRUB UR-MCNC: NEGATIVE — SIGNIFICANT CHANGE UP
BUN SERPL-MCNC: 48 MG/DL — HIGH (ref 7–23)
BUN SERPL-MCNC: 48 MG/DL — HIGH (ref 7–23)
CALCIUM SERPL-MCNC: 8.9 MG/DL — SIGNIFICANT CHANGE UP (ref 8.4–10.5)
CALCIUM SERPL-MCNC: 8.9 MG/DL — SIGNIFICANT CHANGE UP (ref 8.4–10.5)
CHLORIDE SERPL-SCNC: 104 MMOL/L — SIGNIFICANT CHANGE UP (ref 96–108)
CHLORIDE SERPL-SCNC: 104 MMOL/L — SIGNIFICANT CHANGE UP (ref 96–108)
CO2 SERPL-SCNC: 20 MMOL/L — LOW (ref 22–31)
CO2 SERPL-SCNC: 20 MMOL/L — LOW (ref 22–31)
COLOR SPEC: YELLOW — SIGNIFICANT CHANGE UP
COLOR SPEC: YELLOW — SIGNIFICANT CHANGE UP
COMMENT - URINE: SIGNIFICANT CHANGE UP
COMMENT - URINE: SIGNIFICANT CHANGE UP
CREAT SERPL-MCNC: 5.65 MG/DL — HIGH (ref 0.5–1.3)
CREAT SERPL-MCNC: 5.65 MG/DL — HIGH (ref 0.5–1.3)
DIFF PNL FLD: ABNORMAL
DIFF PNL FLD: ABNORMAL
EGFR: 7 ML/MIN/1.73M2 — LOW
EGFR: 7 ML/MIN/1.73M2 — LOW
GLUCOSE SERPL-MCNC: 81 MG/DL — SIGNIFICANT CHANGE UP (ref 70–99)
GLUCOSE SERPL-MCNC: 81 MG/DL — SIGNIFICANT CHANGE UP (ref 70–99)
GLUCOSE UR QL: NEGATIVE MG/DL — SIGNIFICANT CHANGE UP
GLUCOSE UR QL: NEGATIVE MG/DL — SIGNIFICANT CHANGE UP
HCT VFR BLD CALC: 34.6 % — SIGNIFICANT CHANGE UP (ref 34.5–45)
HCT VFR BLD CALC: 34.6 % — SIGNIFICANT CHANGE UP (ref 34.5–45)
HGB BLD-MCNC: 11.9 G/DL — SIGNIFICANT CHANGE UP (ref 11.5–15.5)
HGB BLD-MCNC: 11.9 G/DL — SIGNIFICANT CHANGE UP (ref 11.5–15.5)
KETONES UR-MCNC: NEGATIVE MG/DL — SIGNIFICANT CHANGE UP
KETONES UR-MCNC: NEGATIVE MG/DL — SIGNIFICANT CHANGE UP
LEUKOCYTE ESTERASE UR-ACNC: ABNORMAL
LEUKOCYTE ESTERASE UR-ACNC: ABNORMAL
LIDOCAIN IGE QN: 153 U/L — HIGH (ref 16–77)
LIDOCAIN IGE QN: 153 U/L — HIGH (ref 16–77)
MCHC RBC-ENTMCNC: 32.9 PG — SIGNIFICANT CHANGE UP (ref 27–34)
MCHC RBC-ENTMCNC: 32.9 PG — SIGNIFICANT CHANGE UP (ref 27–34)
MCHC RBC-ENTMCNC: 34.4 GM/DL — SIGNIFICANT CHANGE UP (ref 32–36)
MCHC RBC-ENTMCNC: 34.4 GM/DL — SIGNIFICANT CHANGE UP (ref 32–36)
MCV RBC AUTO: 95.6 FL — SIGNIFICANT CHANGE UP (ref 80–100)
MCV RBC AUTO: 95.6 FL — SIGNIFICANT CHANGE UP (ref 80–100)
NITRITE UR-MCNC: POSITIVE
NITRITE UR-MCNC: POSITIVE
NRBC # BLD: 0 /100 WBCS — SIGNIFICANT CHANGE UP (ref 0–0)
NRBC # BLD: 0 /100 WBCS — SIGNIFICANT CHANGE UP (ref 0–0)
PH UR: 5.5 — SIGNIFICANT CHANGE UP (ref 5–8)
PH UR: 5.5 — SIGNIFICANT CHANGE UP (ref 5–8)
PLATELET # BLD AUTO: 253 K/UL — SIGNIFICANT CHANGE UP (ref 150–400)
PLATELET # BLD AUTO: 253 K/UL — SIGNIFICANT CHANGE UP (ref 150–400)
POTASSIUM SERPL-MCNC: 4.5 MMOL/L — SIGNIFICANT CHANGE UP (ref 3.5–5.3)
POTASSIUM SERPL-MCNC: 4.5 MMOL/L — SIGNIFICANT CHANGE UP (ref 3.5–5.3)
POTASSIUM SERPL-SCNC: 4.5 MMOL/L — SIGNIFICANT CHANGE UP (ref 3.5–5.3)
POTASSIUM SERPL-SCNC: 4.5 MMOL/L — SIGNIFICANT CHANGE UP (ref 3.5–5.3)
PROT SERPL-MCNC: 6.5 G/DL — SIGNIFICANT CHANGE UP (ref 6–8.3)
PROT SERPL-MCNC: 6.5 G/DL — SIGNIFICANT CHANGE UP (ref 6–8.3)
PROT UR-MCNC: 100 MG/DL
PROT UR-MCNC: 100 MG/DL
RBC # BLD: 3.62 M/UL — LOW (ref 3.8–5.2)
RBC # BLD: 3.62 M/UL — LOW (ref 3.8–5.2)
RBC # FLD: 11.9 % — SIGNIFICANT CHANGE UP (ref 10.3–14.5)
RBC # FLD: 11.9 % — SIGNIFICANT CHANGE UP (ref 10.3–14.5)
RBC CASTS # UR COMP ASSIST: 7 /HPF — HIGH (ref 0–4)
RBC CASTS # UR COMP ASSIST: 7 /HPF — HIGH (ref 0–4)
SODIUM SERPL-SCNC: 137 MMOL/L — SIGNIFICANT CHANGE UP (ref 135–145)
SODIUM SERPL-SCNC: 137 MMOL/L — SIGNIFICANT CHANGE UP (ref 135–145)
SP GR SPEC: 1.01 — SIGNIFICANT CHANGE UP (ref 1–1.03)
SP GR SPEC: 1.01 — SIGNIFICANT CHANGE UP (ref 1–1.03)
UROBILINOGEN FLD QL: 0.2 MG/DL — SIGNIFICANT CHANGE UP (ref 0.2–1)
UROBILINOGEN FLD QL: 0.2 MG/DL — SIGNIFICANT CHANGE UP (ref 0.2–1)
WBC # BLD: 14.03 K/UL — HIGH (ref 3.8–10.5)
WBC # BLD: 14.03 K/UL — HIGH (ref 3.8–10.5)
WBC # FLD AUTO: 14.03 K/UL — HIGH (ref 3.8–10.5)
WBC # FLD AUTO: 14.03 K/UL — HIGH (ref 3.8–10.5)
WBC UR QL: >50 /HPF — HIGH (ref 0–5)
WBC UR QL: >50 /HPF — HIGH (ref 0–5)

## 2023-12-25 PROCEDURE — 99233 SBSQ HOSP IP/OBS HIGH 50: CPT

## 2023-12-25 PROCEDURE — 71045 X-RAY EXAM CHEST 1 VIEW: CPT | Mod: 26

## 2023-12-25 RX ORDER — POLYETHYLENE GLYCOL 3350 17 G/17G
17 POWDER, FOR SOLUTION ORAL EVERY 12 HOURS
Refills: 0 | Status: DISCONTINUED | OUTPATIENT
Start: 2023-12-25 | End: 2023-12-28

## 2023-12-25 RX ADMIN — POLYETHYLENE GLYCOL 3350 17 GRAM(S): 17 POWDER, FOR SOLUTION ORAL at 13:28

## 2023-12-25 RX ADMIN — AMLODIPINE BESYLATE 10 MILLIGRAM(S): 2.5 TABLET ORAL at 08:04

## 2023-12-25 RX ADMIN — SIMVASTATIN 40 MILLIGRAM(S): 20 TABLET, FILM COATED ORAL at 21:18

## 2023-12-25 RX ADMIN — HEPARIN SODIUM 5000 UNIT(S): 5000 INJECTION INTRAVENOUS; SUBCUTANEOUS at 13:28

## 2023-12-25 RX ADMIN — Medication 88 MICROGRAM(S): at 06:04

## 2023-12-25 RX ADMIN — HEPARIN SODIUM 5000 UNIT(S): 5000 INJECTION INTRAVENOUS; SUBCUTANEOUS at 06:04

## 2023-12-25 RX ADMIN — HEPARIN SODIUM 5000 UNIT(S): 5000 INJECTION INTRAVENOUS; SUBCUTANEOUS at 21:18

## 2023-12-25 RX ADMIN — SENNA PLUS 2 TABLET(S): 8.6 TABLET ORAL at 21:18

## 2023-12-25 NOTE — PROGRESS NOTE ADULT - SUBJECTIVE AND OBJECTIVE BOX
No distress    Vital Signs Last 24 Hrs  T(C): 37.1 (12-25-23 @ 05:28), Max: 37.1 (12-25-23 @ 05:28)  T(F): 98.8 (12-25-23 @ 05:28), Max: 98.8 (12-25-23 @ 05:28)  HR: 68 (12-25-23 @ 05:28) (59 - 68)  BP: 136/69 (12-25-23 @ 05:28) (136/69 - 161/70)  RR: 16 (12-25-23 @ 05:28) (16 - 16)  SpO2: 97% (12-25-23 @ 05:28) (97% - 98%)    I&O's Detail    24 Dec 2023 07:01  -  25 Dec 2023 07:00  --------------------------------------------------------  IN:    dextrose 5% + sodium chloride 0.9%: 150 mL    Oral Fluid: 300 mL  Total IN: 450 mL    OUT:    Incontinent per Collection Bag (mL): 1050 mL    Indwelling Catheter - Urethral (mL): 400 mL    Intermittent Catheterization - Urethral (mL): 600 mL  Total OUT: 2050 mL    s1s2  b/l air entry  soft, ND  no edema  AO                                                                11.9   14.03 )-----------( 253      ( 25 Dec 2023 06:44 )             34.6     25 Dec 2023 06:44    137    |  104    |  48     ----------------------------<  81     4.5     |  20     |  5.65     Ca    8.9        25 Dec 2023 06:44    TPro  6.5    /  Alb  2.8    /  TBili  0.8    /  DBili  x      /  AST  17     /  ALT  13     /  AlkPhos  84     25 Dec 2023 06:44    LIVER FUNCTIONS - ( 25 Dec 2023 06:44 )  Alb: 2.8 g/dL / Pro: 6.5 g/dL / ALK PHOS: 84 U/L / ALT: 13 U/L / AST: 17 U/L / GGT: x           acetaminophen     Tablet .. 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, exacerbated by ARB as op  CT A/P w/o hydro, UA negative  CT Chest - emphysema, COLTEN mass  Pulm eval appr  Cr is improving   Avoid nephrotoxins  F/u PVR  F/u CBC, BMP, serologies     [phone]       No distress    Vital Signs Last 24 Hrs  T(C): 37.1 (12-25-23 @ 05:28), Max: 37.1 (12-25-23 @ 05:28)  T(F): 98.8 (12-25-23 @ 05:28), Max: 98.8 (12-25-23 @ 05:28)  HR: 68 (12-25-23 @ 05:28) (59 - 68)  BP: 136/69 (12-25-23 @ 05:28) (136/69 - 161/70)  RR: 16 (12-25-23 @ 05:28) (16 - 16)  SpO2: 97% (12-25-23 @ 05:28) (97% - 98%)    I&O's Detail    24 Dec 2023 07:01  -  25 Dec 2023 07:00  --------------------------------------------------------  IN:    dextrose 5% + sodium chloride 0.9%: 150 mL    Oral Fluid: 300 mL  Total IN: 450 mL    OUT:    Incontinent per Collection Bag (mL): 1050 mL    Indwelling Catheter - Urethral (mL): 400 mL    Intermittent Catheterization - Urethral (mL): 600 mL  Total OUT: 2050 mL    s1s2  b/l air entry  soft, ND  no edema  AO                                                                11.9   14.03 )-----------( 253      ( 25 Dec 2023 06:44 )             34.6     25 Dec 2023 06:44    137    |  104    |  48     ----------------------------<  81     4.5     |  20     |  5.65     Ca    8.9        25 Dec 2023 06:44    TPro  6.5    /  Alb  2.8    /  TBili  0.8    /  DBili  x      /  AST  17     /  ALT  13     /  AlkPhos  84     25 Dec 2023 06:44    LIVER FUNCTIONS - ( 25 Dec 2023 06:44 )  Alb: 2.8 g/dL / Pro: 6.5 g/dL / ALK PHOS: 84 U/L / ALT: 13 U/L / AST: 17 U/L / GGT: x           acetaminophen     Tablet .. 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

## 2023-12-25 NOTE — PROGRESS NOTE ADULT - ASSESSMENT
Physical Examination:  GENERAL:               Alert, Oriented, No acute distress.    HEENT:                    Pupils equal, reactive to light.  Symmetric. No JVD, Moist MM, no palpable lymphadenopathy  PULM:                     Bilateral air entry,No Rales, No Rhonchi, No Wheezing  CVS:                         S1, S2,  No Murmur   NEURO:                  Alert, oriented, interactive, nonfocal, follows commands  PSYC:                      Calm, + Insight.           82 y/o F with PMH HTN, HLD, chronic right plantar foot ulcers/OM recently admitted to [facility] 12/6-12/11 for acute on chronic osteomyelitis of the right foot that required partial amputation of 2nd ray of right foot 12/8/23, c/o nausea, vomiting, found with acute renal failure. Pulmonary consulted for left upper lobe 4.6 cm mass.     Problem list  1. Left upper lobe mass   2. Acute renal failure    Plan:  - Given history of smoking and feathers of lesions, high suspicion for primary pulmonary malignancy  - CT scan also showing evidence of emphysema   - CT abd and pelvis no acute finidngs    - Likely has underlying COPD  - Will need out patient PET and then  IR Guided biopsy with PFT  - Monitor respiratory status   - Discussed with Dr. Hadley   Physical Examination:  GENERAL:               Alert, Oriented, No acute distress.    HEENT:                    Pupils equal, reactive to light.  Symmetric. No JVD, Moist MM, no palpable lymphadenopathy  PULM:                     Bilateral air entry,No Rales, No Rhonchi, No Wheezing  CVS:                         S1, S2,  No Murmur   NEURO:                  Alert, oriented, interactive, nonfocal, follows commands  PSYC:                      Calm, + Insight.           82 y/o F with PMH HTN, HLD, chronic right plantar foot ulcers/OM recently admitted to [facility] 12/6-12/11 for acute on chronic osteomyelitis of the right foot that required partial amputation of 2nd ray of right foot 12/8/23, c/o nausea, vomiting, found with acute renal failure. Pulmonary consulted for left upper lobe 4.6 cm mass.     Problem list  1. Left upper lobe mass   2. Acute renal failure    Plan:  - Given history of smoking and feathers of lesions, high suspicion for primary pulmonary malignancy  - CT scan also showing evidence of emphysema   - CT abd and pelvis no acute finidngs    - Likely has underlying COPD  - Will need out patient PET and then  IR Guided biopsy with PFT  - Monitor respiratory status   - Discussed with Dr. Hadley

## 2023-12-25 NOTE — PROGRESS NOTE ADULT - SUBJECTIVE AND OBJECTIVE BOX
Follow-up Pulmonary Progress Note  Chief Complaint : Acute renal failure        patient seen and examined  discussed need for out patient follow up  discussed possible cancer, need f/u  discussed ir biopsy , discussed pet scan  patient seemed ot understand risks       Allergies :Levaquin (Stomach Upset; Nausea)  doxycycline (Unknown)  linezolid (Unknown)      PAST MEDICAL & SURGICAL HISTORY:  HTN (hypertension)    Hyperlipidemia    Hypothyroid    Paroxysmal SVT (supraventricular tachycardia)    Thyroid cyst    Back problem  s/p back surgery    S/P thyroid surgery    S/P hernia surgery    S/P rotator cuff surgery        Medications:  MEDICATIONS  (STANDING):  amLODIPine   Tablet 10 milliGRAM(s) Oral with breakfast  heparin   Injectable 5000 Unit(s) SubCutaneous every 8 hours  levothyroxine 88 MICROGram(s) Oral daily  polyethylene glycol 3350 17 Gram(s) Oral daily  senna 2 Tablet(s) Oral at bedtime  simvastatin 40 milliGRAM(s) Oral at bedtime    MEDICATIONS  (PRN):  acetaminophen     Tablet .. 650 milliGRAM(s) Oral every 6 hours PRN Moderate Pain (4 - 6)  hydrALAZINE 25 milliGRAM(s) Oral every 6 hours PRN Systolic blood pressure >160 or DBP>100      Antibiotics History      Heme Medications   heparin   Injectable 5000 Unit(s) SubCutaneous every 8 hours, 12-21-23 @ 13:47      GI Medications  polyethylene glycol 3350 17 Gram(s) Oral daily, 12-24-23 @ 00:00, Routine  senna 2 Tablet(s) Oral at bedtime, 12-23-23 @ 16:17, Routine        LABS:                        11.9   14.03 )-----------( 253      ( 25 Dec 2023 06:44 )             34.6     12-25    137  |  104  |  48<H>  ----------------------------<  81  4.5   |  20<L>  |  5.65<H>    Ca    8.9      25 Dec 2023 06:44    TPro  6.5  /  Alb  2.8<L>  /  TBili  0.8  /  DBili  x   /  AST  17  /  ALT  13  /  AlkPhos  84  12-25              VITALS:  T(C): 37.1 (12-25-23 @ 05:28), Max: 37.1 (12-25-23 @ 05:28)  T(F): 98.8 (12-25-23 @ 05:28), Max: 98.8 (12-25-23 @ 05:28)  HR: 68 (12-25-23 @ 05:28) (59 - 68)  BP: 136/69 (12-25-23 @ 05:28) (136/69 - 170/70)  BP(mean): --  ABP: --  ABP(mean): --  RR: 16 (12-25-23 @ 05:28) (15 - 16)  SpO2: 97% (12-25-23 @ 05:28) (97% - 98%)  CVP(mm Hg): --  CVP(cm H2O): --    Ins and Outs     12-24-23 @ 07:01  -  12-25-23 @ 07:00  --------------------------------------------------------  IN: 450 mL / OUT: 2050 mL / NET: -1600 mL         I&O's Detail    24 Dec 2023 07:01  -  25 Dec 2023 07:00  --------------------------------------------------------  IN:    dextrose 5% + sodium chloride 0.9%: 150 mL    Oral Fluid: 300 mL  Total IN: 450 mL    OUT:    Incontinent per Collection Bag (mL): 1050 mL    Indwelling Catheter - Urethral (mL): 400 mL    Intermittent Catheterization - Urethral (mL): 600 mL  Total OUT: 2050 mL    Total NET: -1600 mL

## 2023-12-25 NOTE — PROGRESS NOTE ADULT - SUBJECTIVE AND OBJECTIVE BOX
Medicine Progress Note    Patient is a 83y old  Female who presents with a chief complaint of acute renal failure (25 Dec 2023 11:54)      SUBJECTIVE / OVERNIGHT EVENTS:  seen and examined  Chart reviewed  No overnight events  Limb weakness improving with therapy  BM 2 days back  No pain  voiding well. with intermittent SC. no frequency/dysuria.   wbc up. reported mild cough    ADDITIONAL REVIEW OF SYSTEMS:  denied fever/chills/CP/SOB/palpitation/dizziness/abdominal pian/nausea/vomiting/diarrhoea/dysuria/leg or calf pain/headaches.all other ROS neg    MEDICATIONS  (STANDING):  amLODIPine   Tablet 10 milliGRAM(s) Oral with breakfast  heparin   Injectable 5000 Unit(s) SubCutaneous every 8 hours  levothyroxine 88 MICROGram(s) Oral daily  polyethylene glycol 3350 17 Gram(s) Oral daily  senna 2 Tablet(s) Oral at bedtime  simvastatin 40 milliGRAM(s) Oral at bedtime    MEDICATIONS  (PRN):  acetaminophen     Tablet .. 650 milliGRAM(s) Oral every 6 hours PRN Moderate Pain (4 - 6)  hydrALAZINE 25 milliGRAM(s) Oral every 6 hours PRN Systolic blood pressure >160 or DBP>100    CAPILLARY BLOOD GLUCOSE        I&O's Summary    24 Dec 2023 07:01  -  25 Dec 2023 07:00  --------------------------------------------------------  IN: 450 mL / OUT: 2050 mL / NET: -1600 mL        PHYSICAL EXAM:  Vital Signs Last 24 Hrs  T(C): 37.1 (25 Dec 2023 05:28), Max: 37.1 (25 Dec 2023 05:28)  T(F): 98.8 (25 Dec 2023 05:28), Max: 98.8 (25 Dec 2023 05:28)  HR: 68 (25 Dec 2023 05:28) (59 - 68)  BP: 136/69 (25 Dec 2023 05:28) (136/69 - 170/70)  BP(mean): --  RR: 16 (25 Dec 2023 05:28) (15 - 16)  SpO2: 97% (25 Dec 2023 05:28) (97% - 98%)    Parameters below as of 24 Dec 2023 20:15  Patient On (Oxygen Delivery Method): room air      CONSTITUTIONAL: NAD, well-developed, well-groomed  ENMT: Moist oral mucosa, no pharyngeal injection or exudates; normal dentition  RESPIRATORY: Normal respiratory effort; lungs are clear to auscultation bilaterally  CARDIOVASCULAR: Regular rate and rhythm, normal S1 and S2, no murmur/rub/gallop; No lower extremity edema; Peripheral pulses are 2+ bilaterally  ABDOMEN: Nontender to palpation, normoactive bowel sounds, no rebound/guarding; No hepatosplenomegaly  PSYCH: A+O to person, place, and time; affect appropriate  NEUROLOGY: CN 2-12 are intact and symmetric; no gross sensory deficits   SKIN: No rashes; no palpable lesions    LABS:                        11.9   14.03 )-----------( 253      ( 25 Dec 2023 06:44 )             34.6     12-25    137  |  104  |  48<H>  ----------------------------<  81  4.5   |  20<L>  |  5.65<H>    Ca    8.9      25 Dec 2023 06:44    TPro  6.5  /  Alb  2.8<L>  /  TBili  0.8  /  DBili  x   /  AST  17  /  ALT  13  /  AlkPhos  84  12-25          Urinalysis Basic - ( 25 Dec 2023 06:44 )    Color: x / Appearance: x / SG: x / pH: x  Gluc: 81 mg/dL / Ketone: x  / Bili: x / Urobili: x   Blood: x / Protein: x / Nitrite: x   Leuk Esterase: x / RBC: x / WBC x   Sq Epi: x / Non Sq Epi: x / Bacteria: x            RADIOLOGY & ADDITIONAL TESTS:  Imaging from Last 24 Hours:    Electrocardiogram/QTc Interval:    COORDINATION OF CARE:  Care Discussed with Consultants/Other Providers:

## 2023-12-25 NOTE — PROGRESS NOTE ADULT - ASSESSMENT
84 y/o F with PMH HTN, HLD, chronic right plantar foot ulcers/OM recently admitted to [facility] 12/6-12/11 for acute on chronic osteomyelitis of the right foot that required partial amputation of 2nd ray of right foot 12/8/23, c/o nausea, vomiting, found with acute renal failure, likely due to recent vanco course.     Leucocytosis  - reported mild cough. intermittent urinary retention, [facility] voiding well  - check UA, CXR, procal  - UC and CTX  if UA+  - amylase normalized. lipase trending down  - monitor cbc and temp. currently afebrile      Bradycardia  - likley combination of BB and hypothyroid  - BB dced. LT4 dose increased    Acute renal failure, likely drug induced [ cipro, ARBs]  -Nephro consulted. rec appreciated.   - on IVF  - on TOV since 12/23. bladder scan/SC per protocol.   - dig level--> 1.1. now off dig. unclear why patient is on dig. reported no afib  -CT, UA reviewed, unremarkable. no hydro  - neg urine cx  -Follow repeat BMP    s/p partial amputation of 2nd ray of right foot 12/8/23  -Due for outpatient podiatry f/u this week regarding dressing   -Podiatry consulted. no surgical intervention needed  -PT eval --> home PT  -Afebrile, non toxic appearing    HTN  - Home doses: metoprolol ER BID, losartan  - Losartan was held due to CARLOS  - off metoprolol for bradycardia  - amlodipine was increased form 5 mg to 10 mg on 12/24  - no OH noted  - monitor VS including OH and adjust meds    HLD  -Continue home simvastatin 40mg qd    Hypothyroidism  - TSH 16  - in creased LT4 from 75 to 88 mcg 12/23  - needs repeat TFT in 4-6 weeks    left upper lung mass on CT chest  Emphysema  - + smoking  - pulm consulted likely primary malignancy. needs biopsy.   - CT a/p with OC: 1.5 cm parainguinal LN. no sign of malignancy  - needs PFT with PET adn IR guided Biopsy OP.     Elevated amylase and lipase  - improving  - no ssx of pancreatitis  - CT renal stone hunt: and CT a/p with OC showed normal hepatobiliary system and pancrease.   - GI cx noted: non-pancreatic source. trend    Constipation  - miralax, senna  - increased miralax to BID  - added dulcolax Supp PRN  - metamucil BID    Severe protein-calorie malnutrition.  - nutrition on board      DVT ppx: HSQ    Code Status - DNR/DNI     Dispo: Pending improvement of renal function. infection w/u pending.     12/25: Updated Dr. Eric [ patient's friend and emergency contact]. Dr. Gustavo Peterson is PCP      82 y/o F with PMH HTN, HLD, chronic right plantar foot ulcers/OM recently admitted to [facility] 12/6-12/11 for acute on chronic osteomyelitis of the right foot that required partial amputation of 2nd ray of right foot 12/8/23, c/o nausea, vomiting, found with acute renal failure, likely due to recent vanco course.     Leucocytosis  - reported mild cough. intermittent urinary retention, [facility] voiding well  - check UA, CXR, procal  - UC and CTX  if UA+  - amylase normalized. lipase trending down  - monitor cbc and temp. currently afebrile      Bradycardia  - likley combination of BB and hypothyroid  - BB dced. LT4 dose increased    Acute renal failure, likely drug induced [ cipro, ARBs]  -Nephro consulted. rec appreciated.   - on IVF  - on TOV since 12/23. bladder scan/SC per protocol.   - dig level--> 1.1. now off dig. unclear why patient is on dig. reported no afib  -CT, UA reviewed, unremarkable. no hydro  - neg urine cx  -Follow repeat BMP    s/p partial amputation of 2nd ray of right foot 12/8/23  -Due for outpatient podiatry f/u this week regarding dressing   -Podiatry consulted. no surgical intervention needed  -PT eval --> home PT  -Afebrile, non toxic appearing    HTN  - Home doses: metoprolol ER BID, losartan  - Losartan was held due to CARLOS  - off metoprolol for bradycardia  - amlodipine was increased form 5 mg to 10 mg on 12/24  - no OH noted  - monitor VS including OH and adjust meds    HLD  -Continue home simvastatin 40mg qd    Hypothyroidism  - TSH 16  - in creased LT4 from 75 to 88 mcg 12/23  - needs repeat TFT in 4-6 weeks    left upper lung mass on CT chest  Emphysema  - + smoking  - pulm consulted likely primary malignancy. needs biopsy.   - CT a/p with OC: 1.5 cm parainguinal LN. no sign of malignancy  - needs PFT with PET adn IR guided Biopsy OP.     Elevated amylase and lipase  - improving  - no ssx of pancreatitis  - CT renal stone hunt: and CT a/p with OC showed normal hepatobiliary system and pancrease.   - GI cx noted: non-pancreatic source. trend    Constipation  - miralax, senna  - increased miralax to BID  - added dulcolax Supp PRN  - metamucil BID    Severe protein-calorie malnutrition.  - nutrition on board      DVT ppx: HSQ    Code Status - DNR/DNI     Dispo: Pending improvement of renal function. infection w/u pending.     12/25: Updated Dr. Eric [ patient's friend and emergency contact]. Dr. Gustavo Peterson is PCP

## 2023-12-26 LAB
ALBUMIN SERPL ELPH-MCNC: 2.7 G/DL — LOW (ref 3.3–5)
ALBUMIN SERPL ELPH-MCNC: 2.7 G/DL — LOW (ref 3.3–5)
ANA TITR SER: NEGATIVE — SIGNIFICANT CHANGE UP
ANA TITR SER: NEGATIVE — SIGNIFICANT CHANGE UP
ANION GAP SERPL CALC-SCNC: 12 MMOL/L — SIGNIFICANT CHANGE UP (ref 5–17)
ANION GAP SERPL CALC-SCNC: 12 MMOL/L — SIGNIFICANT CHANGE UP (ref 5–17)
AUTO DIFF PNL BLD: ABNORMAL
AUTO DIFF PNL BLD: ABNORMAL
BASOPHILS # BLD AUTO: 0.06 K/UL — SIGNIFICANT CHANGE UP (ref 0–0.2)
BASOPHILS # BLD AUTO: 0.06 K/UL — SIGNIFICANT CHANGE UP (ref 0–0.2)
BASOPHILS NFR BLD AUTO: 0.5 % — SIGNIFICANT CHANGE UP (ref 0–2)
BASOPHILS NFR BLD AUTO: 0.5 % — SIGNIFICANT CHANGE UP (ref 0–2)
BUN SERPL-MCNC: 42 MG/DL — HIGH (ref 7–23)
BUN SERPL-MCNC: 42 MG/DL — HIGH (ref 7–23)
C-ANCA SER-ACNC: NEGATIVE — SIGNIFICANT CHANGE UP
C-ANCA SER-ACNC: NEGATIVE — SIGNIFICANT CHANGE UP
C3 SERPL-MCNC: 87 MG/DL — SIGNIFICANT CHANGE UP (ref 81–157)
C3 SERPL-MCNC: 87 MG/DL — SIGNIFICANT CHANGE UP (ref 81–157)
C4 SERPL-MCNC: 26 MG/DL — SIGNIFICANT CHANGE UP (ref 13–39)
C4 SERPL-MCNC: 26 MG/DL — SIGNIFICANT CHANGE UP (ref 13–39)
CALCIUM SERPL-MCNC: 9.2 MG/DL — SIGNIFICANT CHANGE UP (ref 8.4–10.5)
CALCIUM SERPL-MCNC: 9.2 MG/DL — SIGNIFICANT CHANGE UP (ref 8.4–10.5)
CHLORIDE SERPL-SCNC: 104 MMOL/L — SIGNIFICANT CHANGE UP (ref 96–108)
CHLORIDE SERPL-SCNC: 104 MMOL/L — SIGNIFICANT CHANGE UP (ref 96–108)
CO2 SERPL-SCNC: 21 MMOL/L — LOW (ref 22–31)
CO2 SERPL-SCNC: 21 MMOL/L — LOW (ref 22–31)
CREAT SERPL-MCNC: 4.73 MG/DL — HIGH (ref 0.5–1.3)
CREAT SERPL-MCNC: 4.73 MG/DL — HIGH (ref 0.5–1.3)
EGFR: 9 ML/MIN/1.73M2 — LOW
EGFR: 9 ML/MIN/1.73M2 — LOW
EOSINOPHIL # BLD AUTO: 0.17 K/UL — SIGNIFICANT CHANGE UP (ref 0–0.5)
EOSINOPHIL # BLD AUTO: 0.17 K/UL — SIGNIFICANT CHANGE UP (ref 0–0.5)
EOSINOPHIL NFR BLD AUTO: 1.3 % — SIGNIFICANT CHANGE UP (ref 0–6)
EOSINOPHIL NFR BLD AUTO: 1.3 % — SIGNIFICANT CHANGE UP (ref 0–6)
GBM IGG SER-ACNC: <0.2 — SIGNIFICANT CHANGE UP (ref 0–0.9)
GBM IGG SER-ACNC: <0.2 — SIGNIFICANT CHANGE UP (ref 0–0.9)
GLUCOSE SERPL-MCNC: 64 MG/DL — LOW (ref 70–99)
GLUCOSE SERPL-MCNC: 64 MG/DL — LOW (ref 70–99)
HCT VFR BLD CALC: 35.1 % — SIGNIFICANT CHANGE UP (ref 34.5–45)
HCT VFR BLD CALC: 35.1 % — SIGNIFICANT CHANGE UP (ref 34.5–45)
HGB BLD-MCNC: 12.1 G/DL — SIGNIFICANT CHANGE UP (ref 11.5–15.5)
HGB BLD-MCNC: 12.1 G/DL — SIGNIFICANT CHANGE UP (ref 11.5–15.5)
IMM GRANULOCYTES NFR BLD AUTO: 0.5 % — SIGNIFICANT CHANGE UP (ref 0–0.9)
IMM GRANULOCYTES NFR BLD AUTO: 0.5 % — SIGNIFICANT CHANGE UP (ref 0–0.9)
LYMPHOCYTES # BLD AUTO: 1.31 K/UL — SIGNIFICANT CHANGE UP (ref 1–3.3)
LYMPHOCYTES # BLD AUTO: 1.31 K/UL — SIGNIFICANT CHANGE UP (ref 1–3.3)
LYMPHOCYTES # BLD AUTO: 9.9 % — LOW (ref 13–44)
LYMPHOCYTES # BLD AUTO: 9.9 % — LOW (ref 13–44)
MCHC RBC-ENTMCNC: 32.4 PG — SIGNIFICANT CHANGE UP (ref 27–34)
MCHC RBC-ENTMCNC: 32.4 PG — SIGNIFICANT CHANGE UP (ref 27–34)
MCHC RBC-ENTMCNC: 34.5 GM/DL — SIGNIFICANT CHANGE UP (ref 32–36)
MCHC RBC-ENTMCNC: 34.5 GM/DL — SIGNIFICANT CHANGE UP (ref 32–36)
MCV RBC AUTO: 94.1 FL — SIGNIFICANT CHANGE UP (ref 80–100)
MCV RBC AUTO: 94.1 FL — SIGNIFICANT CHANGE UP (ref 80–100)
MONOCYTES # BLD AUTO: 0.6 K/UL — SIGNIFICANT CHANGE UP (ref 0–0.9)
MONOCYTES # BLD AUTO: 0.6 K/UL — SIGNIFICANT CHANGE UP (ref 0–0.9)
MONOCYTES NFR BLD AUTO: 4.6 % — SIGNIFICANT CHANGE UP (ref 2–14)
MONOCYTES NFR BLD AUTO: 4.6 % — SIGNIFICANT CHANGE UP (ref 2–14)
MPO AB + PR3 PNL SER: SIGNIFICANT CHANGE UP
MPO AB + PR3 PNL SER: SIGNIFICANT CHANGE UP
NEUTROPHILS # BLD AUTO: 10.97 K/UL — HIGH (ref 1.8–7.4)
NEUTROPHILS # BLD AUTO: 10.97 K/UL — HIGH (ref 1.8–7.4)
NEUTROPHILS NFR BLD AUTO: 83.2 % — HIGH (ref 43–77)
NEUTROPHILS NFR BLD AUTO: 83.2 % — HIGH (ref 43–77)
NRBC # BLD: 0 /100 WBCS — SIGNIFICANT CHANGE UP (ref 0–0)
NRBC # BLD: 0 /100 WBCS — SIGNIFICANT CHANGE UP (ref 0–0)
P-ANCA SER-ACNC: NEGATIVE — SIGNIFICANT CHANGE UP
P-ANCA SER-ACNC: NEGATIVE — SIGNIFICANT CHANGE UP
PHOSPHATE SERPL-MCNC: 5.2 MG/DL — HIGH (ref 2.5–4.5)
PHOSPHATE SERPL-MCNC: 5.2 MG/DL — HIGH (ref 2.5–4.5)
PLATELET # BLD AUTO: 253 K/UL — SIGNIFICANT CHANGE UP (ref 150–400)
PLATELET # BLD AUTO: 253 K/UL — SIGNIFICANT CHANGE UP (ref 150–400)
POTASSIUM SERPL-MCNC: 4.3 MMOL/L — SIGNIFICANT CHANGE UP (ref 3.5–5.3)
POTASSIUM SERPL-MCNC: 4.3 MMOL/L — SIGNIFICANT CHANGE UP (ref 3.5–5.3)
POTASSIUM SERPL-SCNC: 4.3 MMOL/L — SIGNIFICANT CHANGE UP (ref 3.5–5.3)
POTASSIUM SERPL-SCNC: 4.3 MMOL/L — SIGNIFICANT CHANGE UP (ref 3.5–5.3)
RBC # BLD: 3.73 M/UL — LOW (ref 3.8–5.2)
RBC # BLD: 3.73 M/UL — LOW (ref 3.8–5.2)
RBC # FLD: 12.1 % — SIGNIFICANT CHANGE UP (ref 10.3–14.5)
RBC # FLD: 12.1 % — SIGNIFICANT CHANGE UP (ref 10.3–14.5)
SODIUM SERPL-SCNC: 137 MMOL/L — SIGNIFICANT CHANGE UP (ref 135–145)
SODIUM SERPL-SCNC: 137 MMOL/L — SIGNIFICANT CHANGE UP (ref 135–145)
WBC # BLD: 13.18 K/UL — HIGH (ref 3.8–10.5)
WBC # BLD: 13.18 K/UL — HIGH (ref 3.8–10.5)
WBC # FLD AUTO: 13.18 K/UL — HIGH (ref 3.8–10.5)
WBC # FLD AUTO: 13.18 K/UL — HIGH (ref 3.8–10.5)

## 2023-12-26 PROCEDURE — 99233 SBSQ HOSP IP/OBS HIGH 50: CPT

## 2023-12-26 PROCEDURE — 99232 SBSQ HOSP IP/OBS MODERATE 35: CPT

## 2023-12-26 RX ORDER — CEFTRIAXONE 500 MG/1
1000 INJECTION, POWDER, FOR SOLUTION INTRAMUSCULAR; INTRAVENOUS EVERY 24 HOURS
Refills: 0 | Status: DISCONTINUED | OUTPATIENT
Start: 2023-12-27 | End: 2023-12-28

## 2023-12-26 RX ORDER — IPRATROPIUM/ALBUTEROL SULFATE 18-103MCG
3 AEROSOL WITH ADAPTER (GRAM) INHALATION EVERY 6 HOURS
Refills: 0 | Status: DISCONTINUED | OUTPATIENT
Start: 2023-12-26 | End: 2023-12-28

## 2023-12-26 RX ORDER — CEFTRIAXONE 500 MG/1
1000 INJECTION, POWDER, FOR SOLUTION INTRAMUSCULAR; INTRAVENOUS ONCE
Refills: 0 | Status: COMPLETED | OUTPATIENT
Start: 2023-12-26 | End: 2023-12-26

## 2023-12-26 RX ORDER — CEFTRIAXONE 500 MG/1
INJECTION, POWDER, FOR SOLUTION INTRAMUSCULAR; INTRAVENOUS
Refills: 0 | Status: DISCONTINUED | OUTPATIENT
Start: 2023-12-26 | End: 2023-12-28

## 2023-12-26 RX ADMIN — HEPARIN SODIUM 5000 UNIT(S): 5000 INJECTION INTRAVENOUS; SUBCUTANEOUS at 05:23

## 2023-12-26 RX ADMIN — CEFTRIAXONE 100 MILLIGRAM(S): 500 INJECTION, POWDER, FOR SOLUTION INTRAMUSCULAR; INTRAVENOUS at 09:37

## 2023-12-26 RX ADMIN — AMLODIPINE BESYLATE 10 MILLIGRAM(S): 2.5 TABLET ORAL at 08:55

## 2023-12-26 RX ADMIN — HEPARIN SODIUM 5000 UNIT(S): 5000 INJECTION INTRAVENOUS; SUBCUTANEOUS at 15:33

## 2023-12-26 RX ADMIN — SENNA PLUS 2 TABLET(S): 8.6 TABLET ORAL at 21:12

## 2023-12-26 RX ADMIN — HEPARIN SODIUM 5000 UNIT(S): 5000 INJECTION INTRAVENOUS; SUBCUTANEOUS at 21:12

## 2023-12-26 RX ADMIN — Medication 88 MICROGRAM(S): at 05:23

## 2023-12-26 RX ADMIN — SIMVASTATIN 40 MILLIGRAM(S): 20 TABLET, FILM COATED ORAL at 21:13

## 2023-12-26 RX ADMIN — POLYETHYLENE GLYCOL 3350 17 GRAM(S): 17 POWDER, FOR SOLUTION ORAL at 17:45

## 2023-12-26 RX ADMIN — POLYETHYLENE GLYCOL 3350 17 GRAM(S): 17 POWDER, FOR SOLUTION ORAL at 05:24

## 2023-12-26 NOTE — PROGRESS NOTE ADULT - SUBJECTIVE AND OBJECTIVE BOX
INTERVAL HPI/OVERNIGHT EVENTS:  Patient seen and examined at bedside OOB to chair. Denies abdominal pain, denies N/V/D, no hematemesis and hematochezia Last bm: 12/26, tolerating diet            MEDICATIONS  (STANDING):  amLODIPine   Tablet 10 milliGRAM(s) Oral with breakfast  cefTRIAXone   IVPB      heparin   Injectable 5000 Unit(s) SubCutaneous every 8 hours  levothyroxine 88 MICROGram(s) Oral daily  polyethylene glycol 3350 17 Gram(s) Oral every 12 hours  senna 2 Tablet(s) Oral at bedtime  simvastatin 40 milliGRAM(s) Oral at bedtime    MEDICATIONS  (PRN):  acetaminophen     Tablet .. 650 milliGRAM(s) Oral every 6 hours PRN Moderate Pain (4 - 6)  albuterol/ipratropium for Nebulization 3 milliLiter(s) Nebulizer every 6 hours PRN Shortness of Breath and/or Wheezing  bisacodyl Suppository 10 milliGRAM(s) Rectal daily PRN Constipation  hydrALAZINE 25 milliGRAM(s) Oral every 6 hours PRN Systolic blood pressure >160 or DBP>100      Allergies    doxycycline (Unknown)  linezolid (Unknown)    Intolerances    Levaquin (Stomach Upset; Nausea)        Vital Signs Last 24 Hrs  T(C): 36.8 (26 Dec 2023 05:25), Max: 36.8 (25 Dec 2023 17:13)  T(F): 98.2 (26 Dec 2023 05:25), Max: 98.2 (25 Dec 2023 17:13)  HR: 69 (26 Dec 2023 05:25) (69 - 70)  BP: 152/82 (26 Dec 2023 08:58) (142/72 - 152/82)  BP(mean): --  RR: 16 (26 Dec 2023 05:25) (15 - 16)  SpO2: 97% (26 Dec 2023 05:25) (96% - 97%)    Parameters below as of 25 Dec 2023 20:39  Patient On (Oxygen Delivery Method): room air        PHYSICAL EXAM:    Constitutional: Well-developed  ENTT: clear conjunctivae and sclera  Respiratory: clear to auscultation  Cardiovascular: S1 and S2  Gastrointestinal: +Bowel Sounds all quadrants, soft, Non tender, non distended  Extremities: No peripheral edema, neg clubbing, cyanosis  Neurological: A/O x 3  Skin: warm and dry      LABS:                        12.1   13.18 )-----------( 253      ( 26 Dec 2023 07:40 )             35.1     12-26    137  |  104  |  42<H>  ----------------------------<  64<L>  4.3   |  21<L>  |  4.73<H>    Ca    9.2      26 Dec 2023 07:40  Phos  5.2     12-26    TPro  x   /  Alb  2.7<L>  /  TBili  x   /  DBili  x   /  AST  x   /  ALT  x   /  AlkPhos  x   12-26      Urinalysis Basic - ( 26 Dec 2023 07:40 )    Color: x / Appearance: x / SG: x / pH: x  Gluc: 64 mg/dL / Ketone: x  / Bili: x / Urobili: x   Blood: x / Protein: x / Nitrite: x   Leuk Esterase: x / RBC: x / WBC x   Sq Epi: x / Non Sq Epi: x / Bacteria: x      LIVER FUNCTIONS - ( 26 Dec 2023 07:40 )  Alb: 2.7 g/dL / Pro: x     / ALK PHOS: x     / ALT: x     / AST: x     / GGT: x             RADIOLOGY & ADDITIONAL TESTS:

## 2023-12-26 NOTE — PROGRESS NOTE ADULT - ASSESSMENT
84 y/o F with PMH HTN, HLD, chronic right plantar foot ulcers/OM recently admitted to [facility] 12/6-12/11 for acute on chronic osteomyelitis of the right foot that required partial amputation of 2nd ray of right foot 12/8/23.     Currently, tolerating po intake renal restriction diet, no NDV, no abd pain, last BM 12/26 soft brown.   GI consulted for elevated amylase/lipase.   No events overnight.  Lipase improving  Amylase normalized     84 y/o F with PMH HTN, HLD, chronic right plantar foot ulcers/OM recently admitted to [facility] 12/6-12/11 for acute on chronic osteomyelitis of the right foot that required partial amputation of 2nd ray of right foot 12/8/23.     Currently, tolerating po intake renal restriction diet, no NDV, no abd pain, last BM 12/26 soft brown.   GI consulted for elevated amylase/lipase.   No events overnight.  Lipase improving  Amylase normalized

## 2023-12-26 NOTE — PROGRESS NOTE ADULT - SUBJECTIVE AND OBJECTIVE BOX
No distress    Vital Signs Last 24 Hrs  T(C): 36.6 (12-26-23 @ 20:56), Max: 36.8 (12-26-23 @ 05:25)  T(F): 97.8 (12-26-23 @ 20:56), Max: 98.2 (12-26-23 @ 05:25)  HR: 65 (12-26-23 @ 20:56) (65 - 69)  BP: 136/65 (12-26-23 @ 20:56) (136/65 - 152/82)  RR: 16 (12-26-23 @ 20:56) (15 - 16)  SpO2: 97% (12-26-23 @ 20:56) (97% - 98%)    I&O's Detail    25 Dec 2023 07:01  -  26 Dec 2023 07:00  --------------------------------------------------------  IN:    Oral Fluid: 360 mL  Total IN: 360 mL    OUT:    Incontinent per Collection Bag (mL): 300 mL  Total OUT: 300 mL    26 Dec 2023 07:01  -  26 Dec 2023 22:17  --------------------------------------------------------  IN:    IV PiggyBack: 100 mL    Oral Fluid: 950 mL  Total IN: 1050 mL    OUT:    Incontinent per Collection Bag (mL): 550 mL  Total OUT: 550 mL    s1s2  b/l air entry  soft, ND  no edema  AO                                                                12.1   13.18 )-----------( 253      ( 26 Dec 2023 07:40 )             35.1     26 Dec 2023 07:40    137    |  104    |  42     ----------------------------<  64     4.3     |  21     |  4.73     Ca    9.2        26 Dec 2023 07:40  Phos  5.2       26 Dec 2023 07:40    TPro  x      /  Alb  2.7    /  TBili  x      /  DBili  x      /  AST  x      /  ALT  x      /  AlkPhos  x      26 Dec 2023 07:40    LIVER FUNCTIONS - ( 26 Dec 2023 07:40 )  Alb: 2.7 g/dL / Pro: x     / ALK PHOS: x     / ALT: x     / AST: x     / GGT: x           acetaminophen     Tablet .. 650 milliGRAM(s) Oral every 6 hours PRN  albuterol/ipratropium for Nebulization 3 milliLiter(s) Nebulizer every 6 hours PRN  amLODIPine   Tablet 10 milliGRAM(s) Oral with breakfast  bisacodyl Suppository 10 milliGRAM(s) Rectal daily PRN  cefTRIAXone   IVPB      heparin   Injectable 5000 Unit(s) SubCutaneous every 8 hours  hydrALAZINE 25 milliGRAM(s) Oral every 6 hours PRN  levothyroxine 88 MICROGram(s) Oral daily  polyethylene glycol 3350 17 Gram(s) Oral every 12 hours  senna 2 Tablet(s) Oral at bedtime  simvastatin 40 milliGRAM(s) Oral at bedtime    A/P:    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, exacerbated by ARB as op  CT A/P w/o hydro  CT Chest - emphysema, COLTEN mass  Pulm eval appr  Cr is improving   Serologies are negative  Avoid nephrotoxins  F/u PVR  F/u CBC, BMP    [phone]       No distress    Vital Signs Last 24 Hrs  T(C): 36.6 (12-26-23 @ 20:56), Max: 36.8 (12-26-23 @ 05:25)  T(F): 97.8 (12-26-23 @ 20:56), Max: 98.2 (12-26-23 @ 05:25)  HR: 65 (12-26-23 @ 20:56) (65 - 69)  BP: 136/65 (12-26-23 @ 20:56) (136/65 - 152/82)  RR: 16 (12-26-23 @ 20:56) (15 - 16)  SpO2: 97% (12-26-23 @ 20:56) (97% - 98%)    I&O's Detail    25 Dec 2023 07:01  -  26 Dec 2023 07:00  --------------------------------------------------------  IN:    Oral Fluid: 360 mL  Total IN: 360 mL    OUT:    Incontinent per Collection Bag (mL): 300 mL  Total OUT: 300 mL    26 Dec 2023 07:01  -  26 Dec 2023 22:17  --------------------------------------------------------  IN:    IV PiggyBack: 100 mL    Oral Fluid: 950 mL  Total IN: 1050 mL    OUT:    Incontinent per Collection Bag (mL): 550 mL  Total OUT: 550 mL    s1s2  b/l air entry  soft, ND  no edema  AO                                                                12.1   13.18 )-----------( 253      ( 26 Dec 2023 07:40 )             35.1     26 Dec 2023 07:40    137    |  104    |  42     ----------------------------<  64     4.3     |  21     |  4.73     Ca    9.2        26 Dec 2023 07:40  Phos  5.2       26 Dec 2023 07:40    TPro  x      /  Alb  2.7    /  TBili  x      /  DBili  x      /  AST  x      /  ALT  x      /  AlkPhos  x      26 Dec 2023 07:40    LIVER FUNCTIONS - ( 26 Dec 2023 07:40 )  Alb: 2.7 g/dL / Pro: x     / ALK PHOS: x     / ALT: x     / AST: x     / GGT: x           acetaminophen     Tablet .. 650 milliGRAM(s) Oral every 6 hours PRN  albuterol/ipratropium for Nebulization 3 milliLiter(s) Nebulizer every 6 hours PRN  amLODIPine   Tablet 10 milliGRAM(s) Oral with breakfast  bisacodyl Suppository 10 milliGRAM(s) Rectal daily PRN  cefTRIAXone   IVPB      heparin   Injectable 5000 Unit(s) SubCutaneous every 8 hours  hydrALAZINE 25 milliGRAM(s) Oral every 6 hours PRN  levothyroxine 88 MICROGram(s) Oral daily  polyethylene glycol 3350 17 Gram(s) Oral every 12 hours  senna 2 Tablet(s) Oral at bedtime  simvastatin 40 milliGRAM(s) Oral at bedtime    A/P:    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, exacerbated by ARB as op  CT A/P w/o hydro  CT Chest - emphysema, COLTEN mass  Pulm eval appr  Cr is improving   Serologies are negative  Avoid nephrotoxins  F/u PVR  F/u CBC, BMP    [phone]

## 2023-12-26 NOTE — PROGRESS NOTE ADULT - ASSESSMENT
84 y/o F with PMH HTN, HLD, chronic right plantar foot ulcers/OM recently admitted to [facility] 12/6-12/11 for acute on chronic osteomyelitis of the right foot that required partial amputation of 2nd ray of right foot 12/8/23, c/o nausea, vomiting, found with acute renal failure, likely due to recent vanco course.     Leucocytosis  - improving  - reported mild cough yesterday now improved  - CXR none acute  - UA+ for UTI. but no symptoms. UC pending  - on CTX for now. DC if UC neg  - amylase normalized. lipase trending down  - monitor cbc and temp. currently afebrile      Bradycardia  - likley combination of BB and hypothyroid  - BB dced. LT4 dose increased  - DC tele    Acute renal failure, likely drug induced [ cipro, ARBs]  -Nephro consulted. rec appreciated.   - on IVF  - on TOV since 12/23. DC bladder scan/SC as PVR is low  - dig level--> 1.1. now off dig. unclear why patient is on dig. reported no afib  -CT, UA reviewed, unremarkable. no hydro  - neg urine cx  -Follow repeat BMP    s/p partial amputation of 2nd ray of right foot 12/8/23  -Due for outpatient podiatry f/u this week regarding dressing   -Podiatry consulted. no surgical intervention needed  -PT eval --> home PT  -Afebrile, non toxic appearing    HTN  - Home doses: metoprolol ER BID, losartan  - Losartan was held due to CARLOS  - off metoprolol for bradycardia  - amlodipine was increased form 5 mg to 10 mg on 12/24  - no OH noted  - monitor VS including OH and adjust meds    HLD  -Continue home simvastatin 40mg qd    Hypothyroidism  - TSH 16  - in creased LT4 from 75 to 88 mcg 12/23  - needs repeat TFT in 4-6 weeks    left upper lung mass on CT chest  Emphysema  - + smoking  - pulm consulted likely primary malignancy. needs biopsy OP  - CT a/p with OC: 1.5 cm parainguinal LN. no sign of malignancy  - needs PFT with PET and IR guided Biopsy OP.     Elevated amylase and lipase  - improving  - no ssx of pancreatitis  - CT renal stone hunt: and CT a/p with OC showed normal hepatobiliary system and pancrease.   - GI cx noted: non-pancreatic source. trend    Constipation  - miralax, senna  - increased miralax to BID  - added dulcolax Supp PRN  - metamucil BID    Severe protein-calorie malnutrition.  - nutrition on board      DVT ppx: HSQ    Code Status - DNR/DNI     Dispo: Pending improvement of renal function. infection w/u pending.     12/26: Updated Dr. Eric [ patient's friend and emergency contact]. Dr. Gustavo Peterson is PCP      82 y/o F with PMH HTN, HLD, chronic right plantar foot ulcers/OM recently admitted to [facility] 12/6-12/11 for acute on chronic osteomyelitis of the right foot that required partial amputation of 2nd ray of right foot 12/8/23, c/o nausea, vomiting, found with acute renal failure, likely due to recent vanco course.     Leucocytosis  - improving  - reported mild cough yesterday now improved  - CXR none acute  - UA+ for UTI. but no symptoms. UC pending  - on CTX for now. DC if UC neg  - amylase normalized. lipase trending down  - monitor cbc and temp. currently afebrile      Bradycardia  - likley combination of BB and hypothyroid  - BB dced. LT4 dose increased  - DC tele    Acute renal failure, likely drug induced [ cipro, ARBs]  -Nephro consulted. rec appreciated.   - on IVF  - on TOV since 12/23. DC bladder scan/SC as PVR is low  - dig level--> 1.1. now off dig. unclear why patient is on dig. reported no afib  -CT, UA reviewed, unremarkable. no hydro  - neg urine cx  -Follow repeat BMP    s/p partial amputation of 2nd ray of right foot 12/8/23  -Due for outpatient podiatry f/u this week regarding dressing   -Podiatry consulted. no surgical intervention needed  -PT eval --> home PT  -Afebrile, non toxic appearing    HTN  - Home doses: metoprolol ER BID, losartan  - Losartan was held due to CARLOS  - off metoprolol for bradycardia  - amlodipine was increased form 5 mg to 10 mg on 12/24  - no OH noted  - monitor VS including OH and adjust meds    HLD  -Continue home simvastatin 40mg qd    Hypothyroidism  - TSH 16  - in creased LT4 from 75 to 88 mcg 12/23  - needs repeat TFT in 4-6 weeks    left upper lung mass on CT chest  Emphysema  - + smoking  - pulm consulted likely primary malignancy. needs biopsy OP  - CT a/p with OC: 1.5 cm parainguinal LN. no sign of malignancy  - needs PFT with PET and IR guided Biopsy OP.     Elevated amylase and lipase  - improving  - no ssx of pancreatitis  - CT renal stone hunt: and CT a/p with OC showed normal hepatobiliary system and pancrease.   - GI cx noted: non-pancreatic source. trend    Constipation  - miralax, senna  - increased miralax to BID  - added dulcolax Supp PRN  - metamucil BID    Severe protein-calorie malnutrition.  - nutrition on board      DVT ppx: HSQ    Code Status - DNR/DNI     Dispo: Pending improvement of renal function. infection w/u pending.     12/26: Updated Dr. Eric [ patient's friend and emergency contact]. Dr. Gustavo Peterson is PCP

## 2023-12-26 NOTE — PROGRESS NOTE ADULT - SUBJECTIVE AND OBJECTIVE BOX
Medicine Progress Note    Patient is a 83y old  Female who presents with a chief complaint of acute renal failure (26 Dec 2023 12:30)      SUBJECTIVE / OVERNIGHT EVENTS:  seen and examined  Chart reviewed  No overnight events  Limb weakness improving with therapy  No pain except constipation. small BM yesterday.  wbc/ creatinine trending down. on CTX for abnormal UA but no symptoms. PVR low. UC pending  reported no dysuria/flank pain/frequency/urgency    ADDITIONAL REVIEW OF SYSTEMS:  denied fever/chills/CP/SOB/cough/palpitation/dizziness/abdominal pian/nausea/vomiting/diarrhoea/constipation/dysuria/leg or calf pain/headaches.all other ROS neg    MEDICATIONS  (STANDING):  amLODIPine   Tablet 10 milliGRAM(s) Oral with breakfast  cefTRIAXone   IVPB      heparin   Injectable 5000 Unit(s) SubCutaneous every 8 hours  levothyroxine 88 MICROGram(s) Oral daily  polyethylene glycol 3350 17 Gram(s) Oral every 12 hours  senna 2 Tablet(s) Oral at bedtime  simvastatin 40 milliGRAM(s) Oral at bedtime    MEDICATIONS  (PRN):  acetaminophen     Tablet .. 650 milliGRAM(s) Oral every 6 hours PRN Moderate Pain (4 - 6)  albuterol/ipratropium for Nebulization 3 milliLiter(s) Nebulizer every 6 hours PRN Shortness of Breath and/or Wheezing  bisacodyl Suppository 10 milliGRAM(s) Rectal daily PRN Constipation  hydrALAZINE 25 milliGRAM(s) Oral every 6 hours PRN Systolic blood pressure >160 or DBP>100    CAPILLARY BLOOD GLUCOSE        I&O's Summary    25 Dec 2023 07:01  -  26 Dec 2023 07:00  --------------------------------------------------------  IN: 360 mL / OUT: 300 mL / NET: 60 mL    26 Dec 2023 07:01  -  26 Dec 2023 13:51  --------------------------------------------------------  IN: 400 mL / OUT: 0 mL / NET: 400 mL        PHYSICAL EXAM:  Vital Signs Last 24 Hrs  T(C): 36.8 (26 Dec 2023 05:25), Max: 36.8 (25 Dec 2023 17:13)  T(F): 98.2 (26 Dec 2023 05:25), Max: 98.2 (25 Dec 2023 17:13)  HR: 69 (26 Dec 2023 05:25) (69 - 70)  BP: 152/82 (26 Dec 2023 08:58) (142/72 - 152/82)  BP(mean): --  RR: 16 (26 Dec 2023 05:25) (15 - 16)  SpO2: 97% (26 Dec 2023 05:25) (96% - 97%)    Parameters below as of 25 Dec 2023 20:39  Patient On (Oxygen Delivery Method): room air      CONSTITUTIONAL: NAD, well-developed, well-groomed  ENMT: Moist oral mucosa, no pharyngeal injection or exudates; normal dentition  RESPIRATORY: Normal respiratory effort; lungs are clear to auscultation bilaterally  CARDIOVASCULAR: Regular rate and rhythm, normal S1 and S2, no murmur/rub/gallop; No lower extremity edema; Peripheral pulses are 2+ bilaterally  ABDOMEN: Nontender to palpation, normoactive bowel sounds, no rebound/guarding; No hepatosplenomegaly  PSYCH: A+O to person, place, and time; affect appropriate  NEUROLOGY: CN 2-12 are intact and symmetric; no gross sensory deficits   SKIN: No rashes; no palpable lesions    LABS:                        12.1   13.18 )-----------( 253      ( 26 Dec 2023 07:40 )             35.1     12-26    137  |  104  |  42<H>  ----------------------------<  64<L>  4.3   |  21<L>  |  4.73<H>    Ca    9.2      26 Dec 2023 07:40  Phos  5.2     12-26    TPro  x   /  Alb  2.7<L>  /  TBili  x   /  DBili  x   /  AST  x   /  ALT  x   /  AlkPhos  x   12-26          Urinalysis Basic - ( 26 Dec 2023 07:40 )    Color: x / Appearance: x / SG: x / pH: x  Gluc: 64 mg/dL / Ketone: x  / Bili: x / Urobili: x   Blood: x / Protein: x / Nitrite: x   Leuk Esterase: x / RBC: x / WBC x   Sq Epi: x / Non Sq Epi: x / Bacteria: x            RADIOLOGY & ADDITIONAL TESTS:  Imaging from Last 24 Hours:    Electrocardiogram/QTc Interval:    COORDINATION OF CARE:  Care Discussed with Consultants/Other Providers:

## 2023-12-27 LAB
ANION GAP SERPL CALC-SCNC: 11 MMOL/L — SIGNIFICANT CHANGE UP (ref 5–17)
ANION GAP SERPL CALC-SCNC: 11 MMOL/L — SIGNIFICANT CHANGE UP (ref 5–17)
BUN SERPL-MCNC: 43 MG/DL — HIGH (ref 7–23)
BUN SERPL-MCNC: 43 MG/DL — HIGH (ref 7–23)
CALCIUM SERPL-MCNC: 9.5 MG/DL — SIGNIFICANT CHANGE UP (ref 8.4–10.5)
CALCIUM SERPL-MCNC: 9.5 MG/DL — SIGNIFICANT CHANGE UP (ref 8.4–10.5)
CHLORIDE SERPL-SCNC: 101 MMOL/L — SIGNIFICANT CHANGE UP (ref 96–108)
CHLORIDE SERPL-SCNC: 101 MMOL/L — SIGNIFICANT CHANGE UP (ref 96–108)
CO2 SERPL-SCNC: 23 MMOL/L — SIGNIFICANT CHANGE UP (ref 22–31)
CO2 SERPL-SCNC: 23 MMOL/L — SIGNIFICANT CHANGE UP (ref 22–31)
CREAT SERPL-MCNC: 4.1 MG/DL — HIGH (ref 0.5–1.3)
CREAT SERPL-MCNC: 4.1 MG/DL — HIGH (ref 0.5–1.3)
EGFR: 10 ML/MIN/1.73M2 — LOW
EGFR: 10 ML/MIN/1.73M2 — LOW
GLUCOSE SERPL-MCNC: 83 MG/DL — SIGNIFICANT CHANGE UP (ref 70–99)
GLUCOSE SERPL-MCNC: 83 MG/DL — SIGNIFICANT CHANGE UP (ref 70–99)
HCT VFR BLD CALC: 35.3 % — SIGNIFICANT CHANGE UP (ref 34.5–45)
HCT VFR BLD CALC: 35.3 % — SIGNIFICANT CHANGE UP (ref 34.5–45)
HGB BLD-MCNC: 12.4 G/DL — SIGNIFICANT CHANGE UP (ref 11.5–15.5)
HGB BLD-MCNC: 12.4 G/DL — SIGNIFICANT CHANGE UP (ref 11.5–15.5)
MCHC RBC-ENTMCNC: 32.5 PG — SIGNIFICANT CHANGE UP (ref 27–34)
MCHC RBC-ENTMCNC: 32.5 PG — SIGNIFICANT CHANGE UP (ref 27–34)
MCHC RBC-ENTMCNC: 35.1 GM/DL — SIGNIFICANT CHANGE UP (ref 32–36)
MCHC RBC-ENTMCNC: 35.1 GM/DL — SIGNIFICANT CHANGE UP (ref 32–36)
MCV RBC AUTO: 92.7 FL — SIGNIFICANT CHANGE UP (ref 80–100)
MCV RBC AUTO: 92.7 FL — SIGNIFICANT CHANGE UP (ref 80–100)
NRBC # BLD: 0 /100 WBCS — SIGNIFICANT CHANGE UP (ref 0–0)
NRBC # BLD: 0 /100 WBCS — SIGNIFICANT CHANGE UP (ref 0–0)
PLATELET # BLD AUTO: 241 K/UL — SIGNIFICANT CHANGE UP (ref 150–400)
PLATELET # BLD AUTO: 241 K/UL — SIGNIFICANT CHANGE UP (ref 150–400)
POTASSIUM SERPL-MCNC: 3.9 MMOL/L — SIGNIFICANT CHANGE UP (ref 3.5–5.3)
POTASSIUM SERPL-MCNC: 3.9 MMOL/L — SIGNIFICANT CHANGE UP (ref 3.5–5.3)
POTASSIUM SERPL-SCNC: 3.9 MMOL/L — SIGNIFICANT CHANGE UP (ref 3.5–5.3)
POTASSIUM SERPL-SCNC: 3.9 MMOL/L — SIGNIFICANT CHANGE UP (ref 3.5–5.3)
RBC # BLD: 3.81 M/UL — SIGNIFICANT CHANGE UP (ref 3.8–5.2)
RBC # BLD: 3.81 M/UL — SIGNIFICANT CHANGE UP (ref 3.8–5.2)
RBC # FLD: 11.9 % — SIGNIFICANT CHANGE UP (ref 10.3–14.5)
RBC # FLD: 11.9 % — SIGNIFICANT CHANGE UP (ref 10.3–14.5)
SODIUM SERPL-SCNC: 135 MMOL/L — SIGNIFICANT CHANGE UP (ref 135–145)
SODIUM SERPL-SCNC: 135 MMOL/L — SIGNIFICANT CHANGE UP (ref 135–145)
WBC # BLD: 9.84 K/UL — SIGNIFICANT CHANGE UP (ref 3.8–10.5)
WBC # BLD: 9.84 K/UL — SIGNIFICANT CHANGE UP (ref 3.8–10.5)
WBC # FLD AUTO: 9.84 K/UL — SIGNIFICANT CHANGE UP (ref 3.8–10.5)
WBC # FLD AUTO: 9.84 K/UL — SIGNIFICANT CHANGE UP (ref 3.8–10.5)

## 2023-12-27 PROCEDURE — 99232 SBSQ HOSP IP/OBS MODERATE 35: CPT

## 2023-12-27 PROCEDURE — 99233 SBSQ HOSP IP/OBS HIGH 50: CPT

## 2023-12-27 RX ADMIN — SIMVASTATIN 40 MILLIGRAM(S): 20 TABLET, FILM COATED ORAL at 21:38

## 2023-12-27 RX ADMIN — HEPARIN SODIUM 5000 UNIT(S): 5000 INJECTION INTRAVENOUS; SUBCUTANEOUS at 05:46

## 2023-12-27 RX ADMIN — AMLODIPINE BESYLATE 10 MILLIGRAM(S): 2.5 TABLET ORAL at 08:14

## 2023-12-27 RX ADMIN — HEPARIN SODIUM 5000 UNIT(S): 5000 INJECTION INTRAVENOUS; SUBCUTANEOUS at 13:50

## 2023-12-27 RX ADMIN — POLYETHYLENE GLYCOL 3350 17 GRAM(S): 17 POWDER, FOR SOLUTION ORAL at 05:46

## 2023-12-27 RX ADMIN — Medication 88 MICROGRAM(S): at 05:46

## 2023-12-27 RX ADMIN — SENNA PLUS 2 TABLET(S): 8.6 TABLET ORAL at 21:38

## 2023-12-27 RX ADMIN — CEFTRIAXONE 100 MILLIGRAM(S): 500 INJECTION, POWDER, FOR SOLUTION INTRAMUSCULAR; INTRAVENOUS at 06:38

## 2023-12-27 RX ADMIN — HEPARIN SODIUM 5000 UNIT(S): 5000 INJECTION INTRAVENOUS; SUBCUTANEOUS at 21:39

## 2023-12-27 NOTE — PROGRESS NOTE ADULT - SUBJECTIVE AND OBJECTIVE BOX
INTERVAL HPI / OVERNIGHT EVENTS:  Patient seen and examined at OOB to chair in room. Denies abdominal pain, denies N/V/D, no hematemesis and hematochezia Last bm:12/26 , tolerating diet            MEDICATIONS  (STANDING):  amLODIPine   Tablet 10 milliGRAM(s) Oral with breakfast  cefTRIAXone   IVPB 1000 milliGRAM(s) IV Intermittent every 24 hours  cefTRIAXone   IVPB      heparin   Injectable 5000 Unit(s) SubCutaneous every 8 hours  levothyroxine 88 MICROGram(s) Oral daily  polyethylene glycol 3350 17 Gram(s) Oral every 12 hours  senna 2 Tablet(s) Oral at bedtime  simvastatin 40 milliGRAM(s) Oral at bedtime    MEDICATIONS  (PRN):  acetaminophen     Tablet .. 650 milliGRAM(s) Oral every 6 hours PRN Moderate Pain (4 - 6)  albuterol/ipratropium for Nebulization 3 milliLiter(s) Nebulizer every 6 hours PRN Shortness of Breath and/or Wheezing  bisacodyl Suppository 10 milliGRAM(s) Rectal daily PRN Constipation  hydrALAZINE 25 milliGRAM(s) Oral every 6 hours PRN Systolic blood pressure >160 or DBP>100      Allergies    doxycycline (Unknown)  linezolid (Unknown)    Intolerances    Levaquin (Stomach Upset; Nausea)        Vital Signs Last 24 Hrs  T(C): 36.4 (27 Dec 2023 07:02), Max: 36.8 (26 Dec 2023 15:50)  T(F): 97.6 (27 Dec 2023 07:02), Max: 98.2 (26 Dec 2023 15:50)  HR: 66 (27 Dec 2023 07:02) (65 - 69)  BP: 134/77 (27 Dec 2023 08:10) (134/77 - 150/77)  BP(mean): --  RR: 16 (27 Dec 2023 07:02) (15 - 16)  SpO2: 98% (27 Dec 2023 07:02) (97% - 98%)    Parameters below as of 27 Dec 2023 07:02  Patient On (Oxygen Delivery Method): room air        PHYSICAL EXAM:    Constitutional: Well-developed  ENTT: clear conjunctivae and sclera  Respiratory: clear to auscultation  Cardiovascular: S1 and S2  Gastrointestinal: +Bowel Sounds all quadrants, soft, Non tender, non distended  Extremities: No peripheral edema, neg clubbing, cyanosis  Neurological: A/O x 3  Skin: warm and dry      LABS:                        12.4   9.84  )-----------( 241      ( 27 Dec 2023 06:36 )             35.3     12-27    135  |  101  |  43<H>  ----------------------------<  83  3.9   |  23  |  4.10<H>    Ca    9.5      27 Dec 2023 06:36  Phos  5.2     12-26    TPro  x   /  Alb  2.7<L>  /  TBili  x   /  DBili  x   /  AST  x   /  ALT  x   /  AlkPhos  x   12-26      Urinalysis Basic - ( 27 Dec 2023 06:36 )    Color: x / Appearance: x / SG: x / pH: x  Gluc: 83 mg/dL / Ketone: x  / Bili: x / Urobili: x   Blood: x / Protein: x / Nitrite: x   Leuk Esterase: x / RBC: x / WBC x   Sq Epi: x / Non Sq Epi: x / Bacteria: x      LIVER FUNCTIONS - ( 26 Dec 2023 07:40 )  Alb: 2.7 g/dL / Pro: x     / ALK PHOS: x     / ALT: x     / AST: x     / GGT: x          INTERVAL HPI / OVERNIGHT EVENTS:  Patient seen and examined at OOB to chair in room. Denies abdominal pain, denies N/V/D,   no hematemesis and hematochezia  Last bm:12/26 , tolerating diet      MEDICATIONS  (STANDING):  amLODIPine   Tablet 10 milliGRAM(s) Oral with breakfast  cefTRIAXone   IVPB 1000 milliGRAM(s) IV Intermittent every 24 hours  cefTRIAXone   IVPB      heparin   Injectable 5000 Unit(s) SubCutaneous every 8 hours  levothyroxine 88 MICROGram(s) Oral daily  polyethylene glycol 3350 17 Gram(s) Oral every 12 hours  senna 2 Tablet(s) Oral at bedtime  simvastatin 40 milliGRAM(s) Oral at bedtime    MEDICATIONS  (PRN):  acetaminophen     Tablet .. 650 milliGRAM(s) Oral every 6 hours PRN Moderate Pain (4 - 6)  albuterol/ipratropium for Nebulization 3 milliLiter(s) Nebulizer every 6 hours PRN Shortness of Breath and/or Wheezing  bisacodyl Suppository 10 milliGRAM(s) Rectal daily PRN Constipation  hydrALAZINE 25 milliGRAM(s) Oral every 6 hours PRN Systolic blood pressure >160 or DBP>100      Allergies    doxycycline (Unknown)  linezolid (Unknown)    Intolerances    Levaquin (Stomach Upset; Nausea)        Vital Signs Last 24 Hrs  T(C): 36.4 (27 Dec 2023 07:02), Max: 36.8 (26 Dec 2023 15:50)  T(F): 97.6 (27 Dec 2023 07:02), Max: 98.2 (26 Dec 2023 15:50)  HR: 66 (27 Dec 2023 07:02) (65 - 69)  BP: 134/77 (27 Dec 2023 08:10) (134/77 - 150/77)  BP(mean): --  RR: 16 (27 Dec 2023 07:02) (15 - 16)  SpO2: 98% (27 Dec 2023 07:02) (97% - 98%)    Parameters below as of 27 Dec 2023 07:02  Patient On (Oxygen Delivery Method): room air        PHYSICAL EXAM:    Constitutional: Well-developed  ENTT: clear conjunctivae and sclera  Respiratory: clear to auscultation  Cardiovascular: S1 and S2  Gastrointestinal: +Bowel Sounds all quadrants, soft, Non tender, non distended  Extremities: No peripheral edema, neg clubbing, cyanosis  Neurological: A/O x 3  Skin: warm and dry      LABS:                        12.4   9.84  )-----------( 241      ( 27 Dec 2023 06:36 )             35.3     12-27    135  |  101  |  43<H>  ----------------------------<  83  3.9   |  23  |  4.10<H>    Ca    9.5      27 Dec 2023 06:36  Phos  5.2     12-26    TPro  x   /  Alb  2.7<L>  /  TBili  x   /  DBili  x   /  AST  x   /  ALT  x   /  AlkPhos  x   12-26      Urinalysis Basic - ( 27 Dec 2023 06:36 )    Color: x / Appearance: x / SG: x / pH: x  Gluc: 83 mg/dL / Ketone: x  / Bili: x / Urobili: x   Blood: x / Protein: x / Nitrite: x   Leuk Esterase: x / RBC: x / WBC x   Sq Epi: x / Non Sq Epi: x / Bacteria: x      LIVER FUNCTIONS - ( 26 Dec 2023 07:40 )  Alb: 2.7 g/dL / Pro: x     / ALK PHOS: x     / ALT: x     / AST: x     / GGT: x

## 2023-12-27 NOTE — PROGRESS NOTE ADULT - SUBJECTIVE AND OBJECTIVE BOX
Resting    Vital Signs Last 24 Hrs  T(C): 36.6 (12-27-23 @ 16:42), Max: 36.6 (12-26-23 @ 20:56)  T(F): 97.9 (12-27-23 @ 16:42), Max: 97.9 (12-27-23 @ 16:42)  HR: 77 (12-27-23 @ 16:42) (65 - 77)  BP: 154/71 (12-27-23 @ 16:42) (134/77 - 154/71)  RR: 16 (12-27-23 @ 16:42) (16 - 16)  SpO2: 98% (12-27-23 @ 16:42) (97% - 98%)    I&O's Detail    26 Dec 2023 07:01  -  27 Dec 2023 07:00  --------------------------------------------------------  IN:    IV PiggyBack: 150 mL    Oral Fluid: 950 mL  Total IN: 1100 mL    OUT:    Incontinent per Collection Bag (mL): 550 mL  Total OUT: 550 mL    s1s2  b/l air entry  soft, ND  no edema                        12.4   9.84  )-----------( 241      ( 27 Dec 2023 06:36 )             35.3     27 Dec 2023 06:36    135    |  101    |  43     ----------------------------<  83     3.9     |  23     |  4.10     Ca    9.5        27 Dec 2023 06:36  Phos  5.2       26 Dec 2023 07:40    TPro  x      /  Alb  2.7    /  TBili  x      /  DBili  x      /  AST  x      /  ALT  x      /  AlkPhos  x      26 Dec 2023 07:40    LIVER FUNCTIONS - ( 26 Dec 2023 07:40 )  Alb: 2.7 g/dL / Pro: x     / ALK PHOS: x     / ALT: x     / AST: x     / GGT: x           Culture - Urine (collected 25 Dec 2023 21:15)  Source: Clean Catch Clean Catch (Midstream)  Preliminary Report (27 Dec 2023 10:35):    >100,000 CFU/ml Escherichia coli    acetaminophen     Tablet .. 650 milliGRAM(s) Oral every 6 hours PRN  albuterol/ipratropium for Nebulization 3 milliLiter(s) Nebulizer every 6 hours PRN  amLODIPine   Tablet 10 milliGRAM(s) Oral with breakfast  bisacodyl Suppository 10 milliGRAM(s) Rectal daily PRN  cefTRIAXone   IVPB 1000 milliGRAM(s) IV Intermittent every 24 hours  cefTRIAXone   IVPB      heparin   Injectable 5000 Unit(s) SubCutaneous every 8 hours  hydrALAZINE 25 milliGRAM(s) Oral every 6 hours PRN  levothyroxine 88 MICROGram(s) Oral daily  polyethylene glycol 3350 17 Gram(s) Oral every 12 hours  senna 2 Tablet(s) Oral at bedtime  simvastatin 40 milliGRAM(s) Oral at bedtime    A/P:    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, exacerbated by ARB as op  CT A/P w/o hydro  CT Chest - emphysema, COLTEN mass  Pulm eval appr  Cr is improving   Serologies are negative  Avoid nephrotoxins  F/u PVR  F/u CBC, BMP  No NSAID's, no ACE/ARB as op  Would add Cipro to list of allergies  Would d/c Ceftriaxone after tomorrow's dose  If Cr is improving in am, can be d/c from renal pov w/op f/u     [phone]       Resting    Vital Signs Last 24 Hrs  T(C): 36.6 (12-27-23 @ 16:42), Max: 36.6 (12-26-23 @ 20:56)  T(F): 97.9 (12-27-23 @ 16:42), Max: 97.9 (12-27-23 @ 16:42)  HR: 77 (12-27-23 @ 16:42) (65 - 77)  BP: 154/71 (12-27-23 @ 16:42) (134/77 - 154/71)  RR: 16 (12-27-23 @ 16:42) (16 - 16)  SpO2: 98% (12-27-23 @ 16:42) (97% - 98%)    I&O's Detail    26 Dec 2023 07:01  -  27 Dec 2023 07:00  --------------------------------------------------------  IN:    IV PiggyBack: 150 mL    Oral Fluid: 950 mL  Total IN: 1100 mL    OUT:    Incontinent per Collection Bag (mL): 550 mL  Total OUT: 550 mL    s1s2  b/l air entry  soft, ND  no edema                        12.4   9.84  )-----------( 241      ( 27 Dec 2023 06:36 )             35.3     27 Dec 2023 06:36    135    |  101    |  43     ----------------------------<  83     3.9     |  23     |  4.10     Ca    9.5        27 Dec 2023 06:36  Phos  5.2       26 Dec 2023 07:40    TPro  x      /  Alb  2.7    /  TBili  x      /  DBili  x      /  AST  x      /  ALT  x      /  AlkPhos  x      26 Dec 2023 07:40    LIVER FUNCTIONS - ( 26 Dec 2023 07:40 )  Alb: 2.7 g/dL / Pro: x     / ALK PHOS: x     / ALT: x     / AST: x     / GGT: x           Culture - Urine (collected 25 Dec 2023 21:15)  Source: Clean Catch Clean Catch (Midstream)  Preliminary Report (27 Dec 2023 10:35):    >100,000 CFU/ml Escherichia coli    acetaminophen     Tablet .. 650 milliGRAM(s) Oral every 6 hours PRN  albuterol/ipratropium for Nebulization 3 milliLiter(s) Nebulizer every 6 hours PRN  amLODIPine   Tablet 10 milliGRAM(s) Oral with breakfast  bisacodyl Suppository 10 milliGRAM(s) Rectal daily PRN  cefTRIAXone   IVPB 1000 milliGRAM(s) IV Intermittent every 24 hours  cefTRIAXone   IVPB      heparin   Injectable 5000 Unit(s) SubCutaneous every 8 hours  hydrALAZINE 25 milliGRAM(s) Oral every 6 hours PRN  levothyroxine 88 MICROGram(s) Oral daily  polyethylene glycol 3350 17 Gram(s) Oral every 12 hours  senna 2 Tablet(s) Oral at bedtime  simvastatin 40 milliGRAM(s) Oral at bedtime    A/P:    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, exacerbated by ARB as op  CT A/P w/o hydro  CT Chest - emphysema, COLTEN mass  Pulm eval appr  Cr is improving   Serologies are negative  Avoid nephrotoxins  F/u PVR  F/u CBC, BMP  No NSAID's, no ACE/ARB as op  Would add Cipro to list of allergies  Would d/c Ceftriaxone after tomorrow's dose  If Cr is improving in am, can be d/c from renal pov w/op f/u     [phone]

## 2023-12-27 NOTE — PROGRESS NOTE ADULT - SUBJECTIVE AND OBJECTIVE BOX
Medicine Progress Note    Patient is a 83y old  Female who presents with a chief complaint of acute renal failure (27 Dec 2023 12:49)      SUBJECTIVE / OVERNIGHT EVENTS:  seen and examined  Chart reviewed  No overnight events  BM+  No pain  No complaints    ADDITIONAL REVIEW OF SYSTEMS:  denied fever/chills/CP/SOB/cough/palpitation/dizziness/abdominal pain/nausea/vomiting/diarrhoea/constipation/dysuria/leg or calf pain/headaches.all other ROS neg    MEDICATIONS  (STANDING):  amLODIPine   Tablet 10 milliGRAM(s) Oral with breakfast  cefTRIAXone   IVPB 1000 milliGRAM(s) IV Intermittent every 24 hours  cefTRIAXone   IVPB      heparin   Injectable 5000 Unit(s) SubCutaneous every 8 hours  levothyroxine 88 MICROGram(s) Oral daily  polyethylene glycol 3350 17 Gram(s) Oral every 12 hours  senna 2 Tablet(s) Oral at bedtime  simvastatin 40 milliGRAM(s) Oral at bedtime    MEDICATIONS  (PRN):  acetaminophen     Tablet .. 650 milliGRAM(s) Oral every 6 hours PRN Moderate Pain (4 - 6)  albuterol/ipratropium for Nebulization 3 milliLiter(s) Nebulizer every 6 hours PRN Shortness of Breath and/or Wheezing  bisacodyl Suppository 10 milliGRAM(s) Rectal daily PRN Constipation  hydrALAZINE 25 milliGRAM(s) Oral every 6 hours PRN Systolic blood pressure >160 or DBP>100    CAPILLARY BLOOD GLUCOSE        I&O's Summary    26 Dec 2023 07:01  -  27 Dec 2023 07:00  --------------------------------------------------------  IN: 1100 mL / OUT: 550 mL / NET: 550 mL        PHYSICAL EXAM:  Vital Signs Last 24 Hrs  T(C): 36.4 (27 Dec 2023 07:02), Max: 36.6 (26 Dec 2023 20:56)  T(F): 97.6 (27 Dec 2023 07:02), Max: 97.8 (26 Dec 2023 20:56)  HR: 66 (27 Dec 2023 07:02) (65 - 66)  BP: 134/77 (27 Dec 2023 08:10) (134/77 - 150/77)  BP(mean): --  RR: 16 (27 Dec 2023 07:02) (16 - 16)  SpO2: 98% (27 Dec 2023 07:02) (97% - 98%)    Parameters below as of 27 Dec 2023 07:02  Patient On (Oxygen Delivery Method): room air      CONSTITUTIONAL: NAD, well-developed, well-groomed  ENMT: Moist oral mucosa, no pharyngeal injection or exudates; normal dentition  RESPIRATORY: Normal respiratory effort; lungs are clear to auscultation bilaterally  CARDIOVASCULAR: Regular rate and rhythm, normal S1 and S2, no murmur/rub/gallop; No lower extremity edema; Peripheral pulses are 2+ bilaterally  ABDOMEN: Nontender to palpation, normoactive bowel sounds, no rebound/guarding; No hepatosplenomegaly  PSYCH: A+O to person, place, and time; affect appropriate  NEUROLOGY: CN 2-12 are intact and symmetric; no gross sensory deficits   SKIN: No rashes; no palpable lesions    LABS:                        12.4   9.84  )-----------( 241      ( 27 Dec 2023 06:36 )             35.3     12-27    135  |  101  |  43<H>  ----------------------------<  83  3.9   |  23  |  4.10<H>    Ca    9.5      27 Dec 2023 06:36  Phos  5.2     12-26    TPro  x   /  Alb  2.7<L>  /  TBili  x   /  DBili  x   /  AST  x   /  ALT  x   /  AlkPhos  x   12-26          Urinalysis Basic - ( 27 Dec 2023 06:36 )    Color: x / Appearance: x / SG: x / pH: x  Gluc: 83 mg/dL / Ketone: x  / Bili: x / Urobili: x   Blood: x / Protein: x / Nitrite: x   Leuk Esterase: x / RBC: x / WBC x   Sq Epi: x / Non Sq Epi: x / Bacteria: x        Culture - Urine (collected 25 Dec 2023 21:15)  Source: Clean Catch Clean Catch (Midstream)  Preliminary Report (27 Dec 2023 10:35):    >100,000 CFU/ml Escherichia coli          RADIOLOGY & ADDITIONAL TESTS:  Imaging from Last 24 Hours:    Electrocardiogram/QTc Interval:    COORDINATION OF CARE:  Care Discussed with Consultants/Other Providers:

## 2023-12-27 NOTE — PROGRESS NOTE ADULT - ASSESSMENT
84 y/o F with PMH HTN, HLD, chronic right plantar foot ulcers/OM recently admitted to [facility] 12/6-12/11 for acute on chronic osteomyelitis of the right foot that required partial amputation of 2nd ray of right foot 12/8/23, c/o nausea, vomiting, found with acute renal failure, likely due to recent vanco course.     Leucocytosis  - improved  - reported mild cough now improved  - CXR none acute  - UA+ for UTI. but no symptoms excpet one time retention after TOV. now PVR low. DC bladder scan  - on CTX for now. day 3 today. f/u sensitivity.   - amylase normalized. lipase trending down  - monitor cbc and temp. currently afebrile      Bradycardia  - likley combination of BB and hypothyroid  - BB dced. LT4 dose increased  - DCed tele    Acute renal failure, likely drug induced [ cipro, ARBs]  -Nephro consulted. rec appreciated.   - on IVF  - on TOV since 12/23. DC bladder scan/SC as PVR is low  - dig level--> 1.1. now off dig. unclear why patient is on dig. reported no afib  -CT,  no hydro  - neg UA and Uc on amdisison. + on 12/23. on CTx. f/u sensitivity  -Follow repeat BMP    s/p partial amputation of 2nd ray of right foot 12/8/23  -Due for outpatient podiatry f/u this week regarding dressing   -Podiatry consulted. no surgical intervention needed  -PT eval --> home PT  -Afebrile, non toxic appearing    HTN  - Home doses: metoprolol ER BID, losartan  - Losartan was held due to CARLOS  - off metoprolol for bradycardia  - amlodipine was increased form 5 mg to 10 mg on 12/24  - no OH noted  - monitor VS including OH and adjust meds    HLD  -Continue home simvastatin 40mg qd    Hypothyroidism  - TSH 16  - increased LT4 from 75 to 88 mcg 12/23  - needs repeat TFT in 4-6 weeks    left upper lung mass on CT chest  Emphysema  - + smoking  - pulm consulted likely primary malignancy. needs biopsy OP  - CT a/p with OC: 1.5 cm parainguinal LN. no sign of malignancy  - needs PFT with PET and IR guided Biopsy OP. discussed with patient and Dr. Pineda. per pulm, patient will needs PET scan before Biopsy. wwill try to get heme on board so that she can be followed up OP sooner. needs OP pulm eval.     Elevated amylase and lipase  - improving  - no ssx of pancreatitis  - CT renal stone hunt: and CT a/p with OC showed normal hepatobiliary system and pancrease.   - GI cx noted: non-pancreatic source. trend    Constipation  - miralax, senna  - increased miralax to BID  - added dulcolax Supp PRN  - metamucil BID    Severe protein-calorie malnutrition.  - nutrition on board      DVT ppx: HSQ    Code Status - DNR/DNI     Dispo: Pending improvement of renal function. UC pending. anticipate in DC 24 hrs.     12/27: Updated Dr. Eric [ patient's friend and emergency contact]. Dr. Gustavo Peterson is PCP      84 y/o F with PMH HTN, HLD, chronic right plantar foot ulcers/OM recently admitted to [facility] 12/6-12/11 for acute on chronic osteomyelitis of the right foot that required partial amputation of 2nd ray of right foot 12/8/23, c/o nausea, vomiting, found with acute renal failure, likely due to recent vanco course.     Leucocytosis  - improved  - reported mild cough now improved  - CXR none acute  - UA+ for UTI. but no symptoms excpet one time retention after TOV. now PVR low. DC bladder scan  - on CTX for now. day 3 today. f/u sensitivity.   - amylase normalized. lipase trending down  - monitor cbc and temp. currently afebrile      Bradycardia  - likley combination of BB and hypothyroid  - BB dced. LT4 dose increased  - DCed tele    Acute renal failure, likely drug induced [ cipro, ARBs]  -Nephro consulted. rec appreciated.   - on IVF  - on TOV since 12/23. DC bladder scan/SC as PVR is low  - dig level--> 1.1. now off dig. unclear why patient is on dig. reported no afib  -CT,  no hydro  - neg UA and Uc on amdisison. + on 12/23. on CTx. f/u sensitivity  -Follow repeat BMP    s/p partial amputation of 2nd ray of right foot 12/8/23  -Due for outpatient podiatry f/u this week regarding dressing   -Podiatry consulted. no surgical intervention needed  -PT eval --> home PT  -Afebrile, non toxic appearing    HTN  - Home doses: metoprolol ER BID, losartan  - Losartan was held due to CARLOS  - off metoprolol for bradycardia  - amlodipine was increased form 5 mg to 10 mg on 12/24  - no OH noted  - monitor VS including OH and adjust meds    HLD  -Continue home simvastatin 40mg qd    Hypothyroidism  - TSH 16  - increased LT4 from 75 to 88 mcg 12/23  - needs repeat TFT in 4-6 weeks    left upper lung mass on CT chest  Emphysema  - + smoking  - pulm consulted likely primary malignancy. needs biopsy OP  - CT a/p with OC: 1.5 cm parainguinal LN. no sign of malignancy  - needs PFT with PET and IR guided Biopsy OP. discussed with patient and Dr. Pineda. per pulm, patient will needs PET scan before Biopsy. wwill try to get heme on board so that she can be followed up OP sooner. needs OP pulm eval.     Elevated amylase and lipase  - improving  - no ssx of pancreatitis  - CT renal stone hunt: and CT a/p with OC showed normal hepatobiliary system and pancrease.   - GI cx noted: non-pancreatic source. trend    Constipation  - miralax, senna  - increased miralax to BID  - added dulcolax Supp PRN  - metamucil BID    Severe protein-calorie malnutrition.  - nutrition on board      DVT ppx: HSQ    Code Status - DNR/DNI     Dispo: Pending improvement of renal function. UC pending. anticipate in DC 24 hrs.     12/27: Updated Dr. Eric [ patient's friend and emergency contact]. Dr. Gustavo Peterson is PCP

## 2023-12-27 NOTE — PROGRESS NOTE ADULT - ASSESSMENT
82 y/o F with PMH HTN, HLD, chronic right plantar foot ulcers/OM recently admitted to [facility] 12/6-12/11 for acute on chronic osteomyelitis of the right foot that required partial amputation of 2nd ray of right foot 12/8/23.     Currently, tolerating po intake renal restriction diet, no NDV, no abd pain, last BM 12/26 soft brown.     GI consulted for elevated amylase/lipase.   No events overnight.  Lipase improving  Amylase normalized     82 y/o F with PMH HTN, HLD, chronic right plantar foot ulcers/OM recently admitted to [facility] 12/6-12/11 for acute on chronic osteomyelitis of the right foot that required partial amputation of 2nd ray of right foot 12/8/23.     Currently, tolerating po intake renal restriction diet, no NDV, no abd pain, last BM 12/26 soft brown.     GI consulted for elevated amylase/lipase.   No events overnight.  Lipase improving  Amylase normalized

## 2023-12-27 NOTE — CHART NOTE - NSCHARTNOTEFT_GEN_A_CORE
NUTRITION Follow Up Note    SOURCE: Patient [X]  Medical Record [X]  Nursing Staff [X]  Family Member []    DIET: Diet, Renal Restrictions:   For patients receiving Renal Replacement - No Protein Restr, No Conc K, No Conc Phos, Low Sodium (12-23-23 @ 07:30) [Active]    Patient noted with variable PO intakes, consuming 0-100% of meals as per nursing flowsheets. Patient reports feeling overwhelmed with large portions of food.  Encouraged PO intakes and food preferences obtained and noted on patients file with nutrition office. Receptive to receive Nepro 8oz BID (Provides 850kcal & 38grams of Protein) to enhance PO intakes and optimize nutritional status.    EDEMA: 1+ right foot    LAST BM: no BM noted - bowel regimen noted    SKIN: no pressure injuries noted    WEIGHT TRENDS: 65.9 kG (12/21/23)      PERTINENT MEDICATIONS: MEDICATIONS  (STANDING):  amLODIPine   Tablet 10 milliGRAM(s) Oral with breakfast  cefTRIAXone   IVPB 1000 milliGRAM(s) IV Intermittent every 24 hours  cefTRIAXone   IVPB      heparin   Injectable 5000 Unit(s) SubCutaneous every 8 hours  levothyroxine 88 MICROGram(s) Oral daily  polyethylene glycol 3350 17 Gram(s) Oral every 12 hours  senna 2 Tablet(s) Oral at bedtime  simvastatin 40 milliGRAM(s) Oral at bedtime    MEDICATIONS  (PRN):  acetaminophen     Tablet .. 650 milliGRAM(s) Oral every 6 hours PRN Moderate Pain (4 - 6)  albuterol/ipratropium for Nebulization 3 milliLiter(s) Nebulizer every 6 hours PRN Shortness of Breath and/or Wheezing  bisacodyl Suppository 10 milliGRAM(s) Rectal daily PRN Constipation  hydrALAZINE 25 milliGRAM(s) Oral every 6 hours PRN Systolic blood pressure >160 or DBP>100      PERTINENT LABS:  12-27 Na135 mmol/L Glu 83 mg/dL K+ 3.9 mmol/L Cr  4.10 mg/dL<H> BUN 43 mg/dL<H> 12-26 Phos 5.2 mg/dL<H> 12-26 Alb 2.7 g/dL<L>        ESTIMATED NEEDS:   [X] No Change Since Previous Assessment    PREVIOUS NUTRITION DIAGNOSIS:  Severe Protein Calorie Malnutrition  related to inadequate protein energy intakes  as evidenced by 12.12% unintentional weight loss x 3 weeks (severe) & reported <75% EER for >7 days  Patient to meet at least 75% of assessed needs during hospitalization.      NUTRITION DIAGNOSIS IS [X] Ongoing -     NEW NUTRITION DIAGNOSIS: [X] Not Applicable    INTERVENTIONS:   1. Add Nepro 8oz BID (Provides 850kcal & 38grams of Protein)   2. Honor patients daily food preferences as feasible with prescribed diet   3. Monitor daily PO intakes, GI tolerance, labs, weights, skin integrity, & BM regularity     MONITORING & EVALUATION:   1. Weights   2. PO intakes   3. Skin integrity   4. Tolerance to diet prescription   5. Labs & POCT  6. Follow up (per protocol)    Registered Dietitian/Nutritionist Remains Available.  Vinay Phillips RD, CDN    Contact: [phone] or via MS TEAMS NUTRITION Follow Up Note    SOURCE: Patient [X]  Medical Record [X]  Nursing Staff [X]  Family Member []    DIET: Diet, Renal Restrictions:   For patients receiving Renal Replacement - No Protein Restr, No Conc K, No Conc Phos, Low Sodium (12-23-23 @ 07:30) [Active]    Patient noted with variable PO intakes, consuming 0-100% of meals as per nursing flowsheets. Patient reports feeling overwhelmed with large portions of food.  Encouraged PO intakes and food preferences obtained and noted on patients file with nutrition office. Receptive to receive Nepro 8oz BID (Provides 850kcal & 38grams of Protein) to enhance PO intakes and optimize nutritional status.    EDEMA: 1+ right foot    LAST BM: no BM noted - bowel regimen noted    SKIN: no pressure injuries noted    WEIGHT TRENDS: 65.9 kG (12/21/23)      PERTINENT MEDICATIONS: MEDICATIONS  (STANDING):  amLODIPine   Tablet 10 milliGRAM(s) Oral with breakfast  cefTRIAXone   IVPB 1000 milliGRAM(s) IV Intermittent every 24 hours  cefTRIAXone   IVPB      heparin   Injectable 5000 Unit(s) SubCutaneous every 8 hours  levothyroxine 88 MICROGram(s) Oral daily  polyethylene glycol 3350 17 Gram(s) Oral every 12 hours  senna 2 Tablet(s) Oral at bedtime  simvastatin 40 milliGRAM(s) Oral at bedtime    MEDICATIONS  (PRN):  acetaminophen     Tablet .. 650 milliGRAM(s) Oral every 6 hours PRN Moderate Pain (4 - 6)  albuterol/ipratropium for Nebulization 3 milliLiter(s) Nebulizer every 6 hours PRN Shortness of Breath and/or Wheezing  bisacodyl Suppository 10 milliGRAM(s) Rectal daily PRN Constipation  hydrALAZINE 25 milliGRAM(s) Oral every 6 hours PRN Systolic blood pressure >160 or DBP>100      PERTINENT LABS:  12-27 Na135 mmol/L Glu 83 mg/dL K+ 3.9 mmol/L Cr  4.10 mg/dL<H> BUN 43 mg/dL<H> 12-26 Phos 5.2 mg/dL<H> 12-26 Alb 2.7 g/dL<L>        ESTIMATED NEEDS:   [X] No Change Since Previous Assessment    PREVIOUS NUTRITION DIAGNOSIS:  Severe Protein Calorie Malnutrition  related to inadequate protein energy intakes  as evidenced by 12.12% unintentional weight loss x 3 weeks (severe) & reported <75% EER for >7 days  Patient to meet at least 75% of assessed needs during hospitalization.      NUTRITION DIAGNOSIS IS [X] Ongoing -     NEW NUTRITION DIAGNOSIS: [X] Not Applicable    INTERVENTIONS:   1. Add Nepro 8oz BID (Provides 850kcal & 38grams of Protein)   2. Honor patients daily food preferences as feasible with prescribed diet   3. Monitor daily PO intakes, GI tolerance, labs, weights, skin integrity, & BM regularity     MONITORING & EVALUATION:   1. Weights   2. PO intakes   3. Skin integrity   4. Tolerance to diet prescription   5. Labs & POCT  6. Follow up (per protocol)    Registered Dietitian/Nutritionist Remains Available.  Vinay Phillips RD, CDN    Contact: [phone] or via MS TEAMS NUTRITION Follow Up Note    SOURCE: Patient [X]  Medical Record [X]  Nursing Staff [X]  Family Member []    DIET: Diet, Renal Restrictions:   For patients receiving Renal Replacement - No Protein Restr, No Conc K, No Conc Phos, Low Sodium (12-23-23 @ 07:30) [Active]    Patient noted with variable PO intakes, consuming 0-100% of meals as per nursing flowsheets. Patient reports feeling overwhelmed with large portions of food.  Encouraged PO intakes and food preferences obtained and noted on patients file with nutrition office. Receptive to receive Nepro 8oz BID (Provides 850kcal & 38grams of Protein) to enhance PO intakes and optimize nutritional status. Patient with constipation. Will provide fiber-rich foods to promote BM regularity    EDEMA: 1+ right foot    LAST BM: no BM noted - bowel regimen noted    SKIN: no pressure injuries noted    WEIGHT TRENDS: 65.9 kG (12/21/23)      PERTINENT MEDICATIONS: MEDICATIONS  (STANDING):  amLODIPine   Tablet 10 milliGRAM(s) Oral with breakfast  cefTRIAXone   IVPB 1000 milliGRAM(s) IV Intermittent every 24 hours  cefTRIAXone   IVPB      heparin   Injectable 5000 Unit(s) SubCutaneous every 8 hours  levothyroxine 88 MICROGram(s) Oral daily  polyethylene glycol 3350 17 Gram(s) Oral every 12 hours  senna 2 Tablet(s) Oral at bedtime  simvastatin 40 milliGRAM(s) Oral at bedtime    MEDICATIONS  (PRN):  acetaminophen     Tablet .. 650 milliGRAM(s) Oral every 6 hours PRN Moderate Pain (4 - 6)  albuterol/ipratropium for Nebulization 3 milliLiter(s) Nebulizer every 6 hours PRN Shortness of Breath and/or Wheezing  bisacodyl Suppository 10 milliGRAM(s) Rectal daily PRN Constipation  hydrALAZINE 25 milliGRAM(s) Oral every 6 hours PRN Systolic blood pressure >160 or DBP>100      PERTINENT LABS:  12-27 Na135 mmol/L Glu 83 mg/dL K+ 3.9 mmol/L Cr  4.10 mg/dL<H> BUN 43 mg/dL<H> 12-26 Phos 5.2 mg/dL<H> 12-26 Alb 2.7 g/dL<L>        ESTIMATED NEEDS:   [X] No Change Since Previous Assessment    PREVIOUS NUTRITION DIAGNOSIS:  Severe Protein Calorie Malnutrition  related to inadequate protein energy intakes  as evidenced by 12.12% unintentional weight loss x 3 weeks (severe) & reported <75% EER for >7 days  Patient to meet at least 75% of assessed needs during hospitalization.      NUTRITION DIAGNOSIS IS [X] Ongoing -     NEW NUTRITION DIAGNOSIS: [X] Not Applicable    INTERVENTIONS:   1. Add Nepro 8oz BID (Provides 850kcal & 38grams of Protein)   2. Honor patients daily food preferences as feasible with prescribed diet   3. Monitor daily PO intakes, GI tolerance, labs, weights, skin integrity, & BM regularity     MONITORING & EVALUATION:   1. Weights   2. PO intakes   3. Skin integrity   4. Tolerance to diet prescription   5. Labs & POCT  6. Follow up (per protocol)    Registered Dietitian/Nutritionist Remains Available.  Vinay Phillips RD, CDN    Contact: [phone] or via MS TEAMS NUTRITION Follow Up Note    SOURCE: Patient [X]  Medical Record [X]  Nursing Staff [X]  Family Member []    DIET: Diet, Renal Restrictions:   For patients receiving Renal Replacement - No Protein Restr, No Conc K, No Conc Phos, Low Sodium (12-23-23 @ 07:30) [Active]    Patient noted with variable PO intakes, consuming 0-100% of meals as per nursing flowsheets. Patient reports feeling overwhelmed with large portions of food.  Encouraged PO intakes and food preferences obtained and noted on patients file with nutrition office. Receptive to receive Nepro 8oz BID (Provides 850kcal & 38grams of Protein) to enhance PO intakes and optimize nutritional status. Patient with constipation. Will provide fiber-rich foods to promote BM regularity    EDEMA: 1+ right foot    LAST BM: no BM noted - bowel regimen noted    SKIN: no pressure injuries noted    WEIGHT TRENDS: 65.9 kG (12/21/23)      PERTINENT MEDICATIONS: MEDICATIONS  (STANDING):  amLODIPine   Tablet 10 milliGRAM(s) Oral with breakfast  cefTRIAXone   IVPB 1000 milliGRAM(s) IV Intermittent every 24 hours  cefTRIAXone   IVPB      heparin   Injectable 5000 Unit(s) SubCutaneous every 8 hours  levothyroxine 88 MICROGram(s) Oral daily  polyethylene glycol 3350 17 Gram(s) Oral every 12 hours  senna 2 Tablet(s) Oral at bedtime  simvastatin 40 milliGRAM(s) Oral at bedtime    MEDICATIONS  (PRN):  acetaminophen     Tablet .. 650 milliGRAM(s) Oral every 6 hours PRN Moderate Pain (4 - 6)  albuterol/ipratropium for Nebulization 3 milliLiter(s) Nebulizer every 6 hours PRN Shortness of Breath and/or Wheezing  bisacodyl Suppository 10 milliGRAM(s) Rectal daily PRN Constipation  hydrALAZINE 25 milliGRAM(s) Oral every 6 hours PRN Systolic blood pressure >160 or DBP>100      PERTINENT LABS:  12-27 Na135 mmol/L Glu 83 mg/dL K+ 3.9 mmol/L Cr  4.10 mg/dL<H> BUN 43 mg/dL<H> 12-26 Phos 5.2 mg/dL<H> 12-26 Alb 2.7 g/dL<L>        ESTIMATED NEEDS:   [X] No Change Since Previous Assessment    PREVIOUS NUTRITION DIAGNOSIS:  Severe Protein Calorie Malnutrition  related to inadequate protein energy intakes  as evidenced by 12.12% unintentional weight loss x 3 weeks (severe) & reported <75% EER for >7 days  Patient to meet at least 75% of assessed needs during hospitalization.      NUTRITION DIAGNOSIS IS [X] Ongoing -     NEW NUTRITION DIAGNOSIS: [X] Not Applicable    INTERVENTIONS:   1. Add Nepro 8oz BID (Provides 850kcal & 38grams of Protein)   2. Honor patients daily food preferences as feasible with prescribed diet   3. Monitor daily PO intakes, GI tolerance, labs, weights, skin integrity, & BM regularity     MONITORING & EVALUATION:   1. Weights   2. PO intakes   3. Skin integrity   4. Tolerance to diet prescription   5. Labs & POCT  6. Follow up (per protocol)    Registered Dietitian/Nutritionist Remains Available.  Vinay Phillips RD, CDN    Contact: [phone] or via MS TEAMS

## 2023-12-28 ENCOUNTER — TRANSCRIPTION ENCOUNTER (OUTPATIENT)
Age: 83
End: 2023-12-28

## 2023-12-28 VITALS
DIASTOLIC BLOOD PRESSURE: 74 MMHG | RESPIRATION RATE: 14 BRPM | OXYGEN SATURATION: 97 % | HEART RATE: 87 BPM | SYSTOLIC BLOOD PRESSURE: 156 MMHG | TEMPERATURE: 98 F

## 2023-12-28 DIAGNOSIS — C34.90 MALIGNANT NEOPLASM OF UNSPECIFIED PART OF UNSPECIFIED BRONCHUS OR LUNG: ICD-10-CM

## 2023-12-28 LAB
-  AMOXICILLIN/CLAVULANIC ACID: SIGNIFICANT CHANGE UP
-  AMOXICILLIN/CLAVULANIC ACID: SIGNIFICANT CHANGE UP
-  AMPICILLIN/SULBACTAM: SIGNIFICANT CHANGE UP
-  AMPICILLIN/SULBACTAM: SIGNIFICANT CHANGE UP
-  AMPICILLIN: SIGNIFICANT CHANGE UP
-  AMPICILLIN: SIGNIFICANT CHANGE UP
-  AZTREONAM: SIGNIFICANT CHANGE UP
-  AZTREONAM: SIGNIFICANT CHANGE UP
-  CEFAZOLIN: SIGNIFICANT CHANGE UP
-  CEFAZOLIN: SIGNIFICANT CHANGE UP
-  CEFEPIME: SIGNIFICANT CHANGE UP
-  CEFEPIME: SIGNIFICANT CHANGE UP
-  CEFOXITIN: SIGNIFICANT CHANGE UP
-  CEFOXITIN: SIGNIFICANT CHANGE UP
-  CEFTRIAXONE: SIGNIFICANT CHANGE UP
-  CEFTRIAXONE: SIGNIFICANT CHANGE UP
-  CEFUROXIME: SIGNIFICANT CHANGE UP
-  CEFUROXIME: SIGNIFICANT CHANGE UP
-  CIPROFLOXACIN: SIGNIFICANT CHANGE UP
-  CIPROFLOXACIN: SIGNIFICANT CHANGE UP
-  ERTAPENEM: SIGNIFICANT CHANGE UP
-  ERTAPENEM: SIGNIFICANT CHANGE UP
-  GENTAMICIN: SIGNIFICANT CHANGE UP
-  GENTAMICIN: SIGNIFICANT CHANGE UP
-  IMIPENEM: SIGNIFICANT CHANGE UP
-  IMIPENEM: SIGNIFICANT CHANGE UP
-  LEVOFLOXACIN: SIGNIFICANT CHANGE UP
-  LEVOFLOXACIN: SIGNIFICANT CHANGE UP
-  MEROPENEM: SIGNIFICANT CHANGE UP
-  MEROPENEM: SIGNIFICANT CHANGE UP
-  NITROFURANTOIN: SIGNIFICANT CHANGE UP
-  NITROFURANTOIN: SIGNIFICANT CHANGE UP
-  PIPERACILLIN/TAZOBACTAM: SIGNIFICANT CHANGE UP
-  PIPERACILLIN/TAZOBACTAM: SIGNIFICANT CHANGE UP
-  TOBRAMYCIN: SIGNIFICANT CHANGE UP
-  TOBRAMYCIN: SIGNIFICANT CHANGE UP
-  TRIMETHOPRIM/SULFAMETHOXAZOLE: SIGNIFICANT CHANGE UP
-  TRIMETHOPRIM/SULFAMETHOXAZOLE: SIGNIFICANT CHANGE UP
ALBUMIN SERPL ELPH-MCNC: 3 G/DL — LOW (ref 3.3–5)
ALBUMIN SERPL ELPH-MCNC: 3 G/DL — LOW (ref 3.3–5)
ALP SERPL-CCNC: 99 U/L — SIGNIFICANT CHANGE UP (ref 40–120)
ALP SERPL-CCNC: 99 U/L — SIGNIFICANT CHANGE UP (ref 40–120)
ALT FLD-CCNC: 15 U/L — SIGNIFICANT CHANGE UP (ref 10–45)
ALT FLD-CCNC: 15 U/L — SIGNIFICANT CHANGE UP (ref 10–45)
ANION GAP SERPL CALC-SCNC: 13 MMOL/L — SIGNIFICANT CHANGE UP (ref 5–17)
ANION GAP SERPL CALC-SCNC: 13 MMOL/L — SIGNIFICANT CHANGE UP (ref 5–17)
AST SERPL-CCNC: 23 U/L — SIGNIFICANT CHANGE UP (ref 10–40)
AST SERPL-CCNC: 23 U/L — SIGNIFICANT CHANGE UP (ref 10–40)
BASOPHILS # BLD AUTO: 0.03 K/UL — SIGNIFICANT CHANGE UP (ref 0–0.2)
BASOPHILS # BLD AUTO: 0.03 K/UL — SIGNIFICANT CHANGE UP (ref 0–0.2)
BASOPHILS NFR BLD AUTO: 0.5 % — SIGNIFICANT CHANGE UP (ref 0–2)
BASOPHILS NFR BLD AUTO: 0.5 % — SIGNIFICANT CHANGE UP (ref 0–2)
BILIRUB SERPL-MCNC: 0.4 MG/DL — SIGNIFICANT CHANGE UP (ref 0.2–1.2)
BILIRUB SERPL-MCNC: 0.4 MG/DL — SIGNIFICANT CHANGE UP (ref 0.2–1.2)
BUN SERPL-MCNC: 40 MG/DL — HIGH (ref 7–23)
BUN SERPL-MCNC: 40 MG/DL — HIGH (ref 7–23)
CALCIUM SERPL-MCNC: 9.5 MG/DL — SIGNIFICANT CHANGE UP (ref 8.4–10.5)
CALCIUM SERPL-MCNC: 9.5 MG/DL — SIGNIFICANT CHANGE UP (ref 8.4–10.5)
CHLORIDE SERPL-SCNC: 99 MMOL/L — SIGNIFICANT CHANGE UP (ref 96–108)
CHLORIDE SERPL-SCNC: 99 MMOL/L — SIGNIFICANT CHANGE UP (ref 96–108)
CO2 SERPL-SCNC: 21 MMOL/L — LOW (ref 22–31)
CO2 SERPL-SCNC: 21 MMOL/L — LOW (ref 22–31)
CREAT SERPL-MCNC: 3.54 MG/DL — HIGH (ref 0.5–1.3)
CREAT SERPL-MCNC: 3.54 MG/DL — HIGH (ref 0.5–1.3)
CULTURE RESULTS: ABNORMAL
CULTURE RESULTS: ABNORMAL
EGFR: 12 ML/MIN/1.73M2 — LOW
EGFR: 12 ML/MIN/1.73M2 — LOW
EOSINOPHIL # BLD AUTO: 0.13 K/UL — SIGNIFICANT CHANGE UP (ref 0–0.5)
EOSINOPHIL # BLD AUTO: 0.13 K/UL — SIGNIFICANT CHANGE UP (ref 0–0.5)
EOSINOPHIL NFR BLD AUTO: 2.2 % — SIGNIFICANT CHANGE UP (ref 0–6)
EOSINOPHIL NFR BLD AUTO: 2.2 % — SIGNIFICANT CHANGE UP (ref 0–6)
GLUCOSE SERPL-MCNC: 108 MG/DL — HIGH (ref 70–99)
GLUCOSE SERPL-MCNC: 108 MG/DL — HIGH (ref 70–99)
HCT VFR BLD CALC: 37.1 % — SIGNIFICANT CHANGE UP (ref 34.5–45)
HCT VFR BLD CALC: 37.1 % — SIGNIFICANT CHANGE UP (ref 34.5–45)
HGB BLD-MCNC: 12.5 G/DL — SIGNIFICANT CHANGE UP (ref 11.5–15.5)
HGB BLD-MCNC: 12.5 G/DL — SIGNIFICANT CHANGE UP (ref 11.5–15.5)
IMM GRANULOCYTES NFR BLD AUTO: 0.5 % — SIGNIFICANT CHANGE UP (ref 0–0.9)
IMM GRANULOCYTES NFR BLD AUTO: 0.5 % — SIGNIFICANT CHANGE UP (ref 0–0.9)
LIDOCAIN IGE QN: 136 U/L — HIGH (ref 16–77)
LIDOCAIN IGE QN: 136 U/L — HIGH (ref 16–77)
LYMPHOCYTES # BLD AUTO: 0.46 K/UL — LOW (ref 1–3.3)
LYMPHOCYTES # BLD AUTO: 0.46 K/UL — LOW (ref 1–3.3)
LYMPHOCYTES # BLD AUTO: 7.9 % — LOW (ref 13–44)
LYMPHOCYTES # BLD AUTO: 7.9 % — LOW (ref 13–44)
MCHC RBC-ENTMCNC: 32.1 PG — SIGNIFICANT CHANGE UP (ref 27–34)
MCHC RBC-ENTMCNC: 32.1 PG — SIGNIFICANT CHANGE UP (ref 27–34)
MCHC RBC-ENTMCNC: 33.7 GM/DL — SIGNIFICANT CHANGE UP (ref 32–36)
MCHC RBC-ENTMCNC: 33.7 GM/DL — SIGNIFICANT CHANGE UP (ref 32–36)
MCV RBC AUTO: 95.1 FL — SIGNIFICANT CHANGE UP (ref 80–100)
MCV RBC AUTO: 95.1 FL — SIGNIFICANT CHANGE UP (ref 80–100)
METHOD TYPE: SIGNIFICANT CHANGE UP
METHOD TYPE: SIGNIFICANT CHANGE UP
MONOCYTES # BLD AUTO: 0.58 K/UL — SIGNIFICANT CHANGE UP (ref 0–0.9)
MONOCYTES # BLD AUTO: 0.58 K/UL — SIGNIFICANT CHANGE UP (ref 0–0.9)
MONOCYTES NFR BLD AUTO: 9.9 % — SIGNIFICANT CHANGE UP (ref 2–14)
MONOCYTES NFR BLD AUTO: 9.9 % — SIGNIFICANT CHANGE UP (ref 2–14)
NEUTROPHILS # BLD AUTO: 4.6 K/UL — SIGNIFICANT CHANGE UP (ref 1.8–7.4)
NEUTROPHILS # BLD AUTO: 4.6 K/UL — SIGNIFICANT CHANGE UP (ref 1.8–7.4)
NEUTROPHILS NFR BLD AUTO: 79 % — HIGH (ref 43–77)
NEUTROPHILS NFR BLD AUTO: 79 % — HIGH (ref 43–77)
NRBC # BLD: 0 /100 WBCS — SIGNIFICANT CHANGE UP (ref 0–0)
NRBC # BLD: 0 /100 WBCS — SIGNIFICANT CHANGE UP (ref 0–0)
ORGANISM # SPEC MICROSCOPIC CNT: ABNORMAL
ORGANISM # SPEC MICROSCOPIC CNT: ABNORMAL
ORGANISM # SPEC MICROSCOPIC CNT: SIGNIFICANT CHANGE UP
ORGANISM # SPEC MICROSCOPIC CNT: SIGNIFICANT CHANGE UP
PLATELET # BLD AUTO: 214 K/UL — SIGNIFICANT CHANGE UP (ref 150–400)
PLATELET # BLD AUTO: 214 K/UL — SIGNIFICANT CHANGE UP (ref 150–400)
POTASSIUM SERPL-MCNC: 3.8 MMOL/L — SIGNIFICANT CHANGE UP (ref 3.5–5.3)
POTASSIUM SERPL-MCNC: 3.8 MMOL/L — SIGNIFICANT CHANGE UP (ref 3.5–5.3)
POTASSIUM SERPL-SCNC: 3.8 MMOL/L — SIGNIFICANT CHANGE UP (ref 3.5–5.3)
POTASSIUM SERPL-SCNC: 3.8 MMOL/L — SIGNIFICANT CHANGE UP (ref 3.5–5.3)
PROT SERPL-MCNC: 7.2 G/DL — SIGNIFICANT CHANGE UP (ref 6–8.3)
PROT SERPL-MCNC: 7.2 G/DL — SIGNIFICANT CHANGE UP (ref 6–8.3)
RBC # BLD: 3.9 M/UL — SIGNIFICANT CHANGE UP (ref 3.8–5.2)
RBC # BLD: 3.9 M/UL — SIGNIFICANT CHANGE UP (ref 3.8–5.2)
RBC # FLD: 12.1 % — SIGNIFICANT CHANGE UP (ref 10.3–14.5)
RBC # FLD: 12.1 % — SIGNIFICANT CHANGE UP (ref 10.3–14.5)
SODIUM SERPL-SCNC: 133 MMOL/L — LOW (ref 135–145)
SODIUM SERPL-SCNC: 133 MMOL/L — LOW (ref 135–145)
SPECIMEN SOURCE: SIGNIFICANT CHANGE UP
SPECIMEN SOURCE: SIGNIFICANT CHANGE UP
WBC # BLD: 5.83 K/UL — SIGNIFICANT CHANGE UP (ref 3.8–10.5)
WBC # BLD: 5.83 K/UL — SIGNIFICANT CHANGE UP (ref 3.8–10.5)
WBC # FLD AUTO: 5.83 K/UL — SIGNIFICANT CHANGE UP (ref 3.8–10.5)
WBC # FLD AUTO: 5.83 K/UL — SIGNIFICANT CHANGE UP (ref 3.8–10.5)

## 2023-12-28 PROCEDURE — 83516 IMMUNOASSAY NONANTIBODY: CPT

## 2023-12-28 PROCEDURE — 87086 URINE CULTURE/COLONY COUNT: CPT

## 2023-12-28 PROCEDURE — 97110 THERAPEUTIC EXERCISES: CPT

## 2023-12-28 PROCEDURE — 85025 COMPLETE CBC W/AUTO DIFF WBC: CPT

## 2023-12-28 PROCEDURE — 99239 HOSP IP/OBS DSCHRG MGMT >30: CPT

## 2023-12-28 PROCEDURE — 81001 URINALYSIS AUTO W/SCOPE: CPT

## 2023-12-28 PROCEDURE — 84439 ASSAY OF FREE THYROXINE: CPT

## 2023-12-28 PROCEDURE — 84443 ASSAY THYROID STIM HORMONE: CPT

## 2023-12-28 PROCEDURE — 80048 BASIC METABOLIC PNL TOTAL CA: CPT

## 2023-12-28 PROCEDURE — 99285 EMERGENCY DEPT VISIT HI MDM: CPT | Mod: 25

## 2023-12-28 PROCEDURE — 99223 1ST HOSP IP/OBS HIGH 75: CPT

## 2023-12-28 PROCEDURE — 93306 TTE W/DOPPLER COMPLETE: CPT

## 2023-12-28 PROCEDURE — 80069 RENAL FUNCTION PANEL: CPT

## 2023-12-28 PROCEDURE — 93005 ELECTROCARDIOGRAM TRACING: CPT

## 2023-12-28 PROCEDURE — 97166 OT EVAL MOD COMPLEX 45 MIN: CPT

## 2023-12-28 PROCEDURE — 97116 GAIT TRAINING THERAPY: CPT

## 2023-12-28 PROCEDURE — 74176 CT ABD & PELVIS W/O CONTRAST: CPT

## 2023-12-28 PROCEDURE — 80162 ASSAY OF DIGOXIN TOTAL: CPT

## 2023-12-28 PROCEDURE — 86036 ANCA SCREEN EACH ANTIBODY: CPT

## 2023-12-28 PROCEDURE — 97162 PT EVAL MOD COMPLEX 30 MIN: CPT

## 2023-12-28 PROCEDURE — 85027 COMPLETE CBC AUTOMATED: CPT

## 2023-12-28 PROCEDURE — 96374 THER/PROPH/DIAG INJ IV PUSH: CPT

## 2023-12-28 PROCEDURE — 80053 COMPREHEN METABOLIC PANEL: CPT

## 2023-12-28 PROCEDURE — 36415 COLL VENOUS BLD VENIPUNCTURE: CPT

## 2023-12-28 PROCEDURE — 71045 X-RAY EXAM CHEST 1 VIEW: CPT

## 2023-12-28 PROCEDURE — 82150 ASSAY OF AMYLASE: CPT

## 2023-12-28 PROCEDURE — 87186 SC STD MICRODIL/AGAR DIL: CPT

## 2023-12-28 PROCEDURE — 82550 ASSAY OF CK (CPK): CPT

## 2023-12-28 PROCEDURE — 86038 ANTINUCLEAR ANTIBODIES: CPT

## 2023-12-28 PROCEDURE — 83690 ASSAY OF LIPASE: CPT

## 2023-12-28 PROCEDURE — 71250 CT THORAX DX C-: CPT | Mod: MA

## 2023-12-28 PROCEDURE — 85610 PROTHROMBIN TIME: CPT

## 2023-12-28 PROCEDURE — 96361 HYDRATE IV INFUSION ADD-ON: CPT

## 2023-12-28 PROCEDURE — 86160 COMPLEMENT ANTIGEN: CPT

## 2023-12-28 PROCEDURE — 99233 SBSQ HOSP IP/OBS HIGH 50: CPT

## 2023-12-28 PROCEDURE — 97535 SELF CARE MNGMENT TRAINING: CPT

## 2023-12-28 RX ORDER — LEVOTHYROXINE SODIUM 125 MCG
1 TABLET ORAL
Qty: 30 | Refills: 0
Start: 2023-12-28

## 2023-12-28 RX ORDER — LEVOTHYROXINE SODIUM 125 MCG
1 TABLET ORAL
Qty: 0 | Refills: 0 | DISCHARGE
Start: 2023-12-28

## 2023-12-28 RX ORDER — DIGOXIN 250 MCG
1 TABLET ORAL
Refills: 0 | DISCHARGE

## 2023-12-28 RX ORDER — AMLODIPINE BESYLATE 2.5 MG/1
1 TABLET ORAL
Qty: 30 | Refills: 0
Start: 2023-12-28

## 2023-12-28 RX ORDER — ALBUTEROL 90 UG/1
2 AEROSOL, METERED ORAL
Qty: 1 | Refills: 0
Start: 2023-12-28

## 2023-12-28 RX ORDER — ACETAMINOPHEN 500 MG
2 TABLET ORAL
Qty: 0 | Refills: 0 | DISCHARGE
Start: 2023-12-28

## 2023-12-28 RX ORDER — AMLODIPINE BESYLATE 2.5 MG/1
1 TABLET ORAL
Refills: 0
Start: 2023-12-28

## 2023-12-28 RX ORDER — LOSARTAN POTASSIUM 100 MG/1
1 TABLET, FILM COATED ORAL
Refills: 0 | DISCHARGE

## 2023-12-28 RX ORDER — LEVOTHYROXINE SODIUM 125 MCG
1 TABLET ORAL
Refills: 0 | DISCHARGE

## 2023-12-28 RX ORDER — METOPROLOL TARTRATE 50 MG
1 TABLET ORAL
Refills: 0 | DISCHARGE

## 2023-12-28 RX ADMIN — HEPARIN SODIUM 5000 UNIT(S): 5000 INJECTION INTRAVENOUS; SUBCUTANEOUS at 06:25

## 2023-12-28 RX ADMIN — CEFTRIAXONE 100 MILLIGRAM(S): 500 INJECTION, POWDER, FOR SOLUTION INTRAMUSCULAR; INTRAVENOUS at 06:38

## 2023-12-28 RX ADMIN — HEPARIN SODIUM 5000 UNIT(S): 5000 INJECTION INTRAVENOUS; SUBCUTANEOUS at 14:40

## 2023-12-28 RX ADMIN — AMLODIPINE BESYLATE 10 MILLIGRAM(S): 2.5 TABLET ORAL at 08:08

## 2023-12-28 RX ADMIN — Medication 88 MICROGRAM(S): at 06:31

## 2023-12-28 RX ADMIN — POLYETHYLENE GLYCOL 3350 17 GRAM(S): 17 POWDER, FOR SOLUTION ORAL at 06:30

## 2023-12-28 NOTE — DISCHARGE NOTE PROVIDER - NSDCCPCAREPLAN_GEN_ALL_CORE_FT
PRINCIPAL DISCHARGE DIAGNOSIS  Diagnosis: CARLOS (acute kidney injury)  Assessment and Plan of Treatment: your kidney function is improving. not normal yet. creatinine down from 8.2 to 3.54. normal level upto 1.3. please see your primary MD and kidney doctor in 1 week. get kidney funtion test early next week. drink plenty to keep yourself hydrated. avoid motrin, aleve or other NSIADs. avoid losartan. let your doctor know that you shouldnot be on certain medication called ACEI or ARBS. i informed Dr. Gustavo Peterson. see him as soon as possibe. we also treated you with IV rocephine forurinary tract infection. come to ER if symptoms recur or new concerning symptoms. seek immediate medical attention if retaining urine ir any urinary symptoms like abdominal or flank pain, burning or pain during urinarion, urinary retention, fever, chills etc. you have appointment with  on januray 4. call to confirm appointment.      SECONDARY DISCHARGE DIAGNOSES  Diagnosis: HTN (hypertension)  Assessment and Plan of Treatment: we stopped your metoprolol for slow heart rate and losartan for kidney problem. started on amlodipine. do not rush out of bed or chair. see jase HODGSON and get blood pressure checked. DASH/TLC diet    Diagnosis: Elevated amylase and lipase  Assessment and Plan of Treatment: improving. your primary doctor needs to recheck it. seen by GI doctor    Diagnosis: Lung mass  Assessment and Plan of Treatment: you have a lung mass on CT scan looks like primary lung cancer. needs PET scan and biopsy. see lung doctor as soon as possible and Dr. Burden [ cancer doc]. stay away from smoking cig.    Diagnosis: Osteomyelitis  Assessment and Plan of Treatment: see your foot surgeon in 1 week.     PRINCIPAL DISCHARGE DIAGNOSIS  Diagnosis: CARLOS (acute kidney injury)  Assessment and Plan of Treatment: your kidney function is improving. not normal yet. creatinine down from 8.2 to 3.54. normal level upto 1.3. please see your primary MD and kidney doctor in 1 week. get kidney funtion test early next week. drink plenty to keep yourself hydrated. avoid motrin, aleve or other NSIADs. avoid losartan. let your doctor know that you shouldnot be on certain medication called ACEI or ARBS. i informed Dr. Gustavo Peterson. see him as soon as possibe. we also treated you with IV rocephine forurinary tract infection. come to ER if symptoms recur or new concerning symptoms. seek immediate medical attention if retaining urine ir any urinary symptoms like abdominal or flank pain, burning or pain during urinarion, urinary retention, fever, chills etc. you have appointment with  on januray 4. call to confirm appointment.      SECONDARY DISCHARGE DIAGNOSES  Diagnosis: HTN (hypertension)  Assessment and Plan of Treatment: we stopped your metoprolol for slow heart rate and losartan for kidney problem. started on amlodipine. do not rush out of bed or chair. see jase HODGSON and get blood pressure checked. DASH/TLC diet    Diagnosis: Elevated amylase and lipase  Assessment and Plan of Treatment: improving. your primary doctor needs to recheck it. seen by GI doctor    Diagnosis: Lung mass  Assessment and Plan of Treatment: you have a lung mass on CT scan looks like primary lung cancer. needs PET scan and biopsy. see lung doctor as soon as possible and Dr. Burden [ cancer doc]. stay away from smoking cig.    Diagnosis: Osteomyelitis  Assessment and Plan of Treatment: see your foot surgeon in 1 week.    Diagnosis: Hypothyroid  Assessment and Plan of Treatment: your TSH level was high at 16 and your heart rate was low. we increased your levothyroxin dose from 75 mcg to 88 mcg. your thyroid function needs to be checked in 4 weeks by primary MD

## 2023-12-28 NOTE — DISCHARGE NOTE PROVIDER - CARE PROVIDER_API CALL
Gustavo Peterson  [facility]  [address] [place] [address]  Phone: [phone]  Fax: [phone]  Follow Up Time:     foot surgeon,   Phone: (   )    -  Fax: (   )    -  Follow Up Time:     Elaina Lewis  Nephrology  [address], [address]  [place] [address]  Phone: [phone]  Fax: [phone]  Follow Up Time:    Gustavo Peterson  [facility]  [address] [place] [address]  Phone: [phone]  Fax: [phone]  Follow Up Time:     foot surgeon,   Phone: (   )    -  Fax: (   )    -  Follow Up Time:     Elaina Lewis  Nephrology  [address], [address]  [place] [address]  Phone: [phone]  Fax: [phone]  Follow Up Time:    Gustavo Peterson  Family Medicine  [address] [place] [address]  Phone: [phone]  Fax: [phone]  Follow Up Time:     Elaina Lewis  Nephrology  [address], [address]  [place] [address]  Phone: [phone]  Fax: [phone]  Follow Up Time:     foot surgeon,   Phone: (   )    -  Fax: (   )    -  Follow Up Time:     Claudine Walker  Internal Medicine  [address], [address]  [place] [address]  Phone: [phone]  Fax: [phone]  Follow Up Time:     Gisela Figueroa  Medical Oncology  [address] [place] [address]  Phone: [phone]  Fax: [phone]  Follow Up Time:    Gustavo Peterson  Family Medicine  [address] [place] [address]  Phone: [phone]  Fax: [phone]  Follow Up Time:     Elaina Lewis  Nephrology  [address], [address]  [place] [address]  Phone: [phone]  Fax: [phone]  Follow Up Time:     foot surgeon,   Phone: (   )    -  Fax: (   )    -  Follow Up Time:     Claudine Walker  Internal Medicine  [address], [address]  [place] [address]  Phone: [phone]  Fax: [phone]  Follow Up Time:     Gisela Figueroa  Medical Oncology  [address] [place] [address]  Phone: [phone]  Fax: [phone]  Follow Up Time:

## 2023-12-28 NOTE — DISCHARGE NOTE PROVIDER - NSDCMRMEDTOKEN_GEN_ALL_CORE_FT
Digitek 125 mcg (0.125 mg) oral tablet: 1 tab(s) orally once a day  levothyroxine 75 mcg (0.075 mg) oral tablet: 1 tab(s) orally once a day  losartan 25 mg oral tablet: 1 tab(s) orally once a day  metoprolol succinate 25 mg oral tablet, extended release: 1 tab(s) orally 2 times a day  simvastatin 40 mg oral tablet: 1 tab(s) orally once a day (at bedtime)   acetaminophen 325 mg oral tablet: 2 tab(s) orally every 6 hours As needed Moderate Pain (4 - 6)  amLODIPine 10 mg oral tablet: 1 tab(s) orally once a day (before a meal)  levothyroxine 88 mcg (0.088 mg) oral tablet: 1 tab(s) orally once a day  ProAir HFA 90 mcg/inh inhalation aerosol: 2 puff(s) inhaled 2 times a day  simvastatin 40 mg oral tablet: 1 tab(s) orally once a day (at bedtime)

## 2023-12-28 NOTE — DISCHARGE NOTE PROVIDER - NSDCFUADDINST_GEN_ALL_CORE_FT
please bring all DC papers and medications to all health care providers. Return to ER if symptoms recur and any new concerning symptoms.   fall precautions

## 2023-12-28 NOTE — CONSULT NOTE ADULT - PROBLEM SELECTOR RECOMMENDATION 9
Asymptomatic elderly women with negative CT scan.  Suspect non-pacreatic source of elevated pancreatic enzymes   - Trend liver enzymes   - Trend Amylase   - Trend Lipase
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  CT A/P showed no evidence of distant disease.  Patient evaluated by pulmonary recommended tissue biopsy for histologic diagnosis.  Patient is however reluctant for further oncologic investigation given her age and social circumstances (lives alone, does not have family for support).  She understands the risk for progression of disease; reviewed treatment options including not only systemic chemotherapy, but immunotherapy and/or targeted therapy based on histology.  Patient expressed her understanding and would like to continue supportive care for now.

## 2023-12-28 NOTE — DISCHARGE NOTE PROVIDER - DETAILS OF MALNUTRITION DIAGNOSIS/DIAGNOSES
This patient has been assessed with a concern for Malnutrition and was treated during this hospitalization for the following Nutrition diagnosis/diagnoses:     -  12/22/2023: Severe protein-calorie malnutrition

## 2023-12-28 NOTE — PROGRESS NOTE ADULT - PROBLEM SELECTOR PLAN 1
Asymptomatic elderly women with negative CT scan.  Suspect non-pacreatic source of elevated pancreatic enzymes   - Continue Trend liver enzymes   - Continue to Trend Lipase.  - Diet renal restriction as tolerated
Asymptomatic elderly women with negative CT scan.  Suspect non-pacreatic source of elevated pancreatic enzymes   Trend liver enzymes   Trend Amylase   Trend Lipase.  Diet renal restriction as tolerated
Asymptomatic elderly women with negative CT scan.  Suspect non-pacreatic source of elevated pancreatic enzymes   - Continue Trend liver enzymes   - Continue to Trend Lipase.  - Diet renal restriction as tolerated
Asymptomatic elderly women with negative CT scan.  Suspect non-pacreatic source of elevated pancreatic enzymes   - Continue Trend liver enzymes   - Continue to Trend Lipase.  - Diet renal restriction as tolerated

## 2023-12-28 NOTE — DISCHARGE NOTE PROVIDER - HOSPITAL COURSE
82 y/o F with PMH HTN, HLD, chronic right plantar foot ulcers/OM recently admitted to [facility] 12/6-12/11 for acute on chronic osteomyelitis of the right foot that required partial amputation of 2nd ray of right foot 12/8/23, c/o nausea, vomiting, found with acute renal failure, likely due to recent vanco course.     Leucocytosis  - improved  - reported mild cough now improved  - CXR none acute  - UA+ for UTI. but no symptoms except one time retention after TOV. now PVR low. DCed bladder scan  - on CTX for now. last dose today.  f/u sensitivity. per renal, no need OP Abx  - amylase normalized. lipase trending down  - monitor cbc and temp. currently afebrile      Bradycardia  - improved  - likley combination of BB and hypothyroid  - BB dced. LT4 dose increased  - DCed tele    Acute renal failure, likely drug induced [ cipro, ARBs]  -Nephro consulted. rec appreciated.   - on IVF  - on TOV since 12/23. DC bladder scan/SC as PVR is low  - dig level--> 1.1. now off dig. unclear why patient is on dig. reported no afib  -CT,  no hydro  - neg UA and Uc on amdisison. + on 12/23. on CTx. f/u sensitivity  -Follow repeat BMP    s/p partial amputation of 2nd ray of right foot 12/8/23  -Due for outpatient podiatry f/u this week regarding dressing . per podiatrist Dr. Magdaleno is aware. she will follow up with her surgeon after DC  -Podiatry consulted. no surgical intervention needed  -PT eval --> home PT  -Afebrile, non toxic appearing    HTN  - Home doses: metoprolol ER BID, losartan  - Losartan was held due to CARLOS  - off metoprolol for bradycardia  - amlodipine was increased form 5 mg to 10 mg on 12/24  - no OH noted  - monitor VS including OH and adjust meds  - patient to see PCP for further management of HTN  - DASH/TLC    HLD  -Continue home simvastatin 40mg qd    Hypothyroidism  - TSH 16  - increased LT4 from 75 to 88 mcg 12/23  - needs repeat TFT in 4-6 weeks    left upper lung mass on CT chest  Emphysema  - + smoking  - pulm consulted likely primary malignancy. needs biopsy OP  - CT a/p with OC: 1.5 cm parainguinal LN. no sign of malignancy  - needs PFT with PET and IR guided Biopsy OP. discussed with patient and Dr. Pineda. per pulm, patient will needs PET scan before Biopsy. Heme cxnoted. discussed with patient. she does not want any further work up now. will give her info about pulm and heme if she changes her mind. denied depression//anxiety/SI    Elevated amylase and lipase  - amylase improved. lipase is improving  - improving  - no ssx of pancreatitis  - CT renal stone hunt: and CT a/p with OC showed normal hepatobiliary system and pancrease.   - GI cx noted: non-pancreatic source. trend    Constipation  - miralax, senna  - dulcolax Supp PRN  - metamucil BID    Severe protein-calorie malnutrition.  - nutrition on board      DVT ppx: HSQ    Code Status - DNR/DNI     Dispo: renal cleared for DC today. seen by PT. home PT> fall precautions advised. per patient her  and his wife helps her for ADL/IADL. will DC home care/Home PT/RN visit    12/28: Updated Dr. Eric [ patient's friend and emergency contact]. Dr. Gustavo Peterson is PCP [ plan of care discussed including needs for follow up appointment BMP, renal follow up ; pul follow up, PET scan, OP biopsy etc.     discharging provider: Leighann Hadley MD. cell: [phone]. call with Qs 84 y/o F with PMH HTN, HLD, chronic right plantar foot ulcers/OM recently admitted to [facility] 12/6-12/11 for acute on chronic osteomyelitis of the right foot that required partial amputation of 2nd ray of right foot 12/8/23, c/o nausea, vomiting, found with acute renal failure, likely due to recent vanco course.     Leucocytosis  - improved  - reported mild cough now improved  - CXR none acute  - UA+ for UTI. but no symptoms except one time retention after TOV. now PVR low. DCed bladder scan  - on CTX for now. last dose today.  f/u sensitivity. per renal, no need OP Abx  - amylase normalized. lipase trending down  - monitor cbc and temp. currently afebrile      Bradycardia  - improved  - likley combination of BB and hypothyroid  - BB dced. LT4 dose increased  - DCed tele    Acute renal failure, likely drug induced [ cipro, ARBs]  -Nephro consulted. rec appreciated.   - on IVF  - on TOV since 12/23. DC bladder scan/SC as PVR is low  - dig level--> 1.1. now off dig. unclear why patient is on dig. reported no afib  -CT,  no hydro  - neg UA and Uc on amdisison. + on 12/23. on CTx. f/u sensitivity  -Follow repeat BMP    s/p partial amputation of 2nd ray of right foot 12/8/23  -Due for outpatient podiatry f/u this week regarding dressing . per podiatrist Dr. Magdaleno is aware. she will follow up with her surgeon after DC  -Podiatry consulted. no surgical intervention needed  -PT eval --> home PT  -Afebrile, non toxic appearing    HTN  - Home doses: metoprolol ER BID, losartan  - Losartan was held due to CARLOS  - off metoprolol for bradycardia  - amlodipine was increased form 5 mg to 10 mg on 12/24  - no OH noted  - monitor VS including OH and adjust meds  - patient to see PCP for further management of HTN  - DASH/TLC    HLD  -Continue home simvastatin 40mg qd    Hypothyroidism  - TSH 16  - increased LT4 from 75 to 88 mcg 12/23  - needs repeat TFT in 4-6 weeks    left upper lung mass on CT chest  Emphysema  - + smoking  - pulm consulted likely primary malignancy. needs biopsy OP  - CT a/p with OC: 1.5 cm parainguinal LN. no sign of malignancy  - needs PFT with PET and IR guided Biopsy OP. discussed with patient and Dr. Pineda. per pulm, patient will needs PET scan before Biopsy. Heme cxnoted. discussed with patient. she does not want any further work up now. will give her info about pulm and heme if she changes her mind. denied depression//anxiety/SI    Elevated amylase and lipase  - amylase improved. lipase is improving  - improving  - no ssx of pancreatitis  - CT renal stone hunt: and CT a/p with OC showed normal hepatobiliary system and pancrease.   - GI cx noted: non-pancreatic source. trend    Constipation  - miralax, senna  - dulcolax Supp PRN  - metamucil BID    Severe protein-calorie malnutrition.  - nutrition on board      DVT ppx: HSQ    Code Status - DNR/DNI     Dispo: renal cleared for DC today. seen by PT. home PT> fall precautions advised. per patient her  and his wife helps her for ADL/IADL. will DC home care/Home PT/RN visit    12/28: Updated Dr. Eric [ patient's friend and emergency contact]. Dr. Gustavo Peterson is PCP [ plan of care discussed including needs for follow up appointment BMP, renal follow up ; pul follow up, PET scan, OP biopsy etc.     discharging provider: Leighann Hadley MD. cell: [phone]. call with Qs

## 2023-12-28 NOTE — PROGRESS NOTE ADULT - PROVIDER SPECIALTY LIST ADULT
Gastroenterology
Nephrology
Pulmonology
Nephrology
Gastroenterology
Gastroenterology
Pulmonology
Hospitalist
Gastroenterology
Hospitalist

## 2023-12-28 NOTE — PROGRESS NOTE ADULT - SUBJECTIVE AND OBJECTIVE BOX
Resting    Vital Signs Last 24 Hrs  T(C): 36.7 (12-28-23 @ 15:49), Max: 36.7 (12-28-23 @ 15:49)  T(F): 98.1 (12-28-23 @ 15:49), Max: 98.1 (12-28-23 @ 15:49)  HR: 87 (12-28-23 @ 15:49) (74 - 87)  BP: 156/74 (12-28-23 @ 15:49) (142/68 - 156/74)  RR: 14 (12-28-23 @ 15:49) (14 - 16)  SpO2: 97% (12-28-23 @ 15:49) (97% - 97%)    s1s2  b/l air entry  soft, ND  no edema                               12.5   5.83  )-----------( 214      ( 28 Dec 2023 09:06 )             37.1     28 Dec 2023 09:06    133    |  99     |  40     ----------------------------<  108    3.8     |  21     |  3.54     Ca    9.5        28 Dec 2023 09:06    TPro  7.2    /  Alb  3.0    /  TBili  0.4    /  DBili  x      /  AST  23     /  ALT  15     /  AlkPhos  99     28 Dec 2023 09:06    LIVER FUNCTIONS - ( 28 Dec 2023 09:06 )  Alb: 3.0 g/dL / Pro: 7.2 g/dL / ALK PHOS: 99 U/L / ALT: 15 U/L / AST: 23 U/L / GGT: x           acetaminophen     Tablet .. 650 milliGRAM(s) Oral every 6 hours PRN  albuterol/ipratropium for Nebulization 3 milliLiter(s) Nebulizer every 6 hours PRN  amLODIPine   Tablet 10 milliGRAM(s) Oral with breakfast  bisacodyl Suppository 10 milliGRAM(s) Rectal daily PRN  cefTRIAXone   IVPB 1000 milliGRAM(s) IV Intermittent every 24 hours  cefTRIAXone   IVPB      heparin   Injectable 5000 Unit(s) SubCutaneous every 8 hours  hydrALAZINE 25 milliGRAM(s) Oral every 6 hours PRN  levothyroxine 88 MICROGram(s) Oral daily  polyethylene glycol 3350 17 Gram(s) Oral every 12 hours  senna 2 Tablet(s) Oral at bedtime  simvastatin 40 milliGRAM(s) Oral at bedtime    A/P:    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, exacerbated by ARB as op (Cr 0.83 - 12/10/23)  CT A/P w/o hydro  CT Chest - emphysema, COLTEN mass  Pulm eval appr, op f/u  Cr is improving   Serologies are negative  Avoid nephrotoxins  No NSAID's, no ACE/ARB as op  Would add Cipro to list of allergies  PMD, renal f/u as op    [phone]       Resting    Vital Signs Last 24 Hrs  T(C): 36.7 (12-28-23 @ 15:49), Max: 36.7 (12-28-23 @ 15:49)  T(F): 98.1 (12-28-23 @ 15:49), Max: 98.1 (12-28-23 @ 15:49)  HR: 87 (12-28-23 @ 15:49) (74 - 87)  BP: 156/74 (12-28-23 @ 15:49) (142/68 - 156/74)  RR: 14 (12-28-23 @ 15:49) (14 - 16)  SpO2: 97% (12-28-23 @ 15:49) (97% - 97%)    s1s2  b/l air entry  soft, ND  no edema                               12.5   5.83  )-----------( 214      ( 28 Dec 2023 09:06 )             37.1     28 Dec 2023 09:06    133    |  99     |  40     ----------------------------<  108    3.8     |  21     |  3.54     Ca    9.5        28 Dec 2023 09:06    TPro  7.2    /  Alb  3.0    /  TBili  0.4    /  DBili  x      /  AST  23     /  ALT  15     /  AlkPhos  99     28 Dec 2023 09:06    LIVER FUNCTIONS - ( 28 Dec 2023 09:06 )  Alb: 3.0 g/dL / Pro: 7.2 g/dL / ALK PHOS: 99 U/L / ALT: 15 U/L / AST: 23 U/L / GGT: x           acetaminophen     Tablet .. 650 milliGRAM(s) Oral every 6 hours PRN  albuterol/ipratropium for Nebulization 3 milliLiter(s) Nebulizer every 6 hours PRN  amLODIPine   Tablet 10 milliGRAM(s) Oral with breakfast  bisacodyl Suppository 10 milliGRAM(s) Rectal daily PRN  cefTRIAXone   IVPB 1000 milliGRAM(s) IV Intermittent every 24 hours  cefTRIAXone   IVPB      heparin   Injectable 5000 Unit(s) SubCutaneous every 8 hours  hydrALAZINE 25 milliGRAM(s) Oral every 6 hours PRN  levothyroxine 88 MICROGram(s) Oral daily  polyethylene glycol 3350 17 Gram(s) Oral every 12 hours  senna 2 Tablet(s) Oral at bedtime  simvastatin 40 milliGRAM(s) Oral at bedtime    A/P:    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, exacerbated by ARB as op (Cr 0.83 - 12/10/23)  CT A/P w/o hydro  CT Chest - emphysema, COLTEN mass  Pulm eval appr, op f/u  Cr is improving   Serologies are negative  Avoid nephrotoxins  No NSAID's, no ACE/ARB as op  Would add Cipro to list of allergies  PMD, renal f/u as op    [phone]

## 2023-12-28 NOTE — PROGRESS NOTE ADULT - SUBJECTIVE AND OBJECTIVE BOX
INTERVAL HPI/OVERNIGHT EVENTS:  Patient seen and examined at bed side. No event over night. Denies abdominal pain, nausea, vomiting, diarrhea, melena or hematochezia.     MEDICATIONS  (STANDING):  amLODIPine   Tablet 10 milliGRAM(s) Oral with breakfast  cefTRIAXone   IVPB 1000 milliGRAM(s) IV Intermittent every 24 hours  cefTRIAXone   IVPB      heparin   Injectable 5000 Unit(s) SubCutaneous every 8 hours  levothyroxine 88 MICROGram(s) Oral daily  polyethylene glycol 3350 17 Gram(s) Oral every 12 hours  senna 2 Tablet(s) Oral at bedtime  simvastatin 40 milliGRAM(s) Oral at bedtime    MEDICATIONS  (PRN):  acetaminophen     Tablet .. 650 milliGRAM(s) Oral every 6 hours PRN Moderate Pain (4 - 6)  albuterol/ipratropium for Nebulization 3 milliLiter(s) Nebulizer every 6 hours PRN Shortness of Breath and/or Wheezing  bisacodyl Suppository 10 milliGRAM(s) Rectal daily PRN Constipation  hydrALAZINE 25 milliGRAM(s) Oral every 6 hours PRN Systolic blood pressure >160 or DBP>100      Allergies    doxycycline (Unknown)  linezolid (Unknown)    Intolerances    Levaquin (Stomach Upset; Nausea)            Vital Signs Last 24 Hrs  T(C): 36.5 (28 Dec 2023 05:35), Max: 37.1 (27 Dec 2023 21:35)  T(F): 97.7 (28 Dec 2023 05:35), Max: 98.7 (27 Dec 2023 21:35)  HR: 74 (28 Dec 2023 05:35) (74 - 79)  BP: 142/68 (28 Dec 2023 05:35) (139/75 - 154/71)  BP(mean): --  RR: 16 (28 Dec 2023 05:35) (16 - 16)  SpO2: 97% (28 Dec 2023 05:35) (97% - 99%)    Parameters below as of 27 Dec 2023 21:35  Patient On (Oxygen Delivery Method): room air        PHYSICAL EXAM:    Constitutional: NAD, well-developed  Neck: No LAD, supple  Respiratory: No SOB No distress   Cardiovascular: S1 and S2  Gastrointestinal:Soft non tender, non distended + BS   Extremities: No peripheral edema, neg clubbing, cyanosis  Neurological: A/O x 3   Skin: No rashes      LABS:                        12.5   5.83  )-----------( 214      ( 28 Dec 2023 09:06 )             37.1     12-28    133<L>  |  99  |  40<H>  ----------------------------<  108<H>  3.8   |  21<L>  |  3.54<H>    Ca    9.5      28 Dec 2023 09:06    TPro  7.2  /  Alb  3.0<L>  /  TBili  0.4  /  DBili  x   /  AST  23  /  ALT  15  /  AlkPhos  99  12-28      Urinalysis Basic - ( 28 Dec 2023 09:06 )    Color: x / Appearance: x / SG: x / pH: x  Gluc: 108 mg/dL / Ketone: x  / Bili: x / Urobili: x   Blood: x / Protein: x / Nitrite: x   Leuk Esterase: x / RBC: x / WBC x   Sq Epi: x / Non Sq Epi: x / Bacteria: x      LIVER FUNCTIONS - ( 28 Dec 2023 09:06 )  Alb: 3.0 g/dL / Pro: 7.2 g/dL / ALK PHOS: 99 U/L / ALT: 15 U/L / AST: 23 U/L / GGT: x             RADIOLOGY & ADDITIONAL TESTS:

## 2023-12-28 NOTE — DISCHARGE NOTE PROVIDER - CARE PROVIDERS DIRECT ADDRESSES
,[email or web address] ,[email or web address],DirectAddress_Unknown,[email or web address] ,[email or web address],DirectAddress_Unknown,DirectAddress_Unknown,[email or web address] ,[email or web address],DirectAddress_Unknown,DirectAddress_Unknown,[email or web address]

## 2023-12-28 NOTE — DISCHARGE NOTE NURSING/CASE MANAGEMENT/SOCIAL WORK - MODE OF TRANSPORTATION
Results   QUANTIFERON TB GOLD   06 Mar 2017 04:12 PM  -   QUANTIFERON TB GOLD: NEGATIVE  Reference Range: NEGATIVE  -   NIL: 0.18  -   TB ANTIGEN NIL: 0.00  -   MITOGEN NIL: 6.40. CRP   06 Mar 2017 04:12 PM  -   C-REACTIVE PROTEIN: 1.5 mg/dl  Reference Range: <1.0  Flag: H.  COMP METABOLIC PNL   06 Mar 2733 04:12 PM  -   SODIUM: 134  Reference Range: 135-145  Flag: L  -   POTASSIUM: 4.0  Reference Range: 3.4-5.1  -   CHLORIDE: 96  Reference Range:   Flag: L  -   CARBON DIOXIDE: 30  Reference Range: 21-32  -   ANION GAP: 12 mmol/L  Reference Range: 10-20  -   GLUCOSE: 84  Reference Range: 65-99  -   BUN: 12  Reference Range: 6-20  -   CREATININE: 0.46  Reference Range: 0.51-0.95  Flag: L  -   GFR EST.  AMER: >90  -   GFR EST. NONAFRI AMER: >90  -   BUN/CREATININE RATIO: 26   Reference Range: 7-25  Flag: H  -   BILIRUBIN TOTAL: 0.3 mg/dl  Reference Range: 0.2-1.0  -   GOT/AST: 26 Units/L  Reference Range: <38  -   ALKALINE PHOSPHATASE: 90  Reference Range:   -   ALBUMIN: 3.4  Reference Range: 3.6-5.1  Flag: L  -   TOTAL PROTEIN: 7.2  Reference Range: 6.4-8.2  -   GLOBULIN (CALCULATED): 3.8  Reference Range: 2.0-4.0  -   A/G RATIO: 0.9   Reference Range: 1.0-2.4  Flag: L  -   CALCIUM: 9.5  Reference Range: 8.4-10.2  -   GPT/ALT: 27 Units/L  Reference Range: <79  -   FASTING STATUS: UNK.   CBC WITH AUTOMATED DIFFERENTIAL   06 Mar 2017 04:12 PM  -   WHITE BLOOD COUNT: 8.9  Reference Range: 4.2-11.0  -   RED CELL COUNT: 3.86  Reference Range: 4.00-5.20  Flag: L  -   HEMOGLOBIN: 12.1  Reference Range: 12.0-15.5  -   HEMATOCRIT: 35.8  Reference Range: 36.0-46.5  Flag: L  -   MEAN CORPUSCULAR VOLUME: 92.7  Reference Range: 78.0-100.0  -   MEAN CORPUSCULAR HEMOGLOBIN: 31.3  Reference Range: 26.0-34.0  -   MEAN CORPUSCULAR HGB CONC: 33.8  Reference Range: 32.0-36.5  -   RDW-CV: 14.0 %  Reference Range: 11.0-15.0  -   PLATELET COUNT: 862  Reference Range: 140-450  -   DEMOND%: 60 %  -   LYM%: 27 %  -   MON%: 11 %  -   EOS%: 2 %  -   BASO%: 0 %  -   DEMOND ABS: 5.3 K/mcL  Reference Range: 1.8-7.7  -   LYM ABS: 2.4 K/mcL  Reference Range: 1.0-4.0  -   MON ABS: 1.0 K/mcL  Reference Range: 0.3-0.9  Flag: H  -   EOS ABS: 0.2 K/mcL  Reference Range: 0.1-0.5  -   BASO ABS: 0.0 K/mcL  Reference Range: 0.0-0.3  -   DIFF TYPE: AUTOMATED DIFFERENTIAL. HEMOGLOBIN A1C GLYCOSYLATED   06 Mar 2017 04:13 PM  -   HEMOGLOBIN A1C GLYH: 5.1 %  Reference Range: 4.5-5.6. Discussed   Dear Ada Contreras: Your lab tests are enclosed. The CRP test for inflammtion is slightly elevated. The white blood cells, hemoglobin, platelets, calcium, glucose, kidney, and liver tests are normal.  The test for diabetes hemoglobin A1c is normal.  The Quantiferon test for TB is now negative. Dr. Kirit Bardales. Ambulatory

## 2023-12-28 NOTE — PROGRESS NOTE ADULT - ASSESSMENT
84 y/o F with PMH HTN, HLD, chronic right plantar foot ulcers/OM recently admitted to [facility] 12/6-12/11 for acute on chronic osteomyelitis of the right foot that required partial amputation of 2nd ray of right foot 12/8/23, c/o nausea, vomiting, found with acute renal failure, likely due to recent vanco course.     Leucocytosis  - improved  - reported mild cough now improved  - CXR none acute  - UA+ for UTI. but no symptoms except one time retention after TOV. now PVR low. DCed bladder scan  - on CTX for now. last dose today.  f/u sensitivity. per renal, no need OP Abx  - amylase normalized. lipase trending down  - monitor cbc and temp. currently afebrile      Bradycardia  - improved  - likley combination of BB and hypothyroid  - BB dced. LT4 dose increased  - DCed tele    Acute renal failure, likely drug induced [ cipro, ARBs]  -Nephro consulted. rec appreciated.   - on IVF  - on TOV since 12/23. DC bladder scan/SC as PVR is low  - dig level--> 1.1. now off dig. unclear why patient is on dig. reported no afib  -CT,  no hydro  - neg UA and Uc on amdisison. + on 12/23. on CTx. f/u sensitivity  -Follow repeat BMP    s/p partial amputation of 2nd ray of right foot 12/8/23  -Due for outpatient podiatry f/u this week regarding dressing . per podiatrist Dr. Magdaleno is aware. she will follow up with her surgeon after DC  -Podiatry consulted. no surgical intervention needed  -PT eval --> home PT  -Afebrile, non toxic appearing    HTN  - Home doses: metoprolol ER BID, losartan  - Losartan was held due to CARLOS  - off metoprolol for bradycardia  - amlodipine was increased form 5 mg to 10 mg on 12/24  - no OH noted  - monitor VS including OH and adjust meds  - patient to see PCP for further management of HTN  - DASH/TLC    HLD  -Continue home simvastatin 40mg qd    Hypothyroidism  - TSH 16  - increased LT4 from 75 to 88 mcg 12/23  - needs repeat TFT in 4-6 weeks    left upper lung mass on CT chest  Emphysema  - + smoking  - pulm consulted likely primary malignancy. needs biopsy OP  - CT a/p with OC: 1.5 cm parainguinal LN. no sign of malignancy  - needs PFT with PET and IR guided Biopsy OP. discussed with patient and Dr. Pineda. per pulm, patient will needs PET scan before Biopsy. Heme cxnoted. discussed with patient. she does not want any further work up now. will give her info about pulm and heme if she changes her mind. denied depression//anxiety/SI    Elevated amylase and lipase  - amylase improved. lipase is improving  - improving  - no ssx of pancreatitis  - CT renal stone hunt: and CT a/p with OC showed normal hepatobiliary system and pancrease.   - GI cx noted: non-pancreatic source. trend    Constipation  - miralax, senna  - dulcolax Supp PRN  - metamucil BID    Severe protein-calorie malnutrition.  - nutrition on board      DVT ppx: HSQ    Code Status - DNR/DNI     Dispo: renal cleared for DC today. seen by PT. home PT> fall precautions advised. per patient her  and his wife helps her for ADL/IADL. will DC home care/Home PT/RN visit    12/28: Updated Dr. Eric [ patient's friend and emergency contact]. Dr. Gustavo Peterson is PCP [ plan of care discussed including needs for follow up appointment BMP, renal follow up ; pul follow up, PET scan, OP biopsy etc.      84 y/o F with PMH HTN, HLD, chronic right plantar foot ulcers/OM recently admitted to [facility] 12/6-12/11 for acute on chronic osteomyelitis of the right foot that required partial amputation of 2nd ray of right foot 12/8/23, c/o nausea, vomiting, found with acute renal failure, likely due to recent vanco course.     Leucocytosis  - improved  - reported mild cough now improved  - CXR none acute  - UA+ for UTI. but no symptoms except one time retention after TOV. now PVR low. DCed bladder scan  - on CTX for now. last dose today.  f/u sensitivity. per renal, no need OP Abx  - amylase normalized. lipase trending down  - monitor cbc and temp. currently afebrile      Bradycardia  - improved  - likley combination of BB and hypothyroid  - BB dced. LT4 dose increased  - DCed tele    Acute renal failure, likely drug induced [ cipro, ARBs]  -Nephro consulted. rec appreciated.   - on IVF  - on TOV since 12/23. DC bladder scan/SC as PVR is low  - dig level--> 1.1. now off dig. unclear why patient is on dig. reported no afib  -CT,  no hydro  - neg UA and Uc on amdisison. + on 12/23. on CTx. f/u sensitivity  -Follow repeat BMP    s/p partial amputation of 2nd ray of right foot 12/8/23  -Due for outpatient podiatry f/u this week regarding dressing . per podiatrist Dr. Magdaleno is aware. she will follow up with her surgeon after DC  -Podiatry consulted. no surgical intervention needed  -PT eval --> home PT  -Afebrile, non toxic appearing    HTN  - Home doses: metoprolol ER BID, losartan  - Losartan was held due to CARLOS  - off metoprolol for bradycardia  - amlodipine was increased form 5 mg to 10 mg on 12/24  - no OH noted  - monitor VS including OH and adjust meds  - patient to see PCP for further management of HTN  - DASH/TLC    HLD  -Continue home simvastatin 40mg qd    Hypothyroidism  - TSH 16  - increased LT4 from 75 to 88 mcg 12/23  - needs repeat TFT in 4-6 weeks    left upper lung mass on CT chest  Emphysema  - + smoking  - pulm consulted likely primary malignancy. needs biopsy OP  - CT a/p with OC: 1.5 cm parainguinal LN. no sign of malignancy  - needs PFT with PET and IR guided Biopsy OP. discussed with patient and Dr. Pineda. per pulm, patient will needs PET scan before Biopsy. Heme cxnoted. discussed with patient. she does not want any further work up now. will give her info about pulm and heme if she changes her mind. denied depression//anxiety/SI    Elevated amylase and lipase  - amylase improved. lipase is improving  - improving  - no ssx of pancreatitis  - CT renal stone hunt: and CT a/p with OC showed normal hepatobiliary system and pancrease.   - GI cx noted: non-pancreatic source. trend    Constipation  - miralax, senna  - dulcolax Supp PRN  - metamucil BID    Severe protein-calorie malnutrition.  - nutrition on board      DVT ppx: HSQ    Code Status - DNR/DNI     Dispo: renal cleared for DC today. seen by PT. home PT> fall precautions advised. per patient her  and his wife helps her for ADL/IADL. will DC home care/Home PT/RN visit    12/28: Updated Dr. Eric [ patient's friend and emergency contact]. Dr. Gustavo Pteerson is PCP [ plan of care discussed including needs for follow up appointment BMP, renal follow up ; pul follow up, PET scan, OP biopsy etc.

## 2023-12-28 NOTE — DISCHARGE NOTE PROVIDER - PROVIDER TOKENS
PROVIDER:[TOKEN:[[id number]]],FREE:[LAST:[foot surgeon],PHONE:[(   )    -],FAX:[(   )    -]],PROVIDER:[TOKEN:[[id number]]] PROVIDER:[TOKEN:[[id number]]],PROVIDER:[TOKEN:[[id number]]],FREE:[LAST:[foot surgeon],PHONE:[(   )    -],FAX:[(   )    -]],PROVIDER:[TOKEN:[[id number]]],PROVIDER:[TOKEN:[[id number]]] PROVIDER:[TOKEN:[[id number]]],PROVIDER:[TOKEN:[[id number]]],FREE:[LAST:[foot surgeon],PHONE:[(   )    -],FAX:[(   )    -]],PROVIDER:[TOKEN:[[id number]]],PROVIDER:[TOKEN:[[id number]]]

## 2023-12-28 NOTE — DISCHARGE NOTE NURSING/CASE MANAGEMENT/SOCIAL WORK - NSDCPEFALRISK_GEN_ALL_CORE
For information on Fall & Injury Prevention, visit: [email or web address] OR  [email or web address] OR  https://www.cdc.gov/steadi/patient.html For information on Fall & Injury Prevention, visit: [email or web address]-[email or web address]-mobility OR  [email or web address]://[email or web address] OR  https://www.cdc.gov/steadi/patient.html

## 2023-12-28 NOTE — PROGRESS NOTE ADULT - REASON FOR ADMISSION
acute renal failure

## 2023-12-28 NOTE — CONSULT NOTE ADULT - SUBJECTIVE AND OBJECTIVE BOX
KASSIE LOPEZ,  83y Female  MRN: [id number]  ATTENDING: Dr. Reginaldo Rivers      HPI:  83F, PMH HTN, HLD, chronic right plantar foot ulcers/osteomyelitis, requiring partial amputation right foot 12/8/2023.  Patient has completed a long course of multiple antibiotic lines including vancomycin, Zosyn, meropenem, and recently transition to oral Augmentin, per ID recommendation.  Hospitalized between 12/6 and 12/11 at [facility].  ED course complicated with acute renal failure.  Patient readmitted at [facility] with GI symptoms and failure to thrive.  CT C/A/P consistent with a 4.6 cm size left upper lobe mass concerning for primary pulmonary malignancy.  No evidence of intra-abdominal or pelvic malignancy.  Oncology consulted in light of above.     PAST MEDICAL & SURGICAL HISTORY:  HTN (hypertension)  Hyperlipidemia  Hypothyroid  Paroxysmal SVT (supraventricular tachycardia)  Thyroid cyst  Back problem-s/p back surgery  S/P thyroid surgery  S/P hernia surgery  S/P rotator cuff surgery    MEDICATION:  amLODIPine   Tablet 10 milliGRAM(s) Oral with breakfast  cefTRIAXone   IVPB 1000 milliGRAM(s) IV Intermittent every 24 hours  cefTRIAXone   IVPB      heparin   Injectable 5000 Unit(s) SubCutaneous every 8 hours  levothyroxine 88 MICROGram(s) Oral daily  polyethylene glycol 3350 17 Gram(s) Oral every 12 hours  senna 2 Tablet(s) Oral at bedtime  simvastatin 40 milliGRAM(s) Oral at bedtime    ALLERGIES:  Levaquin (Stomach Upset; Nausea)  doxycycline (Unknown)  linezolid (Unknown)    FAMILY HISTORY:  Reviewed, non-contributory: [x ]     SOCIAL HISTORY:  Tobacco: YES [ ]  ; NO [ ]; Former smoker [ ]  Alcohol:   YES [ ]  ; NO [ ]; Social alcohol user [ ]  Occupation/ marital status/ children:    REVIEW SYSTEMS:  Constitutional: no fever, chills, night sweats, no weight loss  HEENT: denies visual changes; no oral ulcers, dysphagia, no epistaxis;   Respiratory: no dyspnea , wheezing, cough, hemoptysis  Cardiovascular: denies acute chest pain, palpitations  GI: no loss of appetite, dark stools, or abdominal tenderness / pain; no change in bowel habits.  Musculoskeletal: no new back pain, bone/ joint pain ,no extremity swelling  Integumentary: denies pruritus; no skin rash, bruises, no  suspicious skin lesions  Neurologic: denies peripheral numbness, no dizziness, no gait problems.  Heme: no reported easy bruisability; no lymph node enlargement    VITALS:  T(C): 36.5, Max: 37.1 (12-27-23 @ 21:35)  T(F): 97.7, Max: 98.7 (12-27-23 @ 21:35)  HR: 74 (74 - 79)  BP: 142/68 (139/75 - 154/71)  SpO2: 97% (97% - 99%)    PHYSICAL EXAM:  Constitutional: alert, well developed  HEENT: normocephalic, anicteric sclerae, no mucositis or thrush  Respiratory: bilateral clear to auscultation anteriorly  Cardiovascular : S1, S2 regular, rhythmic, no murmurs, gallops or rubs  Abdomen: soft, distended, + normoactive BS, no palpable HS- megaly  Extremities: no tenderness;  -c/c/e, pulses equal bilaterally  Integumentary: no rashes, scars, or lesions suggestive of malignancy  Neurologic: no gross focal deficits    LABS:  (12-28) WBC: 5.83 K/uL,Hemoglobin: 12.5 g/dL, Hematocrit: 37.1 %,  Platelet: 214 K/uL  (12-28) Na: 133 mmol/L ; K: 3.8 mmol/L ; BUN: 40 mg/dL ; Cr: 3.54 mg/dL.    RADIOLOGY:  ACC: [id number] EXAM:  CT CHEST   ORDERED BY: DOYLE FERRARI     PROCEDURE DATE:  12/21/2023          INTERPRETATION:  CLINICAL INFORMATION: Lung mass.    COMPARISON: None.    CONTRAST/COMPLICATIONS:  IV Contrast: NONE  Oral Contrast: NONE  Complications: None reported at time of study completion    PROCEDURE:  CT of the Chest was performed.  Sagittal and coronal reformats were performed.    FINDINGS:    LUNGS AND AIRWAYS: Patent central airways.  4.6 x 3 cm sized spiculated   mass left upper lobe concerning for a primary pulmonary malignancy.   PET/CT and biopsy can be considered for further evaluation. Pulmonary   emphysema. Suspect bronchial impaction in the right lung base.  PLEURA: No pleural effusion.  MEDIASTINUM AND RAFAEL: No lymphadenopathy.  VESSELS: Atherosclerotic calcification including the coronary arteries.  HEART: Heart size is normal. No pericardial effusion.  CHEST WALL AND LOWER NECK: Within normal limits.  VISUALIZED UPPER ABDOMEN: Within normal limits.  BONES: Degenerative changes    IMPRESSION:  Approximately 4.6 cm sized left upper lobe mass concerning for a primary   pulmonary malignancy.  Pulmonary emphysema.          ACC: [id number] EXAM:  CT ABDOMEN AND PELVIS OC   ORDERED BY: REGINALDO RIVERS     PROCEDURE DATE:  12/22/2023          INTERPRETATION:  CLINICAL INFORMATION: 4.6 cm left upper lobe lung mass   concerning for malignancy.    COMPARISON: CT abdomen/pelvis 12/21/2023.    CONTRAST/COMPLICATIONS:  IV Contrast: NONE  Oral Contrast: Omnipaque 300  Complications: None reported at time of study completion    PROCEDURE:  CT of the Abdomen and Pelvis was performed.  Sagittal and coronal reformats were performed.    FINDINGS:  LOWER CHEST: Emphysematous changes. Right posterior pleural thickening,   with focal calcification. Reference should be made to the report of CT   examination the chest performed 12/21/2023.    LIVER: Within normal limits.  BILE DUCTS: Normal caliber.  GALLBLADDER: Within normal limits.  SPLEEN: Within normal limits.  PANCREAS: Within normal limits.  ADRENALS: Within normal limits.  KIDNEYS/URETERS: Within normal limits.    BLADDER: Verduzco catheter.  REPRODUCTIVE ORGANS: Uterus and adnexa within normal limits.    BOWEL: Fecal material scattered throughout the colon. No evidence for   mechanical bowel obstruction. No colonic wall thickening. Appendix is   normal.  PERITONEUM: No free intraperitoneal air or fluid.  VESSELS: Atherosclerotic calcifications.  RETROPERITONEUM/LYMPH NODES: Right inguinal lymph nodes measure up to 1.5   cm.  ABDOMINAL WALL: Stranding of the subcutaneous fat of the lower anterior   pelvis and vulvar region.  BONES: Bilateral screws traverse the upper sacrum. Ankylosis L2/L3   vertebral bodies. Mild loss of height of T12. Degenerative changes.    IMPRESSION: No CT evidence for intra-abdominal or pelvic malignancy on   this noncontrast CT evaluation.               KASSIE LOPEZ,  83y Female  MRN: [id number]  ATTENDING: Dr. Reginaldo Rivers      HPI:  83F, PMH HTN, HLD, chronic right plantar foot ulcers/osteomyelitis, requiring partial amputation right foot 12/8/2023.  Patient has completed a long course of multiple antibiotic lines including vancomycin, Zosyn, meropenem, and recently transition to oral Augmentin, per ID recommendation.  Hospitalized between 12/6 and 12/11 at [facility].  ED course complicated with acute renal failure.  Patient readmitted at [facility] with GI symptoms and failure to thrive.  CT C/A/P consistent with a 4.6 cm size left upper lobe mass concerning for primary pulmonary malignancy.  No evidence of intra-abdominal or pelvic malignancy.  Oncology consulted in light of above.     PAST MEDICAL & SURGICAL HISTORY:  HTN (hypertension)  Hyperlipidemia  Hypothyroid  Paroxysmal SVT (supraventricular tachycardia)  Thyroid cyst  Back problem-s/p back surgery  S/P thyroid surgery  S/P hernia surgery  S/P rotator cuff surgery    MEDICATION:  amLODIPine   Tablet 10 milliGRAM(s) Oral with breakfast  cefTRIAXone   IVPB 1000 milliGRAM(s) IV Intermittent every 24 hours  cefTRIAXone   IVPB      heparin   Injectable 5000 Unit(s) SubCutaneous every 8 hours  levothyroxine 88 MICROGram(s) Oral daily  polyethylene glycol 3350 17 Gram(s) Oral every 12 hours  senna 2 Tablet(s) Oral at bedtime  simvastatin 40 milliGRAM(s) Oral at bedtime    ALLERGIES:  Levaquin (Stomach Upset; Nausea)  doxycycline (Unknown)  linezolid (Unknown)    FAMILY HISTORY:  Reviewed, non-contributory: [x ]     SOCIAL HISTORY:  Tobacco: YES [ ]  ; NO [ ]; Former smoker [ ]  Alcohol:   YES [ ]  ; NO [ ]; Social alcohol user [ ]  Occupation/ marital status/ children:    REVIEW SYSTEMS:  Constitutional: no fever, chills, night sweats, no weight loss  HEENT: denies visual changes; no oral ulcers, dysphagia, no epistaxis;   Respiratory: no dyspnea , wheezing, cough, hemoptysis  Cardiovascular: denies acute chest pain, palpitations  GI: no loss of appetite, dark stools, or abdominal tenderness / pain; no change in bowel habits.  Musculoskeletal: no new back pain, bone/ joint pain ,no extremity swelling  Integumentary: denies pruritus; no skin rash, bruises, no  suspicious skin lesions  Neurologic: denies peripheral numbness, no dizziness, no gait problems.  Heme: no reported easy bruisability; no lymph node enlargement    VITALS:  T(C): 36.5, Max: 37.1 (12-27-23 @ 21:35)  T(F): 97.7, Max: 98.7 (12-27-23 @ 21:35)  HR: 74 (74 - 79)  BP: 142/68 (139/75 - 154/71)  SpO2: 97% (97% - 99%)    PHYSICAL EXAM:  Constitutional: alert, well developed  HEENT: normocephalic, anicteric sclerae, no mucositis or thrush  Respiratory: bilateral clear to auscultation anteriorly  Cardiovascular : S1, S2 regular, rhythmic, no murmurs, gallops or rubs  Abdomen: soft, distended, + normoactive BS, no palpable HS- megaly  Extremities: no tenderness;  -c/c/e, pulses equal bilaterally  Integumentary: no rashes, scars, or lesions suggestive of malignancy  Neurologic: no gross focal deficits    LABS:  (12-28) WBC: 5.83 K/uL,Hemoglobin: 12.5 g/dL, Hematocrit: 37.1 %,  Platelet: 214 K/uL  (12-28) Na: 133 mmol/L ; K: 3.8 mmol/L ; BUN: 40 mg/dL ; Cr: 3.54 mg/dL.    RADIOLOGY:  ACC: [id number] EXAM:  CT CHEST   ORDERED BY: DOYLE FERRARI     PROCEDURE DATE:  12/21/2023          INTERPRETATION:  CLINICAL INFORMATION: Lung mass.    COMPARISON: None.    CONTRAST/COMPLICATIONS:  IV Contrast: NONE  Oral Contrast: NONE  Complications: None reported at time of study completion    PROCEDURE:  CT of the Chest was performed.  Sagittal and coronal reformats were performed.    FINDINGS:    LUNGS AND AIRWAYS: Patent central airways.  4.6 x 3 cm sized spiculated   mass left upper lobe concerning for a primary pulmonary malignancy.   PET/CT and biopsy can be considered for further evaluation. Pulmonary   emphysema. Suspect bronchial impaction in the right lung base.  PLEURA: No pleural effusion.  MEDIASTINUM AND RAFAEL: No lymphadenopathy.  VESSELS: Atherosclerotic calcification including the coronary arteries.  HEART: Heart size is normal. No pericardial effusion.  CHEST WALL AND LOWER NECK: Within normal limits.  VISUALIZED UPPER ABDOMEN: Within normal limits.  BONES: Degenerative changes    IMPRESSION:  Approximately 4.6 cm sized left upper lobe mass concerning for a primary   pulmonary malignancy.  Pulmonary emphysema.          ACC: [id number] EXAM:  CT ABDOMEN AND PELVIS OC   ORDERED BY: REGINALDO RIVERS     PROCEDURE DATE:  12/22/2023          INTERPRETATION:  CLINICAL INFORMATION: 4.6 cm left upper lobe lung mass   concerning for malignancy.    COMPARISON: CT abdomen/pelvis 12/21/2023.    CONTRAST/COMPLICATIONS:  IV Contrast: NONE  Oral Contrast: Omnipaque 300  Complications: None reported at time of study completion    PROCEDURE:  CT of the Abdomen and Pelvis was performed.  Sagittal and coronal reformats were performed.    FINDINGS:  LOWER CHEST: Emphysematous changes. Right posterior pleural thickening,   with focal calcification. Reference should be made to the report of CT   examination the chest performed 12/21/2023.    LIVER: Within normal limits.  BILE DUCTS: Normal caliber.  GALLBLADDER: Within normal limits.  SPLEEN: Within normal limits.  PANCREAS: Within normal limits.  ADRENALS: Within normal limits.  KIDNEYS/URETERS: Within normal limits.    BLADDER: Verduzco catheter.  REPRODUCTIVE ORGANS: Uterus and adnexa within normal limits.    BOWEL: Fecal material scattered throughout the colon. No evidence for   mechanical bowel obstruction. No colonic wall thickening. Appendix is   normal.  PERITONEUM: No free intraperitoneal air or fluid.  VESSELS: Atherosclerotic calcifications.  RETROPERITONEUM/LYMPH NODES: Right inguinal lymph nodes measure up to 1.5   cm.  ABDOMINAL WALL: Stranding of the subcutaneous fat of the lower anterior   pelvis and vulvar region.  BONES: Bilateral screws traverse the upper sacrum. Ankylosis L2/L3   vertebral bodies. Mild loss of height of T12. Degenerative changes.    IMPRESSION: No CT evidence for intra-abdominal or pelvic malignancy on   this noncontrast CT evaluation.

## 2023-12-28 NOTE — DISCHARGE NOTE NURSING/CASE MANAGEMENT/SOCIAL WORK - PATIENT PORTAL LINK FT
You can access the FollowMyHealth Patient Portal offered by [facility] by registering at the following website: [email or web address]. By joining [employer]’s FollowMyHealth portal, you will also be able to view your health information using other applications (apps) compatible with our system. You can access the FollowMyHealth Patient Portal offered by [facility] by registering at the following website: [email or web address]. By joining [employer]’s FollowMyHealth portal, you will also be able to view your health information using other applications (apps) compatible with our system.

## 2023-12-28 NOTE — PROGRESS NOTE ADULT - ASSESSMENT
84 y/o F with PMH HTN, HLD, chronic right plantar foot ulcers/OM recently admitted to [facility] 12/6-12/11 for acute on chronic osteomyelitis of the right foot that required partial amputation of 2nd ray of right foot 12/8/23.     Currently, tolerating po intake renal restriction diet, no NDV, no abd pain, last BM 12/26 soft brown.     GI consulted for elevated amylase/lipase.     Lipase improving  Amylase normalized     82 y/o F with PMH HTN, HLD, chronic right plantar foot ulcers/OM recently admitted to [facility] 12/6-12/11 for acute on chronic osteomyelitis of the right foot that required partial amputation of 2nd ray of right foot 12/8/23.     Currently, tolerating po intake renal restriction diet, no NDV, no abd pain, last BM 12/26 soft brown.     GI consulted for elevated amylase/lipase.     Lipase improving  Amylase normalized

## 2023-12-28 NOTE — DISCHARGE NOTE PROVIDER - NPI NUMBER (FOR [employer] USE ONLY) :
[[id number]],[[id number]],[[id number] [[id number]],[UNKNOWN],[[id number] [[id number]],[UNKNOWN],[[id number] [[id number]],[[id number]],[[id number]

## 2023-12-28 NOTE — PROGRESS NOTE ADULT - NUTRITIONAL ASSESSMENT
This patient has been assessed with a concern for Malnutrition and has been determined to have a diagnosis/diagnoses of Severe protein-calorie malnutrition.    This patient is being managed with:   Diet Renal Restrictions-  For patients receiving Renal Replacement - No Protein Restr No Conc K No Conc Phos Low Sodium  Supplement Feeding Modality:  Oral  Nepro Cans or Servings Per Day:  1       Frequency:  Two Times a day  Entered: Dec 22 2023 10:19AM    Diet Renal Restrictions-  For patients receiving Renal Replacement - No Protein Restr No Conc K No Conc Phos Low Sodium  Entered: Dec 21 2023  1:43PM    The following pending diet order is being considered for treatment of Severe protein-calorie malnutrition:null
This patient has been assessed with a concern for Malnutrition and has been determined to have a diagnosis/diagnoses of Severe protein-calorie malnutrition.    This patient is being managed with:   Diet Renal Restrictions-  For patients receiving Renal Replacement - No Protein Restr No Conc K No Conc Phos Low Sodium  Supplement Feeding Modality:  Oral  Nepro Cans or Servings Per Day:  1       Frequency:  Two Times a day  Entered: Dec 22 2023 10:19AM    Diet Renal Restrictions-  For patients receiving Renal Replacement - No Protein Restr No Conc K No Conc Phos Low Sodium  Entered: Dec 21 2023  1:43PM    The following pending diet order is being considered for treatment of Severe protein-calorie malnutrition:null
This patient has been assessed with a concern for Malnutrition and has been determined to have a diagnosis/diagnoses of Severe protein-calorie malnutrition.    This patient is being managed with:   Diet Renal Restrictions-  For patients receiving Renal Replacement - No Protein Restr No Conc K No Conc Phos Low Sodium  Entered: Dec 23 2023  7:31AM    Diet Renal Restrictions-  For patients receiving Renal Replacement - No Protein Restr No Conc K No Conc Phos Low Sodium  Supplement Feeding Modality:  Oral  Nepro Cans or Servings Per Day:  1       Frequency:  Two Times a day  Entered: Dec 22 2023 10:19AM    The following pending diet order is being considered for treatment of Severe protein-calorie malnutrition:null

## 2023-12-28 NOTE — DISCHARGE NOTE NURSING/CASE MANAGEMENT/SOCIAL WORK - NSDCFUADDAPPT_GEN_ALL_CORE_FT
Call pcp office for Follow up appointment with Dr. Gustavo Peterson [phone] within a week Call pcp office for Follow up appointment with Dr. Gustavo Peterson [phone] within a week Follow up appointment with Dr. Gustavo Peterson [phone] on 1/4/24 at 10:20am  [address] [facility] Follow up appointment with Dr. Gustavo Peterson [phone] on 1/4/24 at 10:20am  [address] [facility]

## 2023-12-28 NOTE — PROGRESS NOTE ADULT - SUBJECTIVE AND OBJECTIVE BOX
Medicine Progress Note    Patient is a 83y old  Female who presents with a chief complaint of acute renal failure (28 Dec 2023 14:26)      SUBJECTIVE / OVERNIGHT EVENTS:  seen and examined  Chart reviewed  No overnight events  Limb weakness improving with therapy  BM+  No pain  No complaints  renal cleared for DC today. patient agreed. reported she has help at home.     ADDITIONAL REVIEW OF SYSTEMS:  denied fever/chills/CP/SOB/cough/palpitation/dizziness/abdominal pian/nausea/vomiting/diarrhoea/constipation/dysuria/leg or calf pain/headaches.all other ROS neg    MEDICATIONS  (STANDING):  amLODIPine   Tablet 10 milliGRAM(s) Oral with breakfast  cefTRIAXone   IVPB 1000 milliGRAM(s) IV Intermittent every 24 hours  cefTRIAXone   IVPB      heparin   Injectable 5000 Unit(s) SubCutaneous every 8 hours  levothyroxine 88 MICROGram(s) Oral daily  polyethylene glycol 3350 17 Gram(s) Oral every 12 hours  senna 2 Tablet(s) Oral at bedtime  simvastatin 40 milliGRAM(s) Oral at bedtime    MEDICATIONS  (PRN):  acetaminophen     Tablet .. 650 milliGRAM(s) Oral every 6 hours PRN Moderate Pain (4 - 6)  albuterol/ipratropium for Nebulization 3 milliLiter(s) Nebulizer every 6 hours PRN Shortness of Breath and/or Wheezing  bisacodyl Suppository 10 milliGRAM(s) Rectal daily PRN Constipation  hydrALAZINE 25 milliGRAM(s) Oral every 6 hours PRN Systolic blood pressure >160 or DBP>100    CAPILLARY BLOOD GLUCOSE        I&O's Summary    27 Dec 2023 07:01  -  28 Dec 2023 07:00  --------------------------------------------------------  IN: 50 mL / OUT: 0 mL / NET: 50 mL        PHYSICAL EXAM:  Vital Signs Last 24 Hrs  T(C): 36.5 (28 Dec 2023 05:35), Max: 37.1 (27 Dec 2023 21:35)  T(F): 97.7 (28 Dec 2023 05:35), Max: 98.7 (27 Dec 2023 21:35)  HR: 74 (28 Dec 2023 05:35) (74 - 79)  BP: 142/68 (28 Dec 2023 05:35) (139/75 - 154/71)  BP(mean): --  RR: 16 (28 Dec 2023 05:35) (16 - 16)  SpO2: 97% (28 Dec 2023 05:35) (97% - 99%)    Parameters below as of 27 Dec 2023 21:35  Patient On (Oxygen Delivery Method): room air    GENERAL: Not in distress. Alert    HEENT: clear conjuctiva, MMM. no pallor or icterus  CARDIOVASCULAR: RRR S1, S2. No murmur/rubs/gallop  LUNGS: BLAE+, no rales, no wheezing, no rhonchi.    ABDOMEN: ND. Soft,  NT, no guarding / rebound / rigidity. BS normoactive  BACK: No spine tenderness.  EXTREMITIES: no edema. no leg or calf TP.  SKIN: warm and dry  PSYCHIATRIC: Calm.  No agitation.  CNS: AAO*4. moves limbs, follows commands. no FND    LABS:                        12.5   5.83  )-----------( 214      ( 28 Dec 2023 09:06 )             37.1     12-28    133<L>  |  99  |  40<H>  ----------------------------<  108<H>  3.8   |  21<L>  |  3.54<H>    Ca    9.5      28 Dec 2023 09:06    TPro  7.2  /  Alb  3.0<L>  /  TBili  0.4  /  DBili  x   /  AST  23  /  ALT  15  /  AlkPhos  99  12-28          Urinalysis Basic - ( 28 Dec 2023 09:06 )    Color: x / Appearance: x / SG: x / pH: x  Gluc: 108 mg/dL / Ketone: x  / Bili: x / Urobili: x   Blood: x / Protein: x / Nitrite: x   Leuk Esterase: x / RBC: x / WBC x   Sq Epi: x / Non Sq Epi: x / Bacteria: x        Culture - Urine (collected 25 Dec 2023 21:15)  Source: Clean Catch Clean Catch (Midstream)  Preliminary Report (27 Dec 2023 10:35):    >100,000 CFU/ml Escherichia coli          RADIOLOGY & ADDITIONAL TESTS:  Imaging from Last 24 Hours:    Electrocardiogram/QTc Interval:    COORDINATION OF CARE:  Care Discussed with Consultants/Other Providers:

## 2023-12-28 NOTE — DISCHARGE NOTE PROVIDER - NSDCFUSCHEDAPPT_GEN_ALL_CORE_FT
[facility]  [facility] [address] [facility]  Scheduled Appointment: 01/04/2024     [facility]  [facility] [address] [facility]  Scheduled Appointment: 01/04/2024

## 2024-01-04 ENCOUNTER — APPOINTMENT (OUTPATIENT)
Dept: FAMILY MEDICINE | Facility: CLINIC | Age: 84
End: 2024-01-04
Payer: MEDICARE

## 2024-01-04 VITALS
RESPIRATION RATE: 16 BRPM | OXYGEN SATURATION: 96 % | HEIGHT: 70 IN | WEIGHT: 135 LBS | DIASTOLIC BLOOD PRESSURE: 68 MMHG | SYSTOLIC BLOOD PRESSURE: 140 MMHG | HEART RATE: 73 BPM | TEMPERATURE: 97.3 F | BODY MASS INDEX: 19.33 KG/M2

## 2024-01-04 DIAGNOSIS — N17.9 ACUTE KIDNEY FAILURE, UNSPECIFIED: ICD-10-CM

## 2024-01-04 PROCEDURE — 99495 TRANSJ CARE MGMT MOD F2F 14D: CPT

## 2024-01-04 RX ORDER — ALBUTEROL SULFATE 90 UG/1
108 (90 BASE) POWDER, METERED RESPIRATORY (INHALATION)
Refills: 0 | Status: ACTIVE | COMMUNITY
Start: 2024-01-04

## 2024-01-04 NOTE — REVIEW OF SYSTEMS
[Fever] : no fever [Chills] : no chills [Fatigue] : fatigue [Sore Throat] : no sore throat [Chest Pain] : no chest pain [Palpitations] : no palpitations [Leg Claudication] : no leg claudication [Paroxysmal Nocturnal Dyspnea] : no paroxysmal nocturnal dyspnea [Shortness Of Breath] : no shortness of breath [Wheezing] : no wheezing [Cough] : no cough [Dyspnea on Exertion] : no dyspnea on exertion [Abdominal Pain] : no abdominal pain [Nausea] : no nausea [Constipation] : constipation [Vomiting] : no vomiting [Heartburn] : no heartburn [FreeTextEntry9] : Boot on right foot post amputation of toe

## 2024-01-04 NOTE — ASSESSMENT
[FreeTextEntry1] : Patient is feeling better she does have a aide staying with her also is being visited by physical therapy her medications have been slightly changed we have reconciled them we will determine who the pulmonologist was that saw her in the hospital and have them follow-up with regard to the lung mass and biopsy she will have home draw of laboratory work to check on her kidney status and will be followed up next week after consultation with pulmonary.  Due to her renal statusOf note is that the patient's digoxin ACE inhibitor and beta-blocker have been discontinued apparently

## 2024-01-04 NOTE — PHYSICAL EXAM
[No Acute Distress] : no acute distress [Well Nourished] : well nourished [Well Developed] : well developed [Well-Appearing] : well-appearing [Normal Sclera/Conjunctiva] : normal sclera/conjunctiva [PERRL] : pupils equal round and reactive to light [Normal Outer Ear/Nose] : the outer ears and nose were normal in appearance [Normal Oropharynx] : the oropharynx was normal [No JVD] : no jugular venous distention [No Lymphadenopathy] : no lymphadenopathy [No Accessory Muscle Use] : no accessory muscle use [Clear to Auscultation] : lungs were clear to auscultation bilaterally [Regular Rhythm] : with a regular rhythm [No Edema] : there was no peripheral edema [Soft] : abdomen soft [Non Tender] : non-tender [No HSM] : no HSM [Normal Bowel Sounds] : normal bowel sounds [No Joint Swelling] : no joint swelling [Grossly Normal Strength/Tone] : grossly normal strength/tone [Coordination Grossly Intact] : coordination grossly intact

## 2024-01-06 NOTE — HISTORY OF PRESENT ILLNESS
[Post-hospitalization from ___ Hospital] : Post-hospitalization from [unfilled] Hospital [Admitted on: ___] : The patient was admitted on [unfilled] [Discharged on ___] : discharged on [unfilled] [Patient Contacted By: ____] : and contacted by [unfilled] [FreeTextEntry2] : 84 y/o F with PMH HTN, HLD, chronic right plantar foot ulcers/OM recently admitted to [facility] 12/6-12/11 for acute on chronic osteomyelitis of the right foot that required partial amputation of 2nd ray of right foot 12/8/23, c/o nausea, vomiting, found with acute renal failure, likely due to recent vanco course.   Leucocytosis - improved - reported mild cough now improved - CXR none acute - UA+ for UTI. but no symptoms except one time retention after TOV. now PVR low. DCed bladder scan - on CTX for now. last dose today.  f/u sensitivity. per renal, no need OP Abx - amylase normalized. lipase trending down - monitor cbc and temp. currently afebrile   Bradycardia - improved - likley combination of BB and hypothyroid - BB dced. LT4 dose increased - DCed tele  Acute renal failure, likely drug induced [ cipro, ARBs] -Nephro consulted. rec appreciated.  - on IVF - on TOV since 12/23. DC bladder scan/SC as PVR is low - dig level--> 1.1. now off dig. unclear why patient is on dig. reported no afib -CT,  no hydro - neg UA and Uc on amdisison. + on 12/23. on CTx. f/u sensitivity -Follow repeat BMP  s/p partial amputation of 2nd ray of right foot 12/8/23 -Due for outpatient podiatry f/u this week regarding dressing . per podiatrist Dr. Magdaleno is aware. she will follow up with her surgeon after DC -Podiatry consulted. no surgical intervention needed -PT eval --> home PT -Afebrile, non toxic appearing  HTN - Home doses: metoprolol ER BID, losartan - Losartan was held due to CARLOS - off metoprolol for bradycardia - amlodipine was increased form 5 mg to 10 mg on 12/24 - no OH noted - monitor VS including OH and adjust meds - patient to see PCP for further management of HTN - DASH/TLC  HLD -Continue home simvastatin 40mg qd  Hypothyroidism - TSH 16 - increased LT4 from 75 to 88 mcg 12/23 - needs repeat TFT in 4-6 weeks  left upper lung mass on CT chest Emphysema - + smoking - pulm consulted likely primary malignancy. needs biopsy OP - CT a/p with OC: 1.5 cm parainguinal LN. no sign of malignancy - needs PFT with PET and IR guided Biopsy OP. discussed with patient and Dr. Pineda. per pulm, patient will needs PET scan before Biopsy. Heme cxnoted. discussed with patient. she does not want any further work up now. will give her info about pulm and heme if she changes her mind. denied depression//anxiety/SI  Elevated amylase and lipase - amylase improved. lipase is improving - improving - no ssx of pancreatitis - CT renal stone hunt: and CT a/p with OC showed normal hepatobiliary system and pancrease.  - GI cx noted: non-pancreatic source. trend  Constipation - miralax, senna - dulcolax Supp PRN - metamucil BID  Severe protein-calorie malnutrition. - nutrition on board

## 2024-01-06 NOTE — HISTORY OF PRESENT ILLNESS
[Post-hospitalization from ___ Hospital] : Post-hospitalization from [unfilled] Hospital [Admitted on: ___] : The patient was admitted on [unfilled] [Discharged on ___] : discharged on [unfilled] [Patient Contacted By: ____] : and contacted by [unfilled] [FreeTextEntry2] : 84 y/o F with PMH HTN, HLD, chronic right plantar foot ulcers/OM recently admitted to [facility] 12/6-12/11 for acute on chronic osteomyelitis of the right foot that required partial amputation of 2nd ray of right foot 12/8/23, c/o nausea, vomiting, found with acute renal failure, likely due to recent vanco course.   Leucocytosis - improved - reported mild cough now improved - CXR none acute - UA+ for UTI. but no symptoms except one time retention after TOV. now PVR low. DCed bladder scan - on CTX for now. last dose today.  f/u sensitivity. per renal, no need OP Abx - amylase normalized. lipase trending down - monitor cbc and temp. currently afebrile   Bradycardia - improved - likley combination of BB and hypothyroid - BB dced. LT4 dose increased - DCed tele  Acute renal failure, likely drug induced [ cipro, ARBs] -Nephro consulted. rec appreciated.  - on IVF - on TOV since 12/23. DC bladder scan/SC as PVR is low - dig level--> 1.1. now off dig. unclear why patient is on dig. reported no afib -CT,  no hydro - neg UA and Uc on amdisison. + on 12/23. on CTx. f/u sensitivity -Follow repeat BMP  s/p partial amputation of 2nd ray of right foot 12/8/23 -Due for outpatient podiatry f/u this week regarding dressing . per podiatrist Dr. Magdaleno is aware. she will follow up with her surgeon after DC -Podiatry consulted. no surgical intervention needed -PT eval --> home PT -Afebrile, non toxic appearing  HTN - Home doses: metoprolol ER BID, losartan - Losartan was held due to CARLOS - off metoprolol for bradycardia - amlodipine was increased form 5 mg to 10 mg on 12/24 - no OH noted - monitor VS including OH and adjust meds - patient to see PCP for further management of HTN - DASH/TLC  HLD -Continue home simvastatin 40mg qd  Hypothyroidism - TSH 16 - increased LT4 from 75 to 88 mcg 12/23 - needs repeat TFT in 4-6 weeks  left upper lung mass on CT chest Emphysema - + smoking - pulm consulted likely primary malignancy. needs biopsy OP - CT a/p with OC: 1.5 cm parainguinal LN. no sign of malignancy - needs PFT with PET and IR guided Biopsy OP. discussed with patient and Dr. Pineda. per pulm, patient will needs PET scan before Biopsy. Heme cxnoted. discussed with patient. she does not want any further work up now. will give her info about pulm and heme if she changes her mind. denied depression//anxiety/SI  Elevated amylase and lipase - amylase improved. lipase is improving - improving - no ssx of pancreatitis - CT renal stone hunt: and CT a/p with OC showed normal hepatobiliary system and pancrease.  - GI cx noted: non-pancreatic source. trend  Constipation - miralax, senna - dulcolax Supp PRN - metamucil BID  Severe protein-calorie malnutrition. - nutrition on board

## 2024-01-12 ENCOUNTER — APPOINTMENT (OUTPATIENT)
Dept: PULMONOLOGY | Facility: CLINIC | Age: 84
End: 2024-01-12
Payer: MEDICARE

## 2024-01-12 VITALS
WEIGHT: 135 LBS | OXYGEN SATURATION: 97 % | RESPIRATION RATE: 16 BRPM | BODY MASS INDEX: 19.33 KG/M2 | HEIGHT: 70 IN | HEART RATE: 69 BPM | SYSTOLIC BLOOD PRESSURE: 134 MMHG | DIASTOLIC BLOOD PRESSURE: 52 MMHG

## 2024-01-12 DIAGNOSIS — Z87.448 PERSONAL HISTORY OF OTHER DISEASES OF URINARY SYSTEM: ICD-10-CM

## 2024-01-12 PROCEDURE — 99205 OFFICE O/P NEW HI 60 MIN: CPT

## 2024-01-12 NOTE — PROCEDURE
[FreeTextEntry1] : PROCEDURE DATE: 12/21/2023 INTERPRETATION: CLINICAL INFORMATION: Lung mass.  COMPARISON: None.  CONTRAST/COMPLICATIONS: IV Contrast: NONE Oral Contrast: NONE Complications: None reported at time of study completion  PROCEDURE: CT of the Chest was performed. Sagittal and coronal reformats were performed.  FINDINGS:  LUNGS AND AIRWAYS: Patent central airways. 4.6 x 3 cm sized spiculated mass left upper lobe concerning for a primary pulmonary malignancy. PET/CT and biopsy can be considered for further evaluation. Pulmonary emphysema. Suspect bronchial impaction in the right lung base. PLEURA: No pleural effusion. MEDIASTINUM AND RAFAEL: No lymphadenopathy. VESSELS: Atherosclerotic calcification including the coronary arteries. HEART: Heart size is normal. No pericardial effusion. CHEST WALL AND LOWER NECK: Within normal limits. VISUALIZED UPPER ABDOMEN: Within normal limits. BONES: Degenerative changes  IMPRESSION: Approximately 4.6 cm sized left upper lobe mass concerning for a primary pulmonary malignancy. Pulmonary emphysema.    --- End of Report ---    BRYANT STRINGER MD; Attending Radiologist This document has been electronically signed. Dec 21 2023 2:22PMPROCEDURE DATE: 12/25/2023 INTERPRETATION  : Clinical Information: Leukocytosis  Technique: 1 AP chest x-ray(s)  Comparison: None  Findings/ Impression: Heart size unremarkable. Emphysematous changes. Left upper lobe nodule.  --- End of Report --- TAINA RAJPUT MD; Attending Interventional Radiologist This document has been electronically signed. Dec 25 2023 10:30PM  ROCEDURE DATE: 12/21/2023    INTERPRETATION: CLINICAL INFORMATION: Lung mass.  COMPARISON: None.  CONTRAST/COMPLICATIONS: IV Contrast: NONE Oral Contrast: NONE Complications: None reported at time of study completion  PROCEDURE: CT of the Chest was performed. Sagittal and coronal reformats were performed.  FINDINGS:  LUNGS AND AIRWAYS: Patent central airways. 4.6 x 3 cm sized spiculated mass left upper lobe concerning for a primary pulmonary malignancy. PET/CT and biopsy can be considered for further evaluation. Pulmonary emphysema. Suspect bronchial impaction in the right lung base. PLEURA: No pleural effusion. MEDIASTINUM AND RAFAEL: No lymphadenopathy. VESSELS: Atherosclerotic calcification including the coronary arteries. HEART: Heart size is normal. No pericardial effusion. CHEST WALL AND LOWER NECK: Within normal limits. VISUALIZED UPPER ABDOMEN: Within normal limits. BONES: Degenerative changes  IMPRESSION: Approximately 4.6 cm sized left upper lobe mass concerning for a primary pulmonary malignancy. Pulmonary emphysema.    --- End of Report ---      BRYANT STRINGER MD; Attending Radiologist This document has been electronically signed. Dec 21 2023 2:22PM

## 2024-01-12 NOTE — REVIEW OF SYSTEMS
[Chills] : chills [Fever] : no fever [Recent Wt Gain (___ Lbs)] : ~T no recent weight gain [Recent Wt Loss (___ Lbs)] : ~T no recent weight loss [Sore Throat] : no sore throat [Sinus Problems] : no sinus problems [Cough] : no cough [Hemoptysis] : no hemoptysis [Sputum] : no sputum [Dyspnea] : no dyspnea [Wheezing] : no wheezing [SOB on Exertion] : no sob on exertion [Chest Discomfort] : no chest discomfort [Palpitations] : no palpitations [GERD] : no gerd [Abdominal Pain] : no abdominal pain [Nausea] : no nausea [Vomiting] : no vomiting [Dysphagia] : no dysphagia [Bleeding] : no bleeding [Chronic Pain] : no chronic pain [Rash] : no rash [Blood Transfusion] : no blood transfusion [Clotting Disorder/ Frequent bleeding] : no clotting disorder/ frequent bleeding [Diabetes] : no diabetes [Thyroid Problem] : no thyroid problem [Obesity] : no obesity

## 2024-01-12 NOTE — PHYSICAL EXAM
[Enlarged Base of the Tongue] : enlarged base of the tongue [III] : Mallampati Class: III [Normal Appearance] : normal appearance [Supple] : supple [No Neck Mass] : no neck mass [No JVD] : no jvd [Normal Rate/Rhythm] : normal rate/rhythm [Normal S1, S2] : normal s1, s2 [No Murmurs] : no murmurs [No Resp Distress] : no resp distress [No Acc Muscle Use] : no acc muscle use [Clear to Auscultation Bilaterally] : clear to auscultation bilaterally [Benign] : benign [Not Tender] : not tender [No Masses] : no masses [Soft] : soft [No Hernias] : no hernias [Normal Bowel Sounds] : normal bowel sounds [No Edema] : no edema [No Rash] : no rash [No Motor Deficits] : no motor deficits [Normal Affect] : normal affect [TextBox_11] : no lesion [TextBox_2] : frail f   walks with   cane  no cough  no sob   alert conversant [TextBox_99] : leg   dressing     cane   right foot

## 2024-01-12 NOTE — ASSESSMENT
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

## 2024-01-12 NOTE — HISTORY OF PRESENT ILLNESS
[TextBox_4] : 1/12/2024 82y/o   female born in [place]  ex  smoker ( 15y/o -   1/2 ppd  quit  50's   15 pack years)   COLTEN mass -currently  no sob  no chest pain   OOH  Discharge Date	28-Dec-2023 Admission Date	21-Dec-2023 12:37 Reason for Admission	acute renal failure Hospital Course	 84 y/o F with PMH HTN, HLD, chronic right plantar foot ulcers/OM recently admitted to [facility] 12/6-12/11 for acute on chronic osteomyelitis of the right foot that required partial amputation of 2nd ray of right foot 12/8/23, c/o nausea, vomiting, found with acute renal failure, likely due to recent vanco course.   Leucocytosis - improved - reported mild cough now improved - CXR none acute - UA+ for UTI. but no symptoms except one time retention after TOV. now PVR low. DCed bladder scan - on CTX for now. last dose today.  f/u sensitivity. per renal, no need OP Abx - amylase normalized. lipase trending down - monitor cbc and temp. currently afebrile     Bradycardia - improved - likley combination of BB and hypothyroid - BB dced. LT4 dose increased - DCed tele   Acute renal failure, likely drug induced [ cipro, ARBs] -Nephro consulted. rec appreciated. - on IVF - on TOV since 12/23. DC bladder scan/SC as PVR is low - dig level--> 1.1. now off dig. unclear why patient is on dig. reported no afib -CT,  no hydro - neg UA and Uc on amdisison. + on 12/23. on CTx. f/u sensitivity -Follow repeat BMP   s/p partial amputation of 2nd ray of right foot 12/8/23 -Due for outpatient podiatry f/u this week regarding dressing . per podiatrist Dr. Magdaleno is aware. she will follow up with her surgeon after DC -Podiatry consulted. no surgical intervention needed -PT eval --> home PT -Afebrile, non toxic appearing   HTN - Home doses: metoprolol ER BID, losartan - Losartan was held due to CARLOS - off metoprolol for bradycardia - amlodipine was increased form 5 mg to 10 mg on 12/24 - no OH noted - monitor VS including OH and adjust meds - patient to see PCP for further management of HTN - DASH/TLC   HLD -Continue home simvastatin 40mg qd   Hypothyroidism - TSH 16 - increased LT4 from 75 to 88 mcg 12/23 - needs repeat TFT in 4-6 weeks   left upper lung mass on CT chest Emphysema - + smoking - pulm consulted likely primary malignancy. needs biopsy OP - CT a/p with OC: 1.5 cm parainguinal LN. no sign of malignancy - needs PFT with PET and IR guided Biopsy OP. discussed with patient and Dr. Pineda. per pulm, patient will needs PET scan before Biopsy. Heme cxnoted. discussed with patient. she does not want any further work up now. will give her info about pulm and heme if she changes her mind. denied depression//anxiety/SI

## 2024-01-17 ENCOUNTER — OUTPATIENT (OUTPATIENT)
Dept: OUTPATIENT SERVICES | Facility: HOSPITAL | Age: 84
LOS: 1 days | End: 2024-01-17
Payer: MEDICARE

## 2024-01-17 DIAGNOSIS — J44.9 CHRONIC OBSTRUCTIVE PULMONARY DISEASE, UNSPECIFIED: ICD-10-CM

## 2024-01-17 DIAGNOSIS — Z98.89 OTHER SPECIFIED POSTPROCEDURAL STATES: Chronic | ICD-10-CM

## 2024-01-17 DIAGNOSIS — M53.9 DORSOPATHY, UNSPECIFIED: Chronic | ICD-10-CM

## 2024-01-17 DIAGNOSIS — R91.8 OTHER NONSPECIFIC ABNORMAL FINDING OF LUNG FIELD: ICD-10-CM

## 2024-01-17 PROCEDURE — 94726 PLETHYSMOGRAPHY LUNG VOLUMES: CPT | Mod: 26

## 2024-01-17 PROCEDURE — 94729 DIFFUSING CAPACITY: CPT | Mod: 26

## 2024-01-18 ENCOUNTER — NON-APPOINTMENT (OUTPATIENT)
Age: 84
End: 2024-01-18

## 2024-01-18 ENCOUNTER — INPATIENT (INPATIENT)
Facility: HOSPITAL | Age: 84
LOS: 7 days | Discharge: NURSING, REHAB OR LONG-TERM CARE | DRG: 539 | End: 2024-01-26
Attending: HOSPITALIST | Admitting: HOSPITALIST
Payer: MEDICARE

## 2024-01-18 VITALS
DIASTOLIC BLOOD PRESSURE: 74 MMHG | OXYGEN SATURATION: 98 % | WEIGHT: 125 LBS | HEIGHT: 70 IN | SYSTOLIC BLOOD PRESSURE: 148 MMHG | TEMPERATURE: 98 F | RESPIRATION RATE: 18 BRPM | HEART RATE: 151 BPM

## 2024-01-18 DIAGNOSIS — Z98.89 OTHER SPECIFIED POSTPROCEDURAL STATES: Chronic | ICD-10-CM

## 2024-01-18 DIAGNOSIS — M53.9 DORSOPATHY, UNSPECIFIED: Chronic | ICD-10-CM

## 2024-01-18 DIAGNOSIS — I48.91 UNSPECIFIED ATRIAL FIBRILLATION: ICD-10-CM

## 2024-01-18 LAB
ALBUMIN SERPL ELPH-MCNC: 3.3 G/DL — SIGNIFICANT CHANGE UP (ref 3.3–5)
ALP SERPL-CCNC: 122 U/L — HIGH (ref 40–120)
ALT FLD-CCNC: 13 U/L — SIGNIFICANT CHANGE UP (ref 10–45)
ANION GAP SERPL CALC-SCNC: 15 MMOL/L — SIGNIFICANT CHANGE UP (ref 5–17)
APPEARANCE UR: CLEAR — SIGNIFICANT CHANGE UP
APTT BLD: 39.9 SEC — HIGH (ref 24.5–35.6)
AST SERPL-CCNC: 21 U/L — SIGNIFICANT CHANGE UP (ref 10–40)
BACTERIA # UR AUTO: ABNORMAL /HPF
BASOPHILS # BLD AUTO: 0.04 K/UL — SIGNIFICANT CHANGE UP (ref 0–0.2)
BASOPHILS NFR BLD AUTO: 0.5 % — SIGNIFICANT CHANGE UP (ref 0–2)
BILIRUB SERPL-MCNC: 0.9 MG/DL — SIGNIFICANT CHANGE UP (ref 0.2–1.2)
BILIRUB UR-MCNC: NEGATIVE — SIGNIFICANT CHANGE UP
BUN SERPL-MCNC: 15 MG/DL — SIGNIFICANT CHANGE UP (ref 7–23)
CALCIUM SERPL-MCNC: 9.3 MG/DL — SIGNIFICANT CHANGE UP (ref 8.4–10.5)
CHLORIDE SERPL-SCNC: 95 MMOL/L — LOW (ref 96–108)
CO2 SERPL-SCNC: 22 MMOL/L — SIGNIFICANT CHANGE UP (ref 22–31)
COLOR SPEC: YELLOW — SIGNIFICANT CHANGE UP
CREAT SERPL-MCNC: 1.59 MG/DL — HIGH (ref 0.5–1.3)
DIFF PNL FLD: NEGATIVE — SIGNIFICANT CHANGE UP
EGFR: 32 ML/MIN/1.73M2 — LOW
EOSINOPHIL # BLD AUTO: 0.06 K/UL — SIGNIFICANT CHANGE UP (ref 0–0.5)
EOSINOPHIL NFR BLD AUTO: 0.7 % — SIGNIFICANT CHANGE UP (ref 0–6)
EPI CELLS # UR: 2 — SIGNIFICANT CHANGE UP
GLUCOSE SERPL-MCNC: 109 MG/DL — HIGH (ref 70–99)
GLUCOSE UR QL: NEGATIVE MG/DL — SIGNIFICANT CHANGE UP
HCT VFR BLD CALC: 30.4 % — LOW (ref 34.5–45)
HGB BLD-MCNC: 10.8 G/DL — LOW (ref 11.5–15.5)
IMM GRANULOCYTES NFR BLD AUTO: 0.2 % — SIGNIFICANT CHANGE UP (ref 0–0.9)
INR BLD: 1.14 RATIO — SIGNIFICANT CHANGE UP (ref 0.85–1.18)
KETONES UR-MCNC: NEGATIVE MG/DL — SIGNIFICANT CHANGE UP
LACTATE SERPL-SCNC: 1.5 MMOL/L — SIGNIFICANT CHANGE UP (ref 0.7–2)
LACTATE SERPL-SCNC: 2.3 MMOL/L — HIGH (ref 0.7–2)
LEUKOCYTE ESTERASE UR-ACNC: ABNORMAL
LYMPHOCYTES # BLD AUTO: 1.37 K/UL — SIGNIFICANT CHANGE UP (ref 1–3.3)
LYMPHOCYTES # BLD AUTO: 17 % — SIGNIFICANT CHANGE UP (ref 13–44)
MCHC RBC-ENTMCNC: 32.3 PG — SIGNIFICANT CHANGE UP (ref 27–34)
MCHC RBC-ENTMCNC: 35.5 GM/DL — SIGNIFICANT CHANGE UP (ref 32–36)
MCV RBC AUTO: 91 FL — SIGNIFICANT CHANGE UP (ref 80–100)
MONOCYTES # BLD AUTO: 0.89 K/UL — SIGNIFICANT CHANGE UP (ref 0–0.9)
MONOCYTES NFR BLD AUTO: 11.1 % — SIGNIFICANT CHANGE UP (ref 2–14)
NEUTROPHILS # BLD AUTO: 5.66 K/UL — SIGNIFICANT CHANGE UP (ref 1.8–7.4)
NEUTROPHILS NFR BLD AUTO: 70.5 % — SIGNIFICANT CHANGE UP (ref 43–77)
NITRITE UR-MCNC: NEGATIVE — SIGNIFICANT CHANGE UP
NRBC # BLD: 0 /100 WBCS — SIGNIFICANT CHANGE UP (ref 0–0)
PH UR: 6.5 — SIGNIFICANT CHANGE UP (ref 5–8)
PLATELET # BLD AUTO: 317 K/UL — SIGNIFICANT CHANGE UP (ref 150–400)
POTASSIUM SERPL-MCNC: 2.6 MMOL/L — CRITICAL LOW (ref 3.5–5.3)
POTASSIUM SERPL-SCNC: 2.6 MMOL/L — CRITICAL LOW (ref 3.5–5.3)
PROT SERPL-MCNC: 7.5 G/DL — SIGNIFICANT CHANGE UP (ref 6–8.3)
PROT UR-MCNC: NEGATIVE MG/DL — SIGNIFICANT CHANGE UP
PROTHROM AB SERPL-ACNC: 12.9 SEC — SIGNIFICANT CHANGE UP (ref 9.5–13)
RBC # BLD: 3.34 M/UL — LOW (ref 3.8–5.2)
RBC # FLD: 12.2 % — SIGNIFICANT CHANGE UP (ref 10.3–14.5)
RBC CASTS # UR COMP ASSIST: 1 /HPF — SIGNIFICANT CHANGE UP (ref 0–4)
SODIUM SERPL-SCNC: 132 MMOL/L — LOW (ref 135–145)
SP GR SPEC: 1 — LOW (ref 1–1.03)
UROBILINOGEN FLD QL: 0.2 MG/DL — SIGNIFICANT CHANGE UP (ref 0.2–1)
WBC # BLD: 8.04 K/UL — SIGNIFICANT CHANGE UP (ref 3.8–10.5)
WBC # FLD AUTO: 8.04 K/UL — SIGNIFICANT CHANGE UP (ref 3.8–10.5)
WBC UR QL: 1 /HPF — SIGNIFICANT CHANGE UP (ref 0–5)

## 2024-01-18 PROCEDURE — 73630 X-RAY EXAM OF FOOT: CPT | Mod: 26,RT

## 2024-01-18 PROCEDURE — 99223 1ST HOSP IP/OBS HIGH 75: CPT

## 2024-01-18 PROCEDURE — 99223 1ST HOSP IP/OBS HIGH 75: CPT | Mod: AI

## 2024-01-18 PROCEDURE — 93306 TTE W/DOPPLER COMPLETE: CPT | Mod: 26

## 2024-01-18 PROCEDURE — 99285 EMERGENCY DEPT VISIT HI MDM: CPT

## 2024-01-18 RX ORDER — LEVOTHYROXINE SODIUM 125 MCG
88 TABLET ORAL DAILY
Refills: 0 | Status: DISCONTINUED | OUTPATIENT
Start: 2024-01-18 | End: 2024-01-26

## 2024-01-18 RX ORDER — POTASSIUM CHLORIDE 20 MEQ
10 PACKET (EA) ORAL ONCE
Refills: 0 | Status: COMPLETED | OUTPATIENT
Start: 2024-01-18 | End: 2024-01-18

## 2024-01-18 RX ORDER — PIPERACILLIN AND TAZOBACTAM 4; .5 G/20ML; G/20ML
3.38 INJECTION, POWDER, LYOPHILIZED, FOR SOLUTION INTRAVENOUS EVERY 8 HOURS
Refills: 0 | Status: DISCONTINUED | OUTPATIENT
Start: 2024-01-18 | End: 2024-01-19

## 2024-01-18 RX ORDER — DILTIAZEM HCL 120 MG
15 CAPSULE, EXT RELEASE 24 HR ORAL ONCE
Refills: 0 | Status: COMPLETED | OUTPATIENT
Start: 2024-01-18 | End: 2024-01-18

## 2024-01-18 RX ORDER — VANCOMYCIN HCL 1 G
1000 VIAL (EA) INTRAVENOUS ONCE
Refills: 0 | Status: DISCONTINUED | OUTPATIENT
Start: 2024-01-18 | End: 2024-01-18

## 2024-01-18 RX ORDER — POTASSIUM CHLORIDE 20 MEQ
40 PACKET (EA) ORAL ONCE
Refills: 0 | Status: COMPLETED | OUTPATIENT
Start: 2024-01-18 | End: 2024-01-19

## 2024-01-18 RX ORDER — METOPROLOL TARTRATE 50 MG
25 TABLET ORAL EVERY 12 HOURS
Refills: 0 | Status: DISCONTINUED | OUTPATIENT
Start: 2024-01-18 | End: 2024-01-26

## 2024-01-18 RX ORDER — ENOXAPARIN SODIUM 100 MG/ML
60 INJECTION SUBCUTANEOUS EVERY 24 HOURS
Refills: 0 | Status: DISCONTINUED | OUTPATIENT
Start: 2024-01-18 | End: 2024-01-23

## 2024-01-18 RX ORDER — POTASSIUM CHLORIDE 20 MEQ
40 PACKET (EA) ORAL ONCE
Refills: 0 | Status: COMPLETED | OUTPATIENT
Start: 2024-01-18 | End: 2024-01-18

## 2024-01-18 RX ORDER — AMLODIPINE BESYLATE 2.5 MG/1
10 TABLET ORAL EVERY 24 HOURS
Refills: 0 | Status: DISCONTINUED | OUTPATIENT
Start: 2024-01-18 | End: 2024-01-18

## 2024-01-18 RX ORDER — PIPERACILLIN AND TAZOBACTAM 4; .5 G/20ML; G/20ML
3.38 INJECTION, POWDER, LYOPHILIZED, FOR SOLUTION INTRAVENOUS ONCE
Refills: 0 | Status: COMPLETED | OUTPATIENT
Start: 2024-01-18 | End: 2024-01-18

## 2024-01-18 RX ORDER — SODIUM CHLORIDE 9 MG/ML
1750 INJECTION INTRAMUSCULAR; INTRAVENOUS; SUBCUTANEOUS ONCE
Refills: 0 | Status: COMPLETED | OUTPATIENT
Start: 2024-01-18 | End: 2024-01-18

## 2024-01-18 RX ORDER — ACETAMINOPHEN 500 MG
650 TABLET ORAL EVERY 6 HOURS
Refills: 0 | Status: DISCONTINUED | OUTPATIENT
Start: 2024-01-18 | End: 2024-01-26

## 2024-01-18 RX ORDER — DILTIAZEM HCL 120 MG
60 CAPSULE, EXT RELEASE 24 HR ORAL ONCE
Refills: 0 | Status: COMPLETED | OUTPATIENT
Start: 2024-01-18 | End: 2024-01-18

## 2024-01-18 RX ORDER — METOPROLOL TARTRATE 50 MG
12.5 TABLET ORAL
Refills: 0 | Status: DISCONTINUED | OUTPATIENT
Start: 2024-01-18 | End: 2024-01-18

## 2024-01-18 RX ORDER — SIMVASTATIN 20 MG/1
40 TABLET, FILM COATED ORAL AT BEDTIME
Refills: 0 | Status: DISCONTINUED | OUTPATIENT
Start: 2024-01-18 | End: 2024-01-26

## 2024-01-18 RX ADMIN — Medication 15 MILLIGRAM(S): at 14:36

## 2024-01-18 RX ADMIN — SODIUM CHLORIDE 1750 MILLILITER(S): 9 INJECTION INTRAMUSCULAR; INTRAVENOUS; SUBCUTANEOUS at 14:30

## 2024-01-18 RX ADMIN — Medication 100 MILLIEQUIVALENT(S): at 15:37

## 2024-01-18 RX ADMIN — Medication 40 MILLIEQUIVALENT(S): at 15:37

## 2024-01-18 RX ADMIN — PIPERACILLIN AND TAZOBACTAM 25 GRAM(S): 4; .5 INJECTION, POWDER, LYOPHILIZED, FOR SOLUTION INTRAVENOUS at 21:27

## 2024-01-18 RX ADMIN — Medication 60 MILLIGRAM(S): at 14:36

## 2024-01-18 RX ADMIN — SODIUM CHLORIDE 1750 MILLILITER(S): 9 INJECTION INTRAMUSCULAR; INTRAVENOUS; SUBCUTANEOUS at 18:12

## 2024-01-18 RX ADMIN — SIMVASTATIN 40 MILLIGRAM(S): 20 TABLET, FILM COATED ORAL at 21:26

## 2024-01-18 RX ADMIN — PIPERACILLIN AND TAZOBACTAM 200 GRAM(S): 4; .5 INJECTION, POWDER, LYOPHILIZED, FOR SOLUTION INTRAVENOUS at 14:30

## 2024-01-18 RX ADMIN — PIPERACILLIN AND TAZOBACTAM 3.38 GRAM(S): 4; .5 INJECTION, POWDER, LYOPHILIZED, FOR SOLUTION INTRAVENOUS at 15:00

## 2024-01-18 NOTE — ED PROVIDER NOTE - CLINICAL SUMMARY MEDICAL DECISION MAKING FREE TEXT BOX
84 y/o F with PMH HTN, HLD, chronic right plantar foot ulcers/OM recently admitted to [facility] 12/6-12/11 for acute on chronic osteomyelitis of the right foot that required partial amputation of 2nd ray of right foot 12/8/23 With complication of acute renal failure secondary to antibiotics recently discharged from hospital 1 week ago presenting to the ED from Dr. Eric's office, podiatry, referred to ED for IV antibiotics, discussion with Dr. Eric, patient sent for right foot ulcer stage III/stage IV, no surrounding cellulitis or purulent drainage from wound, has previous amputation as noted above.  Third toe phalanx dorsally dislocated known from previous, patient without reported fever chills nausea vomiting, reports since discharge worsening foot swelling.  Denies other complaints.  In the ED noted to have A-fib RVR known left bundle branch block rate of 130.    admit for a. fib rvr  foot ulcer sent by podiatry for IV antibiotics   ID consult as inpatient   admit to medicine   s/o Dr. Mari

## 2024-01-18 NOTE — H&P ADULT - HISTORY OF PRESENT ILLNESS
82yo female with hx of  HTN, HLD, chronic right plantar foot ulcers/OM, presented to the ED from Podiatry office for IV abx for acute on chronic OM of Right foot. Pt was recently admitted/discharged from [facility] for acute on chronic osteomyelitis of the right foot. She completed course of IV abx and was deemed stable for discharge home. Pt was recovering from acute renal failure due to Cipro as well.  Currently denies pain or drainage from her wound. Does endorse persistent swelling of her Right foot along with mild redness.     While in the ED, pt noted to have new onset rapid AFIB. Denied cp, palpitations, sob, abd pain, N/V, fever, chills.

## 2024-01-18 NOTE — PATIENT PROFILE ADULT - FALL HARM RISK - HARM RISK INTERVENTIONS

## 2024-01-18 NOTE — ED ADULT TRIAGE NOTE - CHIEF COMPLAINT QUOTE
Patient sent in by Dr. Eric (Podiatry) for IV antibiotics and admission for soft tissue infection in right foot. Significant edema and redness. Patient has extensive history of infection with surgery for osteomyelitis, discharged from [place] last week after 6 day admission. Dr. Peterson PCP. Patient denies fever, and afebrile in triage. Of note, patient is tachycardic and heart rate is irregular, denies hx of Afib.

## 2024-01-18 NOTE — ED ADULT NURSE NOTE - NSFALLHARMRISKINTERV_ED_ALL_ED

## 2024-01-18 NOTE — CONSULT NOTE ADULT - SUBJECTIVE AND OBJECTIVE BOX
CHIEF COMPLAINT:  Patient is a 83y old  Female who presents with a chief complaint of osteomyelitis from Podiatry.    HPI: Chronic ulcer not fulling responding to oral rx.   Seen for AF RVR in ER.  Follows with Dr. Pacheco in our office for HBP, history of mild AS apcs and svt  Denies c/p, sob or palpitations.  Received IV cardizem in ER. Noted to be hypokalemic as well    Hx of renal failure/antibiotic      PMH:   HTN (hypertension)    Hyperlipidemia    Hypothyroid    Paroxysmal SVT (supraventricular tachycardia)    Thyroid cyst        PSH:   Back problem    S/P thyroid surgery    S/P hernia surgery    S/P rotator cuff surgery        FAMILY HISTORY:  FAMILY HISTORY:  Family history of early CAD (Father)        SOCIAL HISTORY:  Smoking:  remote  Alcohol:  Drugs:    ALLERGIES:  linezolid (Unknown)  doxycycline (Unknown)  Levaquin (Stomach Upset; Nausea)      Home Medications:  acetaminophen 325 mg oral tablet: 2 tab(s) orally every 6 hours As needed Moderate Pain (4 - 6) (28 Dec 2023 16:28)  simvastatin 40 mg oral tablet: 1 tab(s) orally once a day (at bedtime) (22 Dec 2023 11:13)      MEDICATIONS:  piperacillin/tazobactam IVPB.. 3.375 Gram(s) IV Intermittent once  potassium chloride    Tablet ER 40 milliEquivalent(s) Oral once  potassium chloride  10 mEq/100 mL IVPB 10 milliEquivalent(s) IV Intermittent once      REVIEW OF SYSTEMS:   per H and p    PHYSICAL EXAM:  T(C): 36.4 (01-18-24 @ 13:46), Max: 36.4 (01-18-24 @ 13:46)  HR: 89 (01-18-24 @ 14:36) (89 - 151)  BP: 123/73 (01-18-24 @ 14:36) (113/74 - 148/74)  RR: 18 (01-18-24 @ 14:36) (18 - 21)  SpO2: 98% (01-18-24 @ 14:36) (98% - 100%)  Wt(kg): --    GENERAL: NAD, well-groomed, well-developed  HEAD:  Atraumatic, Normocephalic  EYES: EOMI, conjunctiva and sclera clear  ENT: Moist mucous membranes,  NECK: Supple, No JVD, no bruits  CHEST/LUNG: Clear to ausculation and percussion bilaterally; No rales, rhonchi, wheezing, or rubs  HEART: Regular rate and rhythm; No murmurs, rubs, or gallops PMI non displaced.  ABDOMEN: Soft, Nontender, Nondistended; Bowel sounds present  EXTREMITIES:  2+  edema bilateral  ulcer plantar right foot, deep    NERVOUS SYSTEM:  Alert No focal deficits    Cardiovascular Diagnostic Testing:  ECG:  probable AF with rvr    LABS:                        10.8   8.04  )-----------( 317      ( 18 Jan 2024 14:15 )             30.4     01-18    132<L>  |  95<L>  |  15  ----------------------------<  109<H>  2.6<LL>   |  22  |  1.59<H>    Ca    9.3      18 Jan 2024 14:15    TPro  7.5  /  Alb  3.3  /  TBili  0.9  /  DBili  x   /  AST  21  /  ALT  13  /  AlkPhos  122<H>  01-18    PT/INR - ( 18 Jan 2024 14:15 )   PT: 12.9 sec;   INR: 1.14 ratio         PTT - ( 18 Jan 2024 14:15 )  PTT:39.9 sec              Troponin I, High Sensitivity Result: 21.0 ng/L (01-18-24 @ 14:15)      Telemetry:  sinus with apc    IMAGING:< from: Xray Chest 1 View- PORTABLE-Urgent (Xray Chest 1 View- PORTABLE-Urgent .) (12.25.23 @ 13:38) >    ACC: [id number] EXAM:  XR CHEST PORTABLE URGENT 1V   ORDERED BY: REGINALDO RIVERS     PROCEDURE DATE:  12/25/2023          INTERPRETATION:  Clinical Information: Leukocytosis    Technique: 1 AP chest x-ray(s)    Comparison: None    Findings/  Impression: Heart size unremarkable. Emphysematous changes. Left upper   lobe nodule.    --- End of Report ---            TAINA RAJPUT MD; Attending Interventional Radiologist  This document has been electronically signed. Dec 25 2023 10:30PM    < end of copied text >

## 2024-01-18 NOTE — GOALS OF CARE CONVERSATION - ADVANCED CARE PLANNING - CONVERSATION DETAILS
Pt. here from home with swollen, infected right foot. Pt. has hx. osteomyelitis in right foot. She is A&Ox3. Pt. has MOLST DNR/I which she signed 12/3/23. I reviewed it with her. She stated these are still her GOC. MD made aware of reviewed MOLST. Copy of MOLST placed in chart.

## 2024-01-18 NOTE — ED PROVIDER NOTE - PHYSICAL EXAMINATION
VITAL SIGNS: I have reviewed nursing notes and confirm.  CONSTITUTIONAL: well-appearing, non-toxic, NAD  SKIN: Warm dry, normal skin turgor  HEAD: NCAT  CARD: tachycardia, no murmurs, rubs or gallops  RESP: clear to ausculation b/l.  No rales, rhonchi, or wheezing.  ABD: soft, + BS, non-tender, non-distended, no rebound or guarding. No CVA tenderness  EXT:  R foot stage 3 ulcer on sole proximal to 3rd toe no purulent drainage, no surrounding cellulitis

## 2024-01-18 NOTE — CONSULT NOTE ADULT - SUBJECTIVE AND OBJECTIVE BOX
NEPHROLOGY CONSULTATION    CHIEF COMPLAINT: RLE wound    HPI:  Pt is 84 yo F with PMH HTN, HLD, chronic R foot ulcer/OM admitted to [facility] 12/6-12/11 for acute on chronic osteomyelitis that required surgical debridement. Patient was treated with vanco/zosyn, and transitioned to meropenem, with transition to PO augmentin, Cipro. Adm to [facility] 12/21 - 12/28 w/CARLOS suspected iso Cipro. No prior hx of CKD or NSAID's use. Adm today w/non-healing wound RLE, for IV Abx. No CP, SOB, D/C/F/C.     ROS:  as above    Allergies:  linezolid (Unknown)  doxycycline (Unknown)  Cipro    Intolerances:  Levaquin (Stomach Upset; Nausea)    PAST MEDICAL & SURGICAL HISTORY: as above  HTN (hypertension)  Hyperlipidemia  Hypothyroid  Paroxysmal SVT (supraventricular tachycardia)  Thyroid cyst  Back problem  s/p back surgery  S/P thyroid surgery  S/P hernia surgery  S/P rotator cuff surgery    SOCIAL HISTORY:  negative    FAMILY HISTORY:  Family history of early CAD (Father)    MEDICATIONS  (STANDING):  piperacillin/tazobactam IVPB.. 3.375 Gram(s) IV Intermittent once    Home Medications:  acetaminophen 325 mg oral tablet: 2 tab(s) orally every 6 hours As needed Moderate Pain (4 - 6) (28 Dec 2023 16:28)  simvastatin 40 mg oral tablet: 1 tab(s) orally once a day (at bedtime) (22 Dec 2023 11:13)    Vital Signs Last 24 Hrs  T(C): 36.4 (01-18-24 @ 13:46), Max: 36.4 (01-18-24 @ 13:46)  T(F): 97.6 (01-18-24 @ 13:46), Max: 97.6 (01-18-24 @ 13:46)  HR: 130 (01-18-24 @ 14:32) (130 - 151)  BP: 126/73 (01-18-24 @ 14:32) (126/73 - 148/74)  BP(mean): 84 (01-18-24 @ 14:32) (84 - 84)  RR: 18 (01-18-24 @ 14:32) (18 - 18)  SpO2: 98% (01-18-24 @ 14:32) (98% - 98%)                        10.8   8.04  )-----------( 317      ( 18 Jan 2024 14:15 )             30.4     A/P:    full consult to follow    [phone]       NEPHROLOGY CONSULTATION    CHIEF COMPLAINT: RLE wound    HPI:  Pt is 84 yo F with PMH HTN, HLD, chronic R foot ulcer/OM admitted to [facility] - for acute on chronic osteomyelitis that required surgical debridement. Patient was treated with vanco/zosyn, and transitioned to meropenem, with transition to PO augmentin, Cipro. Adm to [facility]  -  w/CARLOS suspected iso Cipro. No prior hx of CKD or NSAID's use. Adm today w/non-healing wound RLE, for IV Abx. No CP, SOB, D/C/F/C.     ROS:  as above    Allergies:  linezolid (Unknown)  doxycycline (Unknown)  Cipro    Intolerances:  Levaquin (Stomach Upset; Nausea)    PAST MEDICAL & SURGICAL HISTORY: as above  HTN (hypertension)  Hyperlipidemia  Hypothyroid  Paroxysmal SVT (supraventricular tachycardia)  Thyroid cyst  Back problem  s/p back surgery  S/P thyroid surgery  S/P hernia surgery  S/P rotator cuff surgery    SOCIAL HISTORY:  negative    FAMILY HISTORY:  Family history of early CAD (Father)    acetaminophen     Tablet .. 650 milliGRAM(s) Oral every 6 hours PRN  amLODIPine   Tablet 10 milliGRAM(s) Oral every 24 hours  enoxaparin Injectable 60 milliGRAM(s) SubCutaneous every 24 hours  levothyroxine 88 MICROGram(s) Oral daily  metoprolol tartrate 12.5 milliGRAM(s) Oral two times a day  piperacillin/tazobactam IVPB.. 3.375 Gram(s) IV Intermittent once  piperacillin/tazobactam IVPB.. 3.375 Gram(s) IV Intermittent every 8 hours  simvastatin 40 milliGRAM(s) Oral at bedtime    Home Medications:  acetaminophen 325 mg oral tablet: 2 tab(s) orally every 6 hours As needed Moderate Pain (4 - 6) (28 Dec 2023 16:28)  simvastatin 40 mg oral tablet: 1 tab(s) orally once a day (at bedtime) (22 Dec 2023 11:13)    Vital Signs Last 24 Hrs  T(C): 36.4 (24 @ 13:46), Max: 36.4 (24 @ 13:46)  T(F): 97.6 (24 @ 13:46), Max: 97.6 (24 @ 13:46)  HR: 130 (24 @ 14:32) (130 - 151)  BP: 126/73 (24 @ 14:32) (126/73 - 148/74)  BP(mean): 84 (24 @ 14:32) (84 - 84)  RR: 18 (24 @ 14:32) (18 - 18)  SpO2: 98% (24 @ 14:32) (98% - 98%)    s1s2  b/l air entry  soft, ND  b/l LE edema, wound dressed                                    10.8   8.04  )-----------( 317      ( 2024 14:15 )             30.4     2024 14:15    132    |  95     |  15     ----------------------------<  109    2.6     |  22     |  1.59     Ca    9.3        2024 14:15    TPro  7.5    /  Alb  3.3    /  TBili  0.9    /  DBili  x      /  AST  21     /  ALT  13     /  AlkPhos  122    2024 14:15    LIVER FUNCTIONS - ( 2024 14:15 )  Alb: 3.3 g/dL / Pro: 7.5 g/dL / ALK PHOS: 122 U/L / ALT: 13 U/L / AST: 21 U/L / GGT: x           PT/INR - ( 2024 14:15 )   PT: 12.9 sec;   INR: 1.14 ratio      Urinalysis Basic - ( 2024 17:50 )    Color: Yellow / Appearance: Clear / S.004 / pH: x  Gluc: x / Ketone: Negative mg/dL  / Bili: Negative / Urobili: 0.2 mg/dL   Blood: x / Protein: Negative mg/dL / Nitrite: Negative   Leuk Esterase: Small / RBC: 1 /HPF / WBC 1 /HPF   Sq Epi: x / Non Sq Epi: x / Bacteria: Few /HPF    A/P:    Hx recent CARLOS suspected iso Cipro, exacerbated by ARB as op (Cr 0.83 - 12/10/23, peak Cr 8.35 - 23)  Cr is improving   UA negative   Serologies are negative on prior adm  Avoid nephrotoxins  No NSAID's, no ACE/ARB, no Cipro  Would add Cipro to list of allergies  This time adm for RLE wound  Abx per ID  Suppl K  F/u CBC, BMP, Mg in am    [phone]

## 2024-01-18 NOTE — ED ADULT NURSE NOTE - NS PRO PASSIVE SMOKE EXP
Unknown [Routine Follow-Up] : a routine follow-up visit for [Food Challenge] : food challenge [Parents] : parents [FreeTextEntry2] :  food allergy, allergic rhinitis/conjunctivitis and atopic dermatitis

## 2024-01-18 NOTE — ED ADULT NURSE NOTE - OBJECTIVE STATEMENT
Patient presents to ED for IV antibiotics as per Dr Eric in podiatry. Patient has 1 inch round wound on bottom of right foot. Patient has been admitted on and off for 3 years for infection of right foot wound. Positive swelling,. edema to right foot but patient denies pain. On arrival to ED patient placed on cardiac monitor and AFIb noted. Patient denies history of Afib.

## 2024-01-18 NOTE — ED PROVIDER NOTE - OBJECTIVE STATEMENT
84 y/o F with PMH HTN, HLD, chronic right plantar foot ulcers/OM recently admitted to [facility] 12/6-12/11 for acute on chronic osteomyelitis of the right foot that required partial amputation of 2nd ray of right foot 12/8/23 With complication of acute renal failure secondary to antibiotics recently discharged from hospital 1 week ago presenting to the ED from Dr. Eric's office, podiatry, referred to ED for IV antibiotics, discussion with Dr. Eric, patient sent for right foot ulcer stage III/stage IV, no surrounding cellulitis or purulent drainage from wound, has previous amputation as noted above.  Third toe phalanx dorsally dislocated known from previous, patient without reported fever chills nausea vomiting, reports since discharge worsening foot swelling.  Denies other complaints.  In the ED noted to have A-fib RVR known left bundle branch block rate of 130.

## 2024-01-18 NOTE — PATIENT PROFILE ADULT - FUNCTIONAL ASSESSMENT - BASIC MOBILITY 6.
3-calculated by average/Not able to assess (calculate score using [facility] averaging method)

## 2024-01-18 NOTE — H&P ADULT - ASSESSMENT
84yo female with hx of  HTN, HLD, chronic right plantar foot ulcers/OM, presented to the ED from Podiatry office for IV abx for acute on chronic OM of Right foot, noted to have new onset A Fib     #New onset A Fib w/ RVR  -Likely acute infection induced vs Electrolyte abnormality  -S/p Cardizem in the ED  -Start Metoprolol 12.5mg BID w/ holding parameters  -CHADSVASC at least 4. Renally dosed Lovenox. Transition to NOAC  -ECHO  -Follow up with TSH and repeat Electrolytes  -Monitor tele    #Hypokalemia  -Replete and monitor    #Acute on chronic Right plantar foot OM  -No leukocytosis   -Podiatrist, Dr. Magdaleno, sent pt for IV abx even upon discharge  -Start Zosyn  -ID Consulted    #Acute renal failure  -Drug induced [ cipro, ARBs]  -Renal function improving   -Nephro consulted  -Follow repeat BMP    #Hypothyroidism  -S/p Thyroidectomy  -Synthroid  -Follow up w/ TSH levels     #HTN  -Amlodipine    #VTE ppx  -Lovenox

## 2024-01-18 NOTE — ED ADULT NURSE NOTE - CHIEF COMPLAINT QUOTE
Patient sent in by Dr. Eric (Podiatry) for IV antibiotics and admission for soft tissue infection in right foot. Significant edema and redness. Patient has extensive history of infection with surgery for osteomyelitis, discharged from [facility] last week after 6 day admission. Dr. Peterson PCP. Patient denies fever, and afebrile in triage. Of note, patient is tachycardic and heart rate is irregular, denies hx of Afib.

## 2024-01-19 LAB
ANION GAP SERPL CALC-SCNC: 10 MMOL/L — SIGNIFICANT CHANGE UP (ref 5–17)
ANION GAP SERPL CALC-SCNC: 8 MMOL/L — SIGNIFICANT CHANGE UP (ref 5–17)
BUN SERPL-MCNC: 10 MG/DL — SIGNIFICANT CHANGE UP (ref 7–23)
BUN SERPL-MCNC: 9 MG/DL — SIGNIFICANT CHANGE UP (ref 7–23)
CALCIUM SERPL-MCNC: 8.8 MG/DL — SIGNIFICANT CHANGE UP (ref 8.4–10.5)
CALCIUM SERPL-MCNC: 8.9 MG/DL — SIGNIFICANT CHANGE UP (ref 8.4–10.5)
CHLORIDE SERPL-SCNC: 104 MMOL/L — SIGNIFICANT CHANGE UP (ref 96–108)
CHLORIDE SERPL-SCNC: 105 MMOL/L — SIGNIFICANT CHANGE UP (ref 96–108)
CO2 SERPL-SCNC: 23 MMOL/L — SIGNIFICANT CHANGE UP (ref 22–31)
CO2 SERPL-SCNC: 26 MMOL/L — SIGNIFICANT CHANGE UP (ref 22–31)
CREAT SERPL-MCNC: 1.15 MG/DL — SIGNIFICANT CHANGE UP (ref 0.5–1.3)
CREAT SERPL-MCNC: 1.25 MG/DL — SIGNIFICANT CHANGE UP (ref 0.5–1.3)
EGFR: 42 ML/MIN/1.73M2 — LOW
EGFR: 47 ML/MIN/1.73M2 — LOW
GLUCOSE SERPL-MCNC: 78 MG/DL — SIGNIFICANT CHANGE UP (ref 70–99)
GLUCOSE SERPL-MCNC: 83 MG/DL — SIGNIFICANT CHANGE UP (ref 70–99)
HCT VFR BLD CALC: 25.3 % — LOW (ref 34.5–45)
HGB BLD-MCNC: 8.9 G/DL — LOW (ref 11.5–15.5)
MAGNESIUM SERPL-MCNC: 1.6 MG/DL — SIGNIFICANT CHANGE UP (ref 1.6–2.6)
MAGNESIUM SERPL-MCNC: 1.7 MG/DL — SIGNIFICANT CHANGE UP (ref 1.6–2.6)
MCHC RBC-ENTMCNC: 32.1 PG — SIGNIFICANT CHANGE UP (ref 27–34)
MCHC RBC-ENTMCNC: 35.2 GM/DL — SIGNIFICANT CHANGE UP (ref 32–36)
MCV RBC AUTO: 91.3 FL — SIGNIFICANT CHANGE UP (ref 80–100)
NRBC # BLD: 0 /100 WBCS — SIGNIFICANT CHANGE UP (ref 0–0)
PLATELET # BLD AUTO: 286 K/UL — SIGNIFICANT CHANGE UP (ref 150–400)
POTASSIUM SERPL-MCNC: 2.7 MMOL/L — CRITICAL LOW (ref 3.5–5.3)
POTASSIUM SERPL-MCNC: 3.8 MMOL/L — SIGNIFICANT CHANGE UP (ref 3.5–5.3)
POTASSIUM SERPL-SCNC: 2.7 MMOL/L — CRITICAL LOW (ref 3.5–5.3)
POTASSIUM SERPL-SCNC: 3.8 MMOL/L — SIGNIFICANT CHANGE UP (ref 3.5–5.3)
RBC # BLD: 2.77 M/UL — LOW (ref 3.8–5.2)
RBC # FLD: 12.4 % — SIGNIFICANT CHANGE UP (ref 10.3–14.5)
SODIUM SERPL-SCNC: 138 MMOL/L — SIGNIFICANT CHANGE UP (ref 135–145)
SODIUM SERPL-SCNC: 138 MMOL/L — SIGNIFICANT CHANGE UP (ref 135–145)
TSH SERPL-MCNC: 0.51 UIU/ML — SIGNIFICANT CHANGE UP (ref 0.36–3.74)
WBC # BLD: 4.59 K/UL — SIGNIFICANT CHANGE UP (ref 3.8–10.5)
WBC # FLD AUTO: 4.59 K/UL — SIGNIFICANT CHANGE UP (ref 3.8–10.5)

## 2024-01-19 PROCEDURE — 93010 ELECTROCARDIOGRAM REPORT: CPT

## 2024-01-19 PROCEDURE — 99232 SBSQ HOSP IP/OBS MODERATE 35: CPT

## 2024-01-19 PROCEDURE — 99233 SBSQ HOSP IP/OBS HIGH 50: CPT

## 2024-01-19 RX ORDER — POTASSIUM CHLORIDE 20 MEQ
10 PACKET (EA) ORAL
Refills: 0 | Status: COMPLETED | OUTPATIENT
Start: 2024-01-19 | End: 2024-01-19

## 2024-01-19 RX ORDER — VANCOMYCIN HCL 1 G
750 VIAL (EA) INTRAVENOUS EVERY 24 HOURS
Refills: 0 | Status: DISCONTINUED | OUTPATIENT
Start: 2024-01-19 | End: 2024-01-21

## 2024-01-19 RX ADMIN — ENOXAPARIN SODIUM 60 MILLIGRAM(S): 100 INJECTION SUBCUTANEOUS at 05:30

## 2024-01-19 RX ADMIN — Medication 250 MILLIGRAM(S): at 17:41

## 2024-01-19 RX ADMIN — Medication 100 MILLIEQUIVALENT(S): at 02:26

## 2024-01-19 RX ADMIN — SIMVASTATIN 40 MILLIGRAM(S): 20 TABLET, FILM COATED ORAL at 21:21

## 2024-01-19 RX ADMIN — Medication 25 MILLIGRAM(S): at 05:30

## 2024-01-19 RX ADMIN — Medication 25 MILLIGRAM(S): at 17:42

## 2024-01-19 RX ADMIN — Medication 100 MILLIEQUIVALENT(S): at 05:30

## 2024-01-19 RX ADMIN — PIPERACILLIN AND TAZOBACTAM 25 GRAM(S): 4; .5 INJECTION, POWDER, LYOPHILIZED, FOR SOLUTION INTRAVENOUS at 06:35

## 2024-01-19 RX ADMIN — Medication 40 MILLIEQUIVALENT(S): at 01:20

## 2024-01-19 RX ADMIN — Medication 100 MILLIEQUIVALENT(S): at 03:35

## 2024-01-19 RX ADMIN — Medication 88 MICROGRAM(S): at 05:30

## 2024-01-19 NOTE — DIETITIAN INITIAL EVALUATION ADULT - PERTINENT LABORATORY DATA
01-19    138  |  105  |  9   ----------------------------<  78  3.8   |  23  |  1.15    Ca    8.8      19 Jan 2024 07:35  Mg     1.6     01-19    TPro  7.5  /  Alb  3.3  /  TBili  0.9  /  DBili  x   /  AST  21  /  ALT  13  /  AlkPhos  122<H>  01-18

## 2024-01-19 NOTE — PROGRESS NOTE ADULT - SUBJECTIVE AND OBJECTIVE BOX
Resting    Vital Signs Last 24 Hrs  T(C): 36.6 (01-19-24 @ 19:38), Max: 36.6 (01-19-24 @ 19:38)  T(F): 97.9 (01-19-24 @ 19:38), Max: 97.9 (01-19-24 @ 19:38)  HR: 77 (01-19-24 @ 19:38) (64 - 77)  BP: 110/61 (01-19-24 @ 19:38) (106/61 - 110/66)  RR: 16 (01-19-24 @ 19:38) (16 - 20)  SpO2: 99% (01-19-24 @ 19:38) (99% - 100%)    I&O's Detail    18 Jan 2024 07:01  -  19 Jan 2024 07:00  --------------------------------------------------------  OUT:    Voided (mL): 800 mL  Total OUT: 800 mL    s1s2  b/l air entry  soft, ND  sm b/l LE edema, RLE wound dressed                                          8.9    4.59  )-----------( 286      ( 19 Jan 2024 07:35 )             25.3     19 Jan 2024 07:35    138    |  105    |  9      ----------------------------<  78     3.8     |  23     |  1.15     Ca    8.8        19 Jan 2024 07:35  Mg     1.6       19 Jan 2024 07:35    TPro  7.5    /  Alb  3.3    /  TBili  0.9    /  DBili  x      /  AST  21     /  ALT  13     /  AlkPhos  122    18 Jan 2024 14:15    LIVER FUNCTIONS - ( 18 Jan 2024 14:15 )  Alb: 3.3 g/dL / Pro: 7.5 g/dL / ALK PHOS: 122 U/L / ALT: 13 U/L / AST: 21 U/L / GGT: x           PT/INR - ( 18 Jan 2024 14:15 )   PT: 12.9 sec;   INR: 1.14 ratio       Culture - Blood (collected 18 Jan 2024 14:16)  Source: .Blood Blood-Peripheral  Preliminary Report (19 Jan 2024 19:02):    No growth at 24 hours    Culture - Blood (collected 18 Jan 2024 14:15)  Source: .Blood Blood-Peripheral  Preliminary Report (19 Jan 2024 19:02):    No growth at 24 hours    acetaminophen     Tablet .. 650 milliGRAM(s) Oral every 6 hours PRN  enoxaparin Injectable 60 milliGRAM(s) SubCutaneous every 24 hours  levothyroxine 88 MICROGram(s) Oral daily  metoprolol tartrate 25 milliGRAM(s) Oral every 12 hours  simvastatin 40 milliGRAM(s) Oral at bedtime  vancomycin  IVPB 750 milliGRAM(s) IV Intermittent every 24 hours    A/P:    Hx recent CARLOS suspected iso Cipro (adm 12/20 - 12/28), exacerbated by ARB as op (Cr 0.83 - 12/10/23, peak Cr 8.35 - 12/20/23)  Cr continues to improve   UA negative   Serologies are negative on prior adm  Avoid nephrotoxins  No NSAID's, no ACE/ARB, no Cipro  Would add Cipro to list of allergies  This time adm for RLE wound, r/o osteo  ID, podiatry f/u  Abx per ID  F/u CBC, BMP, Mg   F/u Vanco level     [phone]

## 2024-01-19 NOTE — DIETITIAN INITIAL EVALUATION ADULT - OTHER INFO
82 yo female with hx of HTN, HLD, chronic right plantar foot ulcers/OM, presented to the ED from podiatry office for IV abx for acute on chronic OM of right foot, also noted to have new onset A Fib.   Pt endorses slowly improving appetite at present, states that she ate some bfast this morning. UBW 150lbs 2 months ago, CBW 125lbs. NFPE with evidence of severe muscle wasting/fat loss. Pt receptive to initiate Ensure HP BID. No pressure ulcers, OM right foot. Pt denies N/V/D, constipation. Last BM yesterday per pt. Continue DASH/TLC given new onset Afib, reviewed with pt.

## 2024-01-19 NOTE — CONSULT NOTE ADULT - SUBJECTIVE AND OBJECTIVE BOX
patient with right foot ulceration. Podiatry team consulted for evaluation. Patient was seen at bedside in no acute distress. Patient denies any fever, nausea or vomitting.                           8.9    4.59  )-----------( 286      ( 19 Jan 2024 07:35 )             25.3     decreased pedal pulses. CFT Delayed, decreased sensation bilaterally. noted is ulceration plantar aspect 3rd metatarsal of the right foot. positive malodorous , positive drainage and localized erythema

## 2024-01-19 NOTE — PROGRESS NOTE ADULT - ASSESSMENT
new paf in setting of chronic foot ulcer/infection and hypokalemia    lbbb    continue current cardiac meds, oral a/c    please obtain follow up ekg

## 2024-01-19 NOTE — PROGRESS NOTE ADULT - SUBJECTIVE AND OBJECTIVE BOX
Patient is a 83y old  Female who presents with a chief complaint of Right foot swelling (19 Jan 2024 08:31)    Patient seen and examined at bedside. No overnight events reported. Patient expresses frustration at her chronic medical issues surrounding her foot wound     ALLERGIES:  linezolid (Unknown)  Cipro (Other)  doxycycline (Unknown)  Levaquin (Stomach Upset; Nausea)    MEDICATIONS  (STANDING):  enoxaparin Injectable 60 milliGRAM(s) SubCutaneous every 24 hours  levothyroxine 88 MICROGram(s) Oral daily  metoprolol tartrate 25 milliGRAM(s) Oral every 12 hours  piperacillin/tazobactam IVPB.. 3.375 Gram(s) IV Intermittent every 8 hours  simvastatin 40 milliGRAM(s) Oral at bedtime    MEDICATIONS  (PRN):  acetaminophen     Tablet .. 650 milliGRAM(s) Oral every 6 hours PRN Temp greater or equal to 38C (100.4F), Mild Pain (1 - 3)    Vital Signs Last 24 Hrs  T(F): 97.7 (19 Jan 2024 05:59), Max: 97.7 (19 Jan 2024 05:59)  HR: 68 (19 Jan 2024 05:59) (68 - 151)  BP: 106/61 (19 Jan 2024 05:59) (90/54 - 148/74)  RR: 20 (19 Jan 2024 05:59) (18 - 24)  SpO2: 99% (19 Jan 2024 05:59) (97% - 100%)    I&O's Summary  18 Jan 2024 07:01  -  19 Jan 2024 07:00  --------------------------------------------------------  IN: 0 mL / OUT: 800 mL / NET: -800 mL    PHYSICAL EXAM:  General: NAD, A/O x 3  ENT: No gross hearing impairment, Moist mucous membranes, no thrush  Neck: Supple, No JVD  Lungs: Clear to auscultation bilaterally, good air entry, non-labored breathing  Cardio: RRR, S1/S2, No murmur  Abdomen: Soft, Nontender, Nondistended; Bowel sounds present  Extremities: No calf tenderness, No cyanosis, No pitting edema, right plantar open wound, no discharge, no redness   Psych: Appropriate mood and affect    LABS:                        8.9    4.59  )-----------( 286      ( 19 Jan 2024 07:35 )             25.3     01-19    138  |  105  |  9   ----------------------------<  78  3.8   |  23  |  1.15    Ca    8.8      19 Jan 2024 07:35  Mg     1.6     01-19    TPro  7.5  /  Alb  3.3  /  TBili  0.9  /  DBili  x   /  AST  21  /  ALT  13  /  AlkPhos  122  01-18          PT/INR - ( 18 Jan 2024 14:15 )   PT: 12.9 sec;   INR: 1.14 ratio         PTT - ( 18 Jan 2024 14:15 )  PTT:39.9 sec  Lactate, Blood: 1.5 mmol/L (01-18 @ 17:45)  Lactate, Blood: 2.3 mmol/L (01-18 @ 14:15)      CARDIAC MARKERS ( 18 Jan 2024 14:15 )  x     / 21.0 ng/L / x     / x     / x            TSH 0.506   TSH with FT4 reflex --  Total T3 --                  Urinalysis Basic - ( 19 Jan 2024 07:35 )    Color: x / Appearance: x / SG: x / pH: x  Gluc: 78 mg/dL / Ketone: x  / Bili: x / Urobili: x   Blood: x / Protein: x / Nitrite: x   Leuk Esterase: x / RBC: x / WBC x   Sq Epi: x / Non Sq Epi: x / Bacteria: x          RADIOLOGY & ADDITIONAL TESTS:    Care Discussed with Consultants/Other Providers:

## 2024-01-19 NOTE — CONSULT NOTE ADULT - SUBJECTIVE AND OBJECTIVE BOX
HPI:   Patient is a 83y female who has chronic plantar ulcer of right foot. In December she underwent right second toe partial ray amp for osteomyelitis to grossly clean margins. She now returns for ongoing open wound on the plantar foot with now redness over the dorsum of the foot. She appears to be neuropathic though is not diabetic. She grew corynebacter striatum from the most recent wound cultures.   REVIEW OF SYSTEMS:  All other review of systems negative (Comprehensive ROS)    PAST MEDICAL & SURGICAL HISTORY:  HTN (hypertension)      Hyperlipidemia      Hypothyroid      Paroxysmal SVT (supraventricular tachycardia)      Thyroid cyst      Back problem  s/p back surgery      S/P thyroid surgery      S/P hernia surgery      S/P rotator cuff surgery          Allergies    linezolid (Unknown)  Cipro (Other)  doxycycline (Unknown)    Intolerances    Levaquin (Stomach Upset; Nausea)      Antimicrobials Day #  :2  piperacillin/tazobactam IVPB.. 3.375 Gram(s) IV Intermittent every 8 hours    Other Medications:  acetaminophen     Tablet .. 650 milliGRAM(s) Oral every 6 hours PRN  enoxaparin Injectable 60 milliGRAM(s) SubCutaneous every 24 hours  levothyroxine 88 MICROGram(s) Oral daily  metoprolol tartrate 25 milliGRAM(s) Oral every 12 hours  simvastatin 40 milliGRAM(s) Oral at bedtime      FAMILY HISTORY:  Family history of early CAD (Father)        SOCIAL HISTORY:  Smoking: [ ]Yesx [ ]No  ETOH: [ ]Yes [ x]No  Drug Use: [ ]Yes x[ ]No   [ x] Single[ ]    T(F): 97.6 (01-19-24 @ 12:44), Max: 97.7 (01-19-24 @ 05:59)  HR: 64 (01-19-24 @ 12:44)  BP: 110/66 (01-19-24 @ 12:44)  RR: 18 (01-19-24 @ 12:44)  SpO2: 100% (01-19-24 @ 12:44)  Wt(kg): --    PHYSICAL EXAM:  General: alert, no acute distress  Eyes:  anicteric, no conjunctival injection, no discharge  Oropharynx: no lesions or injection 	  Neck: supple, without adenopathy  Lungs: clear to auscultation  Heart: regular rate and rhythm; no murmur, rubs or gallops  Abdomen: soft, nondistended, nontender, without mass or organomegaly  Skin: no lesions  Extremities: no clubbing, cyanosis, or edema. right plantar foot ulcer that probes to bone, no gross drainage, some redness over the dorsum of the right foot. No ascending cellulitis. left foot with a callous  Neurologic: alert, oriented, moves all extremities    LAB RESULTS:                        8.9    4.59  )-----------( 286      ( 19 Jan 2024 07:35 )             25.3     01-19    138  |  105  |  9   ----------------------------<  78  3.8   |  23  |  1.15    Ca    8.8      19 Jan 2024 07:35  Mg     1.6     01-19    TPro  7.5  /  Alb  3.3  /  TBili  0.9  /  DBili  x   /  AST  21  /  ALT  13  /  AlkPhos  122<H>  01-18    LIVER FUNCTIONS - ( 18 Jan 2024 14:15 )  Alb: 3.3 g/dL / Pro: 7.5 g/dL / ALK PHOS: 122 U/L / ALT: 13 U/L / AST: 21 U/L / GGT: x           Urinalysis Basic - ( 19 Jan 2024 07:35 )    Color: x / Appearance: x / SG: x / pH: x  Gluc: 78 mg/dL / Ketone: x  / Bili: x / Urobili: x   Blood: x / Protein: x / Nitrite: x   Leuk Esterase: x / RBC: x / WBC x   Sq Epi: x / Non Sq Epi: x / Bacteria: x        MICROBIOLOGY:  RECENT CULTURES:        RADIOLOGY REVIEWED:  < from: Xray Foot AP + Lateral + Oblique, Right (01.18.24 @ 14:32) >    ACC: [id number] EXAM:  XR FOOT COMP MIN 3 VIEWS RT   ORDERED BY: PAT JAVIER     PROCEDURE DATE:  01/18/2024          INTERPRETATION:  Right foot. Concern is osteomyelitis. Patient has a deep   ulcer. 3 views.    Indication the mid shaft of the second metatarsal again noted with signs   of healing.    Advanced degeneration at the first MTP joint again noted.    There is now lana dislocation at the MTP joint of the third digit with   associated soft tissue swelling and local soft tissue erosion abutting   the distal aspect of the third metatarsal.    There is no definitive evidence of bone destruction or fracture.    IMPRESSION: Soft tissue swelling around the third MTP joint with lana   dislocation at the third MTP joint. Soft tissue ulcer abuts the distal   aspect of the third metatarsal. No lana acute bony finding. Other   findings stable.    --- End of Report ---      < end of copied text >          Impression:  Elderly woman who has chronic plantar ulcers , had recent 2nd transmet resection for om to grossly clean margins with growth of corynebacter striatum. Se now returns with open plantar ulcer that probes to bone and some local forefoot cellulitis. I suspect she has continuous  focus osteomyelitis of the 3rd met. Antibiotics are adjunctive to surgical debridement for the bone and will help with the soft tissue component of the infection.   Recommendations:  will cover with vanco for now  took deep wound cx  needs podiatry evaluation and mri of the foot

## 2024-01-19 NOTE — DIETITIAN INITIAL EVALUATION ADULT - PERTINENT MEDS FT
MEDICATIONS  (STANDING):  enoxaparin Injectable 60 milliGRAM(s) SubCutaneous every 24 hours  levothyroxine 88 MICROGram(s) Oral daily  metoprolol tartrate 25 milliGRAM(s) Oral every 12 hours  piperacillin/tazobactam IVPB.. 3.375 Gram(s) IV Intermittent every 8 hours  simvastatin 40 milliGRAM(s) Oral at bedtime    MEDICATIONS  (PRN):  acetaminophen     Tablet .. 650 milliGRAM(s) Oral every 6 hours PRN Temp greater or equal to 38C (100.4F), Mild Pain (1 - 3)

## 2024-01-19 NOTE — DIETITIAN NUTRITION RISK NOTIFICATION - TREATMENT: THE FOLLOWING DIET HAS BEEN RECOMMENDED
Diet, DASH/TLC:   Sodium & Cholesterol Restricted  Supplement Feeding Modality:  Oral  Ensure Plus High Protein Cans or Servings Per Day:  1       Frequency:  Two Times a day (01-19-24 @ 13:42) [Active]

## 2024-01-19 NOTE — DIETITIAN INITIAL EVALUATION ADULT - NUTRITIONGOAL OUTCOME1
Render Risk Assessment In Note?: no Detail Level: Zone Other (Free Text): Advised patient he should treat with chemo cream twice a year Note Text (......Xxx Chief Complaint.): This diagnosis correlates with the Goal: Pt will consume >75% of most meals/supplements throughout hospitalization  Other (Free Text): Discussed biopsy if not resolved at next appointment.

## 2024-01-19 NOTE — DIETITIAN INITIAL EVALUATION ADULT - ORAL INTAKE PTA/DIET HISTORY
Pt endorses minimal intake overt the last 2 months due to nausea from OM +abx. Pt was otherwise eating well on a regular diet, consuming 3 meals/day, independent in meal preparation. NKFA. No chewing/swallowing issues.

## 2024-01-19 NOTE — CONSULT NOTE ADULT - ASSESSMENT
Atrial fibrillation in setting of significant hypokalemia. Osteomyeloitis/plantar ulcer.  HBP  Edema bilateral could be from norvasc    Suggest  Podiatry evaluation  replete kcl  check tfts and mg, echo  telemetry  consider oral a/c  will follow
A:   1- Ulceration of the right foot     PLan:     1- Patient seen and examined   2- XRays reviewed  3-  bedside incision and drainage performed. Wound flushed with saline  4- continue with medical management   5- IV antibiotic as per Infectious disease recommendation   6- REcommmend Vascular( Dr Montoya)  7- Wound cx and MRI pending   8- Podiatry to follow

## 2024-01-19 NOTE — PROGRESS NOTE ADULT - SUBJECTIVE AND OBJECTIVE BOX
Follow up for :   paf  here for foot ulcer    SUBJ:  no palpitations, sob    Hypokalemia treated      PMH  HTN (hypertension)    Hyperlipidemia    Hypothyroid    Paroxysmal SVT (supraventricular tachycardia)    Thyroid cyst        MEDICATIONS  (STANDING):  enoxaparin Injectable 60 milliGRAM(s) SubCutaneous every 24 hours  levothyroxine 88 MICROGram(s) Oral daily  metoprolol tartrate 25 milliGRAM(s) Oral every 12 hours  piperacillin/tazobactam IVPB.. 3.375 Gram(s) IV Intermittent every 8 hours  simvastatin 40 milliGRAM(s) Oral at bedtime    MEDICATIONS  (PRN):  acetaminophen     Tablet .. 650 milliGRAM(s) Oral every 6 hours PRN Temp greater or equal to 38C (100.4F), Mild Pain (1 - 3)        PHYSICAL EXAM:  Vital Signs Last 24 Hrs  T(C): 36.4 (2024 12:44), Max: 36.5 (2024 05:59)  T(F): 97.6 (2024 12:44), Max: 97.7 (2024 05:59)  HR: 64 (2024 12:44) (64 - 151)  BP: 110/66 (2024 12:44) (90/54 - 148/74)  BP(mean): 80 (2024 19:00) (65 - 100)  RR: 18 (2024 12:44) (18 - 24)  SpO2: 100% (2024 12:44) (97% - 100%)    Parameters below as of 2024 12:44  Patient On (Oxygen Delivery Method): room air        GENERAL: NAD, well-groomed, well-developed  HEAD:  Atraumatic, Normocephalic  EYES:  conjunctiva and sclera clear  ENT: Moist mucous membranes,  NECK: Supple, No JVD, no bruits  CHEST/LUNG: Clear to auscultation bilaterally; No rales, rhonchi, wheezing, or rubs  HEART: Regular rate and rhythm; No murmurs, rubs, or gallops PMI non displaced.  ABDOMEN: Soft, Nontender, Nondistended; Bowel sounds present  EXTREMITIES:  , No clubbing, cyanosis, or edema:  plantar ulcer  NERVOUS SYSTEM:  Alert       TELEMETRY:  sinus    ECG:  < from: 12 Lead ECG (24 @ 14:18) >    Ventricular Rate 128 BPM    Atrial Rate 59 BPM    QRS Duration 126 ms    Q-T Interval 394 ms    QTC Calculation(Bazett) 575 ms    R Axis -16 degrees    T Axis 97 degrees    Diagnosis Line Atrial fibrillation with rapid ventricular response  Left bundle branch block  Abnormal ECG  When compared with ECG of 23-DEC-2023 07:19,  Atrial fibrillation has replaced Sinus rhythm  Vent. rate has increased BY  80 BPM      < end of copied text >      LABS:                        8.9    4.59  )-----------( 286      ( 2024 07:35 )             25.3         138  |  105  |  9   ----------------------------<  78  3.8   |  23  |  1.15    Ca    8.8      2024 07:35  Mg     1.6         TPro  7.5  /  Alb  3.3  /  TBili  0.9  /  DBili  x   /  AST  21  /  ALT  13  /  AlkPhos  122<H>          Troponin I, High Sensitivity Result: 21.0 ng/L (24 @ 14:15)    PT/INR - ( 2024 14:15 )   PT: 12.9 sec;   INR: 1.14 ratio         PTT - ( 2024 14:15 )  PTT:39.9 sec    I&O's Summary    2024 07:01  -  2024 07:00  --------------------------------------------------------  IN: 0 mL / OUT: 800 mL / NET: -800 mL          RADIOLOGY & ADDITIONAL STUDIES:  < from: Xray Foot AP + Lateral + Oblique, Right (24 @ 14:32) >    ACC: [id number] EXAM:  XR FOOT COMP MIN 3 VIEWS RT   ORDERED BY: PAT JAVIER     PROCEDURE DATE:  2024          INTERPRETATION:  Right foot. Concern is osteomyelitis. Patient has a deep   ulcer. 3 views.    Indication the mid shaft of the second metatarsal again noted with signs   of healing.    Advanced degeneration at the first MTP joint again noted.    There is now lana dislocation at the MTP joint of the third digit with   associated soft tissue swelling and local soft tissue erosion abutting   the distal aspect of the third metatarsal.    There is no definitive evidence of bone destruction or fracture.    IMPRESSION: Soft tissue swelling around the third MTP joint with lana   dislocation at the third MTP joint. Soft tissue ulcer abuts the distal   aspect of the third metatarsal. No lana acute bony finding. Other   findings stable.    --- End of Report ---            JEANINE BUTLER MD; Attending Radiologist  This document has been electronically signed. 2024  4:29PM    < end of copied text >  < from: TTE Echo Complete w/o Contrast w/ Doppler (24 @ 16:30) >    ACC: [id number] EXAM:  ECHO TTE WO CON COMP W DOPP                          PROCEDURE DATE:  2024          INTERPRETATION:  TRANSTHORACIC ECHOCARDIOGRAM REPORT        Patient Name:   KASSIE LOPEZ Patient Location: [address] Rec #:  [id number]          Accession #:      [id number]  Account #:      [id number]           Height:           69.7 in 177.0 cm  YOB: 1940        Weight:           125.7 lb 57.00 kg  Patient Age:    83 years         BSA:              1.71 m²  Patient Gender: F                BP:               114/60 mmHg      Date of Exam:        2024 4:30:09 PM  Sonographer:         GLORY  Referring Physician: REG    Procedure:     2D Echo/Doppler/Color Doppler Complete.  Indications:   I48.91 - Unspecified atrial fibrillation  Diagnosis:     I35.0 - Nonrheumatic aortic (valve) stenosis  Study Details: Technically fair study.        2D AND M-MODE MEASUREMENTS (normal ranges within parentheses):  Left                 Normal   Aorta/Left            Normal  Ventricle:                    Atrium:  IVSd (2D):    0.97  (0.7-1.1) Aortic Root   3.09  (2.4-3.7)                 cm             (2D):          cm  LVPWd (2D):   0.77  (0.7-1.1) Aortic Root   3.07  (2.4-3.7)                 cm             (Mmode): cm  LVIDd (2D):   4.69  (3.4-5.7) AoV Cusp      1.29  (1.5-2.6)                 cm             Separation:    cm  LVIDs (2D):   2.91            Left Atrium   2.74  (1.9-4.0)                 cm             (2D):          cm  LV FS (2D):   38.0   (>25%)   Left Atrium   2.59  (1.9-4.0)                  %             (Mmode):       cm  LV EF (2D):   68 %   (>55%)   LA Volume     19.1  Relative Wall 0.33   (<0.42)  Index        ml/m²  Thickness    LV DIASTOLIC FUNCTION:  MV Peak E: 0.93 m/s Decel Time: 229 msec  MV Peak A: 0.64 m/s  E/A Ratio: 1.46    SPECTRAL DOPPLER ANALYSIS (where applicable):  Mitral Valve:  MV P1/2 Time: 66.42 msec  MV Area, PHT: 3.31 cm²    Aortic Valve: AoV Max Ovi: 2.17 m/s AoV Peak P.8 mmHg AoV Mean P.4 mmHg    LVOT Vmax: 1.36 m/s LVOT VTI: 0.239 m LVOT Diameter: 1.80 cm    AoV Area, Vmax: 1.59 cm² AoV Area, VTI: 1.25 cm² AoV Area, Vmn: 1.33 cm²  Ao VTI: 0.485  Tricuspid Valve and PA/RV Systolic Pressure: TR Max Velocity: 2.50 m/s RA   Pressure: 3 mmHg RVSP/PASP: 28.0 mmHg      PHYSICIAN INTERPRETATION:  Left Ventricle: The left ventricular internal cavity size is normal. Left   ventricular wall thickness is normal.  Global LV systolic function was normal. Left ventricular ejection   fraction, by visual estimation, is 65 to 70%. The left ventricular   diastolic function could not be assessed in this study.  Right Ventricle: Normal right ventricular size and function. The right   ventricular size is normal.  Left Atrium: Normal left atrial size. LAvolume Index is 19.1 ml/m² ml/m2.  Right Atrium: Normal right atrial size.  Pericardium: There is no evidence of pericardial effusion.  Mitral Valve: Thickening and calcification of the anterior and posterior   mitral valve leaflets. There is mild mitral annular calcification. Mild   mitral valve regurgitation is seen.  Tricuspid Valve: The tricuspid valve is normal in structure.   Mild-moderate tricuspid regurgitation is visualized.  Aortic Valve: The aortic valve is trileaflet. Mild to moderateaortic   stenosis is present. The Dimesionless Index value is .49.  Pulmonic Valve: Structurally normal pulmonic valve, with normal leaflet   excursion. The pulmonic valve is normal. Trace pulmonic valve   regurgitation.  Aorta: The aortic root is normal in size and structure.  Venous: The inferior vena cava is normal.      Summary:   1. Left ventricular ejection fraction, by visual estimation, is 65 to   70%.   2. Normal global left ventricular systolic function.   3. Normal left ventricular internal cavity size.   4. The left ventricular diastolic function could not be assessed in this   study.   5. Normal left atrial size.   6. Normal right atrial size.   7. Mild mitral annular calcification.   8. Mild mitral valve regurgitation.   9. Thickening and calcification of the anterior and posterior mitral   valve leaflets.  10. Mild-moderate tricuspid regurgitation.  11. Mild to moderate aortic valve stenosis.  12. LA volume Index is 19.1 ml/m² ml/m2.    [id number] Wilver May MD,[facility] , Electronically signed on   2024 at 9:13:40 AM      < end of copied text >      ECHO:

## 2024-01-19 NOTE — PROGRESS NOTE ADULT - ASSESSMENT
84 yo female with hx of HTN, HLD, chronic right plantar foot ulcers/OM, presented to the ED from podiatry office for IV abx for acute on chronic OM of right foot, also noted to have new onset A Fib     #New onset A Fib w/ RVR  - Likely acute infection induced vs electrolyte abnormality (hypokalemia)  - HR improved following cardizem in ED  - TSH normal  - Started Metoprolol 12.5mg BID w/ holding parameters  - CHADSVASC at least 4. Renally dosed Lovenox. Transition to NOAC  - f/u TTE  - Cardiology consult pending     #Hypokalemia - resolved  - Continue to monitor     #Acute on chronic right plantar foot OM  - No leukocytosis   - Podiatrist, Dr. Magdaleno, sent pt for IV abx    - f/u blood cultures   - Started Zosyn  - ID consult pending    #Acute renal failure, suspect drug induced [cipro, ARBs]  - Renal function improving   - Nephro consult pending    #Hypothyroidism  - s/p thyroidectomy  - TSH normal  - Continue Synthroid    #HTN  - Continue amlodipine    #VTE ppx  - Lovenox     GOC: DNR/I 84 yo female with hx of HTN, HLD, chronic right plantar foot ulcers/OM, presented to the ED from podiatry office for IV abx for acute on chronic OM of right foot, also noted to have new onset A Fib     #New onset A Fib w/ RVR  - Likely acute infection induced vs electrolyte abnormality (hypokalemia)  - HR improved following cardizem in ED  - TSH normal  - Started Metoprolol 12.5mg BID w/ holding parameters  - CHADSVASC at least 4. Renally dosed Lovenox. Transition to NOAC  - f/u TTE  - Cardiology consulted, following    #Hypokalemia - resolved  - Continue to monitor     #Acute on chronic right plantar foot OM  - No leukocytosis   - Podiatrist, Dr. Magdaleno, sent pt for IV abx    - f/u blood cultures   - Started Zosyn  - ID consult pending    #Acute renal failure, suspect drug induced [cipro, ARBs]  - Renal function improving   - Nephro consult pending    #Hypothyroidism  - s/p thyroidectomy  - TSH normal  - Continue Synthroid    #HTN  - Continue amlodipine    #VTE ppx  - Lovenox     GOC: DNR/I    Dispo: Pending above    Patient states that she will update her family  82 yo female with hx of HTN, HLD, chronic right plantar foot ulcers/OM, presented to the ED from podiatry office for IV abx for acute on chronic OM of right foot, also noted to have new onset A Fib     #New onset A Fib w/ RVR  - Likely acute infection induced vs electrolyte abnormality (hypokalemia)  - HR improved following cardizem in ED  - TSH normal  - Started Metoprolol 12.5mg BID w/ holding parameters  - CHADSVASC at least 4. Renally dosed Lovenox. Transition to NOAC  - f/u TTE  - Cardiology consulted, following    #Hypokalemia - resolved  - Continue to monitor     #Acute on chronic right plantar foot OM  - No leukocytosis   - Podiatrist, Dr. Magdaleno, sent pt for IV abx    - f/u blood cultures   - Started Zosyn  - ID consult pending  - Podiatry consult     #Acute renal failure, suspect drug induced [cipro, ARBs]  - Renal function improving   - Nephro consult pending    #Hypothyroidism  - s/p thyroidectomy  - TSH normal  - Continue Synthroid    #HTN  - Continue amlodipine    #VTE ppx  - Lovenox     GOC: DNR/I    Dispo: Pending above    Patient states that she will update her family

## 2024-01-19 NOTE — PROGRESS NOTE ADULT - NS ATTEND AMEND GEN_ALL_CORE FT
83 year old F PMH HTN, HLD, chronic right plantar foot ulcers/OM, presented to the ED from podiatry office for IV abx for acute on chronic OM of right foot, also noted to have new onset A Fib     #New onset A Fib w/ RVR  - Likely acute infection induced vs electrolyte abnormality (hypokalemia)  - HR improved following cardizem in ED  - TSH normal  - continue Metoprolol tartrate 12.5mg BID w/ holding parameters  - CHADSVASC at least 4- placed on renally dosed lovenox for now, transition to NOAC once no procedures planned   - follow up echo  - follow up repeat EKG  - cardio recs appreciated    #Acute on chronic right plantar foot OM  - No leukocytosis, does not appear acutely infected  - continue zosyn for now  - follow up blood cultures  - follow up ID recs  - podiatry consulted, Dr. Raygoza 83 year old F PMH HTN, HLD, chronic right plantar foot ulcers/OM, presented to the ED from podiatry office for IV abx for acute on chronic OM of right foot, also noted to have new onset A Fib     #New onset A Fib w/ RVR  - Likely acute infection induced vs electrolyte abnormality (hypokalemia)  - HR improved following cardizem in ED  - TSH normal  - continue Metoprolol tartrate 12.5mg BID w/ holding parameters  - CHADSVASC at least 4- placed on renally dosed lovenox for now, transition to NOAC once no procedures planned   - follow up echo  - follow up repeat EKG  - cardio recs appreciated    #Acute on chronic right plantar foot OM  - No leukocytosis, does not appear acutely infected  - dc zosyn and started on vanco as per ID  - follow up blood cultures  - follow up wound cultures  - discussed with Dr. Cloud   - podiatry consulted, Dr. Raygoza

## 2024-01-20 LAB
ANION GAP SERPL CALC-SCNC: 10 MMOL/L — SIGNIFICANT CHANGE UP (ref 5–17)
BUN SERPL-MCNC: 12 MG/DL — SIGNIFICANT CHANGE UP (ref 7–23)
CALCIUM SERPL-MCNC: 8.9 MG/DL — SIGNIFICANT CHANGE UP (ref 8.4–10.5)
CHLORIDE SERPL-SCNC: 105 MMOL/L — SIGNIFICANT CHANGE UP (ref 96–108)
CO2 SERPL-SCNC: 23 MMOL/L — SIGNIFICANT CHANGE UP (ref 22–31)
CREAT SERPL-MCNC: 1.14 MG/DL — SIGNIFICANT CHANGE UP (ref 0.5–1.3)
EGFR: 47 ML/MIN/1.73M2 — LOW
GLUCOSE SERPL-MCNC: 71 MG/DL — SIGNIFICANT CHANGE UP (ref 70–99)
HCT VFR BLD CALC: 30 % — LOW (ref 34.5–45)
HGB BLD-MCNC: 9.9 G/DL — LOW (ref 11.5–15.5)
MCHC RBC-ENTMCNC: 31 PG — SIGNIFICANT CHANGE UP (ref 27–34)
MCHC RBC-ENTMCNC: 33 GM/DL — SIGNIFICANT CHANGE UP (ref 32–36)
MCV RBC AUTO: 94 FL — SIGNIFICANT CHANGE UP (ref 80–100)
NRBC # BLD: 0 /100 WBCS — SIGNIFICANT CHANGE UP (ref 0–0)
PLATELET # BLD AUTO: 314 K/UL — SIGNIFICANT CHANGE UP (ref 150–400)
POTASSIUM SERPL-MCNC: 3.6 MMOL/L — SIGNIFICANT CHANGE UP (ref 3.5–5.3)
POTASSIUM SERPL-SCNC: 3.6 MMOL/L — SIGNIFICANT CHANGE UP (ref 3.5–5.3)
PROT SERPL-MCNC: 5.5 G/DL — LOW (ref 6–8.3)
RBC # BLD: 3.19 M/UL — LOW (ref 3.8–5.2)
RBC # FLD: 12.4 % — SIGNIFICANT CHANGE UP (ref 10.3–14.5)
SODIUM SERPL-SCNC: 138 MMOL/L — SIGNIFICANT CHANGE UP (ref 135–145)
VANCOMYCIN FLD-MCNC: 5.6 UG/ML — SIGNIFICANT CHANGE UP
WBC # BLD: 4.64 K/UL — SIGNIFICANT CHANGE UP (ref 3.8–10.5)
WBC # FLD AUTO: 4.64 K/UL — SIGNIFICANT CHANGE UP (ref 3.8–10.5)

## 2024-01-20 PROCEDURE — 99233 SBSQ HOSP IP/OBS HIGH 50: CPT

## 2024-01-20 RX ORDER — CEFTRIAXONE 500 MG/1
1000 INJECTION, POWDER, FOR SOLUTION INTRAMUSCULAR; INTRAVENOUS EVERY 24 HOURS
Refills: 0 | Status: DISCONTINUED | OUTPATIENT
Start: 2024-01-20 | End: 2024-01-21

## 2024-01-20 RX ADMIN — ENOXAPARIN SODIUM 60 MILLIGRAM(S): 100 INJECTION SUBCUTANEOUS at 05:25

## 2024-01-20 RX ADMIN — Medication 88 MICROGRAM(S): at 05:25

## 2024-01-20 RX ADMIN — CEFTRIAXONE 100 MILLIGRAM(S): 500 INJECTION, POWDER, FOR SOLUTION INTRAMUSCULAR; INTRAVENOUS at 22:20

## 2024-01-20 RX ADMIN — SIMVASTATIN 40 MILLIGRAM(S): 20 TABLET, FILM COATED ORAL at 22:20

## 2024-01-20 RX ADMIN — Medication 25 MILLIGRAM(S): at 17:53

## 2024-01-20 RX ADMIN — Medication 25 MILLIGRAM(S): at 05:25

## 2024-01-20 RX ADMIN — Medication 250 MILLIGRAM(S): at 17:50

## 2024-01-20 NOTE — PROGRESS NOTE ADULT - SUBJECTIVE AND OBJECTIVE BOX
Patient is a 83y old  Female who presents with a chief complaint of Right foot swelling     Patient seen and examined at bedside. Pt states they feel well, denies overnight events or current complaints including chest pain, shortness of breath, dizziness, nausea, vomiting, diarrhea, fever or chills.      ALLERGIES:  linezolid (Unknown)  Cipro (Other)  doxycycline (Unknown)  Levaquin (Stomach Upset; Nausea)    MEDICATIONS  (STANDING):  enoxaparin Injectable 60 milliGRAM(s) SubCutaneous every 24 hours  levothyroxine 88 MICROGram(s) Oral daily  metoprolol tartrate 25 milliGRAM(s) Oral every 12 hours  simvastatin 40 milliGRAM(s) Oral at bedtime  vancomycin  IVPB 750 milliGRAM(s) IV Intermittent every 24 hours    MEDICATIONS  (PRN):  acetaminophen     Tablet .. 650 milliGRAM(s) Oral every 6 hours PRN Temp greater or equal to 38C (100.4F), Mild Pain (1 - 3)    Vital Signs Last 24 Hrs  T(F): 98.4 (20 Jan 2024 05:45), Max: 98.4 (20 Jan 2024 05:45)  HR: 64 (20 Jan 2024 05:45) (64 - 77)  BP: 126/76 (20 Jan 2024 05:45) (110/61 - 126/76)  RR: 17 (20 Jan 2024 05:45) (16 - 18)  SpO2: 96% (20 Jan 2024 05:45) (96% - 100%)  I&O's Summary    19 Jan 2024 07:01  -  20 Jan 2024 07:00  --------------------------------------------------------  IN: 0 mL / OUT: 1200 mL / NET: -1200 mL      PHYSICAL EXAM:  General: NAD, A/O x 3  ENT: MMM, no oral thrush   Neck: Supple, No JVD  Lungs: Clear to auscultation bilaterally, non labored breathing  Cardio: RRR, S1/S2, No murmurs  Abdomen: Soft, Nontender, Nondistended; Bowel sounds present  Extremities: No calf tenderness, No pitting edema    LABS:                        9.9    4.64  )-----------( 314      ( 20 Jan 2024 06:20 )             30.0     01-20    138  |  105  |  12  ----------------------------<  71  3.6   |  23  |  1.14    Ca    8.9      20 Jan 2024 06:20  Mg     1.6     01-19    TPro  5.5  /  Alb  x   /  TBili  x   /  DBili  x   /  AST  x   /  ALT  x   /  AlkPhos  x   01-20      PT/INR - ( 18 Jan 2024 14:15 )   PT: 12.9 sec;   INR: 1.14 ratio         PTT - ( 18 Jan 2024 14:15 )  PTT:39.9 sec  Lactate, Blood: 1.5 mmol/L (01-18 @ 17:45)  Lactate, Blood: 2.3 mmol/L (01-18 @ 14:15)    CARDIAC MARKERS ( 18 Jan 2024 14:15 )  x     / 21.0 ng/L / x     / x     / x            TSH 0.506   TSH with FT4 reflex --  Total T3 --                  Urinalysis Basic - ( 20 Jan 2024 06:20 )    Color: x / Appearance: x / SG: x / pH: x  Gluc: 71 mg/dL / Ketone: x  / Bili: x / Urobili: x   Blood: x / Protein: x / Nitrite: x   Leuk Esterase: x / RBC: x / WBC x   Sq Epi: x / Non Sq Epi: x / Bacteria: x        Culture - Blood (collected 18 Jan 2024 14:16)  Source: .Blood Blood-Peripheral  Preliminary Report (19 Jan 2024 19:02):    No growth at 24 hours    Culture - Blood (collected 18 Jan 2024 14:15)  Source: .Blood Blood-Peripheral  Preliminary Report (19 Jan 2024 19:02):    No growth at 24 hours          RADIOLOGY & ADDITIONAL TESTS:  < from: Xray Foot AP + Lateral + Oblique, Right (01.18.24 @ 14:32) >  IMPRESSION: Soft tissue swelling around the third MTP joint with lana   dislocation at the third MTP joint. Soft tissue ulcer abuts the distal   aspect of the third metatarsal. No lana acute bony finding. Other   findings stable.    --- End of Report ---            JEANINE BUTLER MD; Attending Radiologist  This document has been electronically signed. Jan 18 2024  4:29PM    < end of copied text >    < from: TTE Echo Complete w/o Contrast w/ Doppler (01.18.24 @ 16:30) >      Summary:   1. Left ventricular ejection fraction, by visual estimation, is 65 to   70%.   2. Normal global left ventricular systolic function.   3. Normal left ventricular internal cavity size.   4. The left ventricular diastolic function could not be assessed in this   study.   5. Normal left atrial size.   6. Normal right atrial size.   7. Mild mitral annular calcification.   8. Mild mitral valve regurgitation.   9. Thickening and calcification of the anterior and posterior mitral   valve leaflets.  10. Mild-moderate tricuspid regurgitation.  11. Mild to moderate aortic valve stenosis.  12. LA volume Index is 19.1 ml/m² ml/m2.    [id number] Wilver May MD,[facility] , Electronically signed on   1/19/2024 at 9:13:40 AM            *** Final ***    < end of copied text >    Care Discussed with Consultants/Other Providers:

## 2024-01-20 NOTE — PROGRESS NOTE ADULT - NS ATTEND AMEND GEN_ALL_CORE FT
83 year old F PMH HTN, HLD, chronic right plantar foot ulcers/OM, presented to the ED from podiatry office for IV abx for acute on chronic OM of right foot, also noted to have new onset A Fib     #New onset A Fib w/ RVR  - Likely acute infection induced vs electrolyte abnormality (hypokalemia)  - HR improved following cardizem in ED  - TSH normal  - continue Metoprolol tartrate 12.5mg BID w/ holding parameters  - CHADSVASC at least 4- placed on renally dosed lovenox for now, transition to NOAC once no procedures planned   - echo with EF 65-70%  - repeat EKG with NSR, no LBBB  - cardio recs appreciated    #Acute on chronic right plantar foot OM  - No leukocytosis, does not appear acutely infected  - dc zosyn and started on vanco as per ID  - follow up blood cultures  - follow up wound cultures  - follow up DAVID, if abnormal will consider vascular consult  - follow up MRI  - discussed with Dr. Cloud   - podiatry consulted, Dr. Rayogza- daniel appreciated, bedside I&D performed 1/19    PT consulted

## 2024-01-20 NOTE — PROGRESS NOTE ADULT - ASSESSMENT
82 yo female with hx of HTN, HLD, chronic right plantar foot ulcers/OM, presented to the ED from podiatry office for IV abx for acute on chronic OM of right foot, also noted to have new onset A Fib     #New onset A Fib w/ RVR  - Likely acute infection induced vs electrolyte abnormality (hypokalemia)  - HR improved following cardizem in ED  - TSH normal  - Metoprolol 25mg BID w/ holding parameters  - CHADSVASC at least 4. Renally dosed Lovenox. Transition to NOAC if no procedures  - f/u TTE  - Cardiology recs    #Hypokalemia - resolved  - Continue to monitor     #Acute on chronic right plantar foot OM  - No leukocytosis   - Podiatrist, Dr. Magdaleno, sent pt for IV abx    - blood cultures NGTD   -ID recs  -Vanco  -FU DAVID's. Vascular if needed  -MRI   - podiatry recs      #Acute renal failure, suspect drug induced [cipro, ARBs]  - Renal function improved  - Nephro recs  -Avoid nephrotoxins  -No NSAID's, no ACE/ARB, no Cipro  -monitor      #Hypothyroidism  - s/p thyroidectomy  - TSH normal  - Continue Synthroid    #HTN  - Continue amlodipine    #VTE ppx  - Lovenox     GOC: DNR/I    Dispo: Pending above    Patient states that she will update her family

## 2024-01-21 LAB
-  AMOXICILLIN/CLAVULANIC ACID: SIGNIFICANT CHANGE UP
-  AMPICILLIN/SULBACTAM: SIGNIFICANT CHANGE UP
-  AMPICILLIN: SIGNIFICANT CHANGE UP
-  AZTREONAM: SIGNIFICANT CHANGE UP
-  CEFAZOLIN: SIGNIFICANT CHANGE UP
-  CEFEPIME: SIGNIFICANT CHANGE UP
-  CEFTRIAXONE: SIGNIFICANT CHANGE UP
-  CIPROFLOXACIN: SIGNIFICANT CHANGE UP
-  ERTAPENEM: SIGNIFICANT CHANGE UP
-  GENTAMICIN: SIGNIFICANT CHANGE UP
-  IMIPENEM: SIGNIFICANT CHANGE UP
-  LEVOFLOXACIN: SIGNIFICANT CHANGE UP
-  MEROPENEM: SIGNIFICANT CHANGE UP
-  PIPERACILLIN/TAZOBACTAM: SIGNIFICANT CHANGE UP
-  TOBRAMYCIN: SIGNIFICANT CHANGE UP
-  TRIMETHOPRIM/SULFAMETHOXAZOLE: SIGNIFICANT CHANGE UP
METHOD TYPE: SIGNIFICANT CHANGE UP
VANCOMYCIN TROUGH SERPL-MCNC: 6.2 UG/ML — LOW (ref 10–20)

## 2024-01-21 PROCEDURE — 99233 SBSQ HOSP IP/OBS HIGH 50: CPT

## 2024-01-21 PROCEDURE — 93970 EXTREMITY STUDY: CPT | Mod: 26

## 2024-01-21 RX ORDER — MEROPENEM 1 G/30ML
1000 INJECTION INTRAVENOUS EVERY 12 HOURS
Refills: 0 | Status: DISCONTINUED | OUTPATIENT
Start: 2024-01-21 | End: 2024-01-26

## 2024-01-21 RX ORDER — VANCOMYCIN HCL 1 G
1000 VIAL (EA) INTRAVENOUS EVERY 12 HOURS
Refills: 0 | Status: DISCONTINUED | OUTPATIENT
Start: 2024-01-21 | End: 2024-01-21

## 2024-01-21 RX ADMIN — SIMVASTATIN 40 MILLIGRAM(S): 20 TABLET, FILM COATED ORAL at 22:16

## 2024-01-21 RX ADMIN — Medication 25 MILLIGRAM(S): at 18:26

## 2024-01-21 RX ADMIN — ENOXAPARIN SODIUM 60 MILLIGRAM(S): 100 INJECTION SUBCUTANEOUS at 05:39

## 2024-01-21 RX ADMIN — Medication 88 MICROGRAM(S): at 05:39

## 2024-01-21 RX ADMIN — Medication 250 MILLIGRAM(S): at 18:32

## 2024-01-21 RX ADMIN — Medication 25 MILLIGRAM(S): at 05:39

## 2024-01-21 NOTE — PROGRESS NOTE ADULT - ASSESSMENT
82 yo female with hx of HTN, HLD, chronic right plantar foot ulcers/OM, presented to the ED from podiatry office for IV abx for acute on chronic OM of right foot, also noted to have new onset A Fib     #New onset A Fib w/ RVR  - Likely acute infection induced vs electrolyte abnormality (hypokalemia)  - HR improved following cardizem in ED  - TSH normal  - Metoprolol 25mg BID w/ holding parameters  - CHADSVASC at least 4. Renally dosed Lovenox. Transition to NOAC if no procedures  - f/u TTE  - Cardiology recs    #Hypokalemia - resolved  - Continue to monitor     #Acute on chronic right plantar foot OM  - No leukocytosis   - Podiatrist, Dr. Magdaleno, sent pt for IV abx    - blood cultures NGTD   -ID recs  -Vanco  -FU DAVID's. Vascular if needed  -MRI (paperwork sent)  - podiatry recs      #Acute renal failure, suspect drug induced [cipro, ARBs]  - Renal function improved  - Nephro recs  -Avoid nephrotoxins  -No NSAID's, no ACE/ARB, no Cipro  -monitor      #Hypothyroidism  - s/p thyroidectomy  - TSH normal  - Continue Synthroid    #HTN  - Continue amlodipine    #VTE ppx  - Lovenox     GOC: DNR/I    Dispo: Pending above    Patient states that she will update her family  82 yo female with hx of HTN, HLD, chronic right plantar foot ulcers/OM, presented to the ED from podiatry office for IV abx for acute on chronic OM of right foot, also noted to have new onset A Fib     #New onset A Fib w/ RVR  - Likely acute infection induced vs electrolyte abnormality (hypokalemia)  - HR improved following cardizem in ED  - TSH normal  - Metoprolol 25mg BID w/ holding parameters  - CHADSVASC at least 4. Renally dosed Lovenox. Transition to NOAC if no procedures  - f/u TTE  - Cardiology recs    #Hypokalemia - resolved  - Continue to monitor     #Acute on chronic right plantar foot OM  - No leukocytosis   - Podiatrist, Dr. Magdaleno, sent pt for IV abx    - blood cultures NGTD   -ID recs  -Vanco trough low. Increase to 1000mg q 12. Discussed with pharmacy   -FU DAVID's. Vascular if needed  -MRI (paperwork sent)  - podiatry recs      #Acute renal failure, suspect drug induced [cipro, ARBs]  - Renal function improved  - Nephro recs  -Avoid nephrotoxins  -No NSAID's, no ACE/ARB, no Cipro  -monitor      #Hypothyroidism  - s/p thyroidectomy  - TSH normal  - Continue Synthroid    #HTN  - Continue amlodipine    #VTE ppx  - Lovenox     GOC: DNR/I    Dispo: Pending above    Patient states that she will update her family

## 2024-01-21 NOTE — PROGRESS NOTE ADULT - SUBJECTIVE AND OBJECTIVE BOX
Patient is a 83y old  Female who presents with a chief complaint of Right foot swelling     Patient seen and examined at bedside. Pt states they feel well, denies overnight events or current complaints including chest pain, shortness of breath, dizziness, nausea, vomiting, diarrhea, fever or chills.      ALLERGIES:  linezolid (Unknown)  Cipro (Other)  doxycycline (Unknown)  Levaquin (Stomach Upset; Nausea)    MEDICATIONS  (STANDING):  cefTRIAXone   IVPB 1000 milliGRAM(s) IV Intermittent every 24 hours  enoxaparin Injectable 60 milliGRAM(s) SubCutaneous every 24 hours  levothyroxine 88 MICROGram(s) Oral daily  metoprolol tartrate 25 milliGRAM(s) Oral every 12 hours  simvastatin 40 milliGRAM(s) Oral at bedtime  vancomycin  IVPB 750 milliGRAM(s) IV Intermittent every 24 hours    MEDICATIONS  (PRN):  acetaminophen     Tablet .. 650 milliGRAM(s) Oral every 6 hours PRN Temp greater or equal to 38C (100.4F), Mild Pain (1 - 3)    Vital Signs Last 24 Hrs  T(F): 98 (21 Jan 2024 04:59), Max: 98.7 (20 Jan 2024 20:00)  HR: 70 (21 Jan 2024 04:59) (63 - 70)  BP: 117/65 (21 Jan 2024 04:59) (112/40 - 126/73)  RR: 16 (21 Jan 2024 04:59) (16 - 18)  SpO2: 98% (21 Jan 2024 04:59) (97% - 98%)  I&O's Summary    20 Jan 2024 07:01  -  21 Jan 2024 07:00  --------------------------------------------------------  IN: 0 mL / OUT: 600 mL / NET: -600 mL      PHYSICAL EXAM:  General: NAD, A/O x 3  ENT: MMM, no oral thrush   Neck: Supple, No JVD  Lungs: Clear to auscultation bilaterally, non labored breathing  Cardio: RRR, S1/S2, No murmurs  Abdomen: Soft, Nontender, Nondistended; Bowel sounds present  Extremities: Right plantar open wound/ulcer, no drainage, No calf tenderness, No pitting edema    LABS:                        9.9    4.64  )-----------( 314      ( 20 Jan 2024 06:20 )             30.0     01-20    138  |  105  |  12  ----------------------------<  71  3.6   |  23  |  1.14    Ca    8.9      20 Jan 2024 06:20  Mg     1.6     01-19    TPro  5.5  /  Alb  x   /  TBili  x   /  DBili  x   /  AST  x   /  ALT  x   /  AlkPhos  x   01-20      PT/INR - ( 18 Jan 2024 14:15 )   PT: 12.9 sec;   INR: 1.14 ratio         PTT - ( 18 Jan 2024 14:15 )  PTT:39.9 sec  Lactate, Blood: 1.5 mmol/L (01-18 @ 17:45)  Lactate, Blood: 2.3 mmol/L (01-18 @ 14:15)    CARDIAC MARKERS ( 18 Jan 2024 14:15 )  x     / 21.0 ng/L / x     / x     / x            TSH 0.506   TSH with FT4 reflex --  Total T3 --                  Urinalysis Basic - ( 20 Jan 2024 06:20 )    Color: x / Appearance: x / SG: x / pH: x  Gluc: 71 mg/dL / Ketone: x  / Bili: x / Urobili: x   Blood: x / Protein: x / Nitrite: x   Leuk Esterase: x / RBC: x / WBC x   Sq Epi: x / Non Sq Epi: x / Bacteria: x        Culture - Surgical Swab (collected 19 Jan 2024 13:40)  Source: .Surgical Swab right foot  Preliminary Report (20 Jan 2024 17:13):    Few Escherichia coli Susceptibility to follow.    Moderate Corynebacterium species "Susceptibilities not performed"    Culture - Urine (collected 18 Jan 2024 17:50)  Source: Clean Catch Clean Catch (Midstream)  Preliminary Report (21 Jan 2024 00:11):    10,000 - 49,000 CFU/mL Escherichia coli    Culture - Blood (collected 18 Jan 2024 14:16)  Source: .Blood Blood-Peripheral  Preliminary Report (20 Jan 2024 19:02):    No growth at 48 Hours    Culture - Blood (collected 18 Jan 2024 14:15)  Source: .Blood Blood-Peripheral  Preliminary Report (20 Jan 2024 19:02):    No growth at 48 Hours          RADIOLOGY & ADDITIONAL TESTS:  < from: US Duplex Venous Lower Ext Complete, Bilateral (01.21.24 @ 11:45) >  IMPRESSION:  No evidence of deep venous thrombosis in either lower extremity.            --- End of Report ---            EARL SILVA MD; Attending Radiologist  This document has been electronically signed. Jan 21 2024 11:52AM    < end of copied text >  < from: US Duplex Venous Lower Ext Complete, Bilateral (01.21.24 @ 11:45) >  IMPRESSION:  No evidence of deep venous thrombosis in either lower extremity.            --- End of Report ---            EARL SILVA MD; Attending Radiologist  This document has been electronically signed. Jan 21 2024 11:52AM    < end of copied text >    Care Discussed with Consultants/Other Providers:

## 2024-01-21 NOTE — PROGRESS NOTE ADULT - NS ATTEND AMEND GEN_ALL_CORE FT
83 year old F PMH HTN, HLD, chronic right plantar foot ulcers/OM, presented to the ED from podiatry office for IV abx for acute on chronic OM of right foot, also noted to have new onset A Fib     #New onset A Fib w/ RVR  - Likely acute infection induced vs electrolyte abnormality (hypokalemia)  - HR improved following cardizem in ED  - TSH normal  - continue Metoprolol tartrate 12.5mg BID w/ holding parameters  - CHADSVASC at least 4- placed on renally dosed lovenox for now, transition to NOAC once no procedures planned   - echo with EF 65-70%  - repeat EKG with NSR, no LBBB  - cardio recs appreciated    #Acute on chronic right plantar foot OM  - No leukocytosis, does not appear acutely infected  - dc zosyn and started on vanco as per ID  - follow up blood cultures  - follow up wound cultures  - follow up DAVID, if abnormal will consider vascular consult  - follow up MRI  - discussed with Dr. Cloud   - podiatry consulted, Dr. Raygoza- daniel appreciated, bedside I&D performed 1/19    PT consulted

## 2024-01-22 ENCOUNTER — APPOINTMENT (OUTPATIENT)
Dept: PULMONOLOGY | Facility: CLINIC | Age: 84
End: 2024-01-22

## 2024-01-22 LAB
CULTURE RESULTS: SIGNIFICANT CHANGE UP
SPECIMEN SOURCE: SIGNIFICANT CHANGE UP

## 2024-01-22 PROCEDURE — 99233 SBSQ HOSP IP/OBS HIGH 50: CPT

## 2024-01-22 RX ADMIN — Medication 25 MILLIGRAM(S): at 05:46

## 2024-01-22 RX ADMIN — MEROPENEM 100 MILLIGRAM(S): 1 INJECTION INTRAVENOUS at 18:44

## 2024-01-22 RX ADMIN — Medication 88 MICROGRAM(S): at 05:47

## 2024-01-22 RX ADMIN — Medication 25 MILLIGRAM(S): at 18:44

## 2024-01-22 RX ADMIN — MEROPENEM 100 MILLIGRAM(S): 1 INJECTION INTRAVENOUS at 05:47

## 2024-01-22 RX ADMIN — SIMVASTATIN 40 MILLIGRAM(S): 20 TABLET, FILM COATED ORAL at 21:42

## 2024-01-22 RX ADMIN — ENOXAPARIN SODIUM 60 MILLIGRAM(S): 100 INJECTION SUBCUTANEOUS at 05:46

## 2024-01-22 NOTE — PHYSICAL THERAPY INITIAL EVALUATION ADULT - PERTINENT HX OF CURRENT PROBLEM, REHAB EVAL
82yo female with hx of  HTN, HLD, chronic right plantar foot ulcers/OM, presented to the ED from Podiatry office for IV abx for acute on chronic OM of Right foot. Pt was recently admitted/discharged from [facility] for acute on chronic osteomyelitis of the right foot. She completed course of IV abx and was deemed stable for discharge home. Pt was recovering from acute renal failure due to Cipro as well.  Currently denies pain or drainage from her wound. Does endorse persistent swelling of her Right foot along with mild redness. While in the ED, pt noted to have new onset rapid AFIB.

## 2024-01-22 NOTE — PROGRESS NOTE ADULT - ASSESSMENT
82 yo female with HTN,HLD, s/p Rt partial second toe amp admitted with plantar ulcer near 3rd MTH.  The wound culture has corynebacteria striatum and ESBL E Coli.  The wound is relatively clean.  She is concerned that cipro almost killed her.  I am concerned that based on chronicity she may have chronic osteomyelitis.  Her vanco levels have been low  Suggest:  1 Continue meropenem  2 We may restart vanco  3 Await MRI  4 would prefer a management plan that limits antibiotic duration to minimize potential toxicities for limited gain if bone is infected.  5 vascular  assessment is pending

## 2024-01-22 NOTE — PROGRESS NOTE ADULT - SUBJECTIVE AND OBJECTIVE BOX
Patient is a 83y old  Female who presents with a chief complaint of Right foot swelling (21 Jan 2024 12:32)      Patient seen and examined at bedside. No overnight events reported.  Pt states she feels ok today, foot with no pain.     ALLERGIES:  linezolid (Unknown)  Cipro (Other)  doxycycline (Unknown)  Levaquin (Stomach Upset; Nausea)    MEDICATIONS  (STANDING):  enoxaparin Injectable 60 milliGRAM(s) SubCutaneous every 24 hours  levothyroxine 88 MICROGram(s) Oral daily  meropenem  IVPB 1000 milliGRAM(s) IV Intermittent every 12 hours  metoprolol tartrate 25 milliGRAM(s) Oral every 12 hours  simvastatin 40 milliGRAM(s) Oral at bedtime    MEDICATIONS  (PRN):  acetaminophen     Tablet .. 650 milliGRAM(s) Oral every 6 hours PRN Temp greater or equal to 38C (100.4F), Mild Pain (1 - 3)    Vital Signs Last 24 Hrs  T(F): 98.4 (22 Jan 2024 05:02), Max: 98.4 (22 Jan 2024 05:02)  HR: 69 (22 Jan 2024 05:02) (69 - 73)  BP: 116/61 (22 Jan 2024 05:02) (116/61 - 129/67)  RR: 16 (22 Jan 2024 05:02) (16 - 18)  SpO2: 95% (22 Jan 2024 05:02) (95% - 98%)  I&O's Summary    21 Jan 2024 07:01  -  22 Jan 2024 07:00  --------------------------------------------------------  IN: 100 mL / OUT: 0 mL / NET: 100 mL      PHYSICAL EXAM:  General: NAD, A/O x 3  ENT: No gross hearing impairment, Moist mucous membranes, no thrush  Neck: Supple, No JVD  Lungs: Clear to auscultation bilaterally, good air entry, non-labored breathing  Cardio: RRR, S1/S2, No murmur  Abdomen: Soft, Nontender, Nondistended; Bowel sounds present  Extremities: right foot dressed, no apparent drainage, No calf tenderness, No cyanosis, No pitting edema  Psych: Appropriate mood and affect    LABS:                        9.9    4.64  )-----------( 314      ( 20 Jan 2024 06:20 )             30.0     01-20    138  |  105  |  12  ----------------------------<  71  3.6   |  23  |  1.14    Ca    8.9      20 Jan 2024 06:20    TPro  5.5  /  Alb  x   /  TBili  x   /  DBili  x   /  AST  x   /  ALT  x   /  AlkPhos  x   01-20                                      Urinalysis Basic - ( 20 Jan 2024 06:20 )    Color: x / Appearance: x / SG: x / pH: x  Gluc: 71 mg/dL / Ketone: x  / Bili: x / Urobili: x   Blood: x / Protein: x / Nitrite: x   Leuk Esterase: x / RBC: x / WBC x   Sq Epi: x / Non Sq Epi: x / Bacteria: x        Culture - Surgical Swab (collected 19 Jan 2024 13:40)  Source: .Surgical Swab right foot  Preliminary Report (21 Jan 2024 21:16):    Few Escherichia coli ESBL    Moderate Corynebacterium species "Susceptibilities not performed"  Organism: Escherichia coli ESBL (21 Jan 2024 15:12)  Organism: Escherichia coli ESBL (21 Jan 2024 15:12)      Method Type: JEANINE      -  Amoxicillin/Clavulanic Acid: S <=8/4      -  Ampicillin: R >16 These ampicillin results predict results for amoxicillin      -  Ampicillin/Sulbactam: R 8/4      -  Aztreonam: R >16      -  Cefazolin: R >16      -  Cefepime: R >16      -  Ceftriaxone: R >32      -  Ciprofloxacin: R >2      -  Ertapenem: S <=0.5      -  Gentamicin: R >8      -  Imipenem: S <=1      -  Levofloxacin: R >4      -  Meropenem: S <=1      -  Piperacillin/Tazobactam: R <=8      -  Tobramycin: R 8      -  Trimethoprim/Sulfamethoxazole: R >2/38    Culture - Urine (collected 18 Jan 2024 17:50)  Source: Clean Catch Clean Catch (Midstream)  Final Report (22 Jan 2024 08:48):    Culture grew 3 or more types of organisms which indicate    collection contamination; consider recollection only if clinically    indicated.    Culture - Blood (collected 18 Jan 2024 14:16)  Source: .Blood Blood-Peripheral  Preliminary Report (21 Jan 2024 19:01):    No growth at 72 Hours    Culture - Blood (collected 18 Jan 2024 14:15)  Source: .Blood Blood-Peripheral  Preliminary Report (21 Jan 2024 19:01):    No growth at 72 Hours        RADIOLOGY & ADDITIONAL TESTS:  < from: TTE Echo Complete w/o Contrast w/ Doppler (01.18.24 @ 16:30) >    Summary:   1. Left ventricular ejection fraction, by visual estimation, is 65 to   70%.   2. Normal global left ventricular systolic function.   3. Normal left ventricular internal cavity size.   4. The left ventricular diastolic function could not be assessed in this   study.   5. Normal left atrial size.   6. Normal right atrial size.   7. Mild mitral annular calcification.   8. Mild mitral valve regurgitation.   9. Thickening and calcification of the anterior and posterior mitral   valve leaflets.  10. Mild-moderate tricuspid regurgitation.  11. Mild to moderate aortic valve stenosis.  12. LA volume Index is 19.1 ml/m² ml/m2.      < end of copied text >    Care Discussed with Consultants/Other Providers:

## 2024-01-22 NOTE — PROGRESS NOTE ADULT - ASSESSMENT
84 yo female with hx of HTN, HLD, chronic right plantar foot ulcers/OM, sent from podiatry office for IV abx for acute on chronic OM of right foot, also noted to have new onset A Fib.     #New onset Paroxysmal A Fib w/ RVR  - Likely acute infection induced vs electrolyte abnormality (hypokalemia)  - Heart rate improved; cont Metoprolol  - TSH normal  - CHADSVASC at least 4. Renally dosed Lovenox. Transition to NOAC if no procedures to be done  - Echo done: EF 65>70%, mild to mod AS, tricuspid regurg  - Cardiology recs noted    #Hypokalemia - resolved  - Continue to monitor     #Acute on chronic right plantar foot OM  - pt with no fevers, no elevated wbc  - Podiatrist, Dr. Magdaleno, sent pt for IV abx    - blood cultures NGTD   - ID recs noted: cont IV Meropenem  - f/u  DAVID's. Vascular consult if needed-- pt to have MRI today of right foot    #Acute renal failure, suspect drug induced [cipro, ARBs]  - Renal function improved  - Nephro recs noted; avoid nephrotoxins, No NSAID's, no ACE/ARB, no Cipro    #Hypothyroidism  - s/p thyroidectomy  - TSH normal  - Continue Synthroid    #HTN  - Continue amlodipine    #VTE ppx  - Lovenox     GOC: DNR/I    Dispo: Pending above    Patient states that she will update her family

## 2024-01-22 NOTE — PROGRESS NOTE ADULT - SUBJECTIVE AND OBJECTIVE BOX
S : 83y year old Female seen at bedside for right foot ulceration. Patient is resting comfortably.    Chief Complaint : Patient is a 83y old  Female who presents with a chief complaint of Right foot swelling (22 Jan 2024 11:22)    HPI : HPI:  82yo female with hx of  HTN, HLD, chronic right plantar foot ulcers/OM, presented to the ED from Podiatry office for IV abx for acute on chronic OM of Right foot. Pt was recently admitted/discharged from [facility] for acute on chronic osteomyelitis of the right foot. She completed course of IV abx and was deemed stable for discharge home. Pt was recovering from acute renal failure due to Cipro as well.  Currently denies pain or drainage from her wound. Does endorse persistent swelling of her Right foot along with mild redness.     While in the ED, pt noted to have new onset rapid AFIB. Denied cp, palpitations, sob, abd pain, N/V, fever, chills.  (18 Jan 2024 15:51)      Patient admits to  (-) Fevers, (-) Chills, (-) Nausea, (-) Vomiting, (-) Shortness of Breath      PMH: HTN (hypertension)    Hyperlipidemia    Hypothyroid    Paroxysmal SVT (supraventricular tachycardia)    Thyroid cyst      PSH:Back problem    S/P thyroid surgery    S/P hernia surgery    S/P rotator cuff surgery        Allergies:linezolid (Unknown)  Cipro (Other)  doxycycline (Unknown)  Levaquin (Stomach Upset; Nausea)      Labs:      WBC Trend  4.64 Date (01-20 @ 06:20)  4.59 Date (01-19 @ 07:35)  8.04 Date (01-18 @ 14:15)      Chem              T(F): 98.4 (01-22-24 @ 05:02), Max: 98.4 (01-22-24 @ 05:02)  HR: 69 (01-22-24 @ 05:02) (69 - 73)  BP: 116/61 (01-22-24 @ 05:02) (116/61 - 129/67)  RR: 16 (01-22-24 @ 05:02) (16 - 18)  SpO2: 95% (01-22-24 @ 05:02) (95% - 98%)  Wt(kg): --    O:   General: Pleasant  female NAD & AOX3.    Integument:  Skin warm, dry and supple bilateral.    Ulceration Right foot: + hyperkeratotic border, wound base is Fibrogranular , wound size (1cm cm X 1cm cm X 0.4cm) negative edema, negative  josh-wound erythema, negative  purulence, positive fluctuance, positive tracking/tunneling, negative probe to bone.   Vascular: Dorsalis Pedis and Posterior Tibial pulses non-palpable.  Capillary re-fill time less then 3 seconds digits 1-5 bilateral.    Neuro: Protective sensation diminished to the level of the digits bilateral.  MSK: Muscle strength 5/5 all major muscle groups bilateral.  ----------------------------------  < from: Xray Foot AP + Lateral + Oblique, Right (01.18.24 @ 14:32) >  ACC: [id number] EXAM:  XR FOOT COMP MIN 3 VIEWS RT   ORDERED BY: PAT JAVIER     PROCEDURE DATE:  01/18/2024          INTERPRETATION:  Right foot. Concern is osteomyelitis. Patient has a deep   ulcer. 3 views.    Indication the mid shaft of the second metatarsal again noted with signs   of healing.    Advanced degeneration at the first MTP joint again noted.    There is now lana dislocation at the MTP joint of the third digit with   associated soft tissue swelling and local soft tissue erosion abutting   the distal aspect of the third metatarsal.    There is no definitive evidence of bone destruction or fracture.    IMPRESSION: Soft tissue swelling around the third MTP joint with lana   dislocation at the third MTP joint. Soft tissue ulcer abuts the distal   aspect of the third metatarsal. No lana acute bony finding. Other   findings stable.    --- End of Report ---              -----------------------------------------------  A: Right foot ulceration      P:   Chart reviewed and Patient evaluated  Discussed diagnosis and treatment with patient  Wound flush with normal saline  Awaiting MRI   Applied betadine with dry sterile dressing  X-rays reviewed as above  Continue with IV antibiotics As Per ID  Podiatry will follow while in house.

## 2024-01-22 NOTE — PROGRESS NOTE ADULT - SUBJECTIVE AND OBJECTIVE BOX
CC: f/u for Rt foot infection    Patient reports: no specific complaints    REVIEW OF SYSTEMS:  All other review of systems negative (Comprehensive ROS)    Antimicrobials Day #  :day 2, s/p vanco amd zosyn  meropenem  IVPB 1000 milliGRAM(s) IV Intermittent every 12 hours    Other Medications Reviewed  MEDICATIONS  (STANDING):  enoxaparin Injectable 60 milliGRAM(s) SubCutaneous every 24 hours  levothyroxine 88 MICROGram(s) Oral daily  meropenem  IVPB 1000 milliGRAM(s) IV Intermittent every 12 hours  metoprolol tartrate 25 milliGRAM(s) Oral every 12 hours  simvastatin 40 milliGRAM(s) Oral at bedtime    T(F): 98.7 (01-22-24 @ 12:22), Max: 98.7 (01-22-24 @ 12:22)  HR: 68 (01-22-24 @ 12:22)  BP: 115/68 (01-22-24 @ 12:22)  RR: 16 (01-22-24 @ 12:22)  SpO2: 97% (01-22-24 @ 12:22)  Wt(kg): --    PHYSICAL EXAM:  General: alert, no acute distress  Eyes:  anicteric, no conjunctival injection, no discharge  Oropharynx: no lesions or injection 	  Neck: supple, without adenopathy  Lungs: clear to auscultation  Heart: regular rate and rhythm; no murmur, rubs or gallops  Abdomen: soft, nondistended, nontender, without mass or organomegaly  Skin: no lesions  Extremities: no clubbing, cyanosis, or edema  Neurologic: alert, oriented, moves all extremities  Rt foot with dry callous on plantar surface, no pus or cellulitis  LAB RESULTS:              MICROBIOLOGY:  RECENT CULTURES:  01-19 @ 13:40 .Surgical Swab right foot Escherichia coli ESBL    Few Escherichia coli ESBL  Moderate Corynebacterium species "Susceptibilities not performed"      01-18 @ 17:50 Clean Catch Clean Catch (Midstream)     Culture grew 3 or more types of organisms which indicate  collection contamination; consider recollection only if clinically  indicated.      01-18 @ 14:16 .Blood Blood-Peripheral     No growth at 72 Hours      01-18 @ 14:15 .Blood Blood-Peripheral     No growth at 72 Hours          RADIOLOGY REVIEWED:  < from: US Duplex Venous Lower Ext Complete, Bilateral (01.21.24 @ 11:45) >  IMPRESSION:  No evidence of deep venous thrombosis in either lower extremity.    < end of copied text >  < from: Xray Foot AP + Lateral + Oblique, Right (01.18.24 @ 14:32) >  IMPRESSION: Soft tissue swelling around the third MTP joint with lana   dislocation at the third MTP joint. Soft tissue ulcer abuts the distal   aspect of the third metatarsal. No lana acute bony finding. Other   findings stable.    --- End of Report ---    < end of copied text >

## 2024-01-22 NOTE — PHYSICAL THERAPY INITIAL EVALUATION ADULT - ADDITIONAL COMMENTS
pt lives alone in a small apartment, 2+ flights with rail to enter, is independent with all mobility and ADLs, reports she does not use any DME, has a supportive neighbor and uses a car service to do errands, reports she started home PT recently but both she and therapist did not feel she needed it

## 2024-01-22 NOTE — PROGRESS NOTE ADULT - SUBJECTIVE AND OBJECTIVE BOX
No distress    Vital Signs Last 24 Hrs  T(C): 36.7 (01-22-24 @ 21:05), Max: 37.1 (01-22-24 @ 12:22)  T(F): 98 (01-22-24 @ 21:05), Max: 98.7 (01-22-24 @ 12:22)  HR: 66 (01-22-24 @ 21:05) (66 - 70)  BP: 101/59 (01-22-24 @ 21:05) (101/59 - 116/61)  RR: 16 (01-22-24 @ 21:05) (16 - 16)  SpO2: 97% (01-22-24 @ 21:05) (95% - 97%)    22 Jan 2024 07:01  -  22 Jan 2024 22:01  --------------------------------------------------------  OUT:    Voided (mL): 500 mL  Total OUT: 500 mL    s1s2  b/l air entry  soft, ND  tr b/l LE edema, RLE wound dressed                               acetaminophen     Tablet .. 650 milliGRAM(s) Oral every 6 hours PRN  enoxaparin Injectable 60 milliGRAM(s) SubCutaneous every 24 hours  levothyroxine 88 MICROGram(s) Oral daily  meropenem  IVPB 1000 milliGRAM(s) IV Intermittent every 12 hours  metoprolol tartrate 25 milliGRAM(s) Oral every 12 hours  simvastatin 40 milliGRAM(s) Oral at bedtime    A/P:    Hx recent CARLOS suspected iso Cipro (adm 12/20 - 12/28), exacerbated by ARB as op (Cr 0.83 - 12/10/23, peak Cr 8.35 - 12/20/23)  Cr continues to improve   UA negative   Serologies are negative on prior adm  Avoid nephrotoxins  No NSAID's, avoid ACE/ARB, no Cipro  Would add Cipro to list of allergies  This time adm for RLE wound, r/o osteo  ID, podiatry f/u  For MRI   Abx per ID  F/u CBC, BMP, Mg in am    [phone]

## 2024-01-22 NOTE — PROGRESS NOTE ADULT - NS ATTEND AMEND GEN_ALL_CORE FT
83 year old F PMH HTN, HLD, chronic right plantar foot ulcers/OM, presented to the ED from podiatry office for IV abx for acute on chronic OM of right foot, also noted to have new onset A Fib     #New onset A Fib w/ RVR  - Likely acute infection induced vs electrolyte abnormality (hypokalemia)  - HR improved following cardizem in ED  - TSH normal  - continue Metoprolol tartrate 12.5mg BID w/ holding parameters  - CHADSVASC at least 4- placed on renally dosed lovenox for now, transition to NOAC once no procedures planned   - echo with EF 65-70%  - repeat EKG with NSR, no LBBB  - cardio recs appreciated    #Acute on chronic right plantar foot OM  - No leukocytosis, does not appear acutely infected  - wound cultures with ESBL E coli, placed on meropenem  - BCx with NGTD, UCx contaminated  - follow up DAVID, if abnormal will consider vascular consult  - follow up MRI with contrast, to be done today  - ID consulted  - podiatry consulted, recs appreciated- bedside I&D performed 1/19    PT recommends HCPT

## 2024-01-23 ENCOUNTER — APPOINTMENT (OUTPATIENT)
Dept: MRI IMAGING | Facility: HOSPITAL | Age: 84
End: 2024-01-23

## 2024-01-23 LAB
ANION GAP SERPL CALC-SCNC: 9 MMOL/L — SIGNIFICANT CHANGE UP (ref 5–17)
BUN SERPL-MCNC: 15 MG/DL — SIGNIFICANT CHANGE UP (ref 7–23)
CALCIUM SERPL-MCNC: 9 MG/DL — SIGNIFICANT CHANGE UP (ref 8.4–10.5)
CHLORIDE SERPL-SCNC: 106 MMOL/L — SIGNIFICANT CHANGE UP (ref 96–108)
CO2 SERPL-SCNC: 24 MMOL/L — SIGNIFICANT CHANGE UP (ref 22–31)
CREAT SERPL-MCNC: 1.02 MG/DL — SIGNIFICANT CHANGE UP (ref 0.5–1.3)
CULTURE RESULTS: SIGNIFICANT CHANGE UP
CULTURE RESULTS: SIGNIFICANT CHANGE UP
EGFR: 54 ML/MIN/1.73M2 — LOW
GLUCOSE SERPL-MCNC: 77 MG/DL — SIGNIFICANT CHANGE UP (ref 70–99)
HCT VFR BLD CALC: 28.7 % — LOW (ref 34.5–45)
HGB BLD-MCNC: 9.5 G/DL — LOW (ref 11.5–15.5)
MAGNESIUM SERPL-MCNC: 1.7 MG/DL — SIGNIFICANT CHANGE UP (ref 1.6–2.6)
MCHC RBC-ENTMCNC: 32.1 PG — SIGNIFICANT CHANGE UP (ref 27–34)
MCHC RBC-ENTMCNC: 33.1 GM/DL — SIGNIFICANT CHANGE UP (ref 32–36)
MCV RBC AUTO: 97 FL — SIGNIFICANT CHANGE UP (ref 80–100)
NRBC # BLD: 0 /100 WBCS — SIGNIFICANT CHANGE UP (ref 0–0)
PLATELET # BLD AUTO: 251 K/UL — SIGNIFICANT CHANGE UP (ref 150–400)
POTASSIUM SERPL-MCNC: 3.8 MMOL/L — SIGNIFICANT CHANGE UP (ref 3.5–5.3)
POTASSIUM SERPL-SCNC: 3.8 MMOL/L — SIGNIFICANT CHANGE UP (ref 3.5–5.3)
RBC # BLD: 2.96 M/UL — LOW (ref 3.8–5.2)
RBC # FLD: 12.9 % — SIGNIFICANT CHANGE UP (ref 10.3–14.5)
SODIUM SERPL-SCNC: 139 MMOL/L — SIGNIFICANT CHANGE UP (ref 135–145)
SPECIMEN SOURCE: SIGNIFICANT CHANGE UP
SPECIMEN SOURCE: SIGNIFICANT CHANGE UP
WBC # BLD: 4.46 K/UL — SIGNIFICANT CHANGE UP (ref 3.8–10.5)
WBC # FLD AUTO: 4.46 K/UL — SIGNIFICANT CHANGE UP (ref 3.8–10.5)

## 2024-01-23 PROCEDURE — 73719 MRI LOWER EXTREMITY W/DYE: CPT | Mod: 26,RT

## 2024-01-23 PROCEDURE — 99233 SBSQ HOSP IP/OBS HIGH 50: CPT

## 2024-01-23 RX ORDER — ENOXAPARIN SODIUM 100 MG/ML
60 INJECTION SUBCUTANEOUS EVERY 12 HOURS
Refills: 0 | Status: DISCONTINUED | OUTPATIENT
Start: 2024-01-23 | End: 2024-01-25

## 2024-01-23 RX ADMIN — Medication 88 MICROGRAM(S): at 05:10

## 2024-01-23 RX ADMIN — Medication 25 MILLIGRAM(S): at 05:10

## 2024-01-23 RX ADMIN — MEROPENEM 100 MILLIGRAM(S): 1 INJECTION INTRAVENOUS at 05:10

## 2024-01-23 RX ADMIN — MEROPENEM 100 MILLIGRAM(S): 1 INJECTION INTRAVENOUS at 18:15

## 2024-01-23 RX ADMIN — SIMVASTATIN 40 MILLIGRAM(S): 20 TABLET, FILM COATED ORAL at 20:53

## 2024-01-23 RX ADMIN — ENOXAPARIN SODIUM 60 MILLIGRAM(S): 100 INJECTION SUBCUTANEOUS at 05:10

## 2024-01-23 RX ADMIN — Medication 25 MILLIGRAM(S): at 18:15

## 2024-01-23 RX ADMIN — ENOXAPARIN SODIUM 60 MILLIGRAM(S): 100 INJECTION SUBCUTANEOUS at 18:15

## 2024-01-23 NOTE — PROGRESS NOTE ADULT - ASSESSMENT
84 yo female with hx of HTN, HLD, chronic right plantar foot ulcers/OM, sent from podiatry office for IV abx for acute on chronic OM of right foot, also noted to have new onset A Fib.     #New onset Paroxysmal A Fib w/ RVR  - Likely acute infection induced vs electrolyte abnormality (hypokalemia)  - Heart rate improved; cont Metoprolol  - TSH normal  - CHADSVASC at least 4. Renally dosed Lovenox. Transition to NOAC if no procedures to be done  - Echo done: EF 65>70%, mild to mod AS, tricuspid regurg  - Cardiology recs noted    #Hypokalemia - resolved  - Continue to monitor     #Acute on chronic right plantar foot OM  - pt with no fevers, no elevated wbc  - Podiatrist, Dr. Skaggs, sent pt for IV abx  --f/u after MRI results  - blood cultures NGTD   - ID recs noted: cont IV Meropenem for now  - DAVID'S noted, will get Vascular consult if needed-- pt to have MRI today of right foot    #Acute renal failure, suspect drug induced [cipro, ARBs]  - Renal function improved  - Nephro recs noted; avoid nephrotoxins, No NSAID's, no ACE/ARB, no Cipro    #Hypothyroidism  - s/p thyroidectomy  - TSH normal  - Continue Synthroid    #HTN  - Continue amlodipine    #VTE ppx  - Lovenox     GOC: DNR/I    Dispo: Pending above    Patient states that she will update her family

## 2024-01-23 NOTE — PROGRESS NOTE ADULT - SUBJECTIVE AND OBJECTIVE BOX
No distress    Vital Signs Last 24 Hrs  T(C): 36.8 (01-23-24 @ 13:03), Max: 36.9 (01-23-24 @ 05:00)  T(F): 98.2 (01-23-24 @ 13:03), Max: 98.4 (01-23-24 @ 05:00)  HR: 91 (01-23-24 @ 18:27) (66 - 91)  BP: 127/63 (01-23-24 @ 18:27) (101/59 - 128/68)  RR: 16 (01-23-24 @ 13:03) (16 - 16)  SpO2: 97% (01-23-24 @ 13:03) (97% - 97%)    I&O's Detail    22 Jan 2024 07:01  -  23 Jan 2024 07:00    OUT:    Voided (mL): 1500 mL  Total OUT: 1500 mL    s1s2  b/l air entry  soft, ND  tr b/l LE edema, RLE wound dressed                                 9.5    4.46  )-----------( 251      ( 23 Jan 2024 06:25 )             28.7     23 Jan 2024 06:25    139    |  106    |  15     ----------------------------<  77     3.8     |  24     |  1.02     Ca    9.0        23 Jan 2024 06:25  Mg     1.7       23 Jan 2024 06:25    acetaminophen     Tablet .. 650 milliGRAM(s) Oral every 6 hours PRN  enoxaparin Injectable 60 milliGRAM(s) SubCutaneous every 12 hours  levothyroxine 88 MICROGram(s) Oral daily  meropenem  IVPB 1000 milliGRAM(s) IV Intermittent every 12 hours  metoprolol tartrate 25 milliGRAM(s) Oral every 12 hours  simvastatin 40 milliGRAM(s) Oral at bedtime    A/P:    Hx recent CARLOS suspected iso Cipro (adm 12/20 - 12/28), exacerbated by ARB as op (Cr 0.83 - 12/10/23, peak Cr 8.35 - 12/20/23)  Resolved   UA negative   Avoid nephrotoxins  No NSAID's, would avoid ACE/ARB, no Cipro  Would add Cipro to list of allergies  This time adm for RLE osteo  ID, podiatry following   S/p MRI today  Abx per ID   F/u [facility]    [phone]

## 2024-01-23 NOTE — PROGRESS NOTE ADULT - SUBJECTIVE AND OBJECTIVE BOX
Patient is a 83y old  Female who presents with a chief complaint of Right foot swelling (22 Jan 2024 22:01)      Patient seen and examined at bedside. No overnight events reported.  Pt reports no pain.     ALLERGIES:  linezolid (Unknown)  Cipro (Other)  doxycycline (Unknown)  Levaquin (Stomach Upset; Nausea)    MEDICATIONS  (STANDING):  enoxaparin Injectable 60 milliGRAM(s) SubCutaneous every 24 hours  levothyroxine 88 MICROGram(s) Oral daily  meropenem  IVPB 1000 milliGRAM(s) IV Intermittent every 12 hours  metoprolol tartrate 25 milliGRAM(s) Oral every 12 hours  simvastatin 40 milliGRAM(s) Oral at bedtime    MEDICATIONS  (PRN):  acetaminophen     Tablet .. 650 milliGRAM(s) Oral every 6 hours PRN Temp greater or equal to 38C (100.4F), Mild Pain (1 - 3)    Vital Signs Last 24 Hrs  T(F): 98.4 (23 Jan 2024 05:00), Max: 98.7 (22 Jan 2024 12:22)  HR: 68 (23 Jan 2024 05:00) (66 - 70)  BP: 128/68 (23 Jan 2024 05:00) (101/59 - 128/68)  RR: 16 (23 Jan 2024 05:00) (16 - 16)  SpO2: 97% (23 Jan 2024 05:00) (97% - 97%)  I&O's Summary    22 Jan 2024 07:01  -  23 Jan 2024 07:00  --------------------------------------------------------  IN: 170 mL / OUT: 1500 mL / NET: -1330 mL      PHYSICAL EXAM:  General: NAD, A/O x 3  ENT: No gross hearing impairment, Moist mucous membranes, no thrush  Neck: Supple, No JVD  Lungs: Clear to auscultation bilaterally, good air entry, non-labored breathing  Cardio: RRR, S1/S2, No murmur  Abdomen: Soft, Nontender, Nondistended; Bowel sounds present  Extremities: No calf tenderness, No cyanosis, right foot dressed  Psych: Appropriate mood and affect    LABS:                        9.5    4.46  )-----------( 251      ( 23 Jan 2024 06:25 )             28.7     01-23    139  |  106  |  15  ----------------------------<  77  3.8   |  24  |  1.02    Ca    9.0      23 Jan 2024 06:25  Mg     1.7     01-23                                        Urinalysis Basic - ( 23 Jan 2024 06:25 )    Color: x / Appearance: x / SG: x / pH: x  Gluc: 77 mg/dL / Ketone: x  / Bili: x / Urobili: x   Blood: x / Protein: x / Nitrite: x   Leuk Esterase: x / RBC: x / WBC x   Sq Epi: x / Non Sq Epi: x / Bacteria: x        Culture - Surgical Swab (collected 19 Jan 2024 13:40)  Source: .Surgical Swab right foot  Preliminary Report (21 Jan 2024 21:16):    Few Escherichia coli ESBL    Moderate Corynebacterium species "Susceptibilities not performed"  Organism: Escherichia coli ESBL (21 Jan 2024 15:12)  Organism: Escherichia coli ESBL (21 Jan 2024 15:12)      Method Type: JEANINE      -  Amoxicillin/Clavulanic Acid: S <=8/4      -  Ampicillin: R >16 These ampicillin results predict results for amoxicillin      -  Ampicillin/Sulbactam: R 8/4      -  Aztreonam: R >16      -  Cefazolin: R >16      -  Cefepime: R >16      -  Ceftriaxone: R >32      -  Ciprofloxacin: R >2      -  Ertapenem: S <=0.5      -  Gentamicin: R >8      -  Imipenem: S <=1      -  Levofloxacin: R >4      -  Meropenem: S <=1      -  Piperacillin/Tazobactam: R <=8      -  Tobramycin: R 8      -  Trimethoprim/Sulfamethoxazole: R >2/38    Culture - Urine (collected 18 Jan 2024 17:50)  Source: Clean Catch Clean Catch (Midstream)  Final Report (22 Jan 2024 08:48):    Culture grew 3 or more types of organisms which indicate    collection contamination; consider recollection only if clinically    indicated.    Culture - Blood (collected 18 Jan 2024 14:16)  Source: .Blood Blood-Peripheral  Preliminary Report (22 Jan 2024 19:00):    No growth at 4 days    Culture - Blood (collected 18 Jan 2024 14:15)  Source: .Blood Blood-Peripheral  Preliminary Report (22 Jan 2024 19:00):    No growth at 4 days        RADIOLOGY & ADDITIONAL TESTS:    Care Discussed with Consultants/Other Providers:

## 2024-01-24 LAB
CULTURE RESULTS: ABNORMAL
ORGANISM # SPEC MICROSCOPIC CNT: ABNORMAL
ORGANISM # SPEC MICROSCOPIC CNT: SIGNIFICANT CHANGE UP
SPECIMEN SOURCE: SIGNIFICANT CHANGE UP

## 2024-01-24 PROCEDURE — 99233 SBSQ HOSP IP/OBS HIGH 50: CPT

## 2024-01-24 RX ADMIN — ENOXAPARIN SODIUM 60 MILLIGRAM(S): 100 INJECTION SUBCUTANEOUS at 05:16

## 2024-01-24 RX ADMIN — ENOXAPARIN SODIUM 60 MILLIGRAM(S): 100 INJECTION SUBCUTANEOUS at 17:48

## 2024-01-24 RX ADMIN — Medication 88 MICROGRAM(S): at 05:16

## 2024-01-24 RX ADMIN — Medication 25 MILLIGRAM(S): at 17:48

## 2024-01-24 RX ADMIN — MEROPENEM 100 MILLIGRAM(S): 1 INJECTION INTRAVENOUS at 05:16

## 2024-01-24 RX ADMIN — Medication 25 MILLIGRAM(S): at 05:16

## 2024-01-24 RX ADMIN — SIMVASTATIN 40 MILLIGRAM(S): 20 TABLET, FILM COATED ORAL at 22:24

## 2024-01-24 RX ADMIN — MEROPENEM 100 MILLIGRAM(S): 1 INJECTION INTRAVENOUS at 17:48

## 2024-01-24 NOTE — PROGRESS NOTE ADULT - SUBJECTIVE AND OBJECTIVE BOX
Resting    Vital Signs Last 24 Hrs  T(C): 36.9 (01-24-24 @ 20:31), Max: 36.9 (01-24-24 @ 20:31)  T(F): 98.4 (01-24-24 @ 20:31), Max: 98.4 (01-24-24 @ 20:31)  HR: 72 (01-24-24 @ 20:31) (65 - 72)  BP: 115/58 (01-24-24 @ 20:31) (115/58 - 123/66)  RR: 16 (01-24-24 @ 20:31) (16 - 17)  SpO2: 96% (01-24-24 @ 20:31) (95% - 96%)    s1s2  b/l air entry  soft, ND  tr b/l LE edema, RLE wound dressed                                          9.5    4.46  )-----------( 251      ( 23 Jan 2024 06:25 )             28.7     23 Jan 2024 06:25    139    |  106    |  15     ----------------------------<  77     3.8     |  24     |  1.02     Ca    9.0        23 Jan 2024 06:25  Mg     1.7       23 Jan 2024 06:25    acetaminophen     Tablet .. 650 milliGRAM(s) Oral every 6 hours PRN  enoxaparin Injectable 60 milliGRAM(s) SubCutaneous every 12 hours  levothyroxine 88 MICROGram(s) Oral daily  meropenem  IVPB 1000 milliGRAM(s) IV Intermittent every 12 hours  metoprolol tartrate 25 milliGRAM(s) Oral every 12 hours  simvastatin 40 milliGRAM(s) Oral at bedtime    A/P:    Hx recent CARLOS suspected iso Cipro (adm 12/20 - 12/28), exacerbated by ARB as op (Cr 0.83 - 12/10/23, peak Cr 8.35 - 12/20/23)  Resolved   UA negative   Avoid nephrotoxins  No NSAID's, would avoid ACE/ARB, Cipro  Would add Cipro to list of allergies  This time adm for RLE osteo  ID, podiatry following, abx per ID  Would agree w/limiting Abx as possible   F/u [facility]    [phone]

## 2024-01-24 NOTE — PROGRESS NOTE ADULT - ASSESSMENT
A: Right foot ulceration  Osteomyelitis    P:  1. Patient evaluated  2. MRI reviewed with the patient.  3. Discussed treatment options with the patient including surgical and conservative management. All risks, benefits and alternatives discussed with the patient in detail. Patient fully understands and opts for conservative management at this time.   4. Continue antibiotics per ID  5. Patient to follow up with her podiatrist (Dr. Pineda) upon d/c.

## 2024-01-24 NOTE — PROGRESS NOTE ADULT - SUBJECTIVE AND OBJECTIVE BOX
Patient is a 83y old  Female who presents with a chief complaint of Right foot swelling (23 Jan 2024 20:16)      Patient seen and examined at bedside. No overnight events reported.     ALLERGIES:  linezolid (Unknown)  Cipro (Other)  doxycycline (Unknown)  Levaquin (Stomach Upset; Nausea)    MEDICATIONS  (STANDING):  enoxaparin Injectable 60 milliGRAM(s) SubCutaneous every 12 hours  levothyroxine 88 MICROGram(s) Oral daily  meropenem  IVPB 1000 milliGRAM(s) IV Intermittent every 12 hours  metoprolol tartrate 25 milliGRAM(s) Oral every 12 hours  simvastatin 40 milliGRAM(s) Oral at bedtime    MEDICATIONS  (PRN):  acetaminophen     Tablet .. 650 milliGRAM(s) Oral every 6 hours PRN Temp greater or equal to 38C (100.4F), Mild Pain (1 - 3)    Vital Signs Last 24 Hrs  T(F): 97.9 (24 Jan 2024 05:14), Max: 98.2 (23 Jan 2024 13:03)  HR: 65 (24 Jan 2024 05:14) (65 - 91)  BP: 121/70 (24 Jan 2024 05:14) (103/73 - 127/63)  RR: 17 (24 Jan 2024 05:14) (16 - 17)  SpO2: 96% (24 Jan 2024 05:14) (96% - 97%)  I&O's Summary    PHYSICAL EXAM:  General: NAD, A/O x 3  ENT: No gross hearing impairment, Moist mucous membranes, no thrush  Neck: Supple, No JVD  Lungs: Clear to auscultation bilaterally, good air entry, non-labored breathing  Cardio: RRR, S1/S2, No murmur  Abdomen: Soft, Nontender, Nondistended; Bowel sounds present  Extremities: right foot dressed,  No calf tenderness, No cyanosis, No pitting edema  Psych: Appropriate mood and affect    LABS:                        9.5    4.46  )-----------( 251      ( 23 Jan 2024 06:25 )             28.7     01-23    139  |  106  |  15  ----------------------------<  77  3.8   |  24  |  1.02    Ca    9.0      23 Jan 2024 06:25  Mg     1.7     01-23                                        Urinalysis Basic - ( 23 Jan 2024 06:25 )    Color: x / Appearance: x / SG: x / pH: x  Gluc: 77 mg/dL / Ketone: x  / Bili: x / Urobili: x   Blood: x / Protein: x / Nitrite: x   Leuk Esterase: x / RBC: x / WBC x   Sq Epi: x / Non Sq Epi: x / Bacteria: x        Culture - Surgical Swab (collected 19 Jan 2024 13:40)  Source: .Surgical Swab right foot  Preliminary Report (21 Jan 2024 21:16):    Few Escherichia coli ESBL    Moderate Corynebacterium species "Susceptibilities not performed"  Organism: Escherichia coli ESBL (21 Jan 2024 15:12)  Organism: Escherichia coli ESBL (21 Jan 2024 15:12)      -  Levofloxacin: R >4      -  Tobramycin: R 8      -  Aztreonam: R >16      -  Gentamicin: R >8      -  Cefazolin: R >16      -  Cefepime: R >16      -  Piperacillin/Tazobactam: R <=8      -  Ciprofloxacin: R >2      -  Imipenem: S <=1      -  Ceftriaxone: R >32      -  Ampicillin: R >16 These ampicillin results predict results for amoxicillin      Method Type: JEANINE      -  Meropenem: S <=1      -  Ampicillin/Sulbactam: R 8/4      -  Amoxicillin/Clavulanic Acid: S <=8/4      -  Trimethoprim/Sulfamethoxazole: R >2/38      -  Ertapenem: S <=0.5    Culture - Urine (collected 18 Jan 2024 17:50)  Source: Clean Catch Clean Catch (Midstream)  Final Report (22 Jan 2024 08:48):    Culture grew 3 or more types of organisms which indicate    collection contamination; consider recollection only if clinically    indicated.    Culture - Blood (collected 18 Jan 2024 14:16)  Source: .Blood Blood-Peripheral  Final Report (23 Jan 2024 19:01):    No growth at 5 days    Culture - Blood (collected 18 Jan 2024 14:15)  Source: .Blood Blood-Peripheral  Final Report (23 Jan 2024 19:01):    No growth at 5 days        RADIOLOGY & ADDITIONAL TESTS:  < from: MR Foot w/ IV Cont, Right (01.23.24 @ 11:57) >    IMPRESSION:  1.  Osteomyelitis of the second metatarsal stump and third metatarsal   head and shaft.  2.  Early osteomyelitis of the third proximal phalanx.  3.  Marked edema and postcontrast enhancement is seen throughout the foot   with locules of air extending to the dorsum of the foot. No mature,   drainable, or enhancing collection is seen at this time.    < end of copied text >    Care Discussed with Consultants/Other Providers:    Patient is a 83y old  Female who presents with a chief complaint of Right foot swelling (23 Jan 2024 20:16)      Patient seen and examined at bedside. No overnight events reported.  She has no pain.     ALLERGIES:  linezolid (Unknown)  Cipro (Other)  doxycycline (Unknown)  Levaquin (Stomach Upset; Nausea)    MEDICATIONS  (STANDING):  enoxaparin Injectable 60 milliGRAM(s) SubCutaneous every 12 hours  levothyroxine 88 MICROGram(s) Oral daily  meropenem  IVPB 1000 milliGRAM(s) IV Intermittent every 12 hours  metoprolol tartrate 25 milliGRAM(s) Oral every 12 hours  simvastatin 40 milliGRAM(s) Oral at bedtime    MEDICATIONS  (PRN):  acetaminophen     Tablet .. 650 milliGRAM(s) Oral every 6 hours PRN Temp greater or equal to 38C (100.4F), Mild Pain (1 - 3)    Vital Signs Last 24 Hrs  T(F): 97.9 (24 Jan 2024 05:14), Max: 98.2 (23 Jan 2024 13:03)  HR: 65 (24 Jan 2024 05:14) (65 - 91)  BP: 121/70 (24 Jan 2024 05:14) (103/73 - 127/63)  RR: 17 (24 Jan 2024 05:14) (16 - 17)  SpO2: 96% (24 Jan 2024 05:14) (96% - 97%)  I&O's Summary    PHYSICAL EXAM:  General: NAD, A/O x 3  ENT: No gross hearing impairment, Moist mucous membranes, no thrush  Neck: Supple, No JVD  Lungs: Clear to auscultation bilaterally, good air entry, non-labored breathing  Cardio: RRR, S1/S2, No murmur  Abdomen: Soft, Nontender, Nondistended; Bowel sounds present  Extremities: right foot dressed,  No calf tenderness, No cyanosis, No pitting edema  Psych: Appropriate mood and affect    LABS:                        9.5    4.46  )-----------( 251      ( 23 Jan 2024 06:25 )             28.7     01-23    139  |  106  |  15  ----------------------------<  77  3.8   |  24  |  1.02    Ca    9.0      23 Jan 2024 06:25  Mg     1.7     01-23                                        Urinalysis Basic - ( 23 Jan 2024 06:25 )    Color: x / Appearance: x / SG: x / pH: x  Gluc: 77 mg/dL / Ketone: x  / Bili: x / Urobili: x   Blood: x / Protein: x / Nitrite: x   Leuk Esterase: x / RBC: x / WBC x   Sq Epi: x / Non Sq Epi: x / Bacteria: x        Culture - Surgical Swab (collected 19 Jan 2024 13:40)  Source: .Surgical Swab right foot  Preliminary Report (21 Jan 2024 21:16):    Few Escherichia coli ESBL    Moderate Corynebacterium species "Susceptibilities not performed"  Organism: Escherichia coli ESBL (21 Jan 2024 15:12)  Organism: Escherichia coli ESBL (21 Jan 2024 15:12)      -  Levofloxacin: R >4      -  Tobramycin: R 8      -  Aztreonam: R >16      -  Gentamicin: R >8      -  Cefazolin: R >16      -  Cefepime: R >16      -  Piperacillin/Tazobactam: R <=8      -  Ciprofloxacin: R >2      -  Imipenem: S <=1      -  Ceftriaxone: R >32      -  Ampicillin: R >16 These ampicillin results predict results for amoxicillin      Method Type: JEANINE      -  Meropenem: S <=1      -  Ampicillin/Sulbactam: R 8/4      -  Amoxicillin/Clavulanic Acid: S <=8/4      -  Trimethoprim/Sulfamethoxazole: R >2/38      -  Ertapenem: S <=0.5    Culture - Urine (collected 18 Jan 2024 17:50)  Source: Clean Catch Clean Catch (Midstream)  Final Report (22 Jan 2024 08:48):    Culture grew 3 or more types of organisms which indicate    collection contamination; consider recollection only if clinically    indicated.    Culture - Blood (collected 18 Jan 2024 14:16)  Source: .Blood Blood-Peripheral  Final Report (23 Jan 2024 19:01):    No growth at 5 days    Culture - Blood (collected 18 Jan 2024 14:15)  Source: .Blood Blood-Peripheral  Final Report (23 Jan 2024 19:01):    No growth at 5 days        RADIOLOGY & ADDITIONAL TESTS:  < from: MR Foot w/ IV Cont, Right (01.23.24 @ 11:57) >    IMPRESSION:  1.  Osteomyelitis of the second metatarsal stump and third metatarsal   head and shaft.  2.  Early osteomyelitis of the third proximal phalanx.  3.  Marked edema and postcontrast enhancement is seen throughout the foot   with locules of air extending to the dorsum of the foot. No mature,   drainable, or enhancing collection is seen at this time.    < end of copied text >    Care Discussed with Consultants/Other Providers:

## 2024-01-24 NOTE — PROGRESS NOTE ADULT - NS ATTEND AMEND GEN_ALL_CORE FT
83 year old F PMH HTN, HLD, chronic right plantar foot ulcers/OM, presented to the ED from podiatry office for IV abx for acute on chronic OM of right foot, also noted to have new onset A Fib     #New onset A Fib w/ RVR  - Likely acute infection induced vs electrolyte abnormality (hypokalemia)  - HR improved following cardizem in ED  - TSH normal  - continue Metoprolol tartrate 12.5mg BID w/ holding parameters  - CHADSVASC at least 4- placed on renally dosed lovenox for now, transition to NOAC once no procedures planned   - echo with EF 65-70%  - repeat EKG with NSR, no LBBB  - cardio recs appreciated    #Acute on chronic right plantar foot OM  - No leukocytosis, does not appear acutely infected  - wound cultures with ESBL E coli, placed on meropenem  - BCx with NGTD, UCx contaminated  - DAVID performed and reviewed, normal  - MRI performed with evidence of osteo, discussed with Dr. Viramontes podiatry- recommend patient would need transmetatarsal amputation. Will discuss with patient if this is something she would like to pursue  - ID recs appreciated- possible chronic osteo, continue meropenem day 3/5  - podiatry recs appreciated- bedside I&D performed 1/19    PT recommends HCPT  if no surgical intervention, last dose meropenem is evening of 1/26 therefore can dc home on 1/27 83 year old F PMH HTN, HLD, chronic right plantar foot ulcers/OM, presented to the ED from podiatry office for IV abx for acute on chronic OM of right foot, also noted to have new onset A Fib     #New onset A Fib w/ RVR  - Likely acute infection induced vs electrolyte abnormality (hypokalemia)  - HR improved following cardizem in ED  - TSH normal  - continue Metoprolol tartrate 12.5mg BID w/ holding parameters  - CHADSVASC at least 4- placed on renally dosed lovenox for now, transition to NOAC once no procedures planned   - echo with EF 65-70%  - repeat EKG with NSR, no LBBB noted  - cardio recs appreciated    #Acute on chronic right plantar foot OM  - No leukocytosis, does not appear acutely infected  - wound cultures with ESBL E coli, placed on meropenem  - BCx with NGTD, UCx contaminated  - DAVID performed and reviewed, normal  - MRI performed with evidence of osteo, discussed with Dr. Viramontes podiatry- recommend patient would need transmetatarsal amputation. Will discuss with patient if this is something she would like to pursue  - ID recs appreciated- possible chronic osteo, continue meropenem day 3/5  - podiatry recs appreciated- bedside I&D performed 1/19    PT recommends HCPT  if no surgical intervention, last dose meropenem is evening of 1/26 therefore can dc home on 1/27

## 2024-01-24 NOTE — CHART NOTE - NSCHARTNOTEFT_GEN_A_CORE
Nutrition Follow Up Note  Hospital Course   (Per Electronic Medical Record)    Source:  Patient [X]  Nursing Staff [X]   Medical Record [X]      Diet: Diet, DASH/TLC:   Sodium & Cholesterol Restricted  Supplement Feeding Modality:  Oral  Ensure Plus High Protein Cans or Servings Per Day:  1       Frequency:  Two Times a day (01-19-24 @ 13:42) [Active]    At this time patient tolerating diet w/ adequate appetite/intake consuming % of meals. W/ good acceptance of Ensure Plus High Protein Daily 8 oz (Provides 350 kcal, 20 grams of protein). Patient reports partial dentition and chews her food slowly and well, no issues w/ chewing and swallowing current diet texture. Denies N/V/C/D, last BM 1/22. W/ good acceptance of Heart-Healthy DASH/TLC. Patient reports she has social support in her building to assist with grocery shopping after discharge.     Current Weight: 135.8lb on 1/24      Pertinent Medications: MEDICATIONS  (STANDING):  enoxaparin Injectable 60 milliGRAM(s) SubCutaneous every 12 hours  levothyroxine 88 MICROGram(s) Oral daily  meropenem  IVPB 1000 milliGRAM(s) IV Intermittent every 12 hours  metoprolol tartrate 25 milliGRAM(s) Oral every 12 hours  simvastatin 40 milliGRAM(s) Oral at bedtime    MEDICATIONS  (PRN):  acetaminophen     Tablet .. 650 milliGRAM(s) Oral every 6 hours PRN Temp greater or equal to 38C (100.4F), Mild Pain (1 - 3)      Pertinent Labs:  01-23 Na139 mmol/L Glu 77 mg/dL K+ 3.8 mmol/L Cr  1.02 mg/dL BUN 15 mg/dL 01-18 Alb 3.3 g/dL      Skin: No pressure injury per nursing flowsheets,  wound; right plantar    Edema: No edema noted per nursing flowsheet    Last Bowel Movement: on 12/    Estimated Needs:   [X] No Change Since Previous Assessment    Previous Nutrition Diagnosis:   Malnutrition...  severe, acute  related to inadequate protein-energy intake in setting of low appetite/nausea with OM on abx  as evidenced by 17% weight loss x 2 months, severe muscle wasting/fat loss    Nutrition Diagnosis is [X] Ongoing - addressed w/ Ensure Plus High Protein Daily 8 oz (Provides 350 kcal, 20 grams of protein) BID     New Nutrition Diagnosis: [X] Not Applicable      Interventions:   1. Recommend continuing with current plan of care    Monitoring & Evaluation:   [X] Weights   [X] PO Intake   [X] Skin Integrity   [X] Follow Up (Per Protocol)  [X] Tolerance to Diet Prescription   [X] Other: Labs     Registered Dietitian/Nutritionist Remains Available.  Cherie Montgomery RD    Phone# [phone]

## 2024-01-24 NOTE — PROGRESS NOTE ADULT - ASSESSMENT
82 yo female with hx of HTN, HLD, chronic right plantar foot ulcers/OM, sent from podiatry office for IV abx for acute on chronic OM of right foot, also noted to have new onset A Fib.     #New onset Paroxysmal A Fib w/ RVR  - Likely acute infection induced vs electrolyte abnormality (hypokalemia)  - Heart rate improved; cont Metoprolol  - TSH normal  - CHADSVASC at least 4. Renally dosed Lovenox. Transition to NOAC if no procedures to be done  - Echo done: EF 65>70%, mild to mod AS, tricuspid regurg  - Cardiology recs noted    #Acute on chronic right plantar foot OM  - MR with IV contrast of right foot: +osteo of 2nd metatarsal stump and 3rd metatarsal head and shaft, early osteo of 3rd proximal phalanx, no drainable collection seen  - pt with no fevers, no elevated wbc  - Podiatrist, Dr. Skaggs, sent pt for IV abx -await input today now MRI done  - blood cultures NGTD   - ID recs noted: cont IV Meropenem for now  - DAVID'S noted    #Acute renal failure, suspect drug induced [cipro, ARBs]  - Renal function improved  - Nephro recs noted; avoid nephrotoxins, No NSAID's, no ACE/ARB, no Cipro    #Hypothyroidism  - s/p thyroidectomy  - TSH normal  - Continue Synthroid    #HTN  - Continue amlodipine    #VTE ppx  - Lovenox     GOC: DNR/I    Dispo: Pending above    Patient states that she will update her family  84 yo female with hx of HTN, HLD, chronic right plantar foot ulcers/OM, sent from podiatry office for IV abx for acute on chronic OM of right foot, also noted to have new onset A Fib.     #New onset Paroxysmal A Fib w/ RVR  - Likely acute infection induced vs electrolyte abnormality (hypokalemia)  - Heart rate improved; cont Metoprolol  - TSH normal  - CHADSVASC at least 4. Renally dosed Lovenox. Transition to NOAC if no procedures to be done  - Echo done: EF 65>70%, mild to mod AS, tricuspid regurg  - Cardiology recs noted    #Acute on chronic right plantar foot OM  - MR with IV contrast of right foot: +osteo of 2nd metatarsal stump and 3rd metatarsal head and shaft, early osteo of 3rd proximal phalanx, no drainable collection seen  - pt with no fevers, no elevated wbc, BC with NGTD  - Podiatry f/u pending; Dr. Mcgarry contacted via teams for further recs  - pt states she will not have Surgery if recommended just yet, will need to go home after antibx course and think on it  - ID recs noted: cont IV Meropenem, last dose Friday, 1/26  - DAVID'S noted    #Acute renal failure, suspect drug induced [cipro, ARBs]  - Renal function improved  - Nephro recs noted; avoid nephrotoxins, No NSAID's, no ACE/ARB, no Cipro    #Hypothyroidism  - s/p thyroidectomy  - TSH normal  - Continue Synthroid    #HTN  - Continue amlodipine    #VTE ppx  - Lovenox     GOC: DNR/I    Dispo: Pending above    Patient states that she will update her family

## 2024-01-24 NOTE — PROGRESS NOTE ADULT - ASSESSMENT
84 yo female with HTN,HLD, s/p Rt partial second toe amp in early December  admitted with plantar ulcer near 3rd MTH.  The wound culture has corynebacteria striatum and ESBL E Coli.  The wound is relatively clean.  She is concerned that cipro almost killed her.  She was at [facility] from 12/6-12/11, received a few days of vanco and zosyn and was to received a limited course of augmentin after discharge.  She was receiving cipro and admitted to [facility] later in December with CARLOS and a creat near 7, was ultimately felt to have a quinolone induced CARLOS.  This resolved with conservative therapy.  I am concerned that based on chronicity she may have chronic osteomyelitis.  Her vanco levels were low, drug had been stopped a few days ago.  MRI raises concerns of osteo of 2nd met stump, 3rd digit, and third met head and shaft.  Her prior podiatric procedure was felt to have clean margins, bone culture was negative, wound culture with C striatum.  The cellulitic component is improved  Suggest:  1 Favor limiting meropenem to 5 days unless further surgical debridement planned  2 MRI noted, ? if she is a candidate for a TMA or more definitive surgery  3 would prefer a management plan that limits antibiotic duration to minimize potential toxicities for limited gain if bone is infected.She has been off and on antibiotics x 3 years , potential toxicities may outweigh benefit here.  4 vascular  assessment is pending  5 Perhaps she should be sent back to Dr Montes who operated last month.  6. Wound cultures do not always correlate to bone cultures, Vanco would be needed to treat C Striatum but has potential nephrotoxicity.

## 2024-01-24 NOTE — PROGRESS NOTE ADULT - SUBJECTIVE AND OBJECTIVE BOX
CC: f/u for chronic Rt foot infection    Patient reports: she is comfortable, offers no complaints    REVIEW OF SYSTEMS:  All other review of systems negative (Comprehensive ROS)    Antimicrobials Day #  :day 4  meropenem  IVPB 1000 milliGRAM(s) IV Intermittent every 12 hours    Other Medications Reviewed  MEDICATIONS  (STANDING):  enoxaparin Injectable 60 milliGRAM(s) SubCutaneous every 12 hours  levothyroxine 88 MICROGram(s) Oral daily  meropenem  IVPB 1000 milliGRAM(s) IV Intermittent every 12 hours  metoprolol tartrate 25 milliGRAM(s) Oral every 12 hours  simvastatin 40 milliGRAM(s) Oral at bedtime    T(F): 97.9 (01-24-24 @ 05:14), Max: 98.2 (01-23-24 @ 13:03)  HR: 65 (01-24-24 @ 05:14)  BP: 121/70 (01-24-24 @ 05:14)  RR: 17 (01-24-24 @ 05:14)  SpO2: 96% (01-24-24 @ 05:14)  Wt(kg): --    PHYSICAL EXAM:  General: alert, no acute distress  Eyes:  anicteric, no conjunctival injection, no discharge  Oropharynx: no lesions or injection 	  Neck: supple, without adenopathy  Lungs: clear to auscultation  Heart: regular rate and rhythm; no murmur, rubs or gallops  Abdomen: soft, nondistended, nontender, without mass or organomegaly  Skin: no lesions  Extremities: no clubbing, cyanosis, or edema  Neurologic: alert, oriented, moves all extremities  Rt foot with well healed second digit amputation  Plantar ulcer deep, cellulitis is improved.There is mild edema of foot  LAB RESULTS:                        9.5    4.46  )-----------( 251      ( 23 Jan 2024 06:25 )             28.7     01-23    139  |  106  |  15  ----------------------------<  77  3.8   |  24  |  1.02    Ca    9.0      23 Jan 2024 06:25  Mg     1.7     01-23              MICROBIOLOGY:  RECENT CULTURES:  01-19 @ 13:40 .Surgical Swab right foot Escherichia coli ESBL    Few Escherichia coli ESBL  Moderate Corynebacterium species "Susceptibilities not performed"          RADIOLOGY REVIEWED:    < from: MR Foot w/ IV Cont, Right (01.23.24 @ 11:57) >  IMPRESSION:  1.  Osteomyelitis of the second metatarsal stump and third metatarsal   head and shaft.  2.  Early osteomyelitis of the third proximal phalanx.  3.  Marked edema and postcontrast enhancement is seen throughout the foot   with locules of air extending to the dorsum of the foot. No mature,   drainable, or enhancing collection is seen at this time.    --- End of Report -    < end of copied text >

## 2024-01-24 NOTE — PROGRESS NOTE ADULT - SUBJECTIVE AND OBJECTIVE BOX
Patient seen and examined at bedside, NAD.     HPI:  82yo female with hx of  HTN, HLD, chronic right plantar foot ulcers/OM, presented to the ED from Podiatry office for IV abx for acute on chronic OM of Right foot. Pt was recently admitted/discharged from [facility] for acute on chronic osteomyelitis of the right foot. She completed course of IV abx and was deemed stable for discharge home. Pt was recovering from acute renal failure due to Cipro as well.  Currently denies pain or drainage from her wound. Does endorse persistent swelling of her Right foot along with mild redness.     While in the ED, pt noted to have new onset rapid AFIB. Denied cp, palpitations, sob, abd pain, N/V, fever, chills.  (18 Jan 2024 15:51)                            9.5    4.46  )-----------( 251      ( 23 Jan 2024 06:25 )             28.7       < from: MR Foot w/ IV Cont, Right (01.23.24 @ 11:57) >  ACC: [id number] EXAM:  MR FOOT IC RT   ORDERED BY:  MISHA HOGUE     PROCEDURE DATE:  01/23/2024          INTERPRETATION:  Exam Type: MR FOOT WITH IV CONTRAST RIGHT  Exam Date and Time: 1/23/2024 11:57 AM  Indication: Concern for osteomyelitis between the second and third digits  Comparison: Right foot MRI on 12/6/2023 and right foot radiograph on   1/18/2024    TECHNIQUE:  Multiplanar multisequence MRI of the right foot was performed with   contrast.    Contrast: 6 mL Gadavist IV    FINDINGS:    Large soft tissue ulcer at the plantar aspect of the forefoot centered   around the second and third metatarsal heads. There is marked osseous   edema, loss of normal T1 fatty marrow, postcontrast enhancement within   the third metatarsal head to shaft representing osteomyelitis. Mild   osseous edema with mild postcontrast enhancement within the base of the   third proximal phalanx representing early osteomyelitis. Status post   amputation of the second digit distal to the mid shaft of the second   metatarsal. There is edema, loss of normal fatty marrow, and postcontrast   enhancement within the metatarsal stump representing osteomyelitis.   Moderate osteoarthritis of first metatarsophalangeal joint. Marked edema   and postcontrast enhancement is seen throughout the foot with locules of   air extending to the dorsum of the foot. Fatty atrophy of the intrinsic   muscles. No mature, drainable, or enhancing collection is seen at this   time.    IMPRESSION:  1.  Osteomyelitis of the second metatarsal stump and third metatarsal   head and shaft.  2.  Early osteomyelitis of the third proximal phalanx.  3.  Marked edema and postcontrast enhancement is seen throughout the foot   with locules of air extending to the dorsum of the foot. No mature,   drainable, or enhancing collection is seen at this time.    --- End of Report ---    < end of copied text >    O: Noted is decreased edema and erythema to the right foot. Plantar ulceration noted with no purulence expressed.

## 2024-01-25 ENCOUNTER — TRANSCRIPTION ENCOUNTER (OUTPATIENT)
Age: 84
End: 2024-01-25

## 2024-01-25 PROCEDURE — 99232 SBSQ HOSP IP/OBS MODERATE 35: CPT | Mod: FS

## 2024-01-25 RX ORDER — APIXABAN 2.5 MG/1
5 TABLET, FILM COATED ORAL EVERY 12 HOURS
Refills: 0 | Status: DISCONTINUED | OUTPATIENT
Start: 2024-01-25 | End: 2024-01-26

## 2024-01-25 RX ADMIN — Medication 88 MICROGRAM(S): at 05:35

## 2024-01-25 RX ADMIN — Medication 25 MILLIGRAM(S): at 17:44

## 2024-01-25 RX ADMIN — APIXABAN 5 MILLIGRAM(S): 2.5 TABLET, FILM COATED ORAL at 17:44

## 2024-01-25 RX ADMIN — MEROPENEM 100 MILLIGRAM(S): 1 INJECTION INTRAVENOUS at 05:36

## 2024-01-25 RX ADMIN — Medication 25 MILLIGRAM(S): at 05:34

## 2024-01-25 RX ADMIN — SIMVASTATIN 40 MILLIGRAM(S): 20 TABLET, FILM COATED ORAL at 21:46

## 2024-01-25 RX ADMIN — ENOXAPARIN SODIUM 60 MILLIGRAM(S): 100 INJECTION SUBCUTANEOUS at 05:36

## 2024-01-25 RX ADMIN — MEROPENEM 100 MILLIGRAM(S): 1 INJECTION INTRAVENOUS at 17:44

## 2024-01-25 NOTE — PROGRESS NOTE ADULT - ASSESSMENT
82 yo female with hx of HTN, HLD, chronic right plantar foot ulcers/OM, sent from podiatry office for IV abx for acute on chronic OM of right foot, also noted to have new onset A Fib.     #New onset Paroxysmal A Fib w/ RVR  - Likely acute infection induced vs electrolyte abnormality (hypokalemia)  - Heart rate improved; cont Metoprolol  - TSH normal  - CHADSVASC at least 4. Renally dosed Lovenox. Transition to NOAC if no procedures to be done  - Echo done: EF 65>70%, mild to mod AS, tricuspid regurg  - Cardiology recs noted    #Acute on chronic right plantar foot OM  - MR with IV contrast of right foot: +osteo of 2nd metatarsal stump and 3rd metatarsal head and shaft, early osteo of 3rd proximal phalanx, no drainable collection seen  - pt with no fevers, no elevated wbc, BC with NGTD  - Podiatry f/u pending; Dr. Mcgarry contacted via teams for further recs  - pt states she will not have Surgery if recommended just yet, will need to go home after antibx course and think on it  - ID recs noted: cont IV Meropenem, last dose Friday, 1/26  - DAVID'S noted    #Acute renal failure, suspect drug induced [cipro, ARBs]  - Renal function improved  - Nephro recs noted; avoid nephrotoxins, No NSAID's, no ACE/ARB, no Cipro    #Hypothyroidism  - s/p thyroidectomy  - TSH normal  - Continue Synthroid    #HTN  - Continue amlodipine    #VTE ppx  - Lovenox     GOC: DNR/I    Dispo: Pending above    Patient states that she will update her family  82 yo female with hx of HTN, HLD, chronic right plantar foot ulcers/OM, sent from podiatry office for IV abx for acute on chronic OM of right foot, also noted to have new onset A Fib.     #New onset Paroxysmal A Fib w/ RVR  - Likely acute infection induced vs electrolyte abnormality (hypokalemia)  - Heart rate improved; cont Metoprolol  - TSH normal  - CHADSVASC at least 4. Renally dosed Lovenox. Transition to NOAC if no procedures to be done  - Echo done: EF 65>70%, mild to mod AS, tricuspid regurg  - Cardiology recs noted    #Acute on chronic right plantar foot OM  - MR with IV contrast of right foot: +osteo of 2nd metatarsal stump and 3rd metatarsal head and shaft, early osteo of 3rd proximal phalanx, no drainable collection seen  - pt with no fevers, no elevated wbc, BC with NGTD  - Podiatry  Dr. Mcgarry consult appreciated - opted for conservative management- complete course of IV abx and then can be discharge home. Patient to follow up with her podiatrist (Dr. Pineda) upon d/c.  - ID recs noted: cont IV Meropenem, last day  Friday, 1/26  - DAVID'S noted    #Acute renal failure, suspect drug induced [cipro, ARBs]  - Renal function improved  - Nephro recs noted; avoid nephrotoxins, No NSAID's, no ACE/ARB, no Cipro    #Hypothyroidism  - s/p thyroidectomy  - TSH normal  - Continue Synthroid    #HTN  - Continue amlodipine    #VTE ppx  - Lovenox     GOC: DNR/I    Dispo: discharge saturday after abx completed   Patient states that she will update her family

## 2024-01-25 NOTE — PROGRESS NOTE ADULT - SUBJECTIVE AND OBJECTIVE BOX
CC: f/u for Rt foot infection    Patient reports: she is comfortable, offers no complaints    REVIEW OF SYSTEMS:  All other review of systems negative (Comprehensive ROS)    Antimicrobials Day #  :day 5/5  meropenem  IVPB 1000 milliGRAM(s) IV Intermittent every 12 hours    Other Medications Reviewed  MEDICATIONS  (STANDING):  apixaban 5 milliGRAM(s) Oral every 12 hours  levothyroxine 88 MICROGram(s) Oral daily  meropenem  IVPB 1000 milliGRAM(s) IV Intermittent every 12 hours  metoprolol tartrate 25 milliGRAM(s) Oral every 12 hours  simvastatin 40 milliGRAM(s) Oral at bedtime    T(F): 98 (01-25-24 @ 12:48), Max: 98.4 (01-24-24 @ 20:31)  HR: 68 (01-25-24 @ 12:48)  BP: 120/67 (01-25-24 @ 12:48)  RR: 16 (01-25-24 @ 12:48)  SpO2: 97% (01-25-24 @ 12:48)  Wt(kg): --    PHYSICAL EXAM:  General: alert, no acute distress  Eyes:  anicteric, no conjunctival injection, no discharge  Oropharynx: no lesions or injection 	  Neck: supple, without adenopathy  Lungs: clear to auscultation  Heart: regular rate and rhythm; no murmur, rubs or gallops  Abdomen: soft, nondistended, nontender, without mass or organomegaly  Skin: no lesions  Extremities: no clubbing, cyanosis, or edema  Neurologic: alert, oriented, moves all extremities  Rt foot with plantar wound without drainage or cellulitis  LAB RESULTS:              MICROBIOLOGY:  RECENT CULTURES:      RADIOLOGY REVIEWED:

## 2024-01-25 NOTE — PROVIDER CONTACT NOTE (CRITICAL VALUE NOTIFICATION) - SITUATION
Patient had a swab done of the R foot wound
Pt's K level was 2.6 in ED; repletion given. Repeat lab ordered.

## 2024-01-25 NOTE — DISCHARGE NOTE PROVIDER - CARE PROVIDERS DIRECT ADDRESSES
,DirectAddress_Unknown ,DirectAddress_Unknown,[email or web address]

## 2024-01-25 NOTE — DISCHARGE NOTE PROVIDER - HOSPITAL COURSE
Hospital Course  HPI:  82yo female with hx of  HTN, HLD, chronic right plantar foot ulcers/OM, presented to the ED from Podiatry office for IV abx for acute on chronic OM of Right foot. Pt was recently admitted/discharged from [facility] for acute on chronic osteomyelitis of the right foot. She completed course of IV abx and was deemed stable for discharge home. Pt was recovering from acute renal failure due to Cipro as well.  Currently denies pain or drainage from her wound. Does endorse persistent swelling of her Right foot along with mild redness.     While in the ED, pt noted to have new onset rapid AFIB. Denied cp, palpitations, sob, abd pain, N/V, fever, chills.  (18 Jan 2024 15:51)      You were admitted for swollen right foot  You were diagnosed with   You were treated with IV antibiotics for 5 days   You were prescribed the following new medications:    You will need to follow up with your primary care physician.    Source of Infection:  Antibiotic / Last Day:    Palliative Care / Advanced Care Planning  Code Status:full code   Patient/Family agreeable to Hospice/Palliative (Y/N)?  Summary of Goals of Care Conversation:    Discharging Provider:  Padmini Colon   Contact Info: Cell [phone] - Please call with any questions or concerns.    Outpatient Provider: Dr Eric     Hospital Course  HPI:  84yo female with hx of  HTN, HLD, chronic right plantar foot ulcers/OM, presented to the ED from Podiatry office for IV abx for acute on chronic OM of Right foot. Pt was recently admitted/discharged from [facility] for acute on chronic osteomyelitis of the right foot. She completed course of IV abx and was deemed stable for discharge home. Pt was recovering from acute renal failure due to Cipro as well.  Currently denies pain or drainage from her wound. Does endorse persistent swelling of her Right foot along with mild redness.     While in the ED, pt noted to have new onset rapid AFIB. Denied cp, palpitations, sob, abd pain, N/V, fever, chills.        You were admitted for swollen right foot  You were diagnosed with right foot infection and New onset Afib   You were treated with IV antibiotics for 5 days - completed in hospital .  Started on Metoprolol to control heart rate and Eliquis for New onset Afib   You were prescribed the following new medications: Metoprolol 25 mg po bid  and eliquis 5mg po bid - Hold amlodipine as Bp has been running low with starting the metoprolol.  Follow up with PMD for instructions on resuming Amlodipine     You will need to follow up with your primary care physician. Dr Gustavo Peterson- Follow up with podiatry next week     Source of Infection: right toe infection   Antibiotic / Last Day: completed antibiotic course in hospital 1/26/24    Palliative Care / Advanced Care Planning  Code Status:full code   Patient/Family agreeable to Hospice/Palliative (N)?  Summary of Goals of Care Conversation:    Discharging Provider:  Padmini Colon   Contact Info: Cell [phone] - Please call with any questions or concerns.    Outpatient Provider: Dr Eric/ Dr Gustavo Peterson     Hospital Course  HPI:  82yo female with hx of  HTN, HLD, chronic right plantar foot ulcers/OM, presented to the ED from Podiatry office for IV abx for acute on chronic OM of Right foot. Pt was recently admitted/discharged from [facility] for acute on chronic osteomyelitis of the right foot. She completed course of IV abx and was deemed stable for discharge home. Pt was recovering from acute renal failure due to Cipro as well.  Currently denies pain or drainage from her wound. Does endorse persistent swelling of her Right foot along with mild redness.     While in the ED, pt noted to have new onset rapid AFIB. Denied cp, palpitations, sob, abd pain, N/V, fever, chills.  Pt seen and treated for Afib with rapid rhythm- pt seen by cardiology and started on Metoprolol and eliquis.  Pt hr controlled and pt treated with IV abx for foot wound.  Followed by ID to follow up with Podiatry for possible amputation.  Pt currently declining surgery and will f/u with her podiatrist as out pt       You were admitted for swollen right foot  You were diagnosed with right foot infection and New onset Afib   You were treated with IV antibiotics for 5 days - completed in hospital .  Started on Metoprolol to control heart rate and Eliquis for New onset Afib   You were prescribed the following new medications: Metoprolol 25 mg po bid  and eliquis 5mg po bid - Hold amlodipine as Bp has been running low with starting the metoprolol.  Follow up with PMD for instructions on resuming Amlodipine     You will need to follow up with your primary care physician. Dr Gustavo Peterson- Follow up with podiatry next week     Source of Infection: right toe infection   Antibiotic / Last Day: completed antibiotic course in hospital 1/26/24    Palliative Care / Advanced Care Planning  Code Status:full code   Patient/Family agreeable to Hospice/Palliative (N)?  Summary of Goals of Care Conversation:    Discharging Provider:  Padmini Colon   Contact Info: Cell [phone] - Please call with any questions or concerns.    Outpatient Provider: Dr Eric/ Dr Gustavo Peterson

## 2024-01-25 NOTE — DISCHARGE NOTE PROVIDER - CARE PROVIDER_API CALL
Feliciano Eric  Podiatric Medicine and Surgery  [address] [place] [id number]  Phone: [phone]  Fax: [phone]  Follow Up Time:    Feliciano Eric  Podiatric Medicine and Surgery  [address] [place], [place] [id number]  Phone: [phone]  Fax: [phone]  Follow Up Time:     Gustavo Peterson  [facility]  [address] [place] [address]  Phone: [phone]  Fax: [phone]  Follow Up Time:

## 2024-01-25 NOTE — PROVIDER CONTACT NOTE (CRITICAL VALUE NOTIFICATION) - BACKGROUND
Patient has a wound/ulcer of the right foot
Pt presented to ED with right foot swelling found to have acute on chronic OM of right foot, hospital course c/b acute renal failure dt cipro

## 2024-01-25 NOTE — PROGRESS NOTE ADULT - NS ATTEND AMEND GEN_ALL_CORE FT
83 year old F PMH HTN, HLD, chronic right plantar foot ulcers/OM, presented to the ED from podiatry office for IV abx for acute on chronic OM of right foot, also noted to have new onset A Fib     #New onset A Fib w/ RVR  - Likely acute infection induced vs electrolyte abnormality (hypokalemia)  - HR improved following cardizem in ED  - TSH normal  - continue Metoprolol tartrate 25mg BID w/ holding parameters  - CHADSVASC at least 4- placed on renally dosed lovenox for now, transition to NOAC once no procedures planned   - echo with EF 65-70%  - repeat EKG with NSR, no LBBB noted  - cardio recs appreciated    #Acute on chronic right plantar foot OM  - No leukocytosis, does not appear acutely infected  - wound cultures with ESBL E coli, placed on meropenem  - BCx with NGTD, UCx contaminated  - DAVID performed and reviewed, normal  - MRI performed with evidence of osteo, discussed with Dr. Viramontes podiatry- recommend patient would need transmetatarsal amputation. Will discuss with patient if this is something she would like to pursue  - ID recs appreciated- possible chronic osteo, continue meropenem day 4/5  - podiatry recs appreciated- bedside I&D performed 1/19    PT recommends HCPT  if no surgical intervention, last dose meropenem is evening of 1/26 therefore can dc home on 1/27

## 2024-01-25 NOTE — DISCHARGE NOTE PROVIDER - DETAILS OF MALNUTRITION DIAGNOSIS/DIAGNOSES
This patient has been assessed with a concern for Malnutrition and was treated during this hospitalization for the following Nutrition diagnosis/diagnoses:     -  01/19/2024: Severe protein-calorie malnutrition   -  01/19/2024: Underweight (BMI < 19)

## 2024-01-25 NOTE — DISCHARGE NOTE PROVIDER - NSDCFUSCHEDAPPT_GEN_ALL_CORE_FT
Claudine Walker  [facility]  PULED [address] [place] [address]  Scheduled Appointment: 02/23/2024     Claudine Walker  [facility]  [address]  Scheduled Appointment: 02/23/2024    Jc Pacheco  [facility]  CARDIOLOGY [address]  Scheduled Appointment: 04/25/2024

## 2024-01-25 NOTE — DISCHARGE NOTE PROVIDER - NSDCCPCAREPLAN_GEN_ALL_CORE_FT
PRINCIPAL DISCHARGE DIAGNOSIS  Diagnosis: Rapid atrial fibrillation  Assessment and Plan of Treatment: You were admitted for swollen right foot  You were diagnosed with right foot infection and New onset Afib   You were treated with IV antibiotics for 5 days - completed in hospital .  Started on Metoprolol to control heart rate and Eliquis for New onset Afib   You were prescribed the following new medications: Metoprolol 25 mg po bid  and eliquis 5mg po bid - Hold amlodipine as Bp has been running low with starting the metoprolol.  Follow up with PMD for instructions on resuming Amlodipine   You will need to follow up with your primary care physician. Dr Gustavo Peterson- Follow up with podiatry next week      SECONDARY DISCHARGE DIAGNOSES  Diagnosis: Foot ulcer  Assessment and Plan of Treatment:

## 2024-01-25 NOTE — DISCHARGE NOTE PROVIDER - PROVIDER TOKENS
PROVIDER:[TOKEN:[[id number]]] PROVIDER:[TOKEN:[[id number]]],PROVIDER:[TOKEN:[[id number]]]

## 2024-01-25 NOTE — DISCHARGE NOTE PROVIDER - NSDCMRMEDTOKEN_GEN_ALL_CORE_FT
amLODIPine 10 mg oral tablet: 1 tab(s) orally once a day (before a meal)  levothyroxine 88 mcg (0.088 mg) oral tablet: 1 tab(s) orally once a day  ProAir HFA 90 mcg/inh inhalation aerosol: 2 puff(s) inhaled 2 times a day  simvastatin 40 mg oral tablet: 1 tab(s) orally once a day (at bedtime)   apixaban 5 mg oral tablet: 1 tab(s) orally every 12 hours  levothyroxine 88 mcg (0.088 mg) oral tablet: 1 tab(s) orally once a day  metoprolol tartrate 25 mg oral tablet: 1 tab(s) orally every 12 hours  ProAir HFA 90 mcg/inh inhalation aerosol: 2 puff(s) inhaled 2 times a day  simvastatin 40 mg oral tablet: 1 tab(s) orally once a day (at bedtime)

## 2024-01-25 NOTE — PROGRESS NOTE ADULT - SUBJECTIVE AND OBJECTIVE BOX
Patient is a 83y old  Female who presents with a chief complaint of Right foot swelling (24 Jan 2024 22:38)      Patient seen and examined at bedside.    ALLERGIES:  linezolid (Unknown)  Cipro (Other)  doxycycline (Unknown)  Levaquin (Stomach Upset; Nausea)    MEDICATIONS  (STANDING):  enoxaparin Injectable 60 milliGRAM(s) SubCutaneous every 12 hours  levothyroxine 88 MICROGram(s) Oral daily  meropenem  IVPB 1000 milliGRAM(s) IV Intermittent every 12 hours  metoprolol tartrate 25 milliGRAM(s) Oral every 12 hours  simvastatin 40 milliGRAM(s) Oral at bedtime    MEDICATIONS  (PRN):  acetaminophen     Tablet .. 650 milliGRAM(s) Oral every 6 hours PRN Temp greater or equal to 38C (100.4F), Mild Pain (1 - 3)    Vital Signs Last 24 Hrs  T(F): 97.8 (25 Jan 2024 04:53), Max: 98.4 (24 Jan 2024 20:31)  HR: 66 (25 Jan 2024 04:53) (66 - 72)  BP: 118/62 (25 Jan 2024 04:53) (115/58 - 123/66)  RR: 16 (25 Jan 2024 04:53) (16 - 17)  SpO2: 95% (25 Jan 2024 04:53) (95% - 96%)  I&O's Summary    24 Jan 2024 07:01  -  25 Jan 2024 07:00  --------------------------------------------------------  IN: 240 mL / OUT: 0 mL / NET: 240 mL      PHYSICAL EXAM:  General: NAD, A/O x 3  ENT: MMM  Neck: Supple, No JVD  Lungs: Clear to auscultation bilaterally, Non labored breathing   Cardio: RRR, S1/S2, No murmurs  Abdomen: Soft, Nontender, Nondistended; Bowel sounds present  Extremities: No calf tenderness, No pitting edema    LABS:                        9.5    4.46  )-----------( 251      ( 23 Jan 2024 06:25 )             28.7     01-23    139  |  106  |  15  ----------------------------<  77  3.8   |  24  |  1.02    Ca    9.0      23 Jan 2024 06:25  Mg     1.7     01-23                                  Urinalysis Basic - ( 23 Jan 2024 06:25 )    Color: x / Appearance: x / SG: x / pH: x  Gluc: 77 mg/dL / Ketone: x  / Bili: x / Urobili: x   Blood: x / Protein: x / Nitrite: x   Leuk Esterase: x / RBC: x / WBC x   Sq Epi: x / Non Sq Epi: x / Bacteria: x        Culture - Surgical Swab (collected 19 Jan 2024 13:40)  Source: .Surgical Swab right foot  Final Report (24 Jan 2024 17:49):    Few Escherichia coli ESBL    Moderate Corynebacterium species "Susceptibilities not performed"  Organism: Escherichia coli ESBL (24 Jan 2024 17:49)  Organism: Escherichia coli ESBL (24 Jan 2024 17:49)      Method Type: JEANINE      -  Amoxicillin/Clavulanic Acid: S <=8/4      -  Ampicillin: R >16 These ampicillin results predict results for amoxicillin      -  Ampicillin/Sulbactam: R 8/4      -  Aztreonam: R >16      -  Cefazolin: R >16      -  Cefepime: R >16      -  Ceftriaxone: R >32      -  Ciprofloxacin: R >2      -  Ertapenem: S <=0.5      -  Gentamicin: R >8      -  Imipenem: S <=1      -  Levofloxacin: R >4      -  Meropenem: S <=1      -  Piperacillin/Tazobactam: R <=8      -  Tobramycin: R 8      -  Trimethoprim/Sulfamethoxazole: R >2/38    Culture - Urine (collected 18 Jan 2024 17:50)  Source: Clean Catch Clean Catch (Midstream)  Final Report (22 Jan 2024 08:48):    Culture grew 3 or more types of organisms which indicate    collection contamination; consider recollection only if clinically    indicated.    Culture - Blood (collected 18 Jan 2024 14:16)  Source: .Blood Blood-Peripheral  Final Report (23 Jan 2024 19:01):    No growth at 5 days    Culture - Blood (collected 18 Jan 2024 14:15)  Source: .Blood Blood-Peripheral  Final Report (23 Jan 2024 19:01):    No growth at 5 days        RADIOLOGY & ADDITIONAL TESTS:    Care Discussed with Consultants/Other Providers:    Patient is a 83y old  Female who presents with a chief complaint of Right foot swelling       Patient seen and examined at bedside.    ALLERGIES:  linezolid (Unknown)  Cipro (Other)  doxycycline (Unknown)  Levaquin (Stomach Upset; Nausea)    MEDICATIONS  (STANDING):  enoxaparin Injectable 60 milliGRAM(s) SubCutaneous every 12 hours  levothyroxine 88 MICROGram(s) Oral daily  meropenem  IVPB 1000 milliGRAM(s) IV Intermittent every 12 hours  metoprolol tartrate 25 milliGRAM(s) Oral every 12 hours  simvastatin 40 milliGRAM(s) Oral at bedtime    MEDICATIONS  (PRN):  acetaminophen     Tablet .. 650 milliGRAM(s) Oral every 6 hours PRN Temp greater or equal to 38C (100.4F), Mild Pain (1 - 3)    Vital Signs Last 24 Hrs  T(F): 97.8 (25 Jan 2024 04:53), Max: 98.4 (24 Jan 2024 20:31)  HR: 66 (25 Jan 2024 04:53) (66 - 72)  BP: 118/62 (25 Jan 2024 04:53) (115/58 - 123/66)  RR: 16 (25 Jan 2024 04:53) (16 - 17)  SpO2: 95% (25 Jan 2024 04:53) (95% - 96%)  I&O's Summary    24 Jan 2024 07:01  -  25 Jan 2024 07:00  --------------------------------------------------------  IN: 240 mL / OUT: 0 mL / NET: 240 mL      PHYSICAL EXAM:  General: NAD, A/O x 3  ENT: MMM  Neck: Supple, No JVD  Lungs: Clear to auscultation bilaterally, Non labored breathing   Cardio: RRR, S1/S2, No murmurs  Abdomen: Soft, Nontender, Nondistended; Bowel sounds present  Extremities: No calf tenderness, No pitting edema    LABS:                        9.5    4.46  )-----------( 251      ( 23 Jan 2024 06:25 )             28.7     01-23    139  |  106  |  15  ----------------------------<  77  3.8   |  24  |  1.02    Ca    9.0      23 Jan 2024 06:25  Mg     1.7     01-23                                  Urinalysis Basic - ( 23 Jan 2024 06:25 )    Color: x / Appearance: x / SG: x / pH: x  Gluc: 77 mg/dL / Ketone: x  / Bili: x / Urobili: x   Blood: x / Protein: x / Nitrite: x   Leuk Esterase: x / RBC: x / WBC x   Sq Epi: x / Non Sq Epi: x / Bacteria: x        Culture - Surgical Swab (collected 19 Jan 2024 13:40)  Source: .Surgical Swab right foot  Final Report (24 Jan 2024 17:49):    Few Escherichia coli ESBL    Moderate Corynebacterium species "Susceptibilities not performed"  Organism: Escherichia coli ESBL (24 Jan 2024 17:49)  Organism: Escherichia coli ESBL (24 Jan 2024 17:49)      Method Type: JEANINE      -  Amoxicillin/Clavulanic Acid: S <=8/4      -  Ampicillin: R >16 These ampicillin results predict results for amoxicillin      -  Ampicillin/Sulbactam: R 8/4      -  Aztreonam: R >16      -  Cefazolin: R >16      -  Cefepime: R >16      -  Ceftriaxone: R >32      -  Ciprofloxacin: R >2      -  Ertapenem: S <=0.5      -  Gentamicin: R >8      -  Imipenem: S <=1      -  Levofloxacin: R >4      -  Meropenem: S <=1      -  Piperacillin/Tazobactam: R <=8      -  Tobramycin: R 8      -  Trimethoprim/Sulfamethoxazole: R >2/38    Culture - Urine (collected 18 Jan 2024 17:50)  Source: Clean Catch Clean Catch (Midstream)  Final Report (22 Jan 2024 08:48):    Culture grew 3 or more types of organisms which indicate    collection contamination; consider recollection only if clinically    indicated.    Culture - Blood (collected 18 Jan 2024 14:16)  Source: .Blood Blood-Peripheral  Final Report (23 Jan 2024 19:01):    No growth at 5 days    Culture - Blood (collected 18 Jan 2024 14:15)  Source: .Blood Blood-Peripheral  Final Report (23 Jan 2024 19:01):    No growth at 5 days        RADIOLOGY & ADDITIONAL TESTS:    Care Discussed with Consultants/Other Providers:    Patient is a 83y old  Female who presents with a chief complaint of Right foot swelling       Patient seen and examined at bedside. Pt without complaints, Ambulating with assistance.  Pt eager for discharge, has things to do   ALLERGIES:  linezolid (Unknown)  Cipro (Other)  doxycycline (Unknown)  Levaquin (Stomach Upset; Nausea)    MEDICATIONS  (STANDING):  enoxaparin Injectable 60 milliGRAM(s) SubCutaneous every 12 hours  levothyroxine 88 MICROGram(s) Oral daily  meropenem  IVPB 1000 milliGRAM(s) IV Intermittent every 12 hours  metoprolol tartrate 25 milliGRAM(s) Oral every 12 hours  simvastatin 40 milliGRAM(s) Oral at bedtime    MEDICATIONS  (PRN):  acetaminophen     Tablet .. 650 milliGRAM(s) Oral every 6 hours PRN Temp greater or equal to 38C (100.4F), Mild Pain (1 - 3)    Vital Signs Last 24 Hrs  T(F): 97.8 (25 Jan 2024 04:53), Max: 98.4 (24 Jan 2024 20:31)  HR: 66 (25 Jan 2024 04:53) (66 - 72)  BP: 118/62 (25 Jan 2024 04:53) (115/58 - 123/66)  RR: 16 (25 Jan 2024 04:53) (16 - 17)  SpO2: 95% (25 Jan 2024 04:53) (95% - 96%)  I&O's Summary    24 Jan 2024 07:01  -  25 Jan 2024 07:00  --------------------------------------------------------  IN: 240 mL / OUT: 0 mL / NET: 240 mL      PHYSICAL EXAM:  General: 82 y/o female in NAD, A/O x 3  ENT: MMM  Neck: Supple, No JVD  Lungs: Clear to auscultation bilaterally, Non labored breathing   Cardio: RRR, S1/S2, No murmurs  Abdomen: Soft, Nontender, Nondistended; Bowel sounds present  Extremities: No calf tenderness, No pitting edema    LABS:                        9.5    4.46  )-----------( 251      ( 23 Jan 2024 06:25 )             28.7     01-23    139  |  106  |  15  ----------------------------<  77  3.8   |  24  |  1.02    Ca    9.0      23 Jan 2024 06:25  Mg     1.7     01-23                                  Urinalysis Basic - ( 23 Jan 2024 06:25 )    Color: x / Appearance: x / SG: x / pH: x  Gluc: 77 mg/dL / Ketone: x  / Bili: x / Urobili: x   Blood: x / Protein: x / Nitrite: x   Leuk Esterase: x / RBC: x / WBC x   Sq Epi: x / Non Sq Epi: x / Bacteria: x        Culture - Surgical Swab (collected 19 Jan 2024 13:40)  Source: .Surgical Swab right foot  Final Report (24 Jan 2024 17:49):    Few Escherichia coli ESBL    Moderate Corynebacterium species "Susceptibilities not performed"  Organism: Escherichia coli ESBL (24 Jan 2024 17:49)  Organism: Escherichia coli ESBL (24 Jan 2024 17:49)      Method Type: JEANINE      -  Amoxicillin/Clavulanic Acid: S <=8/4      -  Ampicillin: R >16 These ampicillin results predict results for amoxicillin      -  Ampicillin/Sulbactam: R 8/4      -  Aztreonam: R >16      -  Cefazolin: R >16      -  Cefepime: R >16      -  Ceftriaxone: R >32      -  Ciprofloxacin: R >2      -  Ertapenem: S <=0.5      -  Gentamicin: R >8      -  Imipenem: S <=1      -  Levofloxacin: R >4      -  Meropenem: S <=1      -  Piperacillin/Tazobactam: R <=8      -  Tobramycin: R 8      -  Trimethoprim/Sulfamethoxazole: R >2/38    Culture - Urine (collected 18 Jan 2024 17:50)  Source: Clean Catch Clean Catch (Midstream)  Final Report (22 Jan 2024 08:48):    Culture grew 3 or more types of organisms which indicate    collection contamination; consider recollection only if clinically    indicated.    Culture - Blood (collected 18 Jan 2024 14:16)  Source: .Blood Blood-Peripheral  Final Report (23 Jan 2024 19:01):    No growth at 5 days    Culture - Blood (collected 18 Jan 2024 14:15)  Source: .Blood Blood-Peripheral  Final Report (23 Jan 2024 19:01):    No growth at 5 days        RADIOLOGY & ADDITIONAL TESTS:    Care Discussed with Consultants/Other Providers:

## 2024-01-25 NOTE — DISCHARGE NOTE PROVIDER - ATTENDING DISCHARGE PHYSICAL EXAMINATION:
General: NAD well developed elderly female  Head: AT NC  Lungs: CTA B/L no wheezing nonlabored breathing  Heart: Irregularly irregular  Neuro: AAOx 3

## 2024-01-26 ENCOUNTER — TRANSCRIPTION ENCOUNTER (OUTPATIENT)
Age: 84
End: 2024-01-26

## 2024-01-26 VITALS
DIASTOLIC BLOOD PRESSURE: 71 MMHG | SYSTOLIC BLOOD PRESSURE: 151 MMHG | OXYGEN SATURATION: 95 % | RESPIRATION RATE: 19 BRPM | TEMPERATURE: 98 F | HEART RATE: 71 BPM

## 2024-01-26 LAB
ANION GAP SERPL CALC-SCNC: 6 MMOL/L — SIGNIFICANT CHANGE UP (ref 5–17)
BUN SERPL-MCNC: 23 MG/DL — SIGNIFICANT CHANGE UP (ref 7–23)
CALCIUM SERPL-MCNC: 8.9 MG/DL — SIGNIFICANT CHANGE UP (ref 8.4–10.5)
CHLORIDE SERPL-SCNC: 105 MMOL/L — SIGNIFICANT CHANGE UP (ref 96–108)
CO2 SERPL-SCNC: 28 MMOL/L — SIGNIFICANT CHANGE UP (ref 22–31)
CREAT SERPL-MCNC: 0.93 MG/DL — SIGNIFICANT CHANGE UP (ref 0.5–1.3)
EGFR: 60 ML/MIN/1.73M2 — SIGNIFICANT CHANGE UP
GLUCOSE SERPL-MCNC: 80 MG/DL — SIGNIFICANT CHANGE UP (ref 70–99)
HCT VFR BLD CALC: 28.5 % — LOW (ref 34.5–45)
HGB BLD-MCNC: 9.3 G/DL — LOW (ref 11.5–15.5)
MCHC RBC-ENTMCNC: 31.4 PG — SIGNIFICANT CHANGE UP (ref 27–34)
MCHC RBC-ENTMCNC: 32.6 GM/DL — SIGNIFICANT CHANGE UP (ref 32–36)
MCV RBC AUTO: 96.3 FL — SIGNIFICANT CHANGE UP (ref 80–100)
NRBC # BLD: 0 /100 WBCS — SIGNIFICANT CHANGE UP (ref 0–0)
PLATELET # BLD AUTO: 267 K/UL — SIGNIFICANT CHANGE UP (ref 150–400)
POTASSIUM SERPL-MCNC: 4.1 MMOL/L — SIGNIFICANT CHANGE UP (ref 3.5–5.3)
POTASSIUM SERPL-SCNC: 4.1 MMOL/L — SIGNIFICANT CHANGE UP (ref 3.5–5.3)
RBC # BLD: 2.96 M/UL — LOW (ref 3.8–5.2)
RBC # FLD: 12.9 % — SIGNIFICANT CHANGE UP (ref 10.3–14.5)
SODIUM SERPL-SCNC: 139 MMOL/L — SIGNIFICANT CHANGE UP (ref 135–145)
WBC # BLD: 4.76 K/UL — SIGNIFICANT CHANGE UP (ref 3.8–10.5)
WBC # FLD AUTO: 4.76 K/UL — SIGNIFICANT CHANGE UP (ref 3.8–10.5)

## 2024-01-26 PROCEDURE — 93970 EXTREMITY STUDY: CPT

## 2024-01-26 PROCEDURE — 99285 EMERGENCY DEPT VISIT HI MDM: CPT | Mod: 25

## 2024-01-26 PROCEDURE — 85025 COMPLETE CBC W/AUTO DIFF WBC: CPT

## 2024-01-26 PROCEDURE — 83880 ASSAY OF NATRIURETIC PEPTIDE: CPT

## 2024-01-26 PROCEDURE — 81001 URINALYSIS AUTO W/SCOPE: CPT

## 2024-01-26 PROCEDURE — 87070 CULTURE OTHR SPECIMN AEROBIC: CPT

## 2024-01-26 PROCEDURE — 84165 PROTEIN E-PHORESIS SERUM: CPT

## 2024-01-26 PROCEDURE — 84155 ASSAY OF PROTEIN SERUM: CPT

## 2024-01-26 PROCEDURE — 87186 SC STD MICRODIL/AGAR DIL: CPT

## 2024-01-26 PROCEDURE — 99239 HOSP IP/OBS DSCHRG MGMT >30: CPT

## 2024-01-26 PROCEDURE — 36415 COLL VENOUS BLD VENIPUNCTURE: CPT

## 2024-01-26 PROCEDURE — 80202 ASSAY OF VANCOMYCIN: CPT

## 2024-01-26 PROCEDURE — 94060 EVALUATION OF WHEEZING: CPT

## 2024-01-26 PROCEDURE — 96375 TX/PRO/DX INJ NEW DRUG ADDON: CPT

## 2024-01-26 PROCEDURE — 93306 TTE W/DOPPLER COMPLETE: CPT

## 2024-01-26 PROCEDURE — 97161 PT EVAL LOW COMPLEX 20 MIN: CPT

## 2024-01-26 PROCEDURE — 84443 ASSAY THYROID STIM HORMONE: CPT

## 2024-01-26 PROCEDURE — 87040 BLOOD CULTURE FOR BACTERIA: CPT

## 2024-01-26 PROCEDURE — 87086 URINE CULTURE/COLONY COUNT: CPT

## 2024-01-26 PROCEDURE — 85730 THROMBOPLASTIN TIME PARTIAL: CPT

## 2024-01-26 PROCEDURE — 73630 X-RAY EXAM OF FOOT: CPT

## 2024-01-26 PROCEDURE — A9579: CPT

## 2024-01-26 PROCEDURE — 83605 ASSAY OF LACTIC ACID: CPT

## 2024-01-26 PROCEDURE — 94729 DIFFUSING CAPACITY: CPT

## 2024-01-26 PROCEDURE — 80048 BASIC METABOLIC PNL TOTAL CA: CPT

## 2024-01-26 PROCEDURE — 84484 ASSAY OF TROPONIN QUANT: CPT

## 2024-01-26 PROCEDURE — 73719 MRI LOWER EXTREMITY W/DYE: CPT

## 2024-01-26 PROCEDURE — 86334 IMMUNOFIX E-PHORESIS SERUM: CPT

## 2024-01-26 PROCEDURE — 96365 THER/PROPH/DIAG IV INF INIT: CPT

## 2024-01-26 PROCEDURE — 93005 ELECTROCARDIOGRAM TRACING: CPT

## 2024-01-26 PROCEDURE — 83735 ASSAY OF MAGNESIUM: CPT

## 2024-01-26 PROCEDURE — 93923 UPR/LXTR ART STDY 3+ LVLS: CPT

## 2024-01-26 PROCEDURE — 87077 CULTURE AEROBIC IDENTIFY: CPT

## 2024-01-26 PROCEDURE — 80053 COMPREHEN METABOLIC PANEL: CPT

## 2024-01-26 PROCEDURE — 94726 PLETHYSMOGRAPHY LUNG VOLUMES: CPT

## 2024-01-26 PROCEDURE — 85027 COMPLETE CBC AUTOMATED: CPT

## 2024-01-26 PROCEDURE — 85610 PROTHROMBIN TIME: CPT

## 2024-01-26 RX ORDER — APIXABAN 2.5 MG/1
1 TABLET, FILM COATED ORAL
Qty: 60 | Refills: 0
Start: 2024-01-26 | End: 2024-02-24

## 2024-01-26 RX ORDER — METOPROLOL TARTRATE 50 MG
1 TABLET ORAL
Qty: 60 | Refills: 0
Start: 2024-01-26 | End: 2024-02-24

## 2024-01-26 RX ADMIN — APIXABAN 5 MILLIGRAM(S): 2.5 TABLET, FILM COATED ORAL at 05:47

## 2024-01-26 RX ADMIN — APIXABAN 5 MILLIGRAM(S): 2.5 TABLET, FILM COATED ORAL at 17:13

## 2024-01-26 RX ADMIN — Medication 88 MICROGRAM(S): at 05:47

## 2024-01-26 RX ADMIN — MEROPENEM 100 MILLIGRAM(S): 1 INJECTION INTRAVENOUS at 05:47

## 2024-01-26 RX ADMIN — Medication 25 MILLIGRAM(S): at 17:12

## 2024-01-26 NOTE — PHARMACOTHERAPY INTERVENTION NOTE - COMMENTS
recommended lovenox q12h as CARLOS has resolved
pharmacy couldn't be reached; provided patient with coupon for free 30 supply of eliquis

## 2024-01-26 NOTE — PROGRESS NOTE ADULT - ASSESSMENT
82 yo female with hx of HTN, HLD, chronic right plantar foot ulcers/OM, sent from podiatry office for IV abx for acute on chronic OM of right foot, also noted to have new onset A Fib.     #New onset Paroxysmal A Fib w/ RVR  - Likely acute infection induced vs electrolyte abnormality (hypokalemia)  - Heart rate improved; cont Metoprolol  - TSH normal  - CHADSVASC at least 4. Renally dosed Lovenox. Transition to NOAC if no procedures to be done  - Echo done: EF 65>70%, mild to mod AS, tricuspid regurg  - Cardiology recs noted    #Acute on chronic right plantar foot OM  - MR with IV contrast of right foot: +osteo of 2nd metatarsal stump and 3rd metatarsal head and shaft, early osteo of 3rd proximal phalanx, no drainable collection seen  - pt with no fevers, no elevated wbc, BC with NGTD  - Podiatry  Dr. Mcgarry consult appreciated - opted for conservative management- complete course of IV abx and then can be discharge home. Patient to follow up with her podiatrist (Dr. Pineda) upon d/c.  - ID recs noted: cont IV Meropenem, last day  Friday, 1/26  - DAVID'S noted    #Acute renal failure, suspect drug induced [cipro, ARBs]  - Renal function improved  - Nephro recs noted; avoid nephrotoxins, No NSAID's, no ACE/ARB, no Cipro    #Hypothyroidism  - s/p thyroidectomy  - TSH normal  - Continue Synthroid    #HTN  - Continue amlodipine    #VTE ppx  - Lovenox     GOC: DNR/I    Dispo: discharge saturday after abx completed   Patient states that she will update her family

## 2024-01-26 NOTE — PROGRESS NOTE ADULT - ASSESSMENT
84 yo female with HTN,HLD, s/p Rt partial second toe amp in early December  admitted with plantar ulcer near 3rd MTH.  The wound culture has corynebacteria striatum and ESBL E Coli.  The wound is relatively clean.  She is concerned that cipro almost killed her.  She was at [facility] from 12/6-12/11, received a few days of vanco and zosyn and was to received a limited course of augmentin after discharge.  She was receiving cipro and admitted to [facility] later in December with CARLOS and a creat near 7, was ultimately felt to have a quinolone induced CARLOS.  This resolved with conservative therapy.  I am concerned that based on chronicity she may have chronic osteomyelitis.  Her vanco levels were low, drug had been stopped a few days ago.  MRI raises concerns of osteo of 2nd met stump, 3rd digit, and third met head and shaft.  Her prior podiatric procedure was felt to have clean margins, bone culture was negative, wound culture with C striatum.  The cellulitic component is improved.  She has completed 5 days of meropenem  Suggest:  1 Monitor off antibiotics  2 local wound care  3 No ID objection to discharge planning. If she develops signs of recurrent soft tissue infection we will no a more aggressive approach will be needed  4 She will f/u with her podiatry team after discharge. With stable course and no additional ID w/u planned at this point we will stop actively following. Please call if ID issues arise

## 2024-01-26 NOTE — PROGRESS NOTE ADULT - NS ATTEND OPT1 GEN_ALL_CORE
Spoke with patient and advised of below. She wants to go to [facility]. Told her it still has to be approved though her insurance so when they call to schedule to ask them to fax it there.  She verbalized understanding.  
I attest my time as attending is greater than 50% of the total combined time spent on qualifying patient care activities by the PA/NP and attending.

## 2024-01-26 NOTE — PROGRESS NOTE ADULT - NS ATTEND AMEND GEN_ALL_CORE FT
83 year old F PMH HTN, HLD, chronic right plantar foot ulcers/OM, presented to the ED from podiatry office for IV abx for acute on chronic OM of right foot, also noted to have new onset A Fib     Pt w/ acute on chronic OM   ID recommendations appreciated; completed course of abx  New onset AFIB, rate controlled. On AC     Outpatient follow up with Podiatry and cardiology    Stable for discharge

## 2024-01-26 NOTE — DISCHARGE NOTE NURSING/CASE MANAGEMENT/SOCIAL WORK - PATIENT PORTAL LINK FT
You can access the FollowMyHealth Patient Portal offered by [facility] by registering at the following website: [email or web address]. By joining [employer]’s FollowMyHealth portal, you will also be able to view your health information using other applications (apps) compatible with our system.

## 2024-01-26 NOTE — PROGRESS NOTE ADULT - SUBJECTIVE AND OBJECTIVE BOX
Patient is a 83y old  Female who presents with a chief complaint of Right foot swelling (25 Jan 2024 16:23)      Patient seen and examined at bedside.    ALLERGIES:  linezolid (Unknown)  Cipro (Other)  doxycycline (Unknown)  Levaquin (Stomach Upset; Nausea)    MEDICATIONS  (STANDING):  apixaban 5 milliGRAM(s) Oral every 12 hours  levothyroxine 88 MICROGram(s) Oral daily  meropenem  IVPB 1000 milliGRAM(s) IV Intermittent every 12 hours  metoprolol tartrate 25 milliGRAM(s) Oral every 12 hours  simvastatin 40 milliGRAM(s) Oral at bedtime    MEDICATIONS  (PRN):  acetaminophen     Tablet .. 650 milliGRAM(s) Oral every 6 hours PRN Temp greater or equal to 38C (100.4F), Mild Pain (1 - 3)    Vital Signs Last 24 Hrs  T(F): 97.6 (26 Jan 2024 05:46), Max: 98.5 (25 Jan 2024 21:00)  HR: 63 (26 Jan 2024 05:46) (63 - 69)  BP: 139/72 (26 Jan 2024 05:46) (100/53 - 139/72)  RR: 16 (26 Jan 2024 05:46) (16 - 16)  SpO2: 98% (26 Jan 2024 05:46) (96% - 98%)  I&O's Summary    25 Jan 2024 07:01  -  26 Jan 2024 07:00  --------------------------------------------------------  IN: 480 mL / OUT: 1100 mL / NET: -620 mL      PHYSICAL EXAM:  General: NAD, A/O x 3  ENT: MMM  Neck: Supple, No JVD  Lungs: Clear to auscultation bilaterally, Non labored breathing   Cardio: RRR, S1/S2, No murmurs  Abdomen: Soft, Nontender, Nondistended; Bowel sounds present  Extremities: No calf tenderness, No pitting edema    LABS:                                          Culture - Surgical Swab (collected 19 Jan 2024 13:40)  Source: .Surgical Swab right foot  Final Report (24 Jan 2024 17:49):    Few Escherichia coli ESBL    Moderate Corynebacterium species "Susceptibilities not performed"  Organism: Escherichia coli ESBL (24 Jan 2024 17:49)  Organism: Escherichia coli ESBL (24 Jan 2024 17:49)      Method Type: JEANINE      -  Amoxicillin/Clavulanic Acid: S <=8/4      -  Ampicillin: R >16 These ampicillin results predict results for amoxicillin      -  Ampicillin/Sulbactam: R 8/4      -  Aztreonam: R >16      -  Cefazolin: R >16      -  Cefepime: R >16      -  Ceftriaxone: R >32      -  Ciprofloxacin: R >2      -  Ertapenem: S <=0.5      -  Gentamicin: R >8      -  Imipenem: S <=1      -  Levofloxacin: R >4      -  Meropenem: S <=1      -  Piperacillin/Tazobactam: R <=8      -  Tobramycin: R 8      -  Trimethoprim/Sulfamethoxazole: R >2/38        RADIOLOGY & ADDITIONAL TESTS:    Care Discussed with Consultants/Other Providers:    Patient is a 83y old  Female who presents with a chief complaint of Right foot swelling      Patient seen and examined at bedside.    ALLERGIES:  linezolid (Unknown)  Cipro (Other)  doxycycline (Unknown)  Levaquin (Stomach Upset; Nausea)    MEDICATIONS  (STANDING):  apixaban 5 milliGRAM(s) Oral every 12 hours  levothyroxine 88 MICROGram(s) Oral daily  meropenem  IVPB 1000 milliGRAM(s) IV Intermittent every 12 hours  metoprolol tartrate 25 milliGRAM(s) Oral every 12 hours  simvastatin 40 milliGRAM(s) Oral at bedtime    MEDICATIONS  (PRN):  acetaminophen     Tablet .. 650 milliGRAM(s) Oral every 6 hours PRN Temp greater or equal to 38C (100.4F), Mild Pain (1 - 3)    Vital Signs Last 24 Hrs  T(F): 97.6 (26 Jan 2024 05:46), Max: 98.5 (25 Jan 2024 21:00)  HR: 63 (26 Jan 2024 05:46) (63 - 69)  BP: 139/72 (26 Jan 2024 05:46) (100/53 - 139/72)  RR: 16 (26 Jan 2024 05:46) (16 - 16)  SpO2: 98% (26 Jan 2024 05:46) (96% - 98%)  I&O's Summary    25 Jan 2024 07:01  -  26 Jan 2024 07:00  --------------------------------------------------------  IN: 480 mL / OUT: 1100 mL / NET: -620 mL      PHYSICAL EXAM:  General: NAD, A/O x 3  ENT: MMM  Neck: Supple, No JVD  Lungs: Clear to auscultation bilaterally, Non labored breathing   Cardio: RRR, S1/S2, No murmurs  Abdomen: Soft, Nontender, Nondistended; Bowel sounds present  Extremities: No calf tenderness, No pitting edema    LABS:                                          Culture - Surgical Swab (collected 19 Jan 2024 13:40)  Source: .Surgical Swab right foot  Final Report (24 Jan 2024 17:49):    Few Escherichia coli ESBL    Moderate Corynebacterium species "Susceptibilities not performed"  Organism: Escherichia coli ESBL (24 Jan 2024 17:49)  Organism: Escherichia coli ESBL (24 Jan 2024 17:49)      Method Type: JEANINE      -  Amoxicillin/Clavulanic Acid: S <=8/4      -  Ampicillin: R >16 These ampicillin results predict results for amoxicillin      -  Ampicillin/Sulbactam: R 8/4      -  Aztreonam: R >16      -  Cefazolin: R >16      -  Cefepime: R >16      -  Ceftriaxone: R >32      -  Ciprofloxacin: R >2      -  Ertapenem: S <=0.5      -  Gentamicin: R >8      -  Imipenem: S <=1      -  Levofloxacin: R >4      -  Meropenem: S <=1      -  Piperacillin/Tazobactam: R <=8      -  Tobramycin: R 8      -  Trimethoprim/Sulfamethoxazole: R >2/38        RADIOLOGY & ADDITIONAL TESTS:    Care Discussed with Consultants/Other Providers:

## 2024-01-26 NOTE — PROGRESS NOTE ADULT - REASON FOR ADMISSION
Right foot swelling

## 2024-01-26 NOTE — DISCHARGE NOTE NURSING/CASE MANAGEMENT/SOCIAL WORK - NSDCPEFALRISK_GEN_ALL_CORE
For information on Fall & Injury Prevention, visit: [email or web address] OR  [email or web address] OR  https://www.cdc.gov/steadi/patient.html

## 2024-01-26 NOTE — PROGRESS NOTE ADULT - SUBJECTIVE AND OBJECTIVE BOX
Resting    Vital Signs Last 24 Hrs  T(C): 36.7 (01-26-24 @ 12:36), Max: 36.9 (01-25-24 @ 21:00)  T(F): 98.1 (01-26-24 @ 12:36), Max: 98.5 (01-25-24 @ 21:00)  HR: 73 (01-26-24 @ 12:36) (63 - 73)  BP: 129/74 (01-26-24 @ 12:36) (100/53 - 139/72)  RR: 18 (01-26-24 @ 12:36) (16 - 18)  SpO2: 96% (01-26-24 @ 12:36) (96% - 98%)    I&O's Detail    25 Jan 2024 07:01  -  26 Jan 2024 07:00  --------------------------------------------------------  IN:    Oral Fluid: 480 mL  Total IN: 480 mL    OUT:    Voided (mL): 1100 mL  Total OUT: 1100 mL    s1s2  b/l air entry  soft, ND  tr b/l LE edema, RLE wound dressed                                                  9.3    4.76  )-----------( 267      ( 26 Jan 2024 05:55 )             28.5     26 Jan 2024 05:55    139    |  105    |  23     ----------------------------<  80     4.1     |  28     |  0.93     Ca    8.9        26 Jan 2024 05:55    acetaminophen     Tablet .. 650 milliGRAM(s) Oral every 6 hours PRN  apixaban 5 milliGRAM(s) Oral every 12 hours  levothyroxine 88 MICROGram(s) Oral daily  metoprolol tartrate 25 milliGRAM(s) Oral every 12 hours  simvastatin 40 milliGRAM(s) Oral at bedtime    A/P:    Hx recent CARLOS suspected iso Cipro (adm 12/20 - 12/28), exacerbated by ARB as op (Cr 0.83 - 12/10/23, peak Cr 8.35 - 12/20/23)  CARLOS resolved   UA negative   Avoid nephrotoxins  No NSAID's, would avoid ACE/ARB, Cipro  Cipro added to list of allergies  This time adm for RLE osteo  ID, podiatry following  Will follow PRN    [phone]

## 2024-01-26 NOTE — PROGRESS NOTE ADULT - NUTRITIONAL ASSESSMENT
This patient has been assessed with a concern for Malnutrition and has been determined to have a diagnosis/diagnoses of Severe protein-calorie malnutrition and Underweight (BMI < 19).    This patient is being managed with:   Diet DASH/TLC-  Sodium & Cholesterol Restricted  Supplement Feeding Modality:  Oral  Ensure Plus High Protein Cans or Servings Per Day:  1       Frequency:  Two Times a day  Entered: Jan 19 2024  1:42PM  

## 2024-01-26 NOTE — PROGRESS NOTE ADULT - PROVIDER SPECIALTY LIST ADULT
Hospitalist
Nephrology
Podiatry
Infectious Disease
Podiatry
Hospitalist
Nephrology
Podiatry
Cardiology
Hospitalist
Infectious Disease

## 2024-01-26 NOTE — PROGRESS NOTE ADULT - SUBJECTIVE AND OBJECTIVE BOX
CC: f/u for Rt foot osteo    Patient reports: no specific complaints, she will consider options as OPD    REVIEW OF SYSTEMS:  All other review of systems negative (Comprehensive ROS)    Antimicrobials Day #  :s/p 5 days of meropenem    Other Medications Reviewed  MEDICATIONS  (STANDING):  apixaban 5 milliGRAM(s) Oral every 12 hours  levothyroxine 88 MICROGram(s) Oral daily  metoprolol tartrate 25 milliGRAM(s) Oral every 12 hours  simvastatin 40 milliGRAM(s) Oral at bedtime    T(F): 98.1 (01-26-24 @ 12:36), Max: 98.5 (01-25-24 @ 21:00)  HR: 73 (01-26-24 @ 12:36)  BP: 129/74 (01-26-24 @ 12:36)  RR: 18 (01-26-24 @ 12:36)  SpO2: 96% (01-26-24 @ 12:36)  Wt(kg): --    PHYSICAL EXAM:  General: alert, no acute distress  Eyes:  anicteric, no conjunctival injection, no discharge  Oropharynx: no lesions or injection 	  Neck: supple, without adenopathy  Lungs: clear to auscultation  Heart: regular rate and rhythm; no murmur, rubs or gallops  Abdomen: soft, nondistended, nontender, without mass or organomegaly  Skin: no lesions  Extremities: no clubbing, cyanosis, or edema  Neurologic: alert, oriented, moves all extremities  Rt foot with stable plantar ulcer, no cellulitis  LAB RESULTS:                        9.3    4.76  )-----------( 267      ( 26 Jan 2024 05:55 )             28.5     01-26    139  |  105  |  23  ----------------------------<  80  4.1   |  28  |  0.93    Ca    8.9      26 Jan 2024 05:55              MICROBIOLOGY:  RECENT CULTURES:      RADIOLOGY REVIEWED:

## 2024-01-27 LAB
% ALBUMIN: 50.7 % — SIGNIFICANT CHANGE UP
% ALPHA 1: 6.3 % — SIGNIFICANT CHANGE UP
% ALPHA 2: 17.5 % — SIGNIFICANT CHANGE UP
% BETA: 11.7 % — SIGNIFICANT CHANGE UP
% GAMMA: 13.8 % — SIGNIFICANT CHANGE UP
ALBUMIN SERPL ELPH-MCNC: 2.8 G/DL — LOW (ref 3.6–5.5)
ALBUMIN/GLOB SERPL ELPH: 1 RATIO — SIGNIFICANT CHANGE UP
ALPHA1 GLOB SERPL ELPH-MCNC: 0.3 G/DL — SIGNIFICANT CHANGE UP (ref 0.1–0.4)
ALPHA2 GLOB SERPL ELPH-MCNC: 1 G/DL — SIGNIFICANT CHANGE UP (ref 0.5–1)
B-GLOBULIN SERPL ELPH-MCNC: 0.6 G/DL — SIGNIFICANT CHANGE UP (ref 0.5–1)
GAMMA GLOBULIN: 0.8 G/DL — SIGNIFICANT CHANGE UP (ref 0.6–1.6)
INTERPRETATION SERPL IFE-IMP: SIGNIFICANT CHANGE UP
PROT PATTERN SERPL ELPH-IMP: SIGNIFICANT CHANGE UP
PROT SERPL-MCNC: 5.5 G/DL — LOW (ref 6–8.3)

## 2024-01-31 ENCOUNTER — NON-APPOINTMENT (OUTPATIENT)
Age: 84
End: 2024-01-31

## 2024-01-31 ENCOUNTER — APPOINTMENT (OUTPATIENT)
Dept: GERIATRICS | Facility: CLINIC | Age: 84
End: 2024-01-31
Payer: MEDICARE

## 2024-01-31 VITALS
WEIGHT: 134 LBS | HEIGHT: 70 IN | BODY MASS INDEX: 19.18 KG/M2 | SYSTOLIC BLOOD PRESSURE: 118 MMHG | HEART RATE: 66 BPM | TEMPERATURE: 97.2 F | OXYGEN SATURATION: 99 % | DIASTOLIC BLOOD PRESSURE: 62 MMHG | RESPIRATION RATE: 16 BRPM

## 2024-01-31 DIAGNOSIS — I35.0 NONRHEUMATIC AORTIC (VALVE) STENOSIS: ICD-10-CM

## 2024-01-31 DIAGNOSIS — D64.9 ANEMIA, UNSPECIFIED: ICD-10-CM

## 2024-01-31 DIAGNOSIS — M86.10 OTHER ACUTE OSTEOMYELITIS, UNSPECIFIED SITE: ICD-10-CM

## 2024-01-31 DIAGNOSIS — R60.9 EDEMA, UNSPECIFIED: ICD-10-CM

## 2024-01-31 PROCEDURE — 93000 ELECTROCARDIOGRAM COMPLETE: CPT

## 2024-01-31 PROCEDURE — 99496 TRANSJ CARE MGMT HIGH F2F 7D: CPT

## 2024-01-31 RX ORDER — METOPROLOL TARTRATE 25 MG/1
25 TABLET, FILM COATED ORAL TWICE DAILY
Qty: 60 | Refills: 6 | Status: ACTIVE | COMMUNITY
Start: 2024-01-31

## 2024-01-31 RX ORDER — AMLODIPINE BESYLATE 10 MG/1
10 TABLET ORAL DAILY
Qty: 90 | Refills: 2 | Status: DISCONTINUED | COMMUNITY
Start: 2024-01-04 | End: 2024-01-31

## 2024-01-31 RX ORDER — FUROSEMIDE 20 MG/1
20 TABLET ORAL
Qty: 30 | Refills: 1 | Status: ACTIVE | COMMUNITY
Start: 2024-01-31 | End: 1900-01-01

## 2024-01-31 NOTE — ASSESSMENT
[FreeTextEntry1] : Edema: EKG today showing NSR, q-wave in V2, unchanged from previous lungs clear on exam advised leg elevation  due to amount of edema, will recommend short course of lasix 20mg daily monitor renal function closely check home labwork  recent CARLOS: upon discharge Cr 0.93  newly found pAF with RVR: normal EF but bnp was elevated on 1/18/24 repeat bnp  continue with metoprolol 25mg bid and eliquis 5mg bid hx of bradycardia on BB in past-  would closely monitor and advised f/u with cardiologist  OM of toe with wound: c/w care with podiatry  Lung mass: following with pulm. planning for bx  advised f/u in 2 weeks with Dr. Peterson as scheduled

## 2024-01-31 NOTE — HISTORY OF PRESENT ILLNESS
[FreeTextEntry1] : 83yoF seen post hospitalization with new edema.     seen by podiatrist Dr. Pineda today and found with edema b/l legs and recommend to be seen.  recently hospitalized 1/18-1/26 for right foot swelling at [facility] treated for acute on chronic osteomyelitis with meropenem and improved.  post d/c f/u done 1/29/24 by RN.  labs reviewed showing: anemia 9.3 Cr 0.93   (previously admitted 12/2023 with severe CARLOS s/2 cipro and ARB with cr ~8)  MRI foot 1/23/2024 reviewed with OM in 2nd metatarsal stump and 3rd toe.  also found with new AFib with RVR and was started on metoprolol and eliquis and her norvasc was stopped. echo found ER 65-70%  with mild/mod aortic stenosis  BNP 1547 on jan 18th when cr was 1.15.  edema of right leg more than left leg she was scratching leg and now with some excoriations  1/21 bilateral venous doppler negative for DVT

## 2024-01-31 NOTE — PHYSICAL EXAM
[Alert] : alert [No Acute Distress] : in no acute distress [Sclera] : the sclera and conjunctiva were normal [EOMI] : extraocular movements were intact [Normal Outer Ear/Nose] : the ears and nose were normal in appearance [Normal Appearance] : the appearance of the neck was normal [No Respiratory Distress] : no respiratory distress [No Acc Muscle Use] : no accessory muscle use [Respiration, Rhythm And Depth] : normal respiratory rhythm and effort [Auscultation Breath Sounds / Voice Sounds] : lungs were clear to auscultation bilaterally [Normal S1, S2] : normal S1 and S2 [Heart Rate And Rhythm] : heart rate was normal and rhythm regular [No Focal Deficits] : no focal deficits [Normal Affect] : the affect was normal [de-identified] : +systolic murmur best at RUSB [de-identified] : edema of b/l lower legs 2+ right leg > left [de-identified] : cane use, unsteady gait [de-identified] : 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

## 2024-01-31 NOTE — REVIEW OF SYSTEMS
[Chest Pain] : no chest pain [Palpitations] : no palpitations [Lower Ext Edema] : lower extremity edema [As Noted in HPI] : as noted in HPI [Negative] : Heme/Lymph

## 2024-02-01 ENCOUNTER — LABORATORY RESULT (OUTPATIENT)
Age: 84
End: 2024-02-01

## 2024-02-02 ENCOUNTER — NON-APPOINTMENT (OUTPATIENT)
Age: 84
End: 2024-02-02

## 2024-02-05 ENCOUNTER — INPATIENT (INPATIENT)
Facility: HOSPITAL | Age: 84
LOS: 10 days | Discharge: ANOTHER HOSPITAL | DRG: 539 | End: 2024-02-16
Attending: FAMILY MEDICINE | Admitting: STUDENT IN AN ORGANIZED HEALTH CARE EDUCATION/TRAINING PROGRAM
Payer: MEDICARE

## 2024-02-05 VITALS
DIASTOLIC BLOOD PRESSURE: 50 MMHG | RESPIRATION RATE: 16 BRPM | HEIGHT: 70 IN | TEMPERATURE: 98 F | HEART RATE: 75 BPM | WEIGHT: 134.92 LBS | SYSTOLIC BLOOD PRESSURE: 169 MMHG | OXYGEN SATURATION: 100 %

## 2024-02-05 DIAGNOSIS — M53.9 DORSOPATHY, UNSPECIFIED: Chronic | ICD-10-CM

## 2024-02-05 DIAGNOSIS — Z98.89 OTHER SPECIFIED POSTPROCEDURAL STATES: Chronic | ICD-10-CM

## 2024-02-05 LAB
ALBUMIN SERPL ELPH-MCNC: 2.8 G/DL — LOW (ref 3.3–5)
ALP SERPL-CCNC: 98 U/L — SIGNIFICANT CHANGE UP (ref 40–120)
ALT FLD-CCNC: 21 U/L — SIGNIFICANT CHANGE UP (ref 10–45)
ANION GAP SERPL CALC-SCNC: 10 MMOL/L — SIGNIFICANT CHANGE UP (ref 5–17)
AST SERPL-CCNC: 21 U/L — SIGNIFICANT CHANGE UP (ref 10–40)
BASOPHILS # BLD AUTO: 0.03 K/UL — SIGNIFICANT CHANGE UP (ref 0–0.2)
BASOPHILS NFR BLD AUTO: 0.6 % — SIGNIFICANT CHANGE UP (ref 0–2)
BILIRUB SERPL-MCNC: 0.3 MG/DL — SIGNIFICANT CHANGE UP (ref 0.2–1.2)
BUN SERPL-MCNC: 13 MG/DL — SIGNIFICANT CHANGE UP (ref 7–23)
CALCIUM SERPL-MCNC: 9.2 MG/DL — SIGNIFICANT CHANGE UP (ref 8.4–10.5)
CHLORIDE SERPL-SCNC: 106 MMOL/L — SIGNIFICANT CHANGE UP (ref 96–108)
CO2 SERPL-SCNC: 25 MMOL/L — SIGNIFICANT CHANGE UP (ref 22–31)
CREAT SERPL-MCNC: 0.97 MG/DL — SIGNIFICANT CHANGE UP (ref 0.5–1.3)
CRP SERPL-MCNC: 8 MG/L — HIGH
EGFR: 57 ML/MIN/1.73M2 — LOW
EOSINOPHIL # BLD AUTO: 0.16 K/UL — SIGNIFICANT CHANGE UP (ref 0–0.5)
EOSINOPHIL NFR BLD AUTO: 3.5 % — SIGNIFICANT CHANGE UP (ref 0–6)
ERYTHROCYTE [SEDIMENTATION RATE] IN BLOOD: 59 MM/HR — HIGH (ref 0–20)
GLUCOSE SERPL-MCNC: 96 MG/DL — SIGNIFICANT CHANGE UP (ref 70–99)
HCT VFR BLD CALC: 27.8 % — LOW (ref 34.5–45)
HGB BLD-MCNC: 9.3 G/DL — LOW (ref 11.5–15.5)
IMM GRANULOCYTES NFR BLD AUTO: 0.4 % — SIGNIFICANT CHANGE UP (ref 0–0.9)
LYMPHOCYTES # BLD AUTO: 0.95 K/UL — LOW (ref 1–3.3)
LYMPHOCYTES # BLD AUTO: 20.6 % — SIGNIFICANT CHANGE UP (ref 13–44)
MCHC RBC-ENTMCNC: 32.4 PG — SIGNIFICANT CHANGE UP (ref 27–34)
MCHC RBC-ENTMCNC: 33.5 GM/DL — SIGNIFICANT CHANGE UP (ref 32–36)
MCV RBC AUTO: 96.9 FL — SIGNIFICANT CHANGE UP (ref 80–100)
MONOCYTES # BLD AUTO: 0.37 K/UL — SIGNIFICANT CHANGE UP (ref 0–0.9)
MONOCYTES NFR BLD AUTO: 8 % — SIGNIFICANT CHANGE UP (ref 2–14)
NEUTROPHILS # BLD AUTO: 3.09 K/UL — SIGNIFICANT CHANGE UP (ref 1.8–7.4)
NEUTROPHILS NFR BLD AUTO: 66.9 % — SIGNIFICANT CHANGE UP (ref 43–77)
NRBC # BLD: 0 /100 WBCS — SIGNIFICANT CHANGE UP (ref 0–0)
PLATELET # BLD AUTO: 313 K/UL — SIGNIFICANT CHANGE UP (ref 150–400)
POTASSIUM SERPL-MCNC: 3.7 MMOL/L — SIGNIFICANT CHANGE UP (ref 3.5–5.3)
POTASSIUM SERPL-SCNC: 3.7 MMOL/L — SIGNIFICANT CHANGE UP (ref 3.5–5.3)
PROT SERPL-MCNC: 6.6 G/DL — SIGNIFICANT CHANGE UP (ref 6–8.3)
RBC # BLD: 2.87 M/UL — LOW (ref 3.8–5.2)
RBC # FLD: 13.2 % — SIGNIFICANT CHANGE UP (ref 10.3–14.5)
SODIUM SERPL-SCNC: 141 MMOL/L — SIGNIFICANT CHANGE UP (ref 135–145)
WBC # BLD: 4.62 K/UL — SIGNIFICANT CHANGE UP (ref 3.8–10.5)
WBC # FLD AUTO: 4.62 K/UL — SIGNIFICANT CHANGE UP (ref 3.8–10.5)

## 2024-02-05 PROCEDURE — 99285 EMERGENCY DEPT VISIT HI MDM: CPT

## 2024-02-05 RX ORDER — LEVOTHYROXINE SODIUM 125 MCG
88 TABLET ORAL DAILY
Refills: 0 | Status: DISCONTINUED | OUTPATIENT
Start: 2024-02-05 | End: 2024-02-16

## 2024-02-05 RX ORDER — LANOLIN ALCOHOL/MO/W.PET/CERES
3 CREAM (GRAM) TOPICAL AT BEDTIME
Refills: 0 | Status: DISCONTINUED | OUTPATIENT
Start: 2024-02-05 | End: 2024-02-16

## 2024-02-05 RX ORDER — ACETAMINOPHEN 500 MG
650 TABLET ORAL EVERY 6 HOURS
Refills: 0 | Status: DISCONTINUED | OUTPATIENT
Start: 2024-02-05 | End: 2024-02-16

## 2024-02-05 RX ORDER — APIXABAN 2.5 MG/1
5 TABLET, FILM COATED ORAL
Refills: 0 | Status: DISCONTINUED | OUTPATIENT
Start: 2024-02-05 | End: 2024-02-16

## 2024-02-05 RX ORDER — ALBUTEROL 90 UG/1
2 AEROSOL, METERED ORAL EVERY 6 HOURS
Refills: 0 | Status: DISCONTINUED | OUTPATIENT
Start: 2024-02-05 | End: 2024-02-16

## 2024-02-05 RX ORDER — SIMVASTATIN 20 MG/1
40 TABLET, FILM COATED ORAL AT BEDTIME
Refills: 0 | Status: DISCONTINUED | OUTPATIENT
Start: 2024-02-05 | End: 2024-02-16

## 2024-02-05 RX ORDER — METOPROLOL TARTRATE 50 MG
25 TABLET ORAL ONCE
Refills: 0 | Status: COMPLETED | OUTPATIENT
Start: 2024-02-05 | End: 2024-02-05

## 2024-02-05 RX ORDER — MEROPENEM 1 G/30ML
1000 INJECTION INTRAVENOUS ONCE
Refills: 0 | Status: COMPLETED | OUTPATIENT
Start: 2024-02-05 | End: 2024-02-05

## 2024-02-05 RX ORDER — LACTOBACILLUS ACIDOPHILUS 100MM CELL
1 CAPSULE ORAL DAILY
Refills: 0 | Status: DISCONTINUED | OUTPATIENT
Start: 2024-02-05 | End: 2024-02-16

## 2024-02-05 RX ORDER — METOPROLOL TARTRATE 50 MG
25 TABLET ORAL EVERY 12 HOURS
Refills: 0 | Status: DISCONTINUED | OUTPATIENT
Start: 2024-02-05 | End: 2024-02-16

## 2024-02-05 RX ORDER — ONDANSETRON 8 MG/1
4 TABLET, FILM COATED ORAL EVERY 8 HOURS
Refills: 0 | Status: DISCONTINUED | OUTPATIENT
Start: 2024-02-05 | End: 2024-02-16

## 2024-02-05 RX ORDER — MEROPENEM 1 G/30ML
1000 INJECTION INTRAVENOUS EVERY 12 HOURS
Refills: 0 | Status: DISCONTINUED | OUTPATIENT
Start: 2024-02-05 | End: 2024-02-07

## 2024-02-05 RX ADMIN — SIMVASTATIN 40 MILLIGRAM(S): 20 TABLET, FILM COATED ORAL at 22:14

## 2024-02-05 RX ADMIN — MEROPENEM 100 MILLIGRAM(S): 1 INJECTION INTRAVENOUS at 22:14

## 2024-02-05 RX ADMIN — APIXABAN 5 MILLIGRAM(S): 2.5 TABLET, FILM COATED ORAL at 18:46

## 2024-02-05 RX ADMIN — Medication 1 TABLET(S): at 12:25

## 2024-02-05 RX ADMIN — Medication 25 MILLIGRAM(S): at 12:25

## 2024-02-05 RX ADMIN — MEROPENEM 100 MILLIGRAM(S): 1 INJECTION INTRAVENOUS at 10:51

## 2024-02-05 RX ADMIN — Medication 25 MILLIGRAM(S): at 18:46

## 2024-02-05 NOTE — ED PROVIDER NOTE - PHYSICAL EXAMINATION
VITAL SIGNS: I have reviewed nursing notes and confirm.   GEN: Well-developed; well-nourished; in no acute distress. Speaking full sentences.  SKIN: Warm, pink, no rash, no diaphoresis, no cyanosis, well perfused.   HEAD: Normocephalic; atraumatic. No scalp lacerations, no abrasions.  NECK: Supple; non tender.   EYES: Pupils 3mm equal, round, reactive to light and accomodation, conjunctiva and sclera clear.    ENT: No nasal discharge; airway clear. Trachea is midline. Normal dentition.  CV: RRR. S1, S2 normal; no murmurs, gallops, or rubs. Capillary refill < 2 seconds throughout. Distal pulses intact 2+ throughout.  RESP: CTA bilaterally. No wheezes, rales, or rhonchi.   ABD: Normal bowel sounds, soft, non-distended, non-tender, no rebound, no guarding, no rigidity,   MSK: Normal range of motion and movement of all 4 extremities.  (+) RIGHT foot: Plantar ulceration with central open wound. dry yellow crusting. moderate swelling.  BACK: No thoracolumbar midline or paravertebral tenderness.    NEURO: Alert & oriented x 3, Grossly unremarkable. Sensory and motor intact throughout. No focal deficits. Gait: Fluid. Normal speech and coordination. VITAL SIGNS: I have reviewed nursing notes and confirm.   GEN: Well-developed; well-nourished; in no acute distress. Speaking full sentences.  SKIN: Warm, pink, no rash, no diaphoresis, no cyanosis, well perfused.   HEAD: Normocephalic; atraumatic. No scalp lacerations, no abrasions.  NECK: Supple; non tender.   EYES: Pupils 3mm equal, round, reactive to light and accomodation, conjunctiva and sclera clear.    ENT: No nasal discharge; airway clear. Trachea is midline. Normal dentition.  CV: RRR. S1, S2 normal; no murmurs, gallops, or rubs. Capillary refill < 2 seconds throughout. Distal pulses intact 2+ throughout.  RESP: CTA bilaterally. No wheezes, rales, or rhonchi.   ABD: Normal bowel sounds, soft, non-distended, non-tender, no rebound, no guarding, no rigidity,   MSK: Normal range of motion and movement of all 4 extremities.  (+) RIGHT foot: Plantar ulceration with central open wound. dry yellow crusting. moderate swelling. 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   BACK: No thoracolumbar midline or paravertebral tenderness.    NEURO: Alert & oriented x 3, Grossly unremarkable. Sensory and motor intact throughout. No focal deficits. Gait: Fluid. Normal speech and coordination.

## 2024-02-05 NOTE — H&P ADULT - NSHPREVIEWOFSYSTEMS_GEN_ALL_CORE
REVIEW OF SYSTEMS:  CONSTITUTIONAL: No fever, weight loss, or fatigue  EYES: No eye pain, visual disturbances, or discharge  ENMT:  No difficulty hearing, tinnitus, vertigo; No sinus or throat pain  NECK: No pain or stiffness  BREASTS: No pain, masses, or nipple discharge  RESPIRATORY: No cough, wheezing, chills or hemoptysis; No shortness of breath  CARDIOVASCULAR: No chest pain, palpitations, dizziness, or leg swelling  GASTROINTESTINAL: No abdominal or epigastric pain. No nausea, vomiting, or hematemesis; No diarrhea or constipation. No melena or hematochezia.  GENITOURINARY: No dysuria, frequency, hematuria, or incontinence  NEUROLOGICAL: No headaches, memory loss, loss of strength, numbness, or tremors  SKIN: No itching, burning, rashes, or lesions   LYMPH NODES: No enlarged glands  ENDOCRINE: No heat or cold intolerance; No hair loss  MUSCULOSKELETAL: No joint pain or swelling; No muscle, back, or extremity pain  PSYCHIATRIC: No depression, anxiety, mood swings, or difficulty sleeping  HEME/LYMPH: No easy bruising, or bleeding gums  ALLERY AND IMMUNOLOGIC: No hives or eczema    ALL ROS REVIEWED AND NORMAL EXCEPT AS STATED ABOVE REVIEW OF SYSTEMS:  CONSTITUTIONAL: No fever, weight loss, or fatigue  EYES: No eye pain, visual disturbances, or discharge  ENMT:  No difficulty hearing, tinnitus, vertigo; No sinus or throat pain  NECK: No pain or stiffness  BREASTS: No pain, masses, or nipple discharge  RESPIRATORY: No cough, wheezing, chills or hemoptysis; No shortness of breath  CARDIOVASCULAR: No chest pain, palpitations, dizziness, or leg swelling  GASTROINTESTINAL: No abdominal or epigastric pain. No nausea, vomiting, or hematemesis; No diarrhea or constipation. No melena or hematochezia.  GENITOURINARY: No dysuria, frequency, hematuria, or incontinence  NEUROLOGICAL: No headaches, memory loss, loss of strength, numbness, or tremors  SKIN: No itching, burning, rashes, or lesions   LYMPH NODES: No enlarged glands  ENDOCRINE: No heat or cold intolerance; No hair loss  MUSCULOSKELETAL: right leg swelling with ulceration of right plantar surface  PSYCHIATRIC: No depression, anxiety, mood swings, or difficulty sleeping  HEME/LYMPH: No easy bruising, or bleeding gums  ALLERY AND IMMUNOLOGIC: No hives or eczema    ALL ROS REVIEWED AND NORMAL EXCEPT AS STATED ABOVE

## 2024-02-05 NOTE — ED ADULT NURSE NOTE - OBJECTIVE STATEMENT
pt presents to ED stating she was told to come to the ED by her podiatrist for IV antibiotics for right foot infection. pt reports hx of OM to R-foot secondary to DM foot ulcer. pt reports she underwent sx for removal of 2nd toe in december for OM. hx of chronic right foot planter ulcer/OM, afib, HTN, HLD. denies any cp, sob, fevers, n/v/d.

## 2024-02-05 NOTE — ED PROVIDER NOTE - NS ED MD DISPO OBSERVATION SRV
I have called the medication management clinic to verify patient's enrollment.  I have called [facility] to order a PT/INR and a BMP for tomorrow.   CDP

## 2024-02-05 NOTE — PATIENT PROFILE ADULT - FUNCTIONAL ASSESSMENT - DAILY ACTIVITY 3.
Returned call to patient regarding dermatology referral. Patient stated that because of the medications Myfortic and Envarsus she is taking she is at an increased risk for developing some cancers and she should go for a skin check.    4 = No assist / stand by assistance

## 2024-02-05 NOTE — H&P ADULT - NSHPLABSRESULTS_GEN_ALL_CORE
9.3    4.62  )-----------( 313      ( 05 Feb 2024 09:35 )             27.8       02-05    141  |  106  |  13  ----------------------------<  96  3.7   |  25  |  0.97    Ca    9.2      05 Feb 2024 09:35    TPro  6.6  /  Alb  2.8  /  TBili  0.3  /  DBili  x   /  AST  21  /  ALT  21  /  AlkPhos  98  02-05    Urinalysis Basic - ( 05 Feb 2024 09:35 )    Color: x / Appearance: x / SG: x / pH: x  Gluc: 96 mg/dL / Ketone: x  / Bili: x / Urobili: x   Blood: x / Protein: x / Nitrite: x   Leuk Esterase: x / RBC: x / WBC x   Sq Epi: x / Non Sq Epi: x / Bacteria: x    12 Lead ECG:   Ventricular Rate 65 BPM    Atrial Rate 65 BPM    P-R Interval 172 ms    QRS Duration 68 ms    Q-T Interval 440 ms    QTC Calculation(Bazett) 457 ms    P Axis -5 degrees    R Axis 62 degrees    T Axis 56 degrees    Diagnosis Line Sinus rhythm with occasional premature ventricular complexes  T wave abnormality, consider anterior ischemia  Abnormal ECG  When compared with ECG of 18-JAN-2024 14:18,  Sinus rhythm has replaced Atrial fibrillation  Vent. rate has decreased BY  63 BPM  Left bundle branch block is no longer present    Confirmed by MIKAYLA MONROE MD ([id number]) on 1/20/2024 10:29:39 AM (01-19-24 @ 10:31)          RADIOLOGY  MR Foot w/ IV Cont, Right (01.23.24 @ 11:57) >      IMPRESSION:  1.  Osteomyelitis of the second metatarsal stump and third metatarsal   head and shaft.  2.  Early osteomyelitis of the third proximal phalanx.  3.  Marked edema and postcontrast enhancement is seen throughout the foot   with locules of air extending to the dorsum of the foot. No mature,   drainable, or enhancing collection is seen at this time    Consultant(s) Notes Reveiwed [x ] Yes   Podiatary  Care Discussed with [x ] Consultants  [x ] Patient  [ ] Family  [ ] /   [x ] Other; RN 9.3    4.62  )-----------( 313      ( 05 Feb 2024 09:35 )             27.8       02-05    141  |  106  |  13  ----------------------------<  96  3.7   |  25  |  0.97    Ca    9.2      05 Feb 2024 09:35    TPro  6.6  /  Alb  2.8  /  TBili  0.3  /  DBili  x   /  AST  21  /  ALT  21  /  AlkPhos  98  02-05    Urinalysis Basic - ( 05 Feb 2024 09:35 )    Color: x / Appearance: x / SG: x / pH: x  Gluc: 96 mg/dL / Ketone: x  / Bili: x / Urobili: x   Blood: x / Protein: x / Nitrite: x   Leuk Esterase: x / RBC: x / WBC x   Sq Epi: x / Non Sq Epi: x / Bacteria: x    12 Lead ECG:   Ventricular Rate 65 BPM    Atrial Rate 65 BPM    P-R Interval 172 ms    QRS Duration 68 ms    Q-T Interval 440 ms    QTC Calculation(Bazett) 457 ms    P Axis -5 degrees    R Axis 62 degrees    T Axis 56 degrees    Diagnosis Line Sinus rhythm with occasional premature ventricular complexes  T wave abnormality, consider anterior ischemia  Abnormal ECG  When compared with ECG of 18-JAN-2024 14:18,  Sinus rhythm has replaced Atrial fibrillation  Vent. rate has decreased BY  63 BPM  Left bundle branch block is no longer present    Confirmed by MIKAYLA MONROE MD ([id number]) on 1/20/2024 10:29:39 AM (01-19-24 @ 10:31)    RADIOLOGY  MR Foot w/ IV Cont, Right (01.23.24 @ 11:57) >      IMPRESSION:  1.  Osteomyelitis of the second metatarsal stump and third metatarsal   head and shaft.  2.  Early osteomyelitis of the third proximal phalanx.  3.  Marked edema and postcontrast enhancement is seen throughout the foot   with locules of air extending to the dorsum of the foot. No mature,   drainable, or enhancing collection is seen at this time    Consultant(s) Notes Reveiwed [x ] Yes   Podiatary  Care Discussed with [x ] Consultants  [x ] Patient  [ ] Family  [ ] /   [x ] Other; RN 9.3    4.62  )-----------( 313      ( 05 Feb 2024 09:35 )             27.8       02-05    141  |  106  |  13  ----------------------------<  96  3.7   |  25  |  0.97    Ca    9.2      05 Feb 2024 09:35    TPro  6.6  /  Alb  2.8  /  TBili  0.3  /  DBili  x   /  AST  21  /  ALT  21  /  AlkPhos  98  02-05    Urinalysis Basic - ( 05 Feb 2024 09:35 )    Color: x / Appearance: x / SG: x / pH: x  Gluc: 96 mg/dL / Ketone: x  / Bili: x / Urobili: x   Blood: x / Protein: x / Nitrite: x   Leuk Esterase: x / RBC: x / WBC x   Sq Epi: x / Non Sq Epi: x / Bacteria: x    12 Lead ECG:   Ventricular Rate 65 BPM    Atrial Rate 65 BPM    P-R Interval 172 ms    QRS Duration 68 ms    Q-T Interval 440 ms    QTC Calculation(Bazett) 457 ms    P Axis -5 degrees    R Axis 62 degrees    T Axis 56 degrees    Diagnosis Line Sinus rhythm with occasional premature ventricular complexes  T wave abnormality, consider anterior ischemia  Abnormal ECG  When compared with ECG of 18-JAN-2024 14:18,  Sinus rhythm has replaced Atrial fibrillation  Vent. rate has decreased BY  63 BPM  Left bundle branch block is no longer present    Confirmed by MIKAYLA MONROE MD ([id number]) on 1/20/2024 10:29:39 AM (01-19-24 @ 10:31)    RADIOLOGY  MR Foot w/ IV Cont, Right (01.23.24 @ 11:57) >      IMPRESSION:  1.  Osteomyelitis of the second metatarsal stump and third metatarsal   head and shaft.  2.  Early osteomyelitis of the third proximal phalanx.  3.  Marked edema and postcontrast enhancement is seen throughout the foot   with locules of air extending to the dorsum of the foot. No mature,   drainable, or enhancing collection is seen at this time    CT Chest No Cont (12.21.23 @ 13:30) >      IMPRESSION:  Approximately 4.6 cm sized left upper lobe mass concerning for a primary   pulmonary malignancy.  Pulmonary emphysema.      Consultant(s) Notes Reveiwed [x ] Yes   Podiatary  Care Discussed with [x ] Consultants  [x ] Patient  [ ] Family  [ ] /   [x ] Other; RN

## 2024-02-05 NOTE — ED PROVIDER NOTE - CLINICAL SUMMARY MEDICAL DECISION MAKING FREE TEXT BOX
Dr. Matt Aleman MD, EM And Medical Toxicology AttendinF PMHx AFIB on eliquis, HTN, HLD, chronic right plantar foot ulcer/OM presenting from primary podiatry office Dr. Feliciano Eric for IV abx for worsening acute on chronic OM of the right foot. Was recently admitted on , had 5 days of meropenem, had a MRI on  showing: "Osteomyelitis of the second metatarsal stump and third metatarsal head and shaft. Early osteomyelitis of the third proximal phalanx. Marked edema and postcontrast enhancement is seen throughout the foot with locules of air extending to the dorsum of the foot. No mature, drainable, or enhancing collection is seen at this time.". She was discharged on  and was to follow up with her primary podiatrist for further recommendations. ID signed off. Describes worsening right foot swelling and purulent drainage. Recently seen by Dr Eric about 1 week prior, had debrided and redressed, and recommended to come back to the ED for admission.  History obtained from independent historian: Dr. Eric Podiatry  External note reviewed: prior admission.  DDx: acute on chronic DM ulceration/OM of toe worsening.  Decision making, ED course, independent interpretation of imaging studies, and consults:   Pt p/w hx of diabetes and ulceration to RIGHT plantar foot  concerning for acute on chronic OM infection - no evidence of acute arterial insufficiency. Non-toxic appearing, hemodynamically stable. No crepitus or overlying discoloration of skin overlying/surrounding ulcer site.  (no) fever, (+) overlying cellulitis, but no lymphangitis. Do not suspect deep space infection.   - CBC, CMP, PT/PTT/INR, BCx x 2,  FSG,   XR of extremity.  - Case discussed w/ Dr. Eric podiatry attending, took cultures about 1 week ago, grew ESBL Ecoli and strep. that is sensitive to vancomycin, zosyn, meropenem, imipenem, and ertapenem.   - Will start on meropenem 100mg IVPB, same dose on prior admission    Consideration hospitalization vs de-escalation of care: admit to observation    1054am  I, Dr. Matt Aleman MD discussed the case with Dr. Deutsch attending hospitalist who advises me for observation admission.     Disposition: OBS

## 2024-02-05 NOTE — ED PROVIDER NOTE - OBJECTIVE STATEMENT
83F PMHx AFIB on eliquis, HTN, HLD, chronic right plantar foot ulcer/OM presenting from primary podiatry office Dr. Feliciano Eric  for IV abx for worsening acute on chronic OM of the right foot. Was recently admitted on 1/26, had 5 days of meropenem, had a MRI on 1/23 showing: "Osteomyelitis of the second metatarsal stump and third metatarsal head and shaft. Early osteomyelitis of the third proximal phalanx. Marked edema and postcontrast enhancement is seen throughout the foot with locules of air extending to the dorsum of the foot. No mature, drainable, or enhancing collection is seen at this time.". She was discharged on 1/26 and was to follow up with her primary podiatrist for further recommendations. ID signed off. Describes worsening right foot swelling and purulent drainage. Recently seen by Dr Eric about 1 week prior, had debrided and redressed, and recommended to come back to the ED for admission.

## 2024-02-05 NOTE — H&P ADULT - HISTORY OF PRESENT ILLNESS
83F, from home,  PMHx AFIB on eliquis, HTN, HLD, chronic right plantar foot ulcer/OM presenting from primary podiatry office Dr. Feliciano Eric  for IV abx for worsening acute on chronic OM of the right foot.     Of note, she was recently admitted from 1/1/-1/26, had 5 days of meropenem, had a MRI on 1/23 showing: "Osteomyelitis of the second metatarsal stump and third metatarsal head and shaft. Early osteomyelitis of the third proximal phalanx. Marked edema and postcontrast enhancement is seen throughout the foot with locules of air extending to the dorsum of the foot. No mature, drainable, or enhancing collection is seen at this time."    She was discharged on 1/26 and was to follow up with her primary podiatrist for further recommendations. ID signed off.    Since discharge, she describes worsening right foot swelling and purulent drainage. Recently seen by Dr Eric about 1 week prior, had debrided and redressed, and recommended to come back to the ED for admission.   83F, from home,  PMHx AFIB on eliquis, HTN, HLD, chronic right plantar foot ulcer/OM presenting to ER  for IV abx for worsening acute on chronic OM of the right foot.   Dr Eric, podiatry, saw patient on Saturday at her home...noted that she her ulcer was not healing well.  He recommended that she come to the hospital again for IV antibiotics.    Of note, she was recently admitted from 1/1/-1/26, had 5 days of meropenem, had a MRI on 1/23 showing: "Osteomyelitis of the second metatarsal stump and third metatarsal head and shaft. Early osteomyelitis of the third proximal phalanx. Marked edema and postcontrast enhancement is seen throughout the foot with locules of air extending to the dorsum of the foot. No mature, drainable, or enhancing collection is seen at this time."    She was discharged on 1/26 and was to follow up with her primary podiatrist for further recommendations. ID signed off.    Since discharge, she describes worsening right leg/foot swelling and a yellow discharge; she herself states that there is one area where the ulcer is not healing.  She denies fever, cough, runny nose.  She ambulates with a walker and does her own wound dressing changes.      In the ER, she was given meropenem 1gm IVPB

## 2024-02-05 NOTE — H&P ADULT - NSHPPHYSICALEXAM_GEN_ALL_CORE
Vital Signs Last 24 Hrs  T(F): 97.8 (05 Feb 2024 08:40), Max: 97.8 (05 Feb 2024 08:40)  HR: 75 (05 Feb 2024 08:40) (75 - 75)  BP: 169/50 (05 Feb 2024 08:40) (169/50 - 169/50)  RR: 16 (05 Feb 2024 08:40) (16 - 16)  SpO2: 100% (05 Feb 2024 08:40) (100% - 100%)    PHYSICAL EXAM:  GENERAL: NAD, well-groomed, well-developed  HEAD:  Atraumatic, Normocephalic  EYES: EOMI, conjunctiva and sclera clear  ENMT: Moist mucous membranes, Good dentition, no thrush  NECK: Supple, No JVD  CHEST/LUNG: Clear to auscultation bilaterally, good air entry, non-labored breathing  HEART: RRR; S1/S2, No murmur  ABDOMEN: Soft, Nontender, Nondistended; Bowel sounds present  VASCULAR: Normal pulses, Normal capillary refill  EXTREMITIES: No calf tenderness, No cyanosis, No edema  LYMPH: Normal; No lymphadenopathy noted  SKIN: Warm, Intact  PSYCH: Normal mood, Normal affect  NERVOUS SYSTEM:  A/O x3, Good concentration; CN 2-12 intact, No focal deficits Vital Signs Last 24 Hrs  T(F): 97.8 (05 Feb 2024 08:40), Max: 97.8 (05 Feb 2024 08:40)  HR: 75 (05 Feb 2024 08:40) (75 - 75)  BP: 169/50 (05 Feb 2024 08:40) (169/50 - 169/50)  RR: 16 (05 Feb 2024 08:40) (16 - 16)  SpO2: 100% (05 Feb 2024 08:40) (100% - 100%)    PHYSICAL EXAM:  GENERAL: NAD, well-groomed, well-developed  HEAD:  Atraumatic, Normocephalic  EYES: EOMI, conjunctiva and sclera clear  ENMT: Moist mucous membranes, Good dentition, no thrush  NECK: Supple, No JVD  CHEST/LUNG: Clear to auscultation bilaterally, good air entry, non-labored breathing  HEART: RRR; S1/S2, No murmur  ABDOMEN: Soft, Nontender, Nondistended; Bowel sounds present  VASCULAR: Normal pulses, Normal capillary refill  EXTREMITIES: Right second digit amputated, right LE swelling noted, erythema and slight tenderness over anterior tib/fib area, right plantar foot:  Noted area of ulceration, yellow discharge noted, no odor noted, hyperkeratosis noted, slight tenderness  LYMPH: Normal; No lymphadenopathy noted  SKIN: Warm, Intact  PSYCH: Normal mood, Normal affect  NERVOUS SYSTEM:  A/O x3, Good concentration; CN 2-12 intact, No focal deficits

## 2024-02-05 NOTE — H&P ADULT - ASSESSMENT
82 yo female with hx of HTN, HLD, chronic right plantar foot ulcers/OM, sent from podiatry office for IV abx for acute on chronic OM of right foot, also noted to have new onset A Fib.     #Acute on chronic right plantar foot OM  - MR with IV contrast of right foot: +osteo of 2nd metatarsal stump and 3rd metatarsal head and shaft, early osteo of 3rd proximal phalanx, no drainable collection seen    - Podiatry  Dr. Mcgarry consult appreciated - opted for conservative management- complete course of IV abx and then can be discharge home. Patient to follow up with her podiatrist (Dr. Pineda) upon d/c.  - ID recs noted: cont IV Meropenem, last day  Friday, 1/26  - DAVID'S noted        #Paroxysmal A Fib w/ RVR  - Likely acute infection induced vs electrolyte abnormality (hypokalemia)  - Heart rate improved; cont Metoprolol  - TSH normal  - CHADSVASC at least 4. Renally dosed Lovenox. Transition to NOAC if no procedures to be done  - Echo done: EF 65>70%, mild to mod AS, tricuspid regurg  - Cardiology recs noted      #Acute renal failure, suspect drug induced [cipro, ARBs]  - Renal function improved  - Nephro recs noted; avoid nephrotoxins, No NSAID's, no ACE/ARB, no Cipro    #Hypothyroidism  - s/p thyroidectomy  - TSH normal  - Continue Synthroid    #HTN  - Continue amlodipine    #VTE ppx  - Lovenox     GOC: DNR/I    Dispo: discharge saturday after abx completed   Patient states that she will update her family    82 yo female with hx of HTN, HLD, chronic right plantar foot ulcers/OM, sent from podiatry office for IV abx for acute on chronic OM of right foot, also noted to have new onset A Fib.     #Acute on chronic right plantar foot OM  - Will place on observation for now.... pending ID recommendations, will see if admission is appropriate  - Monitor F/WBC count, f/u BCX, ESR, CRP  - Will obtain wound culture of right plantar surface  - CONSULTED: ID, podiatry (ER spoke with Dr Pineda...podiatry will follow patient)  - Will c/w meropenem 1gm IVPB q 12hrs for now, probiotic  - Fall precautions, PT eval, activity as tolerated.....    #Paroxysmal A Fib, rate controlled, on long term anticoagulation  #HTN, controlled  - C/w metoprolol tartrate 25mg q 12hrs, eliquis 5mg BID    #Normocytic anemia  - Will monitor Hg/Hct, transfuse if Hg <7  - Will check B12/folate/iron in AM    #Hypothyroidism  - s/p thyroidectomy  - Continue Synthroid 88mcg daily    #HLD  - Continue zocor 40mg qhs    #VTE ppx: Eliquis  AM labs  DISP: Pending course    Patient states that she will update her family; has a sister here that is in a nursing home; no other immediate family   84 yo female with hx of HTN, HLD, chronic right plantar foot ulcers/OM, sent from podiatry office for IV abx for acute on chronic OM of right foot, also noted to have new onset A Fib.     #Acute on chronic right plantar foot OM  - Will place on observation for now.... pending ID recommendations, will see if admission is appropriate  - Monitor F/WBC count, f/u BCX, ESR, CRP  - Will obtain wound culture of right plantar surface  - CONSULTED: ID, podiatry (ER spoke with Dr Pineda...podiatry will follow patient)  - Will c/w meropenem 1gm IVPB q 12hrs for now, probiotic  - Fall precautions, PT eval, activity as tolerated.....    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  - Upon review of the chart, I found a CT chest report from 12/21/23  - Approximately 4.6 cm sized left upper lobe mass concerning for a primary pulmonary malignancy.  - When I brought it up to patient....she stated she knows about it and she for now wants nothing to be done about it  - "My mother had the same problem and it was nothing"; I told her it could be a malignant mass.....she said for now to leave it alone  - I would highly recommend outpatient oncology and pulmonary follow up .......will need a biopsy if she wants for diagnosis and management  - Again, I offered this to her and she said to leave it alone during this admission.....    #VTE ppx: Eliquis  AM labs  DISP: Pending course    Patient states that she will update her family; has a sister here that is in a nursing home; no other immediate family

## 2024-02-05 NOTE — H&P ADULT - NSHPSOCIALHISTORY_GEN_ALL_CORE
Social History:    Marital Status: (  ) , (  ) Single, (  ) , (  ) , (  )   # of Children:   Lives with: (  ) alone, (  ) children, (  ) spouse, (  ) parents, (  ) siblings, (  ) friends, (  ) other:   Occupation:     Substance Use/Illicit Drugs: (  ) never used vs other:   Tobacco Usage: (  ) never smoked, (  ) former smoker, (  ) current smoker and Total Pack-Years:   Last Alcohol Usage/Frequency/Amount/Withdrawal/Hx of Abuse:    Foreign travel:   Animal exposure: Social History:    Marital Status: (  ) , ( x ) Single, (  ) , (  ) , (  )   # of Children: 0  Lives with: (x ) alone, (  ) children, (  ) spouse, (  ) parents, (  ) siblings, (  ) friends, (  ) other:   Retired, walks with a walker, no HHA    Substance Use/Illicit Drugs: (x  ) never used vs other:   Tobacco Usage: ( x ) never smoked, (  ) former smoker, (  ) current smoker and Total Pack-Years:   Last Alcohol Usage/Frequency/Amount/Withdrawal/Hx of Abuse:  Denies  Foreign travel: Denies  Animal exposure:Denies

## 2024-02-06 LAB
ANION GAP SERPL CALC-SCNC: 9 MMOL/L — SIGNIFICANT CHANGE UP (ref 5–17)
BUN SERPL-MCNC: 9 MG/DL — SIGNIFICANT CHANGE UP (ref 7–23)
CALCIUM SERPL-MCNC: 9.1 MG/DL — SIGNIFICANT CHANGE UP (ref 8.4–10.5)
CHLORIDE SERPL-SCNC: 107 MMOL/L — SIGNIFICANT CHANGE UP (ref 96–108)
CO2 SERPL-SCNC: 26 MMOL/L — SIGNIFICANT CHANGE UP (ref 22–31)
CREAT SERPL-MCNC: 0.86 MG/DL — SIGNIFICANT CHANGE UP (ref 0.5–1.3)
EGFR: 66 ML/MIN/1.73M2 — SIGNIFICANT CHANGE UP
GLUCOSE SERPL-MCNC: 72 MG/DL — SIGNIFICANT CHANGE UP (ref 70–99)
HCT VFR BLD CALC: 28.1 % — LOW (ref 34.5–45)
HGB BLD-MCNC: 9.3 G/DL — LOW (ref 11.5–15.5)
MAGNESIUM SERPL-MCNC: 1.8 MG/DL — SIGNIFICANT CHANGE UP (ref 1.6–2.6)
MCHC RBC-ENTMCNC: 32.2 PG — SIGNIFICANT CHANGE UP (ref 27–34)
MCHC RBC-ENTMCNC: 33.1 GM/DL — SIGNIFICANT CHANGE UP (ref 32–36)
MCV RBC AUTO: 97.2 FL — SIGNIFICANT CHANGE UP (ref 80–100)
NRBC # BLD: 0 /100 WBCS — SIGNIFICANT CHANGE UP (ref 0–0)
PLATELET # BLD AUTO: 305 K/UL — SIGNIFICANT CHANGE UP (ref 150–400)
POTASSIUM SERPL-MCNC: 3.7 MMOL/L — SIGNIFICANT CHANGE UP (ref 3.5–5.3)
POTASSIUM SERPL-SCNC: 3.7 MMOL/L — SIGNIFICANT CHANGE UP (ref 3.5–5.3)
RBC # BLD: 2.89 M/UL — LOW (ref 3.8–5.2)
RBC # FLD: 13.1 % — SIGNIFICANT CHANGE UP (ref 10.3–14.5)
SODIUM SERPL-SCNC: 142 MMOL/L — SIGNIFICANT CHANGE UP (ref 135–145)
WBC # BLD: 4.06 K/UL — SIGNIFICANT CHANGE UP (ref 3.8–10.5)
WBC # FLD AUTO: 4.06 K/UL — SIGNIFICANT CHANGE UP (ref 3.8–10.5)

## 2024-02-06 RX ADMIN — Medication 25 MILLIGRAM(S): at 18:42

## 2024-02-06 RX ADMIN — Medication 88 MICROGRAM(S): at 05:59

## 2024-02-06 RX ADMIN — MEROPENEM 100 MILLIGRAM(S): 1 INJECTION INTRAVENOUS at 13:13

## 2024-02-06 RX ADMIN — SIMVASTATIN 40 MILLIGRAM(S): 20 TABLET, FILM COATED ORAL at 21:26

## 2024-02-06 RX ADMIN — MEROPENEM 100 MILLIGRAM(S): 1 INJECTION INTRAVENOUS at 22:21

## 2024-02-06 RX ADMIN — APIXABAN 5 MILLIGRAM(S): 2.5 TABLET, FILM COATED ORAL at 05:59

## 2024-02-06 RX ADMIN — Medication 25 MILLIGRAM(S): at 05:59

## 2024-02-06 RX ADMIN — Medication 1 TABLET(S): at 13:13

## 2024-02-06 RX ADMIN — APIXABAN 5 MILLIGRAM(S): 2.5 TABLET, FILM COATED ORAL at 18:42

## 2024-02-06 NOTE — PROGRESS NOTE ADULT - SUBJECTIVE AND OBJECTIVE BOX
83F, from home,  PMHx AFIB on eliquis, HTN, HLD, chronic right plantar foot ulcer/OM presenting to ER  for IV abx for worsening acute on chronic OM of the right foot.   Dr Eric, podiatry, saw patient on Saturday at her home...noted that she her ulcer was not healing well.  He recommended that she come to the hospital again for IV antibiotics.  Of note, she was recently admitted from 1/1/-1/26, had 5 days of meropenem, had a MRI on 1/23 showing: "Osteomyelitis of the second metatarsal stump and third metatarsal head and shaft. Early osteomyelitis of the third proximal phalanx. Marked edema and postcontrast enhancement is seen throughout the foot with locules of air extending to the dorsum of the foot. No mature, drainable, or enhancing collection is seen at this time."  She was discharged on 1/26 and was to follow up with her primary podiatrist for further recommendations. ID signed off.  Since discharge, she describes worsening right leg/foot swelling and a yellow discharge; she herself states that there is one area where the ulcer is not healing.  She denies fever, cough, runny nose.  She ambulates with a walker and does her own wound dressing changes.    In the ER, she was given meropenem 1gm IVPB    Subjective: This isHospital Day#1. Patient seen and examined at bedside in NAD.     REVIEW OF SYSTEMS:  CONSTITUTIONAL: No fever, weight loss, or fatigue  EYES: No eye pain, visual disturbances, or discharge  ENMT:  No difficulty hearing, tinnitus, vertigo; No sinus or throat pain  RESPIRATORY: No cough, wheezing, chills or hemoptysis; No shortness of breath  CARDIOVASCULAR: No chest pain, palpitations, dizziness, or leg swelling  GASTROINTESTINAL: No abdominal or epigastric pain. No nausea, vomiting, or hematemesis; No diarrhea or constipation. No melena or hematochezia.  GENITOURINARY: No dysuria, frequency, hematuria, or incontinence  NEUROLOGICAL: No headaches, memory loss, loss of strength, numbness, or tremors  SKIN: No itching, burning, rashes, or lesions   MUSCULOSKELETAL: right leg swelling with ulceration of right plantar surface    Vital Signs Last 24 Hrs  T(C): 36.7 (06 Feb 2024 05:07), Max: 36.7 (05 Feb 2024 18:43)  T(F): 98 (06 Feb 2024 05:07), Max: 98 (05 Feb 2024 18:43)  HR: 62 (06 Feb 2024 05:07) (58 - 71)  BP: 145/67 (06 Feb 2024 05:07) (133/68 - 179/83)  BP(mean): --  RR: 16 (06 Feb 2024 05:07) (16 - 18)  SpO2: 96% (06 Feb 2024 05:07) (96% - 100%)    Parameters below as of 06 Feb 2024 05:07  Patient On (Oxygen Delivery Method): room air    PHYSICAL EXAM:  GENERAL: NAD, well-groomed, well-developed  HEAD:  Atraumatic, Normocephalic  EYES: EOMI, conjunctiva and sclera clear  ENMT: Moist mucous membranes, Good dentition, no thrush  NECK: Supple, No JVD  CHEST/LUNG: Clear to auscultation bilaterally, good air entry, non-labored breathing  HEART: RRR; S1/S2, No murmur  ABDOMEN: Soft, Nontender, Nondistended; Bowel sounds present  VASCULAR: Normal pulses, Normal capillary refill  EXTREMITIES: Right second digit amputated, right LE swelling noted, erythema and slight tenderness over anterior tib/fib area, right plantar foot:  Noted area of ulceration, yellow discharge noted, no odor noted, hyperkeratosis noted, slight tenderness  LYMPH: Normal; No lymphadenopathy noted  SKIN: Warm, Intact  PSYCH: Normal mood, Normal affect  NERVOUS SYSTEM:  A/O x3, Good concentration; CN 2-12 intact, No focal deficits      LABS:                          9.3    4.06  )-----------( 305      ( 06 Feb 2024 06:00 )             28.1     02-06    142  |  107  |  9   ----------------------------<  72  3.7   |  26  |  0.86    Ca    9.1      06 Feb 2024 06:00  Mg     1.8     02-06    TPro  6.6  /  Alb  2.8<L>  /  TBili  0.3  /  DBili  x   /  AST  21  /  ALT  21  /  AlkPhos  98  02-05    LIVER FUNCTIONS - ( 05 Feb 2024 09:35 )  Alb: 2.8 g/dL / Pro: 6.6 g/dL / ALK PHOS: 98 U/L / ALT: 21 U/L / AST: 21 U/L / GGT: x             Urinalysis Basic - ( 06 Feb 2024 06:00 )    Color: x / Appearance: x / SG: x / pH: x  Gluc: 72 mg/dL / Ketone: x  / Bili: x / Urobili: x   Blood: x / Protein: x / Nitrite: x   Leuk Esterase: x / RBC: x / WBC x   Sq Epi: x / Non Sq Epi: x / Bacteria: x

## 2024-02-06 NOTE — DIETITIAN INITIAL EVALUATION ADULT - OTHER INFO
83F, from home,  PMHx AFIB on eliquis, HTN, HLD, chronic right plantar foot ulcer/OM presenting to ER  for IV abx for worsening acute on chronic OM of the right foot.   Dr Eric, podiatry, saw patient on Saturday at her home...noted that she her ulcer was not healing well.  He recommended that she come to the hospital again for IV antibiotics. patient receiving IV Meropenum , Appetite reported good % , no issue chewing / swallowing reported  ~ 15+ weight change reported NFPE conducted patient with evidence of severe protein calorie malnutrition (+) Muscle wasting /loss of body fat observed & weight loss noted . (+) BM (2/5) (+1) foot edema, surgical incision (R) 2nd toe noted

## 2024-02-06 NOTE — PROGRESS NOTE ADULT - ASSESSMENT
84 yo female with hx of HTN, HLD, chronic right plantar foot ulcers/OM, sent from podiatry office for IV abx for acute on chronic OM of right foot, also noted to have new onset A Fib.     #Acute on chronic right plantar foot OM  - Monitor F/WBC count, f/u BCX, ESR, CRP  - Will obtain wound culture of right plantar surface (prior culture from 01/19 showed ESBL growth)  - Awaiting podiatry reccs  - Per ID wound appears improved compared to prior admission, would likely benefit from surgical debridement however will f/u with podiatry  - Will c/w meropenem 1gm IVPB q 12hrs for now, probiotic  - Fall precautions, PT eval, activity as tolerated    #Paroxysmal A Fib, rate controlled, on long term anticoagulation  #HTN, controlled  - C/w metoprolol tartrate 25mg q 12hrs, eliquis 5mg BID    #Normocytic anemia  - Will monitor Hg/Hct, transfuse if Hg <7  - Will check B12/folate/iron in AM    #Hypothyroidism  - s/p thyroidectomy  - Continue Synthroid 88mcg daily    #HLD  - Continue zocor 40mg qhs    #Left upper lobe lung mass, 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  - Upon review of the chart, I found a CT chest report from 12/21/23  - Approximately 4.6 cm sized left upper lobe mass concerning for a primary pulmonary malignancy.  - When I brought it up to patient....she stated she knows about it and she for now wants nothing to be done about it  - "My mother had the same problem and it was nothing"; I told her it could be a malignant mass.....she said for now to leave it alone  - I would highly recommend outpatient oncology and pulmonary follow up .......will need a biopsy if she wants for diagnosis and management  - Again, I offered this to her and she said to leave it alone during this admission.....    VTE ppx: Eliquis  DISP: Pending course    Patient states that she will update her family; has a sister here that is in a nursing home; no other immediate family

## 2024-02-06 NOTE — PROGRESS NOTE ADULT - SUBJECTIVE AND OBJECTIVE BOX
CC: f/u for  right foot om  Patient reports it is not convenient for her to go to other hospitals.     REVIEW OF SYSTEMS:  All other review of systems negative (Comprehensive ROS)    Antimicrobials Day #  :2  meropenem  IVPB 1000 milliGRAM(s) IV Intermittent every 12 hours    Other Medications Reviewed    T(F): 97.9 (02-06-24 @ 12:26), Max: 98 (02-05-24 @ 18:43)  HR: 60 (02-06-24 @ 12:26)  BP: 124/68 (02-06-24 @ 12:26)  RR: 16 (02-06-24 @ 12:26)  SpO2: 96% (02-06-24 @ 12:26)  Wt(kg): --    PHYSICAL EXAM:  General: alert, no acute distress  Eyes:  anicteric, no conjunctival injection, no discharge  Oropharynx: no lesions or injection 	  Neck: supple, without adenopathy  Lungs: clear to auscultation  Heart: regular rate and rhythm; no murmur, rubs or gallops  Abdomen: soft, nondistended, nontender, without mass or organomegaly  Skin: no lesions  Extremities: no clubbing, cyanosis,. Right foot to lower leg  edema, wound persists, probes to bone, no odor  Neurologic: alert, oriented, moves all extremities    LAB RESULTS:                        9.3    4.06  )-----------( 305      ( 06 Feb 2024 06:00 )             28.1     02-06    142  |  107  |  9   ----------------------------<  72  3.7   |  26  |  0.86    Ca    9.1      06 Feb 2024 06:00  Mg     1.8     02-06    TPro  6.6  /  Alb  2.8<L>  /  TBili  0.3  /  DBili  x   /  AST  21  /  ALT  21  /  AlkPhos  98  02-05    LIVER FUNCTIONS - ( 05 Feb 2024 09:35 )  Alb: 2.8 g/dL / Pro: 6.6 g/dL / ALK PHOS: 98 U/L / ALT: 21 U/L / AST: 21 U/L / GGT: x           Urinalysis Basic - ( 06 Feb 2024 06:00 )    Color: x / Appearance: x / SG: x / pH: x  Gluc: 72 mg/dL / Ketone: x  / Bili: x / Urobili: x   Blood: x / Protein: x / Nitrite: x   Leuk Esterase: x / RBC: x / WBC x   Sq Epi: x / Non Sq Epi: x / Bacteria: x      MICROBIOLOGY:  RECENT CULTURES:  02-05 @ 09:35 .Blood Blood-Peripheral     No growth at 24 hours          RADIOLOGY REVIEWED:  < from: VA Physiol Extremity Lower 3+ Level, BI (01.22.24 @ 11:15) >    ACC: [id number] EXAM:  PHYSIOL EXTREM LOW 3+ LEV BI   ORDERED BY:  MISHA HOGUE     PROCEDURE DATE:  01/22/2024          INTERPRETATION:  Clinical Information: Peripheral vascular disease.   Chronic right plantar foot ulcer and osteomyelitis    Technique: Bilateral lower extremity ABIs/PVR    Comparison: DAVID/PVR 12/6/2023    Findings/  Impression:  Right lower extremity: The ankle brachial index is 1.17, previously 1.22.   The pulse waveforms are normal. No segmental pressure gradient.    Left lower extremity: The ankle brachial index is 1.17, previously 1.24.   The pulse waveforms are normal. No segmental pressure gradient.    --- End of Report ---    < end of copied text >        < from: MR Foot w/ IV Cont, Right (01.23.24 @ 11:57) >    ACC: [id number] EXAM:  MR FOOT IC RT   ORDERED BY:  MISHA HOGUE     PROCEDURE DATE:  01/23/2024          INTERPRETATION:  Exam Type: MR FOOT WITH IV CONTRAST RIGHT  Exam Date and Time: 1/23/2024 11:57 AM  Indication: Concern for osteomyelitis between the second and third digits  Comparison: Right foot MRI on 12/6/2023 and right foot radiograph on   1/18/2024    TECHNIQUE:  Multiplanar multisequence MRI of the right foot was performed with   contrast.    Contrast: 6 mL Gadavist IV    FINDINGS:    Large soft tissue ulcer at the plantar aspect of the forefoot centered   around the second and third metatarsal heads. There is marked osseous   edema, loss of normal T1 fatty marrow, postcontrast enhancement within   the third metatarsal head to shaft representing osteomyelitis. Mild   osseous edema with mild postcontrast enhancement within the base of the   third proximal phalanx representing early osteomyelitis. Status post   amputation of the second digit distal to the mid shaft of the second   metatarsal. There is edema, loss of normal fatty marrow, and postcontrast   enhancement within the metatarsal stump representing osteomyelitis.   Moderate osteoarthritis of first metatarsophalangeal joint. Marked edema   and postcontrast enhancement is seen throughout the foot with locules of   air extending to the dorsum of the foot. Fatty atrophy of the intrinsic   muscles. No mature, drainable, or enhancing collection is seen at this   time.    IMPRESSION:  1.  Osteomyelitis of the second metatarsal stump and third metatarsal   head and shaft.  2.  Early osteomyelitis of the third proximal phalanx.  3.  Marked edema and postcontrast enhancement is seen throughout the foot   with locules of air extending to the dorsum of the foot. No mature,   drainable, or enhancing collection is seen at this time.    --- End of Report ---    < end of copied text >        Assessment:  Elderly woman with chronic ulcer of the right foot, underwent recent 2nd met amp to clean margins. She never followed up as instructed with her surgical podiatrist Dr. Beauchamp nor with the wound care center as instructed. Reviewed with Dr. Mishra. She was here a couple of weeks ago and treated for soft tissue infection of the right foot and told to f/u with her surgical podiatrist. She did not and her regular podiatrist sent her back to hospital  because the foot looks swollen. She needs surgical intervention to treat the foot with antibiotics as adjuvant tx. Spoke to Dr. Beauchamp and with medical team here and advise transfer to [facility] for definitive tx of the foot with contiguous focus om of the foot. abx are of very limited utility alone  and more of a risk for selecting resistance and toxicity more than of benefit.   Plan:  for now on meropenem  took another deep wound culture  transfer to [facility] for podiatry care with surgery

## 2024-02-06 NOTE — DIETITIAN INITIAL EVALUATION ADULT - PERTINENT LABORATORY DATA
02-06    142  |  107  |  9   ----------------------------<  72  3.7   |  26  |  0.86    Ca    9.1      06 Feb 2024 06:00  Mg     1.8     02-06    TPro  6.6  /  Alb  2.8<L>  /  TBili  0.3  /  DBili  x   /  AST  21  /  ALT  21  /  AlkPhos  98  02-05

## 2024-02-06 NOTE — DIETITIAN INITIAL EVALUATION ADULT - PERTINENT MEDS FT
MEDICATIONS  (STANDING):  apixaban 5 milliGRAM(s) Oral two times a day  lactobacillus acidophilus 1 Tablet(s) Oral daily  levothyroxine 88 MICROGram(s) Oral daily  meropenem  IVPB 1000 milliGRAM(s) IV Intermittent every 12 hours  metoprolol tartrate 25 milliGRAM(s) Oral every 12 hours  simvastatin 40 milliGRAM(s) Oral at bedtime    MEDICATIONS  (PRN):  acetaminophen     Tablet .. 650 milliGRAM(s) Oral every 6 hours PRN Temp greater or equal to 38C (100.4F), Mild Pain (1 - 3)  albuterol    90 MICROgram(s) HFA Inhaler 2 Puff(s) Inhalation every 6 hours PRN Bronchospasm  aluminum hydroxide/magnesium hydroxide/simethicone Suspension 30 milliLiter(s) Oral every 4 hours PRN Dyspepsia  melatonin 3 milliGRAM(s) Oral at bedtime PRN Insomnia  ondansetron Injectable 4 milliGRAM(s) IV Push every 8 hours PRN Nausea and/or Vomiting

## 2024-02-06 NOTE — DIETITIAN INITIAL EVALUATION ADULT - ORAL INTAKE PTA/DIET HISTORY
Patient reports consuming a regular diet PTA , NKFA reported , weight loss reported over past 2 months

## 2024-02-07 ENCOUNTER — TRANSCRIPTION ENCOUNTER (OUTPATIENT)
Age: 84
End: 2024-02-07

## 2024-02-07 DIAGNOSIS — M86.60 OTHER CHRONIC OSTEOMYELITIS, UNSPECIFIED SITE: ICD-10-CM

## 2024-02-07 LAB
-  AMOXICILLIN/CLAVULANIC ACID: SIGNIFICANT CHANGE UP
-  AMPICILLIN/SULBACTAM: SIGNIFICANT CHANGE UP
-  AMPICILLIN: SIGNIFICANT CHANGE UP
-  AZTREONAM: SIGNIFICANT CHANGE UP
-  CEFAZOLIN: SIGNIFICANT CHANGE UP
-  CEFEPIME: SIGNIFICANT CHANGE UP
-  CEFTRIAXONE: SIGNIFICANT CHANGE UP
-  CIPROFLOXACIN: SIGNIFICANT CHANGE UP
-  ERTAPENEM: SIGNIFICANT CHANGE UP
-  GENTAMICIN: SIGNIFICANT CHANGE UP
-  IMIPENEM: SIGNIFICANT CHANGE UP
-  LEVOFLOXACIN: SIGNIFICANT CHANGE UP
-  MEROPENEM: SIGNIFICANT CHANGE UP
-  PIPERACILLIN/TAZOBACTAM: SIGNIFICANT CHANGE UP
-  TOBRAMYCIN: SIGNIFICANT CHANGE UP
-  TRIMETHOPRIM/SULFAMETHOXAZOLE: SIGNIFICANT CHANGE UP
ANION GAP SERPL CALC-SCNC: 10 MMOL/L — SIGNIFICANT CHANGE UP (ref 5–17)
BUN SERPL-MCNC: 15 MG/DL — SIGNIFICANT CHANGE UP (ref 7–23)
CALCIUM SERPL-MCNC: 8.9 MG/DL — SIGNIFICANT CHANGE UP (ref 8.4–10.5)
CHLORIDE SERPL-SCNC: 104 MMOL/L — SIGNIFICANT CHANGE UP (ref 96–108)
CO2 SERPL-SCNC: 26 MMOL/L — SIGNIFICANT CHANGE UP (ref 22–31)
CREAT SERPL-MCNC: 0.99 MG/DL — SIGNIFICANT CHANGE UP (ref 0.5–1.3)
CULTURE RESULTS: ABNORMAL
EGFR: 56 ML/MIN/1.73M2 — LOW
GLUCOSE SERPL-MCNC: 89 MG/DL — SIGNIFICANT CHANGE UP (ref 70–99)
HCT VFR BLD CALC: 28 % — LOW (ref 34.5–45)
HGB BLD-MCNC: 9.3 G/DL — LOW (ref 11.5–15.5)
MCHC RBC-ENTMCNC: 31.7 PG — SIGNIFICANT CHANGE UP (ref 27–34)
MCHC RBC-ENTMCNC: 33.2 GM/DL — SIGNIFICANT CHANGE UP (ref 32–36)
MCV RBC AUTO: 95.6 FL — SIGNIFICANT CHANGE UP (ref 80–100)
METHOD TYPE: SIGNIFICANT CHANGE UP
NRBC # BLD: 0 /100 WBCS — SIGNIFICANT CHANGE UP (ref 0–0)
ORGANISM # SPEC MICROSCOPIC CNT: ABNORMAL
ORGANISM # SPEC MICROSCOPIC CNT: SIGNIFICANT CHANGE UP
PLATELET # BLD AUTO: 320 K/UL — SIGNIFICANT CHANGE UP (ref 150–400)
POTASSIUM SERPL-MCNC: 3.9 MMOL/L — SIGNIFICANT CHANGE UP (ref 3.5–5.3)
POTASSIUM SERPL-SCNC: 3.9 MMOL/L — SIGNIFICANT CHANGE UP (ref 3.5–5.3)
RBC # BLD: 2.93 M/UL — LOW (ref 3.8–5.2)
RBC # FLD: 13.1 % — SIGNIFICANT CHANGE UP (ref 10.3–14.5)
SODIUM SERPL-SCNC: 140 MMOL/L — SIGNIFICANT CHANGE UP (ref 135–145)
SPECIMEN SOURCE: SIGNIFICANT CHANGE UP
WBC # BLD: 4.43 K/UL — SIGNIFICANT CHANGE UP (ref 3.8–10.5)
WBC # FLD AUTO: 4.43 K/UL — SIGNIFICANT CHANGE UP (ref 3.8–10.5)

## 2024-02-07 PROCEDURE — 93971 EXTREMITY STUDY: CPT | Mod: 26,RT

## 2024-02-07 PROCEDURE — 99233 SBSQ HOSP IP/OBS HIGH 50: CPT | Mod: GC

## 2024-02-07 RX ORDER — ERTAPENEM SODIUM 1 G/1
1000 INJECTION, POWDER, LYOPHILIZED, FOR SOLUTION INTRAMUSCULAR; INTRAVENOUS EVERY 24 HOURS
Refills: 0 | Status: DISCONTINUED | OUTPATIENT
Start: 2024-02-07 | End: 2024-02-15

## 2024-02-07 RX ORDER — MEROPENEM 1 G/30ML
1000 INJECTION INTRAVENOUS
Qty: 0 | Refills: 0 | DISCHARGE
Start: 2024-02-07

## 2024-02-07 RX ORDER — LACTOBACILLUS ACIDOPHILUS 100MM CELL
1 CAPSULE ORAL
Qty: 0 | Refills: 0 | DISCHARGE
Start: 2024-02-07

## 2024-02-07 RX ADMIN — SIMVASTATIN 40 MILLIGRAM(S): 20 TABLET, FILM COATED ORAL at 21:33

## 2024-02-07 RX ADMIN — Medication 25 MILLIGRAM(S): at 05:23

## 2024-02-07 RX ADMIN — Medication 25 MILLIGRAM(S): at 18:06

## 2024-02-07 RX ADMIN — MEROPENEM 100 MILLIGRAM(S): 1 INJECTION INTRAVENOUS at 10:27

## 2024-02-07 RX ADMIN — Medication 88 MICROGRAM(S): at 05:23

## 2024-02-07 RX ADMIN — APIXABAN 5 MILLIGRAM(S): 2.5 TABLET, FILM COATED ORAL at 18:06

## 2024-02-07 RX ADMIN — Medication 1 TABLET(S): at 11:27

## 2024-02-07 RX ADMIN — APIXABAN 5 MILLIGRAM(S): 2.5 TABLET, FILM COATED ORAL at 05:23

## 2024-02-07 RX ADMIN — ERTAPENEM SODIUM 120 MILLIGRAM(S): 1 INJECTION, POWDER, LYOPHILIZED, FOR SOLUTION INTRAMUSCULAR; INTRAVENOUS at 21:32

## 2024-02-07 NOTE — DISCHARGE NOTE PROVIDER - NSDCQMPCI_CARD_ALL_CORE
----- Message from Agusto Jauregui sent at 1/29/2019  3:08 PM CST -----  Contact: Pt  Please give pt a call at ..[phone] (home) [phone] (work)regarding some issues with her medication.   
Pt stated rx Klonopin was sent to wrong pharmacy.  Called [facility], s/w Otf, cancelled rx.  Rx sent to [facility] per request./rpr  
No

## 2024-02-07 NOTE — CONSULT NOTE ADULT - ASSESSMENT
A: Right foot ulceration  OM, right foot      P:   1. Patient evaluated  2. Discussed with the patient conservative options including HBO, wound care and antibiotics vs surgical options (Transmetatarsal amputation). All questions were answered and patient fully understands. Patient states she would like surgical intervention at this time.   3. Discussed with primary team.   
Patient is a 83y female with a past history of HTN.HLD, and hypothyroidism who was sent to ER by her podiatrist for management of chronic osteo.  She has a partial second toe resection on the rt foot in early December at [facility], surgical margins felt clean and bone culture negative, received a limited course of IV antibiotics and 1 week of post op augmentin.  She was sent to [facility] in January for foot drainage, received 5 days of meropenem with resolution of cellulitis.  A wound culture grew C striatum and ESBL E coli.  She had an MRI with osteo of  second metatarsal stump, 3rd metatarsal and digit as well,  Podiatry deferred additional surgery and she was eventually discharged on no antibiotics. She was referred back to her initial surgeon who had done December surgery  She has seen DR Carlos and he sent her back to hospital.  She had received cipro prior to last admission and had an CARLOS which almost  resulted in dialysis  She appears to have chronic osteo of rt foot.  Antibiotics work best as adjunctive to adequate debridement.  Suggest:  1 I once again would consider a surgical option to remove involved bones, obtain clean margins and hope for cure  2 Antibiotics will likely produce a waxing/waning course , juggling clinical improvement in foot with risk of line infections, C diff, and CARLOS from antimicrobials.  3 Consider transfer to [facility] for combined ID/Podiatric approach   4 Meropenem okay for now, in all honesty her foot looks better than I recall last month

## 2024-02-07 NOTE — DISCHARGE NOTE PROVIDER - CARE PROVIDER_API CALL
Gustavo Peterson  [facility]  [address] [place] [address]  Phone: [phone]  Fax: [phone]  Established Patient  Follow Up Time:     Feliciano Eric  Podiatric Medicine and Surgery  [address] [place] [id number]  Phone: [phone]  Fax: [phone]  Established Patient  Follow Up Time:

## 2024-02-07 NOTE — DISCHARGE NOTE PROVIDER - NSDCMRMEDTOKEN_GEN_ALL_CORE_FT
apixaban 5 mg oral tablet: 1 tab(s) orally every 12 hours  lactobacillus acidophilus oral capsule: 1 cap(s) orally once a day  levothyroxine 88 mcg (0.088 mg) oral tablet: 1 tab(s) orally once a day  meropenem 1000 mg intravenous injection: 1000 milligram(s) intravenous every 12 hours  metoprolol tartrate 25 mg oral tablet: 1 tab(s) orally every 12 hours  ProAir HFA 90 mcg/inh inhalation aerosol: 2 puff(s) inhaled 2 times a day  simvastatin 40 mg oral tablet: 1 tab(s) orally once a day (at bedtime)

## 2024-02-07 NOTE — DISCHARGE NOTE PROVIDER - HOSPITAL COURSE
Patient is an 83F, from home,  PMHx AFIB on eliquis, HTN, HLD, chronic right plantar foot ulcer/OM admitted for IV abx for worsening acute on chronic OM of the right foot.     Of note, she was recently admitted from 1/1/-1/26, had 5 days of meropenem, had a MRI on 1/23 showing: "Osteomyelitis of the second metatarsal stump and third metatarsal head and shaft. Early osteomyelitis of the third proximal phalanx. Marked edema and postcontrast enhancement is seen throughout the foot with locules of air extending to the dorsum of the foot. No mature, drainable, or enhancing collection is seen at this time." Had DAVID completed 1/22/24 during that admission showing RLE DAVID 1.17, normal waveforms, no segmental pressure gradient, LLE DAVID 1.17 w/ normal pulse waveforms and no segmental pressure gradient.     During this admission she received 3 days of meropenem with wound cultures growing ESBL sensitive to meropenem, blood cultures without growth, and was agreeable to forefoot amputation after deliberation of her current condition. Patient was seen by Podiatry here with recommendation for forefoot amputation w/ achilles tendon lengthening which is not done at this hospital. Patient is agreeable and is medically stabilized for transfer to [facility] for this amputation procedure and further management.     CONSULTS: Infectious Disease, Podiatry     IMAGING:  Xray Foot AP + Lateral, Right (02.05.24 @ 10:22)   IMPRESSION:  No acute radiographic findings, if there is continued clinical concern   follow-up MRI can be ordered

## 2024-02-07 NOTE — DISCHARGE NOTE PROVIDER - PROVIDER TOKENS
PROVIDER:[TOKEN:[[id number]],ESTABLISHEDPATIENT:[T]],PROVIDER:[TOKEN:[[id number]],ESTABLISHEDPATIENT:[T]]

## 2024-02-07 NOTE — CONSULT NOTE ADULT - SUBJECTIVE AND OBJECTIVE BOX
Patient seen and examined at bedside. Patient states her podiatrist sent her back to the hospital. Patient states she wants a definitive procedure at this time.    HPI:  83F, from home,  PMHx AFIB on eliquis, HTN, HLD, chronic right plantar foot ulcer/OM presenting to ER  for IV abx for worsening acute on chronic OM of the right foot.   Dr Eric, podiatry, saw patient on Saturday at her home...noted that she her ulcer was not healing well.  He recommended that she come to the hospital again for IV antibiotics.    Of note, she was recently admitted from 1/1/-1/26, had 5 days of meropenem, had a MRI on 1/23 showing: "Osteomyelitis of the second metatarsal stump and third metatarsal head and shaft. Early osteomyelitis of the third proximal phalanx. Marked edema and postcontrast enhancement is seen throughout the foot with locules of air extending to the dorsum of the foot. No mature, drainable, or enhancing collection is seen at this time."    She was discharged on 1/26 and was to follow up with her primary podiatrist for further recommendations. ID signed off.    Since discharge, she describes worsening right leg/foot swelling and a yellow discharge; she herself states that there is one area where the ulcer is not healing.  She denies fever, cough, runny nose.  She ambulates with a walker and does her own wound dressing changes.      In the ER, she was given meropenem 1gm IVPB   (05 Feb 2024 11:06)    PAST MEDICAL & SURGICAL HISTORY:  HTN (hypertension)  Hyperlipidemia  Hypothyroid  Paroxysmal SVT (supraventricular tachycardia)  Thyroid cyst  Back problem  s/p back surgery  S/P thyroid surgery  S/P hernia surgery  S/P rotator cuff surgery                           9.3    4.43  )-----------( 320      ( 07 Feb 2024 06:21 )             28.0       ICU Vital Signs Last 24 Hrs  T(C): 36.6 (07 Feb 2024 11:49), Max: 36.9 (06 Feb 2024 20:39)  T(F): 97.9 (07 Feb 2024 11:49), Max: 98.4 (06 Feb 2024 20:39)  HR: 64 (07 Feb 2024 11:49) (61 - 66)  BP: 143/70 (07 Feb 2024 17:34) (110/63 - 149/74)  BP(mean): --  ABP: --  ABP(mean): --  RR: 16 (07 Feb 2024 11:49) (16 - 16)  SpO2: 97% (07 Feb 2024 11:49) (97% - 98%)    O2 Parameters below as of 07 Feb 2024 11:49  Patient On (Oxygen Delivery Method): room air    O: Noted is plantar forefoot ulceration right foot. Fibrogranular base with hyperkeratotic border. No erythema, no purulence, no edema noted.                 
HPI:   Patient is a 83y female with a past history of HTN.HLD, and hypothyroidism who was sent to ER by her podiatrist for management of chronic osteo.  She has a partial second toe resection on the rt foot in early December at [facility], surgical margins felt clean and bone culture negative, received a limited course of IV antibiotics and 1 week of post op augmentin.  She was sent to [facility] in January for foot drainage, received 5 days of meropenem with resolution of cellulitis.  A wound culture grew C striatum and ESBL E coli.  She had an MRI with osteo of  second metatarsal stump, 3rd metatarsal and digit as well,  Podiatry deferred additional surgery and she was eventually discharged on no antibiotics. She was referred back to her initial surgeon who had done December surgery  She has seen DR Carlos and he sent her back to hospital.  She had received cipro prior to last admission and had an CARLOS which almost  resulted in dialysis    REVIEW OF SYSTEMS:  All other review of systems negative (Comprehensive ROS)    PAST MEDICAL & SURGICAL HISTORY:  HTN (hypertension)      Hyperlipidemia      Hypothyroid      Paroxysmal SVT (supraventricular tachycardia)      Thyroid cyst      Back problem  s/p back surgery      S/P thyroid surgery      S/P hernia surgery      S/P rotator cuff surgery          Allergies    doxycycline (Unknown)  Cipro (Other)  linezolid (Unknown)    Intolerances    Levaquin (Stomach Upset; Nausea)      Antimicrobials Day #  :  meropenem  IVPB 1000 milliGRAM(s) IV Intermittent every 12 hours    Other Medications:  acetaminophen     Tablet .. 650 milliGRAM(s) Oral every 6 hours PRN  albuterol    90 MICROgram(s) HFA Inhaler 2 Puff(s) Inhalation every 6 hours PRN  aluminum hydroxide/magnesium hydroxide/simethicone Suspension 30 milliLiter(s) Oral every 4 hours PRN  apixaban 5 milliGRAM(s) Oral two times a day  lactobacillus acidophilus 1 Tablet(s) Oral daily  levothyroxine 88 MICROGram(s) Oral daily  melatonin 3 milliGRAM(s) Oral at bedtime PRN  metoprolol tartrate 25 milliGRAM(s) Oral every 12 hours  ondansetron Injectable 4 milliGRAM(s) IV Push every 8 hours PRN  simvastatin 40 milliGRAM(s) Oral at bedtime      FAMILY HISTORY:  Family history of early CAD (Father)        SOCIAL HISTORY:  Smoking:  x   ETOH: x    Drug Use: x  Single     T(F): 97.9 (02-05-24 @ 13:48), Max: 97.9 (02-05-24 @ 12:03)  HR: 58 (02-05-24 @ 13:48)  BP: 168/62 (02-05-24 @ 13:48)  RR: 18 (02-05-24 @ 13:48)  SpO2: 100% (02-05-24 @ 13:48)  Wt(kg): --    PHYSICAL EXAM:  General: alert, no acute distress  Eyes:  anicteric, no conjunctival injection, no discharge  Oropharynx: no lesions or injection 	  Neck: supple, without adenopathy  Lungs: clear to auscultation  Heart: regular rate and rhythm; no murmur, rubs or gallops  Abdomen: soft, nondistended, nontender, without mass or organomegaly  Skin: no lesions  Extremities: no clubbing, cyanosis, trace  edema both feet. Plantar ulcers rt foot, callous over plantar surfaces of both feet.  Mild erythema left pretibial region  Neurologic: alert, oriented, moves all extremities    LAB RESULTS:                        9.3    4.62  )-----------( 313      ( 05 Feb 2024 09:35 )             27.8     02-05    141  |  106  |  13  ----------------------------<  96  3.7   |  25  |  0.97    Ca    9.2      05 Feb 2024 09:35    TPro  6.6  /  Alb  2.8<L>  /  TBili  0.3  /  DBili  x   /  AST  21  /  ALT  21  /  AlkPhos  98  02-05    LIVER FUNCTIONS - ( 05 Feb 2024 09:35 )  Alb: 2.8 g/dL / Pro: 6.6 g/dL / ALK PHOS: 98 U/L / ALT: 21 U/L / AST: 21 U/L / GGT: x                   MICROBIOLOGY:  RECENT CULTURES:        RADIOLOGY REVIEWED:  < from: MR Foot w/ IV Cont, Right (01.23.24 @ 11:57) >  IMPRESSION:  1.  Osteomyelitis of the second metatarsal stump and third metatarsal   head and shaft.  2.  Early osteomyelitis of the third proximal phalanx.  3.  Marked edema and postcontrast enhancement is seen throughout the foot   with locules of air extending to the dorsum of the foot. No mature,   drainable, or enhancing collection is seen at this time.

## 2024-02-07 NOTE — PROGRESS NOTE ADULT - ASSESSMENT
84 yo female with hx of HTN, HLD, chronic right plantar foot ulcers/OM, sent from podiatry office for IV abx for acute on chronic OM of right foot, also noted to have new onset A Fib.     #Acute on chronic right plantar foot OM  - WBC WNL, elevated ESR/CRP  - Ulcer culture w/ ESBL sensitive to gladys, blood cultures negative   - Will c/w meropenem 1gm IVPB q 12hrs for now, probiotic  - Fall precautions, PT eval - declined, ambulates with cane, activity as tolerated  - Appreciate ID recs - if no amputation wanted, can go home for 7d course of peripheral abx   - Podiatry consult appreciated - now agreeable to forefoot amputation    #Paroxysmal A Fib, rate controlled, on long term anticoagulation  #HTN, controlled  - C/w metoprolol tartrate 25mg q 12hrs, eliquis 5mg BID    #Normocytic anemia  - Will monitor Hg/Hct, transfuse if Hg <7  - F/U B12/folate/iron    #Hypothyroidism  - s/p thyroidectomy  - Continue Synthroid 88mcg daily    #HLD  - Continue zocor 40mg qhs    #Left upper lobe lung mass, 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  - Upon review of the chart, CT chest report from 12/21/23  - Approximately 4.6 cm sized left upper lobe mass concerning for a primary pulmonary malignancy.  - Pt aware, doesn't want intervention during this admission  - Recommendation for outpatient oncology/pulm F/U for dx/management    VTE ppx: Eliquis  DISP: Pending course    Patient states that she will update her family; has a sister here that is in a nursing home; no other immediate family

## 2024-02-07 NOTE — PROGRESS NOTE ADULT - SUBJECTIVE AND OBJECTIVE BOX
CC: f/u for   om right foot  Patient reports she is going to have her foot taken care of    REVIEW OF SYSTEMS:  All other review of systems negative (Comprehensive ROS)    Antimicrobials Day #  :3 abx, ertapenem    Other Medications Reviewed    T(F): 97.9 (02-07-24 @ 11:49), Max: 98.4 (02-06-24 @ 20:39)  HR: 64 (02-07-24 @ 11:49)  BP: 146/75 (02-07-24 @ 11:49)  RR: 16 (02-07-24 @ 11:49)  SpO2: 97% (02-07-24 @ 11:49)  Wt(kg): --    PHYSICAL EXAM:  General: alert, no acute distress  Eyes:  anicteric, no conjunctival injection, no discharge  Oropharynx: no lesions or injection 	  Neck: supple, without adenopathy  Lungs: clear to auscultation  Heart: regular rate and rhythm; no murmur, rubs or gallops  Abdomen: soft, nondistended, nontender, without mass or organomegaly  Skin: no lesions  Extremities: no clubbing, cyanosis,. right foot is dressed, has leg edema  Neurologic: alert, oriented, moves all extremities    LAB RESULTS:                        9.3    4.43  )-----------( 320      ( 07 Feb 2024 06:21 )             28.0     02-07    140  |  104  |  15  ----------------------------<  89  3.9   |  26  |  0.99    Ca    8.9      07 Feb 2024 06:21  Mg     1.8     02-06        Urinalysis Basic - ( 07 Feb 2024 06:21 )    Color: x / Appearance: x / SG: x / pH: x  Gluc: 89 mg/dL / Ketone: x  / Bili: x / Urobili: x   Blood: x / Protein: x / Nitrite: x   Leuk Esterase: x / RBC: x / WBC x   Sq Epi: x / Non Sq Epi: x / Bacteria: x      MICROBIOLOGY:  RECENT CULTURES:  02-05 @ 12:20 Ulcer Sp. Instructions_Additional Info: PLEASE COLLECT CULTURE FROM RIGHT PLANTAR ULCER Escherichia coli ESBL    Few Escherichia coli ESBL  Normal zena isolated      02-05 @ 09:35 .Blood Blood-Peripheral     No growth at 48 Hours          RADIOLOGY REVIEWED:    < from: US Duplex Venous Lower Ext Complete, Bilateral (01.21.24 @ 11:45) >    ACC: [id number] EXAM:  US DPLX LWR EXT VEINS COMPL BI   ORDERED BY:    MISHA HOGUE     PROCEDURE DATE:  01/21/2024          INTERPRETATION:  CLINICAL INFORMATION: RIGHT lower extremity swelling,   foot ulcers    COMPARISON: None available.    TECHNIQUE: Duplex sonography of the BILATERAL LOWER extremity veins with   color and spectral Doppler, with and without compression.    FINDINGS:    RIGHT:  Normal compressibility of the RIGHT common femoral, femoral and popliteal   veins.  Doppler examination shows normal spontaneous and phasic flow.  No RIGHT calf vein thrombosis is detected.    LEFT:  Normal compressibility of the LEFT common femoral, femoral and popliteal   veins.  Doppler examination shows normal spontaneous and phasic flow.  No LEFT calf vein thrombosis is detected.    Incidental small/morphologically normal RIGHT inguinal lymph nodes    IMPRESSION:  No evidence of deep venous thrombosis in either lower extremity.    < end of copied text >            < from: MR Foot w/ IV Cont, Right (01.23.24 @ 11:57) >    ACC: [id number] EXAM:  MR FOOT IC RT   ORDERED BY:  MISHA HOGUE     PROCEDURE DATE:  01/23/2024          INTERPRETATION:  Exam Type: MR FOOT WITH IV CONTRAST RIGHT  Exam Date and Time: 1/23/2024 11:57 AM  Indication: Concern for osteomyelitis between the second and third digits  Comparison: Right foot MRI on 12/6/2023 and right foot radiograph on   1/18/2024    TECHNIQUE:  Multiplanar multisequence MRI of the right foot was performed with   contrast.    Contrast: 6 mL Gadavist IV    FINDINGS:    Large soft tissue ulcer at the plantar aspect of the forefoot centered   around the second and third metatarsal heads. There is marked osseous   edema, loss of normal T1 fatty marrow, postcontrast enhancement within   the third metatarsal head to shaft representing osteomyelitis. Mild   osseous edema with mild postcontrast enhancement within the base of the   third proximal phalanx representing early osteomyelitis. Status post   amputation of the second digit distal to the mid shaft of the second   metatarsal. There is edema, loss of normal fatty marrow, and postcontrast   enhancement within the metatarsal stump representing osteomyelitis.   Moderate osteoarthritis of first metatarsophalangeal joint. Marked edema   and postcontrast enhancement is seen throughout the foot with locules of   air extending to the dorsum of the foot. Fatty atrophy of the intrinsic   muscles. No mature, drainable, or enhancing collection is seen at this   time.    IMPRESSION:  1.  Osteomyelitis of the second metatarsal stump and third metatarsal   head and shaft.  2.  Early osteomyelitis of the third proximal phalanx.  3.  Marked edema and postcontrast enhancement is seen throughout the foot   with locules of air extending to the dorsum of the foot. No mature,   drainable, or enhancing collection is seen at this time.    --- End of Report ---    < end of copied text >  Assessment:  Elderly woman with chronic plantar ulcer right foot, recent 2nd met amputation for om, came back a couple of weeks ago for ongoing foot ulcer and swelling of the foot, found to have 3rd met om. She had 5 d of meropenem and told to see her surgical podiatrist. She did not but foot swelling again hence sent here again. She is not septic. She is growing esbl ecoli as expected. She probes to bone. She is now amenable to the necessary surgical intervention to the foot to eradicate her problem.   Plan:  can tailor to ertapenem  doppler right leg again  await planned transfer to [facility] for podiatric surgery

## 2024-02-07 NOTE — DISCHARGE NOTE PROVIDER - CARE PROVIDERS DIRECT ADDRESSES
[email or web address]

## 2024-02-07 NOTE — DISCHARGE NOTE PROVIDER - NSDCFUSCHEDAPPT_GEN_ALL_CORE_FT
Gustavo Peterson  Health system Physician [facility]  [facility] [address]  Scheduled Appointment: 02/13/2024    Gustavo Peterson  [facility]  [facility] [address]  Scheduled Appointment: 02/15/2024    Claudine Walker  Health system Physician Critical access hospital  PULMMED [address] [facility]  Scheduled Appointment: 02/23/2024    Jc Pacheco  [facility]  CARDIOLOGY [address]  Scheduled Appointment: 04/25/2024     Claudine Walker  [place] Physician [facility]  [address]  Scheduled Appointment: 02/23/2024    Jc Pacheco  [facility]  CARDIOLOGY [address]  Scheduled Appointment: 04/25/2024

## 2024-02-07 NOTE — PROGRESS NOTE ADULT - SUBJECTIVE AND OBJECTIVE BOX
Patient is an 83F, from home,  PMHx AFIB on eliquis, HTN, HLD, chronic right plantar foot ulcer/OM admitted for IV abx for worsening acute on chronic OM of the right foot.     Of note, she was recently admitted from 1/1/-1/26, had 5 days of meropenem, had a MRI on 1/23 showing: "Osteomyelitis of the second metatarsal stump and third metatarsal head and shaft. Early osteomyelitis of the third proximal phalanx. Marked edema and postcontrast enhancement is seen throughout the foot with locules of air extending to the dorsum of the foot. No mature, drainable, or enhancing collection is seen at this time."    Subjective: This is hospital day 2. Patient seen and examined at bedside in [facility]. Now agreeable to amputation of forefoot. Will call Podiatry and relay patient decision. Patient aware the definitive treatment is amputation. No further questions or concerns at this time.     REVIEW OF SYSTEMS:  CONSTITUTIONAL: No fever, weight loss, or fatigue  EYES: No eye pain, visual disturbances, or discharge  ENMT:  No difficulty hearing, tinnitus, vertigo; No sinus or throat pain  RESPIRATORY: No cough, wheezing, chills or hemoptysis; No shortness of breath  CARDIOVASCULAR: No chest pain, palpitations, dizziness, or leg swelling  GASTROINTESTINAL: No abdominal or epigastric pain. No nausea, vomiting, or hematemesis; No diarrhea or constipation. No melena or hematochezia.  GENITOURINARY: No dysuria, frequency, hematuria, or incontinence  NEUROLOGICAL: No headaches, memory loss, loss of strength, numbness, or tremors  SKIN: No itching, burning, rashes, or lesions   MUSCULOSKELETAL: right leg swelling with ulceration of right plantar surface    Vital Signs Last 24 Hrs  T(C): 36.4 (07 Feb 2024 05:07), Max: 36.9 (06 Feb 2024 20:39)  T(F): 97.6 (07 Feb 2024 05:07), Max: 98.4 (06 Feb 2024 20:39)  HR: 62 (07 Feb 2024 05:19) (60 - 66)  BP: 149/74 (07 Feb 2024 05:07) (110/63 - 149/74)  BP(mean): --  RR: 16 (07 Feb 2024 05:07) (16 - 16)  SpO2: 98% (07 Feb 2024 05:07) (96% - 98%)    Parameters below as of 07 Feb 2024 05:07  Patient On (Oxygen Delivery Method): room air    PHYSICAL EXAM:  GENERAL: NAD, well-groomed, well-developed  HEAD:  Atraumatic, Normocephalic  EYES: EOMI, conjunctiva and sclera clear  ENMT: Moist mucous membranes, Good dentition, no thrush  NECK: Supple  CHEST/LUNG: Clear to auscultation bilaterally, good air entry, non-labored breathing  HEART: RRR; S1/S2, No murmur  ABDOMEN: Soft, Nontender, Nondistended; Bowel sounds present  VASCULAR: Normal pulses, Normal capillary refill  EXTREMITIES: Right second digit amputated, right LE swelling noted, erythema and slight tenderness over anterior tib/fib area, right plantar foot, area of ulceration, yellow discharge noted, no odor noted, hyperkeratosis noted, slight tenderness, currently wrapped   LYMPH: Normal; No lymphadenopathy noted  SKIN: Warm, Intact  PSYCH: Normal mood, Normal affect  NERVOUS SYSTEM:  A/O x3, No focal deficits      LABS:  Personally reviewed                        9.3    4.43  )-----------( 320      ( 07 Feb 2024 06:21 )             28.0     07 Feb 2024 06:21    140    |  104    |  15     ----------------------------<  89     3.9     |  26     |  0.99     Ca    8.9        07 Feb 2024 06:21  Mg     1.8       06 Feb 2024 06:00    CAPILLARY BLOOD GLUCOSE      Urinalysis Basic - ( 07 Feb 2024 06:21 )    Color: x / Appearance: x / SG: x / pH: x  Gluc: 89 mg/dL / Ketone: x  / Bili: x / Urobili: x   Blood: x / Protein: x / Nitrite: x   Leuk Esterase: x / RBC: x / WBC x   Sq Epi: x / Non Sq Epi: x / Bacteria: x      MEDS:  MEDICATIONS  (STANDING):  apixaban 5 milliGRAM(s) Oral two times a day  lactobacillus acidophilus 1 Tablet(s) Oral daily  levothyroxine 88 MICROGram(s) Oral daily  meropenem  IVPB 1000 milliGRAM(s) IV Intermittent every 12 hours  metoprolol tartrate 25 milliGRAM(s) Oral every 12 hours  simvastatin 40 milliGRAM(s) Oral at bedtime    MEDICATIONS  (PRN):  acetaminophen     Tablet .. 650 milliGRAM(s) Oral every 6 hours PRN Temp greater or equal to 38C (100.4F), Mild Pain (1 - 3)  albuterol    90 MICROgram(s) HFA Inhaler 2 Puff(s) Inhalation every 6 hours PRN Bronchospasm  aluminum hydroxide/magnesium hydroxide/simethicone Suspension 30 milliLiter(s) Oral every 4 hours PRN Dyspepsia  melatonin 3 milliGRAM(s) Oral at bedtime PRN Insomnia  ondansetron Injectable 4 milliGRAM(s) IV Push every 8 hours PRN Nausea and/or Vomiting

## 2024-02-08 LAB
ANION GAP SERPL CALC-SCNC: 10 MMOL/L — SIGNIFICANT CHANGE UP (ref 5–17)
BUN SERPL-MCNC: 19 MG/DL — SIGNIFICANT CHANGE UP (ref 7–23)
CALCIUM SERPL-MCNC: 9.2 MG/DL — SIGNIFICANT CHANGE UP (ref 8.4–10.5)
CHLORIDE SERPL-SCNC: 105 MMOL/L — SIGNIFICANT CHANGE UP (ref 96–108)
CO2 SERPL-SCNC: 26 MMOL/L — SIGNIFICANT CHANGE UP (ref 22–31)
CREAT SERPL-MCNC: 0.94 MG/DL — SIGNIFICANT CHANGE UP (ref 0.5–1.3)
CULTURE RESULTS: SIGNIFICANT CHANGE UP
EGFR: 59 ML/MIN/1.73M2 — LOW
GLUCOSE SERPL-MCNC: 80 MG/DL — SIGNIFICANT CHANGE UP (ref 70–99)
HCT VFR BLD CALC: 29.9 % — LOW (ref 34.5–45)
HGB BLD-MCNC: 9.8 G/DL — LOW (ref 11.5–15.5)
MCHC RBC-ENTMCNC: 32.1 PG — SIGNIFICANT CHANGE UP (ref 27–34)
MCHC RBC-ENTMCNC: 32.8 GM/DL — SIGNIFICANT CHANGE UP (ref 32–36)
MCV RBC AUTO: 98 FL — SIGNIFICANT CHANGE UP (ref 80–100)
NRBC # BLD: 0 /100 WBCS — SIGNIFICANT CHANGE UP (ref 0–0)
PLATELET # BLD AUTO: 318 K/UL — SIGNIFICANT CHANGE UP (ref 150–400)
POTASSIUM SERPL-MCNC: 4.4 MMOL/L — SIGNIFICANT CHANGE UP (ref 3.5–5.3)
POTASSIUM SERPL-SCNC: 4.4 MMOL/L — SIGNIFICANT CHANGE UP (ref 3.5–5.3)
RBC # BLD: 3.05 M/UL — LOW (ref 3.8–5.2)
RBC # FLD: 13.3 % — SIGNIFICANT CHANGE UP (ref 10.3–14.5)
SODIUM SERPL-SCNC: 141 MMOL/L — SIGNIFICANT CHANGE UP (ref 135–145)
SPECIMEN SOURCE: SIGNIFICANT CHANGE UP
WBC # BLD: 5.16 K/UL — SIGNIFICANT CHANGE UP (ref 3.8–10.5)
WBC # FLD AUTO: 5.16 K/UL — SIGNIFICANT CHANGE UP (ref 3.8–10.5)

## 2024-02-08 PROCEDURE — 99232 SBSQ HOSP IP/OBS MODERATE 35: CPT | Mod: GC

## 2024-02-08 RX ADMIN — Medication 88 MICROGRAM(S): at 06:12

## 2024-02-08 RX ADMIN — SIMVASTATIN 40 MILLIGRAM(S): 20 TABLET, FILM COATED ORAL at 21:37

## 2024-02-08 RX ADMIN — Medication 25 MILLIGRAM(S): at 17:30

## 2024-02-08 RX ADMIN — Medication 1 TABLET(S): at 12:17

## 2024-02-08 RX ADMIN — APIXABAN 5 MILLIGRAM(S): 2.5 TABLET, FILM COATED ORAL at 17:30

## 2024-02-08 RX ADMIN — Medication 25 MILLIGRAM(S): at 06:12

## 2024-02-08 RX ADMIN — ERTAPENEM SODIUM 120 MILLIGRAM(S): 1 INJECTION, POWDER, LYOPHILIZED, FOR SOLUTION INTRAMUSCULAR; INTRAVENOUS at 21:36

## 2024-02-08 RX ADMIN — APIXABAN 5 MILLIGRAM(S): 2.5 TABLET, FILM COATED ORAL at 06:13

## 2024-02-08 NOTE — PROGRESS NOTE ADULT - SUBJECTIVE AND OBJECTIVE BOX
CC: f/u for  contiguous focus om of the foot  Patient reports  he foot is doing a lot better  REVIEW OF SYSTEMS:  All other review of systems negative (Comprehensive ROS)    Antimicrobials Day #  :4  ertapenem  IVPB 1000 milliGRAM(s) IV Intermittent every 24 hours    Other Medications Reviewed    T(F): 98.8 (02-08-24 @ 13:32), Max: 98.8 (02-08-24 @ 13:32)  HR: 67 (02-08-24 @ 13:32)  BP: 124/75 (02-08-24 @ 13:32)  RR: 16 (02-08-24 @ 13:32)  SpO2: 97% (02-08-24 @ 13:32)  Wt(kg): --    PHYSICAL EXAM:  General: alert, no acute distress  Eyes:  anicteric, no conjunctival injection, no discharge  Oropharynx: no lesions or injection 	  Neck: supple, without adenopathy  Lungs: clear to auscultation  Heart: regular rate and rhythm; no murmur, rubs or gallops  Abdomen: soft, nondistended, nontender, without mass or organomegaly  Skin: no lesions  Extremities: no clubbing, cyanosis, . right foot much less  edema, much less drainage  Neurologic: alert, oriented, moves all extremities    LAB RESULTS:                        9.8    5.16  )-----------( 318      ( 08 Feb 2024 06:00 )             29.9     02-08    141  |  105  |  19  ----------------------------<  80  4.4   |  26  |  0.94    Ca    9.2      08 Feb 2024 06:00        Urinalysis Basic - ( 08 Feb 2024 06:00 )    Color: x / Appearance: x / SG: x / pH: x  Gluc: 80 mg/dL / Ketone: x  / Bili: x / Urobili: x   Blood: x / Protein: x / Nitrite: x   Leuk Esterase: x / RBC: x / WBC x   Sq Epi: x / Non Sq Epi: x / Bacteria: x      MICROBIOLOGY:  RECENT CULTURES:  02-06 @ 15:00 Wound Wound     Normal skin zena isolated      02-05 @ 12:20 Ulcer Sp. Instructions_Additional Info: PLEASE COLLECT CULTURE FROM RIGHT PLANTAR ULCER Escherichia coli ESBL    Few Escherichia coli ESBL  Normal zena isolated      02-05 @ 09:35 .Blood Blood-Peripheral     No growth at 72 Hours          RADIOLOGY REVIEWED:    < from: US Duplex Venous Lower Ext Ltd, Right (02.07.24 @ 18:54) >    ACC: [id number] EXAM:  US DPLX LWR EXT VEINS LTD RT   ORDERED BY: JAMES PHILLIPS     PROCEDURE DATE:  02/07/2024          INTERPRETATION:  CLINICAL INFORMATION: RIGHT lower extremity edema    COMPARISON: None available.    TECHNIQUE: Duplex sonography of the RIGHT LOWER extremity veins with   color and spectral Doppler, with and without compression.    FINDINGS:    There is normal compressibility of the right common femoral, femoral and   popliteal veins.  The contralateral common femoral vein is patent.  Doppler examination shows normal spontaneous and phasic flow.    No calf vein thrombosis is detected.    IMPRESSION:  No evidence of right lower extremity deep venous thrombosis.        < end of copied text >            < from: MR Foot w/ IV Cont, Right (01.23.24 @ 11:57) >    ACC: [id number] EXAM:  MR FOOT IC RT   ORDERED BY:  MISHA HOGUE     PROCEDURE DATE:  01/23/2024          INTERPRETATION:  Exam Type: MR FOOT WITH IV CONTRAST RIGHT  Exam Date and Time: 1/23/2024 11:57 AM  Indication: Concern for osteomyelitis between the second and third digits  Comparison: Right foot MRI on 12/6/2023 and right foot radiograph on   1/18/2024    TECHNIQUE:  Multiplanar multisequence MRI of the right foot was performed with   contrast.    Contrast: 6 mL Gadavist IV    FINDINGS:    Large soft tissue ulcer at the plantar aspect of the forefoot centered   around the second and third metatarsal heads. There is marked osseous   edema, loss of normal T1 fatty marrow, postcontrast enhancement within   the third metatarsal head to shaft representing osteomyelitis. Mild   osseous edema with mild postcontrast enhancement within the base of the   third proximal phalanx representing early osteomyelitis. Status post   amputation of the second digit distal to the mid shaft of the second   metatarsal. There is edema, loss of normal fatty marrow, and postcontrast   enhancement within the metatarsal stump representing osteomyelitis.   Moderate osteoarthritis of first metatarsophalangeal joint. Marked edema   and postcontrast enhancement is seen throughout the foot with locules of   air extending to the dorsum of the foot. Fatty atrophy of the intrinsic   muscles. No mature, drainable, or enhancing collection is seen at this   time.    IMPRESSION:  1.  Osteomyelitis of the second metatarsal stump and third metatarsal   head and shaft.  2.  Early osteomyelitis of the third proximal phalanx.  3.  Marked edema and postcontrast enhancement is seen throughout the foot   with locules of air extending to the dorsum of the foot. No mature,   drainable, or enhancing collection is seen at this time.      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 She had 5 d of meropenem and told to see her surgical podiatrist. She did not follow up with Dr Gómez and the  foot swelling again hence sent here again. She is not septic. She is growing esbl ecoli as expected. She probes to bone. She is now amenable to the necessary surgical intervention to the foot to eradicate her problem. doppler neg for dvt in right leg  Plan:  continue ertapenem, foot looks better with elevation, abx and compression wrap but without surgery to remove the infected bone and close the open wound this foot will remain chronically infected.     await planned transfer to [facility] for podiatric surgery    Plan: CC: f/u for  contiguous focus om of the foot  Patient reports  he foot is doing a lot better  REVIEW OF SYSTEMS:  All other review of systems negative (Comprehensive ROS)      DOS 2/8/24  Antimicrobials Day #  :4  ertapenem  IVPB 1000 milliGRAM(s) IV Intermittent every 24 hours    Other Medications Reviewed    T(F): 98.8 (02-08-24 @ 13:32), Max: 98.8 (02-08-24 @ 13:32)  HR: 67 (02-08-24 @ 13:32)  BP: 124/75 (02-08-24 @ 13:32)  RR: 16 (02-08-24 @ 13:32)  SpO2: 97% (02-08-24 @ 13:32)  Wt(kg): --    PHYSICAL EXAM:  General: alert, no acute distress  Eyes:  anicteric, no conjunctival injection, no discharge  Oropharynx: no lesions or injection 	  Neck: supple, without adenopathy  Lungs: clear to auscultation  Heart: regular rate and rhythm; no murmur, rubs or gallops  Abdomen: soft, nondistended, nontender, without mass or organomegaly  Skin: no lesions  Extremities: no clubbing, cyanosis, . right foot much less  edema, much less drainage  Neurologic: alert, oriented, moves all extremities    LAB RESULTS:                        9.8    5.16  )-----------( 318      ( 08 Feb 2024 06:00 )             29.9     02-08    141  |  105  |  19  ----------------------------<  80  4.4   |  26  |  0.94    Ca    9.2      08 Feb 2024 06:00        Urinalysis Basic - ( 08 Feb 2024 06:00 )    Color: x / Appearance: x / SG: x / pH: x  Gluc: 80 mg/dL / Ketone: x  / Bili: x / Urobili: x   Blood: x / Protein: x / Nitrite: x   Leuk Esterase: x / RBC: x / WBC x   Sq Epi: x / Non Sq Epi: x / Bacteria: x      MICROBIOLOGY:  RECENT CULTURES:  02-06 @ 15:00 Wound Wound     Normal skin zena isolated      02-05 @ 12:20 Ulcer Sp. Instructions_Additional Info: PLEASE COLLECT CULTURE FROM RIGHT PLANTAR ULCER Escherichia coli ESBL    Few Escherichia coli ESBL  Normal zena isolated      02-05 @ 09:35 .Blood Blood-Peripheral     No growth at 72 Hours          RADIOLOGY REVIEWED:    < from: US Duplex Venous Lower Ext Ltd, Right (02.07.24 @ 18:54) >    ACC: [id number] EXAM:  US DPLX LWR EXT VEINS LTD RT   ORDERED BY: JAMES PHILLIPS     PROCEDURE DATE:  02/07/2024          INTERPRETATION:  CLINICAL INFORMATION: RIGHT lower extremity edema    COMPARISON: None available.    TECHNIQUE: Duplex sonography of the RIGHT LOWER extremity veins with   color and spectral Doppler, with and without compression.    FINDINGS:    There is normal compressibility of the right common femoral, femoral and   popliteal veins.  The contralateral common femoral vein is patent.  Doppler examination shows normal spontaneous and phasic flow.    No calf vein thrombosis is detected.    IMPRESSION:  No evidence of right lower extremity deep venous thrombosis.        < end of copied text >            < from: MR Foot w/ IV Cont, Right (01.23.24 @ 11:57) >    ACC: [id number] EXAM:  MR FOOT IC RT   ORDERED BY:  MISHA HOGUE     PROCEDURE DATE:  01/23/2024          INTERPRETATION:  Exam Type: MR FOOT WITH IV CONTRAST RIGHT  Exam Date and Time: 1/23/2024 11:57 AM  Indication: Concern for osteomyelitis between the second and third digits  Comparison: Right foot MRI on 12/6/2023 and right foot radiograph on   1/18/2024    TECHNIQUE:  Multiplanar multisequence MRI of the right foot was performed with   contrast.    Contrast: 6 mL Gadavist IV    FINDINGS:    Large soft tissue ulcer at the plantar aspect of the forefoot centered   around the second and third metatarsal heads. There is marked osseous   edema, loss of normal T1 fatty marrow, postcontrast enhancement within   the third metatarsal head to shaft representing osteomyelitis. Mild   osseous edema with mild postcontrast enhancement within the base of the   third proximal phalanx representing early osteomyelitis. Status post   amputation of the second digit distal to the mid shaft of the second   metatarsal. There is edema, loss of normal fatty marrow, and postcontrast   enhancement within the metatarsal stump representing osteomyelitis.   Moderate osteoarthritis of first metatarsophalangeal joint. Marked edema   and postcontrast enhancement is seen throughout the foot with locules of   air extending to the dorsum of the foot. Fatty atrophy of the intrinsic   muscles. No mature, drainable, or enhancing collection is seen at this   time.    IMPRESSION:  1.  Osteomyelitis of the second metatarsal stump and third metatarsal   head and shaft.  2.  Early osteomyelitis of the third proximal phalanx.  3.  Marked edema and postcontrast enhancement is seen throughout the foot   with locules of air extending to the dorsum of the foot. No mature,   drainable, or enhancing collection is seen at this time.      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 She had 5 d of meropenem and told to see her surgical podiatrist. She did not follow up with Dr Gómez and the  foot swelling again hence sent here again. She is not septic. She is growing esbl ecoli as expected. She probes to bone. She is now amenable to the necessary surgical intervention to the foot to eradicate her problem. doppler neg for dvt in right leg  Plan:  continue ertapenem, foot looks better with elevation, abx and compression wrap but without surgery to remove the infected bone and close the open wound this foot will remain chronically infected.     await planned transfer to [facility] for podiatric surgery    Plan:

## 2024-02-08 NOTE — PROGRESS NOTE ADULT - ASSESSMENT
84 yo female with hx of HTN, HLD, chronic right plantar foot ulcers/OM, sent from podiatry office for IV abx for acute on chronic OM of right foot, pending transfer to [facility] for amputation.    #Acute on chronic right plantar foot OM  - WBC WNL, elevated ESR/CRP  - Ulcer culture w/ ESBL sensitive to gladys, blood cultures negative   - C/w meropenem 1gm IVPB q 12hrs for now, probiotic  - Fall precautions  - PT eval - declined, ambulates with cane, activity as tolerated  - Appreciate ID recs - if no amputation wanted, can go home for 7d course of peripheral abx   - Podiatry consult appreciated - now agreeable to forefoot amputation, transfer for procedure    #Paroxysmal A Fib, rate controlled, on long term anticoagulation  #HTN, controlled  - C/w metoprolol tartrate 25mg q 12hrs, eliquis 5mg BID    #Normocytic anemia  - Continue to monitor Hg/Hct, transfuse if Hg <7    #Hypothyroidism  - s/p thyroidectomy  - Continue Synthroid 88mcg daily    #HLD  - Continue zocor 40mg qhs    #Left upper lobe lung mass, 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  - Upon review of the chart, CT chest report from 12/21/23  - Approximately 4.6 cm sized left upper lobe mass concerning for a primary pulmonary malignancy.  - Pt aware, doesn't want intervention during this admission  - Recommendation for outpatient oncology/pulm F/U for dx/management    VTE ppx: Eliquis  DISP: Pending course    Patient states that she will update her family; has a sister here that is in a nursing home; no other immediate family    Discussed with attending, Dr. Pacheco. 84 yo female with hx of HTN, HLD, chronic right plantar foot ulcers/OM, sent from podiatry office for IV abx for acute on chronic OM of right foot, pending transfer to [facility] for amputation.    #Acute on chronic right plantar foot OM  - WBC WNL, elevated ESR/CRP  - Ulcer culture w/ ESBL sensitive to gladys, blood cultures negative   - C/w ertapenem 1gm IVPB Q24h, probiotic  - Fall precautions  - PT eval - declined, ambulates with cane, activity as tolerated  - Appreciate ID recs - if no amputation wanted, can go home for 7d course of peripheral abx   - Podiatry consult appreciated - now agreeable to forefoot amputation, transfer for procedure    #Paroxysmal A Fib, rate controlled, on long term anticoagulation  #HTN, controlled  - C/w metoprolol tartrate 25mg q 12hrs, eliquis 5mg BID    #Normocytic anemia  - Continue to monitor Hg/Hct, transfuse if Hg <7    #Hypothyroidism  - s/p thyroidectomy  - Continue Synthroid 88mcg daily    #HLD  - Continue zocor 40mg qhs    #Left upper lobe lung mass, 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  - Upon review of the chart, CT chest report from 12/21/23  - Approximately 4.6 cm sized left upper lobe mass concerning for a primary pulmonary malignancy.  - Pt aware, doesn't want intervention during this admission  - Recommendation for outpatient oncology/pulm F/U for dx/management    VTE ppx: Eliquis  DISP: Pending course    Patient states that she will update her family; has a sister here that is in a nursing home; no other immediate family    Discussed with attending, Dr. Pacheco.

## 2024-02-08 NOTE — PROGRESS NOTE ADULT - SUBJECTIVE AND OBJECTIVE BOX
Patient is an 83F, from home,  PMHx AFIB on eliquis, HTN, HLD, chronic right plantar foot ulcer/OM admitted for IV abx for worsening acute on chronic OM of the right foot.     Of note, she was recently admitted from 1/1/-1/26, had 5 days of meropenem, had a MRI on 1/23 showing: "Osteomyelitis of the second metatarsal stump and third metatarsal head and shaft. Early osteomyelitis of the third proximal phalanx. Marked edema and postcontrast enhancement is seen throughout the foot with locules of air extending to the dorsum of the foot. No mature, drainable, or enhancing collection is seen at this time."    Subjective: This is hospital day 3. Patient seen and examined at bedside. Transfer pending to [facility] for amputation.     REVIEW OF SYSTEMS:  CONSTITUTIONAL: No fever, weight loss, or fatigue  EYES: No eye pain, visual disturbances, or discharge  ENMT:  No difficulty hearing, tinnitus, vertigo; No sinus or throat pain  RESPIRATORY: No cough, wheezing, chills or hemoptysis; No shortness of breath  CARDIOVASCULAR: No chest pain, palpitations, dizziness, or leg swelling  GASTROINTESTINAL: No abdominal or epigastric pain. No nausea, vomiting, or hematemesis; No diarrhea or constipation. No melena or hematochezia.  GENITOURINARY: No dysuria, frequency, hematuria, or incontinence  NEUROLOGICAL: No headaches, memory loss, loss of strength, numbness, or tremors  SKIN: No itching, burning, rashes, or lesions   MUSCULOSKELETAL: right leg swelling with ulceration of right plantar surface    Vital Signs Last 24 Hrs  T(C): 36.5 (08 Feb 2024 05:31), Max: 36.7 (07 Feb 2024 21:00)  T(F): 97.7 (08 Feb 2024 05:31), Max: 98 (07 Feb 2024 21:00)  HR: 63 (08 Feb 2024 05:31) (63 - 65)  BP: 142/69 (08 Feb 2024 05:31) (134/66 - 146/75)  BP(mean): --  RR: 16 (08 Feb 2024 05:31) (16 - 16)  SpO2: 97% (08 Feb 2024 05:31) (96% - 97%)    Parameters below as of 08 Feb 2024 05:31  Patient On (Oxygen Delivery Method): room air      PHYSICAL EXAM:  GENERAL: NAD, well-groomed, well-developed  HEAD:  Atraumatic, Normocephalic  EYES: EOMI, conjunctiva and sclera clear  ENMT: Moist mucous membranes, Good dentition, no thrush  NECK: Supple  CHEST/LUNG: Clear to auscultation bilaterally, good air entry, non-labored breathing  HEART: RRR; S1/S2, No murmur  ABDOMEN: Soft, Nontender, Nondistended; Bowel sounds present  VASCULAR: Normal pulses, Normal capillary refill  EXTREMITIES: Right second digit amputated, right LE swelling noted, erythema and slight tenderness over anterior tib/fib area, right plantar foot, area of ulceration, yellow discharge noted, no odor noted, hyperkeratosis noted, slight tenderness, currently wrapped   LYMPH: Normal; No lymphadenopathy noted  SKIN: Warm, Intact  PSYCH: Normal mood, Normal affect  NERVOUS SYSTEM:  A/O x3, No focal deficits      LABS:  Personally reviewed               MEDS:  MEDICATIONS  (STANDING):  apixaban 5 milliGRAM(s) Oral two times a day  lactobacillus acidophilus 1 Tablet(s) Oral daily  levothyroxine 88 MICROGram(s) Oral daily  meropenem  IVPB 1000 milliGRAM(s) IV Intermittent every 12 hours  metoprolol tartrate 25 milliGRAM(s) Oral every 12 hours  simvastatin 40 milliGRAM(s) Oral at bedtime    MEDICATIONS  (PRN):  acetaminophen     Tablet .. 650 milliGRAM(s) Oral every 6 hours PRN Temp greater or equal to 38C (100.4F), Mild Pain (1 - 3)  albuterol    90 MICROgram(s) HFA Inhaler 2 Puff(s) Inhalation every 6 hours PRN Bronchospasm  aluminum hydroxide/magnesium hydroxide/simethicone Suspension 30 milliLiter(s) Oral every 4 hours PRN Dyspepsia  melatonin 3 milliGRAM(s) Oral at bedtime PRN Insomnia  ondansetron Injectable 4 milliGRAM(s) IV Push every 8 hours PRN Nausea and/or Vomiting           Patient is an 83F, from home,  PMHx AFIB on eliquis, HTN, HLD, chronic right plantar foot ulcer/OM admitted for IV abx for worsening acute on chronic OM of the right foot.     Of note, she was recently admitted from 1/1/-1/26, had 5 days of meropenem, had a MRI on 1/23 showing: "Osteomyelitis of the second metatarsal stump and third metatarsal head and shaft. Early osteomyelitis of the third proximal phalanx. Marked edema and postcontrast enhancement is seen throughout the foot with locules of air extending to the dorsum of the foot. No mature, drainable, or enhancing collection is seen at this time."    Subjective: This is hospital day 3. Patient seen and examined at bedside. Transfer pending to [facility] for amputation.     REVIEW OF SYSTEMS:  CONSTITUTIONAL: No fever, weight loss, or fatigue  EYES: No eye pain, visual disturbances, or discharge  ENMT:  No difficulty hearing, tinnitus, vertigo; No sinus or throat pain  RESPIRATORY: No cough, wheezing, chills or hemoptysis; No shortness of breath  CARDIOVASCULAR: No chest pain, palpitations, dizziness, or leg swelling  GASTROINTESTINAL: No abdominal or epigastric pain. No nausea, vomiting, or hematemesis; No diarrhea or constipation. No melena or hematochezia.  GENITOURINARY: No dysuria, frequency, hematuria, or incontinence  NEUROLOGICAL: No headaches, memory loss, loss of strength, numbness, or tremors  SKIN: No itching, burning, rashes, or lesions   MUSCULOSKELETAL: right leg swelling with ulceration of right plantar surface, wrapped     Vital Signs Last 24 Hrs  T(C): 36.5 (08 Feb 2024 05:31), Max: 36.7 (07 Feb 2024 21:00)  T(F): 97.7 (08 Feb 2024 05:31), Max: 98 (07 Feb 2024 21:00)  HR: 63 (08 Feb 2024 05:31) (63 - 65)  BP: 142/69 (08 Feb 2024 05:31) (134/66 - 146/75)  BP(mean): --  RR: 16 (08 Feb 2024 05:31) (16 - 16)  SpO2: 97% (08 Feb 2024 05:31) (96% - 97%)    Parameters below as of 08 Feb 2024 05:31  Patient On (Oxygen Delivery Method): room air      PHYSICAL EXAM:  GENERAL: NAD, well-groomed, well-developed  HEAD:  Atraumatic, Normocephalic  EYES: EOMI, conjunctiva and sclera clear  ENMT: Moist mucous membranes, Good dentition, no thrush  NECK: Supple  CHEST/LUNG: Clear to auscultation bilaterally, good air entry, non-labored breathing  HEART: RRR; S1/S2, No murmur  ABDOMEN: Soft, Nontender, Nondistended; Bowel sounds present  VASCULAR: Normal pulses, Normal capillary refill  EXTREMITIES: Right footcurrently wrapped   LYMPH: Normal; No lymphadenopathy noted  SKIN: Warm, Intact  PSYCH: Normal mood, Normal affect  NERVOUS SYSTEM:  A/O x3, No focal deficits      LABS:  Personally reviewed                              9.8    5.16  )-----------( 318      ( 08 Feb 2024 06:00 )             29.9     08 Feb 2024 06:00    141    |  105    |  19     ----------------------------<  80     4.4     |  26     |  0.94     Ca    9.2        08 Feb 2024 06:00    CAPILLARY BLOOD GLUCOSE    Urinalysis Basic - ( 08 Feb 2024 06:00 )    Color: x / Appearance: x / SG: x / pH: x  Gluc: 80 mg/dL / Ketone: x  / Bili: x / Urobili: x   Blood: x / Protein: x / Nitrite: x   Leuk Esterase: x / RBC: x / WBC x   Sq Epi: x / Non Sq Epi: x / Bacteria: x        MEDS:  MEDICATIONS  (STANDING):  apixaban 5 milliGRAM(s) Oral two times a day  lactobacillus acidophilus 1 Tablet(s) Oral daily  levothyroxine 88 MICROGram(s) Oral daily  meropenem  IVPB 1000 milliGRAM(s) IV Intermittent every 12 hours  metoprolol tartrate 25 milliGRAM(s) Oral every 12 hours  simvastatin 40 milliGRAM(s) Oral at bedtime    MEDICATIONS  (PRN):  acetaminophen     Tablet .. 650 milliGRAM(s) Oral every 6 hours PRN Temp greater or equal to 38C (100.4F), Mild Pain (1 - 3)  albuterol    90 MICROgram(s) HFA Inhaler 2 Puff(s) Inhalation every 6 hours PRN Bronchospasm  aluminum hydroxide/magnesium hydroxide/simethicone Suspension 30 milliLiter(s) Oral every 4 hours PRN Dyspepsia  melatonin 3 milliGRAM(s) Oral at bedtime PRN Insomnia  ondansetron Injectable 4 milliGRAM(s) IV Push every 8 hours PRN Nausea and/or Vomiting

## 2024-02-09 LAB
HCT VFR BLD CALC: 31.7 % — LOW (ref 34.5–45)
HGB BLD-MCNC: 10.5 G/DL — LOW (ref 11.5–15.5)
MCHC RBC-ENTMCNC: 32 PG — SIGNIFICANT CHANGE UP (ref 27–34)
MCHC RBC-ENTMCNC: 33.1 GM/DL — SIGNIFICANT CHANGE UP (ref 32–36)
MCV RBC AUTO: 96.6 FL — SIGNIFICANT CHANGE UP (ref 80–100)
NRBC # BLD: 0 /100 WBCS — SIGNIFICANT CHANGE UP (ref 0–0)
PLATELET # BLD AUTO: 313 K/UL — SIGNIFICANT CHANGE UP (ref 150–400)
RBC # BLD: 3.28 M/UL — LOW (ref 3.8–5.2)
RBC # FLD: 13.4 % — SIGNIFICANT CHANGE UP (ref 10.3–14.5)
WBC # BLD: 5.32 K/UL — SIGNIFICANT CHANGE UP (ref 3.8–10.5)
WBC # FLD AUTO: 5.32 K/UL — SIGNIFICANT CHANGE UP (ref 3.8–10.5)

## 2024-02-09 PROCEDURE — 99232 SBSQ HOSP IP/OBS MODERATE 35: CPT | Mod: GC

## 2024-02-09 RX ADMIN — Medication 88 MICROGRAM(S): at 06:05

## 2024-02-09 RX ADMIN — SIMVASTATIN 40 MILLIGRAM(S): 20 TABLET, FILM COATED ORAL at 21:06

## 2024-02-09 RX ADMIN — Medication 25 MILLIGRAM(S): at 06:06

## 2024-02-09 RX ADMIN — ERTAPENEM SODIUM 120 MILLIGRAM(S): 1 INJECTION, POWDER, LYOPHILIZED, FOR SOLUTION INTRAMUSCULAR; INTRAVENOUS at 21:06

## 2024-02-09 RX ADMIN — Medication 1 TABLET(S): at 13:46

## 2024-02-09 RX ADMIN — APIXABAN 5 MILLIGRAM(S): 2.5 TABLET, FILM COATED ORAL at 18:10

## 2024-02-09 RX ADMIN — APIXABAN 5 MILLIGRAM(S): 2.5 TABLET, FILM COATED ORAL at 06:05

## 2024-02-09 RX ADMIN — Medication 25 MILLIGRAM(S): at 18:10

## 2024-02-09 NOTE — PROGRESS NOTE ADULT - SUBJECTIVE AND OBJECTIVE BOX
CC: f/u for  om right foot  Patient reports she is doing fine    REVIEW OF SYSTEMS:  All other review of systems negative (Comprehensive ROS)    Antimicrobials Day #  :5  ertapenem  IVPB 1000 milliGRAM(s) IV Intermittent every 24 hours    Other Medications Reviewed    T(F): 97.4 (02-09-24 @ 12:00), Max: 97.6 (02-08-24 @ 20:34)  HR: 66 (02-09-24 @ 18:11)  BP: 133/75 (02-09-24 @ 18:11)  RR: 17 (02-09-24 @ 12:00)  SpO2: 97% (02-09-24 @ 12:00)  Wt(kg): --    PHYSICAL EXAM:  General: alert, no acute distress  Eyes:  anicteric, no conjunctival injection, no discharge  Oropharynx: no lesions or injection 	  Neck: supple, without adenopathy  Lungs: clear to auscultation  Heart: regular rate and rhythm; no murmur, rubs or gallops  Abdomen: soft, nondistended, nontender, without mass or organomegaly  Skin: no lesions  Extremities: no clubbing, cyanosis, . right foot is wrapped   Neurologic: alert, oriented, moves all extremities    LAB RESULTS:                        10.5   5.32  )-----------( 313      ( 09 Feb 2024 07:40 )             31.7     02-08    141  |  105  |  19  ----------------------------<  80  4.4   |  26  |  0.94    Ca    9.2      08 Feb 2024 06:00        Urinalysis Basic - ( 08 Feb 2024 06:00 )    Color: x / Appearance: x / SG: x / pH: x  Gluc: 80 mg/dL / Ketone: x  / Bili: x / Urobili: x   Blood: x / Protein: x / Nitrite: x   Leuk Esterase: x / RBC: x / WBC x   Sq Epi: x / Non Sq Epi: x / Bacteria: x      MICROBIOLOGY:  RECENT CULTURES:  02-06 @ 15:00 Wound Wound     Normal skin zena isolated      02-05 @ 12:20 Ulcer Sp. Instructions_Additional Info: PLEASE COLLECT CULTURE FROM RIGHT PLANTAR ULCER Escherichia coli ESBL    Few Escherichia coli ESBL  Normal zena isolated      02-05 @ 09:35 .Blood Blood-Peripheral     No growth at 4 days          RADIOLOGY REVIEWED:              Assessment:  Elderly woman, chronic right foot plantar ulcer, recent tma 2nd met for om, now with 3rd met om, persistent ulcer, growing esbl ecoli. Planned for tx to [facility] for further surgical management. soft tissue component of process is improved with edema control and antibiotics  Plan:  continue ertapenem for now  await tx to [facility] for surgery to the foot

## 2024-02-09 NOTE — PROGRESS NOTE ADULT - SUBJECTIVE AND OBJECTIVE BOX
Patient is an 83F, from home,  PMHx AFIB on eliquis, HTN, HLD, chronic right plantar foot ulcer/OM admitted for IV abx for worsening acute on chronic OM of the right foot.     Of note, she was recently admitted from 1/1/-1/26, had 5 days of meropenem, had a MRI on 1/23 showing: "Osteomyelitis of the second metatarsal stump and third metatarsal head and shaft. Early osteomyelitis of the third proximal phalanx. Marked edema and postcontrast enhancement is seen throughout the foot with locules of air extending to the dorsum of the foot. No mature, drainable, or enhancing collection is seen at this time."    Subjective: This is hospital day 4. Patient seen and examined at bedside. Transfer pending to [facility] for amputation.     REVIEW OF SYSTEMS:  CONSTITUTIONAL: No fever, weight loss, or fatigue  EYES: No eye pain, visual disturbances, or discharge  ENMT:  No difficulty hearing, tinnitus, vertigo; No sinus or throat pain  RESPIRATORY: No cough, wheezing, chills or hemoptysis; No shortness of breath  CARDIOVASCULAR: No chest pain, palpitations, dizziness, or leg swelling  GASTROINTESTINAL: No abdominal or epigastric pain. No nausea, vomiting, or hematemesis; No diarrhea or constipation. No melena or hematochezia.  GENITOURINARY: No dysuria, frequency, hematuria, or incontinence  NEUROLOGICAL: No headaches, memory loss, loss of strength, numbness, or tremors  SKIN: No itching, burning, rashes, or lesions   MUSCULOSKELETAL: right leg swelling with ulceration of right plantar surface, wrapped     Vital Signs Last 24 Hrs  T(C): 36.2 (09 Feb 2024 05:38), Max: 37.1 (08 Feb 2024 13:32)  T(F): 97.1 (09 Feb 2024 05:38), Max: 98.8 (08 Feb 2024 13:32)  HR: 61 (09 Feb 2024 05:38) (61 - 70)  BP: 169/72 (09 Feb 2024 05:38) (124/75 - 169/72)  BP(mean): --  RR: 17 (09 Feb 2024 05:38) (16 - 17)  SpO2: 99% (09 Feb 2024 05:38) (97% - 99%)    Parameters below as of 09 Feb 2024 05:38  Patient On (Oxygen Delivery Method): room air        PHYSICAL EXAM:  GENERAL: NAD, well-groomed, well-developed  HEAD:  Atraumatic, Normocephalic  EYES: EOMI, conjunctiva and sclera clear  ENMT: Moist mucous membranes, Good dentition, no thrush  NECK: Supple  CHEST/LUNG: Clear to auscultation bilaterally, good air entry, non-labored breathing  HEART: RRR; S1/S2, No murmur  ABDOMEN: Soft, Nontender, Nondistended; Bowel sounds present  VASCULAR: Normal pulses, Normal capillary refill  EXTREMITIES: Right foot currently wrapped   LYMPH: Normal; No lymphadenopathy noted  SKIN: Warm, Intact  PSYCH: Normal mood, Normal affect  NERVOUS SYSTEM:  A/O x3, No focal deficits      LABS:  Personally reviewed                MEDS:  MEDICATIONS  (STANDING):  apixaban 5 milliGRAM(s) Oral two times a day  lactobacillus acidophilus 1 Tablet(s) Oral daily  levothyroxine 88 MICROGram(s) Oral daily  meropenem  IVPB 1000 milliGRAM(s) IV Intermittent every 12 hours  metoprolol tartrate 25 milliGRAM(s) Oral every 12 hours  simvastatin 40 milliGRAM(s) Oral at bedtime    MEDICATIONS  (PRN):  acetaminophen     Tablet .. 650 milliGRAM(s) Oral every 6 hours PRN Temp greater or equal to 38C (100.4F), Mild Pain (1 - 3)  albuterol    90 MICROgram(s) HFA Inhaler 2 Puff(s) Inhalation every 6 hours PRN Bronchospasm  aluminum hydroxide/magnesium hydroxide/simethicone Suspension 30 milliLiter(s) Oral every 4 hours PRN Dyspepsia  melatonin 3 milliGRAM(s) Oral at bedtime PRN Insomnia  ondansetron Injectable 4 milliGRAM(s) IV Push every 8 hours PRN Nausea and/or Vomiting           Patient is an 83F, from home,  PMHx AFIB on eliquis, HTN, HLD, chronic right plantar foot ulcer/OM admitted for IV abx for worsening acute on chronic OM of the right foot.     Of note, she was recently admitted from 1/1/-1/26, had 5 days of meropenem, had a MRI on 1/23 showing: "Osteomyelitis of the second metatarsal stump and third metatarsal head and shaft. Early osteomyelitis of the third proximal phalanx. Marked edema and postcontrast enhancement is seen throughout the foot with locules of air extending to the dorsum of the foot. No mature, drainable, or enhancing collection is seen at this time."    Subjective: This is hospital day 4. Patient seen and examined at bedside. Transfer pending to [facility] for amputation.     REVIEW OF SYSTEMS:  CONSTITUTIONAL: No fever, weight loss, or fatigue  EYES: No eye pain, visual disturbances, or discharge  ENMT:  No difficulty hearing, tinnitus, vertigo; No sinus or throat pain  RESPIRATORY: No cough, wheezing, chills or hemoptysis; No shortness of breath  CARDIOVASCULAR: No chest pain, palpitations, dizziness, or leg swelling  GASTROINTESTINAL: No abdominal or epigastric pain. No nausea, vomiting, or hematemesis; No diarrhea or constipation. No melena or hematochezia.  GENITOURINARY: No dysuria, frequency, hematuria, or incontinence  NEUROLOGICAL: No headaches, memory loss, loss of strength, numbness, or tremors  SKIN: No itching, burning, rashes, or lesions   MUSCULOSKELETAL: right leg swelling with ulceration of right plantar surface, wrapped     Vital Signs Last 24 Hrs  T(C): 36.2 (09 Feb 2024 05:38), Max: 37.1 (08 Feb 2024 13:32)  T(F): 97.1 (09 Feb 2024 05:38), Max: 98.8 (08 Feb 2024 13:32)  HR: 61 (09 Feb 2024 05:38) (61 - 70)  BP: 169/72 (09 Feb 2024 05:38) (124/75 - 169/72)  BP(mean): --  RR: 17 (09 Feb 2024 05:38) (16 - 17)  SpO2: 99% (09 Feb 2024 05:38) (97% - 99%)    Parameters below as of 09 Feb 2024 05:38  Patient On (Oxygen Delivery Method): room air        PHYSICAL EXAM:  GENERAL: NAD, well-groomed, well-developed  HEAD:  Atraumatic, Normocephalic  EYES: EOMI, conjunctiva and sclera clear  ENMT: Moist mucous membranes, Good dentition, no thrush  NECK: Supple  CHEST/LUNG: Clear to auscultation bilaterally, good air entry, non-labored breathing  HEART: RRR; S1/S2, No murmur  ABDOMEN: Soft, Nontender, Nondistended; Bowel sounds present  VASCULAR: Normal pulses, Normal capillary refill  EXTREMITIES: Right foot currently wrapped   LYMPH: Normal; No lymphadenopathy noted  SKIN: Warm, Intact  PSYCH: Normal mood, Normal affect  NERVOUS SYSTEM:  A/O x3, No focal deficits        MEDS:  MEDICATIONS  (STANDING):  apixaban 5 milliGRAM(s) Oral two times a day  lactobacillus acidophilus 1 Tablet(s) Oral daily  levothyroxine 88 MICROGram(s) Oral daily  meropenem  IVPB 1000 milliGRAM(s) IV Intermittent every 12 hours  metoprolol tartrate 25 milliGRAM(s) Oral every 12 hours  simvastatin 40 milliGRAM(s) Oral at bedtime    MEDICATIONS  (PRN):  acetaminophen     Tablet .. 650 milliGRAM(s) Oral every 6 hours PRN Temp greater or equal to 38C (100.4F), Mild Pain (1 - 3)  albuterol    90 MICROgram(s) HFA Inhaler 2 Puff(s) Inhalation every 6 hours PRN Bronchospasm  aluminum hydroxide/magnesium hydroxide/simethicone Suspension 30 milliLiter(s) Oral every 4 hours PRN Dyspepsia  melatonin 3 milliGRAM(s) Oral at bedtime PRN Insomnia  ondansetron Injectable 4 milliGRAM(s) IV Push every 8 hours PRN Nausea and/or Vomiting

## 2024-02-09 NOTE — PROGRESS NOTE ADULT - ASSESSMENT
82 yo female with hx of HTN, HLD, chronic right plantar foot ulcers/OM, sent from podiatry office for IV abx for acute on chronic OM of right foot, pending transfer to [facility] for amputation.    #Acute on chronic right plantar foot OM  - WBC WNL, elevated ESR/CRP  - Ulcer culture w/ ESBL sensitive to gladys, blood cultures negative   - C/w ertapenem 1gm IVPB Q24h, probiotic  - Fall precautions  - PT eval - declined, ambulates with cane, activity as tolerated  - Appreciate ID recs - if no amputation wanted, can go home for 7d course of peripheral abx   - Podiatry consult appreciated - now agreeable to forefoot amputation, transfer for procedure    #Paroxysmal A Fib, rate controlled, on long term anticoagulation  #HTN, controlled  - C/w metoprolol tartrate 25mg q 12hrs, eliquis 5mg BID    #Normocytic anemia  - Continue to monitor Hg/Hct, transfuse if Hg <7    #Hypothyroidism  - s/p thyroidectomy  - Continue Synthroid 88mcg daily    #HLD  - Continue zocor 40mg qhs    #Left upper lobe lung mass, 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  - Upon review of the chart, CT chest report from 12/21/23  - Approximately 4.6 cm sized left upper lobe mass concerning for a primary pulmonary malignancy.  - Pt aware, doesn't want intervention during this admission  - Recommendation for outpatient oncology/pulm F/U for dx/management    VTE ppx: Eliquis  DISP: Pending course    Patient states that she will update her family; has a sister here that is in a nursing home; no other immediate family    Discussed with attending, Dr. Pacheco. 82 yo female with hx of HTN, HLD, chronic right plantar foot ulcers/OM, sent from podiatry office for IV abx for acute on chronic OM of right foot, pending transfer to [facility] for amputation.    #Acute on chronic right plantar foot OM  - WBC WNL, elevated ESR/CRP  - Ulcer culture w/ ESBL sensitive to gladys, blood cultures negative   - C/w ertapenem 1gm IVPB Q24h, probiotic  - Fall precautions  - PT eval - declined, ambulates with cane, activity as tolerated  - Appreciate ID recs - if no amputation wanted, can go home for 7d course of peripheral abx   - Podiatry consult appreciated - now agreeable to forefoot amputation, transfer for procedure    #Paroxysmal A Fib, rate controlled, on long term anticoagulation  #HTN, controlled  - C/w metoprolol tartrate 25mg q 12hrs, eliquis 5mg BID    #Normocytic anemia  - Continue to monitor Hg/Hct, transfuse if Hg <7    #Hypothyroidism  - s/p thyroidectomy  - Continue Synthroid 88mcg daily    #HLD  - Continue zocor 40mg qhs    #Left upper lobe lung mass, 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  - Upon review of the chart, CT chest report from 12/21/23  - Approximately 4.6 cm sized left upper lobe mass concerning for a primary pulmonary malignancy.  - Pt aware, doesn't want intervention during this admission  - Recommendation for outpatient oncology/pulm F/U for dx/management    VTE ppx: Eliquis  DISP: Pending transfer     Patient states that she will update her family; has a sister here that is in a nursing home; no other immediate family    Discussed with attending, Dr. Pacheco.

## 2024-02-10 LAB
CULTURE RESULTS: SIGNIFICANT CHANGE UP
CULTURE RESULTS: SIGNIFICANT CHANGE UP
SPECIMEN SOURCE: SIGNIFICANT CHANGE UP
SPECIMEN SOURCE: SIGNIFICANT CHANGE UP

## 2024-02-10 PROCEDURE — 99232 SBSQ HOSP IP/OBS MODERATE 35: CPT | Mod: GC

## 2024-02-10 RX ADMIN — SIMVASTATIN 40 MILLIGRAM(S): 20 TABLET, FILM COATED ORAL at 21:33

## 2024-02-10 RX ADMIN — APIXABAN 5 MILLIGRAM(S): 2.5 TABLET, FILM COATED ORAL at 18:08

## 2024-02-10 RX ADMIN — APIXABAN 5 MILLIGRAM(S): 2.5 TABLET, FILM COATED ORAL at 05:27

## 2024-02-10 RX ADMIN — Medication 25 MILLIGRAM(S): at 05:26

## 2024-02-10 RX ADMIN — ERTAPENEM SODIUM 120 MILLIGRAM(S): 1 INJECTION, POWDER, LYOPHILIZED, FOR SOLUTION INTRAMUSCULAR; INTRAVENOUS at 20:46

## 2024-02-10 RX ADMIN — Medication 1 TABLET(S): at 12:57

## 2024-02-10 RX ADMIN — Medication 88 MICROGRAM(S): at 05:27

## 2024-02-10 NOTE — PROGRESS NOTE ADULT - SUBJECTIVE AND OBJECTIVE BOX
Patient is an 83F, from home,  PMHx AFIB on eliquis, HTN, HLD, chronic right plantar foot ulcer/OM admitted for IV abx for worsening acute on chronic OM of the right foot.     Of note, she was recently admitted from 1/1/-1/26, had 5 days of meropenem, had a MRI on 1/23 showing: "Osteomyelitis of the second metatarsal stump and third metatarsal head and shaft. Early osteomyelitis of the third proximal phalanx. Marked edema and postcontrast enhancement is seen throughout the foot with locules of air extending to the dorsum of the foot. No mature, drainable, or enhancing collection is seen at this time."    Subjective: This is hospital day 5. Patient seen and examined at bedside. Transfer pending to [facility] for amputation.    REVIEW OF SYSTEMS:  CONSTITUTIONAL: No fever, weight loss, or fatigue  EYES: No eye pain, visual disturbances, or discharge  ENMT:  No difficulty hearing, tinnitus, vertigo; No sinus or throat pain  RESPIRATORY: No cough, wheezing, chills or hemoptysis; No shortness of breath  CARDIOVASCULAR: No chest pain, palpitations, dizziness, or leg swelling  GASTROINTESTINAL: No abdominal or epigastric pain. No nausea, vomiting, or hematemesis; No diarrhea or constipation. No melena or hematochezia.  GENITOURINARY: No dysuria, frequency, hematuria, or incontinence  NEUROLOGICAL: No headaches, memory loss, loss of strength, numbness, or tremors  SKIN: No itching, burning, rashes, or lesions   MUSCULOSKELETAL: right leg swelling with ulceration of right plantar surface, wrapped     Vital Signs Last 24 Hrs  T(C): 36.4 (10 Feb 2024 05:20), Max: 36.4 (10 Feb 2024 05:20)  T(F): 97.6 (10 Feb 2024 05:20), Max: 97.6 (10 Feb 2024 05:20)  HR: 73 (10 Feb 2024 05:20) (66 - 73)  BP: 142/75 (10 Feb 2024 05:20) (133/75 - 142/75)  BP(mean): --  RR: 16 (10 Feb 2024 05:20) (16 - 17)  SpO2: 97% (10 Feb 2024 05:20) (97% - 97%)    Parameters below as of 10 Feb 2024 05:20  Patient On (Oxygen Delivery Method): room air        PHYSICAL EXAM:  GENERAL: NAD, well-groomed, well-developed  HEAD:  Atraumatic, Normocephalic  EYES: EOMI, conjunctiva and sclera clear  ENMT: Moist mucous membranes, Good dentition, no thrush  NECK: Supple  CHEST/LUNG: Clear to auscultation bilaterally, good air entry, non-labored breathing  HEART: RRR; S1/S2, No murmur  ABDOMEN: Soft, Nontender, Nondistended; Bowel sounds present  VASCULAR: Normal pulses, Normal capillary refill  EXTREMITIES: Right foot currently wrapped   LYMPH: Normal; No lymphadenopathy noted  SKIN: Warm, Intact  PSYCH: Normal mood, Normal affect  NERVOUS SYSTEM:  A/O x3, No focal deficits        MEDS:  MEDICATIONS  (STANDING):  apixaban 5 milliGRAM(s) Oral two times a day  lactobacillus acidophilus 1 Tablet(s) Oral daily  levothyroxine 88 MICROGram(s) Oral daily  meropenem  IVPB 1000 milliGRAM(s) IV Intermittent every 12 hours  metoprolol tartrate 25 milliGRAM(s) Oral every 12 hours  simvastatin 40 milliGRAM(s) Oral at bedtime    MEDICATIONS  (PRN):  acetaminophen     Tablet .. 650 milliGRAM(s) Oral every 6 hours PRN Temp greater or equal to 38C (100.4F), Mild Pain (1 - 3)  albuterol    90 MICROgram(s) HFA Inhaler 2 Puff(s) Inhalation every 6 hours PRN Bronchospasm  aluminum hydroxide/magnesium hydroxide/simethicone Suspension 30 milliLiter(s) Oral every 4 hours PRN Dyspepsia  melatonin 3 milliGRAM(s) Oral at bedtime PRN Insomnia  ondansetron Injectable 4 milliGRAM(s) IV Push every 8 hours PRN Nausea and/or Vomiting

## 2024-02-10 NOTE — PROGRESS NOTE ADULT - ASSESSMENT
84 yo female with hx of HTN, HLD, chronic right plantar foot ulcers/OM, sent from podiatry office for IV abx for acute on chronic OM of right foot, pending transfer to [facility] for amputation.    #Acute on chronic right plantar foot OM  - WBC WNL, elevated ESR/CRP  - Ulcer culture w/ ESBL sensitive to gladys, blood cultures negative   - C/w ertapenem 1gm IVPB Q24h, probiotic  - Fall precautions  - PT eval - declined, ambulates with cane, activity as tolerated  - Appreciate ID recs - if no amputation wanted, can go home for 7d course of peripheral abx   - Podiatry consult appreciated - now agreeable to forefoot amputation, transfer for procedure    #Paroxysmal A Fib, rate controlled, on long term anticoagulation  #HTN, controlled  - C/w metoprolol tartrate 25mg q 12hrs, eliquis 5mg BID    #Normocytic anemia  - Continue to monitor Hg/Hct, transfuse if Hg <7    #Hypothyroidism  - s/p thyroidectomy  - Continue Synthroid 88mcg daily    #HLD  - Continue zocor 40mg qhs    #Left upper lobe lung mass, 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  - Upon review of the chart, CT chest report from 12/21/23  - Approximately 4.6 cm sized left upper lobe mass concerning for a primary pulmonary malignancy.  - Pt aware, doesn't want intervention during this admission  - Recommendation for outpatient oncology/pulm F/U for dx/management    VTE ppx: Eliquis  DISP: Pending transfer     Patient states that she will update her family; has a sister here that is in a nursing home; no other immediate family    Discussed with attending, Dr. Pacheco.

## 2024-02-11 PROCEDURE — 99232 SBSQ HOSP IP/OBS MODERATE 35: CPT | Mod: GC

## 2024-02-11 RX ADMIN — Medication 88 MICROGRAM(S): at 05:53

## 2024-02-11 RX ADMIN — SIMVASTATIN 40 MILLIGRAM(S): 20 TABLET, FILM COATED ORAL at 21:02

## 2024-02-11 RX ADMIN — Medication 25 MILLIGRAM(S): at 17:22

## 2024-02-11 RX ADMIN — APIXABAN 5 MILLIGRAM(S): 2.5 TABLET, FILM COATED ORAL at 17:21

## 2024-02-11 RX ADMIN — Medication 1 TABLET(S): at 12:19

## 2024-02-11 RX ADMIN — APIXABAN 5 MILLIGRAM(S): 2.5 TABLET, FILM COATED ORAL at 05:53

## 2024-02-11 RX ADMIN — ERTAPENEM SODIUM 120 MILLIGRAM(S): 1 INJECTION, POWDER, LYOPHILIZED, FOR SOLUTION INTRAMUSCULAR; INTRAVENOUS at 21:02

## 2024-02-11 RX ADMIN — Medication 25 MILLIGRAM(S): at 05:54

## 2024-02-11 NOTE — PROGRESS NOTE ADULT - ASSESSMENT
82 yo female with hx of HTN, HLD, chronic right plantar foot ulcers/OM, sent from podiatry office for IV abx for acute on chronic OM of right foot, pending transfer to [facility] for amputation.    #Acute on chronic right plantar foot OM  - WBC WNL, elevated ESR/CRP  - Ulcer culture w/ ESBL sensitive to gladys, blood cultures negative   - C/w ertapenem 1gm IVPB Q24h, probiotic  - Fall precautions  - PT eval - declined, ambulates with cane, activity as tolerated  - Appreciate ID recs - if no amputation wanted, can go home for 7d course of peripheral abx   - Podiatry consult appreciated - now agreeable to forefoot amputation, transfer for procedure    #Paroxysmal A Fib, rate controlled, on long term anticoagulation  #HTN, controlled  - C/w metoprolol tartrate 25mg q 12hrs, eliquis 5mg BID    #Normocytic anemia  - Continue to monitor Hg/Hct, transfuse if Hg <7    #Hypothyroidism  - s/p thyroidectomy  - Continue Synthroid 88mcg daily    #HLD  - Continue zocor 40mg qhs    #Left upper lobe lung mass, 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  - Upon review of the chart, CT chest report from 12/21/23  - Approximately 4.6 cm sized left upper lobe mass concerning for a primary pulmonary malignancy.  - Pt aware, doesn't want intervention during this admission  - Recommendation for outpatient oncology/pulm F/U for dx/management    VTE ppx: Eliquis  DISP: Pending transfer     Patient states that she will update her family; has a sister here that is in a nursing home; no other immediate family    Discussed with attending, Dr. Pacheco.

## 2024-02-11 NOTE — CHART NOTE - NSCHARTNOTEFT_GEN_A_CORE
NUTRITION FOLLOW UP    SOURCE: Patient [X)   Medical Record (X)  Patient reports good appetite, observed po intake 75% breakfast. Patient reports adherence to Ensure Plus HP BID. Pt denies GI disturbances at this time.     DIET: Regular; Ensure Plus HP BID     PATIENT REPORT  [x] other: no GI distress    PO INTAKE:  [x]   50-75%            CURRENT WEIGHT: 61.2 kg (02-05)   61.5 kg (02-06)  61.3 kg (02-07)  61.3 kg (02-08)  61 kg (02-09)  61.2 kg (02-11)    PERTINENT MEDS:   Pertinent Medications: MEDICATIONS  (STANDING):  apixaban 5 milliGRAM(s) Oral two times a day  ertapenem  IVPB 1000 milliGRAM(s) IV Intermittent every 24 hours  lactobacillus acidophilus 1 Tablet(s) Oral daily  levothyroxine 88 MICROGram(s) Oral daily  metoprolol tartrate 25 milliGRAM(s) Oral every 12 hours  simvastatin 40 milliGRAM(s) Oral at bedtime    MEDICATIONS  (PRN):  acetaminophen     Tablet .. 650 milliGRAM(s) Oral every 6 hours PRN Temp greater or equal to 38C (100.4F), Mild Pain (1 - 3)  albuterol    90 MICROgram(s) HFA Inhaler 2 Puff(s) Inhalation every 6 hours PRN Bronchospasm  aluminum hydroxide/magnesium hydroxide/simethicone Suspension 30 milliLiter(s) Oral every 4 hours PRN Dyspepsia  melatonin 3 milliGRAM(s) Oral at bedtime PRN Insomnia  ondansetron Injectable 4 milliGRAM(s) IV Push every 8 hours PRN Nausea and/or Vomiting    PERTINENT LABS:   02-05 Alb 2.8 g/dL<L>    SKIN: WDL; Surgical Incision   EDEMA: 1+ left + right foot   LAST BM: 2/10 per flowsheet     ESTIMATED NEEDS:   [X] no change since previous assessment    PREVIOUS NUTRITION DIAGNOSIS: Malnutrition      NUTRITION DIAGNOSIS is :  (x)  Ongoing - addressed with Ensure Plus HP BID     NEW NUTRITION DIAGNOSIS: No new nutrition diagnosis at this time     NUTRITION RECOMMENDATIONS:   1. Regular Diet  2. Ensure Plus HP BID (provides 350 kcal, 20 g protein per serving)     MONITORING AND EVALUATION:   1. Tolerance to diet prescription   2. PO intake  3. Weights  4. Labs  5. Follow Up per protocol     RD to remain available  Nafisa Matta MS,RD,CDN

## 2024-02-11 NOTE — PROGRESS NOTE ADULT - SUBJECTIVE AND OBJECTIVE BOX
Patient is an 83F, from home,  PMHx AFIB on eliquis, HTN, HLD, chronic right plantar foot ulcer/OM admitted for IV abx for worsening acute on chronic OM of the right foot.     Of note, she was recently admitted from 1/1/-1/26, had 5 days of meropenem, had a MRI on 1/23 showing: "Osteomyelitis of the second metatarsal stump and third metatarsal head and shaft. Early osteomyelitis of the third proximal phalanx. Marked edema and postcontrast enhancement is seen throughout the foot with locules of air extending to the dorsum of the foot. No mature, drainable, or enhancing collection is seen at this time."    Subjective: This is hospital day 6. Patient seen and examined at bedside. Transfer pending to [facility] for amputation.    REVIEW OF SYSTEMS:  CONSTITUTIONAL: No fever, weight loss, or fatigue  EYES: No eye pain, visual disturbances, or discharge  ENMT:  No difficulty hearing, tinnitus, vertigo; No sinus or throat pain  RESPIRATORY: No cough, wheezing, chills or hemoptysis; No shortness of breath  CARDIOVASCULAR: No chest pain, palpitations, dizziness, or leg swelling  GASTROINTESTINAL: No abdominal or epigastric pain. No nausea, vomiting, or hematemesis; No diarrhea or constipation. No melena or hematochezia.  GENITOURINARY: No dysuria, frequency, hematuria, or incontinence  NEUROLOGICAL: No headaches, memory loss, loss of strength, numbness, or tremors  SKIN: No itching, burning, rashes, or lesions   MUSCULOSKELETAL: right leg swelling with ulceration of right plantar surface, wrapped     Vital Signs Last 24 Hrs  T(C): 36.6 (11 Feb 2024 05:00), Max: 36.7 (10 Feb 2024 20:34)  T(F): 97.8 (11 Feb 2024 05:00), Max: 98.1 (10 Feb 2024 20:34)  HR: 90 (11 Feb 2024 05:00) (56 - 90)  BP: 156/74 (11 Feb 2024 05:00) (104/66 - 156/74)  BP(mean): 102 (11 Feb 2024 05:00) (102 - 102)  RR: 17 (11 Feb 2024 05:00) (16 - 17)  SpO2: 96% (11 Feb 2024 05:00) (96% - 99%)    Parameters below as of 11 Feb 2024 05:00  Patient On (Oxygen Delivery Method): room air      PHYSICAL EXAM:  GENERAL: NAD, well-groomed, well-developed  HEAD:  Atraumatic, Normocephalic  EYES: EOMI, conjunctiva and sclera clear  ENMT: Moist mucous membranes, Good dentition, no thrush  NECK: Supple  CHEST/LUNG: Clear to auscultation bilaterally, good air entry, non-labored breathing  HEART: RRR; S1/S2, No murmur  ABDOMEN: Soft, Nontender, Nondistended; Bowel sounds present  VASCULAR: Normal pulses, Normal capillary refill  EXTREMITIES: Right foot currently wrapped   LYMPH: Normal; No lymphadenopathy noted  SKIN: Warm, Intact  PSYCH: Normal mood, Normal affect  NERVOUS SYSTEM:  A/O x3, No focal deficits        MEDS:  MEDICATIONS  (STANDING):  apixaban 5 milliGRAM(s) Oral two times a day  lactobacillus acidophilus 1 Tablet(s) Oral daily  levothyroxine 88 MICROGram(s) Oral daily  meropenem  IVPB 1000 milliGRAM(s) IV Intermittent every 12 hours  metoprolol tartrate 25 milliGRAM(s) Oral every 12 hours  simvastatin 40 milliGRAM(s) Oral at bedtime    MEDICATIONS  (PRN):  acetaminophen     Tablet .. 650 milliGRAM(s) Oral every 6 hours PRN Temp greater or equal to 38C (100.4F), Mild Pain (1 - 3)  albuterol    90 MICROgram(s) HFA Inhaler 2 Puff(s) Inhalation every 6 hours PRN Bronchospasm  aluminum hydroxide/magnesium hydroxide/simethicone Suspension 30 milliLiter(s) Oral every 4 hours PRN Dyspepsia  melatonin 3 milliGRAM(s) Oral at bedtime PRN Insomnia  ondansetron Injectable 4 milliGRAM(s) IV Push every 8 hours PRN Nausea and/or Vomiting

## 2024-02-12 LAB
ANION GAP SERPL CALC-SCNC: 8 MMOL/L — SIGNIFICANT CHANGE UP (ref 5–17)
BASOPHILS # BLD AUTO: 0.04 K/UL — SIGNIFICANT CHANGE UP (ref 0–0.2)
BASOPHILS NFR BLD AUTO: 0.8 % — SIGNIFICANT CHANGE UP (ref 0–2)
BUN SERPL-MCNC: 23 MG/DL — SIGNIFICANT CHANGE UP (ref 7–23)
CALCIUM SERPL-MCNC: 9.5 MG/DL — SIGNIFICANT CHANGE UP (ref 8.4–10.5)
CHLORIDE SERPL-SCNC: 103 MMOL/L — SIGNIFICANT CHANGE UP (ref 96–108)
CO2 SERPL-SCNC: 27 MMOL/L — SIGNIFICANT CHANGE UP (ref 22–31)
CREAT SERPL-MCNC: 1 MG/DL — SIGNIFICANT CHANGE UP (ref 0.5–1.3)
EGFR: 55 ML/MIN/1.73M2 — LOW
EOSINOPHIL # BLD AUTO: 0.41 K/UL — SIGNIFICANT CHANGE UP (ref 0–0.5)
EOSINOPHIL NFR BLD AUTO: 8.5 % — HIGH (ref 0–6)
GLUCOSE SERPL-MCNC: 76 MG/DL — SIGNIFICANT CHANGE UP (ref 70–99)
HCT VFR BLD CALC: 33 % — LOW (ref 34.5–45)
HGB BLD-MCNC: 10.8 G/DL — LOW (ref 11.5–15.5)
IMM GRANULOCYTES NFR BLD AUTO: 0.4 % — SIGNIFICANT CHANGE UP (ref 0–0.9)
LYMPHOCYTES # BLD AUTO: 1.32 K/UL — SIGNIFICANT CHANGE UP (ref 1–3.3)
LYMPHOCYTES # BLD AUTO: 27.2 % — SIGNIFICANT CHANGE UP (ref 13–44)
MCHC RBC-ENTMCNC: 31.8 PG — SIGNIFICANT CHANGE UP (ref 27–34)
MCHC RBC-ENTMCNC: 32.7 GM/DL — SIGNIFICANT CHANGE UP (ref 32–36)
MCV RBC AUTO: 97.1 FL — SIGNIFICANT CHANGE UP (ref 80–100)
MONOCYTES # BLD AUTO: 0.61 K/UL — SIGNIFICANT CHANGE UP (ref 0–0.9)
MONOCYTES NFR BLD AUTO: 12.6 % — SIGNIFICANT CHANGE UP (ref 2–14)
NEUTROPHILS # BLD AUTO: 2.45 K/UL — SIGNIFICANT CHANGE UP (ref 1.8–7.4)
NEUTROPHILS NFR BLD AUTO: 50.5 % — SIGNIFICANT CHANGE UP (ref 43–77)
NRBC # BLD: 0 /100 WBCS — SIGNIFICANT CHANGE UP (ref 0–0)
PLATELET # BLD AUTO: 280 K/UL — SIGNIFICANT CHANGE UP (ref 150–400)
POTASSIUM SERPL-MCNC: 4.1 MMOL/L — SIGNIFICANT CHANGE UP (ref 3.5–5.3)
POTASSIUM SERPL-SCNC: 4.1 MMOL/L — SIGNIFICANT CHANGE UP (ref 3.5–5.3)
RBC # BLD: 3.4 M/UL — LOW (ref 3.8–5.2)
RBC # FLD: 13.4 % — SIGNIFICANT CHANGE UP (ref 10.3–14.5)
SODIUM SERPL-SCNC: 138 MMOL/L — SIGNIFICANT CHANGE UP (ref 135–145)
WBC # BLD: 4.85 K/UL — SIGNIFICANT CHANGE UP (ref 3.8–10.5)
WBC # FLD AUTO: 4.85 K/UL — SIGNIFICANT CHANGE UP (ref 3.8–10.5)

## 2024-02-12 PROCEDURE — 99232 SBSQ HOSP IP/OBS MODERATE 35: CPT | Mod: GC

## 2024-02-12 RX ADMIN — Medication 25 MILLIGRAM(S): at 17:36

## 2024-02-12 RX ADMIN — SIMVASTATIN 40 MILLIGRAM(S): 20 TABLET, FILM COATED ORAL at 21:04

## 2024-02-12 RX ADMIN — Medication 25 MILLIGRAM(S): at 05:28

## 2024-02-12 RX ADMIN — APIXABAN 5 MILLIGRAM(S): 2.5 TABLET, FILM COATED ORAL at 17:36

## 2024-02-12 RX ADMIN — Medication 1 TABLET(S): at 11:48

## 2024-02-12 RX ADMIN — APIXABAN 5 MILLIGRAM(S): 2.5 TABLET, FILM COATED ORAL at 05:28

## 2024-02-12 RX ADMIN — Medication 88 MICROGRAM(S): at 05:28

## 2024-02-12 RX ADMIN — ERTAPENEM SODIUM 120 MILLIGRAM(S): 1 INJECTION, POWDER, LYOPHILIZED, FOR SOLUTION INTRAMUSCULAR; INTRAVENOUS at 21:04

## 2024-02-12 NOTE — PROGRESS NOTE ADULT - SUBJECTIVE AND OBJECTIVE BOX
CC: f/u for right foot om    Patient reports    REVIEW OF SYSTEMS:  All other review of systems negative (Comprehensive ROS)    Antimicrobials Day #  :8  ertapenem  IVPB 1000 milliGRAM(s) IV Intermittent every 24 hours    Other Medications Reviewed    T(F): 97.7 (02-12-24 @ 12:09), Max: 97.7 (02-12-24 @ 12:09)  HR: 67 (02-12-24 @ 17:34)  BP: 135/70 (02-12-24 @ 17:34)  RR: 16 (02-12-24 @ 12:09)  SpO2: 98% (02-12-24 @ 12:09)  Wt(kg): --    PHYSICAL EXAM:  General: alert, no acute distress  Eyes:  anicteric, no conjunctival injection, no discharge  Oropharynx: no lesions or injection 	  Neck: supple, without adenopathy  Lungs: clear to auscultation  Heart: regular rate and rhythm; no murmur, rubs or gallops  Abdomen: soft, nondistended, nontender, without mass or organomegaly  Skin: no lesions  Extremities: no clubbing, cyanosis, or edema. right foot ulcer without active drainage  Neurologic: alert, oriented, moves all extremities    LAB RESULTS:                        10.8   4.85  )-----------( 280      ( 12 Feb 2024 06:11 )             33.0     02-12    138  |  103  |  23  ----------------------------<  76  4.1   |  27  |  1.00    Ca    9.5      12 Feb 2024 06:11        Urinalysis Basic - ( 12 Feb 2024 06:11 )    Color: x / Appearance: x / SG: x / pH: x  Gluc: 76 mg/dL / Ketone: x  / Bili: x / Urobili: x   Blood: x / Protein: x / Nitrite: x   Leuk Esterase: x / RBC: x / WBC x   Sq Epi: x / Non Sq Epi: x / Bacteria: x      MICROBIOLOGY:  RECENT CULTURES:      RADIOLOGY REVIEWED:    < from: MR Foot w/ IV Cont, Right (01.23.24 @ 11:57) >  ACC: [id number] EXAM:  MR FOOT IC RT   ORDERED BY:  MISHA HOGUE     PROCEDURE DATE:  01/23/2024          INTERPRETATION:  Exam Type: MR FOOT WITH IV CONTRAST RIGHT  Exam Date and Time: 1/23/2024 11:57 AM  Indication: Concern for osteomyelitis between the second and third digits  Comparison: Right foot MRI on 12/6/2023 and right foot radiograph on   1/18/2024    TECHNIQUE:  Multiplanar multisequence MRI of the right foot was performed with   contrast.    Contrast: 6 mL Gadavist IV    FINDINGS:    Large soft tissue ulcer at the plantar aspect of the forefoot centered   around the second and third metatarsal heads. There is marked osseous   edema, loss of normal T1 fatty marrow, postcontrast enhancement within   the third metatarsal head to shaft representing osteomyelitis. Mild   osseous edema with mild postcontrast enhancement within the base of the   third proximal phalanx representing early osteomyelitis. Status post   amputation of the second digit distal to the mid shaft of the second   metatarsal. There is edema, loss of normal fatty marrow, and postcontrast   enhancement within the metatarsal stump representing osteomyelitis.   Moderate osteoarthritis of first metatarsophalangeal joint. Marked edema   and postcontrast enhancement is seen throughout the foot with locules of   air extending to the dorsum of the foot. Fatty atrophy of the intrinsic   muscles. No mature, drainable, or enhancing collection is seen at this   time.    IMPRESSION:  1.  Osteomyelitis of the second metatarsal stump and third metatarsal   head and shaft.  2.  Early osteomyelitis of the third proximal phalanx.  3.  Marked edema and postcontrast enhancement is seen throughout the foot   with locules of air extending to the dorsum of the foot. No mature,   drainable, or enhancing collection is seen at this time.    --- End of Report ---            < end of copied text >            Assessment:  Elderly woman, chronic right foot plantar ulcer, recent tma 2nd met for om, now with 3rd met om, persistent ulcer, growing esbl ecoli. Planned for tx to [facility] for further surgical management. soft tissue component of process is improved with edema control and antibiotics  Plan:  continue ertapenem for now  await tx to [facility] for surgery to the foot  monitor eosinophil count and cr    Plan:

## 2024-02-12 NOTE — PROGRESS NOTE ADULT - SUBJECTIVE AND OBJECTIVE BOX
Patient is an 83F, from home,  PMHx AFIB on eliquis, HTN, HLD, chronic right plantar foot ulcer/OM admitted for IV abx for worsening acute on chronic OM of the right foot.     Of note, she was recently admitted from 1/1/-1/26, had 5 days of meropenem, had a MRI on 1/23 showing: "Osteomyelitis of the second metatarsal stump and third metatarsal head and shaft. Early osteomyelitis of the third proximal phalanx. Marked edema and postcontrast enhancement is seen throughout the foot with locules of air extending to the dorsum of the foot. No mature, drainable, or enhancing collection is seen at this time."    Subjective: This is hospital day 7. Patient seen and examined at bedside. Transfer pending to [facility] for amputation.    REVIEW OF SYSTEMS:  CONSTITUTIONAL: No fever, weight loss, or fatigue  EYES: No eye pain, visual disturbances, or discharge  ENMT:  No difficulty hearing, tinnitus, vertigo; No sinus or throat pain  RESPIRATORY: No cough, wheezing, chills or hemoptysis; No shortness of breath  CARDIOVASCULAR: No chest pain, palpitations, dizziness, or leg swelling  GASTROINTESTINAL: No abdominal or epigastric pain. No nausea, vomiting, or hematemesis; No diarrhea or constipation. No melena or hematochezia.  GENITOURINARY: No dysuria, frequency, hematuria, or incontinence  NEUROLOGICAL: No headaches, memory loss, loss of strength, numbness, or tremors  SKIN: No itching, burning, rashes, or lesions   MUSCULOSKELETAL: right leg swelling with ulceration of right plantar surface, wrapped     Vital Signs Last 24 Hrs  T(C): 36.3 (12 Feb 2024 05:20), Max: 36.6 (11 Feb 2024 20:06)  T(F): 97.4 (12 Feb 2024 05:20), Max: 97.9 (11 Feb 2024 20:06)  HR: 68 (12 Feb 2024 05:20) (67 - 81)  BP: 149/65 (12 Feb 2024 05:20) (126/64 - 158/75)  BP(mean): --  RR: 16 (12 Feb 2024 05:20) (16 - 17)  SpO2: 97% (12 Feb 2024 05:20) (95% - 99%)    Parameters below as of 12 Feb 2024 05:20  Patient On (Oxygen Delivery Method): room air    PHYSICAL EXAM:  GENERAL: NAD, well-groomed, well-developed  HEAD:  Atraumatic, Normocephalic  EYES: EOMI, conjunctiva and sclera clear  ENMT: Moist mucous membranes, Good dentition, no thrush  NECK: Supple  CHEST/LUNG: Clear to auscultation bilaterally, good air entry, non-labored breathing  HEART: RRR; S1/S2, No murmur  ABDOMEN: Soft, Nontender, Nondistended; Bowel sounds present  VASCULAR: Normal pulses, Normal capillary refill  EXTREMITIES: Right foot currently wrapped   LYMPH: Normal; No lymphadenopathy noted  SKIN: Warm, Intact  PSYCH: Normal mood, Normal affect  NERVOUS SYSTEM:  A/O x3, No focal deficits        MEDS:  MEDICATIONS  (STANDING):  apixaban 5 milliGRAM(s) Oral two times a day  lactobacillus acidophilus 1 Tablet(s) Oral daily  levothyroxine 88 MICROGram(s) Oral daily  meropenem  IVPB 1000 milliGRAM(s) IV Intermittent every 12 hours  metoprolol tartrate 25 milliGRAM(s) Oral every 12 hours  simvastatin 40 milliGRAM(s) Oral at bedtime    MEDICATIONS  (PRN):  acetaminophen     Tablet .. 650 milliGRAM(s) Oral every 6 hours PRN Temp greater or equal to 38C (100.4F), Mild Pain (1 - 3)  albuterol    90 MICROgram(s) HFA Inhaler 2 Puff(s) Inhalation every 6 hours PRN Bronchospasm  aluminum hydroxide/magnesium hydroxide/simethicone Suspension 30 milliLiter(s) Oral every 4 hours PRN Dyspepsia  melatonin 3 milliGRAM(s) Oral at bedtime PRN Insomnia  ondansetron Injectable 4 milliGRAM(s) IV Push every 8 hours PRN Nausea and/or Vomiting           Patient is an 83F, from home,  PMHx AFIB on eliquis, HTN, HLD, chronic right plantar foot ulcer/OM admitted for IV abx for worsening acute on chronic OM of the right foot.     Of note, she was recently admitted from 1/1/-1/26, had 5 days of meropenem, had a MRI on 1/23 showing: "Osteomyelitis of the second metatarsal stump and third metatarsal head and shaft. Early osteomyelitis of the third proximal phalanx. Marked edema and postcontrast enhancement is seen throughout the foot with locules of air extending to the dorsum of the foot. No mature, drainable, or enhancing collection is seen at this time."    Subjective: This is hospital day 7, s/p 2 days of meropenem, 6 days of ertapenem. Patient seen and examined at bedside. Transfer pending to [facility] for amputation.    REVIEW OF SYSTEMS:  CONSTITUTIONAL: No fever, weight loss, or fatigue  EYES: No eye pain, visual disturbances, or discharge  ENMT:  No difficulty hearing, tinnitus, vertigo; No sinus or throat pain  RESPIRATORY: No cough, wheezing, chills or hemoptysis; No shortness of breath  CARDIOVASCULAR: No chest pain, palpitations, dizziness, or leg swelling  GASTROINTESTINAL: No abdominal or epigastric pain. No nausea, vomiting, or hematemesis; No diarrhea or constipation. No melena or hematochezia.  GENITOURINARY: No dysuria, frequency, hematuria, or incontinence  NEUROLOGICAL: No headaches, memory loss, loss of strength, numbness, or tremors  SKIN: No itching, burning, rashes, or lesions   MUSCULOSKELETAL: right leg swelling with ulceration of right plantar surface, wrapped     Vital Signs Last 24 Hrs  T(C): 36.3 (12 Feb 2024 05:20), Max: 36.6 (11 Feb 2024 20:06)  T(F): 97.4 (12 Feb 2024 05:20), Max: 97.9 (11 Feb 2024 20:06)  HR: 68 (12 Feb 2024 05:20) (67 - 81)  BP: 149/65 (12 Feb 2024 05:20) (126/64 - 158/75)  BP(mean): --  RR: 16 (12 Feb 2024 05:20) (16 - 17)  SpO2: 97% (12 Feb 2024 05:20) (95% - 99%)    Parameters below as of 12 Feb 2024 05:20  Patient On (Oxygen Delivery Method): room air    PHYSICAL EXAM:  GENERAL: NAD, well-groomed, well-developed  HEAD:  Atraumatic, Normocephalic  EYES: EOMI, conjunctiva and sclera clear  ENMT: Moist mucous membranes, Good dentition, no thrush  NECK: Supple  CHEST/LUNG: Clear to auscultation bilaterally, good air entry, non-labored breathing  HEART: RRR; S1/S2, No murmur  ABDOMEN: Soft, Nontender, Nondistended; Bowel sounds present  VASCULAR: Normal pulses, Normal capillary refill  EXTREMITIES: Right foot currently wrapped   LYMPH: Normal; No lymphadenopathy noted  SKIN: Warm, Intact  PSYCH: Normal mood, Normal affect  NERVOUS SYSTEM:  A/O x3, No focal deficits        MEDS:  MEDICATIONS  (STANDING):  apixaban 5 milliGRAM(s) Oral two times a day  lactobacillus acidophilus 1 Tablet(s) Oral daily  levothyroxine 88 MICROGram(s) Oral daily  meropenem  IVPB 1000 milliGRAM(s) IV Intermittent every 12 hours  metoprolol tartrate 25 milliGRAM(s) Oral every 12 hours  simvastatin 40 milliGRAM(s) Oral at bedtime    MEDICATIONS  (PRN):  acetaminophen     Tablet .. 650 milliGRAM(s) Oral every 6 hours PRN Temp greater or equal to 38C (100.4F), Mild Pain (1 - 3)  albuterol    90 MICROgram(s) HFA Inhaler 2 Puff(s) Inhalation every 6 hours PRN Bronchospasm  aluminum hydroxide/magnesium hydroxide/simethicone Suspension 30 milliLiter(s) Oral every 4 hours PRN Dyspepsia  melatonin 3 milliGRAM(s) Oral at bedtime PRN Insomnia  ondansetron Injectable 4 milliGRAM(s) IV Push every 8 hours PRN Nausea and/or Vomiting

## 2024-02-12 NOTE — PROGRESS NOTE ADULT - ASSESSMENT
82 yo female with hx of HTN, HLD, chronic right plantar foot ulcers/OM, sent from podiatry office for IV abx for acute on chronic OM of right foot, pending transfer to [facility] for amputation.    #Acute on chronic right plantar foot OM  - WBC WNL, elevated ESR/CRP  - Ulcer culture w/ ESBL sensitive to gladys, blood cultures negative   - C/w ertapenem 1gm IVPB Q24h, probiotic  - Fall precautions  - PT eval - declined, ambulates with cane, activity as tolerated  - Appreciate ID recs - if no amputation wanted, can go home for 7d course of peripheral abx   - Podiatry consult appreciated - now agreeable to forefoot amputation, transfer for procedure    #Paroxysmal A Fib, rate controlled, on long term anticoagulation  #HTN, controlled  - C/w metoprolol tartrate 25mg q 12hrs, eliquis 5mg BID    #Normocytic anemia  - Continue to monitor Hg/Hct, transfuse if Hg <7    #Hypothyroidism  - s/p thyroidectomy  - Continue Synthroid 88mcg daily    #HLD  - Continue zocor 40mg qhs    #Left upper lobe lung mass, 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  - Upon review of the chart, CT chest report from 12/21/23  - Approximately 4.6 cm sized left upper lobe mass concerning for a primary pulmonary malignancy.  - Pt aware, doesn't want intervention during this admission  - Recommendation for outpatient oncology/pulm F/U for dx/management    VTE ppx: Eliquis  DISP: Pending transfer     Patient states that she will update her family; has a sister here that is in a nursing home; no other immediate family    Discussed with attending, Dr. Pacheco. 82 yo female with hx of HTN, HLD, chronic right plantar foot ulcers/OM, sent from podiatry office for IV abx for acute on chronic OM of right foot, pending transfer to [facility] for amputation.    #Acute on chronic right plantar foot OM  - WBC WNL, elevated ESR/CRP, s/p 2 days meropenem  - Ulcer culture w/ ESBL sensitive to gladys, blood cultures negative   - C/w ertapenem day 6, probiotic  - Fall precautions  - PT eval - declined, ambulates with cane, activity as tolerated  - Appreciate ID recs - if no amputation wanted, can go home for 7d course of peripheral abx   - Podiatry consult appreciated - now agreeable to forefoot amputation, transfer for procedure    #Paroxysmal A Fib, rate controlled, on long term anticoagulation  #HTN, controlled  - C/w metoprolol tartrate 25mg q 12hrs, eliquis 5mg BID    #Normocytic anemia  - Continue to monitor Hg/Hct, transfuse if Hg <7    #Hypothyroidism  - s/p thyroidectomy  - Continue Synthroid 88mcg daily    #HLD  - Continue zocor 40mg qhs    #Left upper lobe lung mass, 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  - Upon review of the chart, CT chest report from 12/21/23  - Approximately 4.6 cm sized left upper lobe mass concerning for a primary pulmonary malignancy.  - Pt aware, doesn't want intervention during this admission  - Recommendation for outpatient oncology/pulm F/U for dx/management    VTE ppx: Eliquis  DISP: Pending transfer     Patient states that she will update her family; has a sister here that is in a nursing home; no other immediate family    Discussed with attending, Dr. Pacheco.

## 2024-02-13 ENCOUNTER — APPOINTMENT (OUTPATIENT)
Dept: FAMILY MEDICINE | Facility: CLINIC | Age: 84
End: 2024-02-13

## 2024-02-13 PROCEDURE — 99232 SBSQ HOSP IP/OBS MODERATE 35: CPT | Mod: GC

## 2024-02-13 RX ADMIN — Medication 25 MILLIGRAM(S): at 05:12

## 2024-02-13 RX ADMIN — Medication 88 MICROGRAM(S): at 05:12

## 2024-02-13 RX ADMIN — ERTAPENEM SODIUM 120 MILLIGRAM(S): 1 INJECTION, POWDER, LYOPHILIZED, FOR SOLUTION INTRAMUSCULAR; INTRAVENOUS at 21:21

## 2024-02-13 RX ADMIN — APIXABAN 5 MILLIGRAM(S): 2.5 TABLET, FILM COATED ORAL at 05:12

## 2024-02-13 RX ADMIN — Medication 1 TABLET(S): at 11:48

## 2024-02-13 RX ADMIN — SIMVASTATIN 40 MILLIGRAM(S): 20 TABLET, FILM COATED ORAL at 21:21

## 2024-02-13 RX ADMIN — Medication 25 MILLIGRAM(S): at 17:52

## 2024-02-13 RX ADMIN — APIXABAN 5 MILLIGRAM(S): 2.5 TABLET, FILM COATED ORAL at 17:52

## 2024-02-13 NOTE — PROGRESS NOTE ADULT - SUBJECTIVE AND OBJECTIVE BOX
Patient is an 83F, from home,  PMHx AFIB on eliquis, HTN, HLD, chronic right plantar foot ulcer/OM admitted for IV abx for worsening acute on chronic OM of the right foot.     Of note, she was recently admitted from 1/1/-1/26, had 5 days of meropenem, had a MRI on 1/23 showing: "Osteomyelitis of the second metatarsal stump and third metatarsal head and shaft. Early osteomyelitis of the third proximal phalanx. Marked edema and postcontrast enhancement is seen throughout the foot with locules of air extending to the dorsum of the foot. No mature, drainable, or enhancing collection is seen at this time."    Subjective: This is hospital day 8, s/p 2 days of meropenem, now on ertapenem. Patient seen and examined at bedside. Transfer pending to [facility] for amputation. Patient now interested in pursuing IV abx at home, surgical work-up outpatient.     REVIEW OF SYSTEMS:  CONSTITUTIONAL: No fever, weight loss, or fatigue  EYES: No eye pain, visual disturbances, or discharge  ENMT:  No difficulty hearing, tinnitus, vertigo; No sinus or throat pain  RESPIRATORY: No cough, wheezing, chills or hemoptysis; No shortness of breath  CARDIOVASCULAR: No chest pain, palpitations, dizziness, or leg swelling  GASTROINTESTINAL: No abdominal or epigastric pain. No nausea, vomiting, or hematemesis; No diarrhea or constipation. No melena or hematochezia.  GENITOURINARY: No dysuria, frequency, hematuria, or incontinence  NEUROLOGICAL: No headaches, memory loss, loss of strength, numbness, or tremors  SKIN: No itching, burning, rashes, or lesions   MUSCULOSKELETAL: right leg swelling with ulceration of right plantar surface, wrapped     Vital Signs Last 24 Hrs  T(C): 36.2 (13 Feb 2024 05:03), Max: 36.5 (12 Feb 2024 12:09)  T(F): 97.2 (13 Feb 2024 05:03), Max: 97.7 (12 Feb 2024 12:09)  HR: 60 (13 Feb 2024 05:03) (59 - 68)  BP: 130/71 (13 Feb 2024 05:03) (104/61 - 135/70)  BP(mean): --  RR: 16 (13 Feb 2024 05:03) (16 - 16)  SpO2: 98% (13 Feb 2024 05:03) (98% - 99%)    Parameters below as of 13 Feb 2024 05:03  Patient On (Oxygen Delivery Method): room air      PHYSICAL EXAM:  GENERAL: NAD, well-groomed, well-developed  HEAD:  Atraumatic, Normocephalic  EYES: EOMI, conjunctiva and sclera clear  ENMT: Moist mucous membranes, Good dentition, no thrush  NECK: Supple  CHEST/LUNG: Clear to auscultation bilaterally, good air entry, non-labored breathing  HEART: RRR; S1/S2, No murmur  ABDOMEN: Soft, Nontender, Nondistended; Bowel sounds present  VASCULAR: Normal pulses, Normal capillary refill  EXTREMITIES: Right foot currently wrapped   LYMPH: Normal; No lymphadenopathy noted  SKIN: Warm, Intact  PSYCH: Normal mood, Normal affect  NERVOUS SYSTEM:  A/O x3, No focal deficits        MEDS:  MEDICATIONS  (STANDING):  apixaban 5 milliGRAM(s) Oral two times a day  lactobacillus acidophilus 1 Tablet(s) Oral daily  levothyroxine 88 MICROGram(s) Oral daily  meropenem  IVPB 1000 milliGRAM(s) IV Intermittent every 12 hours  metoprolol tartrate 25 milliGRAM(s) Oral every 12 hours  simvastatin 40 milliGRAM(s) Oral at bedtime    MEDICATIONS  (PRN):  acetaminophen     Tablet .. 650 milliGRAM(s) Oral every 6 hours PRN Temp greater or equal to 38C (100.4F), Mild Pain (1 - 3)  albuterol    90 MICROgram(s) HFA Inhaler 2 Puff(s) Inhalation every 6 hours PRN Bronchospasm  aluminum hydroxide/magnesium hydroxide/simethicone Suspension 30 milliLiter(s) Oral every 4 hours PRN Dyspepsia  melatonin 3 milliGRAM(s) Oral at bedtime PRN Insomnia  ondansetron Injectable 4 milliGRAM(s) IV Push every 8 hours PRN Nausea and/or Vomiting

## 2024-02-13 NOTE — PROGRESS NOTE ADULT - ASSESSMENT
84 yo female with hx of HTN, HLD, chronic right plantar foot ulcers/OM, sent from podiatry office for IV abx for acute on chronic OM of right foot, pending transfer to [facility] for amputation, now wanting to pursue home IV abx infusions, surgical work-up outpt.    #Acute on chronic right plantar foot OM  - WBC WNL, elevated ESR/CRP, s/p 2 days meropenem  - Ulcer culture w/ ESBL sensitive to gladys, blood cultures negative   - C/w ertapenem, probiotic  - Fall precautions  - PT eval - declined, ambulates with cane, activity as tolerated  - Appreciate ID recs - need rec on how long to continue home IV abx   - Podiatry consult appreciated - now agreeable to forefoot amputation, transfer for procedure    #Paroxysmal A Fib, rate controlled, on long term anticoagulation  #HTN, controlled  - C/w metoprolol tartrate 25mg q 12hrs, eliquis 5mg BID    #Normocytic anemia  - Continue to monitor Hg/Hct, transfuse if Hg <7    #Hypothyroidism  - s/p thyroidectomy  - Continue Synthroid 88mcg daily    #HLD  - Continue zocor 40mg qhs    #Left upper lobe lung mass, 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  - Upon review of the chart, CT chest report from 12/21/23  - Approximately 4.6 cm sized left upper lobe mass concerning for a primary pulmonary malignancy.  - Pt aware, doesn't want intervention during this admission  - Recommendation for outpatient oncology/pulm F/U for dx/management    VTE ppx: Eliquis  DISP: Pending transfer     Patient states that she will update her family; has a sister here that is in a nursing home; no other immediate family    Discussed with attending, Dr. Pacheco. 82 yo female with hx of HTN, HLD, chronic right plantar foot ulcers/OM, sent from podiatry office for IV abx for acute on chronic OM of right foot, pending transfer to [facility] for amputation, now wanting to pursue home IV abx infusions, surgical work-up outpt.    #Acute on chronic right plantar foot OM  - WBC WNL, elevated ESR/CRP, s/p 2 days meropenem  - Ulcer culture w/ ESBL sensitive to gladys, blood cultures negative   - C/w ertapenem, probiotic  - Fall precautions  - PT eval - declined, ambulates with cane, activity as tolerated  - Appreciate ID recs - need rec on how long to continue home IV abx   - Podiatry consult appreciated - now agreeable to forefoot amputation, transfer for procedure still pending  - Per ID: c/w 1 more day of IV abx, then d/w in AM with no home abx    #Paroxysmal A Fib, rate controlled, on long term anticoagulation  #HTN, controlled  - C/w metoprolol tartrate 25mg q 12hrs, eliquis 5mg BID    #Normocytic anemia  - Continue to monitor Hg/Hct, transfuse if Hg <7    #Hypothyroidism  - s/p thyroidectomy  - Continue Synthroid 88mcg daily    #HLD  - Continue zocor 40mg qhs    #Left upper lobe lung mass, 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  - Upon review of the chart, CT chest report from 12/21/23  - Approximately 4.6 cm sized left upper lobe mass concerning for a primary pulmonary malignancy.  - Pt aware, doesn't want intervention during this admission  - Recommendation for outpatient oncology/pulm F/U for dx/management    VTE ppx: Eliquis  DISP: Pending transfer     Patient states that she will update her family; has a sister here that is in a nursing home; no other immediate family    Discussed with attending, Dr. Pacheco.

## 2024-02-13 NOTE — PROGRESS NOTE ADULT - SUBJECTIVE AND OBJECTIVE BOX
CC: f/u for  om and chronic ulcer right foot  Patient reports    REVIEW OF SYSTEMS:  All other review of systems negative (Comprehensive ROS)    Antimicrobials Day #  :9  ertapenem  IVPB 1000 milliGRAM(s) IV Intermittent every 24 hours    Other Medications Reviewed    T(F): 97.3 (02-13-24 @ 12:11), Max: 97.4 (02-12-24 @ 20:30)  HR: 63 (02-13-24 @ 12:11)  BP: 133/67 (02-13-24 @ 12:11)  RR: 17 (02-13-24 @ 12:11)  SpO2: 99% (02-13-24 @ 12:11)  Wt(kg): --    PHYSICAL EXAM:  General: alert, no acute distress  Eyes:  anicteric, no conjunctival injection, no discharge  Oropharynx: no lesions or injection 	  Neck: supple, without adenopathy  Lungs: clear to auscultation  Heart: regular rate and rhythm; no murmur, rubs or gallops  Abdomen: soft, nondistended, nontender, without mass or organomegaly  Skin: no lesions  Extremities: no clubbing, cyanosis,  right foot edema much improved, wound not draining , no redness  Neurologic: alert, oriented, moves all extremities    LAB RESULTS:                        10.8   4.85  )-----------( 280      ( 12 Feb 2024 06:11 )             33.0     02-12    138  |  103  |  23  ----------------------------<  76  4.1   |  27  |  1.00    Ca    9.5      12 Feb 2024 06:11        Urinalysis Basic - ( 12 Feb 2024 06:11 )    Color: x / Appearance: x / SG: x / pH: x  Gluc: 76 mg/dL / Ketone: x  / Bili: x / Urobili: x   Blood: x / Protein: x / Nitrite: x   Leuk Esterase: x / RBC: x / WBC x   Sq Epi: x / Non Sq Epi: x / Bacteria: x      MICROBIOLOGY:  RECENT CULTURES:      RADIOLOGY REVIEWED:    < from: US Duplex Venous Lower Ext Ltd, Right (02.07.24 @ 18:54) >    ACC: [id number] EXAM:  US DPLX LWR EXT VEINS LTD RT   ORDERED BY: JAMES PHILLIPS     PROCEDURE DATE:  02/07/2024          INTERPRETATION:  CLINICAL INFORMATION: RIGHT lower extremity edema    COMPARISON: None available.    TECHNIQUE: Duplex sonography of the RIGHT LOWER extremity veins with   color and spectral Doppler, with and without compression.    FINDINGS:    There is normal compressibility of the right common femoral, femoral and   popliteal veins.  The contralateral common femoral vein is patent.  Doppler examination shows normal spontaneous and phasic flow.    No calf vein thrombosis is detected.    IMPRESSION:  No evidence of right lower extremity deep venous thrombosis.    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   Plan: would limit ertapenem to one more day unless surgery is to be performed imminently  I am hoping patient will wait for direct transfer to [facility] for the foot surgery but if declines and decides to go home I would not advise prolonged antibiotics for the esbl since it will not cure the bone infection and the soft tissue infection has been well treated.

## 2024-02-14 LAB
ANION GAP SERPL CALC-SCNC: 9 MMOL/L — SIGNIFICANT CHANGE UP (ref 5–17)
BUN SERPL-MCNC: 26 MG/DL — HIGH (ref 7–23)
CALCIUM SERPL-MCNC: 9.2 MG/DL — SIGNIFICANT CHANGE UP (ref 8.4–10.5)
CHLORIDE SERPL-SCNC: 102 MMOL/L — SIGNIFICANT CHANGE UP (ref 96–108)
CO2 SERPL-SCNC: 27 MMOL/L — SIGNIFICANT CHANGE UP (ref 22–31)
CREAT SERPL-MCNC: 0.92 MG/DL — SIGNIFICANT CHANGE UP (ref 0.5–1.3)
EGFR: 61 ML/MIN/1.73M2 — SIGNIFICANT CHANGE UP
GLUCOSE SERPL-MCNC: 84 MG/DL — SIGNIFICANT CHANGE UP (ref 70–99)
HCT VFR BLD CALC: 31.5 % — LOW (ref 34.5–45)
HGB BLD-MCNC: 10.3 G/DL — LOW (ref 11.5–15.5)
MCHC RBC-ENTMCNC: 31.8 PG — SIGNIFICANT CHANGE UP (ref 27–34)
MCHC RBC-ENTMCNC: 32.7 GM/DL — SIGNIFICANT CHANGE UP (ref 32–36)
MCV RBC AUTO: 97.2 FL — SIGNIFICANT CHANGE UP (ref 80–100)
NRBC # BLD: 0 /100 WBCS — SIGNIFICANT CHANGE UP (ref 0–0)
PLATELET # BLD AUTO: 242 K/UL — SIGNIFICANT CHANGE UP (ref 150–400)
POTASSIUM SERPL-MCNC: 4.2 MMOL/L — SIGNIFICANT CHANGE UP (ref 3.5–5.3)
POTASSIUM SERPL-SCNC: 4.2 MMOL/L — SIGNIFICANT CHANGE UP (ref 3.5–5.3)
RBC # BLD: 3.24 M/UL — LOW (ref 3.8–5.2)
RBC # FLD: 13.4 % — SIGNIFICANT CHANGE UP (ref 10.3–14.5)
SODIUM SERPL-SCNC: 138 MMOL/L — SIGNIFICANT CHANGE UP (ref 135–145)
WBC # BLD: 4.79 K/UL — SIGNIFICANT CHANGE UP (ref 3.8–10.5)
WBC # FLD AUTO: 4.79 K/UL — SIGNIFICANT CHANGE UP (ref 3.8–10.5)

## 2024-02-14 PROCEDURE — 99232 SBSQ HOSP IP/OBS MODERATE 35: CPT | Mod: GC

## 2024-02-14 RX ADMIN — Medication 88 MICROGRAM(S): at 05:07

## 2024-02-14 RX ADMIN — APIXABAN 5 MILLIGRAM(S): 2.5 TABLET, FILM COATED ORAL at 17:56

## 2024-02-14 RX ADMIN — Medication 25 MILLIGRAM(S): at 17:56

## 2024-02-14 RX ADMIN — SIMVASTATIN 40 MILLIGRAM(S): 20 TABLET, FILM COATED ORAL at 21:08

## 2024-02-14 RX ADMIN — ERTAPENEM SODIUM 120 MILLIGRAM(S): 1 INJECTION, POWDER, LYOPHILIZED, FOR SOLUTION INTRAMUSCULAR; INTRAVENOUS at 21:08

## 2024-02-14 RX ADMIN — Medication 1 TABLET(S): at 12:01

## 2024-02-14 RX ADMIN — APIXABAN 5 MILLIGRAM(S): 2.5 TABLET, FILM COATED ORAL at 05:07

## 2024-02-14 NOTE — PROGRESS NOTE ADULT - SUBJECTIVE AND OBJECTIVE BOX
Patient is an 83F, from home,  PMHx AFIB on eliquis, HTN, HLD, chronic right plantar foot ulcer/OM admitted for IV abx for worsening acute on chronic OM of the right foot.     Of note, she was recently admitted from 1/1/-1/26, had 5 days of meropenem, had a MRI on 1/23 showing: "Osteomyelitis of the second metatarsal stump and third metatarsal head and shaft. Early osteomyelitis of the third proximal phalanx. Marked edema and postcontrast enhancement is seen throughout the foot with locules of air extending to the dorsum of the foot. No mature, drainable, or enhancing collection is seen at this time."    Subjective: This is hospital day 9, s/p 2 days of meropenem, now on ertapenem. Patient seen and examined at bedside. Transfer pending to [facility] for amputation.     REVIEW OF SYSTEMS:  CONSTITUTIONAL: No fever, weight loss, or fatigue  EYES: No eye pain, visual disturbances, or discharge  ENMT:  No difficulty hearing, tinnitus, vertigo; No sinus or throat pain  RESPIRATORY: No cough, wheezing, chills or hemoptysis; No shortness of breath  CARDIOVASCULAR: No chest pain, palpitations, dizziness, or leg swelling  GASTROINTESTINAL: No abdominal or epigastric pain. No nausea, vomiting, or hematemesis; No diarrhea or constipation. No melena or hematochezia.  GENITOURINARY: No dysuria, frequency, hematuria, or incontinence  NEUROLOGICAL: No headaches, memory loss, loss of strength, numbness, or tremors  SKIN: No itching, burning, rashes, or lesions   MUSCULOSKELETAL: right leg swelling with ulceration of right plantar surface, wrapped     Vital Signs Last 24 Hrs  T(C): 36.9 (14 Feb 2024 05:20), Max: 36.9 (14 Feb 2024 05:20)  T(F): 98.5 (14 Feb 2024 05:20), Max: 98.5 (14 Feb 2024 05:20)  HR: 58 (14 Feb 2024 05:20) (58 - 67)  BP: 124/63 (14 Feb 2024 05:20) (118/62 - 133/67)  BP(mean): --  RR: 16 (14 Feb 2024 05:20) (16 - 17)  SpO2: 97% (14 Feb 2024 05:20) (97% - 99%)    Parameters below as of 14 Feb 2024 05:20  Patient On (Oxygen Delivery Method): room air        PHYSICAL EXAM:  GENERAL: NAD, well-groomed, well-developed  HEAD:  Atraumatic, Normocephalic  EYES: EOMI, conjunctiva and sclera clear  ENMT: Moist mucous membranes, Good dentition, no thrush  NECK: Supple  CHEST/LUNG: Clear to auscultation bilaterally, good air entry, non-labored breathing  HEART: RRR; S1/S2, No murmur  ABDOMEN: Soft, Nontender, Nondistended; Bowel sounds present  VASCULAR: Normal pulses, Normal capillary refill  EXTREMITIES: Right foot currently wrapped   LYMPH: Normal; No lymphadenopathy noted  SKIN: Warm, Intact  PSYCH: Normal mood, Normal affect  NERVOUS SYSTEM:  A/O x3, No focal deficits        MEDS:  MEDICATIONS  (STANDING):  apixaban 5 milliGRAM(s) Oral two times a day  ertapenem  IVPB 1000 milliGRAM(s) IV Intermittent every 24 hours  lactobacillus acidophilus 1 Tablet(s) Oral daily  levothyroxine 88 MICROGram(s) Oral daily  metoprolol tartrate 25 milliGRAM(s) Oral every 12 hours  simvastatin 40 milliGRAM(s) Oral at bedtime    MEDICATIONS  (PRN):  acetaminophen     Tablet .. 650 milliGRAM(s) Oral every 6 hours PRN Temp greater or equal to 38C (100.4F), Mild Pain (1 - 3)  albuterol    90 MICROgram(s) HFA Inhaler 2 Puff(s) Inhalation every 6 hours PRN Bronchospasm  aluminum hydroxide/magnesium hydroxide/simethicone Suspension 30 milliLiter(s) Oral every 4 hours PRN Dyspepsia  melatonin 3 milliGRAM(s) Oral at bedtime PRN Insomnia  ondansetron Injectable 4 milliGRAM(s) IV Push every 8 hours PRN Nausea and/or Vomiting

## 2024-02-14 NOTE — PROGRESS NOTE ADULT - ASSESSMENT
84 yo female with hx of HTN, HLD, chronic right plantar foot ulcers/OM, sent from podiatry office for IV abx for acute on chronic OM of right foot, pending transfer to [facility] for amputation, now wanting to pursue home IV abx infusions, surgical work-up outpt.    #Acute on chronic right plantar foot OM  - WBC WNL, elevated ESR/CRP, s/p 2 days meropenem  - Ulcer culture w/ ESBL sensitive to gladys, blood cultures negative   - C/w ertapenem, probiotic  - Fall precautions  - PT eval - declined, ambulates with cane, activity as tolerated  - Podiatry consult appreciated - now agreeable to forefoot amputation, transfer for procedure still pending  - Appreciate ID recs - last day of ertapenem then can d/c home for surgical follow-up outpatient if doesn't want to wait for transfer     #Paroxysmal A Fib, rate controlled, on long term anticoagulation  #HTN, controlled  - C/w metoprolol tartrate 25mg q 12hrs, eliquis 5mg BID    #Normocytic anemia  - Continue to monitor Hg/Hct, transfuse if Hg <7    #Hypothyroidism  - s/p thyroidectomy  - Continue Synthroid 88mcg daily    #HLD  - Continue zocor 40mg qhs    #Left upper lobe lung mass, 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  - Upon review of the chart, CT chest report from 12/21/23  - Approximately 4.6 cm sized left upper lobe mass concerning for a primary pulmonary malignancy.  - Pt aware, doesn't want intervention during this admission  - Recommendation for outpatient oncology/pulm F/U for dx/management    VTE ppx: Eliquis  DISP: Pending transfer     Patient states that she will update her family; has a sister here that is in a nursing home; no other immediate family    Discussed with attending, Dr. Pacheco.

## 2024-02-14 NOTE — PROGRESS NOTE ADULT - SUBJECTIVE AND OBJECTIVE BOX
CC: f/u for om right foot    Patient reports  she is willing to wait to go to [facility] for foot surgery  REVIEW OF SYSTEMS:  All other review of systems negative (Comprehensive ROS)    Antimicrobials Day #  :10  ertapenem  IVPB 1000 milliGRAM(s) IV Intermittent every 24 hours    Other Medications Reviewed    T(F): 98.7 (02-14-24 @ 13:23), Max: 98.7 (02-14-24 @ 13:23)  HR: 71 (02-14-24 @ 13:23)  BP: 117/65 (02-14-24 @ 13:23)  RR: 17 (02-14-24 @ 13:23)  SpO2: 96% (02-14-24 @ 13:23)  Wt(kg): --    PHYSICAL EXAM:  General: alert, no acute distress  Eyes:  anicteric, no conjunctival injection, no discharge  Oropharynx: no lesions or injection 	  Neck: supple, without adenopathy  Lungs: clear to auscultation  Heart: regular rate and rhythm; no murmur, rubs or gallops  Abdomen: soft, nondistended, nontender, without mass or organomegaly  Skin: no lesions  Extremities: no clubbing, cyanosis, . right foot is wrapped  Neurologic: alert, oriented, moves all extremities    LAB RESULTS:                        10.3   4.79  )-----------( 242      ( 14 Feb 2024 06:20 )             31.5     02-14    138  |  102  |  26<H>  ----------------------------<  84  4.2   |  27  |  0.92    Ca    9.2      14 Feb 2024 06:20        Urinalysis Basic - ( 14 Feb 2024 06:20 )    Color: x / Appearance: x / SG: x / pH: x  Gluc: 84 mg/dL / Ketone: x  / Bili: x / Urobili: x   Blood: x / Protein: x / Nitrite: x   Leuk Esterase: x / RBC: x / WBC x   Sq Epi: x / Non Sq Epi: x / Bacteria: x      MICROBIOLOGY:  RECENT CULTURES:      RADIOLOGY REVIEWED:    < from: US Duplex Venous Lower Ext Ltd, Right (02.07.24 @ 18:54) >    ACC: [id number] EXAM:  US DPLX LWR EXT VEINS LTD RT   ORDERED BY: JAMES PHILLIPS     PROCEDURE DATE:  02/07/2024          INTERPRETATION:  CLINICAL INFORMATION: RIGHT lower extremity edema    COMPARISON: None available.    TECHNIQUE: Duplex sonography of the RIGHT LOWER extremity veins with   color and spectral Doppler, with and without compression.    FINDINGS:    There is normal compressibility of the right common femoral, femoral and   popliteal veins.  The contralateral common femoral vein is patent.  Doppler examination shows normal spontaneous and phasic flow.    No calf vein thrombosis is detected.    IMPRESSION:  No evidence of right lower extremity deep venous thrombosis.        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  She is now willing to wait for a bed at [facility] For the surgery to be done so will continue ertapenem through the procedure  Plan:   continue ertapenem for now since surgery is hopefully going to happen soon  surgical findings and bone margins will determine ultimate duration of antibiotics

## 2024-02-15 ENCOUNTER — TRANSCRIPTION ENCOUNTER (OUTPATIENT)
Age: 84
End: 2024-02-15

## 2024-02-15 ENCOUNTER — APPOINTMENT (OUTPATIENT)
Dept: FAMILY MEDICINE | Facility: CLINIC | Age: 84
End: 2024-02-15

## 2024-02-15 VITALS
OXYGEN SATURATION: 96 % | RESPIRATION RATE: 16 BRPM | SYSTOLIC BLOOD PRESSURE: 124 MMHG | DIASTOLIC BLOOD PRESSURE: 66 MMHG | HEART RATE: 66 BPM | TEMPERATURE: 98 F

## 2024-02-15 PROCEDURE — 87077 CULTURE AEROBIC IDENTIFY: CPT

## 2024-02-15 PROCEDURE — 85652 RBC SED RATE AUTOMATED: CPT

## 2024-02-15 PROCEDURE — 99285 EMERGENCY DEPT VISIT HI MDM: CPT | Mod: 25

## 2024-02-15 PROCEDURE — 36415 COLL VENOUS BLD VENIPUNCTURE: CPT

## 2024-02-15 PROCEDURE — 99232 SBSQ HOSP IP/OBS MODERATE 35: CPT

## 2024-02-15 PROCEDURE — 87186 SC STD MICRODIL/AGAR DIL: CPT

## 2024-02-15 PROCEDURE — 80053 COMPREHEN METABOLIC PANEL: CPT

## 2024-02-15 PROCEDURE — 96374 THER/PROPH/DIAG INJ IV PUSH: CPT

## 2024-02-15 PROCEDURE — 85027 COMPLETE CBC AUTOMATED: CPT

## 2024-02-15 PROCEDURE — 83735 ASSAY OF MAGNESIUM: CPT

## 2024-02-15 PROCEDURE — 93971 EXTREMITY STUDY: CPT

## 2024-02-15 PROCEDURE — 87040 BLOOD CULTURE FOR BACTERIA: CPT

## 2024-02-15 PROCEDURE — 87070 CULTURE OTHR SPECIMN AEROBIC: CPT

## 2024-02-15 PROCEDURE — 85025 COMPLETE CBC W/AUTO DIFF WBC: CPT

## 2024-02-15 PROCEDURE — 86140 C-REACTIVE PROTEIN: CPT

## 2024-02-15 PROCEDURE — 93005 ELECTROCARDIOGRAM TRACING: CPT

## 2024-02-15 PROCEDURE — 80048 BASIC METABOLIC PNL TOTAL CA: CPT

## 2024-02-15 PROCEDURE — 73620 X-RAY EXAM OF FOOT: CPT

## 2024-02-15 RX ADMIN — APIXABAN 5 MILLIGRAM(S): 2.5 TABLET, FILM COATED ORAL at 05:24

## 2024-02-15 RX ADMIN — APIXABAN 5 MILLIGRAM(S): 2.5 TABLET, FILM COATED ORAL at 17:58

## 2024-02-15 RX ADMIN — Medication 88 MICROGRAM(S): at 05:24

## 2024-02-15 RX ADMIN — Medication 25 MILLIGRAM(S): at 17:58

## 2024-02-15 RX ADMIN — Medication 25 MILLIGRAM(S): at 05:24

## 2024-02-15 RX ADMIN — SIMVASTATIN 40 MILLIGRAM(S): 20 TABLET, FILM COATED ORAL at 21:12

## 2024-02-15 RX ADMIN — Medication 1 TABLET(S): at 11:42

## 2024-02-15 NOTE — PROGRESS NOTE ADULT - ATTENDING COMMENTS
83 year old FM PMH pAF, HTN, HLD, chronic right plantar foot ulcers/OM, sent from podiatry office for IV abx for acute on chronic OM of right foot    #Acute on chronic right plantar foot OM  - no leukocytosis noted  - follow up wound culture of foot, prior culture with ESBL   - ID recs appreciated- continue meropenem  - podiatry consulted  - on previous admission, podiatry recommended transmetatarsal amputation and patient wanted to opt for conservative management. This was brought up again as this would be definitive treatment or else patient would be constantly requiring abx therapy. She understands and is hesitant about surgery, wants to still think about it but continue conservative treament for now  - PT consulted
PE: A+Ox3, no murmurs, lungs CTA b/l, abd NT/ND, no lower extremity swelling    Assessment:   - Acute on chronic right plantar foot osteomyelitis   - Normocytic anemia   - Left upper lobe mass   - History of pAF, hypothyroidism, HTN, HLD, chronic right plantar foot ulcers/OM    Plan:   - c/w Ertapenem   - Podiatry recs appreciated - pt will need transmetatarsal amputation which she is agreeable to   - Pt does not want intervention for COLTEN lung mass -> will need oncology and pulmonary f/u outpt   - Dispo: pending transfer to [facility]    I spent a total of 30 minutes on the date of this encounter coordinating the patient's care. This includes reviewing results/imaging and discussions with specialists, nursing, case management/social work. Further tests, medications, and procedures have been ordered as indicated. Results and the plan of care were communicated to the patient and/or their family member. Supporting documentation was completed and added to the patient's chart.
PE: A+Ox3, no murmurs, lungs CTA b/l, abd NT/ND, no lower extremity swelling    Assessment:   - Acute on chronic right plantar foot osteomyelitis   - Normocytic anemia   - Left upper lobe mass   - History of pAF, hypothyroidism, HTN, HLD, chronic right plantar foot ulcers/OM    Plan:   - c/w Ertapenem   - Podiatry recs appreciated - pt will need transmetatarsal amputation which she is agreeable to   - Pt does not want intervention for COLTEN lung mass -> will need oncology and pulmonary f/u outpt   - Dispo: pending transfer to [facility]    I spent a total of 30 minutes on the date of this encounter coordinating the patient's care. This includes reviewing results/imaging and discussions with specialists, nursing, case management/social work. Further tests, medications, and procedures have been ordered as indicated. Results and the plan of care were communicated to the patient and/or their family member. Supporting documentation was completed and added to the patient's chart.
83 year old FM PMH pAF, HTN, HLD, chronic right plantar foot ulcers/OM, sent from podiatry office for IV abx for acute on chronic OM of right foot    #Acute on chronic right plantar foot OM  - no leukocytosis noted  - wound cultures from foot with ESBL E coli sensitive to meropenem  - ID recs appreciated- continue meropenem  - podiatry consulted, recommend transmetatarsal amputation  - on previous admission, podiatry recommended transmetatarsal amputation and patient wanted to opt for conservative management. This was brought up again as this would be definitive treatment or else patient would be constantly requiring abx therapy. She understands and is hesitant about surgery, but now agreeable for surgery   - will call transfer center for podiatric evaluation and surgery  - PT consulted- patient refused PT
PE: A+Ox3, no murmurs, lungs CTA b/l, abd NT/ND, no lower extremity swelling    Assessment:   - Acute on chronic right plantar foot osteomyelitis   - Normocytic anemia   - Left upper lobe mass   - History of pAF, hypothyroidism, HTN, HLD, chronic right plantar foot ulcers/OM    Plan:   - c/w Ertapenem today and then d/c abx   - Podiatry recs appreciated - pt will need transmetatarsal amputation which she is agreeable to   - Pt does not want intervention for COLTEN lung mass -> will need oncology and pulmonary f/u outpt   - Spoke with the pt today, will await for update from [facility]. If no bed by today/tomorrow, pt wishes to go home and f/u outpt.   - Dispo: pending transfer to [facility] vs. home tomorrow with outpt f/u   - The residents and I have been updating the transfer chat and calling the transfer center daily regarding transfer. No beds currently available at [facility], awaiting discharges.     I spent a total of 30 minutes on the date of this encounter coordinating the patient's care. This includes reviewing results/imaging and discussions with specialists, nursing, case management/social work. Further tests, medications, and procedures have been ordered as indicated. Results and the plan of care were communicated to the patient and/or their family member. Supporting documentation was completed and added to the patient's chart.
PE: A+Ox3, no murmurs, lungs CTA b/l, abd NT/ND, no lower extremity swelling    Assessment:   - Acute on chronic right plantar foot osteomyelitis   - Normocytic anemia   - Left upper lobe mass   - History of pAF, hypothyroidism, HTN, HLD, chronic right plantar foot ulcers/OM    Plan:   - c/w Meropenem   - Podiatry recs appreciated - pt will need transmetatarsal amputation which she is agreeable to   - Pt does not want intervention for COLTEN lung mass -> will need oncology and pulmonary f/u outpt   - Dispo: pending transfer to [facility]
PE: A+Ox3, no murmurs, lungs CTA b/l, abd NT/ND, no lower extremity swelling    Assessment:   - Acute on chronic right plantar foot osteomyelitis   - Normocytic anemia   - Left upper lobe mass   - History of pAF, hypothyroidism, HTN, HLD, chronic right plantar foot ulcers/OM    Plan:   - c/w Ertapenem   - Podiatry recs appreciated - pt will need transmetatarsal amputation which she is agreeable to   - Pt does not want intervention for COLTEN lung mass -> will need oncology and pulmonary f/u outpt   - The pt is frustrated with having to stay in the hospital for transfer given multiple additional days and would like to go home. Spoke with ID who stated pt could be transferred OR we can continue with 1 more of day IV abx and pt can be discharged home (no PICC upon d/c).   - Dispo: pending transfer to [facility] vs. home tomorrow with outpt f/u   - The residents and I have been updating the transfer chat and calling the transfer center daily regarding transfer. No beds currently available at [facility], awaiting discharges.     I spent a total of 30 minutes on the date of this encounter coordinating the patient's care. This includes reviewing results/imaging and discussions with specialists, nursing, case management/social work. Further tests, medications, and procedures have been ordered as indicated. Results and the plan of care were communicated to the patient and/or their family member. Supporting documentation was completed and added to the patient's chart.
PE: A+Ox3, no murmurs, lungs CTA b/l, abd NT/ND, no lower extremity swelling    Assessment:   - Acute on chronic right plantar foot osteomyelitis   - Normocytic anemia   - Left upper lobe mass   - History of pAF, hypothyroidism, HTN, HLD, chronic right plantar foot ulcers/OM    Plan:   - c/w Meropenem   - Podiatry recs appreciated - pt will need transmetatarsal amputation which she is agreeable to   - Pt does not want intervention for COLTEN lung mass -> will need oncology and pulmonary f/u outpt   - Dispo: pending transfer to [facility]
agree with above  completing course of IV zosyn today  pt accepted for transfer at [facility], pending bed placement  if bed not available, as pt has completed iv abx, may consider discharge with outpt podiatry/surgical f/u
PE: A+Ox3, no murmurs, lungs CTA b/l, abd NT/ND, no lower extremity swelling    Assessment:   - Acute on chronic right plantar foot osteomyelitis   - Normocytic anemia   - Left upper lobe mass   - History of pAF, hypothyroidism, HTN, HLD, chronic right plantar foot ulcers/OM    Plan:   - c/w Ertapenem   - Podiatry recs appreciated - pt will need transmetatarsal amputation which she is agreeable to   - Pt does not want intervention for COLTEN lung mass -> will need oncology and pulmonary f/u outpt   - Dispo: pending transfer to [facility]  - The residents and I have been updating the transfer chat and calling the transfer center daily regarding transfer. No beds currently available at [facility], awaiting discharges.     I spent a total of 30 minutes on the date of this encounter coordinating the patient's care. This includes reviewing results/imaging and discussions with specialists, nursing, case management/social work. Further tests, medications, and procedures have been ordered as indicated. Results and the plan of care were communicated to the patient and/or their family member. Supporting documentation was completed and added to the patient's chart.

## 2024-02-15 NOTE — PROGRESS NOTE ADULT - ASSESSMENT
84 yo female with hx of HTN, HLD, chronic right plantar foot ulcers/OM, sent from podiatry office for IV abx for acute on chronic OM of right foot, pending transfer to [facility] for amputation, now wanting to pursue home IV abx infusions, surgical work-up outpt.    #Acute on chronic right plantar foot OM  - WBC WNL, elevated ESR/CRP, s/p 2 days meropenem  - Ulcer culture w/ ESBL sensitive to gladys, blood cultures negative   - C/w ertapenem, probiotic  - Fall precautions  - PT eval - declined, ambulates with cane, activity as tolerated  - Podiatry consult appreciated - now agreeable to forefoot amputation, transfer for procedure still pending  - Appreciate ID recs - last day of ertapenem then can d/c home for surgical follow-up outpatient if doesn't want to wait for transfer     #Paroxysmal A Fib, rate controlled, on long term anticoagulation  #HTN, controlled  - C/w metoprolol tartrate 25mg q 12hrs, eliquis 5mg BID    #Normocytic anemia  - Continue to monitor Hg/Hct, transfuse if Hg <7    #Hypothyroidism  - s/p thyroidectomy  - Continue Synthroid 88mcg daily    #HLD  - Continue zocor 40mg qhs    #Left upper lobe lung mass, 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  - Upon review of the chart, CT chest report from 12/21/23  - Approximately 4.6 cm sized left upper lobe mass concerning for a primary pulmonary malignancy.  - Pt aware, doesn't want intervention during this admission  - Recommendation for outpatient oncology/pulm F/U for dx/management    VTE ppx: Eliquis  DISP: Pending transfer     Patient states that she will update her family; has a sister here that is in a nursing home; no other immediate family    Discussed with attending, Dr. Henry 82 yo female with hx of HTN, HLD, chronic right plantar foot ulcers/OM, sent from podiatry office for IV abx for acute on chronic OM of right foot, pending transfer to [facility] for amputation, now wanting to pursue home IV abx infusions, surgical work-up outpt.    #Acute on chronic right plantar foot OM  - WBC WNL, elevated ESR/CRP, s/p 2 days meropenem  - Ulcer culture w/ ESBL sensitive to gladys, blood cultures negative   - Completed course of zosyn   - Fall precautions  - PT eval - declined, ambulates with cane, activity as tolerated  - Podiatry consult appreciated - now agreeable to forefoot amputation, transfer for procedure still pending  - Appreciate ID recs - s/p ertapenem, can d/c home for surgical follow-up outpatient if doesn't want to wait for transfer     #Paroxysmal A Fib, rate controlled, on long term anticoagulation  #HTN, controlled  - C/w metoprolol tartrate 25mg q 12hrs, eliquis 5mg BID    #Normocytic anemia  - Continue to monitor Hg/Hct, transfuse if Hg <7    #Hypothyroidism  - s/p thyroidectomy  - Continue Synthroid 88mcg daily    #HLD  - Continue zocor 40mg qhs    #Left upper lobe lung mass, 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  - Upon review of the chart, CT chest report from 12/21/23  - Approximately 4.6 cm sized left upper lobe mass concerning for a primary pulmonary malignancy.  - Pt aware, doesn't want intervention during this admission  - Recommendation for outpatient oncology/pulm F/U for dx/management    VTE ppx: Eliquis  DISP: Pending transfer     Patient states that she will update her family; has a sister here that is in a nursing home; no other immediate family    Discussed with attending, Dr. Henry

## 2024-02-15 NOTE — PROGRESS NOTE ADULT - REASON FOR ADMISSION
IV antibiotics

## 2024-02-15 NOTE — PROGRESS NOTE ADULT - SUBJECTIVE AND OBJECTIVE BOX
Patient is an 83F, from home,  PMHx AFIB on eliquis, HTN, HLD, chronic right plantar foot ulcer/OM admitted for IV abx for worsening acute on chronic OM of the right foot.     Of note, she was recently admitted from 1/1/-1/26, had 5 days of meropenem, had a MRI on 1/23 showing: "Osteomyelitis of the second metatarsal stump and third metatarsal head and shaft. Early osteomyelitis of the third proximal phalanx. Marked edema and postcontrast enhancement is seen throughout the foot with locules of air extending to the dorsum of the foot. No mature, drainable, or enhancing collection is seen at this time."    Subjective: This is hospital day 10, s/p 2 days of meropenem, yesterday was last day of ertapenem. Patient seen and examined at bedside. Transfer pending to [facility] for amputation.     REVIEW OF SYSTEMS:  CONSTITUTIONAL: No fever, weight loss, or fatigue  EYES: No eye pain, visual disturbances, or discharge  ENMT:  No difficulty hearing, tinnitus, vertigo; No sinus or throat pain  RESPIRATORY: No cough, wheezing, chills or hemoptysis; No shortness of breath  CARDIOVASCULAR: No chest pain, palpitations, dizziness, or leg swelling  GASTROINTESTINAL: No abdominal or epigastric pain. No nausea, vomiting, or hematemesis; No diarrhea or constipation. No melena or hematochezia.  GENITOURINARY: No dysuria, frequency, hematuria, or incontinence  NEUROLOGICAL: No headaches, memory loss, loss of strength, numbness, or tremors  SKIN: No itching, burning, rashes, or lesions   MUSCULOSKELETAL: right leg swelling with ulceration of right plantar surface, wrapped     Vital Signs Last 24 Hrs  T(C): 36.7 (15 Feb 2024 04:53), Max: 37.1 (14 Feb 2024 13:23)  T(F): 98 (15 Feb 2024 04:53), Max: 98.7 (14 Feb 2024 13:23)  HR: 108 (15 Feb 2024 04:53) (62 - 108)  BP: 147/53 (15 Feb 2024 04:53) (117/65 - 147/53)  BP(mean): --  RR: 16 (15 Feb 2024 04:53) (16 - 18)  SpO2: 96% (15 Feb 2024 04:53) (96% - 97%)    Parameters below as of 15 Feb 2024 04:53  Patient On (Oxygen Delivery Method): room air      PHYSICAL EXAM:  GENERAL: NAD, well-groomed, well-developed  HEAD:  Atraumatic, Normocephalic  EYES: EOMI, conjunctiva and sclera clear  ENMT: Moist mucous membranes, Good dentition, no thrush  NECK: Supple  CHEST/LUNG: Clear to auscultation bilaterally, good air entry, non-labored breathing  HEART: RRR; S1/S2, No murmur  ABDOMEN: Soft, Nontender, Nondistended; Bowel sounds present  VASCULAR: Normal pulses, Normal capillary refill  EXTREMITIES: Right foot currently wrapped   LYMPH: Normal; No lymphadenopathy noted  SKIN: Warm, Intact  PSYCH: Normal mood, Normal affect  NERVOUS SYSTEM:  A/O x3, No focal deficits        MEDS:  MEDICATIONS  (STANDING):  apixaban 5 milliGRAM(s) Oral two times a day  ertapenem  IVPB 1000 milliGRAM(s) IV Intermittent every 24 hours  lactobacillus acidophilus 1 Tablet(s) Oral daily  levothyroxine 88 MICROGram(s) Oral daily  metoprolol tartrate 25 milliGRAM(s) Oral every 12 hours  simvastatin 40 milliGRAM(s) Oral at bedtime    MEDICATIONS  (PRN):  acetaminophen     Tablet .. 650 milliGRAM(s) Oral every 6 hours PRN Temp greater or equal to 38C (100.4F), Mild Pain (1 - 3)  albuterol    90 MICROgram(s) HFA Inhaler 2 Puff(s) Inhalation every 6 hours PRN Bronchospasm  aluminum hydroxide/magnesium hydroxide/simethicone Suspension 30 milliLiter(s) Oral every 4 hours PRN Dyspepsia  melatonin 3 milliGRAM(s) Oral at bedtime PRN Insomnia  ondansetron Injectable 4 milliGRAM(s) IV Push every 8 hours PRN Nausea and/or Vomiting             Patient is an 83F, from home,  PMHx AFIB on eliquis, HTN, HLD, chronic right plantar foot ulcer/OM admitted for IV abx for worsening acute on chronic OM of the right foot.     Of note, she was recently admitted from 1/1/-1/26, had 5 days of meropenem, had a MRI on 1/23 showing: "Osteomyelitis of the second metatarsal stump and third metatarsal head and shaft. Early osteomyelitis of the third proximal phalanx. Marked edema and postcontrast enhancement is seen throughout the foot with locules of air extending to the dorsum of the foot. No mature, drainable, or enhancing collection is seen at this time."    Subjective: This is hospital day 10, s/p 2 days of meropenem, yesterday was last day of ertapenem. Patient seen and examined at bedside. Transfer pending to [facility] for amputation but would like to go home if no transfer today.     REVIEW OF SYSTEMS:  CONSTITUTIONAL: No fever, weight loss, or fatigue  EYES: No eye pain, visual disturbances, or discharge  ENMT:  No difficulty hearing, tinnitus, vertigo; No sinus or throat pain  RESPIRATORY: No cough, wheezing, chills or hemoptysis; No shortness of breath  CARDIOVASCULAR: No chest pain, palpitations, dizziness, or leg swelling  GASTROINTESTINAL: No abdominal or epigastric pain. No nausea, vomiting, or hematemesis; No diarrhea or constipation. No melena or hematochezia.  GENITOURINARY: No dysuria, frequency, hematuria, or incontinence  NEUROLOGICAL: No headaches, memory loss, loss of strength, numbness, or tremors  SKIN: No itching, burning, rashes, or lesions   MUSCULOSKELETAL: right leg swelling with ulceration of right plantar surface, wrapped     Vital Signs Last 24 Hrs  T(C): 36.7 (15 Feb 2024 04:53), Max: 37.1 (14 Feb 2024 13:23)  T(F): 98 (15 Feb 2024 04:53), Max: 98.7 (14 Feb 2024 13:23)  HR: 108 (15 Feb 2024 04:53) (62 - 108)  BP: 147/53 (15 Feb 2024 04:53) (117/65 - 147/53)  BP(mean): --  RR: 16 (15 Feb 2024 04:53) (16 - 18)  SpO2: 96% (15 Feb 2024 04:53) (96% - 97%)    Parameters below as of 15 Feb 2024 04:53  Patient On (Oxygen Delivery Method): room air      PHYSICAL EXAM:  GENERAL: NAD, well-groomed, well-developed  HEAD:  Atraumatic, Normocephalic  EYES: EOMI, conjunctiva and sclera clear  ENMT: Moist mucous membranes, Good dentition, no thrush  NECK: Supple  CHEST/LUNG: Clear to auscultation bilaterally, good air entry, non-labored breathing  HEART: RRR; S1/S2, No murmur  ABDOMEN: Soft, Nontender, Nondistended; Bowel sounds present  VASCULAR: Normal pulses, Normal capillary refill  EXTREMITIES: Right foot currently wrapped   LYMPH: Normal; No lymphadenopathy noted  SKIN: Warm, Intact  PSYCH: Normal mood, Normal affect  NERVOUS SYSTEM:  A/O x3, No focal deficits        MEDS:  MEDICATIONS  (STANDING):  apixaban 5 milliGRAM(s) Oral two times a day  lactobacillus acidophilus 1 Tablet(s) Oral daily  levothyroxine 88 MICROGram(s) Oral daily  metoprolol tartrate 25 milliGRAM(s) Oral every 12 hours  simvastatin 40 milliGRAM(s) Oral at bedtime    MEDICATIONS  (PRN):  acetaminophen     Tablet .. 650 milliGRAM(s) Oral every 6 hours PRN Temp greater or equal to 38C (100.4F), Mild Pain (1 - 3)  albuterol    90 MICROgram(s) HFA Inhaler 2 Puff(s) Inhalation every 6 hours PRN Bronchospasm  aluminum hydroxide/magnesium hydroxide/simethicone Suspension 30 milliLiter(s) Oral every 4 hours PRN Dyspepsia  melatonin 3 milliGRAM(s) Oral at bedtime PRN Insomnia  ondansetron Injectable 4 milliGRAM(s) IV Push every 8 hours PRN Nausea and/or Vomiting

## 2024-02-15 NOTE — PROGRESS NOTE ADULT - NUTRITIONAL ASSESSMENT
This patient has been assessed with a concern for Malnutrition and has been determined to have a diagnosis/diagnoses of Severe protein-calorie malnutrition.    This patient is being managed with:   Diet Regular-  Supplement Feeding Modality:  Oral  Ensure Plus High Protein Cans or Servings Per Day:  1       Frequency:  Two Times a day  Entered: Feb 6 2024  1:01PM  

## 2024-02-15 NOTE — PROGRESS NOTE ADULT - SUBJECTIVE AND OBJECTIVE BOX
CC: f/u for  om right foot  Patient reports  feels fine  REVIEW OF SYSTEMS:  All other review of systems negative (Comprehensive ROS)    Antimicrobials Day #  :    Other Medications Reviewed    T(F): 97.9 (02-15-24 @ 13:00), Max: 98 (02-15-24 @ 04:53)  HR: 79 (02-15-24 @ 17:57)  BP: 136/73 (02-15-24 @ 17:57)  RR: 17 (02-15-24 @ 13:00)  SpO2: 97% (02-15-24 @ 13:00)  Wt(kg): --    PHYSICAL EXAM:  General: alert, no acute distress  Eyes:  anicteric, no conjunctival injection, no discharge  Oropharynx: no lesions or injection 	  Neck: supple, without adenopathy  Lungs: clear to auscultation  Heart: regular rate and rhythm; no murmur, rubs or gallops  Abdomen: soft, nondistended, nontender, without mass or organomegaly  Skin: no lesions  Extremities: no clubbing, cyanosis, or edema. right foot wound is dry, no drainage or redness or swelling  Neurologic: alert, oriented, moves all extremities    LAB RESULTS:                        10.3   4.79  )-----------( 242      ( 14 Feb 2024 06:20 )             31.5     02-14    138  |  102  |  26<H>  ----------------------------<  84  4.2   |  27  |  0.92    Ca    9.2      14 Feb 2024 06:20        Urinalysis Basic - ( 14 Feb 2024 06:20 )    Color: x / Appearance: x / SG: x / pH: x  Gluc: 84 mg/dL / Ketone: x  / Bili: x / Urobili: x   Blood: x / Protein: x / Nitrite: x   Leuk Esterase: x / RBC: x / WBC x   Sq Epi: x / Non Sq Epi: x / Bacteria: x      MICROBIOLOGY:  RECENT CULTURES:      RADIOLOGY REVIEWED:              Assessment:  Patient with chronic right foot plantar ulcer, had resection of 2nd toe tma a couple of months ago for contiguous focus om, now has contiguous focus 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  She has  now finished 10 days of antibiotics. She has had an adequate course for soft tissue infection. We will not cure om medically given ongoing open wound. Still await a bed at [facility] for her surgeon to do amputation of the 3rd met   Plan:   will stop antibiotics at this point  await transfer to [facility] or just discharge her home and she can follow up with her podiatric surgeon for surgery   keep foot elevated with pressure wrap

## 2024-02-16 ENCOUNTER — INPATIENT (INPATIENT)
Facility: HOSPITAL | Age: 84
LOS: 6 days | Discharge: NURSING, REHAB OR LONG-TERM CARE | End: 2024-02-23
Attending: INTERNAL MEDICINE | Admitting: INTERNAL MEDICINE
Payer: MEDICARE

## 2024-02-16 VITALS
OXYGEN SATURATION: 100 % | RESPIRATION RATE: 16 BRPM | WEIGHT: 135.14 LBS | SYSTOLIC BLOOD PRESSURE: 133 MMHG | HEART RATE: 63 BPM | HEIGHT: 70 IN | DIASTOLIC BLOOD PRESSURE: 57 MMHG | TEMPERATURE: 98 F

## 2024-02-16 DIAGNOSIS — Z98.89 OTHER SPECIFIED POSTPROCEDURAL STATES: Chronic | ICD-10-CM

## 2024-02-16 DIAGNOSIS — I71.9 AORTIC ANEURYSM OF UNSPECIFIED SITE, WITHOUT RUPTURE: ICD-10-CM

## 2024-02-16 DIAGNOSIS — M53.9 DORSOPATHY, UNSPECIFIED: Chronic | ICD-10-CM

## 2024-02-16 LAB
ALBUMIN SERPL ELPH-MCNC: 3.5 G/DL — SIGNIFICANT CHANGE UP (ref 3.3–5)
ALP SERPL-CCNC: 88 U/L — SIGNIFICANT CHANGE UP (ref 40–120)
ALT FLD-CCNC: 14 U/L — SIGNIFICANT CHANGE UP (ref 4–33)
ANION GAP SERPL CALC-SCNC: 15 MMOL/L — HIGH (ref 7–14)
APTT BLD: 41.9 SEC — HIGH (ref 24.5–35.6)
AST SERPL-CCNC: 25 U/L — SIGNIFICANT CHANGE UP (ref 4–32)
BASOPHILS # BLD AUTO: 0.07 K/UL — SIGNIFICANT CHANGE UP (ref 0–0.2)
BASOPHILS NFR BLD AUTO: 1.2 % — SIGNIFICANT CHANGE UP (ref 0–2)
BILIRUB SERPL-MCNC: 0.3 MG/DL — SIGNIFICANT CHANGE UP (ref 0.2–1.2)
BLD GP AB SCN SERPL QL: NEGATIVE — SIGNIFICANT CHANGE UP
BUN SERPL-MCNC: 26 MG/DL — HIGH (ref 7–23)
CALCIUM SERPL-MCNC: 9.4 MG/DL — SIGNIFICANT CHANGE UP (ref 8.4–10.5)
CHLORIDE SERPL-SCNC: 101 MMOL/L — SIGNIFICANT CHANGE UP (ref 98–107)
CO2 SERPL-SCNC: 23 MMOL/L — SIGNIFICANT CHANGE UP (ref 22–31)
CREAT SERPL-MCNC: 0.81 MG/DL — SIGNIFICANT CHANGE UP (ref 0.5–1.3)
EGFR: 71 ML/MIN/1.73M2 — SIGNIFICANT CHANGE UP
EOSINOPHIL # BLD AUTO: 0.38 K/UL — SIGNIFICANT CHANGE UP (ref 0–0.5)
EOSINOPHIL NFR BLD AUTO: 6.7 % — HIGH (ref 0–6)
GLUCOSE SERPL-MCNC: 82 MG/DL — SIGNIFICANT CHANGE UP (ref 70–99)
HCT VFR BLD CALC: 30.2 % — LOW (ref 34.5–45)
HGB BLD-MCNC: 10.1 G/DL — LOW (ref 11.5–15.5)
IANC: 3.23 K/UL — SIGNIFICANT CHANGE UP (ref 1.8–7.4)
IMM GRANULOCYTES NFR BLD AUTO: 0.5 % — SIGNIFICANT CHANGE UP (ref 0–0.9)
INR BLD: 1.18 RATIO — SIGNIFICANT CHANGE UP (ref 0.85–1.18)
LYMPHOCYTES # BLD AUTO: 1.45 K/UL — SIGNIFICANT CHANGE UP (ref 1–3.3)
LYMPHOCYTES # BLD AUTO: 25.4 % — SIGNIFICANT CHANGE UP (ref 13–44)
MAGNESIUM SERPL-MCNC: 2.1 MG/DL — SIGNIFICANT CHANGE UP (ref 1.6–2.6)
MCHC RBC-ENTMCNC: 32 PG — SIGNIFICANT CHANGE UP (ref 27–34)
MCHC RBC-ENTMCNC: 33.4 GM/DL — SIGNIFICANT CHANGE UP (ref 32–36)
MCV RBC AUTO: 95.6 FL — SIGNIFICANT CHANGE UP (ref 80–100)
MONOCYTES # BLD AUTO: 0.54 K/UL — SIGNIFICANT CHANGE UP (ref 0–0.9)
MONOCYTES NFR BLD AUTO: 9.5 % — SIGNIFICANT CHANGE UP (ref 2–14)
NEUTROPHILS # BLD AUTO: 3.23 K/UL — SIGNIFICANT CHANGE UP (ref 1.8–7.4)
NEUTROPHILS NFR BLD AUTO: 56.7 % — SIGNIFICANT CHANGE UP (ref 43–77)
NRBC # BLD: 0 /100 WBCS — SIGNIFICANT CHANGE UP (ref 0–0)
NRBC # FLD: 0 K/UL — SIGNIFICANT CHANGE UP (ref 0–0)
PHOSPHATE SERPL-MCNC: 3.8 MG/DL — SIGNIFICANT CHANGE UP (ref 2.5–4.5)
PLATELET # BLD AUTO: 255 K/UL — SIGNIFICANT CHANGE UP (ref 150–400)
POTASSIUM SERPL-MCNC: 4.1 MMOL/L — SIGNIFICANT CHANGE UP (ref 3.5–5.3)
POTASSIUM SERPL-SCNC: 4.1 MMOL/L — SIGNIFICANT CHANGE UP (ref 3.5–5.3)
PROT SERPL-MCNC: 6.5 G/DL — SIGNIFICANT CHANGE UP (ref 6–8.3)
PROTHROM AB SERPL-ACNC: 13.2 SEC — HIGH (ref 9.5–13)
RBC # BLD: 3.16 M/UL — LOW (ref 3.8–5.2)
RBC # FLD: 13.4 % — SIGNIFICANT CHANGE UP (ref 10.3–14.5)
RH IG SCN BLD-IMP: NEGATIVE — SIGNIFICANT CHANGE UP
SODIUM SERPL-SCNC: 139 MMOL/L — SIGNIFICANT CHANGE UP (ref 135–145)
WBC # BLD: 5.7 K/UL — SIGNIFICANT CHANGE UP (ref 3.8–10.5)
WBC # FLD AUTO: 5.7 K/UL — SIGNIFICANT CHANGE UP (ref 3.8–10.5)

## 2024-02-16 RX ORDER — APIXABAN 2.5 MG/1
5 TABLET, FILM COATED ORAL
Refills: 0 | Status: DISCONTINUED | OUTPATIENT
Start: 2024-02-16 | End: 2024-02-18

## 2024-02-16 RX ORDER — ONDANSETRON 8 MG/1
4 TABLET, FILM COATED ORAL EVERY 8 HOURS
Refills: 0 | Status: DISCONTINUED | OUTPATIENT
Start: 2024-02-16 | End: 2024-02-23

## 2024-02-16 RX ORDER — ACETAMINOPHEN 500 MG
650 TABLET ORAL EVERY 6 HOURS
Refills: 0 | Status: DISCONTINUED | OUTPATIENT
Start: 2024-02-16 | End: 2024-02-23

## 2024-02-16 RX ORDER — SIMVASTATIN 20 MG/1
40 TABLET, FILM COATED ORAL AT BEDTIME
Refills: 0 | Status: DISCONTINUED | OUTPATIENT
Start: 2024-02-16 | End: 2024-02-23

## 2024-02-16 RX ORDER — ALBUTEROL 90 UG/1
2 AEROSOL, METERED ORAL EVERY 6 HOURS
Refills: 0 | Status: DISCONTINUED | OUTPATIENT
Start: 2024-02-16 | End: 2024-02-23

## 2024-02-16 RX ORDER — METOPROLOL TARTRATE 50 MG
25 TABLET ORAL EVERY 12 HOURS
Refills: 0 | Status: DISCONTINUED | OUTPATIENT
Start: 2024-02-16 | End: 2024-02-23

## 2024-02-16 RX ORDER — CADEXOMER IODINE 0.9 %
1 PADS, MEDICATED (EA) TOPICAL DAILY
Refills: 0 | Status: DISCONTINUED | OUTPATIENT
Start: 2024-02-16 | End: 2024-02-22

## 2024-02-16 RX ORDER — LANOLIN ALCOHOL/MO/W.PET/CERES
3 CREAM (GRAM) TOPICAL AT BEDTIME
Refills: 0 | Status: DISCONTINUED | OUTPATIENT
Start: 2024-02-16 | End: 2024-02-23

## 2024-02-16 RX ORDER — LEVOTHYROXINE SODIUM 125 MCG
88 TABLET ORAL DAILY
Refills: 0 | Status: DISCONTINUED | OUTPATIENT
Start: 2024-02-16 | End: 2024-02-23

## 2024-02-16 RX ADMIN — SIMVASTATIN 40 MILLIGRAM(S): 20 TABLET, FILM COATED ORAL at 22:55

## 2024-02-16 RX ADMIN — APIXABAN 5 MILLIGRAM(S): 2.5 TABLET, FILM COATED ORAL at 16:42

## 2024-02-16 RX ADMIN — Medication 88 MICROGRAM(S): at 08:00

## 2024-02-16 RX ADMIN — Medication 25 MILLIGRAM(S): at 17:01

## 2024-02-16 RX ADMIN — Medication 1 APPLICATION(S): at 15:22

## 2024-02-16 NOTE — CONSULT NOTE ADULT - SUBJECTIVE AND OBJECTIVE BOX
Patient is a 83y old  Female who presents with a chief complaint of 3rd Metatarsal OM for surgery (16 Feb 2024 07:40)      HPI:   83F, from home,  PMHx AFIB on eliquis, HTN, HLD, chronic right plantar foot ulcer/OM admitted for IV abx for worsening acute on chronic OM of the right foot. Of note, she was recently admitted from 1/1/-1/26, had 5 days of meropenem, had a MRI on 1/23 showing: "Osteomyelitis of the second metatarsal stump and third metatarsal head and shaft. Early osteomyelitis of the third proximal phalanx. Marked edema and postcontrast enhancement is seen throughout the foot with locules of air extending to the dorsum of the foot. No mature, drainable, or enhancing collection is seen at this time."  S/P 10 dyas IV ABX and transferred  for 3rd Metatarsal Surgery . (16 Feb 2024 07:40)      PAST MEDICAL & SURGICAL HISTORY:  HTN (hypertension)      Hyperlipidemia      Hypothyroid      Paroxysmal SVT (supraventricular tachycardia)      Thyroid cyst      Back problem  s/p back surgery      S/P thyroid surgery      S/P hernia surgery      S/P rotator cuff surgery          MEDICATIONS  (STANDING):  apixaban 5 milliGRAM(s) Oral two times a day  levothyroxine 88 MICROGram(s) Oral daily  metoprolol tartrate 25 milliGRAM(s) Oral every 12 hours  simvastatin 40 milliGRAM(s) Oral at bedtime    MEDICATIONS  (PRN):  acetaminophen     Tablet .. 650 milliGRAM(s) Oral every 6 hours PRN Temp greater or equal to 38C (100.4F), Mild Pain (1 - 3)  albuterol    90 MICROgram(s) HFA Inhaler 2 Puff(s) Inhalation every 6 hours PRN Shortness of Breath and/or Wheezing  aluminum hydroxide/magnesium hydroxide/simethicone Suspension 30 milliLiter(s) Oral every 4 hours PRN Dyspepsia  melatonin 3 milliGRAM(s) Oral at bedtime PRN Insomnia  ondansetron Injectable 4 milliGRAM(s) IV Push every 8 hours PRN Nausea and/or Vomiting      Allergies    linezolid (Unknown)  Cipro (Other)  doxycycline (Unknown)    Intolerances    Levaquin (Stomach Upset; Nausea)      VITALS:    Vital Signs Last 24 Hrs  T(C): 36.6 (16 Feb 2024 10:41), Max: 36.7 (16 Feb 2024 01:30)  T(F): 97.9 (16 Feb 2024 10:41), Max: 98 (16 Feb 2024 01:30)  HR: 63 (16 Feb 2024 10:41) (63 - 79)  BP: 102/83 (16 Feb 2024 10:41) (102/83 - 140/56)  BP(mean): 84 (16 Feb 2024 07:40) (84 - 84)  RR: 18 (16 Feb 2024 10:41) (16 - 18)  SpO2: 100% (16 Feb 2024 10:41) (96% - 100%)    Parameters below as of 16 Feb 2024 06:00  Patient On (Oxygen Delivery Method): room air        LABS:                          10.1   5.70  )-----------( 255      ( 16 Feb 2024 06:10 )             30.2       02-16    139  |  101  |  26<H>  ----------------------------<  82  4.1   |  23  |  0.81    Ca    9.4      16 Feb 2024 06:10  Phos  3.8     02-16  Mg     2.10     02-16    TPro  6.5  /  Alb  3.5  /  TBili  0.3  /  DBili  x   /  AST  25  /  ALT  14  /  AlkPhos  88  02-16      CAPILLARY BLOOD GLUCOSE          PT/INR - ( 16 Feb 2024 06:10 )   PT: 13.2 sec;   INR: 1.18 ratio         PTT - ( 16 Feb 2024 06:10 )  PTT:41.9 sec    LOWER EXTREMITY PHYSICAL EXAM:  Vascular: DP/PT 2/4, B/L, CFT <3 seconds B/L, Temperature gradient warm to warm, B/L.   Neuro: Epicritic sensation diminished  to the level of digits , B/L.  Musculoskeletal/Ortho: unremarkable   Skin: s/p Right foot partial second ray resection, healed, sub met head 2 wound to subQ, granular wound bed, 0.2 undermining circumferentially no pus or drainage noted. L foot sub met head 1 wound to dermis with hyperkeratosis noted, no signs of infection     RADIOLOGY & ADDITIONAL STUDIES:     Patient is a 83y old  Female who presents with a chief complaint of 3rd Metatarsal OM for surgery (16 Feb 2024 07:40)      HPI:   83F, from home,  PMHx AFIB on eliquis, HTN, HLD, chronic right plantar foot ulcer/OM admitted for IV abx for worsening acute on chronic OM of the right foot. Of note, she was recently admitted from 1/1/-1/26, had 5 days of meropenem, had a MRI on 1/23 showing: "Osteomyelitis of the second metatarsal stump and third metatarsal head and shaft. Early osteomyelitis of the third proximal phalanx. Marked edema and postcontrast enhancement is seen throughout the foot with locules of air extending to the dorsum of the foot. No mature, drainable, or enhancing collection is seen at this time."  S/P 10 dyas IV ABX and transferred  for 3rd Metatarsal Surgery . (16 Feb 2024 07:40)      PAST MEDICAL & SURGICAL HISTORY:  HTN (hypertension)      Hyperlipidemia      Hypothyroid      Paroxysmal SVT (supraventricular tachycardia)      Thyroid cyst      Back problem  s/p back surgery      S/P thyroid surgery      S/P hernia surgery      S/P rotator cuff surgery          MEDICATIONS  (STANDING):  apixaban 5 milliGRAM(s) Oral two times a day  levothyroxine 88 MICROGram(s) Oral daily  metoprolol tartrate 25 milliGRAM(s) Oral every 12 hours  simvastatin 40 milliGRAM(s) Oral at bedtime    MEDICATIONS  (PRN):  acetaminophen     Tablet .. 650 milliGRAM(s) Oral every 6 hours PRN Temp greater or equal to 38C (100.4F), Mild Pain (1 - 3)  albuterol    90 MICROgram(s) HFA Inhaler 2 Puff(s) Inhalation every 6 hours PRN Shortness of Breath and/or Wheezing  aluminum hydroxide/magnesium hydroxide/simethicone Suspension 30 milliLiter(s) Oral every 4 hours PRN Dyspepsia  melatonin 3 milliGRAM(s) Oral at bedtime PRN Insomnia  ondansetron Injectable 4 milliGRAM(s) IV Push every 8 hours PRN Nausea and/or Vomiting      Allergies    linezolid (Unknown)  Cipro (Other)  doxycycline (Unknown)    Intolerances    Levaquin (Stomach Upset; Nausea)      VITALS:    Vital Signs Last 24 Hrs  T(C): 36.6 (16 Feb 2024 10:41), Max: 36.7 (16 Feb 2024 01:30)  T(F): 97.9 (16 Feb 2024 10:41), Max: 98 (16 Feb 2024 01:30)  HR: 63 (16 Feb 2024 10:41) (63 - 79)  BP: 102/83 (16 Feb 2024 10:41) (102/83 - 140/56)  BP(mean): 84 (16 Feb 2024 07:40) (84 - 84)  RR: 18 (16 Feb 2024 10:41) (16 - 18)  SpO2: 100% (16 Feb 2024 10:41) (96% - 100%)    Parameters below as of 16 Feb 2024 06:00  Patient On (Oxygen Delivery Method): room air        LABS:                          10.1   5.70  )-----------( 255      ( 16 Feb 2024 06:10 )             30.2       02-16    139  |  101  |  26<H>  ----------------------------<  82  4.1   |  23  |  0.81    Ca    9.4      16 Feb 2024 06:10  Phos  3.8     02-16  Mg     2.10     02-16    TPro  6.5  /  Alb  3.5  /  TBili  0.3  /  DBili  x   /  AST  25  /  ALT  14  /  AlkPhos  88  02-16      CAPILLARY BLOOD GLUCOSE          PT/INR - ( 16 Feb 2024 06:10 )   PT: 13.2 sec;   INR: 1.18 ratio         PTT - ( 16 Feb 2024 06:10 )  PTT:41.9 sec    LOWER EXTREMITY PHYSICAL EXAM:  Vascular: DP/PT 2/4, B/L, CFT <3 seconds B/L, Temperature gradient warm to warm, B/L.   Neuro: Epicritic sensation diminished  to the level of digits , B/L.  Musculoskeletal/Ortho: 12/8 s/p RF partial 2nd ray resection, healed.   Skin: s/p Right foot partial second ray resection, healed, sub met head 2 wound to subQ, granular wound bed, 0.2 undermining circumferentially no pus or drainage noted. L foot sub met head 1 wound to dermis with hyperkeratosis noted, no signs of infection     RADIOLOGY & ADDITIONAL STUDIES:

## 2024-02-16 NOTE — DISCHARGE NOTE NURSING/CASE MANAGEMENT/SOCIAL WORK - NSDCPEFALRISK_GEN_ALL_CORE
For information on Fall & Injury Prevention, visit: [email or web address] OR  [email or web address] OR  https://www.cdc.gov/steadi/patient.html

## 2024-02-16 NOTE — CONSULT NOTE ADULT - ASSESSMENT
83 y.o F right foot sub 2/3 metatarsal head wound to subq  - Pt seen and evaluated  - Afebrile, no leukocytosis   - s/p Right foot partial second ray resection, healed, sub met head 2 wound to subQ, granular wound bed, 0.2 undermining circumferentially no pus or drainage noted. L foot sub met head 1 wound to dermis with hyperkeratosis noted, no signs of infection   - R foot XR: no gas no OM  - RF MRI 1/25: OM of 3rd met head, 3rd prox phalanx and 2nd distal metatarsal stump  - Debrided all hyperkeratotic tissue from wound bed on right foot  - Pod plan; right foot transmetatarsal amputation next week   - Please document medical clearance or podiatry surgery   - Discussed with attending    83 y.o F right foot sub 2/3 metatarsal head wound to subq  - Pt seen and evaluated  - Afebrile, no leukocytosis   - s/p Right foot partial second ray resection, healed, sub met head 2 wound to subQ, granular wound bed, 0.2 undermining circumferentially no pus or drainage noted. L foot sub met head 1 wound to dermis with hyperkeratosis noted, no signs of infection   - R foot XR: no gas no OM  - RF MRI 1/25: OM of 3rd met head, 3rd prox phalanx and 2nd distal metatarsal stump  - Debrided all hyperkeratotic tissue from wound bed on right foot  - Rec ID c/s  - Pod plan; right foot transmetatarsal amputation next week   - Please document medical clearance or podiatry surgery   - Discussed with attending    83 y.o F right foot sub 2/3 metatarsal head wound to subq  - Pt seen and evaluated  - Afebrile, no leukocytosis   - s/p Right foot partial second ray resection, healed, sub met head 2 wound to subQ, granular wound bed, 0.2 undermining circumferentially no pus or drainage noted. L foot sub met head 1 wound to dermis with hyperkeratosis noted, no signs of infection   - R foot XR: no gas no OM  - RF MRI 1/25: OM of 3rd met head, 3rd prox phalanx and 2nd distal metatarsal stump  - Debrided all hyperkeratotic tissue from wound bed on right foot  - Rec ID c/s  - Pod plan; right foot transmetatarsal amputation next week   - Please document medical clearance for podiatry surgery   - Discussed with attending

## 2024-02-16 NOTE — H&P ADULT - ASSESSMENT
83F, from home,  PMHx AFIB on eliquis, HTN, HLD, chronic right plantar foot ulcer/OM admitted for IV abx for worsening acute on chronic OM of the right foot. Of note, she was recently admitted from 1/1/-1/26, had 5 days of meropenem, had a MRI on 1/23 showing: "Osteomyelitis of the second metatarsal stump and third metatarsal head and shaft. Early osteomyelitis of the third proximal phalanx. Marked edema and postcontrast enhancement is seen throughout the foot with locules of air extending to the dorsum of the foot. No mature, drainable, or enhancing collection is seen at this time."  S/P 10 dyas IV ABX and transferred  for 3rd Metatarsal Surgery .  83F, from home,  PMHx AFIB on eliquis, HTN, HLD, chronic right plantar foot ulcer/OM admitted for IV abx for worsening acute on chronic OM of the right foot. Of note, she was recently admitted from 1/1/-1/26, had 5 days of meropenem, had a MRI on 1/23 showing: "Osteomyelitis of the second metatarsal stump and third metatarsal head and shaft. Early osteomyelitis of the third proximal phalanx. Marked edema and postcontrast enhancement is seen throughout the foot with locules of air extending to the dorsum of the foot. No mature, drainable, or enhancing collection is seen at this time."  S/P 10 dyas IV ABX and transferred  for 3rd Metatarsal Surgery .    #Acute on chronic right plantar foot OM with ESBL infection   - WBC WNL, elevated ESR/CRP, s/p 10 days meropenem  - Ulcer culture w/ ESBL sensitive to gladys, blood cultures negative   - Fall precautions  - PT eval - declined, ambulates with cane, activity as tolerated  - Podiatry will follow and planning surgery next week.       #Paroxysmal A Fib, rate controlled, on long term anticoagulation  #HTN, controlled  - C/w metoprolol tartrate 25mg q 12hrs, eliquis 5mg BID  -Will hold Eliquis 48hrs before surgery .     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  - Upon review of the chart, CT chest report from 12/21/23  - Approximately 4.6 cm sized left upper lobe mass concerning for a primary pulmonary malignancy.  - Pt aware, doesn't want intervention during this admission  - Recommendation for outpatient oncology/pulm F/U for dx/management    VTE ppx: Eliquis  DISP: Pending transfer

## 2024-02-16 NOTE — H&P ADULT - HISTORY OF PRESENT ILLNESS
83F, from home,  PMHx AFIB on eliquis, HTN, HLD, chronic right plantar foot ulcer/OM admitted for IV abx for worsening acute on chronic OM of the right foot. Of note, she was recently admitted from 1/1/-1/26, had 5 days of meropenem, had a MRI on 1/23 showing: "Osteomyelitis of the second metatarsal stump and third metatarsal head and shaft. Early osteomyelitis of the third proximal phalanx. Marked edema and postcontrast enhancement is seen throughout the foot with locules of air extending to the dorsum of the foot. No mature, drainable, or enhancing collection is seen at this time."  S/P 10 dyas IV ABX and transferred  for 3rd Metatarsal Surgery .

## 2024-02-16 NOTE — DISCHARGE NOTE NURSING/CASE MANAGEMENT/SOCIAL WORK - PATIENT PORTAL LINK FT
You can access the FollowMyHealth Patient Portal offered by [facility] by registering at the following website: [email or web address]. By joining [facility] [employer] [facility]’s FollowMyHealth portal, you will also be able to view your health information using other applications (apps) compatible with our system.

## 2024-02-16 NOTE — PATIENT PROFILE ADULT - FALL HARM RISK - HARM RISK INTERVENTIONS

## 2024-02-17 LAB
ANION GAP SERPL CALC-SCNC: 14 MMOL/L — SIGNIFICANT CHANGE UP (ref 7–14)
BUN SERPL-MCNC: 22 MG/DL — SIGNIFICANT CHANGE UP (ref 7–23)
CALCIUM SERPL-MCNC: 9.2 MG/DL — SIGNIFICANT CHANGE UP (ref 8.4–10.5)
CHLORIDE SERPL-SCNC: 103 MMOL/L — SIGNIFICANT CHANGE UP (ref 98–107)
CO2 SERPL-SCNC: 23 MMOL/L — SIGNIFICANT CHANGE UP (ref 22–31)
CREAT SERPL-MCNC: 0.76 MG/DL — SIGNIFICANT CHANGE UP (ref 0.5–1.3)
EGFR: 77 ML/MIN/1.73M2 — SIGNIFICANT CHANGE UP
GLUCOSE SERPL-MCNC: 67 MG/DL — LOW (ref 70–99)
HCT VFR BLD CALC: 32.4 % — LOW (ref 34.5–45)
HGB BLD-MCNC: 10.7 G/DL — LOW (ref 11.5–15.5)
MCHC RBC-ENTMCNC: 31.7 PG — SIGNIFICANT CHANGE UP (ref 27–34)
MCHC RBC-ENTMCNC: 33 GM/DL — SIGNIFICANT CHANGE UP (ref 32–36)
MCV RBC AUTO: 95.9 FL — SIGNIFICANT CHANGE UP (ref 80–100)
NRBC # BLD: 0 /100 WBCS — SIGNIFICANT CHANGE UP (ref 0–0)
NRBC # FLD: 0 K/UL — SIGNIFICANT CHANGE UP (ref 0–0)
PLATELET # BLD AUTO: 262 K/UL — SIGNIFICANT CHANGE UP (ref 150–400)
POTASSIUM SERPL-MCNC: 4 MMOL/L — SIGNIFICANT CHANGE UP (ref 3.5–5.3)
POTASSIUM SERPL-SCNC: 4 MMOL/L — SIGNIFICANT CHANGE UP (ref 3.5–5.3)
RBC # BLD: 3.38 M/UL — LOW (ref 3.8–5.2)
RBC # FLD: 13.4 % — SIGNIFICANT CHANGE UP (ref 10.3–14.5)
SODIUM SERPL-SCNC: 140 MMOL/L — SIGNIFICANT CHANGE UP (ref 135–145)
WBC # BLD: 5.25 K/UL — SIGNIFICANT CHANGE UP (ref 3.8–10.5)
WBC # FLD AUTO: 5.25 K/UL — SIGNIFICANT CHANGE UP (ref 3.8–10.5)

## 2024-02-17 RX ADMIN — Medication 25 MILLIGRAM(S): at 06:24

## 2024-02-17 RX ADMIN — Medication 1 APPLICATION(S): at 09:12

## 2024-02-17 RX ADMIN — SIMVASTATIN 40 MILLIGRAM(S): 20 TABLET, FILM COATED ORAL at 21:59

## 2024-02-17 RX ADMIN — Medication 88 MICROGRAM(S): at 05:07

## 2024-02-17 RX ADMIN — APIXABAN 5 MILLIGRAM(S): 2.5 TABLET, FILM COATED ORAL at 16:30

## 2024-02-17 RX ADMIN — APIXABAN 5 MILLIGRAM(S): 2.5 TABLET, FILM COATED ORAL at 06:24

## 2024-02-17 RX ADMIN — Medication 25 MILLIGRAM(S): at 17:00

## 2024-02-17 NOTE — CONSULT NOTE ADULT - ASSESSMENT
Briefly this is an 83W w/ PMHx of Afib, HTN, HLD, chronic right plantar foot ulcer/OM admitted for IV abx for worsening acute on chronic OM of the right foot.   Pt was at [facility] and received meropenem x10 day course as prior ulcer cx showed ESBL E. coli. Now txferred to [facility] for 3rd Metatarsal Surgery scheduled on 2/21  Pt being seen by NSID group at [facility]    Recommendations:   Appreciate podiatry recs, planned for intervention on 2/21  If patient continues to remain clinically stable, would monitor off Abx prior to surgical intervention to increase tissue cx yield to further guide therapy  When pt goes to OR, please obtain tissue cx and path  Once pt is out of OR, will likely resume meropenem pending those results    Full consult to follow  Please call with any immediate questions.   Ember Carrillo M.D.  [facility] Division of Infectious Diseases [phone]  For after 5 P.M. and weekends, please call [phone]

## 2024-02-17 NOTE — CONSULT NOTE ADULT - SUBJECTIVE AND OBJECTIVE BOX
[facility], Division of Infectious Diseases  FAIZAN Yeung S. Shah, DORIAN Rdz [facility]  [phone]    KASSIE LOPEZ  83y, Female  [id number]    Chart reviewed    Microbiology: reviewed    Culture - Other (collected 02-06-24 @ 15:00)  Source: Wound Wound  Final Report (02-08-24 @ 18:57):    Normal skin zena isolated    Culture - Other (collected 02-05-24 @ 12:20)  Source: Ulcer Sp. Instructions_Additional Info: PLEASE COLLECT CULTURE FROM RIGHT PLANTAR ULCER  Final Report (02-07-24 @ 08:39):    Few Escherichia coli ESBL    Normal zena isolated  Organism: Escherichia coli ESBL (02-07-24 @ 08:39)  Organism: Escherichia coli ESBL (02-07-24 @ 08:39)      -  Levofloxacin: R >4      -  Tobramycin: R 8      -  Aztreonam: R >16      -  Gentamicin: R >8      -  Cefazolin: R >16      -  Cefepime: R >16      -  Piperacillin/Tazobactam: R <=8      -  Ciprofloxacin: R >2      -  Imipenem: S <=1      -  Ceftriaxone: R >32      -  Ampicillin: R >16 These ampicillin results predict results for amoxicillin      Method Type: JEANINE      -  Meropenem: S <=1      -  Ampicillin/Sulbactam: R 8/4      -  Amoxicillin/Clavulanic Acid: S <=8/4      -  Trimethoprim/Sulfamethoxazole: R >2/38      -  Ertapenem: S <=0.5    Culture - Blood (collected 02-05-24 @ 09:35)  Source: .Blood Blood-Peripheral  Final Report (02-10-24 @ 14:01):    No growth at 5 days    Culture - Blood (collected 02-05-24 @ 09:35)  Source: .Blood Blood-Peripheral  Final Report (02-10-24 @ 14:01):    No growth at 5 days          Radiology: reviewed

## 2024-02-17 NOTE — PROGRESS NOTE ADULT - ASSESSMENT
83F, from home,  PMHx AFIB on eliquis, HTN, HLD, chronic right plantar foot ulcer/OM admitted for IV abx for worsening acute on chronic OM of the right foot. Of note, she was recently admitted from 1/1/-1/26, had 5 days of meropenem, had a MRI on 1/23 showing: "Osteomyelitis of the second metatarsal stump and third metatarsal head and shaft. Early osteomyelitis of the third proximal phalanx. Marked edema and postcontrast enhancement is seen throughout the foot with locules of air extending to the dorsum of the foot. No mature, drainable, or enhancing collection is seen at this time."  S/P 10 dyas IV ABX and transferred  for 3rd Metatarsal Surgery .    #Acute on chronic right plantar foot OM with ESBL infection   - WBC WNL, elevated ESR/CRP, s/p 10 days meropenem  - Ulcer culture w/ ESBL sensitive to gladys, blood cultures negative   - Fall precautions  - PT eval - declined, ambulates with cane, activity as tolerated  - Podiatry will follow and planning  Right foot partial 3rd ray amputation Wed 2/21 at 7:30am.      #Paroxysmal A Fib, rate controlled, on long term anticoagulation  #HTN, controlled  - C/w metoprolol tartrate 25mg q 12hrs, eliquis 5mg BID  -Will hold Eliquis 48hrs before surgery . Will start heparin for AC.    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  - Upon review of the chart, CT chest report from 12/21/23  - Approximately 4.6 cm sized left upper lobe mass concerning for a primary pulmonary malignancy.  - Pt aware, doesn't want intervention during this admission  - Recommendation for outpatient oncology/pulm F/U for dx/management    VTE ppx: Eliquis    Medically optimized for surgery .

## 2024-02-17 NOTE — PROGRESS NOTE ADULT - ASSESSMENT
83 F presents  Right foot submet 3 wound to bone  - Pt seen and evaluated  - Afebrile, no leukocytosis   - Right foot submet 3 wound to bone, sanguinous drainage, no malodor, no pus, no erythema no tracking or tunneling. Left foot no open wounds or lesions, no acute signs of infection.  - Right foot XR: no gas, no OM  - RF MRI 1/25: OM of 3rd met head, 3rd prox phalanx and 2nd distal metatarsal stump  - Recommend ID consult  - Pod plan; Right foot partial 3rd ray amputation Wed 2/21 at 7:30am with Dr. Oliveros  - Please document medical and cardiac clearance for podiatry surgery under anesthesia  - Seen with attending  83 F presents  Right foot submet 3 wound to bone  - Pt seen and evaluated  - Afebrile, no leukocytosis   - Right foot submet 3 wound to bone, sanguinous drainage, no malodor, no pus, no erythema no tracking or tunneling. Left foot no open wounds or lesions, no acute signs of infection.  - Right foot XR: no gas, no OM  - RF MRI 1/25: OM of 3rd met head, 3rd prox phalanx and 2nd distal metatarsal stump  - Recommend ID consult  - Recommend IV Unasyn  - Pod plan; Right foot partial 3rd ray amputation Wed 2/21 at 7:30am with Dr. Oliveros  - Please document medical and cardiac clearance for podiatry surgery under anesthesia  - Seen with attending

## 2024-02-18 LAB
ANION GAP SERPL CALC-SCNC: 11 MMOL/L — SIGNIFICANT CHANGE UP (ref 7–14)
BUN SERPL-MCNC: 18 MG/DL — SIGNIFICANT CHANGE UP (ref 7–23)
CALCIUM SERPL-MCNC: 9.6 MG/DL — SIGNIFICANT CHANGE UP (ref 8.4–10.5)
CHLORIDE SERPL-SCNC: 103 MMOL/L — SIGNIFICANT CHANGE UP (ref 98–107)
CO2 SERPL-SCNC: 24 MMOL/L — SIGNIFICANT CHANGE UP (ref 22–31)
CREAT SERPL-MCNC: 0.75 MG/DL — SIGNIFICANT CHANGE UP (ref 0.5–1.3)
EGFR: 78 ML/MIN/1.73M2 — SIGNIFICANT CHANGE UP
GLUCOSE SERPL-MCNC: 77 MG/DL — SIGNIFICANT CHANGE UP (ref 70–99)
HCT VFR BLD CALC: 33.5 % — LOW (ref 34.5–45)
HGB BLD-MCNC: 11.2 G/DL — LOW (ref 11.5–15.5)
MAGNESIUM SERPL-MCNC: 2.1 MG/DL — SIGNIFICANT CHANGE UP (ref 1.6–2.6)
MCHC RBC-ENTMCNC: 32.7 PG — SIGNIFICANT CHANGE UP (ref 27–34)
MCHC RBC-ENTMCNC: 33.4 GM/DL — SIGNIFICANT CHANGE UP (ref 32–36)
MCV RBC AUTO: 97.7 FL — SIGNIFICANT CHANGE UP (ref 80–100)
NRBC # BLD: 0 /100 WBCS — SIGNIFICANT CHANGE UP (ref 0–0)
NRBC # FLD: 0 K/UL — SIGNIFICANT CHANGE UP (ref 0–0)
PHOSPHATE SERPL-MCNC: 4 MG/DL — SIGNIFICANT CHANGE UP (ref 2.5–4.5)
PLATELET # BLD AUTO: 237 K/UL — SIGNIFICANT CHANGE UP (ref 150–400)
POTASSIUM SERPL-MCNC: 3.9 MMOL/L — SIGNIFICANT CHANGE UP (ref 3.5–5.3)
POTASSIUM SERPL-SCNC: 3.9 MMOL/L — SIGNIFICANT CHANGE UP (ref 3.5–5.3)
RBC # BLD: 3.43 M/UL — LOW (ref 3.8–5.2)
RBC # FLD: 13.6 % — SIGNIFICANT CHANGE UP (ref 10.3–14.5)
SODIUM SERPL-SCNC: 138 MMOL/L — SIGNIFICANT CHANGE UP (ref 135–145)
WBC # BLD: 4.85 K/UL — SIGNIFICANT CHANGE UP (ref 3.8–10.5)
WBC # FLD AUTO: 4.85 K/UL — SIGNIFICANT CHANGE UP (ref 3.8–10.5)

## 2024-02-18 RX ADMIN — Medication 25 MILLIGRAM(S): at 07:03

## 2024-02-18 RX ADMIN — APIXABAN 5 MILLIGRAM(S): 2.5 TABLET, FILM COATED ORAL at 07:03

## 2024-02-18 RX ADMIN — Medication 1 APPLICATION(S): at 07:49

## 2024-02-18 RX ADMIN — SIMVASTATIN 40 MILLIGRAM(S): 20 TABLET, FILM COATED ORAL at 22:03

## 2024-02-18 RX ADMIN — Medication 25 MILLIGRAM(S): at 17:00

## 2024-02-18 RX ADMIN — Medication 88 MICROGRAM(S): at 07:03

## 2024-02-18 NOTE — CONSULT NOTE ADULT - REASON FOR ADMISSION
3rd Metatarsal OM for surgery

## 2024-02-18 NOTE — CONSULT NOTE ADULT - ASSESSMENT
Briefly this is an 83W w/ PMHx of Afib, HTN, HLD, chronic right plantar foot ulcer/OM admitted for IV abx for worsening acute on chronic OM of the right foot.   Pt was at [facility] and received meropenem/ertapenem x10 day course (completed 2/15) on as prior ulcer cx showed ESBL E. coli.   Now txferred to [facility] for 3rd Metatarsal Surgery scheduled on 2/21  Afebrile, no leukocytosis and otherwise stable     Recommendations:   Appreciate podiatry recs, planned for intervention on 2/21  If patient continues to remain clinically stable, would monitor off Abx prior to surgical intervention to increase tissue cx yield to further guide therapy  When pt goes to OR, please obtain tissue cx and path w/ clean margins  Once pt is out of OR, will likely resume abx therapy pending those results    Dr. Elizondo to resume care 2/19  Infectious Diseases will continue to follow. Please call with any questions.   Ember Carrillo M.D.  [facility] Division of Infectious Diseases [phone]  For after 5 P.M. and weekends, please call [phone]

## 2024-02-18 NOTE — PROGRESS NOTE ADULT - ASSESSMENT
83 F presents with Right foot submet 3 wound to bone  - Pt seen and evaluated  - Afebrile, no leukocytosis   - Right foot submet 3 wound to bone, sanguinous drainage, no malodor, no pus, no erythema no tracking or tunneling. Left foot no open wounds or lesions, no acute signs of infection.  - Right foot XR: no gas, no OM  - RF MRI 1/25: OM of 3rd met head, 3rd prox phalanx and 2nd distal metatarsal stump  - ID recs appreciated   - Pod plan; Right foot partial 3rd ray amputation Wed 2/21 at 7:30am with Dr. Oliveros  - Please document medical and cardiac clearance for podiatry surgery under anesthesia  - Discussed with attending

## 2024-02-18 NOTE — CONSULT NOTE ADULT - SUBJECTIVE AND OBJECTIVE BOX
Cardiovascular Disease Initial Evaluation  Date of Service: 02-18-24 @ 10:30    CHIEF COMPLAINT:    HISTORY OF PRESENT ILLNESS:     This is an 83 year old woman with paroxysmal a-fib, HTN, moderate aortic stenosis and HLD who presented to [facility] on 2/16/2024 for worsening acute on chronic OM of the right foot. Of note, she was recently admitted from 1/1/-1/26, had 5 days of meropenem, had a MRI on 1/23 showing: "Osteomyelitis of the second metatarsal stump and third metatarsal head and shaft. Early osteomyelitis of the third proximal phalanx. Marked edema and postcontrast enhancement is seen throughout the foot with locules of air extending to the dorsum of the foot. No mature, drainable, or enhancing collection is seen at this time."  S/P 10 dyas IV ABX and transferred  for 3rd Metatarsal Surgery .    She reports that she swims and exercises regularly without chest pain or SOB.       Allergies    linezolid (Unknown)  Cipro (Other)  doxycycline (Unknown)    Intolerances    Levaquin (Stomach Upset; Nausea)  	    MEDICATIONS:  apixaban 5 milliGRAM(s) Oral two times a day  metoprolol tartrate 25 milliGRAM(s) Oral every 12 hours      albuterol    90 MICROgram(s) HFA Inhaler 2 Puff(s) Inhalation every 6 hours PRN    acetaminophen     Tablet .. 650 milliGRAM(s) Oral every 6 hours PRN  melatonin 3 milliGRAM(s) Oral at bedtime PRN  ondansetron Injectable 4 milliGRAM(s) IV Push every 8 hours PRN    aluminum hydroxide/magnesium hydroxide/simethicone Suspension 30 milliLiter(s) Oral every 4 hours PRN    levothyroxine 88 MICROGram(s) Oral daily  simvastatin 40 milliGRAM(s) Oral at bedtime    cadexomer iodine 0.9% Gel 1 Application(s) Topical daily      PAST MEDICAL & SURGICAL HISTORY:  HTN (hypertension)      Hyperlipidemia      Hypothyroid      Paroxysmal SVT (supraventricular tachycardia)      Thyroid cyst      Back problem  s/p back surgery      S/P thyroid surgery      S/P hernia surgery      S/P rotator cuff surgery          FAMILY HISTORY:  Family history of early CAD (Father)        SOCIAL HISTORY:    The patient is a nonsmoker       REVIEW OF SYSTEMS:  See HPI, otherwise complete 14 point review of systems negative      PHYSICAL EXAM:  T(C): 36.8 (02-18-24 @ 10:06), Max: 36.8 (02-18-24 @ 10:06)  HR: 60 (02-18-24 @ 10:06) (58 - 67)  BP: 144/66 (02-18-24 @ 10:06) (119/61 - 144/66)  RR: 18 (02-18-24 @ 10:06) (18 - 18)  SpO2: 100% (02-18-24 @ 10:06) (97% - 100%)  Wt(kg): --  I&O's Summary    17 Feb 2024 07:01  -  18 Feb 2024 07:00  --------------------------------------------------------  IN: 855 mL / OUT: 1200 mL / NET: -345 mL    18 Feb 2024 07:01  -  18 Feb 2024 10:30  --------------------------------------------------------  IN: 200 mL / OUT: 175 mL / NET: 25 mL        Appearance: No Acute Distress; resting comfortably  HEENT:  Normal oral mucosa, PERRL, EOMI	  Cardiovascular: Normal S1 S2, No JVD, systolic murmur  Respiratory: Normal respiratory effort; Lungs clear to auscultation bilaterally  Gastrointestinal:  Soft, Non-tender, + BS	  Skin: No rashes, No ecchymoses, No cyanosis	  Neurologic: Non-focal; no weakness  Extremities: No clubbing, cyanosis or edema  Vascular: Peripheral pulses palpable 2+ bilaterally  Psychiatry: A & O x 3, Mood & affect appropriate    Laboratory Data:	 	    CBC Full  -  ( 18 Feb 2024 07:28 )  WBC Count : 4.85 K/uL  Hemoglobin : 11.2 g/dL  Hematocrit : 33.5 %  Platelet Count - Automated : 237 K/uL  Mean Cell Volume : 97.7 fL  Mean Cell Hemoglobin : 32.7 pg  Mean Cell Hemoglobin Concentration : 33.4 gm/dL  Auto Neutrophil # : x  Auto Lymphocyte # : x  Auto Monocyte # : x  Auto Eosinophil # : x  Auto Basophil # : x  Auto Neutrophil % : x  Auto Lymphocyte % : x  Auto Monocyte % : x  Auto Eosinophil % : x  Auto Basophil % : x    02-18    138  |  103  |  18  ----------------------------<  77  3.9   |  24  |  0.75  02-17    140  |  103  |  22  ----------------------------<  67<L>  4.0   |  23  |  0.76    Ca    9.6      18 Feb 2024 07:28  Ca    9.2      17 Feb 2024 05:00  Phos  4.0     02-18  Mg     2.10     02-18          Interpretation of Telemetry: n/a	    ECG:  	Sinus with a pre-existing LBBB    Assessment: 83 year old woman with paroxysmal a-fib, HTN, moderate aortic stenosis and HLD presents for foot surgery.     Plan of Care:    #Pre-operative cardiac risk evaluation-  Ms. Trujillo displays no signs or symptoms of coronary ischemia or CHF.  EKG shows sinus with a pre-existing LBBB.  Recent echo with no acute findings.  The patient is optimized from a cardiac standpoint to proceed with podiatric intervention.   Please continue metoprolol throughout the josh-operative period.    #Paroxysmal a-fib-  A-fib noted on EKG 1/18/2024.  Most recent EKG is sinus.  No objection to holding Eliquis 2-3 days prior to surgery.    #Moderate aortic stenosis-  No indication for intervention at this time.     76 minutes spent on total encounter; more than 50% of the visit was spent counseling and/or coordinating care by the attending physician.   	  Fredy Parker MD [facility]  Cardiovascular Diseases  [phone]

## 2024-02-18 NOTE — CONSULT NOTE ADULT - SUBJECTIVE AND OBJECTIVE BOX
[facility], Division of Infectious Diseases  FAIZAN Yeung S. Shah, Y. Patel, G. [facility]  [phone]    KASSIE LOPEZ  83y, Female  [id number]    HPI--  HPI:   83F, from home,  PMHx AFIB on eliquis, HTN, HLD, chronic right plantar foot ulcer/OM admitted for IV abx for worsening acute on chronic OM of the right foot. Of note, she was recently admitted from 1/1/-1/26, had 5 days of meropenem, had a MRI on 1/23 showing: "Osteomyelitis of the second metatarsal stump and third metatarsal head and shaft. Early osteomyelitis of the third proximal phalanx. Marked edema and postcontrast enhancement is seen throughout the foot with locules of air extending to the dorsum of the foot. No mature, drainable, or enhancing collection is seen at this time."  S/P 10 dyas IV ABX and transferred  for 3rd Metatarsal Surgery . (16 Feb 2024 07:40)    Pt seen at bedside  Reporting no complaints  Chart reviewed--seen by podiatry plan for procedure 2/21    Active Medications--  acetaminophen     Tablet .. 650 milliGRAM(s) Oral every 6 hours PRN  albuterol    90 MICROgram(s) HFA Inhaler 2 Puff(s) Inhalation every 6 hours PRN  aluminum hydroxide/magnesium hydroxide/simethicone Suspension 30 milliLiter(s) Oral every 4 hours PRN  apixaban 5 milliGRAM(s) Oral two times a day  cadexomer iodine 0.9% Gel 1 Application(s) Topical daily  levothyroxine 88 MICROGram(s) Oral daily  melatonin 3 milliGRAM(s) Oral at bedtime PRN  metoprolol tartrate 25 milliGRAM(s) Oral every 12 hours  ondansetron Injectable 4 milliGRAM(s) IV Push every 8 hours PRN  simvastatin 40 milliGRAM(s) Oral at bedtime    Antimicrobials:     Immunologic:     ROS:  CONSTITUTIONAL: No fevers or chills. No weakness or headache. No weight changes.  EYES/ENT: No visual or hearing changes. No sore throat or throat pain .  NECK: No pain or stiffness  RESPIRATORY: No cough, wheezing, or hemoptysis. No shortness of breath  CARDIOVASCULAR: No chest pain or palpitations  GASTROINTESTINAL: No abdominal pain. No nausea or vomiting. No diarrhea or constipation.  GENITOURINARY: No dysuria, frequency or hematuria  NEUROLOGICAL: No numbness or weakness  SKIN: No itching or rashes  PSYCHIATRIC: Pleasant. Appropriate affect    Allergies: linezolid (Unknown)  Cipro (Other)  doxycycline (Unknown)    PMH -- HTN (hypertension)    Hyperlipidemia    Hypothyroid    Paroxysmal SVT (supraventricular tachycardia)    Thyroid cyst      PSH -- Back problem    S/P thyroid surgery    S/P hernia surgery    S/P rotator cuff surgery      FH -- Family history of early CAD (Father)      Social History --  EtOH: denies   Tobacco: denies   Drug Use: denies     Travel/Environmental/Occupational History:    Physical Exam--  Vital Signs Last 24 Hrs  T(F): 97.4 (18 Feb 2024 07:01), Max: 98.2 (17 Feb 2024 10:25)  HR: 65 (18 Feb 2024 07:01) (58 - 67)  BP: 139/60 (18 Feb 2024 07:01) (119/61 - 140/69)  RR: 18 (18 Feb 2024 07:01) (18 - 18)  SpO2: 100% (18 Feb 2024 07:01) (95% - 100%)  General: nontoxic-appearing, no acute distress  HEENT: NC/AT, EOMI,   Lungs: Clear bilaterally without rales, wheezing or rhonchi  Heart: Regular rate and rhythm. No murmur, rub or gallop.  Abdomen: Soft. Nondistended. Nontender.   Extremities: dressing c/d/i  Skin: Warm. Dry. Good turgor.     Laboratory & Imaging Data:  CBC:                       11.2   4.85  )-----------( 237      ( 18 Feb 2024 07:28 )             33.5     CMP: 02-17    140  |  103  |  22  ----------------------------<  67<L>  4.0   |  23  |  0.76    Ca    9.2      17 Feb 2024 05:00        Urinalysis Basic - ( 17 Feb 2024 05:00 )    Color: x / Appearance: x / SG: x / pH: x  Gluc: 67 mg/dL / Ketone: x  / Bili: x / Urobili: x   Blood: x / Protein: x / Nitrite: x   Leuk Esterase: x / RBC: x / WBC x   Sq Epi: x / Non Sq Epi: x / Bacteria: x        Microbiology: reviewed    Culture - Other (collected 02-06-24 @ 15:00)  Source: Wound Wound  Final Report (02-08-24 @ 18:57):    Normal skin zena isolated    Culture - Other (collected 02-05-24 @ 12:20)  Source: Ulcer Sp. Instructions_Additional Info: PLEASE COLLECT CULTURE FROM RIGHT PLANTAR ULCER  Final Report (02-07-24 @ 08:39):    Few Escherichia coli ESBL    Normal zena isolated  Organism: Escherichia coli ESBL (02-07-24 @ 08:39)  Organism: Escherichia coli ESBL (02-07-24 @ 08:39)      Method Type: JEANINE      -  Amoxicillin/Clavulanic Acid: S <=8/4      -  Ampicillin: R >16 These ampicillin results predict results for amoxicillin      -  Ampicillin/Sulbactam: R 8/4      -  Aztreonam: R >16      -  Cefazolin: R >16      -  Cefepime: R >16      -  Ceftriaxone: R >32      -  Ciprofloxacin: R >2      -  Ertapenem: S <=0.5      -  Gentamicin: R >8      -  Imipenem: S <=1      -  Levofloxacin: R >4      -  Meropenem: S <=1      -  Piperacillin/Tazobactam: R <=8      -  Tobramycin: R 8      -  Trimethoprim/Sulfamethoxazole: R >2/38    Culture - Blood (collected 02-05-24 @ 09:35)  Source: .Blood Blood-Peripheral  Final Report (02-10-24 @ 14:01):    No growth at 5 days    Culture - Blood (collected 02-05-24 @ 09:35)  Source: .Blood Blood-Peripheral  Final Report (02-10-24 @ 14:01):    No growth at 5 days          Radiology: reviewed

## 2024-02-18 NOTE — PROGRESS NOTE ADULT - ASSESSMENT
83F, from home,  PMHx AFIB on eliquis, HTN, HLD, chronic right plantar foot ulcer/OM admitted for IV abx for worsening acute on chronic OM of the right foot. Of note, she was recently admitted from 1/1/-1/26, had 5 days of meropenem, had a MRI on 1/23 showing: "Osteomyelitis of the second metatarsal stump and third metatarsal head and shaft. Early osteomyelitis of the third proximal phalanx. Marked edema and postcontrast enhancement is seen throughout the foot with locules of air extending to the dorsum of the foot. No mature, drainable, or enhancing collection is seen at this time."  S/P 10 dyas IV ABX and transferred  for 3rd Metatarsal Surgery .    #Acute on chronic right plantar foot OM with ESBL infection   - WBC WNL, elevated ESR/CRP, s/p 10 days meropenem  - Ulcer culture w/ ESBL sensitive to gladys, blood cultures negative   - Fall precautions  - PT eval - declined, ambulates with cane, activity as tolerated  - Podiatry will follow and planning  Right foot partial 3rd ray amputation Wed 2/21 at 7:30am.      #Paroxysmal A Fib, rate controlled, on long term anticoagulation  #HTN, controlled  - C/w metoprolol tartrate 25mg q 12hrs, eliquis 5mg BID  -Will hold Eliquis 48hrs before surgery . No need to start heparin per cards.    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  - Upon review of the chart, CT chest report from 12/21/23  - Approximately 4.6 cm sized left upper lobe mass concerning for a primary pulmonary malignancy.  - Pt aware, doesn't want intervention during this admission  - Recommendation for outpatient oncology/pulm F/U for dx/management    VTE ppx: Eliquis    Medically optimized for surgery .

## 2024-02-19 LAB
ANION GAP SERPL CALC-SCNC: 11 MMOL/L — SIGNIFICANT CHANGE UP (ref 7–14)
APTT BLD: 39.9 SEC — HIGH (ref 24.5–35.6)
BUN SERPL-MCNC: 21 MG/DL — SIGNIFICANT CHANGE UP (ref 7–23)
CALCIUM SERPL-MCNC: 9.5 MG/DL — SIGNIFICANT CHANGE UP (ref 8.4–10.5)
CHLORIDE SERPL-SCNC: 101 MMOL/L — SIGNIFICANT CHANGE UP (ref 98–107)
CO2 SERPL-SCNC: 25 MMOL/L — SIGNIFICANT CHANGE UP (ref 22–31)
CREAT SERPL-MCNC: 0.94 MG/DL — SIGNIFICANT CHANGE UP (ref 0.5–1.3)
EGFR: 59 ML/MIN/1.73M2 — LOW
GLUCOSE SERPL-MCNC: 68 MG/DL — LOW (ref 70–99)
GRAM STN FLD: ABNORMAL
HCT VFR BLD CALC: 33.6 % — LOW (ref 34.5–45)
HGB BLD-MCNC: 11.1 G/DL — LOW (ref 11.5–15.5)
INR BLD: 1.15 RATIO — SIGNIFICANT CHANGE UP (ref 0.85–1.18)
MAGNESIUM SERPL-MCNC: 2 MG/DL — SIGNIFICANT CHANGE UP (ref 1.6–2.6)
MCHC RBC-ENTMCNC: 31.6 PG — SIGNIFICANT CHANGE UP (ref 27–34)
MCHC RBC-ENTMCNC: 33 GM/DL — SIGNIFICANT CHANGE UP (ref 32–36)
MCV RBC AUTO: 95.7 FL — SIGNIFICANT CHANGE UP (ref 80–100)
NRBC # BLD: 0 /100 WBCS — SIGNIFICANT CHANGE UP (ref 0–0)
NRBC # FLD: 0 K/UL — SIGNIFICANT CHANGE UP (ref 0–0)
PHOSPHATE SERPL-MCNC: 4.1 MG/DL — SIGNIFICANT CHANGE UP (ref 2.5–4.5)
PLATELET # BLD AUTO: 243 K/UL — SIGNIFICANT CHANGE UP (ref 150–400)
POTASSIUM SERPL-MCNC: 3.8 MMOL/L — SIGNIFICANT CHANGE UP (ref 3.5–5.3)
POTASSIUM SERPL-SCNC: 3.8 MMOL/L — SIGNIFICANT CHANGE UP (ref 3.5–5.3)
PROTHROM AB SERPL-ACNC: 12.9 SEC — SIGNIFICANT CHANGE UP (ref 9.5–13)
RBC # BLD: 3.51 M/UL — LOW (ref 3.8–5.2)
RBC # FLD: 13.7 % — SIGNIFICANT CHANGE UP (ref 10.3–14.5)
SODIUM SERPL-SCNC: 137 MMOL/L — SIGNIFICANT CHANGE UP (ref 135–145)
SPECIMEN SOURCE: SIGNIFICANT CHANGE UP
WBC # BLD: 5.25 K/UL — SIGNIFICANT CHANGE UP (ref 3.8–10.5)
WBC # FLD AUTO: 5.25 K/UL — SIGNIFICANT CHANGE UP (ref 3.8–10.5)

## 2024-02-19 RX ADMIN — Medication 25 MILLIGRAM(S): at 05:54

## 2024-02-19 RX ADMIN — Medication 1 APPLICATION(S): at 17:47

## 2024-02-19 RX ADMIN — Medication 88 MICROGRAM(S): at 05:55

## 2024-02-19 RX ADMIN — SIMVASTATIN 40 MILLIGRAM(S): 20 TABLET, FILM COATED ORAL at 21:05

## 2024-02-19 NOTE — PROGRESS NOTE ADULT - ASSESSMENT
83 F presents with Right foot submet 3 wound to bone  - Pt seen and evaluated  - Afebrile, no leukocytosis   - Right foot submet 3 wound to bone, sanguinous drainage, no malodor, no pus, no erythema no tracking or tunneling. Left foot no open wounds or lesions, no acute signs of infection.  - Right foot XR: no gas, no OM  - RF MRI 1/25: OM of 3rd met head, 3rd prox phalanx and 2nd distal metatarsal stump  - ID recs, appreciated  - Pod plan: Right foot partial 3rd ray amputation Wed 2/21 at 7:30am with Dr. Oliveros  - medical and cardiac clearance for podiatry surgery under anesthesia 2/18, appreciated   - seen with attending 83 F presents with Right foot submet 3 wound to bone  - Pt seen and evaluated  - Afebrile, no leukocytosis   - Right foot submet 3 wound to bone, sanguinous drainage, no malodor, no pus, no erythema no tracking or tunneling. Left foot no open wounds or lesions, no acute signs of infection.  - Right foot XR: no gas, no OM  - RF MRI 1/25: OM of 3rd met head, 3rd prox phalanx and 2nd distal metatarsal stump  - ID recs, appreciated  - Rec Zflo offloaders at all times while pt in bed to prevent heel injury  - Pod plan: Right foot partial 3rd ray amputation Wed 2/21 at 7:30am with Dr. Oliveros  - Medical and cardiac clearance for podiatry surgery under anesthesia 2/18, appreciated   - Seen with attending

## 2024-02-19 NOTE — PROGRESS NOTE ADULT - ASSESSMENT
Briefly this is an 83W w/ PMHx of Afib, HTN, HLD, chronic right plantar foot ulcer/OM admitted for IV abx for worsening acute on chronic OM of the right foot.   Pt was at [facility] and received meropenem/ertapenem x10 day course (completed 2/15) on as prior ulcer cx showed ESBL E. coli.   txferred to [facility] for 3rd Metatarsal Surgery scheduled on 2/21    osteomyelitis R foot  MRI 1/23- Osteomyelitis of the second metatarsal stump and third metatarsal head and shaft. Early osteomyelitis of the third proximal phalanx.  Afebrile, no leukocytosis and otherwise stable   tentative plan for R partial 3rd ray amputation 2/21    Recommendations:   continue to monitor off antibiotics prior to surgical intervention to increase tissue cx yield to further guide therapy  - please send clean margins for culture and path  Once pt is out of OR start ertapenem 1g daily    Roland Elizondo M.D.  [facility], Division of Infectious Diseases  [phone]  After 5pm on weekdays and all day on weekends - please call [phone]

## 2024-02-20 ENCOUNTER — TRANSCRIPTION ENCOUNTER (OUTPATIENT)
Age: 84
End: 2024-02-20

## 2024-02-20 LAB
ANION GAP SERPL CALC-SCNC: 14 MMOL/L — SIGNIFICANT CHANGE UP (ref 7–14)
BUN SERPL-MCNC: 25 MG/DL — HIGH (ref 7–23)
CALCIUM SERPL-MCNC: 9.4 MG/DL — SIGNIFICANT CHANGE UP (ref 8.4–10.5)
CHLORIDE SERPL-SCNC: 102 MMOL/L — SIGNIFICANT CHANGE UP (ref 98–107)
CO2 SERPL-SCNC: 23 MMOL/L — SIGNIFICANT CHANGE UP (ref 22–31)
CREAT SERPL-MCNC: 0.83 MG/DL — SIGNIFICANT CHANGE UP (ref 0.5–1.3)
EGFR: 69 ML/MIN/1.73M2 — SIGNIFICANT CHANGE UP
GLUCOSE SERPL-MCNC: 75 MG/DL — SIGNIFICANT CHANGE UP (ref 70–99)
GRAM STN FLD: ABNORMAL
HCT VFR BLD CALC: 33.8 % — LOW (ref 34.5–45)
HGB BLD-MCNC: 11.4 G/DL — LOW (ref 11.5–15.5)
MAGNESIUM SERPL-MCNC: 2 MG/DL — SIGNIFICANT CHANGE UP (ref 1.6–2.6)
MCHC RBC-ENTMCNC: 31.9 PG — SIGNIFICANT CHANGE UP (ref 27–34)
MCHC RBC-ENTMCNC: 33.7 GM/DL — SIGNIFICANT CHANGE UP (ref 32–36)
MCV RBC AUTO: 94.7 FL — SIGNIFICANT CHANGE UP (ref 80–100)
NRBC # BLD: 0 /100 WBCS — SIGNIFICANT CHANGE UP (ref 0–0)
NRBC # FLD: 0 K/UL — SIGNIFICANT CHANGE UP (ref 0–0)
PHOSPHATE SERPL-MCNC: 4 MG/DL — SIGNIFICANT CHANGE UP (ref 2.5–4.5)
PLATELET # BLD AUTO: 237 K/UL — SIGNIFICANT CHANGE UP (ref 150–400)
POTASSIUM SERPL-MCNC: 4 MMOL/L — SIGNIFICANT CHANGE UP (ref 3.5–5.3)
POTASSIUM SERPL-SCNC: 4 MMOL/L — SIGNIFICANT CHANGE UP (ref 3.5–5.3)
RBC # BLD: 3.57 M/UL — LOW (ref 3.8–5.2)
RBC # FLD: 13.3 % — SIGNIFICANT CHANGE UP (ref 10.3–14.5)
SODIUM SERPL-SCNC: 139 MMOL/L — SIGNIFICANT CHANGE UP (ref 135–145)
SPECIMEN SOURCE: SIGNIFICANT CHANGE UP
WBC # BLD: 5.62 K/UL — SIGNIFICANT CHANGE UP (ref 3.8–10.5)
WBC # FLD AUTO: 5.62 K/UL — SIGNIFICANT CHANGE UP (ref 3.8–10.5)

## 2024-02-20 RX ADMIN — Medication 25 MILLIGRAM(S): at 17:17

## 2024-02-20 RX ADMIN — Medication 88 MICROGRAM(S): at 05:38

## 2024-02-20 RX ADMIN — Medication 25 MILLIGRAM(S): at 06:27

## 2024-02-20 RX ADMIN — Medication 1 APPLICATION(S): at 17:17

## 2024-02-20 RX ADMIN — SIMVASTATIN 40 MILLIGRAM(S): 20 TABLET, FILM COATED ORAL at 21:35

## 2024-02-20 NOTE — PROGRESS NOTE ADULT - ASSESSMENT
Briefly this is an 83W w/ PMHx of Afib, HTN, HLD, chronic right plantar foot ulcer/OM admitted for IV abx for worsening acute on chronic OM of the right foot.   Pt was at [facility] and received meropenem/ertapenem x10 day course (completed 2/15) on as prior ulcer cx showed ESBL E. coli.   txferred to [facility] for 3rd Metatarsal Surgery scheduled on 2/21    osteomyelitis R foot  MRI 1/23- Osteomyelitis of the second metatarsal stump and third metatarsal head and shaft. Early osteomyelitis of the third proximal phalanx.  Afebrile, no leukocytosis and otherwise stable   tentative plan for R partial 3rd ray amputation 2/21  wound cx w/ gram positive rods    Recommendations:   continue to monitor off antibiotics prior to surgical intervention to increase tissue cx yield to further guide therapy  - please send clean margins for culture and path  Once pt is out of OR start ertapenem 1g daily    Roland Elizondo M.D.  [facility], Division of Infectious Diseases  [phone]  After 5pm on weekdays and all day on weekends - please call [phone]  Briefly this is an 83W w/ PMHx of Afib, HTN, HLD, chronic right plantar foot ulcer/OM admitted for IV abx for worsening acute on chronic OM of the right foot.   Pt was at [facility] and received meropenem/ertapenem x10 day course (completed 2/15) on as prior ulcer cx showed ESBL E. coli.   txferred to [facility] for 3rd Metatarsal Surgery scheduled on 2/21    osteomyelitis R foot  MRI 1/23- Osteomyelitis of the second metatarsal stump and third metatarsal head and shaft. Early osteomyelitis of the third proximal phalanx.  Afebrile, no leukocytosis  prior cx w/ ESBL E Coli  wound cx w/ gram positive rods  tentative plan for R partial 3rd ray amputation 2/21    Recommendations:   continue to monitor off antibiotics prior to surgical intervention to increase tissue cx yield to further guide therapy  - please send clean margins for culture and path  Once pt is out of OR start ertapenem 1g daily    Roland Elizondo M.D.  [facility]\"", Division of Infectious Diseases  [phone]  After 5pm on weekdays and all day on weekends - please call [phone]

## 2024-02-20 NOTE — PROGRESS NOTE ADULT - ASSESSMENT
83 F presents with Right foot submet 3 wound to bone  - Pt seen and evaluated  - Afebrile, no leukocytosis   - Right foot submet 3 wound to bone, sanguinous drainage, no malodor, no pus, no erythema no tracking or tunneling. Left foot no open wounds or lesions, no acute signs of infection.  - Right foot XR: no gas, no OM  - RF MRI 1/25: OM of 3rd met head, 3rd prox phalanx and 2nd distal metatarsal stump  - ID recs, appreciated  - STRICT decubitus precautions, Z- FLOWS boots at all time when in bed and in chair resting. ry  - Pod plan: Right foot partial 3rd ray amputation Wed 2/21 at 7:30am with Dr. Oliveros  - Medical and cardiac clearance for podiatry surgery under anesthesia 2/18, appreciated   - Discussed with attending

## 2024-02-21 ENCOUNTER — TRANSCRIPTION ENCOUNTER (OUTPATIENT)
Age: 84
End: 2024-02-21

## 2024-02-21 LAB
ANION GAP SERPL CALC-SCNC: 11 MMOL/L — SIGNIFICANT CHANGE UP (ref 7–14)
APTT BLD: 36.6 SEC — HIGH (ref 24.5–35.6)
BASOPHILS # BLD AUTO: 0.05 K/UL — SIGNIFICANT CHANGE UP (ref 0–0.2)
BASOPHILS NFR BLD AUTO: 1 % — SIGNIFICANT CHANGE UP (ref 0–2)
BLD GP AB SCN SERPL QL: NEGATIVE — SIGNIFICANT CHANGE UP
BUN SERPL-MCNC: 24 MG/DL — HIGH (ref 7–23)
CALCIUM SERPL-MCNC: 9.2 MG/DL — SIGNIFICANT CHANGE UP (ref 8.4–10.5)
CHLORIDE SERPL-SCNC: 103 MMOL/L — SIGNIFICANT CHANGE UP (ref 98–107)
CO2 SERPL-SCNC: 23 MMOL/L — SIGNIFICANT CHANGE UP (ref 22–31)
CREAT SERPL-MCNC: 1 MG/DL — SIGNIFICANT CHANGE UP (ref 0.5–1.3)
EGFR: 55 ML/MIN/1.73M2 — LOW
EOSINOPHIL # BLD AUTO: 0.37 K/UL — SIGNIFICANT CHANGE UP (ref 0–0.5)
EOSINOPHIL NFR BLD AUTO: 7.4 % — HIGH (ref 0–6)
GLUCOSE SERPL-MCNC: 89 MG/DL — SIGNIFICANT CHANGE UP (ref 70–99)
GRAM STN FLD: SIGNIFICANT CHANGE UP
GRAM STN FLD: SIGNIFICANT CHANGE UP
HCT VFR BLD CALC: 30.4 % — LOW (ref 34.5–45)
HGB BLD-MCNC: 10.2 G/DL — LOW (ref 11.5–15.5)
IANC: 2.71 K/UL — SIGNIFICANT CHANGE UP (ref 1.8–7.4)
IMM GRANULOCYTES NFR BLD AUTO: 0.2 % — SIGNIFICANT CHANGE UP (ref 0–0.9)
INR BLD: 1.15 RATIO — SIGNIFICANT CHANGE UP (ref 0.85–1.18)
LYMPHOCYTES # BLD AUTO: 1.33 K/UL — SIGNIFICANT CHANGE UP (ref 1–3.3)
LYMPHOCYTES # BLD AUTO: 26.7 % — SIGNIFICANT CHANGE UP (ref 13–44)
MCHC RBC-ENTMCNC: 31.6 PG — SIGNIFICANT CHANGE UP (ref 27–34)
MCHC RBC-ENTMCNC: 33.6 GM/DL — SIGNIFICANT CHANGE UP (ref 32–36)
MCV RBC AUTO: 94.1 FL — SIGNIFICANT CHANGE UP (ref 80–100)
MONOCYTES # BLD AUTO: 0.52 K/UL — SIGNIFICANT CHANGE UP (ref 0–0.9)
MONOCYTES NFR BLD AUTO: 10.4 % — SIGNIFICANT CHANGE UP (ref 2–14)
NEUTROPHILS # BLD AUTO: 2.71 K/UL — SIGNIFICANT CHANGE UP (ref 1.8–7.4)
NEUTROPHILS NFR BLD AUTO: 54.3 % — SIGNIFICANT CHANGE UP (ref 43–77)
NRBC # BLD: 0 /100 WBCS — SIGNIFICANT CHANGE UP (ref 0–0)
NRBC # FLD: 0 K/UL — SIGNIFICANT CHANGE UP (ref 0–0)
PLATELET # BLD AUTO: 220 K/UL — SIGNIFICANT CHANGE UP (ref 150–400)
POTASSIUM SERPL-MCNC: 4 MMOL/L — SIGNIFICANT CHANGE UP (ref 3.5–5.3)
POTASSIUM SERPL-SCNC: 4 MMOL/L — SIGNIFICANT CHANGE UP (ref 3.5–5.3)
PROTHROM AB SERPL-ACNC: 12.9 SEC — SIGNIFICANT CHANGE UP (ref 9.5–13)
RBC # BLD: 3.23 M/UL — LOW (ref 3.8–5.2)
RBC # FLD: 13.5 % — SIGNIFICANT CHANGE UP (ref 10.3–14.5)
RH IG SCN BLD-IMP: NEGATIVE — SIGNIFICANT CHANGE UP
SODIUM SERPL-SCNC: 137 MMOL/L — SIGNIFICANT CHANGE UP (ref 135–145)
SPECIMEN SOURCE: SIGNIFICANT CHANGE UP
SPECIMEN SOURCE: SIGNIFICANT CHANGE UP
WBC # BLD: 4.99 K/UL — SIGNIFICANT CHANGE UP (ref 3.8–10.5)
WBC # FLD AUTO: 4.99 K/UL — SIGNIFICANT CHANGE UP (ref 3.8–10.5)

## 2024-02-21 PROCEDURE — 88311 DECALCIFY TISSUE: CPT | Mod: 26

## 2024-02-21 PROCEDURE — 73630 X-RAY EXAM OF FOOT: CPT | Mod: 26,RT

## 2024-02-21 PROCEDURE — 88305 TISSUE EXAM BY PATHOLOGIST: CPT | Mod: 26

## 2024-02-21 RX ORDER — FENTANYL CITRATE 50 UG/ML
25 INJECTION INTRAVENOUS
Refills: 0 | Status: DISCONTINUED | OUTPATIENT
Start: 2024-02-21 | End: 2024-02-21

## 2024-02-21 RX ORDER — ERTAPENEM SODIUM 1 G/1
1000 INJECTION, POWDER, LYOPHILIZED, FOR SOLUTION INTRAMUSCULAR; INTRAVENOUS EVERY 24 HOURS
Refills: 0 | Status: DISCONTINUED | OUTPATIENT
Start: 2024-02-21 | End: 2024-02-23

## 2024-02-21 RX ORDER — SODIUM CHLORIDE 9 MG/ML
1000 INJECTION, SOLUTION INTRAVENOUS
Refills: 0 | Status: DISCONTINUED | OUTPATIENT
Start: 2024-02-21 | End: 2024-02-21

## 2024-02-21 RX ADMIN — ERTAPENEM SODIUM 120 MILLIGRAM(S): 1 INJECTION, POWDER, LYOPHILIZED, FOR SOLUTION INTRAMUSCULAR; INTRAVENOUS at 15:20

## 2024-02-21 RX ADMIN — SIMVASTATIN 40 MILLIGRAM(S): 20 TABLET, FILM COATED ORAL at 21:44

## 2024-02-21 RX ADMIN — Medication 88 MICROGRAM(S): at 05:31

## 2024-02-21 RX ADMIN — Medication 25 MILLIGRAM(S): at 06:03

## 2024-02-21 NOTE — CHART NOTE - NSCHARTNOTEFT_GEN_A_CORE
Pt seen at bedside to measure and fit with camboot to be used at all times after surgery.  Per podiatry resident, posterior splint can be removed and placed into camboot.  Pt instructed to ubaldo and andreas  Written instructions left at bedside.  Strap extension added to distal foot strap to accommodate dressing.  Follow up if needed      Ron Cronin [facility]  [phone]

## 2024-02-21 NOTE — BRIEF OPERATIVE NOTE - COMMENTS
Pt is s/p right foot partial 3rd ray resection with tendoachilles lengthening, closed  - Low concern for residual infection   - Low concern for viability   - Pod stable for discharge pending clean bone margin no growth x 48hours/ final ID recs Pt is s/p right foot partial 3rd ray resection with tendoachilles lengthening, closed  - Low concern for residual infection   - Low concern for viability   - Pod stable for discharge pending clean bone margin no growth x 48hours/ final ID recs/ CAM boot delivery

## 2024-02-21 NOTE — BRIEF OPERATIVE NOTE - OPERATION/FINDINGS
Pt is s/p right foot partial 3rd ray resection with tendoachilles lengthening, closed  - Bone at proximal resection was hard and of good quality   - Adequate intraop bleeding   - No evidence of purulence or soft tissue infection   - Achilles incision primarily closed with 3.0 nylon, 3rd ray resection incision primarily closed with 3.0 vicryl, 4.0 nylon, and 2.0 nylon

## 2024-02-21 NOTE — DISCHARGE NOTE PROVIDER - NSDCFUADDAPPT_GEN_ALL_CORE_FT
Podiatry Discharge Instructions:  Follow up: Please follow up with Dr. Oliveros within 1 week of discharge from the hospital, please call [phone] for appointment and discuss that you recently were seen in the hospital.  Wound Care: Please leave your dressing clean dry intact until your follow up appointment  Weight bearing: Please remain nonweightbearing to right lower extremity in posterior splint   Antibiotics: Please continue as instructed. Podiatry Discharge Instructions:  Follow up: Please follow up with Dr. Oliveros within 1 week of discharge from the hospital, please call [phone] for appointment and discuss that you recently were seen in the hospital.  Wound Care: Please leave your dressing clean dry intact until your follow up appointment  Weight bearing: Please remain nonweightbearing to right lower extremity in CAM boot  Antibiotics: Please continue as instructed.

## 2024-02-21 NOTE — PHYSICAL THERAPY INITIAL EVALUATION ADULT - PERTINENT HX OF CURRENT PROBLEM, REHAB EVAL
84 yo female with hx of HTN, HLD, chronic right plantar foot ulcers/OM, sent from podiatry office for IV abx for acute on chronic OM of right foot.

## 2024-02-21 NOTE — PROGRESS NOTE ADULT - ASSESSMENT
83F, from home,  PMHx AFIB on eliquis, HTN, HLD, chronic right plantar foot ulcer/OM admitted for IV abx for worsening acute on chronic OM of the right foot. Of note, she was recently admitted from 1/1/-1/26, had 5 days of meropenem, had a MRI on 1/23 showing: "Osteomyelitis of the second metatarsal stump and third metatarsal head and shaft. Early osteomyelitis of the third proximal phalanx. Marked edema and postcontrast enhancement is seen throughout the foot with locules of air extending to the dorsum of the foot. No mature, drainable, or enhancing collection is seen at this time."  S/P 10 dyas IV ABX and transferred  for 3rd Metatarsal Surgery .    #Acute on chronic right plantar foot OM with ESBL infection   - WBC WNL, elevated ESR/CRP, s/p 10 days meropenem  - Ulcer culture w/ ESBL sensitive to gladys, blood cultures negative   - Fall precautions  - PT eval - declined, ambulates with cane, activity as tolerated  podiatry help appreciated  s/p amputation today  discussed with ID  continue to follow podiatry and ID recommendations   started on Ertapenem    #Paroxysmal A Fib, rate controlled, on long term anticoagulation  #HTN, controlled  - C/w metoprolol tartrate 25mg q 12hrs, eliquis 5mg BID  -Will hold Eliquis 48hrs before surgery . No need to start heparin per cards.    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  - Upon review of the chart, CT chest report from 12/21/23  - Approximately 4.6 cm sized left upper lobe mass concerning for a primary pulmonary malignancy.  - Pt aware, doesn't want intervention during this admission  - Recommendation for outpatient oncology/pulm F/U for dx/management    VTE ppx: Eliquis

## 2024-02-21 NOTE — DISCHARGE NOTE PROVIDER - NSDCMRMEDTOKEN_GEN_ALL_CORE_FT
apixaban 5 mg oral tablet: 1 tab(s) orally every 12 hours  lactobacillus acidophilus oral capsule: 1 cap(s) orally once a day  levothyroxine 88 mcg (0.088 mg) oral tablet: 1 tab(s) orally once a day  meropenem 1000 mg intravenous injection: 1000 milligram(s) intravenous every 12 hours  metoprolol tartrate 25 mg oral tablet: 1 tab(s) orally every 12 hours  ProAir HFA 90 mcg/inh inhalation aerosol: 2 puff(s) inhaled 2 times a day  simvastatin 40 mg oral tablet: 1 tab(s) orally once a day (at bedtime)   apixaban 5 mg oral tablet: 1 tab(s) orally every 12 hours  lactobacillus acidophilus oral capsule: 1 cap(s) orally once a day  levothyroxine 88 mcg (0.088 mg) oral tablet: 1 tab(s) orally once a day  meropenem 1000 mg intravenous injection: 1000 milligram(s) intravenous every 12 hours  metoprolol tartrate 25 mg oral tablet: 1 tab(s) orally every 12 hours  ProAir HFA 90 mcg/inh inhalation aerosol: 2 puff(s) inhaled 2 times a day  Right CAM walker boot: Please provide right cam walker boot  Ind: to wear as instructed daily   Dx: s/p R foot partial 3rd ray resection with tendoachilles lengthening  simvastatin 40 mg oral tablet: 1 tab(s) orally once a day (at bedtime)   acetaminophen 325 mg oral tablet: 2 tab(s) orally every 6 hours As needed Temp greater or equal to 38C (100.4F), Mild Pain (1 - 3)  apixaban 5 mg oral tablet: 1 tab(s) orally every 12 hours  levothyroxine 88 mcg (0.088 mg) oral tablet: 1 tab(s) orally once a day  melatonin 3 mg oral tablet: 1 tab(s) orally once a day (at bedtime) As needed Insomnia  metoprolol tartrate 25 mg oral tablet: 1 tab(s) orally every 12 hours  ProAir HFA 90 mcg/inh inhalation aerosol: 2 puff(s) inhaled 2 times a day  simvastatin 40 mg oral tablet: 1 tab(s) orally once a day (at bedtime)

## 2024-02-21 NOTE — BRIEF OPERATIVE NOTE - NSICDXBRIEFPROCEDURE_GEN_ALL_CORE_FT
PROCEDURES:  Amputation, foot, ray 21-Feb-2024 09:42:24  Christie Noland  Lengthening, Achilles tendon 21-Feb-2024 09:42:32  Christie Noland

## 2024-02-21 NOTE — BRIEF OPERATIVE NOTE - SPECIMENS
Pathology: 1) Right foot 3rd digit bone and soft tissue 2) Clean Bone margin; Culture 1) Clean bone margin 2) OR deep wound culture

## 2024-02-21 NOTE — PHYSICAL THERAPY INITIAL EVALUATION ADULT - ADDITIONAL COMMENTS
As per care coordination notes, pt came from [place]. Pt lives in an apartment with 2-3 flights of stairs, no elevator. Pt lives alone. Prior to admission, pt was ambulating with a cane independently. Pt owns a rolling walker.   Post PT evaluation, pt left semi-supine, alarm on, call bell and remote within reach, all precautions maintained, NAD. RN aware.

## 2024-02-21 NOTE — DISCHARGE NOTE PROVIDER - CARE PROVIDER_API CALL
Roland Elizondo  Infectious Disease  [address] [place] [address], [facility] [address]  [place] [address]  Phone: [phone]  Fax: [phone]  Follow Up Time:

## 2024-02-21 NOTE — DISCHARGE NOTE PROVIDER - NSDCACTIVITY_GEN_ALL_CORE
STRICT decubitus precautions to left, Z- FLOWS boots at all time when in bed and in chair resting.   - Pt benefits from a CAM walker for tendoachilles lengthening,   - Pt is aware that she must remain strictly in CAM boot at all times while weight bearing

## 2024-02-21 NOTE — DISCHARGE NOTE PROVIDER - HOSPITAL COURSE
84 yo female with hx of HTN, HLD, chronic right plantar foot ulcers/OM, sent from podiatry office for IV abx for acute on chronic OM of right foot.    Acute on chronic right plantar foot OM with ESBL infection   - transfer from [place] for pods eval   - s/p 10 days meropenem   - podiatry consulted, right foot transmetatarsal amputation next week   - PT eval: restorative rehab    Paroxysmal A Fib & HTN   - C/w metoprolol tartrate 25mg q 12hrs, eliquis 5mg BID      Normocytic anemia  - Continue to monitor Hg/Hct, transfuse if Hg <7    On 2/23/24, discussed with Dr. Carrillo , patient is medically cleared and optimized for discharge today. A

## 2024-02-21 NOTE — ASU PREOP CHECKLIST - NS PREOP CHK HIBICLENS NA
Follow-up with ophthalmologist as discussed in the ER.    Return to the ER if you have any further questions, concerns, or worsening symptoms.  
N/A

## 2024-02-21 NOTE — DISCHARGE NOTE PROVIDER - NSDCFUSCHEDAPPT_GEN_ALL_CORE_FT
Claudine Walker  [facility]  PULMMED [address]  Scheduled Appointment: 02/23/2024    Jc Pacheco  [facility]  CARDIOLOGY [address]  Scheduled Appointment: 04/25/2024     Jc Pacheco Physician [facility]  CARDIOLOGY [address]  Scheduled Appointment: 04/25/2024

## 2024-02-21 NOTE — DISCHARGE NOTE PROVIDER - NSDCCPCAREPLAN_GEN_ALL_CORE_FT
PRINCIPAL DISCHARGE DIAGNOSIS  Diagnosis: OM (osteomyelitis)  Assessment and Plan of Treatment: S/P Right foot partial 3rd ray amputation   - You completed antibiotics  -Follow up with ID once discharged from Rehab        SECONDARY DISCHARGE DIAGNOSES  Diagnosis: Atrial fibrillation  Assessment and Plan of Treatment: Continue Eliquis    Diagnosis: Hypertension  Assessment and Plan of Treatment: Low salt diet  Activity as tolerated.  Take all medication as prescribed.  Follow up with your medical doctor for routine blood pressure monitoring at your next visit.  Notify your doctor if you have any of the following symptoms:   Dizziness, Lightheadedness, Blurry vision, Headache, Chest pain, Shortness of breath

## 2024-02-21 NOTE — PROGRESS NOTE ADULT - ASSESSMENT
Pt is scheduled for R foot Partial 3rd Ray Resection vs Transmetatarsal Amputation with Tendo Achilles lengthening with Dr. Oliveros at 0730. Patient is aware of procedure and is NPO since midnight.  CXR on sunrise.EKG on sunrise.  Medical/Cardiac clearance since 2/20 and documented in chart.  Consent signed and in chart.  H&P seen and acknowledged.     Procedure was explained to patient in detail. All alternatives, risks and complications were discussed. All questions answered.

## 2024-02-21 NOTE — PHYSICAL THERAPY INITIAL EVALUATION ADULT - LEVEL OF INDEPENDENCE: GAIT, REHAB EVAL
Pt performed 1 little shuffling step forward then states "I can't do this." Pt unable to hop and maintain nonweightbearing precautions of right LE./minimum assist (75% patients effort)/unable to perform

## 2024-02-21 NOTE — PROGRESS NOTE ADULT - ASSESSMENT
Briefly this is an 83W w/ PMHx of Afib, HTN, HLD, chronic right plantar foot ulcer/OM admitted for IV abx for worsening acute on chronic OM of the right foot.   Pt was at [facility] and received meropenem/ertapenem x10 day course (completed 2/15) on as prior ulcer cx showed ESBL E. coli.   txferred to [facility] for 3rd Metatarsal Surgery scheduled on 2/21    osteomyelitis R foot  MRI 1/23- Osteomyelitis of the second metatarsal stump and third metatarsal head and shaft. Early osteomyelitis of the third proximal phalanx.  prior cx w/ ESBL E Coli  wound cx w/ corynebacterium (skin zena)  s/p R foot partial 3rd ray resection with tendoachilles lengthening  per podiatry low concern for residual infection     Recommendations:   start ertapenem 1g daily  f/u OR cultures  f/u OR path  have reached out to podiatry regarding 2nd toe findings on prior MRI    Roland Elizondo M.D.  [facility], Division of Infectious Diseases  [phone]  After 5pm on weekdays and all day on weekends - please call [phone]

## 2024-02-22 LAB
-  AMOXICILLIN/CLAVULANIC ACID: SIGNIFICANT CHANGE UP
-  AMPICILLIN/SULBACTAM: SIGNIFICANT CHANGE UP
-  AMPICILLIN: SIGNIFICANT CHANGE UP
-  AZTREONAM: SIGNIFICANT CHANGE UP
-  CEFAZOLIN: SIGNIFICANT CHANGE UP
-  CEFEPIME: SIGNIFICANT CHANGE UP
-  CEFOXITIN: SIGNIFICANT CHANGE UP
-  CEFTRIAXONE: SIGNIFICANT CHANGE UP
-  CIPROFLOXACIN: SIGNIFICANT CHANGE UP
-  ERTAPENEM: SIGNIFICANT CHANGE UP
-  GENTAMICIN: SIGNIFICANT CHANGE UP
-  LEVOFLOXACIN: SIGNIFICANT CHANGE UP
-  MEROPENEM: SIGNIFICANT CHANGE UP
-  PIPERACILLIN/TAZOBACTAM: SIGNIFICANT CHANGE UP
-  TOBRAMYCIN: SIGNIFICANT CHANGE UP
-  TRIMETHOPRIM/SULFAMETHOXAZOLE: SIGNIFICANT CHANGE UP
ALBUMIN SERPL ELPH-MCNC: 3.2 G/DL — LOW (ref 3.3–5)
ALP SERPL-CCNC: 84 U/L — SIGNIFICANT CHANGE UP (ref 40–120)
ALT FLD-CCNC: 8 U/L — SIGNIFICANT CHANGE UP (ref 4–33)
ANION GAP SERPL CALC-SCNC: 10 MMOL/L — SIGNIFICANT CHANGE UP (ref 7–14)
AST SERPL-CCNC: 16 U/L — SIGNIFICANT CHANGE UP (ref 4–32)
BASOPHILS # BLD AUTO: 0.04 K/UL — SIGNIFICANT CHANGE UP (ref 0–0.2)
BASOPHILS NFR BLD AUTO: 0.7 % — SIGNIFICANT CHANGE UP (ref 0–2)
BILIRUB SERPL-MCNC: 0.5 MG/DL — SIGNIFICANT CHANGE UP (ref 0.2–1.2)
BUN SERPL-MCNC: 24 MG/DL — HIGH (ref 7–23)
CALCIUM SERPL-MCNC: 9.3 MG/DL — SIGNIFICANT CHANGE UP (ref 8.4–10.5)
CHLORIDE SERPL-SCNC: 103 MMOL/L — SIGNIFICANT CHANGE UP (ref 98–107)
CO2 SERPL-SCNC: 24 MMOL/L — SIGNIFICANT CHANGE UP (ref 22–31)
CREAT SERPL-MCNC: 0.77 MG/DL — SIGNIFICANT CHANGE UP (ref 0.5–1.3)
EGFR: 76 ML/MIN/1.73M2 — SIGNIFICANT CHANGE UP
EOSINOPHIL # BLD AUTO: 0.27 K/UL — SIGNIFICANT CHANGE UP (ref 0–0.5)
EOSINOPHIL NFR BLD AUTO: 4.6 % — SIGNIFICANT CHANGE UP (ref 0–6)
GLUCOSE SERPL-MCNC: 75 MG/DL — SIGNIFICANT CHANGE UP (ref 70–99)
HCT VFR BLD CALC: 28 % — LOW (ref 34.5–45)
HGB BLD-MCNC: 9.5 G/DL — LOW (ref 11.5–15.5)
IANC: 4.01 K/UL — SIGNIFICANT CHANGE UP (ref 1.8–7.4)
IMM GRANULOCYTES NFR BLD AUTO: 0.3 % — SIGNIFICANT CHANGE UP (ref 0–0.9)
LYMPHOCYTES # BLD AUTO: 0.83 K/UL — LOW (ref 1–3.3)
LYMPHOCYTES # BLD AUTO: 14.2 % — SIGNIFICANT CHANGE UP (ref 13–44)
MAGNESIUM SERPL-MCNC: 1.9 MG/DL — SIGNIFICANT CHANGE UP (ref 1.6–2.6)
MCHC RBC-ENTMCNC: 32.1 PG — SIGNIFICANT CHANGE UP (ref 27–34)
MCHC RBC-ENTMCNC: 33.9 GM/DL — SIGNIFICANT CHANGE UP (ref 32–36)
MCV RBC AUTO: 94.6 FL — SIGNIFICANT CHANGE UP (ref 80–100)
METHOD TYPE: SIGNIFICANT CHANGE UP
MONOCYTES # BLD AUTO: 0.68 K/UL — SIGNIFICANT CHANGE UP (ref 0–0.9)
MONOCYTES NFR BLD AUTO: 11.6 % — SIGNIFICANT CHANGE UP (ref 2–14)
NEUTROPHILS # BLD AUTO: 4.01 K/UL — SIGNIFICANT CHANGE UP (ref 1.8–7.4)
NEUTROPHILS NFR BLD AUTO: 68.6 % — SIGNIFICANT CHANGE UP (ref 43–77)
NRBC # BLD: 0 /100 WBCS — SIGNIFICANT CHANGE UP (ref 0–0)
NRBC # FLD: 0 K/UL — SIGNIFICANT CHANGE UP (ref 0–0)
PHOSPHATE SERPL-MCNC: 3.8 MG/DL — SIGNIFICANT CHANGE UP (ref 2.5–4.5)
PLATELET # BLD AUTO: 206 K/UL — SIGNIFICANT CHANGE UP (ref 150–400)
POTASSIUM SERPL-MCNC: 3.6 MMOL/L — SIGNIFICANT CHANGE UP (ref 3.5–5.3)
POTASSIUM SERPL-SCNC: 3.6 MMOL/L — SIGNIFICANT CHANGE UP (ref 3.5–5.3)
PROT SERPL-MCNC: 5.6 G/DL — LOW (ref 6–8.3)
RBC # BLD: 2.96 M/UL — LOW (ref 3.8–5.2)
RBC # FLD: 13.4 % — SIGNIFICANT CHANGE UP (ref 10.3–14.5)
SODIUM SERPL-SCNC: 137 MMOL/L — SIGNIFICANT CHANGE UP (ref 135–145)
WBC # BLD: 5.85 K/UL — SIGNIFICANT CHANGE UP (ref 3.8–10.5)
WBC # FLD AUTO: 5.85 K/UL — SIGNIFICANT CHANGE UP (ref 3.8–10.5)

## 2024-02-22 RX ORDER — POVIDONE-IODINE 5 %
1 AEROSOL (ML) TOPICAL EVERY 12 HOURS
Refills: 0 | Status: DISCONTINUED | OUTPATIENT
Start: 2024-02-22 | End: 2024-02-23

## 2024-02-22 RX ORDER — APIXABAN 2.5 MG/1
5 TABLET, FILM COATED ORAL
Refills: 0 | Status: DISCONTINUED | OUTPATIENT
Start: 2024-02-22 | End: 2024-02-23

## 2024-02-22 RX ADMIN — ERTAPENEM SODIUM 120 MILLIGRAM(S): 1 INJECTION, POWDER, LYOPHILIZED, FOR SOLUTION INTRAMUSCULAR; INTRAVENOUS at 12:40

## 2024-02-22 RX ADMIN — Medication 25 MILLIGRAM(S): at 12:41

## 2024-02-22 RX ADMIN — Medication 88 MICROGRAM(S): at 06:08

## 2024-02-22 RX ADMIN — APIXABAN 5 MILLIGRAM(S): 2.5 TABLET, FILM COATED ORAL at 18:52

## 2024-02-22 RX ADMIN — SIMVASTATIN 40 MILLIGRAM(S): 20 TABLET, FILM COATED ORAL at 22:12

## 2024-02-22 RX ADMIN — Medication 25 MILLIGRAM(S): at 22:12

## 2024-02-22 NOTE — PROGRESS NOTE ADULT - ASSESSMENT
Briefly this is an 83W w/ PMHx of Afib, HTN, HLD, chronic right plantar foot ulcer/OM admitted for IV abx for worsening acute on chronic OM of the right foot.   Pt was at [facility] and received meropenem/ertapenem x10 day course (completed 2/15) on as prior ulcer cx showed ESBL E. coli.   txferred to [facility] for 3rd Metatarsal Surgery scheduled on 2/21    osteomyelitis R 3rd toe  MRI 1/23- Osteomyelitis of the second metatarsal stump and third metatarsal head and shaft. Early osteomyelitis of the third proximal phalanx.  prior cx w/ ESBL E Coli  wound cx w/ corynebacterium (skin zena)  discussed w/ podiatry clinically low concern for osteo 2nd metatarsal  s/p R foot partial 3rd ray resection with tendoachilles lengthening  per podiatry low concern for residual infection     Recommendations:   c/w ertapenem 1g daily for now  f/u OR cultures  f/u OR path    Roland Elizondo M.D.  [facility], Division of Infectious Diseases  [phone]  After 5pm on weekdays and all day on weekends - please call [phone]  Briefly this is an 83W w/ PMHx of Afib, HTN, HLD, chronic right plantar foot ulcer/OM admitted for IV abx for worsening acute on chronic OM of the right foot.   Pt was at [facility] and received meropenem/ertapenem x10 day course (completed 2/15) on as prior ulcer cx showed ESBL E. coli.   txferred to [facility] for 3rd Metatarsal Surgery scheduled on 2/21    osteomyelitis R 3rd toe  MRI 1/23- Osteomyelitis of the second metatarsal stump and third metatarsal head and shaft. Early osteomyelitis of the third proximal phalanx.  prior cx w/ ESBL E Coli  wound cx w/ corynebacterium (skin zena)  discussed w/ podiatry clinically low concern for osteo 2nd metatarsal  s/p R foot partial 3rd ray resection with tendoachilles lengthening  per podiatry low concern for residual infection     Recommendations:   c/w ertapenem 1g daily for now  f/u OR cultures  - if OR cx negative x 48 hours discontinue antibiotics and pt can f/u w/ [facility] ID office to review pathology  f/u OR path    Roland Elizondo M.D.  [facility], Division of Infectious Diseases  [phone]  After 5pm on weekdays and all day on weekends - please call [phone]

## 2024-02-22 NOTE — PROGRESS NOTE ADULT - ASSESSMENT
83 F s/p right foot partial 3rd ray resection and tendoachilles lengthening, closed (DOS 2/21)  - Pt seen and evaluated  - Afebrile, no leukocytosis   - Right foot s/p partial 3rd ray resection, closed, flaps warm and viable, mild maceration of interspace incision, sutures intact, sanginous drainage, T. Left foot no open wounds or lesions, no acute signs of infection.  - Intraop findings low concern for residual infection, low concern for viability   - OR data pending   - ID recs, appreciated  - STRICT decubitus precautions to left, Z- FLOWS boots at all time when in bed and in chair resting.   - Pt is aware that she must remain strictly in CAM boot at all times while walking and resting in bed  - Pt is stable for d/c from podiatry standpoint tomorrow pending 48 hours of no growth on clean margins and final ID recs  - Discussed with attending   83 F s/p right foot partial 3rd ray resection and tendoachilles lengthening, closed (DOS 2/21)  - Pt seen and evaluated  - Afebrile, no leukocytosis   - Right foot s/p partial 3rd ray resection, closed, flaps warm and viable, mild maceration of interspace incision, sutures intact, sanginous drainage, T. Left foot no open wounds or lesions, no acute signs of infection.  - Intraop findings low concern for residual infection, low concern for viability   - OR data pending   - ID recs, appreciated  - STRICT decubitus precautions to left, Z- FLOWS boots at all time when in bed and in chair resting.   - Pt benefits from a CAM walker for tendoachilles lengthening,   - Pt is aware that she must remain strictly in CAM boot at all times while weight bearing and resting in bed  - Pt is stable for d/c from podiatry standpoint tomorrow pending 48 hours of no growth on clean margins and final ID recs  - Discussed with attending

## 2024-02-22 NOTE — PROGRESS NOTE ADULT - ASSESSMENT
83F, from home,  PMHx AFIB on eliquis, HTN, HLD, chronic right plantar foot ulcer/OM admitted for IV abx for worsening acute on chronic OM of the right foot. Of note, she was recently admitted from 1/1/-1/26, had 5 days of meropenem, had a MRI on 1/23 showing: "Osteomyelitis of the second metatarsal stump and third metatarsal head and shaft. Early osteomyelitis of the third proximal phalanx. Marked edema and postcontrast enhancement is seen throughout the foot with locules of air extending to the dorsum of the foot. No mature, drainable, or enhancing collection is seen at this time."  S/P 10 dyas IV ABX and transferred  for 3rd Metatarsal Surgery .    #Acute on chronic right plantar foot OM with ESBL infection   - WBC WNL, elevated ESR/CRP, s/p 10 days meropenem  - Ulcer culture w/ ESBL sensitive to gladys, blood cultures negative   - Fall precautions  - PT eval - declined, ambulates with cane, activity as tolerated  podiatry help appreciated  s/p amputation today  discussed with ID  continue to follow podiatry and ID recommendations   continue on Ertapenem till OR cultures negative per ID recommendations     #Paroxysmal A Fib, rate controlled, on long term anticoagulation  #HTN, controlled  - C/w metoprolol tartrate 25mg q 12hrs, eliquis 5mg BID  -Will hold Eliquis 48hrs before surgery . No need to start heparin per cards.    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  - Upon review of the chart, CT chest report from 12/21/23  - Approximately 4.6 cm sized left upper lobe mass concerning for a primary pulmonary malignancy.  - Pt aware, doesn't want intervention during this admission  - Recommendation for outpatient oncology/pulm F/U for dx/management    VTE ppx: Eliquis

## 2024-02-23 ENCOUNTER — TRANSCRIPTION ENCOUNTER (OUTPATIENT)
Age: 84
End: 2024-02-23

## 2024-02-23 ENCOUNTER — APPOINTMENT (OUTPATIENT)
Dept: PULMONOLOGY | Facility: CLINIC | Age: 84
End: 2024-02-23

## 2024-02-23 VITALS — WEIGHT: 149.91 LBS

## 2024-02-23 LAB
ALBUMIN SERPL ELPH-MCNC: 3.5 G/DL — SIGNIFICANT CHANGE UP (ref 3.3–5)
ALP SERPL-CCNC: 93 U/L — SIGNIFICANT CHANGE UP (ref 40–120)
ALT FLD-CCNC: 12 U/L — SIGNIFICANT CHANGE UP (ref 4–33)
ANION GAP SERPL CALC-SCNC: 10 MMOL/L — SIGNIFICANT CHANGE UP (ref 7–14)
AST SERPL-CCNC: 25 U/L — SIGNIFICANT CHANGE UP (ref 4–32)
BILIRUB SERPL-MCNC: 0.6 MG/DL — SIGNIFICANT CHANGE UP (ref 0.2–1.2)
BUN SERPL-MCNC: 18 MG/DL — SIGNIFICANT CHANGE UP (ref 7–23)
CALCIUM SERPL-MCNC: 9.5 MG/DL — SIGNIFICANT CHANGE UP (ref 8.4–10.5)
CHLORIDE SERPL-SCNC: 102 MMOL/L — SIGNIFICANT CHANGE UP (ref 98–107)
CO2 SERPL-SCNC: 25 MMOL/L — SIGNIFICANT CHANGE UP (ref 22–31)
CREAT SERPL-MCNC: 0.7 MG/DL — SIGNIFICANT CHANGE UP (ref 0.5–1.3)
EGFR: 85 ML/MIN/1.73M2 — SIGNIFICANT CHANGE UP
GLUCOSE SERPL-MCNC: 80 MG/DL — SIGNIFICANT CHANGE UP (ref 70–99)
HCT VFR BLD CALC: 30.1 % — LOW (ref 34.5–45)
HGB BLD-MCNC: 9.9 G/DL — LOW (ref 11.5–15.5)
MAGNESIUM SERPL-MCNC: 2 MG/DL — SIGNIFICANT CHANGE UP (ref 1.6–2.6)
MCHC RBC-ENTMCNC: 31.4 PG — SIGNIFICANT CHANGE UP (ref 27–34)
MCHC RBC-ENTMCNC: 32.9 GM/DL — SIGNIFICANT CHANGE UP (ref 32–36)
MCV RBC AUTO: 95.6 FL — SIGNIFICANT CHANGE UP (ref 80–100)
NRBC # BLD: 0 /100 WBCS — SIGNIFICANT CHANGE UP (ref 0–0)
NRBC # FLD: 0 K/UL — SIGNIFICANT CHANGE UP (ref 0–0)
PHOSPHATE SERPL-MCNC: 3.4 MG/DL — SIGNIFICANT CHANGE UP (ref 2.5–4.5)
PLATELET # BLD AUTO: 199 K/UL — SIGNIFICANT CHANGE UP (ref 150–400)
POTASSIUM SERPL-MCNC: 4.4 MMOL/L — SIGNIFICANT CHANGE UP (ref 3.5–5.3)
POTASSIUM SERPL-SCNC: 4.4 MMOL/L — SIGNIFICANT CHANGE UP (ref 3.5–5.3)
PROT SERPL-MCNC: 6.1 G/DL — SIGNIFICANT CHANGE UP (ref 6–8.3)
RBC # BLD: 3.15 M/UL — LOW (ref 3.8–5.2)
RBC # FLD: 13.6 % — SIGNIFICANT CHANGE UP (ref 10.3–14.5)
SODIUM SERPL-SCNC: 137 MMOL/L — SIGNIFICANT CHANGE UP (ref 135–145)
WBC # BLD: 6.13 K/UL — SIGNIFICANT CHANGE UP (ref 3.8–10.5)
WBC # FLD AUTO: 6.13 K/UL — SIGNIFICANT CHANGE UP (ref 3.8–10.5)

## 2024-02-23 RX ORDER — ACETAMINOPHEN 500 MG
2 TABLET ORAL
Qty: 0 | Refills: 0 | DISCHARGE
Start: 2024-02-23

## 2024-02-23 RX ORDER — LANOLIN ALCOHOL/MO/W.PET/CERES
1 CREAM (GRAM) TOPICAL
Qty: 0 | Refills: 0 | DISCHARGE
Start: 2024-02-23

## 2024-02-23 RX ORDER — CHLORHEXIDINE GLUCONATE 213 G/1000ML
1 SOLUTION TOPICAL DAILY
Refills: 0 | Status: DISCONTINUED | OUTPATIENT
Start: 2024-02-23 | End: 2024-02-23

## 2024-02-23 RX ADMIN — Medication 88 MICROGRAM(S): at 05:10

## 2024-02-23 RX ADMIN — APIXABAN 5 MILLIGRAM(S): 2.5 TABLET, FILM COATED ORAL at 05:10

## 2024-02-23 RX ADMIN — CHLORHEXIDINE GLUCONATE 1 APPLICATION(S): 213 SOLUTION TOPICAL at 11:31

## 2024-02-23 RX ADMIN — Medication 25 MILLIGRAM(S): at 10:46

## 2024-02-23 NOTE — DIETITIAN INITIAL EVALUATION ADULT - PERTINENT MEDS FT
MEDICATIONS  (STANDING):  apixaban 5 milliGRAM(s) Oral two times a day  chlorhexidine 2% Cloths 1 Application(s) Topical daily  levothyroxine 88 MICROGram(s) Oral daily  metoprolol tartrate 25 milliGRAM(s) Oral every 12 hours  povidone iodine 10% Ointment 1 Application(s) Topical every 12 hours  simvastatin 40 milliGRAM(s) Oral at bedtime    MEDICATIONS  (PRN):  acetaminophen     Tablet .. 650 milliGRAM(s) Oral every 6 hours PRN Temp greater or equal to 38C (100.4F), Mild Pain (1 - 3)  albuterol    90 MICROgram(s) HFA Inhaler 2 Puff(s) Inhalation every 6 hours PRN Shortness of Breath and/or Wheezing  aluminum hydroxide/magnesium hydroxide/simethicone Suspension 30 milliLiter(s) Oral every 4 hours PRN Dyspepsia  melatonin 3 milliGRAM(s) Oral at bedtime PRN Insomnia  ondansetron Injectable 4 milliGRAM(s) IV Push every 8 hours PRN Nausea and/or Vomiting

## 2024-02-23 NOTE — DISCHARGE NOTE NURSING/CASE MANAGEMENT/SOCIAL WORK - PATIENT PORTAL LINK FT
You can access the FollowMyHealth Patient Portal offered by [facility] by registering at the following website: [email or web address]. By joining [employer]’s FollowMyHealth portal, you will also be able to view your health information using other applications (apps) compatible with our system.

## 2024-02-23 NOTE — PROGRESS NOTE ADULT - REASON FOR ADMISSION
3rd Metatarsal OM for surgery

## 2024-02-23 NOTE — PROGRESS NOTE ADULT - SUBJECTIVE AND OBJECTIVE BOX
Cardiovascular Disease Progress Note  Date of Service: 02-23-24 @ 11:01    Overnight events: No acute events overnight.  The patient denies chest pain or SOB.     Otherwise review of systems negative    Objective Findings:  T(C): 36.6 (02-23-24 @ 10:23), Max: 36.9 (02-22-24 @ 16:42)  HR: 64 (02-23-24 @ 10:23) (63 - 78)  BP: 143/69 (02-23-24 @ 10:23) (115/55 - 143/69)  RR: 17 (02-23-24 @ 10:23) (17 - 18)  SpO2: 99% (02-23-24 @ 10:23) (95% - 100%)  Wt(kg): --  Daily     Daily       Physical Exam:  Gen: NAD; Patient resting comfortably  HEENT: EOMI, Normocephalic/ atraumatic  CV: RRR, normal S1 + S2, no m/r/g  Lungs:  Normal respiratory effort; clear to auscultation bilaterally  Abd: soft, non-tender; bowel sounds present  Ext: No edema;      Telemetry: n/a    Laboratory Data:                        9.9    6.13  )-----------( 199      ( 23 Feb 2024 08:01 )             30.1     02-23    137  |  102  |  18  ----------------------------<  80  4.4   |  25  |  0.70    Ca    9.5      23 Feb 2024 08:04  Phos  3.4     02-23  Mg     2.00     02-23    TPro  6.1  /  Alb  3.5  /  TBili  0.6  /  DBili  x   /  AST  25  /  ALT  12  /  AlkPhos  93  02-23              Inpatient Medications:  MEDICATIONS  (STANDING):  apixaban 5 milliGRAM(s) Oral two times a day  chlorhexidine 2% Cloths 1 Application(s) Topical daily  levothyroxine 88 MICROGram(s) Oral daily  metoprolol tartrate 25 milliGRAM(s) Oral every 12 hours  povidone iodine 10% Ointment 1 Application(s) Topical every 12 hours  simvastatin 40 milliGRAM(s) Oral at bedtime      Assessment:  83 year old woman with paroxysmal a-fib, HTN, moderate aortic stenosis and HLD presents for foot surgery.     Plan of Care:    #Post-operative cardiac  evaluation-  Ms. Trujillo tolerated podiatric resection well.  She displays no signs or symptoms of post operative coronary ischemia or CHF.  EKG shows sinus with a pre-existing LBBB.  Recent echo with no acute findings.  Continue current cardiac management.     #Paroxysmal a-fib-  A-fib noted on EKG 1/18/2024.  Most recent EKG is sinus.  Eliquis for AC (most recent weight 61 Kg).     #Moderate aortic stenosis-  No indication for intervention at this time.      #ACP (advance care planning)-  Advanced care planning was discussed with the patient.  Advance care planning forms were discussed. Risks, benefits and alternatives of medical treatment and procedures were discussed in detail and all questions were answered. 30 additional minutes spent addressing advance care plans.      Over 55 minutes spent on total encounter; more than 50% of the visit was spent counseling and/or coordinating care by the attending physician.      Fredy Parker MD [facility]  Cardiovascular Disease  [phone]
[facility], Division of Infectious Diseases  FAIZAN Yeung Y. Patel, S. Shah, G. Mikhail  [phone]  ([phone] - weekdays after 5pm and weekends)    Name: KASSIE LOPEZ  Age/Gender: 83y Female  MRN: [id number]    Interval History:  Notes reviewed.   No concerning overnight events.  Afebrile.   s/p OR today  denies pain    Allergies: linezolid (Unknown)  Cipro (Other)  doxycycline (Unknown)      Objective:  Vitals:   T(F): 97.1 (02-21-24 @ 11:15), Max: 98.4 (02-20-24 @ 20:53)  HR: 51 (02-21-24 @ 11:15) (51 - 68)  BP: 130/59 (02-21-24 @ 11:15) (106/54 - 150/68)  RR: 16 (02-21-24 @ 11:15) (13 - 18)  SpO2: 99% (02-21-24 @ 11:15) (97% - 100%)  Physical Examination:  General: no acute distress  HEENT: anicteric  Cardio: S1, S2, normal rate  Resp: clear to auscultation anteriorly   Abd: soft, NT, ND  Ext: R foot wrapped w/ dressing  Skin: warm, dry    Laboratory Studies:  CBC:                       10.2   4.99  )-----------( 220      ( 21 Feb 2024 02:44 )             30.4     WBC Trend:  4.99 02-21-24 @ 02:44  5.62 02-20-24 @ 07:34  5.25 02-19-24 @ 04:28  4.85 02-18-24 @ 07:28  5.25 02-17-24 @ 05:00  5.70 02-16-24 @ 06:10    CMP: 02-21    137  |  103  |  24<H>  ----------------------------<  89  4.0   |  23  |  1.00    Ca    9.2      21 Feb 2024 02:44  Phos  4.0     02-20  Mg     2.00     02-20            Urinalysis Basic - ( 21 Feb 2024 02:44 )    Color: x / Appearance: x / SG: x / pH: x  Gluc: 89 mg/dL / Ketone: x  / Bili: x / Urobili: x   Blood: x / Protein: x / Nitrite: x   Leuk Esterase: x / RBC: x / WBC x   Sq Epi: x / Non Sq Epi: x / Bacteria: x      Microbiology: reviewed     Culture - Abscess with Gram Stain (collected 02-19-24 @ 15:30)  Source: .Abscess right foot  Gram Stain (02-19-24 @ 21:22):    No polymorphonuclear leukocytes per low power field    Few Gram Positive Rods per oil power field  Preliminary Report (02-20-24 @ 17:00):    Numerous Corynebacterium species    "Susceptibilities not performed"    Culture - Abscess with Gram Stain (collected 02-16-24 @ 07:58)  Source: .Abscess right foot  Gram Stain (02-20-24 @ 20:36):    No polymorphonuclear cells seen per low power field    Moderate Gram Positive Rods per oil power field        Radiology: reviewed     Medications:  acetaminophen     Tablet .. 650 milliGRAM(s) Oral every 6 hours PRN  albuterol    90 MICROgram(s) HFA Inhaler 2 Puff(s) Inhalation every 6 hours PRN  aluminum hydroxide/magnesium hydroxide/simethicone Suspension 30 milliLiter(s) Oral every 4 hours PRN  cadexomer iodine 0.9% Gel 1 Application(s) Topical daily  levothyroxine 88 MICROGram(s) Oral daily  melatonin 3 milliGRAM(s) Oral at bedtime PRN  metoprolol tartrate 25 milliGRAM(s) Oral every 12 hours  ondansetron Injectable 4 milliGRAM(s) IV Push every 8 hours PRN  simvastatin 40 milliGRAM(s) Oral at bedtime    Antimicrobials:  
[facility], Division of Infectious Diseases  FAIZAN Yeung Y. Patel, S. Shah, G. Mikhail  [phone]  ([phone] - weekdays after 5pm and weekends)    Name: KASSIE LOPEZ  Age/Gender: 83y Female  MRN: [id number]    Interval History:  Notes reviewed.   No concerning overnight events.  Afebrile.   feels fine  awaiting OR    Allergies: linezolid (Unknown)  Cipro (Other)  doxycycline (Unknown)      Objective:  Vitals:   T(F): 97.7 (02-19-24 @ 05:51), Max: 98.6 (02-19-24 @ 02:27)  HR: 66 (02-19-24 @ 05:51) (57 - 66)  BP: 134/63 (02-19-24 @ 05:51) (125/55 - 152/66)  RR: 18 (02-19-24 @ 05:51) (17 - 18)  SpO2: 100% (02-19-24 @ 05:51) (97% - 100%)  Physical Examination:  General: no acute distress  HEENT: anicteric  Cardio: S1, S2, normal rate  Resp: clear to auscultation anteriorly   Abd: soft, NT, ND  Ext: R foot wrapped w/ dressing  Skin: warm, dry    Laboratory Studies:  CBC:                       11.1   5.25  )-----------( 243      ( 19 Feb 2024 04:28 )             33.6     WBC Trend:  5.25 02-19-24 @ 04:28  4.85 02-18-24 @ 07:28  5.25 02-17-24 @ 05:00  5.70 02-16-24 @ 06:10  4.79 02-14-24 @ 06:20    CMP: 02-19    137  |  101  |  21  ----------------------------<  68<L>  3.8   |  25  |  0.94    Ca    9.5      19 Feb 2024 04:28  Phos  4.1     02-19  Mg     2.00     02-19            Urinalysis Basic - ( 19 Feb 2024 04:28 )    Color: x / Appearance: x / SG: x / pH: x  Gluc: 68 mg/dL / Ketone: x  / Bili: x / Urobili: x   Blood: x / Protein: x / Nitrite: x   Leuk Esterase: x / RBC: x / WBC x   Sq Epi: x / Non Sq Epi: x / Bacteria: x      Microbiology: reviewed     Culture - Other (collected 02-06-24 @ 15:00)  Source: Wound Wound  Final Report (02-08-24 @ 18:57):    Normal skin zena isolated    Culture - Other (collected 02-05-24 @ 12:20)  Source: Ulcer Sp. Instructions_Additional Info: PLEASE COLLECT CULTURE FROM RIGHT PLANTAR ULCER  Final Report (02-07-24 @ 08:39):    Few Escherichia coli ESBL    Normal zena isolated  Organism: Escherichia coli ESBL (02-07-24 @ 08:39)  Organism: Escherichia coli ESBL (02-07-24 @ 08:39)      Method Type: JEANINE      -  Amoxicillin/Clavulanic Acid: S <=8/4      -  Ampicillin: R >16 These ampicillin results predict results for amoxicillin      -  Ampicillin/Sulbactam: R 8/4      -  Aztreonam: R >16      -  Cefazolin: R >16      -  Cefepime: R >16      -  Ceftriaxone: R >32      -  Ciprofloxacin: R >2      -  Ertapenem: S <=0.5      -  Gentamicin: R >8      -  Imipenem: S <=1      -  Levofloxacin: R >4      -  Meropenem: S <=1      -  Piperacillin/Tazobactam: R <=8      -  Tobramycin: R 8      -  Trimethoprim/Sulfamethoxazole: R >2/38        Radiology: reviewed     Medications:  acetaminophen     Tablet .. 650 milliGRAM(s) Oral every 6 hours PRN  albuterol    90 MICROgram(s) HFA Inhaler 2 Puff(s) Inhalation every 6 hours PRN  aluminum hydroxide/magnesium hydroxide/simethicone Suspension 30 milliLiter(s) Oral every 4 hours PRN  cadexomer iodine 0.9% Gel 1 Application(s) Topical daily  levothyroxine 88 MICROGram(s) Oral daily  melatonin 3 milliGRAM(s) Oral at bedtime PRN  metoprolol tartrate 25 milliGRAM(s) Oral every 12 hours  ondansetron Injectable 4 milliGRAM(s) IV Push every 8 hours PRN  simvastatin 40 milliGRAM(s) Oral at bedtime    Antimicrobials:  
Patient is a 83y old  Female who presents with a chief complaint of 3rd Metatarsal OM for surgery (19 Feb 2024 11:32)      DATE OF SERVICE: 02-19-24 @ 13:35    SUBJECTIVE / OVERNIGHT EVENTS: overnight events noted    ROS:  Resp: No cough no sputum production  CVS: No chest pain no palpitations no orthopnea  GI: no N/V/D          MEDICATIONS  (STANDING):  cadexomer iodine 0.9% Gel 1 Application(s) Topical daily  levothyroxine 88 MICROGram(s) Oral daily  metoprolol tartrate 25 milliGRAM(s) Oral every 12 hours  simvastatin 40 milliGRAM(s) Oral at bedtime    MEDICATIONS  (PRN):  acetaminophen     Tablet .. 650 milliGRAM(s) Oral every 6 hours PRN Temp greater or equal to 38C (100.4F), Mild Pain (1 - 3)  albuterol    90 MICROgram(s) HFA Inhaler 2 Puff(s) Inhalation every 6 hours PRN Shortness of Breath and/or Wheezing  aluminum hydroxide/magnesium hydroxide/simethicone Suspension 30 milliLiter(s) Oral every 4 hours PRN Dyspepsia  melatonin 3 milliGRAM(s) Oral at bedtime PRN Insomnia  ondansetron Injectable 4 milliGRAM(s) IV Push every 8 hours PRN Nausea and/or Vomiting        CAPILLARY BLOOD GLUCOSE        I&O's Summary    18 Feb 2024 07:01  -  19 Feb 2024 07:00  --------------------------------------------------------  IN: 880 mL / OUT: 1425 mL / NET: -545 mL    19 Feb 2024 07:01  -  19 Feb 2024 13:35  --------------------------------------------------------  IN: 600 mL / OUT: 700 mL / NET: -100 mL        Vital Signs Last 24 Hrs  T(C): 37.1 (19 Feb 2024 10:19), Max: 37.1 (19 Feb 2024 10:19)  T(F): 98.8 (19 Feb 2024 10:19), Max: 98.8 (19 Feb 2024 10:19)  HR: 68 (19 Feb 2024 10:19) (62 - 68)  BP: 129/66 (19 Feb 2024 10:19) (129/66 - 152/66)  BP(mean): --  RR: 18 (19 Feb 2024 10:19) (17 - 18)  SpO2: 100% (19 Feb 2024 10:19) (97% - 100%)    PHYSICAL EXAM:  GENERAL: in no apparent distress  HEAD:  Atraumatic, Normocephalic  EYES: EOMI, PERRLA, sclera clear  NECK: Supple, No JVD  CHEST/LUNG: clear b/l, no wheeze  HEART: S1 S2; no murmurs appreciated  ABDOMEN: Soft, Nontender, Bowel sounds present  EXTREMITIES:  No clubbing or cyanosis,  no edema  NEUROLOGY: Alert non-focal  SKIN: No rashes or lesions    LABS:                        11.1   5.25  )-----------( 243      ( 19 Feb 2024 04:28 )             33.6     02-19    137  |  101  |  21  ----------------------------<  68<L>  3.8   |  25  |  0.94    Ca    9.5      19 Feb 2024 04:28  Phos  4.1     02-19  Mg     2.00     02-19      PT/INR - ( 19 Feb 2024 04:28 )   PT: 12.9 sec;   INR: 1.15 ratio         PTT - ( 19 Feb 2024 04:28 )  PTT:39.9 sec      Urinalysis Basic - ( 19 Feb 2024 04:28 )    Color: x / Appearance: x / SG: x / pH: x  Gluc: 68 mg/dL / Ketone: x  / Bili: x / Urobili: x   Blood: x / Protein: x / Nitrite: x   Leuk Esterase: x / RBC: x / WBC x   Sq Epi: x / Non Sq Epi: x / Bacteria: x          All consultant(s) notes reviewed and care discussed with other providers        Contact Number, Dr Carrillo [phone]
Patient is a 83y old  Female who presents with a chief complaint of 3rd Metatarsal OM for surgery (23 Feb 2024 12:46)      DATE OF SERVICE: 02-23-24 @ 14:47    SUBJECTIVE / OVERNIGHT EVENTS: overnight events noted    ROS:  Resp: No cough no sputum production  CVS: No chest pain no palpitations no orthopnea  GI: no N/V/D  : no dysuria, no hematuria  'I feel OK'     MEDICATIONS  (STANDING):  apixaban 5 milliGRAM(s) Oral two times a day  chlorhexidine 2% Cloths 1 Application(s) Topical daily  levothyroxine 88 MICROGram(s) Oral daily  metoprolol tartrate 25 milliGRAM(s) Oral every 12 hours  povidone iodine 10% Ointment 1 Application(s) Topical every 12 hours  simvastatin 40 milliGRAM(s) Oral at bedtime    MEDICATIONS  (PRN):  acetaminophen     Tablet .. 650 milliGRAM(s) Oral every 6 hours PRN Temp greater or equal to 38C (100.4F), Mild Pain (1 - 3)  albuterol    90 MICROgram(s) HFA Inhaler 2 Puff(s) Inhalation every 6 hours PRN Shortness of Breath and/or Wheezing  aluminum hydroxide/magnesium hydroxide/simethicone Suspension 30 milliLiter(s) Oral every 4 hours PRN Dyspepsia  melatonin 3 milliGRAM(s) Oral at bedtime PRN Insomnia  ondansetron Injectable 4 milliGRAM(s) IV Push every 8 hours PRN Nausea and/or Vomiting        CAPILLARY BLOOD GLUCOSE        I&O's Summary      Vital Signs Last 24 Hrs  T(C): 36.3 (23 Feb 2024 11:35), Max: 36.9 (22 Feb 2024 16:42)  T(F): 97.4 (23 Feb 2024 11:35), Max: 98.5 (22 Feb 2024 16:42)  HR: 60 (23 Feb 2024 11:35) (60 - 70)  BP: 116/64 (23 Feb 2024 11:35) (115/55 - 143/69)  BP(mean): --  RR: 17 (23 Feb 2024 11:35) (17 - 18)  SpO2: 99% (23 Feb 2024 11:35) (97% - 100%)    PHYSICAL EXAM:  CHEST/LUNG: clear  HEART: S1 S2; no murmurs   ABDOMEN: Soft, Nontender  EXTREMITIES:  right leg in bandage  NEUROLOGY: Alert non-focal  SKIN: No rashes or lesions    LABS:                        9.9    6.13  )-----------( 199      ( 23 Feb 2024 08:01 )             30.1     02-23    137  |  102  |  18  ----------------------------<  80  4.4   |  25  |  0.70    Ca    9.5      23 Feb 2024 08:04  Phos  3.4     02-23  Mg     2.00     02-23    TPro  6.1  /  Alb  3.5  /  TBili  0.6  /  DBili  x   /  AST  25  /  ALT  12  /  AlkPhos  93  02-23          Urinalysis Basic - ( 23 Feb 2024 08:04 )    Color: x / Appearance: x / SG: x / pH: x  Gluc: 80 mg/dL / Ketone: x  / Bili: x / Urobili: x   Blood: x / Protein: x / Nitrite: x   Leuk Esterase: x / RBC: x / WBC x   Sq Epi: x / Non Sq Epi: x / Bacteria: x          All consultant(s) notes reviewed and care discussed with other providers        Contact Number, Dr Carrillo [phone]
Cardiovascular Disease Progress Note  Date of Service: 02-20-24 @ 11:31    Overnight events: No acute events overnight.    The patient denies chest pain or SOB.   Otherwise review of systems negative    Objective Findings:  T(C): 36.8 (02-20-24 @ 10:11), Max: 36.8 (02-19-24 @ 16:54)  HR: 61 (02-20-24 @ 10:11) (57 - 68)  BP: 126/57 (02-20-24 @ 10:11) (121/69 - 145/64)  RR: 18 (02-20-24 @ 10:11) (16 - 18)  SpO2: 100% (02-20-24 @ 10:11) (99% - 100%)  Wt(kg): --  Daily     Daily       Physical Exam:  Gen: NAD; Patient resting comfortably  HEENT: EOMI, Normocephalic/ atraumatic  CV: RRR, normal S1 + S2, systolic murmur  Lungs:  Normal respiratory effort; clear to auscultation bilaterally  Abd: soft, non-tender; bowel sounds present  Ext: No edema; warm and well perfused    Telemetry: n/a    Laboratory Data:                        11.4   5.62  )-----------( 237      ( 20 Feb 2024 07:34 )             33.8     02-20    139  |  102  |  25<H>  ----------------------------<  75  4.0   |  23  |  0.83    Ca    9.4      20 Feb 2024 06:00  Phos  4.0     02-20  Mg     2.00     02-20      PT/INR - ( 19 Feb 2024 04:28 )   PT: 12.9 sec;   INR: 1.15 ratio         PTT - ( 19 Feb 2024 04:28 )  PTT:39.9 sec          Inpatient Medications:  MEDICATIONS  (STANDING):  cadexomer iodine 0.9% Gel 1 Application(s) Topical daily  levothyroxine 88 MICROGram(s) Oral daily  metoprolol tartrate 25 milliGRAM(s) Oral every 12 hours  simvastatin 40 milliGRAM(s) Oral at bedtime      Assessment: 83 year old woman with paroxysmal a-fib, HTN, moderate aortic stenosis and HLD presents for foot surgery.     Plan of Care:    #Pre-operative cardiac risk evaluation-  Ms. Symanski displays no signs or symptoms of coronary ischemia or CHF.  EKG shows sinus with a pre-existing LBBB.  Recent echo with no acute findings.  The patient is optimized from a cardiac standpoint to proceed with podiatric intervention.   Please continue metoprolol throughout the josh-operative period.    #Paroxysmal a-fib-  A-fib noted on EKG 1/18/2024.  Most recent EKG is sinus.  No objection to holding Eliquis 2-3 days prior to surgery.    #Moderate aortic stenosis-  No indication for intervention at this time.          Over 55 minutes spent on total encounter; more than 50% of the visit was spent counseling and/or coordinating care by the attending physician.      Fredy Parker MD [facility]  Cardiovascular Disease  [phone]
Cardiovascular Disease Progress Note  Date of service: 02-19-24 @ 11:33    Overnight events: No acute events overnight.  Patient is in no distress. Denies chest pain   Otherwise review of systems negative    Objective Findings:  T(C): 37.1 (02-19-24 @ 10:19), Max: 37.1 (02-19-24 @ 10:19)  HR: 68 (02-19-24 @ 10:19) (57 - 68)  BP: 129/66 (02-19-24 @ 10:19) (129/66 - 152/66)  RR: 18 (02-19-24 @ 10:19) (17 - 18)  SpO2: 100% (02-19-24 @ 10:19) (97% - 100%)  Wt(kg): --  Daily     Daily       Physical Exam:  Gen: NAD; Patient resting comfortably  HEENT: EOMI, Normocephalic/ atraumatic  CV: RRR, normal S1 + S2, no m/r/g  Lungs:  Normal respiratory effort; clear to auscultation bilaterally  Abd: soft, non-tender; bowel sounds present  Ext: No edema; warm and well perfused    Telemetry: N/a    Laboratory Data:                        11.1   5.25  )-----------( 243      ( 19 Feb 2024 04:28 )             33.6     02-19    137  |  101  |  21  ----------------------------<  68<L>  3.8   |  25  |  0.94    Ca    9.5      19 Feb 2024 04:28  Phos  4.1     02-19  Mg     2.00     02-19      PT/INR - ( 19 Feb 2024 04:28 )   PT: 12.9 sec;   INR: 1.15 ratio         PTT - ( 19 Feb 2024 04:28 )  PTT:39.9 sec          Inpatient Medications:  MEDICATIONS  (STANDING):  cadexomer iodine 0.9% Gel 1 Application(s) Topical daily  levothyroxine 88 MICROGram(s) Oral daily  metoprolol tartrate 25 milliGRAM(s) Oral every 12 hours  simvastatin 40 milliGRAM(s) Oral at bedtime      Assessment:  83 year old woman with paroxysmal a-fib, HTN, moderate aortic stenosis and HLD presents for foot surgery.     Plan of Care:    #Pre-operative cardiac risk evaluation-  Ms. Trujillo displays no signs or symptoms of coronary ischemia or CHF.  EKG shows sinus with a pre-existing LBBB.  Recent echo with no acute findings.  The patient is optimized from a cardiac standpoint to proceed with podiatric intervention.   Please continue metoprolol throughout the josh-operative period.    #Paroxysmal a-fib-  A-fib noted on EKG 1/18/2024.  Most recent EKG is sinus.  No objection to holding Eliquis 2-3 days prior to surgery.    #Moderate aortic stenosis-  No indication for intervention at this time.       #ACP (advance care planning)-  Advanced care planning was discussed.  Risks, benefits and alternatives of medical treatment and procedures were discussed in detail and all questions were answered. 30 additional minutes spent addressing advance care plans.          Over 55 minutes spent on total encounter; more than 50% of the visit was spent counseling and/or coordinating care by the attending physician.      Manjit Parker DO [facility]  Cardiovascular Disease  [phone]
KASSIE LOPEZ, MRN [id number], 83yfemale      Patient is a 83y old  Female who presents with a chief complaint of 3rd Metatarsal OM for surgery (20 Feb 2024 08:51)       INTERVAL HPI/OVERNIGHT EVENTS:  Patient seen and evaluated at bedside.  Pt is resting comfortable in NAD. Denies N/V/F/C.    Allergies    linezolid (Unknown)  Cipro (Other)  doxycycline (Unknown)    Intolerances    Levaquin (Stomach Upset; Nausea)      Vital Signs Last 24 Hrs  T(C): 36.8 (20 Feb 2024 10:11), Max: 36.8 (19 Feb 2024 16:54)  T(F): 98.2 (20 Feb 2024 10:11), Max: 98.3 (19 Feb 2024 16:54)  HR: 61 (20 Feb 2024 10:11) (57 - 68)  BP: 126/57 (20 Feb 2024 10:11) (121/69 - 145/64)  BP(mean): --  RR: 18 (20 Feb 2024 10:11) (16 - 18)  SpO2: 100% (20 Feb 2024 10:11) (99% - 100%)    Parameters below as of 20 Feb 2024 06:25  Patient On (Oxygen Delivery Method): room air        LABS:                        11.4   5.62  )-----------( 237      ( 20 Feb 2024 07:34 )             33.8     02-20    139  |  102  |  25<H>  ----------------------------<  75  4.0   |  23  |  0.83    Ca    9.4      20 Feb 2024 06:00  Phos  4.0     02-20  Mg     2.00     02-20      PT/INR - ( 19 Feb 2024 04:28 )   PT: 12.9 sec;   INR: 1.15 ratio         PTT - ( 19 Feb 2024 04:28 )  PTT:39.9 sec  Urinalysis Basic - ( 20 Feb 2024 06:00 )    Color: x / Appearance: x / SG: x / pH: x  Gluc: 75 mg/dL / Ketone: x  / Bili: x / Urobili: x   Blood: x / Protein: x / Nitrite: x   Leuk Esterase: x / RBC: x / WBC x   Sq Epi: x / Non Sq Epi: x / Bacteria: x      CAPILLARY BLOOD GLUCOSE          Lower Extremity Physical Exam:    Vascular: DP/PT 2/4, B/L, CFT <3 seconds B/L, Temperature gradient warm to warm, B/L.   Neuro: Epicritic sensation diminished  to the level of digits , B/L.  Musculoskeletal/Ortho: 12/8 s/p RF partial 2nd ray resection, healed.   Skin: Right foot submet 3 wound to bone, sanguinous drainage, no malodor, no pus, no erythema no tracking or tunneling. Left foot no open wounds or lesions, no acute signs of infection.      RADIOLOGY & ADDITIONAL TESTS:  
Patient is a 83y old  Female who presents with a chief complaint of 3rd Metatarsal OM for surgery (20 Feb 2024 11:31)      DATE OF SERVICE: 02-20-24 @ 15:26    SUBJECTIVE / OVERNIGHT EVENTS: overnight events noted    ROS:  Resp: No cough no sputum production  CVS: No chest pain no palpitations no orthopnea  GI: no N/V/D          MEDICATIONS  (STANDING):  cadexomer iodine 0.9% Gel 1 Application(s) Topical daily  levothyroxine 88 MICROGram(s) Oral daily  metoprolol tartrate 25 milliGRAM(s) Oral every 12 hours  simvastatin 40 milliGRAM(s) Oral at bedtime    MEDICATIONS  (PRN):  acetaminophen     Tablet .. 650 milliGRAM(s) Oral every 6 hours PRN Temp greater or equal to 38C (100.4F), Mild Pain (1 - 3)  albuterol    90 MICROgram(s) HFA Inhaler 2 Puff(s) Inhalation every 6 hours PRN Shortness of Breath and/or Wheezing  aluminum hydroxide/magnesium hydroxide/simethicone Suspension 30 milliLiter(s) Oral every 4 hours PRN Dyspepsia  melatonin 3 milliGRAM(s) Oral at bedtime PRN Insomnia  ondansetron Injectable 4 milliGRAM(s) IV Push every 8 hours PRN Nausea and/or Vomiting        CAPILLARY BLOOD GLUCOSE        I&O's Summary    19 Feb 2024 07:01  -  20 Feb 2024 07:00  --------------------------------------------------------  IN: 1140 mL / OUT: 1750 mL / NET: -610 mL    20 Feb 2024 07:01  -  20 Feb 2024 15:26  --------------------------------------------------------  IN: 350 mL / OUT: 300 mL / NET: 50 mL        Vital Signs Last 24 Hrs  T(C): 36.6 (20 Feb 2024 13:15), Max: 36.8 (19 Feb 2024 16:54)  T(F): 97.8 (20 Feb 2024 13:15), Max: 98.3 (19 Feb 2024 16:54)  HR: 68 (20 Feb 2024 13:15) (57 - 68)  BP: 122/59 (20 Feb 2024 13:15) (121/69 - 145/64)  BP(mean): --  RR: 17 (20 Feb 2024 13:15) (16 - 18)  SpO2: 100% (20 Feb 2024 13:15) (99% - 100%)    PHYSICAL EXAM:  EYES: EOMI, PERRLA,  NECK: Supple, No JVD  CHEST/LUNG: clear b/l, no wheeze  HEART: S1 S2; no murmurs appreciated  ABDOMEN: Soft, Nontender, Bowel sounds present  EXTREMITIES:  right leg in bandage  NEUROLOGY: Alert non-focal  SKIN: No rashes or lesions    LABS:                        11.4   5.62  )-----------( 237      ( 20 Feb 2024 07:34 )             33.8     02-20    139  |  102  |  25<H>  ----------------------------<  75  4.0   |  23  |  0.83    Ca    9.4      20 Feb 2024 06:00  Phos  4.0     02-20  Mg     2.00     02-20      PT/INR - ( 19 Feb 2024 04:28 )   PT: 12.9 sec;   INR: 1.15 ratio         PTT - ( 19 Feb 2024 04:28 )  PTT:39.9 sec      Urinalysis Basic - ( 20 Feb 2024 06:00 )    Color: x / Appearance: x / SG: x / pH: x  Gluc: 75 mg/dL / Ketone: x  / Bili: x / Urobili: x   Blood: x / Protein: x / Nitrite: x   Leuk Esterase: x / RBC: x / WBC x   Sq Epi: x / Non Sq Epi: x / Bacteria: x          All consultant(s) notes reviewed and care discussed with other providers        Contact Number, Dr Carrillo [phone]
Cardiovascular Disease Progress Note  Date of Service: 02-22-24 @ 09:44    Overnight events: No acute events overnight.    The patient denies chest pain or SOB.   Otherwise review of systems negative    Objective Findings:  T(C): 36.8 (02-22-24 @ 06:05), Max: 37.1 (02-21-24 @ 17:40)  HR: 78 (02-22-24 @ 06:05) (51 - 78)  BP: 110/54 (02-22-24 @ 06:05) (110/54 - 135/69)  RR: 17 (02-22-24 @ 06:05) (16 - 18)  SpO2: 99% (02-22-24 @ 06:05) (95% - 100%)  Wt(kg): --  Daily     Daily       Physical Exam:  Gen: NAD; Patient resting comfortably  HEENT: EOMI, Normocephalic/ atraumatic  CV: RRR, normal S1 + S2, no m/r/g  Lungs:  Normal respiratory effort; clear to auscultation bilaterally  Abd: soft, non-tender; bowel sounds present  Ext: No edema;    Telemetry: n/a    Laboratory Data:                        9.5    5.85  )-----------( 206      ( 22 Feb 2024 06:00 )             28.0     02-22    137  |  103  |  24<H>  ----------------------------<  75  3.6   |  24  |  0.77    Ca    9.3      22 Feb 2024 06:00  Phos  3.8     02-22  Mg     1.90     02-22    TPro  5.6<L>  /  Alb  3.2<L>  /  TBili  0.5  /  DBili  x   /  AST  16  /  ALT  8   /  AlkPhos  84  02-22    PT/INR - ( 21 Feb 2024 02:44 )   PT: 12.9 sec;   INR: 1.15 ratio         PTT - ( 21 Feb 2024 02:44 )  PTT:36.6 sec          Inpatient Medications:  MEDICATIONS  (STANDING):  cadexomer iodine 0.9% Gel 1 Application(s) Topical daily  ertapenem  IVPB 1000 milliGRAM(s) IV Intermittent every 24 hours  levothyroxine 88 MICROGram(s) Oral daily  metoprolol tartrate 25 milliGRAM(s) Oral every 12 hours  simvastatin 40 milliGRAM(s) Oral at bedtime      Assessment: 83 year old woman with paroxysmal a-fib, HTN, moderate aortic stenosis and HLD presents for foot surgery.     Plan of Care:    #Post-operative cardiac  evaluation-  Ms. Trujillo tolerated podiatric resection well.  She displays no signs or symptoms of post operative coronary ischemia or CHF.  EKG shows sinus with a pre-existing LBBB.  Recent echo with no acute findings.  Continue current cardiac management.     #Paroxysmal a-fib-  A-fib noted on EKG 1/18/2024.  Most recent EKG is sinus.  Resume Eliquis once no surgical objection.     #Moderate aortic stenosis-  No indication for intervention at this time.                 Over 55 minutes spent on total encounter; more than 50% of the visit was spent counseling and/or coordinating care by the attending physician.      Fredy Parker MD [facility]  Cardiovascular Disease  [phone]
Date of Service  : 02-17-24    INTERVAL HPI/OVERNIGHT EVENTS: I feel fine.   Vital Signs Last 24 Hrs  T(C): 36.7 (17 Feb 2024 16:52), Max: 36.8 (17 Feb 2024 10:25)  T(F): 98 (17 Feb 2024 16:52), Max: 98.2 (17 Feb 2024 10:25)  HR: 62 (17 Feb 2024 16:52) (58 - 67)  BP: 137/68 (17 Feb 2024 16:52) (124/55 - 138/70)  BP(mean): --  RR: 18 (17 Feb 2024 16:52) (16 - 18)  SpO2: 99% (17 Feb 2024 16:52) (95% - 100%)    Parameters below as of 17 Feb 2024 16:52  Patient On (Oxygen Delivery Method): room air      I&O's Summary    16 Feb 2024 07:01  -  17 Feb 2024 07:00  --------------------------------------------------------  IN: 1240 mL / OUT: 500 mL / NET: 740 mL    17 Feb 2024 07:01  -  17 Feb 2024 17:11  --------------------------------------------------------  IN: 415 mL / OUT: 200 mL / NET: 215 mL      MEDICATIONS  (STANDING):  apixaban 5 milliGRAM(s) Oral two times a day  cadexomer iodine 0.9% Gel 1 Application(s) Topical daily  levothyroxine 88 MICROGram(s) Oral daily  metoprolol tartrate 25 milliGRAM(s) Oral every 12 hours  simvastatin 40 milliGRAM(s) Oral at bedtime    MEDICATIONS  (PRN):  acetaminophen     Tablet .. 650 milliGRAM(s) Oral every 6 hours PRN Temp greater or equal to 38C (100.4F), Mild Pain (1 - 3)  albuterol    90 MICROgram(s) HFA Inhaler 2 Puff(s) Inhalation every 6 hours PRN Shortness of Breath and/or Wheezing  aluminum hydroxide/magnesium hydroxide/simethicone Suspension 30 milliLiter(s) Oral every 4 hours PRN Dyspepsia  melatonin 3 milliGRAM(s) Oral at bedtime PRN Insomnia  ondansetron Injectable 4 milliGRAM(s) IV Push every 8 hours PRN Nausea and/or Vomiting    LABS:                        10.7   5.25  )-----------( 262      ( 17 Feb 2024 05:00 )             32.4     02-17    140  |  103  |  22  ----------------------------<  67<L>  4.0   |  23  |  0.76    Ca    9.2      17 Feb 2024 05:00  Phos  3.8     02-16  Mg     2.10     02-16    TPro  6.5  /  Alb  3.5  /  TBili  0.3  /  DBili  x   /  AST  25  /  ALT  14  /  AlkPhos  88  02-16    PT/INR - ( 16 Feb 2024 06:10 )   PT: 13.2 sec;   INR: 1.18 ratio         PTT - ( 16 Feb 2024 06:10 )  PTT:41.9 sec  Urinalysis Basic - ( 17 Feb 2024 05:00 )    Color: x / Appearance: x / SG: x / pH: x  Gluc: 67 mg/dL / Ketone: x  / Bili: x / Urobili: x   Blood: x / Protein: x / Nitrite: x   Leuk Esterase: x / RBC: x / WBC x   Sq Epi: x / Non Sq Epi: x / Bacteria: x      CAPILLARY BLOOD GLUCOSE            Urinalysis Basic - ( 17 Feb 2024 05:00 )    Color: x / Appearance: x / SG: x / pH: x  Gluc: 67 mg/dL / Ketone: x  / Bili: x / Urobili: x   Blood: x / Protein: x / Nitrite: x   Leuk Esterase: x / RBC: x / WBC x   Sq Epi: x / Non Sq Epi: x / Bacteria: x      REVIEW OF SYSTEMS:  CONSTITUTIONAL: No fever, weight loss, or fatigue  EYES: No eye pain, visual disturbances, or discharge  ENMT:  No difficulty hearing, tinnitus, vertigo; No sinus or throat pain  NECK: No pain or stiffness  RESPIRATORY: No cough, wheezing, chills or hemoptysis; No shortness of breath  CARDIOVASCULAR: No chest pain, palpitations, dizziness, or leg swelling  GASTROINTESTINAL: No abdominal or epigastric pain. No nausea, vomiting, or hematemesis; No diarrhea or constipation. No melena or hematochezia.  GENITOURINARY: No dysuria, frequency, hematuria, or incontinence  NEUROLOGICAL: No headaches, memory loss, loss of strength, numbness, or tremors      RADIOLOGY & ADDITIONAL TESTS:    Consultant(s) Notes Reviewed:  [x ] YES  [ ] NO    PHYSICAL EXAM:  GENERAL: NAD, well-groomed, well-developed,not in any distress ,  HEAD:  Atraumatic, Normocephalic  EYES: EOMI, PERRLA, conjunctiva and sclera clear  ENMT: No tonsillar erythema, exudates, or enlargement; Moist mucous membranes, Good dentition, No lesions  NECK: Supple, No JVD, Normal thyroid  NERVOUS SYSTEM:  Alert & Oriented X3, No focal deficit   CHEST/LUNG: Good air entry bilateral with no  rales, rhonchi, wheezing, or rubs  HEART: Regular rate and rhythm; No murmurs, rubs, or gallops  ABDOMEN: Soft, Nontender, Nondistended; Bowel sounds present  EXTREMITIES:  Foot dressed.  Care Discussed with Consultants/Other Providers [ x] YES  [ ] NO
Date of Service  : 02-18-24     INTERVAL HPI/OVERNIGHT EVENTS: I feel okay   Vital Signs Last 24 Hrs  T(C): 36.4 (18 Feb 2024 13:26), Max: 36.8 (18 Feb 2024 10:06)  T(F): 97.6 (18 Feb 2024 13:26), Max: 98.2 (18 Feb 2024 10:06)  HR: 57 (18 Feb 2024 13:26) (57 - 67)  BP: 132/62 (18 Feb 2024 13:26) (119/61 - 144/66)  BP(mean): --  RR: 17 (18 Feb 2024 13:26) (17 - 18)  SpO2: 100% (18 Feb 2024 13:26) (97% - 100%)    Parameters below as of 18 Feb 2024 13:26  Patient On (Oxygen Delivery Method): room air      I&O's Summary    17 Feb 2024 07:01  -  18 Feb 2024 07:00  --------------------------------------------------------  IN: 855 mL / OUT: 1200 mL / NET: -345 mL    18 Feb 2024 07:01  -  18 Feb 2024 13:49  --------------------------------------------------------  IN: 440 mL / OUT: 375 mL / NET: 65 mL      MEDICATIONS  (STANDING):  cadexomer iodine 0.9% Gel 1 Application(s) Topical daily  levothyroxine 88 MICROGram(s) Oral daily  metoprolol tartrate 25 milliGRAM(s) Oral every 12 hours  simvastatin 40 milliGRAM(s) Oral at bedtime    MEDICATIONS  (PRN):  acetaminophen     Tablet .. 650 milliGRAM(s) Oral every 6 hours PRN Temp greater or equal to 38C (100.4F), Mild Pain (1 - 3)  albuterol    90 MICROgram(s) HFA Inhaler 2 Puff(s) Inhalation every 6 hours PRN Shortness of Breath and/or Wheezing  aluminum hydroxide/magnesium hydroxide/simethicone Suspension 30 milliLiter(s) Oral every 4 hours PRN Dyspepsia  melatonin 3 milliGRAM(s) Oral at bedtime PRN Insomnia  ondansetron Injectable 4 milliGRAM(s) IV Push every 8 hours PRN Nausea and/or Vomiting    LABS:                        11.2   4.85  )-----------( 237      ( 18 Feb 2024 07:28 )             33.5     02-18    138  |  103  |  18  ----------------------------<  77  3.9   |  24  |  0.75    Ca    9.6      18 Feb 2024 07:28  Phos  4.0     02-18  Mg     2.10     02-18        Urinalysis Basic - ( 18 Feb 2024 07:28 )    Color: x / Appearance: x / SG: x / pH: x  Gluc: 77 mg/dL / Ketone: x  / Bili: x / Urobili: x   Blood: x / Protein: x / Nitrite: x   Leuk Esterase: x / RBC: x / WBC x   Sq Epi: x / Non Sq Epi: x / Bacteria: x      CAPILLARY BLOOD GLUCOSE            Urinalysis Basic - ( 18 Feb 2024 07:28 )    Color: x / Appearance: x / SG: x / pH: x  Gluc: 77 mg/dL / Ketone: x  / Bili: x / Urobili: x   Blood: x / Protein: x / Nitrite: x   Leuk Esterase: x / RBC: x / WBC x   Sq Epi: x / Non Sq Epi: x / Bacteria: x      REVIEW OF SYSTEMS:  CONSTITUTIONAL: No fever, weight loss, or fatigue  EYES: No eye pain, visual disturbances, or discharge  ENMT:  No difficulty hearing, tinnitus, vertigo; No sinus or throat pain  NECK: No pain or stiffness  RESPIRATORY: No cough, wheezing, chills or hemoptysis; No shortness of breath  CARDIOVASCULAR: No chest pain, palpitations, dizziness, or leg swelling  GASTROINTESTINAL: No abdominal or epigastric pain. No nausea, vomiting, or hematemesis; No diarrhea or constipation. No melena or hematochezia.  GENITOURINARY: No dysuria, frequency, hematuria, or incontinence  NEUROLOGICAL: No headaches, memory loss, loss of strength, numbness, or tremors      RADIOLOGY & ADDITIONAL TESTS:    Consultant(s) Notes Reviewed:  [x ] YES  [ ] NO    PHYSICAL EXAM:  GENERAL: NAD, well-groomed, well-developed,not in any distress ,  HEAD:  Atraumatic, Normocephalic  EYES: EOMI, PERRLA, conjunctiva and sclera clear  ENMT: No tonsillar erythema, exudates, or enlargement; Moist mucous membranes, Good dentition, No lesions  NECK: Supple, No JVD, Normal thyroid  NERVOUS SYSTEM:  Alert & Oriented X3, No focal deficit   CHEST/LUNG: Good air entry bilateral with no  rales, rhonchi, wheezing, or rubs  HEART: Regular rate and rhythm; No murmurs, rubs, or gallops  ABDOMEN: Soft, Nontender, Nondistended; Bowel sounds present  EXTREMITIES:  Rt foot dressed.   Care Discussed with Consultants/Other Providers [ x] YES  [ ] NO
[facility], Division of Infectious Diseases  FAIZAN Yeung Y. Patel, S. Shah, G. Mikhail  [phone]  ([phone] - weekdays after 5pm and weekends)    Name: KASSIE LOPEZ  Age/Gender: 83y Female  MRN: [id number]    Interval History:  Notes reviewed.   No concerning overnight events.  Afebrile.   denies diarrhea  feels ok    Allergies: linezolid (Unknown)  Cipro (Other)  doxycycline (Unknown)      Objective:  Vitals:   T(F): 98.2 (02-23-24 @ 05:10), Max: 98.5 (02-22-24 @ 16:42)  HR: 63 (02-23-24 @ 05:10) (63 - 78)  BP: 115/55 (02-23-24 @ 05:10) (115/55 - 140/74)  RR: 18 (02-23-24 @ 05:10) (17 - 18)  SpO2: 100% (02-23-24 @ 05:10) (95% - 100%)  Physical Examination:  General: no acute distress  HEENT: anicteric  Cardio: normal rate  Resp: breathing comfortably on RA   Abd: soft, NT, ND  Ext: R foot wrapped w/ dressing  Skin: warm, dry    Laboratory Studies:  CBC:                       9.9    6.13  )-----------( 199      ( 23 Feb 2024 08:01 )             30.1     WBC Trend:  6.13 02-23-24 @ 08:01  5.85 02-22-24 @ 06:00  4.99 02-21-24 @ 02:44  5.62 02-20-24 @ 07:34  5.25 02-19-24 @ 04:28  4.85 02-18-24 @ 07:28  5.25 02-17-24 @ 05:00    CMP: 02-23    137  |  102  |  18  ----------------------------<  80  4.4   |  25  |  0.70    Ca    9.5      23 Feb 2024 08:04  Phos  3.4     02-23  Mg     2.00     02-23    TPro  6.1  /  Alb  3.5  /  TBili  0.6  /  DBili  x   /  AST  25  /  ALT  12  /  AlkPhos  93  02-23      LIVER FUNCTIONS - ( 23 Feb 2024 08:04 )  Alb: 3.5 g/dL / Pro: 6.1 g/dL / ALK PHOS: 93 U/L / ALT: 12 U/L / AST: 25 U/L / GGT: x             Urinalysis Basic - ( 23 Feb 2024 08:04 )    Color: x / Appearance: x / SG: x / pH: x  Gluc: 80 mg/dL / Ketone: x  / Bili: x / Urobili: x   Blood: x / Protein: x / Nitrite: x   Leuk Esterase: x / RBC: x / WBC x   Sq Epi: x / Non Sq Epi: x / Bacteria: x      Microbiology: reviewed     Culture - Fungal, Tissue (collected 02-21-24 @ 10:55)  Source: .Tissue right 3rd metatarsal  Preliminary Report (02-22-24 @ 13:59):    Testing in progress    Culture - Tissue with Gram Stain (collected 02-21-24 @ 10:55)  Source: .Tissue right 3rd metatarsal  Gram Stain (02-21-24 @ 23:17):    Rare polymorphonuclear leukocytes per low power field    No organisms seen  Preliminary Report (02-22-24 @ 16:39):    No growth to date    Culture - Surgical Swab (collected 02-21-24 @ 10:55)  Source: .Surgical Swab deep wound right foot  Preliminary Report (02-22-24 @ 10:49):    No growth    Culture - Abscess with Gram Stain (collected 02-19-24 @ 15:30)  Source: .Abscess right foot  Gram Stain (02-19-24 @ 21:22):    No polymorphonuclear leukocytes per low power field    Few Gram Positive Rods per oil power field  Preliminary Report (02-20-24 @ 17:00):    Numerous Corynebacterium species    "Susceptibilities not performed"    Culture - Abscess with Gram Stain (collected 02-16-24 @ 07:58)  Source: .Abscess right foot  Gram Stain (02-20-24 @ 20:36):    No polymorphonuclear cells seen per low power field    Moderate Gram Positive Rods per oil power field  Preliminary Report (02-21-24 @ 14:23):    Few Corynebacterium striatum group "Susceptibilities not performed"    Few Pantoea septica  Organism: Pantoea septica (02-22-24 @ 10:47)  Organism: Pantoea septica (02-22-24 @ 10:47)      Method Type: JEANINE      -  Amoxicillin/Clavulanic Acid: S <=8/4      -  Ampicillin: I 16 These ampicillin results predict results for amoxicillin      -  Ampicillin/Sulbactam: S 8/4      -  Aztreonam: S <=4      -  Cefazolin: R 8      -  Cefepime: S <=2      -  Cefoxitin: I 16      -  Ceftriaxone: S <=1      -  Ciprofloxacin: S <=0.25      -  Ertapenem: S <=0.5      -  Gentamicin: S <=2      -  Levofloxacin: S <=0.5      -  Meropenem: S <=1      -  Piperacillin/Tazobactam: S <=8      -  Tobramycin: S <=2      -  Trimethoprim/Sulfamethoxazole: S <=0.5/9.5        Radiology: reviewed     Medications:  acetaminophen     Tablet .. 650 milliGRAM(s) Oral every 6 hours PRN  albuterol    90 MICROgram(s) HFA Inhaler 2 Puff(s) Inhalation every 6 hours PRN  aluminum hydroxide/magnesium hydroxide/simethicone Suspension 30 milliLiter(s) Oral every 4 hours PRN  apixaban 5 milliGRAM(s) Oral two times a day  chlorhexidine 2% Cloths 1 Application(s) Topical daily  ertapenem  IVPB 1000 milliGRAM(s) IV Intermittent every 24 hours  levothyroxine 88 MICROGram(s) Oral daily  melatonin 3 milliGRAM(s) Oral at bedtime PRN  metoprolol tartrate 25 milliGRAM(s) Oral every 12 hours  ondansetron Injectable 4 milliGRAM(s) IV Push every 8 hours PRN  povidone iodine 10% Ointment 1 Application(s) Topical every 12 hours  simvastatin 40 milliGRAM(s) Oral at bedtime    Antimicrobials:  ertapenem  IVPB 1000 milliGRAM(s) IV Intermittent every 24 hours  
[facility], Division of Infectious Diseases  FAIZAN Yeung Y. Patel, S. Shah, G. Mikhail  [phone]  ([phone] - weekdays after 5pm and weekends)    Name: KASSIE LOPEZ  Age/Gender: 83y Female  MRN: [id number]    Interval History:  Notes reviewed.   No concerning overnight events.  Afebrile.   s/p OR yesterday  denies pain, no diarrhea    Allergies: linezolid (Unknown)  Cipro (Other)  doxycycline (Unknown)      Objective:  Vitals:   T(F): 98.2 (02-22-24 @ 06:05), Max: 98.8 (02-21-24 @ 17:40)  HR: 78 (02-22-24 @ 06:05) (51 - 78)  BP: 110/54 (02-22-24 @ 06:05) (106/54 - 135/69)  RR: 17 (02-22-24 @ 06:05) (13 - 18)  SpO2: 99% (02-22-24 @ 06:05) (95% - 100%)  Physical Examination:  General: no acute distress  HEENT: anicteric  Cardio: S1, S2, normal rate  Resp: clear to auscultation anteriorly   Abd: soft, NT, ND  Ext: R foot wrapped w/ dressing  Skin: warm, dry    Laboratory Studies:  CBC:                       9.5    5.85  )-----------( 206      ( 22 Feb 2024 06:00 )             28.0     WBC Trend:  5.85 02-22-24 @ 06:00  4.99 02-21-24 @ 02:44  5.62 02-20-24 @ 07:34  5.25 02-19-24 @ 04:28  4.85 02-18-24 @ 07:28  5.25 02-17-24 @ 05:00  5.70 02-16-24 @ 06:10    CMP: 02-22    137  |  103  |  24<H>  ----------------------------<  75  3.6   |  24  |  0.77    Ca    9.3      22 Feb 2024 06:00  Phos  3.8     02-22  Mg     1.90     02-22    TPro  5.6<L>  /  Alb  3.2<L>  /  TBili  0.5  /  DBili  x   /  AST  16  /  ALT  8   /  AlkPhos  84  02-22      LIVER FUNCTIONS - ( 22 Feb 2024 06:00 )  Alb: 3.2 g/dL / Pro: 5.6 g/dL / ALK PHOS: 84 U/L / ALT: 8 U/L / AST: 16 U/L / GGT: x             Urinalysis Basic - ( 22 Feb 2024 06:00 )    Color: x / Appearance: x / SG: x / pH: x  Gluc: 75 mg/dL / Ketone: x  / Bili: x / Urobili: x   Blood: x / Protein: x / Nitrite: x   Leuk Esterase: x / RBC: x / WBC x   Sq Epi: x / Non Sq Epi: x / Bacteria: x      Microbiology: reviewed     Culture - Tissue with Gram Stain (collected 02-21-24 @ 10:55)  Source: .Tissue right 3rd metatarsal  Gram Stain (02-21-24 @ 23:17):    Rare polymorphonuclear leukocytes per low power field    No organisms seen    Culture - Abscess with Gram Stain (collected 02-19-24 @ 15:30)  Source: .Abscess right foot  Gram Stain (02-19-24 @ 21:22):    No polymorphonuclear leukocytes per low power field    Few Gram Positive Rods per oil power field  Preliminary Report (02-20-24 @ 17:00):    Numerous Corynebacterium species    "Susceptibilities not performed"    Culture - Abscess with Gram Stain (collected 02-16-24 @ 07:58)  Source: .Abscess right foot  Gram Stain (02-20-24 @ 20:36):    No polymorphonuclear cells seen per low power field    Moderate Gram Positive Rods per oil power field  Preliminary Report (02-21-24 @ 14:23):    Few Corynebacterium striatum group "Susceptibilities not performed"    Few Pantoea septica        Radiology: reviewed     Medications:  acetaminophen     Tablet .. 650 milliGRAM(s) Oral every 6 hours PRN  albuterol    90 MICROgram(s) HFA Inhaler 2 Puff(s) Inhalation every 6 hours PRN  aluminum hydroxide/magnesium hydroxide/simethicone Suspension 30 milliLiter(s) Oral every 4 hours PRN  cadexomer iodine 0.9% Gel 1 Application(s) Topical daily  ertapenem  IVPB 1000 milliGRAM(s) IV Intermittent every 24 hours  levothyroxine 88 MICROGram(s) Oral daily  melatonin 3 milliGRAM(s) Oral at bedtime PRN  metoprolol tartrate 25 milliGRAM(s) Oral every 12 hours  ondansetron Injectable 4 milliGRAM(s) IV Push every 8 hours PRN  simvastatin 40 milliGRAM(s) Oral at bedtime    Antimicrobials:  ertapenem  IVPB 1000 milliGRAM(s) IV Intermittent every 24 hours  
[facility], Division of Infectious Diseases  FAIZAN Yeung Y. Patel, S. Shah, G. Mikhail  [phone]  ([phone] - weekdays after 5pm and weekends)    Name: KASSIE LOPEZ  Age/Gender: 83y Female  MRN: [id number]    Interval History:  Notes reviewed.   No concerning overnight events.  Afebrile.   denies pain    Allergies: linezolid (Unknown)  Cipro (Other)  doxycycline (Unknown)      Objective:  Vitals:   T(F): 98.1 (02-20-24 @ 06:25), Max: 98.8 (02-19-24 @ 10:19)  HR: 65 (02-20-24 @ 06:25) (57 - 68)  BP: 128/58 (02-20-24 @ 06:25) (121/69 - 145/64)  RR: 16 (02-20-24 @ 06:25) (16 - 18)  SpO2: 99% (02-20-24 @ 06:25) (99% - 100%)  Physical Examination:  General: no acute distress  HEENT: anicteric  Cardio: S1, S2, normal rate  Resp: clear to auscultation anteriorly   Abd: soft, NT, ND  Ext: R foot wrapped w/ dressing  Skin: warm, dry    Laboratory Studies:  CBC:                       11.4   5.62  )-----------( 237      ( 20 Feb 2024 07:34 )             33.8     WBC Trend:  5.62 02-20-24 @ 07:34  5.25 02-19-24 @ 04:28  4.85 02-18-24 @ 07:28  5.25 02-17-24 @ 05:00  5.70 02-16-24 @ 06:10  4.79 02-14-24 @ 06:20    CMP: 02-20    139  |  102  |  25<H>  ----------------------------<  75  4.0   |  23  |  0.83    Ca    9.4      20 Feb 2024 06:00  Phos  4.0     02-20  Mg     2.00     02-20            Urinalysis Basic - ( 20 Feb 2024 06:00 )    Color: x / Appearance: x / SG: x / pH: x  Gluc: 75 mg/dL / Ketone: x  / Bili: x / Urobili: x   Blood: x / Protein: x / Nitrite: x   Leuk Esterase: x / RBC: x / WBC x   Sq Epi: x / Non Sq Epi: x / Bacteria: x      Microbiology: reviewed     Culture - Abscess with Gram Stain (collected 02-19-24 @ 15:30)  Source: .Abscess right foot  Gram Stain (02-19-24 @ 21:22):    No polymorphonuclear leukocytes per low power field    Few Gram Positive Rods per oil power field    Culture - Other (collected 02-06-24 @ 15:00)  Source: Wound Wound  Final Report (02-08-24 @ 18:57):    Normal skin zena isolated        Radiology: reviewed     Medications:  acetaminophen     Tablet .. 650 milliGRAM(s) Oral every 6 hours PRN  albuterol    90 MICROgram(s) HFA Inhaler 2 Puff(s) Inhalation every 6 hours PRN  aluminum hydroxide/magnesium hydroxide/simethicone Suspension 30 milliLiter(s) Oral every 4 hours PRN  cadexomer iodine 0.9% Gel 1 Application(s) Topical daily  levothyroxine 88 MICROGram(s) Oral daily  melatonin 3 milliGRAM(s) Oral at bedtime PRN  metoprolol tartrate 25 milliGRAM(s) Oral every 12 hours  ondansetron Injectable 4 milliGRAM(s) IV Push every 8 hours PRN  simvastatin 40 milliGRAM(s) Oral at bedtime    Antimicrobials:  
Patient is a 83y old  Female who presents with a chief complaint of 3rd Metatarsal OM for surgery (18 Feb 2024 10:29)       INTERVAL HPI/OVERNIGHT EVENTS:  Patient seen and evaluated at bedside.  Pt is resting comfortable in NAD. Denies N/V/F/C.    Allergies    linezolid (Unknown)  Cipro (Other)  doxycycline (Unknown)    Intolerances    Levaquin (Stomach Upset; Nausea)      Vital Signs Last 24 Hrs  T(C): 36.6 (18 Feb 2024 10:53), Max: 36.8 (18 Feb 2024 10:06)  T(F): 97.9 (18 Feb 2024 10:53), Max: 98.2 (18 Feb 2024 10:06)  HR: 58 (18 Feb 2024 10:53) (58 - 67)  BP: 125/55 (18 Feb 2024 10:53) (119/61 - 144/66)  BP(mean): --  RR: 17 (18 Feb 2024 10:53) (17 - 18)  SpO2: 100% (18 Feb 2024 10:53) (97% - 100%)    Parameters below as of 18 Feb 2024 10:53  Patient On (Oxygen Delivery Method): room air        LABS:                        11.2   4.85  )-----------( 237      ( 18 Feb 2024 07:28 )             33.5     02-18    138  |  103  |  18  ----------------------------<  77  3.9   |  24  |  0.75    Ca    9.6      18 Feb 2024 07:28  Phos  4.0     02-18  Mg     2.10     02-18        Urinalysis Basic - ( 18 Feb 2024 07:28 )    Color: x / Appearance: x / SG: x / pH: x  Gluc: 77 mg/dL / Ketone: x  / Bili: x / Urobili: x   Blood: x / Protein: x / Nitrite: x   Leuk Esterase: x / RBC: x / WBC x   Sq Epi: x / Non Sq Epi: x / Bacteria: x      CAPILLARY BLOOD GLUCOSE          Lower Extremity Physical Exam:  Vascular: DP/PT 2/4, B/L, CFT <3 seconds B/L, Temperature gradient warm to warm, B/L.   Neuro: Epicritic sensation diminished  to the level of digits , B/L.  Musculoskeletal/Ortho: 12/8 s/p RF partial 2nd ray resection, healed.   Skin: Right foot submet 3 wound to bone, sanguinous drainage, no malodor, no pus, no erythema no tracking or tunneling. Left foot no open wounds or lesions, no acute signs of infection.    RADIOLOGY & ADDITIONAL TESTS:  
Patient is a 83y old  Female who presents with a chief complaint of 3rd Metatarsal OM for surgery (22 Feb 2024 10:01)      DATE OF SERVICE: 02-22-24 @ 15:00    SUBJECTIVE / OVERNIGHT EVENTS: overnight events noted    ROS:  Resp: No cough no sputum production  CVS: No chest pain no palpitations no orthopnea  GI: no N/V/D  : no dysuria, no hematuria          MEDICATIONS  (STANDING):  cadexomer iodine 0.9% Gel 1 Application(s) Topical daily  ertapenem  IVPB 1000 milliGRAM(s) IV Intermittent every 24 hours  levothyroxine 88 MICROGram(s) Oral daily  metoprolol tartrate 25 milliGRAM(s) Oral every 12 hours  simvastatin 40 milliGRAM(s) Oral at bedtime    MEDICATIONS  (PRN):  acetaminophen     Tablet .. 650 milliGRAM(s) Oral every 6 hours PRN Temp greater or equal to 38C (100.4F), Mild Pain (1 - 3)  albuterol    90 MICROgram(s) HFA Inhaler 2 Puff(s) Inhalation every 6 hours PRN Shortness of Breath and/or Wheezing  aluminum hydroxide/magnesium hydroxide/simethicone Suspension 30 milliLiter(s) Oral every 4 hours PRN Dyspepsia  melatonin 3 milliGRAM(s) Oral at bedtime PRN Insomnia  ondansetron Injectable 4 milliGRAM(s) IV Push every 8 hours PRN Nausea and/or Vomiting        CAPILLARY BLOOD GLUCOSE        I&O's Summary    21 Feb 2024 07:01  -  22 Feb 2024 07:00  --------------------------------------------------------  IN: 120 mL / OUT: 1350 mL / NET: -1230 mL        Vital Signs Last 24 Hrs  T(C): 36.6 (22 Feb 2024 09:53), Max: 37.1 (21 Feb 2024 17:40)  T(F): 97.9 (22 Feb 2024 09:53), Max: 98.8 (21 Feb 2024 17:40)  HR: 73 (22 Feb 2024 09:53) (64 - 78)  BP: 120/59 (22 Feb 2024 09:53) (110/54 - 128/53)  BP(mean): --  RR: 18 (22 Feb 2024 09:53) (16 - 18)  SpO2: 99% (22 Feb 2024 09:53) (96% - 100%)    PHYSICAL EXAM:  EYES: EOMI, PERRLA,  NECK: Supple, No JVD  CHEST/LUNG: clear b/l, no wheeze  HEART: S1 S2; no murmurs   ABDOMEN: Soft, Nontender  EXTREMITIES:  right leg in bandage  NEUROLOGY: Alert non-focal  SKIN: No rashes or lesions    LABS:                        9.5    5.85  )-----------( 206      ( 22 Feb 2024 06:00 )             28.0     02-22    137  |  103  |  24<H>  ----------------------------<  75  3.6   |  24  |  0.77    Ca    9.3      22 Feb 2024 06:00  Phos  3.8     02-22  Mg     1.90     02-22    TPro  5.6<L>  /  Alb  3.2<L>  /  TBili  0.5  /  DBili  x   /  AST  16  /  ALT  8   /  AlkPhos  84  02-22    PT/INR - ( 21 Feb 2024 02:44 )   PT: 12.9 sec;   INR: 1.15 ratio         PTT - ( 21 Feb 2024 02:44 )  PTT:36.6 sec      Urinalysis Basic - ( 22 Feb 2024 06:00 )    Color: x / Appearance: x / SG: x / pH: x  Gluc: 75 mg/dL / Ketone: x  / Bili: x / Urobili: x   Blood: x / Protein: x / Nitrite: x   Leuk Esterase: x / RBC: x / WBC x   Sq Epi: x / Non Sq Epi: x / Bacteria: x          All consultant(s) notes reviewed and care discussed with other providers        Contact Number, Dr Carrillo [phone]
Podiatry Pager #: [phone] @ [facility] and [address] at [facility], please page 10 digits full number.    Patient is a 83y old  Female who presents with a chief complaint of 3rd Metatarsal OM for surgery (20 Feb 2024 15:26)      INTERVAL HPI/OVERNIGHT EVENTS:   Pt is scheduled for R foot Partial 3rd Ray Resection vs Transmetatarsal Amputation with Tendo Achilles lengthening with Dr. Oliveros at 0730. Patient is aware of procedure and is NPO since midnight.    MEDICATIONS  (STANDING):  cadexomer iodine 0.9% Gel 1 Application(s) Topical daily  levothyroxine 88 MICROGram(s) Oral daily  metoprolol tartrate 25 milliGRAM(s) Oral every 12 hours  simvastatin 40 milliGRAM(s) Oral at bedtime    MEDICATIONS  (PRN):  acetaminophen     Tablet .. 650 milliGRAM(s) Oral every 6 hours PRN Temp greater or equal to 38C (100.4F), Mild Pain (1 - 3)  albuterol    90 MICROgram(s) HFA Inhaler 2 Puff(s) Inhalation every 6 hours PRN Shortness of Breath and/or Wheezing  aluminum hydroxide/magnesium hydroxide/simethicone Suspension 30 milliLiter(s) Oral every 4 hours PRN Dyspepsia  melatonin 3 milliGRAM(s) Oral at bedtime PRN Insomnia  ondansetron Injectable 4 milliGRAM(s) IV Push every 8 hours PRN Nausea and/or Vomiting      Allergies    linezolid (Unknown)  Cipro (Other)  doxycycline (Unknown)    Intolerances    Levaquin (Stomach Upset; Nausea)      Vital Signs Last 24 Hrs  T(C): 36.8 (21 Feb 2024 06:39), Max: 36.9 (20 Feb 2024 20:53)  T(F): 98.3 (21 Feb 2024 06:39), Max: 98.4 (20 Feb 2024 20:53)  HR: 62 (21 Feb 2024 06:39) (61 - 68)  BP: 150/68 (21 Feb 2024 06:39) (119/56 - 150/68)  BP(mean): --  RR: 16 (21 Feb 2024 06:39) (16 - 18)  SpO2: 99% (21 Feb 2024 06:39) (98% - 100%)    Parameters below as of 21 Feb 2024 06:00  Patient On (Oxygen Delivery Method): room air        LABS:                        10.2   4.99  )-----------( 220      ( 21 Feb 2024 02:44 )             30.4     02-21    137  |  103  |  24<H>  ----------------------------<  89  4.0   |  23  |  1.00    Ca    9.2      21 Feb 2024 02:44  Phos  4.0     02-20  Mg     2.00     02-20      PT/INR - ( 21 Feb 2024 02:44 )   PT: 12.9 sec;   INR: 1.15 ratio         PTT - ( 21 Feb 2024 02:44 )  PTT:36.6 sec  Urinalysis Basic - ( 21 Feb 2024 02:44 )    Color: x / Appearance: x / SG: x / pH: x  Gluc: 89 mg/dL / Ketone: x  / Bili: x / Urobili: x   Blood: x / Protein: x / Nitrite: x   Leuk Esterase: x / RBC: x / WBC x   Sq Epi: x / Non Sq Epi: x / Bacteria: x      CAPILLARY BLOOD GLUCOSE          RADIOLOGY & ADDITIONAL TESTS:    Plan:   Pt is scheduled for R foot Partial 3rd Ray Resection vs Transmetatarsal Amputation with Tendo Achilles lengthening with Dr. Oliveros at 0730. Patient is aware of procedure and is NPO since midnight.  CXR on sunrise.EKG on sunrise.  Medical/Cardiac clearance since 2/20 and documented in chart.  Consent signed and in chart.  H&P seen and acknowledged.     Procedure was explained to patient in detail. All alternatives, risks and complications were discussed. All questions answered.
Patient is a 83y old  Female who presents with a chief complaint of 3rd Metatarsal OM for surgery (16 Feb 2024 10:52)       INTERVAL HPI/OVERNIGHT EVENTS:  Patient seen and evaluated at bedside.  Pt is resting comfortable in NAD. Denies N/V/F/C.    Allergies    linezolid (Unknown)  Cipro (Other)  doxycycline (Unknown)    Intolerances    Levaquin (Stomach Upset; Nausea)      Vital Signs Last 24 Hrs  T(C): 36.6 (17 Feb 2024 06:20), Max: 36.9 (16 Feb 2024 16:47)  T(F): 97.9 (17 Feb 2024 06:20), Max: 98.4 (16 Feb 2024 16:47)  HR: 67 (17 Feb 2024 06:20) (60 - 84)  BP: 138/70 (17 Feb 2024 06:20) (102/83 - 149/65)  BP(mean): --  RR: 16 (17 Feb 2024 06:20) (16 - 18)  SpO2: 100% (17 Feb 2024 06:20) (100% - 100%)    Parameters below as of 17 Feb 2024 06:20  Patient On (Oxygen Delivery Method): room air        LABS:                        10.7   5.25  )-----------( 262      ( 17 Feb 2024 05:00 )             32.4     02-17    140  |  103  |  22  ----------------------------<  67<L>  4.0   |  23  |  0.76    Ca    9.2      17 Feb 2024 05:00  Phos  3.8     02-16  Mg     2.10     02-16    TPro  6.5  /  Alb  3.5  /  TBili  0.3  /  DBili  x   /  AST  25  /  ALT  14  /  AlkPhos  88  02-16    PT/INR - ( 16 Feb 2024 06:10 )   PT: 13.2 sec;   INR: 1.18 ratio         PTT - ( 16 Feb 2024 06:10 )  PTT:41.9 sec  Urinalysis Basic - ( 17 Feb 2024 05:00 )    Color: x / Appearance: x / SG: x / pH: x  Gluc: 67 mg/dL / Ketone: x  / Bili: x / Urobili: x   Blood: x / Protein: x / Nitrite: x   Leuk Esterase: x / RBC: x / WBC x   Sq Epi: x / Non Sq Epi: x / Bacteria: x      CAPILLARY BLOOD GLUCOSE          Lower Extremity Physical Exam:  Vascular: DP/PT 2/4, B/L, CFT <3 seconds B/L, Temperature gradient warm to warm, B/L.   Neuro: Epicritic sensation diminished  to the level of digits , B/L.  Musculoskeletal/Ortho: 12/8 s/p RF partial 2nd ray resection, healed.   Skin: Right foot submet 3 wound to bone, sanguinous drainage, no malodor, no pus, no erythema no tracking or tunneling. Left foot no open wounds or lesions, no acute signs of infection.    RADIOLOGY & ADDITIONAL TESTS:  < from: Xray Foot AP + Lateral, Right (02.05.24 @ 10:22) >    ACC: [id number] EXAM:  XR FOOT 2 VIEWS RT   ORDERED BY: BENSON PICHARDO     PROCEDURE DATE:  02/05/2024          INTERPRETATION:  Clinical history: 83-year-old female, right toe chronic   ulcer.    Three views of the right foot are correlated with the MRI of 1/23/2024.    FINDINGS: Mild to moderate degenerative change particularly at the first   MTP.    No fracture, dislocation, gross cortical destruction or soft tissue   emphysema    IMPRESSION:    No acute radiographic findings, if there is continued clinical concern   follow-up MRI can be ordered    --- End of Report ---            BREANN BEEBE DO; Attending Radiologist  This document has been electronically signed. Feb 5 2024  3:32PM    < end of copied text >  
Patient is a 83y old  Female who presents with a chief complaint of 3rd Metatarsal OM for surgery (18 Feb 2024 13:48)       INTERVAL HPI/OVERNIGHT EVENTS:  Patient seen and evaluated at bedside.  Pt is resting comfortable in NAD. Denies N/V/F/C.      Allergies    linezolid (Unknown)  Cipro (Other)  doxycycline (Unknown)    Intolerances    Levaquin (Stomach Upset; Nausea)      Vital Signs Last 24 Hrs  T(C): 36.5 (19 Feb 2024 05:51), Max: 37 (19 Feb 2024 02:27)  T(F): 97.7 (19 Feb 2024 05:51), Max: 98.6 (19 Feb 2024 02:27)  HR: 66 (19 Feb 2024 05:51) (57 - 66)  BP: 134/63 (19 Feb 2024 05:51) (125/55 - 152/66)  BP(mean): --  RR: 18 (19 Feb 2024 05:51) (17 - 18)  SpO2: 100% (19 Feb 2024 05:51) (97% - 100%)    Parameters below as of 19 Feb 2024 05:51  Patient On (Oxygen Delivery Method): room air        LABS:                        11.1   5.25  )-----------( 243      ( 19 Feb 2024 04:28 )             33.6     02-19    137  |  101  |  21  ----------------------------<  68<L>  3.8   |  25  |  0.94    Ca    9.5      19 Feb 2024 04:28  Phos  4.1     02-19  Mg     2.00     02-19      PT/INR - ( 19 Feb 2024 04:28 )   PT: 12.9 sec;   INR: 1.15 ratio         PTT - ( 19 Feb 2024 04:28 )  PTT:39.9 sec  Urinalysis Basic - ( 19 Feb 2024 04:28 )    Color: x / Appearance: x / SG: x / pH: x  Gluc: 68 mg/dL / Ketone: x  / Bili: x / Urobili: x   Blood: x / Protein: x / Nitrite: x   Leuk Esterase: x / RBC: x / WBC x   Sq Epi: x / Non Sq Epi: x / Bacteria: x      CAPILLARY BLOOD GLUCOSE          Lower Extremity Physical Exam:  Vascular: DP/PT 2/4, B/L, CFT <3 seconds B/L, Temperature gradient warm to warm, B/L.   Neuro: Epicritic sensation diminished  to the level of digits , B/L.  Musculoskeletal/Ortho: 12/8 s/p RF partial 2nd ray resection, healed.   Skin: Right foot submet 3 wound to bone, sanguinous drainage, no malodor, no pus, no erythema no tracking or tunneling. Left foot no open wounds or lesions, no acute signs of infection.    RADIOLOGY & ADDITIONAL TESTS:  
Patient is a 83y old  Female who presents with a chief complaint of 3rd Metatarsal OM for surgery (21 Feb 2024 12:20)      DATE OF SERVICE: 02-21-24 @ 13:30    SUBJECTIVE / OVERNIGHT EVENTS: overnight events noted    ROS:  Resp: No cough no sputum production  CVS: No chest pain no palpitations no orthopnea  GI: no N/V/D  s/p right foot ray amp        MEDICATIONS  (STANDING):  cadexomer iodine 0.9% Gel 1 Application(s) Topical daily  ertapenem  IVPB 1000 milliGRAM(s) IV Intermittent every 24 hours  levothyroxine 88 MICROGram(s) Oral daily  metoprolol tartrate 25 milliGRAM(s) Oral every 12 hours  simvastatin 40 milliGRAM(s) Oral at bedtime    MEDICATIONS  (PRN):  acetaminophen     Tablet .. 650 milliGRAM(s) Oral every 6 hours PRN Temp greater or equal to 38C (100.4F), Mild Pain (1 - 3)  albuterol    90 MICROgram(s) HFA Inhaler 2 Puff(s) Inhalation every 6 hours PRN Shortness of Breath and/or Wheezing  aluminum hydroxide/magnesium hydroxide/simethicone Suspension 30 milliLiter(s) Oral every 4 hours PRN Dyspepsia  melatonin 3 milliGRAM(s) Oral at bedtime PRN Insomnia  ondansetron Injectable 4 milliGRAM(s) IV Push every 8 hours PRN Nausea and/or Vomiting        CAPILLARY BLOOD GLUCOSE        I&O's Summary    20 Feb 2024 07:01  -  21 Feb 2024 07:00  --------------------------------------------------------  IN: 950 mL / OUT: 800 mL / NET: 150 mL        Vital Signs Last 24 Hrs  T(C): 36.3 (21 Feb 2024 12:30), Max: 36.9 (20 Feb 2024 20:53)  T(F): 97.3 (21 Feb 2024 12:30), Max: 98.4 (20 Feb 2024 20:53)  HR: 58 (21 Feb 2024 12:30) (51 - 65)  BP: 135/69 (21 Feb 2024 12:30) (106/54 - 150/68)  BP(mean): 75 (21 Feb 2024 11:15) (66 - 76)  RR: 17 (21 Feb 2024 12:30) (13 - 18)  SpO2: 95% (21 Feb 2024 12:30) (95% - 100%)      PHYSICAL EXAM:  EYES: EOMI, PERRLA,  NECK: Supple, No JVD  CHEST/LUNG: clear b/l, no wheeze  HEART: S1 S2; no murmurs   ABDOMEN: Soft, Nontender  EXTREMITIES:  right leg in bandage  NEUROLOGY: Alert non-focal  SKIN: No rashes or lesions    LABS:                        10.2   4.99  )-----------( 220      ( 21 Feb 2024 02:44 )             30.4     02-21    137  |  103  |  24<H>  ----------------------------<  89  4.0   |  23  |  1.00    Ca    9.2      21 Feb 2024 02:44  Phos  4.0     02-20  Mg     2.00     02-20      PT/INR - ( 21 Feb 2024 02:44 )   PT: 12.9 sec;   INR: 1.15 ratio         PTT - ( 21 Feb 2024 02:44 )  PTT:36.6 sec      Urinalysis Basic - ( 21 Feb 2024 02:44 )    Color: x / Appearance: x / SG: x / pH: x  Gluc: 89 mg/dL / Ketone: x  / Bili: x / Urobili: x   Blood: x / Protein: x / Nitrite: x   Leuk Esterase: x / RBC: x / WBC x   Sq Epi: x / Non Sq Epi: x / Bacteria: x          All consultant(s) notes reviewed and care discussed with other providers        Contact Number, Dr Carrillo [phone]
Patient is a 83y old  Female who presents with a chief complaint of 3rd Metatarsal OM for surgery (22 Feb 2024 09:44)       INTERVAL HPI/OVERNIGHT EVENTS:  Patient seen and evaluated at bedside.  Pt is resting comfortable in NAD. Denies N/V/F/C.      Allergies    linezolid (Unknown)  Cipro (Other)  doxycycline (Unknown)    Intolerances    Levaquin (Stomach Upset; Nausea)      Vital Signs Last 24 Hrs  T(C): 36.6 (22 Feb 2024 09:53), Max: 37.1 (21 Feb 2024 17:40)  T(F): 97.9 (22 Feb 2024 09:53), Max: 98.8 (21 Feb 2024 17:40)  HR: 73 (22 Feb 2024 09:53) (51 - 78)  BP: 120/59 (22 Feb 2024 09:53) (110/54 - 135/69)  BP(mean): 75 (21 Feb 2024 11:15) (71 - 76)  RR: 18 (22 Feb 2024 09:53) (16 - 18)  SpO2: 99% (22 Feb 2024 09:53) (95% - 100%)    Parameters below as of 22 Feb 2024 06:05  Patient On (Oxygen Delivery Method): room air        LABS:                        9.5    5.85  )-----------( 206      ( 22 Feb 2024 06:00 )             28.0     02-22    137  |  103  |  24<H>  ----------------------------<  75  3.6   |  24  |  0.77    Ca    9.3      22 Feb 2024 06:00  Phos  3.8     02-22  Mg     1.90     02-22    TPro  5.6<L>  /  Alb  3.2<L>  /  TBili  0.5  /  DBili  x   /  AST  16  /  ALT  8   /  AlkPhos  84  02-22    PT/INR - ( 21 Feb 2024 02:44 )   PT: 12.9 sec;   INR: 1.15 ratio         PTT - ( 21 Feb 2024 02:44 )  PTT:36.6 sec  Urinalysis Basic - ( 22 Feb 2024 06:00 )    Color: x / Appearance: x / SG: x / pH: x  Gluc: 75 mg/dL / Ketone: x  / Bili: x / Urobili: x   Blood: x / Protein: x / Nitrite: x   Leuk Esterase: x / RBC: x / WBC x   Sq Epi: x / Non Sq Epi: x / Bacteria: x      CAPILLARY BLOOD GLUCOSE          Lower Extremity Physical Exam:    Vascular: DP/PT 2/4, B/L, CFT <3 seconds B/L, Temperature gradient warm to warm, B/L.   Neuro: Epicritic sensation diminished  to the level of digits , B/L.  Musculoskeletal/Ortho: 12/8 s/p RF partial 2nd ray resection, healed.   Skin: Right foot s/p partial 3rd ray resection, closed, flaps warm and viable, mild maceration of interspace incision, sutures intact, sanginous drainage, T. Left foot no open wounds or lesions, no acute signs of infection      RADIOLOGY & ADDITIONAL TESTS:  
Patient is a 83y old  Female who presents with a chief complaint of 3rd Metatarsal OM for surgery (23 Feb 2024 11:01)       INTERVAL HPI/OVERNIGHT EVENTS:  Patient seen and evaluated at bedside.  Pt is resting comfortable in NAD. Denies N/V/F/C.    Allergies    linezolid (Unknown)  Cipro (Other)  doxycycline (Unknown)    Intolerances    Levaquin (Stomach Upset; Nausea)      Vital Signs Last 24 Hrs  T(C): 36.3 (23 Feb 2024 11:35), Max: 36.9 (22 Feb 2024 16:42)  T(F): 97.4 (23 Feb 2024 11:35), Max: 98.5 (22 Feb 2024 16:42)  HR: 60 (23 Feb 2024 11:35) (60 - 70)  BP: 116/64 (23 Feb 2024 11:35) (115/55 - 143/69)  BP(mean): --  RR: 17 (23 Feb 2024 11:35) (17 - 18)  SpO2: 99% (23 Feb 2024 11:35) (97% - 100%)    Parameters below as of 23 Feb 2024 11:35  Patient On (Oxygen Delivery Method): room air        LABS:                        9.9    6.13  )-----------( 199      ( 23 Feb 2024 08:01 )             30.1     02-23    137  |  102  |  18  ----------------------------<  80  4.4   |  25  |  0.70    Ca    9.5      23 Feb 2024 08:04  Phos  3.4     02-23  Mg     2.00     02-23    TPro  6.1  /  Alb  3.5  /  TBili  0.6  /  DBili  x   /  AST  25  /  ALT  12  /  AlkPhos  93  02-23      Urinalysis Basic - ( 23 Feb 2024 08:04 )    Color: x / Appearance: x / SG: x / pH: x  Gluc: 80 mg/dL / Ketone: x  / Bili: x / Urobili: x   Blood: x / Protein: x / Nitrite: x   Leuk Esterase: x / RBC: x / WBC x   Sq Epi: x / Non Sq Epi: x / Bacteria: x      CAPILLARY BLOOD GLUCOSE          Lower Extremity Physical Exam:    Vascular: DP/PT 2/4, B/L, CFT <3 seconds B/L, Temperature gradient warm to warm, B/L.   Neuro: Epicritic sensation diminished  to the level of digits , B/L.  Musculoskeletal/Ortho: 12/8 s/p RF partial 2nd ray resection, healed.   Skin: Right foot s/p partial 3rd ray resection, closed, flaps warm and viable, mild maceration of interspace incision, sutures intact, sanginous drainage, T. Left foot no open wounds or lesions, no acute signs of infection    RADIOLOGY & ADDITIONAL TESTS:

## 2024-02-23 NOTE — DISCHARGE NOTE NURSING/CASE MANAGEMENT/SOCIAL WORK - NSDCFUADDAPPT_GEN_ALL_CORE_FT
Podiatry Discharge Instructions:  Follow up: Please follow up with Dr. Oliveros within 1 week of discharge from the hospital, please call [phone] for appointment and discuss that you recently were seen in the hospital.  Wound Care: Please leave your dressing clean dry intact until your follow up appointment  Weight bearing: Please remain nonweightbearing to right lower extremity in CAM boot  Antibiotics: Please continue as instructed.

## 2024-02-23 NOTE — DIETITIAN INITIAL EVALUATION ADULT - OTHER INFO
Patient is A&O x3 at baseline.  Patient is tolerating a DASH/TLC diet.  Denied any chewing or swallowing difficulties at this time.  Pt reported adequate PO intake with fair appetite.  Food and fluid preferences discussed.  No GI distress such as nausea, vomit, diarrhea, constipation.  Pt reported last BM is today 2/23 this AM.  Skin noted 1+ edema on right plantar foot s/p OM, +sacral/gluteal erythema, no pressure injury per RN flowsheets.    CBW 61.28kg (2-21), height 177.8cm/70 inch, BMI 19.4-borderline underweight statuts.  Repeated weight@ 63.6kg (2-21) per RN flowsheet.  Per [facility] weight history: 61.2kg (1-21-24), 65.8kg (11-3-23).  Noted a 10 #/ 6.9% weight loss in 3 months.  Labs 2/23 reviewed all within normal limits.  Patient encouraged to consume protein rich foods to aid with wound healing, amenable to add yogurt on meal trays, not amenable for supplements.   Patient is A&O x3 at baseline.  Patient is tolerating a DASH/TLC diet.  Denied any chewing or swallowing difficulties at this time.  Pt reported adequate PO intake with fair appetite.  PO intake noted varies from meals, mostly 50-75% at times per RN flowsheets.  Food and fluid preferences discussed.  No GI distress such as nausea, vomit, diarrhea, constipation.  Pt reported last BM is today 2/23 this AM.  Skin noted 1+ edema on right plantar foot s/p OM, +sacral/gluteal erythema, no pressure injury per RN flowsheets.    CBW 61.28kg (2-21), height 177.8cm/70 inch, BMI 19.4-borderline underweight statuts.  Repeated weight@ 63.6kg (2-21) per RN flowsheet.  Per [facility] weight history: 61.2kg (1-21-24), 65.8kg (11-3-23).  Noted a 10 #/ 6.9% weight loss in 3 months.  Labs 2/23 reviewed all within normal limits.  Patient encouraged to consume protein rich foods to aid with wound healing, amenable to add yogurt on meal trays, not amenable for supplements.

## 2024-02-23 NOTE — DIETITIAN INITIAL EVALUATION ADULT - PERTINENT LABORATORY DATA
02-23    137  |  102  |  18  ----------------------------<  80  4.4   |  25  |  0.70    Ca    9.5      23 Feb 2024 08:04  Phos  3.4     02-23  Mg     2.00     02-23    TPro  6.1  /  Alb  3.5  /  TBili  0.6  /  DBili  x   /  AST  25  /  ALT  12  /  AlkPhos  93  02-23

## 2024-02-23 NOTE — DISCHARGE NOTE NURSING/CASE MANAGEMENT/SOCIAL WORK - NSDCPEFALRISK_GEN_ALL_CORE
For information on Fall & Injury Prevention, visit: [email or web address] OR  [email or web address] OR  https://www.cdc.gov/steadi/patient.html

## 2024-02-23 NOTE — DIETITIAN INITIAL EVALUATION ADULT - ORAL INTAKE PTA/DIET HISTORY
Reported eating fine, no changes in appetite, NKFA.  No pork per preferences.  Patient likes yogurt and chicken.  Denies weight or appetite change PTA.

## 2024-02-23 NOTE — PROGRESS NOTE ADULT - ASSESSMENT
83F, from home,  PMHx AFIB on eliquis, HTN, HLD, chronic right plantar foot ulcer/OM admitted for IV abx for worsening acute on chronic OM of the right foot. Of note, she was recently admitted from 1/1/-1/26, had 5 days of meropenem, had a MRI on 1/23 showing: "Osteomyelitis of the second metatarsal stump and third metatarsal head and shaft. Early osteomyelitis of the third proximal phalanx. Marked edema and postcontrast enhancement is seen throughout the foot with locules of air extending to the dorsum of the foot. No mature, drainable, or enhancing collection is seen at this time."  S/P 10 dyas IV ABX and transferred  for 3rd Metatarsal Surgery .    #Acute on chronic right plantar foot OM with ESBL infection   podiatry help appreciated  s/p amputation   continue to follow podiatry and ID recommendations   discussed with ID  discontinue antibiotics  OR cultures negative   #Paroxysmal A Fib, rate controlled, on long term anticoagulation  #HTN, controlled  - C/w metoprolol tartrate 25mg q 12hrs, eliquis 5mg BID  -Will hold Eliquis 48hrs before surgery . No need to start heparin per cards.    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  - Upon review of the chart, CT chest report from 12/21/23  - Approximately 4.6 cm sized left upper lobe mass concerning for a primary pulmonary malignancy.  - Pt aware, doesn't want intervention during this admission  - Recommendation for outpatient oncology/pulm F/U for dx/management    VTE ppx: Eliquis

## 2024-02-23 NOTE — PROGRESS NOTE ADULT - ASSESSMENT
83 F s/p right foot partial 3rd ray resection and tendoachilles lengthening, closed (DOS 2/21)  - Pt seen and evaluated  - Afebrile, no leukocytosis   - Right foot s/p partial 3rd ray resection, closed, flaps warm and viable, mild maceration of interspace incision, sutures intact, sanginous drainage, T. Left foot no open wounds or lesions, no acute signs of infection.  - Intraop findings low concern for residual infection, low concern for viability   - Clean bone margin: no growth  - OR wound culture: no growth   - ID recs, appreciated  - STRICT decubitus precautions to left, Z- FLOWS boots at all time when in bed and in chair resting.   - Pt benefits from a CAM walker for tendoachilles lengthening,   - Pt is aware that she must remain strictly in CAM boot at all times while weight bearing  - Pt is stable for d/c from podiatry standpoint pending final ID recs  - Seen with attending

## 2024-02-23 NOTE — DIETITIAN INITIAL EVALUATION ADULT - NUTRITIONGOAL OUTCOME1
Patient to meet > 7% nutrition needs during hospital course.  Patient to meet > 75% nutrition needs during hospital course.

## 2024-02-23 NOTE — PROGRESS NOTE ADULT - ASSESSMENT
Briefly this is an 83W w/ PMHx of Afib, HTN, HLD, chronic right plantar foot ulcer/OM admitted for IV abx for worsening acute on chronic OM of the right foot.   Pt was at [facility] and received meropenem/ertapenem x10 day course (completed 2/15) on as prior ulcer cx showed ESBL E. coli.   txferred to [facility] for 3rd Metatarsal Surgery scheduled on 2/21    osteomyelitis R 3rd toe  MRI 1/23- Osteomyelitis of the second metatarsal stump and third metatarsal head and shaft. Early osteomyelitis of the third proximal phalanx.  prior cx w/ ESBL E Coli  wound cx w/ corynebacterium (skin zena)  discussed w/ podiatry clinically low concern for osteo 2nd metatarsal  s/p R foot partial 3rd ray resection with tendoachilles lengthening  per podiatry low concern for residual infection     Recommendations:   OR cultures- NGTD, discontinue antibiotics  f/u w/ [facility] ID office next week to review pathology  f/u OR path  discharge planning    Roland Elizondo M.D.  [facility], Division of Infectious Diseases  [phone]  After 5pm on weekdays and all day on weekends - please call [phone]

## 2024-02-23 NOTE — PROGRESS NOTE ADULT - PROVIDER SPECIALTY LIST ADULT
Cardiology
Cardiology
Infectious Disease
Internal Medicine
Podiatry
Infectious Disease
Infectious Disease
Internal Medicine
Internal Medicine
Podiatry
Podiatry
Cardiology
Cardiology
Infectious Disease
Internal Medicine
Podiatry
Infectious Disease
Internal Medicine
Podiatry
Internal Medicine
Internal Medicine

## 2024-02-23 NOTE — PROGRESS NOTE ADULT - NSPROGADDITIONALINFOA_GEN_ALL_CORE
cleared for discharge to [facility] Re[facility] by all services  discussed with patient in detail, expresses understanding of treatment plans.  discussed with covering ACP

## 2024-02-23 NOTE — DIETITIAN INITIAL EVALUATION ADULT - REASON FOR ADMISSION
Aortic aneurysm without rupture  Per 2/23/24 internal medicine chart review, Patient is a  83F, from home,  PMHx AFIB on eliquis, HTN, HLD, chronic right plantar foot ulcer/OM admitted for IV abx for worsening acute on chronic OM of the right foot. Of note, she was recently admitted from 1/1/-1/26, had 5 days of meropenem, had a MRI on 1/23 showing: "Osteomyelitis of the second metatarsal stump and third metatarsal head and shaft. Early osteomyelitis of the third proximal phalanx. Marked edema and postcontrast enhancement is seen throughout the foot with locules of air extending to the dorsum of the foot. No mature, drainable, or enhancing collection is seen at this time."  S/P 10 dyas IV ABX and transferred  for 3rd Metatarsal Surgery .

## 2024-02-24 LAB
CULTURE RESULTS: ABNORMAL
SPECIMEN SOURCE: SIGNIFICANT CHANGE UP

## 2024-02-25 LAB
CULTURE RESULTS: ABNORMAL
ORGANISM # SPEC MICROSCOPIC CNT: ABNORMAL
ORGANISM # SPEC MICROSCOPIC CNT: ABNORMAL
SPECIMEN SOURCE: SIGNIFICANT CHANGE UP

## 2024-02-26 LAB
CULTURE RESULTS: ABNORMAL
CULTURE RESULTS: SIGNIFICANT CHANGE UP
SPECIMEN SOURCE: SIGNIFICANT CHANGE UP
SPECIMEN SOURCE: SIGNIFICANT CHANGE UP

## 2024-02-29 LAB — SURGICAL PATHOLOGY STUDY: SIGNIFICANT CHANGE UP

## 2024-03-04 ENCOUNTER — APPOINTMENT (OUTPATIENT)
Dept: WOUND CARE | Facility: HOSPITAL | Age: 84
End: 2024-03-04
Payer: MEDICARE

## 2024-03-04 ENCOUNTER — OUTPATIENT (OUTPATIENT)
Dept: OUTPATIENT SERVICES | Facility: HOSPITAL | Age: 84
LOS: 1 days | End: 2024-03-04

## 2024-03-04 VITALS
RESPIRATION RATE: 18 BRPM | SYSTOLIC BLOOD PRESSURE: 166 MMHG | OXYGEN SATURATION: 100 % | WEIGHT: 134 LBS | DIASTOLIC BLOOD PRESSURE: 83 MMHG | TEMPERATURE: 98.1 F | HEIGHT: 70 IN | BODY MASS INDEX: 19.18 KG/M2 | HEART RATE: 63 BPM

## 2024-03-04 DIAGNOSIS — M53.9 DORSOPATHY, UNSPECIFIED: Chronic | ICD-10-CM

## 2024-03-04 DIAGNOSIS — Z98.89 OTHER SPECIFIED POSTPROCEDURAL STATES: Chronic | ICD-10-CM

## 2024-03-04 PROCEDURE — 99024 POSTOP FOLLOW-UP VISIT: CPT

## 2024-03-04 PROCEDURE — G0463: CPT

## 2024-03-04 NOTE — ASSESSMENT
[FreeTextEntry1] : 83F with right foot partial 3rd ray resection and ORESTES (2/21) - pt seen and evaluated - s/p 2/21 right foot partial 3rd ray resection and ORESTES, sutures intact, no signs of dehiscence, no signs of infection - continue PT - clean culture and path data negatice - pt not on any abx, asked to follow up with ID - instructions given to change right foot dressing 3x/week with DSD, patient allowed to weight bear partially as needed, but to not over exert - RTC 2 weeks

## 2024-03-04 NOTE — HISTORY OF PRESENT ILLNESS
[FreeTextEntry1] : Pt is s/p right foot partial 3rd ray resection and ORESTES on 2/21/24 at [facility]. Pt has not followed up with ID yet and has not been on abx. Patient is currently and coming from rehab, and has been non weight bearing since d/c. 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 Pt denies any f/c/v/n/sob.

## 2024-03-12 DIAGNOSIS — G60.9 HEREDITARY AND IDIOPATHIC NEUROPATHY, UNSPECIFIED: ICD-10-CM

## 2024-03-12 DIAGNOSIS — Z98.890 OTHER SPECIFIED POSTPROCEDURAL STATES: ICD-10-CM

## 2024-03-12 DIAGNOSIS — Z89.421 ACQUIRED ABSENCE OF OTHER RIGHT TOE(S): ICD-10-CM

## 2024-03-20 LAB
CULTURE RESULTS: SIGNIFICANT CHANGE UP
SPECIMEN SOURCE: SIGNIFICANT CHANGE UP

## 2024-03-21 ENCOUNTER — NON-APPOINTMENT (OUTPATIENT)
Age: 84
End: 2024-03-21

## 2024-03-25 ENCOUNTER — APPOINTMENT (OUTPATIENT)
Dept: WOUND CARE | Facility: HOSPITAL | Age: 84
End: 2024-03-25
Payer: MEDICARE

## 2024-03-25 ENCOUNTER — OUTPATIENT (OUTPATIENT)
Dept: OUTPATIENT SERVICES | Facility: HOSPITAL | Age: 84
LOS: 1 days | End: 2024-03-25

## 2024-03-25 VITALS
HEART RATE: 83 BPM | RESPIRATION RATE: 18 BRPM | OXYGEN SATURATION: 100 % | TEMPERATURE: 98.2 F | DIASTOLIC BLOOD PRESSURE: 73 MMHG | SYSTOLIC BLOOD PRESSURE: 152 MMHG

## 2024-03-25 DIAGNOSIS — Z98.89 OTHER SPECIFIED POSTPROCEDURAL STATES: Chronic | ICD-10-CM

## 2024-03-25 DIAGNOSIS — M53.9 DORSOPATHY, UNSPECIFIED: Chronic | ICD-10-CM

## 2024-03-25 PROCEDURE — 99024 POSTOP FOLLOW-UP VISIT: CPT

## 2024-03-25 PROCEDURE — G0463: CPT

## 2024-03-28 NOTE — ASSESSMENT
[FreeTextEntry1] : 83F with right foot partial 3rd ray resection and ORESTES (2/21) - Pt seen and evaluated - s/p 2/21 right foot partial 3rd ray resection and ORESTES, sutures intact, mild dehiscence at 3rd ray resection site, mild serosanguinous drainage, no signs of infection - After obtaining verbal consent, removal of partial right foot TMA site sutures and all ORESTES site sutures with sterile suture removal kit. Patient tolerated the procedure well.  - continue PT - instructions given to change right foot dressing 3x/week with DSD, patient allowed to weight bear partially as needed, but to not over exert - RTC 2 weeks

## 2024-03-28 NOTE — HISTORY OF PRESENT ILLNESS
[FreeTextEntry1] : Pt is s/p right foot partial 3rd ray resection and ORESTES on 2/21/24 at [facility]. Pt has not followed up with ID yet and has not been on abx. Patient is currently and coming from rehab, and has been non weight bearing since d/c. 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 Pt denies any f/c/v/n/sob.

## 2024-04-02 DIAGNOSIS — Z98.890 OTHER SPECIFIED POSTPROCEDURAL STATES: ICD-10-CM

## 2024-04-02 DIAGNOSIS — G60.9 HEREDITARY AND IDIOPATHIC NEUROPATHY, UNSPECIFIED: ICD-10-CM

## 2024-04-02 DIAGNOSIS — Z89.421 ACQUIRED ABSENCE OF OTHER RIGHT TOE(S): ICD-10-CM

## 2024-04-03 ENCOUNTER — NON-APPOINTMENT (OUTPATIENT)
Age: 84
End: 2024-04-03

## 2024-04-08 ENCOUNTER — APPOINTMENT (OUTPATIENT)
Dept: WOUND CARE | Facility: HOSPITAL | Age: 84
End: 2024-04-08

## 2024-04-08 NOTE — ED ADULT TRIAGE NOTE - MODE OF ARRIVAL
Caller: Maureen Saleemine    Relationship: Self    Best call back number: [phone]     Requested Prescriptions:   Requested Prescriptions     Pending Prescriptions Disp Refills    HYDROcodone-acetaminophen (NORCO)  MG per tablet 30 tablet 0     Sig: Take 1 tablet by mouth Daily As Needed for Severe Pain.    diazePAM (VALIUM) 5 MG tablet 30 tablet 0     Si po q dinner prn        Pharmacy where request should be sent: [id number] [place] [id number] [address] AT [address] [phone] [facility] [phone]      Last office visit with prescribing clinician: 2024   Last telemedicine visit with prescribing clinician: Visit date not found   Next office visit with prescribing clinician: Visit date not found     Does the patient have less than a 3 day supply:  [x] Yes  [] No    Would you like a call back once the refill request has been completed: [] Yes [x] No    If the office needs to give you a call back, can they leave a voicemail: [] Yes [x] No    Andrzej Baker Rep   24 08:10 EDT      [facility]

## 2024-04-19 NOTE — ED ADULT NURSE NOTE - NS ED NURSE REPORT GIVEN DT
Spoke with family member  about ear wash prep for Monday. Verbalized understanding.   21-Dec-2023 19:30

## 2024-04-22 ENCOUNTER — OUTPATIENT (OUTPATIENT)
Dept: OUTPATIENT SERVICES | Facility: HOSPITAL | Age: 84
LOS: 1 days | End: 2024-04-22

## 2024-04-22 ENCOUNTER — APPOINTMENT (OUTPATIENT)
Dept: WOUND CARE | Facility: HOSPITAL | Age: 84
End: 2024-04-22
Payer: MEDICARE

## 2024-04-22 DIAGNOSIS — Z98.89 OTHER SPECIFIED POSTPROCEDURAL STATES: Chronic | ICD-10-CM

## 2024-04-22 DIAGNOSIS — G60.9 HEREDITARY AND IDIOPATHIC NEUROPATHY, UNSPECIFIED: ICD-10-CM

## 2024-04-22 DIAGNOSIS — Z98.890 OTHER SPECIFIED POSTPROCEDURAL STATES: ICD-10-CM

## 2024-04-22 DIAGNOSIS — Z89.421 ACQUIRED ABSENCE OF OTHER RIGHT TOE(S): ICD-10-CM

## 2024-04-22 PROCEDURE — G0463: CPT

## 2024-04-22 PROCEDURE — 99024 POSTOP FOLLOW-UP VISIT: CPT

## 2024-04-22 NOTE — HISTORY OF PRESENT ILLNESS
[FreeTextEntry1] : Pt is s/p right foot partial 3rd ray resection and ORESTES on 2/21/24 at [facility]. Pt has not followed up with ID yet and has not been on abx. Patient is currently and coming from rehab, and has been non weight bearing since d/c. 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 Pt denies any f/c/v/n/sob.

## 2024-04-22 NOTE — ASSESSMENT
[FreeTextEntry1] : 83F with right foot partial 3rd ray resection and ORESTES (2/21) - Pt seen and evaluated - s/p 2/21 right foot partial 3rd ray resection and ORESTES - all healed - no need for dressings - no need to continue PT - patient is stable for discharge from rehab  - RTC PRN

## 2024-04-23 RX ORDER — METOPROLOL TARTRATE 50 MG
1 TABLET ORAL
Qty: 60 | Refills: 0
Start: 2024-04-23 | End: 2024-05-22

## 2024-04-23 RX ORDER — LEVOTHYROXINE SODIUM 125 MCG
1 TABLET ORAL
Qty: 30 | Refills: 0
Start: 2024-04-23

## 2024-04-23 RX ORDER — SIMVASTATIN 20 MG/1
1 TABLET, FILM COATED ORAL
Refills: 0 | DISCHARGE

## 2024-04-23 RX ORDER — SIMVASTATIN 20 MG/1
1 TABLET, FILM COATED ORAL
Qty: 30 | Refills: 0
Start: 2024-04-23 | End: 2024-05-22

## 2024-04-23 RX ORDER — APIXABAN 2.5 MG/1
1 TABLET, FILM COATED ORAL
Qty: 60 | Refills: 0
Start: 2024-04-23 | End: 2024-05-22

## 2024-04-25 ENCOUNTER — APPOINTMENT (OUTPATIENT)
Dept: CARDIOLOGY | Facility: CLINIC | Age: 84
End: 2024-04-25

## 2024-05-02 ENCOUNTER — APPOINTMENT (OUTPATIENT)
Dept: FAMILY MEDICINE | Facility: CLINIC | Age: 84
End: 2024-05-02
Payer: MEDICARE

## 2024-05-02 VITALS
SYSTOLIC BLOOD PRESSURE: 120 MMHG | HEART RATE: 95 BPM | DIASTOLIC BLOOD PRESSURE: 70 MMHG | TEMPERATURE: 97.1 F | OXYGEN SATURATION: 98 % | RESPIRATION RATE: 16 BRPM | HEIGHT: 70 IN | BODY MASS INDEX: 20.04 KG/M2 | WEIGHT: 140 LBS

## 2024-05-02 DIAGNOSIS — I48.91 UNSPECIFIED ATRIAL FIBRILLATION: ICD-10-CM

## 2024-05-02 PROCEDURE — 99214 OFFICE O/P EST MOD 30 MIN: CPT

## 2024-05-02 NOTE — HISTORY OF PRESENT ILLNESS
[FreeTextEntry1] : That is post discharge for a toe amputation [de-identified] : Patient is here in follow-up for status post discharge for a toe amputation she spent time in the hospital and then almost 3 weeks in rehab she says that currently she feels very well although is having some difficulty walking due to his leg strength the toe itself seems to be healing well

## 2024-05-02 NOTE — ASSESSMENT
[FreeTextEntry1] : Patient is somewhat chronic complaint complex issue her recent problem with the osteomyelitis in the foot and eventual surgery and now seems to be behind us although she is very dissatisfied the amount of time that she had a spent in the hospital and states she does not want to have anything to do with doctors anymore of particular importance is that she has been found to have a lung mass that was supposed to be further evaluated by PET scan she is not willing to have any further diagnostic procedures done for this and states that if she has it she has not and if this kills her that is okay.  Issues with her thyroid fluctuating levels will be hopefully clarified by repeat testing this week time should be kept on her lower dose of Synthroid feeling that a underactive is better than an overactive in this particular situation she has been encouraged to consider other options particularly with regard to the lung mass and will follow-up here in 3 weeks she states she feels better than she ever has and is walking although feels somewhat weak occasionally when she walks she feels she could use some help with ice housekeeping and perhaps shopping review of systems otherwise unremarkable time of visit 35 minutes

## 2024-05-02 NOTE — REVIEW OF SYSTEMS
[Fever] : no fever [Chills] : no chills [Fatigue] : no fatigue [Pain] : no pain [Earache] : no earache [Hearing Loss] : no hearing loss [Chest Pain] : no chest pain [Palpitations] : no palpitations [Leg Claudication] : no leg claudication [Lower Ext Edema] : no lower extremity edema [Shortness Of Breath] : no shortness of breath [Wheezing] : no wheezing [Nausea] : no nausea [Constipation] : no constipation [Diarrhea] : diarrhea [Dysuria] : no dysuria [Nocturia] : no nocturia [Poor Libido] : libido not poor [Joint Pain] : joint pain [Joint Stiffness] : joint stiffness [Headache] : no headache [Dizziness] : no dizziness [Fainting] : no fainting [Confusion] : confusion

## 2024-05-02 NOTE — PHYSICAL EXAM
[No Acute Distress] : no acute distress [Well Nourished] : well nourished [Well Developed] : well developed [PERRL] : pupils equal round and reactive to light [Normal Oropharynx] : the oropharynx was normal [No Lymphadenopathy] : no lymphadenopathy [No Respiratory Distress] : no respiratory distress  [Clear to Auscultation] : lungs were clear to auscultation bilaterally [Normal Rate] : normal rate  [Soft] : abdomen soft [No HSM] : no HSM [No Spinal Tenderness] : no spinal tenderness [de-identified] : Thin body habitus [de-identified] : Irregularly irregular, [de-identified] :  2 out of 6 systolic murmur less edema about ankles

## 2024-05-16 DIAGNOSIS — Z98.890 OTHER SPECIFIED POSTPROCEDURAL STATES: ICD-10-CM

## 2024-05-16 DIAGNOSIS — Z89.421 ACQUIRED ABSENCE OF OTHER RIGHT TOE(S): ICD-10-CM

## 2024-05-16 DIAGNOSIS — G60.9 HEREDITARY AND IDIOPATHIC NEUROPATHY, UNSPECIFIED: ICD-10-CM

## 2024-05-23 ENCOUNTER — APPOINTMENT (OUTPATIENT)
Dept: FAMILY MEDICINE | Facility: CLINIC | Age: 84
End: 2024-05-23

## 2024-06-18 ENCOUNTER — APPOINTMENT (OUTPATIENT)
Dept: FAMILY MEDICINE | Facility: CLINIC | Age: 84
End: 2024-06-18
Payer: MEDICARE

## 2024-06-18 VITALS
RESPIRATION RATE: 16 BRPM | OXYGEN SATURATION: 98 % | HEIGHT: 70 IN | WEIGHT: 150 LBS | TEMPERATURE: 97 F | HEART RATE: 71 BPM | SYSTOLIC BLOOD PRESSURE: 128 MMHG | BODY MASS INDEX: 21.47 KG/M2 | DIASTOLIC BLOOD PRESSURE: 80 MMHG

## 2024-06-18 DIAGNOSIS — I10 ESSENTIAL (PRIMARY) HYPERTENSION: ICD-10-CM

## 2024-06-18 DIAGNOSIS — R91.8 OTHER NONSPECIFIC ABNORMAL FINDING OF LUNG FIELD: ICD-10-CM

## 2024-06-18 DIAGNOSIS — J44.9 CHRONIC OBSTRUCTIVE PULMONARY DISEASE, UNSPECIFIED: ICD-10-CM

## 2024-06-18 DIAGNOSIS — Z13.1 ENCOUNTER FOR SCREENING FOR DIABETES MELLITUS: ICD-10-CM

## 2024-06-18 PROCEDURE — 99213 OFFICE O/P EST LOW 20 MIN: CPT

## 2024-06-18 NOTE — HISTORY OF PRESENT ILLNESS
[FreeTextEntry1] : Status post toe amputation hypertension lung mass [de-identified] : Patient seen in follow-up on the above issues she says she feels very well with the exception of some difficulty walking secondary to the amputation and somewhat lack of exercise although she says she feels as well as she ever has at the last visit we discussed the finding on chest x-ray and CT from the hospital of the lung lesion which she at that time refused to have any further think anything further done she now may be willing to have further studies review of systems is otherwise unremarkable some mild urinary incontinence which she deals with with depends she does not wish to have an evaluation for this at this time

## 2024-06-18 NOTE — HISTORY OF PRESENT ILLNESS
[FreeTextEntry1] : Status post toe amputation hypertension lung mass [de-identified] : Patient seen in follow-up on the above issues she says she feels very well with the exception of some difficulty walking secondary to the amputation and somewhat lack of exercise although she says she feels as well as she ever has at the last visit we discussed the finding on chest x-ray and CT from the hospital of the lung lesion which she at that time refused to have any further think anything further done she now may be willing to have further studies review of systems is otherwise unremarkable some mild urinary incontinence which she deals with with depends she does not wish to have an evaluation for this at this time

## 2024-06-18 NOTE — ASSESSMENT
[FreeTextEntry1] : Patient now will submit to a repeat CAT scan at least this will be done and she will have a home draw for laboratory work with a follow-up visit in 2 months but discussion after the results of these tests are available to us

## 2024-06-18 NOTE — REVIEW OF SYSTEMS
[Fever] : no fever [Chills] : no chills [Fatigue] : no fatigue [Vision Problems] : no vision problems [Sore Throat] : no sore throat [Chest Pain] : no chest pain [Palpitations] : no palpitations [Shortness Of Breath] : no shortness of breath [Wheezing] : no wheezing [Cough] : no cough [Dyspnea on Exertion] : no dyspnea on exertion [Abdominal Pain] : no abdominal pain [Nausea] : no nausea [Dysuria] : no dysuria [Incontinence] : incontinence [Joint Pain] : joint pain [Headache] : no headache [Dizziness] : dizziness [Memory Loss] : no memory loss [Unsteady Walking] : ataxia [de-identified] : Walks with cane due to status post toe amputation

## 2024-06-18 NOTE — PHYSICAL EXAM
[No Acute Distress] : no acute distress [Well Nourished] : well nourished [Well Developed] : well developed [Normal Sclera/Conjunctiva] : normal sclera/conjunctiva [PERRL] : pupils equal round and reactive to light [Normal Outer Ear/Nose] : the outer ears and nose were normal in appearance [No Lymphadenopathy] : no lymphadenopathy [No Accessory Muscle Use] : no accessory muscle use [Normal Rate] : normal rate  [Regular Rhythm] : with a regular rhythm [No Murmur] : no murmur heard [No Edema] : there was no peripheral edema [Soft] : abdomen soft [Non Tender] : non-tender [Normal Bowel Sounds] : normal bowel sounds [Coordination Grossly Intact] : coordination grossly intact [No Focal Deficits] : no focal deficits [de-identified] : Walks with cane

## 2024-06-18 NOTE — REVIEW OF SYSTEMS
[Fever] : no fever [Chills] : no chills [Fatigue] : no fatigue [Vision Problems] : no vision problems [Sore Throat] : no sore throat [Chest Pain] : no chest pain [Palpitations] : no palpitations [Shortness Of Breath] : no shortness of breath [Wheezing] : no wheezing [Cough] : no cough [Dyspnea on Exertion] : no dyspnea on exertion [Abdominal Pain] : no abdominal pain [Nausea] : no nausea [Dysuria] : no dysuria [Incontinence] : incontinence [Joint Pain] : joint pain [Headache] : no headache [Dizziness] : dizziness [Memory Loss] : no memory loss [Unsteady Walking] : ataxia [de-identified] : Walks with cane due to status post toe amputation

## 2024-06-18 NOTE — PHYSICAL EXAM
[No Acute Distress] : no acute distress [Well Nourished] : well nourished [Well Developed] : well developed [Normal Sclera/Conjunctiva] : normal sclera/conjunctiva [PERRL] : pupils equal round and reactive to light [Normal Outer Ear/Nose] : the outer ears and nose were normal in appearance [No Lymphadenopathy] : no lymphadenopathy [No Accessory Muscle Use] : no accessory muscle use [Normal Rate] : normal rate  [Regular Rhythm] : with a regular rhythm [No Murmur] : no murmur heard [No Edema] : there was no peripheral edema [Soft] : abdomen soft [Non Tender] : non-tender [Normal Bowel Sounds] : normal bowel sounds [Coordination Grossly Intact] : coordination grossly intact [No Focal Deficits] : no focal deficits [de-identified] : Walks with cane

## 2024-06-19 ENCOUNTER — LABORATORY RESULT (OUTPATIENT)
Age: 84
End: 2024-06-19

## 2024-06-19 PROBLEM — Z13.1 SCREENING FOR DIABETES MELLITUS: Status: ACTIVE | Noted: 2024-06-19

## 2024-06-24 RX ORDER — APIXABAN 5 MG/1
5 TABLET, FILM COATED ORAL
Qty: 60 | Refills: 12 | Status: ACTIVE | COMMUNITY
Start: 2024-01-31 | End: 1900-01-01

## 2024-06-26 ENCOUNTER — OUTPATIENT (OUTPATIENT)
Dept: OUTPATIENT SERVICES | Facility: HOSPITAL | Age: 84
LOS: 1 days | End: 2024-06-26
Payer: MEDICARE

## 2024-06-26 ENCOUNTER — APPOINTMENT (OUTPATIENT)
Dept: CT IMAGING | Facility: HOSPITAL | Age: 84
End: 2024-06-26

## 2024-06-26 DIAGNOSIS — Z98.89 OTHER SPECIFIED POSTPROCEDURAL STATES: Chronic | ICD-10-CM

## 2024-06-26 DIAGNOSIS — M53.9 DORSOPATHY, UNSPECIFIED: Chronic | ICD-10-CM

## 2024-06-26 DIAGNOSIS — R91.8 OTHER NONSPECIFIC ABNORMAL FINDING OF LUNG FIELD: ICD-10-CM

## 2024-06-26 PROCEDURE — 71250 CT THORAX DX C-: CPT

## 2024-06-26 PROCEDURE — 71250 CT THORAX DX C-: CPT | Mod: 26,MH

## 2024-07-24 ENCOUNTER — APPOINTMENT (OUTPATIENT)
Dept: THORACIC SURGERY | Facility: CLINIC | Age: 84
End: 2024-07-24
Payer: MEDICARE

## 2024-07-24 VITALS
BODY MASS INDEX: 21.47 KG/M2 | DIASTOLIC BLOOD PRESSURE: 80 MMHG | HEART RATE: 74 BPM | WEIGHT: 150 LBS | HEIGHT: 70 IN | SYSTOLIC BLOOD PRESSURE: 160 MMHG | RESPIRATION RATE: 16 BRPM | OXYGEN SATURATION: 97 %

## 2024-07-24 DIAGNOSIS — R91.8 OTHER NONSPECIFIC ABNORMAL FINDING OF LUNG FIELD: ICD-10-CM

## 2024-07-24 PROCEDURE — 99204 OFFICE O/P NEW MOD 45 MIN: CPT

## 2024-07-24 NOTE — CONSULT LETTER
[Dear  ___] : Dear  [unfilled], [Consult Letter:] : I had the pleasure of evaluating your patient, [unfilled]. [( Thank you for referring [unfilled] for consultation for _____ )] : Thank you for referring [unfilled] for consultation for [unfilled] [Please see my note below.] : Please see my note below. [Consult Closing:] : Thank you very much for allowing me to participate in the care of this patient.  If you have any questions, please do not hesitate to contact me. [Sincerely,] : Sincerely, [FreeTextEntry2] : Dr. Gustavo Peterson (PCP/Ref) [FreeTextEntry3] : Sebastian Lopez MD, FACS , Division of Thoracic Surgery [facility] Thoracic Surgery [facility] Department of Cardiovascular & Thoracic Surgery  [employer] [facility]

## 2024-07-24 NOTE — ASSESSMENT
[FreeTextEntry1] : Ms. KASSIE LOPEZ, 84 year old female, former smoker (occasional smoker, quit over 40 yrs ago), w/ hx of HTN, HLD, AFib (on Eliquis), hypothyroidism (s/p partial thyroidectomy), chronic right plantar foot ulcers/osteomyelitis (s/p 2nd TMA on 12/08/2023 at [facility]). Post-op she presented to [facility] (12/21-12/28/23) for acute renal failure, workup imaging revealed incidental findings of COLTEN mass.  Patient is here today for CT Sx consultation, referred by Dr. Gustavo Peterson (PCP).   I have independently reviewed the medical records and imaging at the time of this office consultation. Imaging reviewed with patient, COLTEN mass appears slightly bigger and denser when compared to previous scan, suspicious for malignancy. I discussed obtaining PET/CT and PFTs for further evaluation. RTC after to discuss findings and next step. She is agreeable.   Recommendations reviewed with patient during this office visit, and all questions answered; Patient instructed on the importance of follow up and verbalizes understanding.    I, FIDELIA Alex, personally performed the evaluation and management (E/M) services for this new patient.  That E/M includes conducting the initial examination, assessing all conditions, and establishing the plan of care.  Today, my ACP, Bubba Carr NP, was here to observe my evaluation and management services for this patient to be followed going forward.

## 2024-07-24 NOTE — HISTORY OF PRESENT ILLNESS
[FreeTextEntry1] : Ms. KASSIE LOPEZ, 84 year old female, former smoker (occasional smoker, quit over 40 yrs ago), w/ hx of HTN, HLD, AFib (on Eliquis), hypothyroidism (s/p partial thyroidectomy), chronic right plantar foot ulcers/osteomyelitis (s/p 2nd TMA on 12/08/2023 at [facility]). Post-op she presented to [facility] (12/21-12/28/23) for acute renal failure, workup imaging revealed incidental findings of COLTEN mass.   CXR on 12/05/2023: - Faint left upper lung opacity may be secondary to atelectasis or pneumonia.  CXR on 12/21/2023: - COPD and bilateral chronic apical thickening again noted. - Increasing amorphous density possibly a mass in the left upper outer lung.  CT Chest on 12/21/2023: - 4.6 x 3 cm sized spiculated mass left upper lobe concerning for a primary pulmonary malignancy. - Pulmonary emphysema.  - Suspect bronchial impaction in the right lung base. - Atherosclerotic calcification including the coronary arteries.  CT Chest on 06/26/2024: - In the COLTEN is a spiculated 3.2 x 2.8 x 4.5 cm spiculated mass within the apical division of the apicoposterior segment of the COLTEN.  - The biapical opacities and calcifications are unchanged.  - There are bilateral peripheral reticular opacities and calcified peripheral right lower lobe opacities that are unchanged.  - Left hemithyroidectomy. The chest lymph nodes measure less than 10 mm in the short axis. - Aortic valve calcifications. Coronary artery calcifications.   Patient is here today for CT Sx consultation, referred by Dr. Gustavo Peterson (PCP). Overall, she reports to be feeling well. Denies any chest pain, shortness of breath, cough, or hemoptysis.

## 2024-07-24 NOTE — HISTORY OF PRESENT ILLNESS
[FreeTextEntry1] : Ms. KASSIE LOPEZ, 84 year old female, former smoker (occasional smoker, quit over 40 yrs ago), w/ hx of HTN, HLD, AFib (on Eliquis), hypothyroidism (s/p partial thyroidectomy), chronic right plantar foot ulcers/osteomyelitis (s/p 2nd TMA on 12/08/2023 at [facility]). Post-op she presented to [facility] (12/21-12/28/23) for acute renal failure, workup imaging revealed incidental findings of COLTEN mass.   CXR on 12/05/2023: - Faint left upper lung opacity may be secondary to atelectasis or pneumonia.  CXR on 12/21/2023: - COPD and bilateral chronic apical thickening again noted. - Increasing amorphous density possibly a mass in the left upper outer lung.  CT Chest on 12/21/2023: - 4.6 x 3 cm sized spiculated mass left upper lobe concerning for a primary pulmonary malignancy. - Pulmonary emphysema.  - Suspect bronchial impaction in the right lung base. - Atherosclerotic calcification including the coronary arteries.  CT Chest on 06/26/2024: - In the COLTEN is a spiculated 3.2 x 2.8 x 4.5 cm spiculated mass within the apical division of the apicoposterior segment of the COLTEN.  - The biapical opacities and calcifications are unchanged.  - There are bilateral peripheral reticular opacities and calcified peripheral right lower lobe opacities that are unchanged.  - Left hemithyroidectomy. The chest lymph nodes measure less than 10 mm in the short axis. - Aortic valve calcifications. Coronary artery calcifications.   Patient is here today for CT Sx consultation, referred by Dr. Gustavo Peterson (PCP). Overall, she reports to be feeling well. Denies any chest pain, shortness of breath, cough, or hemoptysis.

## 2024-07-24 NOTE — ASSESSMENT
[FreeTextEntry1] : Ms. KASSIE LOPEZ, 84 year old female, former smoker (occasional smoker, quit over 40 yrs ago), w/ hx of HTN, HLD, AFib (on Eliquis), hypothyroidism (s/p partial thyroidectomy), chronic right plantar foot ulcers/osteomyelitis (s/p 2nd TMA on 12/08/2023 at [facility]). Post-op she presented to [facility] (12/21-12/28/23) for acute renal failure, workup imaging revealed incidental findings of COLTEN mass.  Patient is here today for CT Sx consultation, referred by Dr. Gustavo Peterson (PCP).   I have independently reviewed the medical records and imaging at the time of this office consultation. Imaging reviewed with patient, COLTEN mass appears slightly bigger and denser when compared to previous scan, suspicious for malignancy. I discussed obtaining PET/CT and PFTs for further evaluation. RTC after to discuss findings and next step. She is agreeable.   Recommendations reviewed with patient during this office visit, and all questions answered; Patient instructed on the importance of follow up and verbalizes understanding.    I, FIDELIA Alex, personally performed the evaluation and management (E/M) services for this new patient.  That E/M includes conducting the initial examination, assessing all conditions, and establishing the plan of care.  Today, my ACP, Bubba Carr NP, was here to observe my evaluation and management services for this patient to be followed going forward.

## 2024-07-24 NOTE — PHYSICAL EXAM
[Fully active, able to carry on all pre-disease performance without restriction] : Status 0 - Fully active, able to carry on all pre-disease performance without restriction [General Appearance - Alert] : alert [General Appearance - In No Acute Distress] : in no acute distress [Sclera] : the sclera and conjunctiva were normal [PERRL With Normal Accommodation] : pupils were equal in size, round, and reactive to light [Extraocular Movements] : extraocular movements were intact [Outer Ear] : the ears and nose were normal in appearance [Oropharynx] : the oropharynx was normal [Neck Appearance] : the appearance of the neck was normal [Neck Cervical Mass (___cm)] : no neck mass was observed [Jugular Venous Distention Increased] : there was no jugular-venous distention [Thyroid Diffuse Enlargement] : the thyroid was not enlarged [Thyroid Nodule] : there were no palpable thyroid nodules [Auscultation Breath Sounds / Voice Sounds] : lungs were clear to auscultation bilaterally [Heart Rate And Rhythm] : heart rate was normal and rhythm regular [Heart Sounds] : normal S1 and S2 [Heart Sounds Gallop] : no gallops [Murmurs] : no murmurs [Heart Sounds Pericardial Friction Rub] : no pericardial rub [Examination Of The Chest] : the chest was normal in appearance [Chest Visual Inspection Thoracic Asymmetry] : no chest asymmetry [Diminished Respiratory Excursion] : normal chest expansion [2+] : left 2+ [No Abnormalities] : the abdominal aorta was not enlarged and no bruit was heard [Breast Appearance] : normal in appearance [Bowel Sounds] : normal bowel sounds [Breast Palpation Mass] : no palpable masses [Abdomen Soft] : soft [Abdomen Tenderness] : non-tender [Abdomen Mass (___ Cm)] : no abdominal mass palpated [Cervical Lymph Nodes Enlarged Posterior Bilaterally] : posterior cervical [Cervical Lymph Nodes Enlarged Anterior Bilaterally] : anterior cervical [Supraclavicular Lymph Nodes Enlarged Bilaterally] : supraclavicular [Axillary Lymph Nodes Enlarged Bilaterally] : axillary [Femoral Lymph Nodes Enlarged Bilaterally] : femoral [Inguinal Lymph Nodes Enlarged Bilaterally] : inguinal [No CVA Tenderness] : no ~M costovertebral angle tenderness [No Spinal Tenderness] : no spinal tenderness [Abnormal Walk] : normal gait [Nail Clubbing] : no clubbing  or cyanosis of the fingernails [Musculoskeletal - Swelling] : no joint swelling seen [Motor Tone] : muscle strength and tone were normal [Skin Color & Pigmentation] : normal skin color and pigmentation [Skin Turgor] : normal skin turgor [] : no rash [Deep Tendon Reflexes (DTR)] : deep tendon reflexes were 2+ and symmetric [No Focal Deficits] : no focal deficits [Sensation] : the sensory exam was normal to light touch and pinprick [Oriented To Time, Place, And Person] : oriented to person, place, and time [Affect] : the affect was normal [Impaired Insight] : insight and judgment were intact [Right Carotid Bruit] : no bruit heard over the right carotid [Left Carotid Bruit] : no bruit heard over the left carotid [Right Femoral Bruit] : no bruit heard over the right femoral artery [Left Femoral Bruit] : no bruit heard over the left femoral artery [FreeTextEntry1] : deferred

## 2024-07-24 NOTE — ASSESSMENT
[FreeTextEntry1] : Ms. KASSIE LOPEZ, 84 year old female, former smoker (occasional smoker, quit over 40 yrs ago), w/ hx of HTN, HLD, AFib (on Eliquis), hypothyroidism (s/p partial thyroidectomy), chronic right plantar foot ulcers/osteomyelitis (s/p 2nd TMA on 12/08/2023 at [facility]). Post-op she presented to [facility] (12/21-12/28/23) for acute renal failure, workup imaging revealed incidental findings of COLTEN mass.  Patient is here today for CT Sx consultation, referred by Dr. Gustavo Peterson (PCP).   I have independently reviewed the medical records and imaging at the time of this office consultation. Imaging reviewed with patient, COLTEN mass appears slightly bigger and denser when compared to previous scan, suspicious for malignancy. I discussed obtaining PET/CT and PFTs for further evaluation. RTC after to discuss findings and next step. She is agreeable.   Recommendations reviewed with patient during this office visit, and all questions answered; Patient instructed on the importance of follow up and verbalizes understanding.    I, FIDELIA Alex, personally performed the evaluation and management (E/M) services for this new patient.  That E/M includes conducting the initial examination, assessing all conditions, and establishing the plan of care.  Today, my ACP, Bubba Carr NP, was here to observe my evaluation and management services for this patient to be followed going forward.

## 2024-07-24 NOTE — DATA REVIEWED
[FreeTextEntry1] : I have independently reviewed the following: CXR on 12/05/2023 CXR on 12/21/2023 CT Chest on 12/21/2023 CT Chest on 06/26/2024

## 2024-07-24 NOTE — CONSULT LETTER
[Dear  ___] : Dear  [unfilled], [Consult Letter:] : I had the pleasure of evaluating your patient, [unfilled]. [( Thank you for referring [unfilled] for consultation for _____ )] : Thank you for referring [unfilled] for consultation for [unfilled] [Please see my note below.] : Please see my note below. [Consult Closing:] : Thank you very much for allowing me to participate in the care of this patient.  If you have any questions, please do not hesitate to contact me. [Sincerely,] : Sincerely, [FreeTextEntry2] : Dr. Gustavo Peterson (PCP/Ref) [FreeTextEntry3] : Sebastian Lopez MD, FACS , Division of Thoracic Surgery [facility] Thoracic Surgery [facility] Department of Cardiovascular & Thoracic Surgery  [employer] at [facility]

## 2024-07-24 NOTE — HISTORY OF PRESENT ILLNESS
[FreeTextEntry1] : Ms. KASSIE LOPEZ, 84 year old female, former smoker (occasional smoker, quit over 40 yrs ago), w/ hx of HTN, HLD, AFib (on Eliquis), hypothyroidism (s/p partial thyroidectomy), chronic right plantar foot ulcers/osteomyelitis (s/p 2nd TMA on 12/08/2023 at [facility]). Post-op she presented to [facility] (12/21-12/28/23) for acute renal failure, workup imaging revealed incidental findings of COLTEN mass.   CXR on 12/05/2023: - Faint left upper lung opacity may be secondary to atelectasis or pneumonia.  CXR on 12/21/2023: - COPD and bilateral chronic apical thickening again noted. - Increasing amorphous density possibly a mass in the left upper outer lung.  CT Chest on 12/21/2023: - 4.6 x 3 cm sized spiculated mass left upper lobe concerning for a primary pulmonary malignancy. - Pulmonary emphysema.  - Suspect bronchial impaction in the right lung base. - Atherosclerotic calcification including the coronary arteries.  CT Chest on 06/26/2024: - In the COLTEN is a spiculated 3.2 x 2.8 x 4.5 cm spiculated mass within the apical division of the apicoposterior segment of the COLTEN.  - The biapical opacities and calcifications are unchanged.  - There are bilateral peripheral reticular opacities and calcified peripheral right lower lobe opacities that are unchanged.  - Left hemithyroidectomy. The chest lymph nodes measure less than 10 mm in the short axis. - Aortic valve calcifications. Coronary artery calcifications.   Patient is here today for CT Sx consultation, referred by Dr. Gustavo Peterson (PCP). Overall, she reports to be feeling well. Denies any chest pain, shortness of breath, cough, or hemoptysis.

## 2024-07-24 NOTE — CONSULT LETTER
[Dear  ___] : Dear  [unfilled], [Consult Letter:] : I had the pleasure of evaluating your patient, [unfilled]. [( Thank you for referring [unfilled] for consultation for _____ )] : Thank you for referring [unfilled] for consultation for [unfilled] [Please see my note below.] : Please see my note below. [Consult Closing:] : Thank you very much for allowing me to participate in the care of this patient.  If you have any questions, please do not hesitate to contact me. [Sincerely,] : Sincerely, [FreeTextEntry2] : Dr. Gustavo Peterson (PCP/Ref) [FreeTextEntry3] : Sebastian Lopez MD, FACS , Division of Thoracic Surgery [facility] Thoracic Surgery [facility] Department of Cardiovascular & Thoracic Surgery  [employer] at [facility] [place]

## 2024-07-24 NOTE — PHYSICAL EXAM
[Fully active, able to carry on all pre-disease performance without restriction] : Status 0 - Fully active, able to carry on all pre-disease performance without restriction [General Appearance - Alert] : alert [General Appearance - In No Acute Distress] : in no acute distress [Sclera] : the sclera and conjunctiva were normal [PERRL With Normal Accommodation] : pupils were equal in size, round, and reactive to light [Extraocular Movements] : extraocular movements were intact [Outer Ear] : the ears and nose were normal in appearance [Oropharynx] : the oropharynx was normal [Neck Appearance] : the appearance of the neck was normal [Neck Cervical Mass (___cm)] : no neck mass was observed [Jugular Venous Distention Increased] : there was no jugular-venous distention [Thyroid Diffuse Enlargement] : the thyroid was not enlarged [Thyroid Nodule] : there were no palpable thyroid nodules [Auscultation Breath Sounds / Voice Sounds] : lungs were clear to auscultation bilaterally [Heart Rate And Rhythm] : heart rate was normal and rhythm regular [Heart Sounds] : normal S1 and S2 [Heart Sounds Gallop] : no gallops [Murmurs] : no murmurs [Heart Sounds Pericardial Friction Rub] : no pericardial rub [Examination Of The Chest] : the chest was normal in appearance [Chest Visual Inspection Thoracic Asymmetry] : no chest asymmetry [Diminished Respiratory Excursion] : normal chest expansion [2+] : left 2+ [No Abnormalities] : the abdominal aorta was not enlarged and no bruit was heard [Breast Appearance] : normal in appearance [Breast Palpation Mass] : no palpable masses [Bowel Sounds] : normal bowel sounds [Abdomen Soft] : soft [Abdomen Tenderness] : non-tender [Abdomen Mass (___ Cm)] : no abdominal mass palpated [Cervical Lymph Nodes Enlarged Posterior Bilaterally] : posterior cervical [Cervical Lymph Nodes Enlarged Anterior Bilaterally] : anterior cervical [Supraclavicular Lymph Nodes Enlarged Bilaterally] : supraclavicular [Axillary Lymph Nodes Enlarged Bilaterally] : axillary [Femoral Lymph Nodes Enlarged Bilaterally] : femoral [Inguinal Lymph Nodes Enlarged Bilaterally] : inguinal [No CVA Tenderness] : no ~M costovertebral angle tenderness [No Spinal Tenderness] : no spinal tenderness [Abnormal Walk] : normal gait [Nail Clubbing] : no clubbing  or cyanosis of the fingernails [Musculoskeletal - Swelling] : no joint swelling seen [Motor Tone] : muscle strength and tone were normal [Skin Color & Pigmentation] : normal skin color and pigmentation [Skin Turgor] : normal skin turgor [] : no rash [Deep Tendon Reflexes (DTR)] : deep tendon reflexes were 2+ and symmetric [Sensation] : the sensory exam was normal to light touch and pinprick [No Focal Deficits] : no focal deficits [Oriented To Time, Place, And Person] : oriented to person, place, and time [Affect] : the affect was normal [Impaired Insight] : insight and judgment were intact [Right Carotid Bruit] : no bruit heard over the right carotid [Left Carotid Bruit] : no bruit heard over the left carotid [Right Femoral Bruit] : no bruit heard over the right femoral artery [Left Femoral Bruit] : no bruit heard over the left femoral artery [FreeTextEntry1] : deferred

## 2024-07-29 ENCOUNTER — APPOINTMENT (OUTPATIENT)
Dept: NUCLEAR MEDICINE | Facility: CLINIC | Age: 84
End: 2024-07-29
Payer: MEDICARE

## 2024-07-29 ENCOUNTER — OUTPATIENT (OUTPATIENT)
Dept: OUTPATIENT SERVICES | Facility: HOSPITAL | Age: 84
LOS: 1 days | End: 2024-07-29
Payer: MEDICARE

## 2024-07-29 DIAGNOSIS — R91.8 OTHER NONSPECIFIC ABNORMAL FINDING OF LUNG FIELD: ICD-10-CM

## 2024-07-29 DIAGNOSIS — M53.9 DORSOPATHY, UNSPECIFIED: Chronic | ICD-10-CM

## 2024-07-29 DIAGNOSIS — Z98.89 OTHER SPECIFIED POSTPROCEDURAL STATES: Chronic | ICD-10-CM

## 2024-07-29 PROCEDURE — 78815 PET IMAGE W/CT SKULL-THIGH: CPT | Mod: 26,PI,MH

## 2024-07-29 PROCEDURE — 78815 PET IMAGE W/CT SKULL-THIGH: CPT

## 2024-07-29 PROCEDURE — A9552: CPT

## 2024-07-29 NOTE — CONSULT LETTER
[Dear  ___] : Dear  [unfilled], [Consult Letter:] : I had the pleasure of evaluating your patient, [unfilled]. [( Thank you for referring [unfilled] for consultation for _____ )] : Thank you for referring [unfilled] for consultation for [unfilled] [Please see my note below.] : Please see my note below. [Consult Closing:] : Thank you very much for allowing me to participate in the care of this patient.  If you have any questions, please do not hesitate to contact me. [Sincerely,] : Sincerely, [FreeTextEntry2] : Dr. Gustavo Peterson (PCP/Ref) [FreeTextEntry3] : Sebastian Lopez MD, FACS , Division of Thoracic Surgery [facility] Thoracic Surgery [facility] Department of Cardiovascular & Thoracic Surgery  [employer] at [facility]

## 2024-07-29 NOTE — ASSESSMENT
[FreeTextEntry1] : Ms. KASSIE LOPEZ, 84 year old female, former smoker (occasional smoker, quit over 40 yrs ago), w/ hx of HTN, HLD, AFib (on Eliquis), hypothyroidism (s/p partial thyroidectomy), chronic right plantar foot ulcers/osteomyelitis (s/p 2nd TMA on 12/08/2023 at [facility]). Post-op she presented to [facility] (12/21-12/28/23) for acute renal failure, workup imaging revealed incidental findings of COLTEN mass. Referred by Dr. Gustavo Peterson (PCP).   Patient is here today for follow up with PET/CT.   I have independently reviewed the medical records and imaging at the time of this office consultation.  Recommendations reviewed with patient during this office visit, and all questions answered; Patient instructed on the importance of follow up and verbalizes understanding.

## 2024-07-29 NOTE — HISTORY OF PRESENT ILLNESS
[FreeTextEntry1] : Ms. KASSIE LOPEZ, 84 year old female, former smoker (occasional smoker, quit over 40 yrs ago), w/ hx of HTN, HLD, AFib (on Eliquis), hypothyroidism (s/p partial thyroidectomy), chronic right plantar foot ulcers/osteomyelitis (s/p 2nd TMA on 12/08/2023 at [facility]). Post-op she presented to [facility] (12/21-12/28/23) for acute renal failure, workup imaging revealed incidental findings of COLTEN mass. Referred by Dr. Gustavo Peterson (PCP).   CXR on 12/05/2023: - Faint left upper lung opacity may be secondary to atelectasis or pneumonia.  CXR on 12/21/2023: - COPD and bilateral chronic apical thickening again noted. - Increasing amorphous density possibly a mass in the left upper outer lung.  CT Chest on 12/21/2023: - 4.6 x 3 cm sized spiculated mass left upper lobe concerning for a primary pulmonary malignancy. - Pulmonary emphysema.  - Suspect bronchial impaction in the right lung base. - Atherosclerotic calcification including the coronary arteries.  CT Chest on 06/26/2024: - In the COLTEN is a spiculated 3.2 x 2.8 x 4.5 cm spiculated mass within the apical division of the apicoposterior segment of the COLTEN.  - The biapical opacities and calcifications are unchanged.  - There are bilateral peripheral reticular opacities and calcified peripheral right lower lobe opacities that are unchanged.  - Left hemithyroidectomy. The chest lymph nodes measure less than 10 mm in the short axis. - Aortic valve calcifications. Coronary artery calcifications.  07/24/2024: Initially seen, imaging reviewed with patient, COLTEN mass appears slightly bigger and denser when compared to previous scan, suspicious for malignancy. I discussed obtaining PET/CT and PFTs for further evaluation. RTC after to discuss findings and next step.   PET/CT on 07/29/2024: -   Patient is here today for follow up.

## 2024-08-01 NOTE — PROGRESS NOTE ADULT - PROBLEM SELECTOR PROBLEM 4
Head, normocephalic, atraumatic, Face, Face within normal limits, Ears, External ears within normal limits, Nose/Nasopharynx, External nose normal appearance, nares patent, no nasal discharge, Mouth and Throat, Oral cavity appearance normal, Lips, Appearance normal
Hypothyroid

## 2024-08-07 ENCOUNTER — NON-APPOINTMENT (OUTPATIENT)
Age: 84
End: 2024-08-07

## 2024-08-07 ENCOUNTER — APPOINTMENT (OUTPATIENT)
Dept: THORACIC SURGERY | Facility: CLINIC | Age: 84
End: 2024-08-07

## 2024-08-07 PROCEDURE — 99214 OFFICE O/P EST MOD 30 MIN: CPT

## 2024-08-07 NOTE — PHYSICAL EXAM
[] : no respiratory distress [Heart Rate And Rhythm] : heart rate was normal and rhythm regular [Auscultation Breath Sounds / Voice Sounds] : lungs were clear to auscultation bilaterally [Heart Sounds] : normal S1 and S2 [Heart Sounds Gallop] : no gallops [Murmurs] : no murmurs [Heart Sounds Pericardial Friction Rub] : no pericardial rub [Examination Of The Chest] : the chest was normal in appearance [Chest Visual Inspection Thoracic Asymmetry] : no chest asymmetry [Diminished Respiratory Excursion] : normal chest expansion

## 2024-08-07 NOTE — HISTORY OF PRESENT ILLNESS
[FreeTextEntry1] : Ms. KASSIE LOPEZ, 84 year old female, former smoker (occasional smoker, quit over 40 yrs ago), w/ hx of HTN, HLD, AFib (on Eliquis), hypothyroidism (s/p partial thyroidectomy), chronic right plantar foot ulcers/osteomyelitis (s/p 2nd TMA on 12/08/2023 at [facility]). Post-op she presented to [facility] (12/21-12/28/23) for acute renal failure, workup imaging revealed incidental findings of COLTEN mass. Referred by Dr. Gustavo Peterson (PCP).   CXR on 12/05/2023: - Faint left upper lung opacity may be secondary to atelectasis or pneumonia.  CXR on 12/21/2023: - COPD and bilateral chronic apical thickening again noted. - Increasing amorphous density possibly a mass in the left upper outer lung.  CT Chest on 12/21/2023: - 4.6 x 3 cm sized spiculated mass left upper lobe concerning for a primary pulmonary malignancy. - Pulmonary emphysema.  - Suspect bronchial impaction in the right lung base. - Atherosclerotic calcification including the coronary arteries.  CT Chest on 06/26/2024: - In the COLTEN is a spiculated 3.2 x 2.8 x 4.5 cm spiculated mass within the apical division of the apicoposterior segment of the COLTEN.  - The biapical opacities and calcifications are unchanged.  - There are bilateral peripheral reticular opacities and calcified peripheral right lower lobe opacities that are unchanged.  - Left hemithyroidectomy. The chest lymph nodes measure less than 10 mm in the short axis. - Aortic valve calcifications. Coronary artery calcifications.  07/24/2024: Initially seen, imaging reviewed with patient, COLTEN mass appears slightly bigger and denser when compared to previous scan, suspicious for malignancy. I discussed obtaining PET/CT and PFTs for further evaluation. RTC after to discuss findings and next step.   PET/CT on 07/29/2024: - The left apical lung mass corresponding to that seen on CT 6/26/2024 is FDG avid and measures 3.6 x 2.9 cm, SUV 6 (image 69). - FDG avid soft tissue nodule associated with the distal esophageal wall as well as linear FDG uptake at the gastroesophageal junction associated with a small hiatal hernia.  - Mildly FDG avid indistinct right parotid nodule, nonspecific. Consider attention on follow-up PET/CT scan.  Of Note: Reports she is being started on antibiotics today (8/7/24) for right foot ulcer and pending MRI Foot.   Patient is here today for follow up. Overall, she reports to be feeling well. Denies any chest pain, shortness of breath, cough, or hemoptysis.

## 2024-08-07 NOTE — CONSULT LETTER
[FreeTextEntry2] : Dr. Gustavo Peterson (PCP/Ref) [FreeTextEntry3] : Sebastian Lopez MD, FACS , Division of Thoracic Surgery [facility] [facility] Thoracic Surgery [facility] Department of Cardiovascular & Thoracic Surgery  [employer] at [facility]

## 2024-08-07 NOTE — ASSESSMENT
[FreeTextEntry1] : Ms. KASSIE LOPEZ, 84 year old female, former smoker (occasional smoker, quit over 40 yrs ago), w/ hx of HTN, HLD, AFib (on Eliquis), hypothyroidism (s/p partial thyroidectomy), chronic right plantar foot ulcers/osteomyelitis (s/p 2nd TMA on 12/08/2023 at [facility]). Post-op she presented to [facility] (12/21-12/28/23) for acute renal failure, workup imaging revealed incidental findings of COLTEN mass. Referred by Dr. Gustavo Peterson (PCP).   Patient is here today for follow up with PET/CT.   I have independently reviewed the medical records and imaging at the time of this office consultation. PET/CT reviewed with patient, uptake noted in the COLTEN suspicious for malignancy. I suspect it is early-stage lung cancer. However, there is also uptake in her esophagus, of unclear etiology. I would like her to undergo EGD for further evaluation, will refer to Dr. Gold. RTC after to discuss findings and next step.  - Instructed to hold Eliquis for 3 days prior to EGD.  - I also want her to obtain PFTs, will refer to Dr. Hua.  Recommendations reviewed with patient during this office visit, and all questions answered; Patient instructed on the importance of follow up and verbalizes understanding.    I, FIDELIA Alex, personally performed the evaluation and management (E/M) services for this established patient. That E/M includes conducting the examination, assessing all new/exacerbated conditions, and establishing a new plan of care. Today, my ACP, Bubba Carr NP, was here to observe my evaluation and management services for this new problem/exacerbated condition to be followed going forward.

## 2024-08-07 NOTE — CONSULT LETTER
[FreeTextEntry2] : Dr. Gustavo Peterson (PCP/Ref) [FreeTextEntry3] : Sebastian Lopez MD, FACS , Division of Thoracic Surgery [facility] Thoracic Surgery [facility] Department of Cardiovascular & Thoracic Surgery  [employer] at [facility]

## 2024-08-07 NOTE — ASSESSMENT
[FreeTextEntry1] : Ms. KASSIE LOPEZ, 84 year old female, former smoker (occasional smoker, quit over 40 yrs ago), w/ hx of HTN, HLD, AFib (on Eliquis), hypothyroidism (s/p partial thyroidectomy), chronic right plantar foot ulcers/osteomyelitis (s/p 2nd TMA on 12/08/2023 at [facility]). Post-op she presented to [facility] (12/21-12/28/23) for acute renal failure, workup imaging revealed incidental findings of COLTEN mass. Referred by Dr. Gustavo Peterson (PCP).   Patient is here today for follow up with PET/CT.   I have independently reviewed the medical records and imaging at the time of this office consultation. PET/CT reviewed with patient, uptake noted in the COLTEN suspicious for malignancy. I suspect it is early-stage lung cancer. However, there is also uptake in her esophagus, of unclear etiology. I would like her to undergo EGD for further evaluation, will refer to Dr. Gold. RTC after to discuss findings and next step.  - Instructed to hold Eliquis for 3 days prior to EGD.  - I also want her to obtain PFTs, will refer to Dr. Hua.  Recommendations reviewed with patient during this office visit, and all questions answered; Patient instructed on the importance of follow up and verbalizes understanding.    I, FIDELIA Alex, personally performed the evaluation and management (E/M) services for this established patient. That E/M includes conducting the examination, assessing all new/exacerbated conditions, and establishing a new plan of care. Today, my ACP, Bubba Carr NP, was here to observe my evaluation and management services for this new problem/exacerbated condition to be followed going forward.

## 2024-08-07 NOTE — ASSESSMENT
[FreeTextEntry1] : Ms. KASSIE LOPEZ, 84 year old female, former smoker (occasional smoker, quit over 40 yrs ago), w/ hx of HTN, HLD, AFib (on Eliquis), hypothyroidism (s/p partial thyroidectomy), chronic right plantar foot ulcers/osteomyelitis (s/p 2nd TMA on 12/08/2023 at [facility]). Post-op she presented to [facility] (12/21-12/28/23) for acute renal failure, workup imaging revealed incidental findings of COLTEN mass. Referred by Dr. Gustavo Peterson (PCP).   Patient is here today for follow up with PET/CT.   I have independently reviewed the medical records and imaging at the time of this office consultation. PET/CT reviewed with patient, uptake noted in the COLTEN suspicious for malignancy. I suspect it is early-stage lung cancer. However, there is also uptake in her esophagus, of unclear etiology. I would like her to undergo EGD for further evaluation, will refer to Dr. Gold. RTC after to discuss findings and next step.  - Instructed to hold Eliquis for 3 days prior to EGD.  - I also want her to obtain PFTs, will refer to Dr. Hua.  Recommendations reviewed with patient during this office visit, and all questions answered; Patient instructed on the importance of follow up and verbalizes understanding.    I, FIDELIA Alex, personally performed the evaluation and management (E/M) services for this established patient. That E/M includes conducting the examination, assessing all new/exacerbated conditions, and establishing a new plan of care. Today, my ACP, Bubba Carr NP, was here to observe my evaluation and management services for this new problem/exacerbated condition to be followed going forward.

## 2024-08-07 NOTE — HISTORY OF PRESENT ILLNESS
[FreeTextEntry1] : Ms. KASSIE LOPEZ, 84 year old female, former smoker (occasional smoker, quit over 40 yrs ago), w/ hx of HTN, HLD, AFib (on Eliquis), hypothyroidism (s/p partial thyroidectomy), chronic right plantar foot ulcers/osteomyelitis (s/p 2nd TMA on 12/08/2023 at [facility]). Post-op she presented to [facility] (12/21-12/28/23) for acute renal failure, workup imaging revealed incidental findings of COLTEN mass. Referred by Dr. Gustavo Peterson (PCP).   CXR on 12/05/2023: - Faint left upper lung opacity may be secondary to atelectasis or pneumonia.  CXR on 12/21/2023: - COPD and bilateral chronic apical thickening again noted. - Increasing amorphous density possibly a mass in the left upper outer lung.  CT Chest on 12/21/2023: - 4.6 x 3 cm sized spiculated mass left upper lobe concerning for a primary pulmonary malignancy. - Pulmonary emphysema.  - Suspect bronchial impaction in the right lung base. - Atherosclerotic calcification including the coronary arteries.  CT Chest on 06/26/2024: - In the COLTEN is a spiculated 3.2 x 2.8 x 4.5 cm spiculated mass within the apical division of the apicoposterior segment of the COLTEN.  - The biapical opacities and calcifications are unchanged.  - There are bilateral peripheral reticular opacities and calcified peripheral right lower lobe opacities that are unchanged.  - Left hemithyroidectomy. The chest lymph nodes measure less than 10 mm in the short axis. - Aortic valve calcifications. Coronary artery calcifications.  07/24/2024: Initially seen, imaging reviewed with patient, COLTNE mass appears slightly bigger and denser when compared to previous scan, suspicious for malignancy. I discussed obtaining PET/CT and PFTs for further evaluation. RTC after to discuss findings and next step.   PET/CT on 07/29/2024: - The left apical lung mass corresponding to that seen on CT 6/26/2024 is FDG avid and measures 3.6 x 2.9 cm, SUV 6 (image 69). - FDG avid soft tissue nodule associated with the distal esophageal wall as well as linear FDG uptake at the gastroesophageal junction associated with a small hiatal hernia.  - Mildly FDG avid indistinct right parotid nodule, nonspecific. Consider attention on follow-up PET/CT scan.  Of Note: Reports she is being started on antibiotics today (8/7/24) for right foot ulcer and pending MRI Foot.   Patient is here today for follow up. Overall, she reports to be feeling well. Denies any chest pain, shortness of breath, cough, or hemoptysis.

## 2024-08-07 NOTE — CONSULT LETTER
[FreeTextEntry2] : Dr. Gustavo Peterson (PCP/Ref) [FreeTextEntry3] : Sebastian Lopez MD, FACS , Division of Thoracic Surgery [facility] Thoracic Surgery [facility] Department of Cardiovascular & Thoracic Surgery  [employer] at [facility]

## 2024-08-07 NOTE — HISTORY OF PRESENT ILLNESS
[FreeTextEntry1] : Ms. KASSIE LOPEZ, 84 year old female, former smoker (occasional smoker, quit over 40 yrs ago), w/ hx of HTN, HLD, AFib (on Eliquis), hypothyroidism (s/p partial thyroidectomy), chronic right plantar foot ulcers/osteomyelitis (s/p 2nd TMA on 12/08/2023 at [facility]). Post-op she presented to [facility] (12/21-12/28/23) for acute renal failure, workup imaging revealed incidental findings of COLTEN mass. Referred by Dr. Gustavo Peterson (PCP).   CXR on 12/05/2023: - Faint left upper lung opacity may be secondary to atelectasis or pneumonia.  CXR on 12/21/2023: - COPD and bilateral chronic apical thickening again noted. - Increasing amorphous density possibly a mass in the left upper outer lung.  CT Chest on 12/21/2023: - 4.6 x 3 cm sized spiculated mass left upper lobe concerning for a primary pulmonary malignancy. - Pulmonary emphysema.  - Suspect bronchial impaction in the right lung base. - Atherosclerotic calcification including the coronary arteries.  CT Chest on 06/26/2024: - In the COLTEN is a spiculated 3.2 x 2.8 x 4.5 cm spiculated mass within the apical division of the apicoposterior segment of the COLTEN.  - The biapical opacities and calcifications are unchanged.  - There are bilateral peripheral reticular opacities and calcified peripheral right lower lobe opacities that are unchanged.  - Left hemithyroidectomy. The chest lymph nodes measure less than 10 mm in the short axis. - Aortic valve calcifications. Coronary artery calcifications.  07/24/2024: Initially seen, imaging reviewed with patient, COLTEN mass appears slightly bigger and denser when compared to previous scan, suspicious for malignancy. I discussed obtaining PET/CT and PFTs for further evaluation. RTC after to discuss findings and next step.   PET/CT on 07/29/2024: - The left apical lung mass corresponding to that seen on CT 6/26/2024 is FDG avid and measures 3.6 x 2.9 cm, SUV 6 (image 69). - FDG avid soft tissue nodule associated with the distal esophageal wall as well as linear FDG uptake at the gastroesophageal junction associated with a small hiatal hernia.  - Mildly FDG avid indistinct right parotid nodule, nonspecific. Consider attention on follow-up PET/CT scan.  Of Note: Reports she is being started on antibiotics today (8/7/24) for right foot ulcer and pending MRI Foot.   Patient is here today for follow up. Overall, she reports to be feeling well. Denies any chest pain, shortness of breath, cough, or hemoptysis.

## 2024-08-14 ENCOUNTER — APPOINTMENT (OUTPATIENT)
Dept: MRI IMAGING | Facility: HOSPITAL | Age: 84
End: 2024-08-14
Payer: MEDICARE

## 2024-08-14 PROCEDURE — 73720 MRI LWR EXTREMITY W/O&W/DYE: CPT | Mod: 26,LT,MH

## 2024-08-15 ENCOUNTER — INPATIENT (INPATIENT)
Facility: HOSPITAL | Age: 84
LOS: 6 days | Discharge: HOME HEALTH | DRG: 541 | End: 2024-08-22
Attending: HOSPITALIST | Admitting: INTERNAL MEDICINE
Payer: MEDICARE

## 2024-08-15 VITALS
OXYGEN SATURATION: 97 % | SYSTOLIC BLOOD PRESSURE: 123 MMHG | WEIGHT: 149.91 LBS | HEIGHT: 70 IN | RESPIRATION RATE: 18 BRPM | HEART RATE: 62 BPM | DIASTOLIC BLOOD PRESSURE: 85 MMHG | TEMPERATURE: 98 F

## 2024-08-15 DIAGNOSIS — Z98.89 OTHER SPECIFIED POSTPROCEDURAL STATES: Chronic | ICD-10-CM

## 2024-08-15 DIAGNOSIS — R39.9 UNSPECIFIED SYMPTOMS AND SIGNS INVOLVING THE GENITOURINARY SYSTEM: ICD-10-CM

## 2024-08-15 DIAGNOSIS — M86.9 OSTEOMYELITIS, UNSPECIFIED: ICD-10-CM

## 2024-08-15 DIAGNOSIS — M53.9 DORSOPATHY, UNSPECIFIED: Chronic | ICD-10-CM

## 2024-08-15 LAB
ALBUMIN SERPL ELPH-MCNC: 3.4 G/DL — SIGNIFICANT CHANGE UP (ref 3.3–5)
ALP SERPL-CCNC: 103 U/L — SIGNIFICANT CHANGE UP (ref 40–120)
ALT FLD-CCNC: 15 U/L — SIGNIFICANT CHANGE UP (ref 10–45)
ANION GAP SERPL CALC-SCNC: 10 MMOL/L — SIGNIFICANT CHANGE UP (ref 5–17)
AST SERPL-CCNC: 25 U/L — SIGNIFICANT CHANGE UP (ref 10–40)
BASOPHILS # BLD AUTO: 0.05 K/UL — SIGNIFICANT CHANGE UP (ref 0–0.2)
BASOPHILS NFR BLD AUTO: 0.6 % — SIGNIFICANT CHANGE UP (ref 0–2)
BILIRUB SERPL-MCNC: 0.5 MG/DL — SIGNIFICANT CHANGE UP (ref 0.2–1.2)
BUN SERPL-MCNC: 16 MG/DL — SIGNIFICANT CHANGE UP (ref 7–23)
CALCIUM SERPL-MCNC: 9.4 MG/DL — SIGNIFICANT CHANGE UP (ref 8.4–10.5)
CHLORIDE SERPL-SCNC: 102 MMOL/L — SIGNIFICANT CHANGE UP (ref 96–108)
CO2 SERPL-SCNC: 25 MMOL/L — SIGNIFICANT CHANGE UP (ref 22–31)
CREAT SERPL-MCNC: 0.98 MG/DL — SIGNIFICANT CHANGE UP (ref 0.5–1.3)
EGFR: 57 ML/MIN/1.73M2 — LOW
EOSINOPHIL # BLD AUTO: 0.12 K/UL — SIGNIFICANT CHANGE UP (ref 0–0.5)
EOSINOPHIL NFR BLD AUTO: 1.5 % — SIGNIFICANT CHANGE UP (ref 0–6)
GLUCOSE SERPL-MCNC: 82 MG/DL — SIGNIFICANT CHANGE UP (ref 70–99)
HCT VFR BLD CALC: 40.4 % — SIGNIFICANT CHANGE UP (ref 34.5–45)
HGB BLD-MCNC: 14 G/DL — SIGNIFICANT CHANGE UP (ref 11.5–15.5)
IMM GRANULOCYTES NFR BLD AUTO: 0.3 % — SIGNIFICANT CHANGE UP (ref 0–0.9)
LYMPHOCYTES # BLD AUTO: 2.29 K/UL — SIGNIFICANT CHANGE UP (ref 1–3.3)
LYMPHOCYTES # BLD AUTO: 28.8 % — SIGNIFICANT CHANGE UP (ref 13–44)
MCHC RBC-ENTMCNC: 32.5 PG — SIGNIFICANT CHANGE UP (ref 27–34)
MCHC RBC-ENTMCNC: 34.7 GM/DL — SIGNIFICANT CHANGE UP (ref 32–36)
MCV RBC AUTO: 93.7 FL — SIGNIFICANT CHANGE UP (ref 80–100)
MONOCYTES # BLD AUTO: 0.69 K/UL — SIGNIFICANT CHANGE UP (ref 0–0.9)
MONOCYTES NFR BLD AUTO: 8.7 % — SIGNIFICANT CHANGE UP (ref 2–14)
NEUTROPHILS # BLD AUTO: 4.79 K/UL — SIGNIFICANT CHANGE UP (ref 1.8–7.4)
NEUTROPHILS NFR BLD AUTO: 60.1 % — SIGNIFICANT CHANGE UP (ref 43–77)
NRBC # BLD: 0 /100 WBCS — SIGNIFICANT CHANGE UP (ref 0–0)
PLATELET # BLD AUTO: 234 K/UL — SIGNIFICANT CHANGE UP (ref 150–400)
POTASSIUM SERPL-MCNC: 4 MMOL/L — SIGNIFICANT CHANGE UP (ref 3.5–5.3)
POTASSIUM SERPL-SCNC: 4 MMOL/L — SIGNIFICANT CHANGE UP (ref 3.5–5.3)
PROT SERPL-MCNC: 6.9 G/DL — SIGNIFICANT CHANGE UP (ref 6–8.3)
RBC # BLD: 4.31 M/UL — SIGNIFICANT CHANGE UP (ref 3.8–5.2)
RBC # FLD: 13.2 % — SIGNIFICANT CHANGE UP (ref 10.3–14.5)
SODIUM SERPL-SCNC: 137 MMOL/L — SIGNIFICANT CHANGE UP (ref 135–145)
WBC # BLD: 7.96 K/UL — SIGNIFICANT CHANGE UP (ref 3.8–10.5)
WBC # FLD AUTO: 7.96 K/UL — SIGNIFICANT CHANGE UP (ref 3.8–10.5)

## 2024-08-15 PROCEDURE — 71045 X-RAY EXAM CHEST 1 VIEW: CPT | Mod: 26

## 2024-08-15 PROCEDURE — 99223 1ST HOSP IP/OBS HIGH 75: CPT | Mod: GC

## 2024-08-15 PROCEDURE — 99285 EMERGENCY DEPT VISIT HI MDM: CPT

## 2024-08-15 PROCEDURE — 93010 ELECTROCARDIOGRAM REPORT: CPT

## 2024-08-15 RX ORDER — ONDANSETRON 2 MG/ML
4 INJECTION, SOLUTION INTRAMUSCULAR; INTRAVENOUS EVERY 8 HOURS
Refills: 0 | Status: DISCONTINUED | OUTPATIENT
Start: 2024-08-15 | End: 2024-08-22

## 2024-08-15 RX ORDER — PIPERACILLIN SODIUM AND TAZOBACTAM SODIUM 3; .375 G/15ML; G/15ML
3.38 INJECTION, POWDER, FOR SOLUTION INTRAVENOUS ONCE
Refills: 0 | Status: COMPLETED | OUTPATIENT
Start: 2024-08-15 | End: 2024-08-15

## 2024-08-15 RX ORDER — MAGNESIUM, ALUMINUM HYDROXIDE 200-225/5
30 SUSPENSION, ORAL (FINAL DOSE FORM) ORAL EVERY 4 HOURS
Refills: 0 | Status: DISCONTINUED | OUTPATIENT
Start: 2024-08-15 | End: 2024-08-22

## 2024-08-15 RX ORDER — PIPERACILLIN SODIUM AND TAZOBACTAM SODIUM 3; .375 G/15ML; G/15ML
3.38 INJECTION, POWDER, FOR SOLUTION INTRAVENOUS ONCE
Refills: 0 | Status: COMPLETED | OUTPATIENT
Start: 2024-08-16 | End: 2024-08-16

## 2024-08-15 RX ORDER — VANCOMYCIN/0.9 % SOD CHLORIDE 1.75G/25
PLASTIC BAG, INJECTION (ML) INTRAVENOUS
Refills: 0 | Status: DISCONTINUED | OUTPATIENT
Start: 2024-08-15 | End: 2024-08-17

## 2024-08-15 RX ORDER — SODIUM CHLORIDE 9 MG/ML
500 INJECTION INTRAMUSCULAR; INTRAVENOUS; SUBCUTANEOUS ONCE
Refills: 0 | Status: COMPLETED | OUTPATIENT
Start: 2024-08-15 | End: 2024-08-15

## 2024-08-15 RX ORDER — ACETAMINOPHEN 325 MG/1
650 TABLET ORAL EVERY 6 HOURS
Refills: 0 | Status: DISCONTINUED | OUTPATIENT
Start: 2024-08-15 | End: 2024-08-22

## 2024-08-15 RX ORDER — PIPERACILLIN SODIUM AND TAZOBACTAM SODIUM 3; .375 G/15ML; G/15ML
3.38 INJECTION, POWDER, FOR SOLUTION INTRAVENOUS EVERY 8 HOURS
Refills: 0 | Status: DISCONTINUED | OUTPATIENT
Start: 2024-08-16 | End: 2024-08-17

## 2024-08-15 RX ORDER — VANCOMYCIN/0.9 % SOD CHLORIDE 1.75G/25
PLASTIC BAG, INJECTION (ML) INTRAVENOUS
Refills: 0 | Status: DISCONTINUED | OUTPATIENT
Start: 2024-08-15 | End: 2024-08-15

## 2024-08-15 RX ORDER — VANCOMYCIN/0.9 % SOD CHLORIDE 1.75G/25
750 PLASTIC BAG, INJECTION (ML) INTRAVENOUS ONCE
Refills: 0 | Status: COMPLETED | OUTPATIENT
Start: 2024-08-15 | End: 2024-08-15

## 2024-08-15 RX ORDER — VANCOMYCIN/0.9 % SOD CHLORIDE 1.75G/25
750 PLASTIC BAG, INJECTION (ML) INTRAVENOUS EVERY 12 HOURS
Refills: 0 | Status: DISCONTINUED | OUTPATIENT
Start: 2024-08-16 | End: 2024-08-17

## 2024-08-15 RX ADMIN — Medication 2000 MILLIGRAM(S): at 18:20

## 2024-08-15 RX ADMIN — PIPERACILLIN SODIUM AND TAZOBACTAM SODIUM 200 GRAM(S): 3; .375 INJECTION, POWDER, FOR SOLUTION INTRAVENOUS at 22:00

## 2024-08-15 RX ADMIN — SODIUM CHLORIDE 1000 MILLILITER(S): 9 INJECTION INTRAMUSCULAR; INTRAVENOUS; SUBCUTANEOUS at 17:50

## 2024-08-15 RX ADMIN — Medication 250 MILLIGRAM(S): at 23:02

## 2024-08-15 RX ADMIN — Medication 100 MILLIGRAM(S): at 17:50

## 2024-08-15 RX ADMIN — SODIUM CHLORIDE 500 MILLILITER(S): 9 INJECTION INTRAMUSCULAR; INTRAVENOUS; SUBCUTANEOUS at 18:20

## 2024-08-15 NOTE — ED PROVIDER NOTE - OBJECTIVE STATEMENT
Referred by Dr. Eric for left 2nd toe osteomyelitis on MRI done yesterday.   Pt  started on amoxicillin 6 days, stopped 2 days ago secondary to diarrhea.  No other complaints.

## 2024-08-15 NOTE — H&P ADULT - ATTENDING COMMENTS
I have personally seen and examined patient on the above date.  I discussed the case with Dr Ward and I agree with findings and plan as detailed per note above, which I have amended where appropriate.    Superseding the above.   84F hx of HTN, HLD, LBBB, PAF on Eliquis, hypothyroid (s/p partial thyroidectomy), COLTEN lung mass (suspicious for malignancy currently undergoing outpt workup),  hx of R 2nd toe amp 12/2023 and R 3rd toe amp 2/2024 sec to osteomyelitis (hx of ESBL Ecoli from 1/2024 and 2/2024 wound cx) sent in by podiatrist Dr Eric for IV antibxs for L 2nd toe osteomyelitis. Pt poor historian, stated L 2nd toe infection for the past 2 months but started antibxs about 4 days ago with amoxicillin and another antibx ([facility] contacted was on Augmentin and cefuroxime)  but developed diarrhea and self D/Mike it 2 days ago with resolution of diarrhea. Denied any fevers, chills or pain in the L foot. Outpt MRI from yesterday 8/14/2024 with " There is mild focal T1/T2 hyperintensity and postcontrast enhancement involving the distal phalanx of the second digit concerning for acute osteomyelitis, especially given overlying enhancing phlegmon/soft tissue edema. 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  Sochx; +remote tob use. no ETOH or illicit drug use. lives alone.   FMHx: father with premature CAD  Allergies: cipro with CARLOS, doxy, linezolid  Meds; Eliquis 5mg bid, Levo 88, lopressor 25mg bid, simvastatin 40mg qd, Proair  PE: afebrile P: 62 BP: 123/85 sat 97% on RA  NAD  CV:S1S2, irreg, +soft systolic murmur RUSB  CL: CTA bl   Abd: soft NT, ND +BS  Ext: neg CCE. R foot s/p R 2nd and 3rd toe amp with no open wounds. L foot with small scabs on dorsum of foot not infected or cellulitic. Left  1st MTP plantar wound about 1-1.25 cm scab with mild abrasion nontender. Left 2nd toe with no visible open wound, no erythema or tenderness but with callous region with no expressible fluid.   Labs:  WBC: 7.96 hgb: 14 60%N cr: 0.98  EKG: personally rev. afib at 76bpm, LBBB (known)  CXR: COLTEN opacity  MRI:   IMPRESSION:  1.  Acute osteomyelitis of the second digit distal phalanx.    2.  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    AP:   84F hx of HTN, HLD, LBBB, PAF on Eliquis, hypothyroid (s/p partial thyroidectomy), COLTEN lung mass (suspicious for malignancy currently undergoing outpt workup),  hx of R 2nd toe amp 12/2023 and R 3rd toe amp 2/2024 sec to osteomyelitis (hx of ESBL Ecoli from 1/2024 and 2/2024 wound cx) sent in by podiatrist Dr Eric for IV antibxs for L 2nd toe osteomyelitis  # L sec toe OM  cont IV antibxs as per ED conv with ID  cover with Vanco and Zosyn for now. Although the wound does not look that impressive.   check ESR, CRP, MRSA PCR  Podiatry and ID consult for antibx de-escalation and role for bone bx/cx.    #PAF   cont eliquis for now  cont metoprolol  #Hypothyroid  cont synthroid  #HLD  cont statin.   #GOC  Pt has advanced directives. She is FULL CODE.   She has no emergency contact to provide. Her family is all in [place]. And she has an elderly sister in a nursing home.

## 2024-08-15 NOTE — H&P ADULT - NSHPSOCIALHISTORY_GEN_ALL_CORE
Lives by self, ambulates with cane, has a  to help with driving/groceries Lives by self, ambulates with cane, has a  to help with driving/groceries.

## 2024-08-15 NOTE — ED ADULT NURSE REASSESSMENT NOTE - NS ED NURSE REASSESS COMMENT FT1
Received  pt. from  previous Rn, Awake, alert, oriented x3, breathing with ease on RA, no acute distress  noted. VSS, vanco started as order via 20g RAC , no s/s of redness or swelling noted, nursing care continue.

## 2024-08-15 NOTE — H&P ADULT - NSICDXPASTMEDICALHX_GEN_ALL_CORE_FT
PAST MEDICAL HISTORY:  Chronic atrial fibrillation     HTN (hypertension)     Hyperlipidemia     Hypothyroid

## 2024-08-15 NOTE — H&P ADULT - CONVERSATION DETAILS
ADVANCED CARE PLANNING NOTE:  I met with patient. Discussed about Cardiac and respiratory resuscitative measures including CPR, vasopressors and ventilator support via mechanical intubation.   All risks and benefits discussed.   Patient wishes and goals of care were addressed inclusive of: FULL CODE  What matters most to them regarding their current health status, emotional support and reassurance offered.  Medication management: Home medications reviewed and reconciled; current active were reviewed  All questions and concerns were answered and addressed.

## 2024-08-15 NOTE — ED ADULT NURSE NOTE - CHIEF COMPLAINT QUOTE
Pt c/o left foot infection had MRI done yesterday and came in to make sure infection did not spread to bones

## 2024-08-15 NOTE — H&P ADULT - NSHPREVIEWOFSYSTEMS_GEN_ALL_CORE
#Acute on chronic right plantar foot OM  - WBC WNL, ESR/CRP pending  - Ulcer culture, blood cultures  - Fall precautions  - PT eval  - Podiatry      #Paroxysmal A Fib, rate controlled, on long term anticoagulation  #HTN, controlled  - C/w metoprolol tartrate 25mg q 12hrs, eliquis 5mg BID  -Eliquis, hold 48hrs before if surgery .     #Hypothyroidism  - s/p thyroidectomy  - Continue Synthroid 88mcg daily    #HLD  - Continue zocor 40mg qhs    VTE ppx: Eliquis REVIEW OF SYSTEMS:  CONSTITUTIONAL: No weakness, fevers or chills  EYES/ENT: No visual changes;  No vertigo or throat pain   NECK: No pain or stiffness  RESPIRATORY: No cough, wheezing, hemoptysis; No shortness of breath  CARDIOVASCULAR: No chest pain or palpitations  GASTROINTESTINAL: No abdominal or epigastric pain. No nausea, vomiting, or hematemesis; No diarrhea or constipation. No melena or hematochezia.  GENITOURINARY: No dysuria, frequency or hematuria  NEUROLOGICAL: No numbness or weakness  SKIN: Wound on L foot. No foot pain

## 2024-08-15 NOTE — ED ADULT NURSE NOTE - OBJECTIVE STATEMENT
84 year old Female comes to the ER with wound on the bottom of her left foot. Has been taking Amoxicillin for it. Reports multiple episodes of diarrhea. Patient went to see podiatrist today who "shaved" wound down and covered it. Patient comes to ER for IV ABX. Patient ambulates with cane, no recent falls, no DM, lives alone, able to care for self. On Eliquis for Afib, bed locked and in lowest position for safety.

## 2024-08-15 NOTE — H&P ADULT - ASSESSMENT
84F with PMHx Afib on eliquis, HTN, HLD, hypothyroidism, lung cancer, chronic right plantar foot ulcers/OM, sent to ED by Dr. Eric (podiatrist) for IV abx for left 2nd toe osteomyelitis as shown on MRI from 8/14/24.     #Acute osteomyelitis of the second digit distal phalanx  did not complete course of outpt abx Augmentin 875 and cefuroxime 500 bid 2/2 diarrhea  -MR foot 8/14/24: Acute osteomyelitis of the second digit distal phalanx. 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  -Pt does not meet sepsis criteria. Open wound noted only on L tarsometatarsal joint. WBC wnl  -s/p CTX x1 in ED  - start empiric zosyn and vancomycin, if pt becomes septic, consider meropenem (previous R foot with ESBL OM s/p meropenem and amputation)  - ESR/CRP pending  - Blood cultures, MRSA/MSSA PCR, A1c ordered  - ID consult  - Podiatry consult  - Fall precautions  - PT eval    #Lung cancer  recently dx with lung cancer  CXR: COLTEN opacity  -f/u outpt with heme/onc    #Paroxysmal Afib, rate controlled, on long term anticoagulation  #HTN, controlled  - c/w metoprolol tartrate 25mg q12hrs  - c/w eliquis 5mg BID    #Hypothyroidism  - s/p thyroidectomy  - c/w Synthroid 88mcg daily    #HLD  - Continue simvastatin 40mg qhs    VTE ppx: Eliquis  Code: Full    Pt states have no emergency contact. Has sister who is in nursing home without kids. Pt is not  and does not have kids. Extended family members are in [place].    *Case d/w Dr. Escobar     84F with PMHx Afib on eliquis, HTN, HLD, hypothyroidism, lung cancer, chronic right plantar foot ulcers/OM, sent to ED by Dr. Eric (podiatrist) for IV abx for left 2nd toe osteomyelitis as shown on MRI from 8/14/24.     #Acute osteomyelitis of the second digit distal phalanx  did not complete course of outpt abx Augmentin 875 and cefuroxime 500 bid 2/2 diarrhea  -MR foot 8/14/24: Acute osteomyelitis of the second digit distal phalanx. 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  -Pt does not meet sepsis criteria. Open wound noted only on L tarsometatarsal joint. WBC wnl  -s/p CTX x1 in ED  - start empiric zosyn and vancomycin, if pt becomes septic, consider meropenem (previous R foot with ESBL OM s/p meropenem and amputation)  - ESR/CRP pending  - Blood cultures, MRSA/MSSA PCR, A1c ordered  - ID consult  - Podiatry consult  - Fall precautions  - PT eval    #Paroxysmal Afib, rate controlled, on long term anticoagulation  #HTN, controlled  - c/w metoprolol tartrate 25mg q12hrs  - c/w eliquis 5mg BID    #Lung cancer  recently dx with lung cancer  CXR: COLTEN opacity  -f/u outpt with heme/onc    #Hypothyroidism  - c/w Synthroid 88mcg daily    #HLD  - c/w simvastatin 40mg qhs    VTE ppx: Eliquis  Code: Full code    Pt states has no emergency contact to provide. She has an older sister who is in a nursing home without kids. Pt is not  and does not have kids. Extended family members are in [place].    *Case d/w Dr. Escobar

## 2024-08-15 NOTE — H&P ADULT - HISTORY OF PRESENT ILLNESS
84F with PMHx Afib on eliquis, HTN, HLD, chronic right plantar foot ulcers/OM, sent to ED by Dr. Eric for left 2nd toe osteomyelitis on MRI done yesterday. Pt was  started on amoxicillin 6 days, stopped 2 days ago secondary to diarrhea.  No other complaints.    Of note, pt was hospitalized in  2/2024 for acute on chronic right plantar foot OM with ESBL infection treated with meropenem.    In ED, temp 98.1, HR 62, /85, RR 18, SpO2 97% on RA. CBC and CMP largely unremarkable except for eGFR 57. ESR and CRP pending. Given rocephin, 500cc NS bolus,     MR foot 8/14/24  1.  Acute osteomyelitis of the second digit distal phalanx.  2.  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     84F with PMHx Afib on eliquis, HTN, HLD, hypothyroidism, lung cancer, chronic right plantar foot ulcers/OM, sent to ED by Dr. Eric (podiatrist) for IV abx for left 2nd toe osteomyelitis as shown on MRI from 8/14/24. Pt states problem with L foot started approx 2 months ago, follow with podiatrist regularly. Pt states she hasn't had problems with her L foot prior to this. She was  started on amoxicillin 6 days ago, took for 4 days but stopped 2 days ago secondary to diarrhea. Pt states diarrhea resolved with discontinuation of abx. Denies fevers, chills, N/V, numbness or tingling of extremities, or foot pain. Pt lives by self, ambulates by self with cane.     Of note, pt was hospitalized in  2/2024 for acute on chronic right plantar foot OM with ESBL infection treated with meropenem. Hx toe amputations in 12/2023 and 2/2024 for R foot OM. Per pt, cannot take cipro due to medication affecting her kidney function. Pt was recently dx with lung cancer, being followed by Dr. Lopez at [facility]. FHx of mother with a lung mass, not cancer. Denies personal or FHx of DM.    In ED, temp 98.1, HR 62, /85, RR 18, SpO2 97% on RA. CBC and CMP largely unremarkable except for eGFR 57. ESR and CRP pending. CXR COLTEN opacity. Given rocephin x1, 500cc NS bolus,    84F with PMHx Afib on eliquis, HTN, HLD, hypothyroidism, lung cancer, chronic right plantar foot ulcers/OM, sent to ED by Dr. Eric (podiatrist) for IV abx for left 2nd toe osteomyelitis as shown on MRI from 8/14/24. Pt states problem with L foot started approx 2 months ago, follow with podiatrist regularly. Pt states she hasn't had problems with her L foot prior to this. She was  started on augmentin and cefuroxime 6 days ago, which she took for 4 days but self dc'd the abx 2 days ago secondary to diarrhea. Pt states diarrhea resolved with discontinuation of abx. Denies fevers, chills, N/V, numbness or tingling of extremities, or foot pain. Pt lives by self, ambulates by self with cane.     Of note, pt was hospitalized in  2/2024 for acute on chronic right plantar foot OM with ESBL infection treated with meropenem. Hx toe amputations in 12/2023 and 2/2024 for R foot OM. Per pt, cannot take cipro due to medication affecting her kidney function. Pt was recently dx with lung cancer, being followed by Dr. Lopez at [facility]. FHx of mother with a lung mass, not cancer. Denies personal or FHx of DM.    In ED, temp 98.1, HR 62, /85, RR 18, SpO2 97% on RA. CBC and CMP largely unremarkable except for eGFR 57. ESR and CRP pending. CXR COLTEN opacity. Given rocephin x1, 500cc NS bolus,

## 2024-08-15 NOTE — H&P ADULT - NSHPLABSRESULTS_GEN_ALL_CORE
< from: MR Foot w/wo IV Cont, Left (08.14.24 @ 14:43) >    CLINICAL INDICATION:Evaluate for osteomyelitis    COMPARISON:None. Note there is extensive prior imaging of the   contralateral right foot.    TECHNIQUE: Multiplanar, multi-sequence MRI of the left forefoot was   performed without and with intravenous contrast. 7 mL Gadavist was   administered and 0.5 mL was discarded.    INTERPRETATION:    There is mild focal T1/T2 hyperintensity and postcontrast enhancement   involving the distal phalanx of the second digit concerning for acute   osteomyelitis, especially given overlying enhancing phlegmon/soft tissue   edema.    There is prominent subchondral edema associated with extensive cartilage   loss at the first tarsometatarsal joint with associated postcontrast   enhancement.    No localized abscess is identified. There is circumferential soft tissue   edema about the forefoot.    IMPRESSION:  1.  Acute osteomyelitis of the second digit distal phalanx.    2.  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

## 2024-08-15 NOTE — ED PROVIDER NOTE - NSGCTIMESPENTATTENDAPP_GEN_A_CORE
FREE:[LAST:[Celia],FIRST:[Pearl],PHONE:[phone]],FAX:[(   [phone]    -],ADDRESS:[[place], [place] [address]]],TOKEN:'[id number]'
2

## 2024-08-15 NOTE — ED ADULT NURSE NOTE - NSFALLHARMRISKINTERV_ED_ALL_ED

## 2024-08-16 PROBLEM — I47.10 SUPRAVENTRICULAR TACHYCARDIA, UNSPECIFIED: Chronic | Status: INACTIVE | Noted: 2023-12-06 | Resolved: 2024-08-15

## 2024-08-16 PROBLEM — E04.1 NONTOXIC SINGLE THYROID NODULE: Chronic | Status: INACTIVE | Noted: 2023-12-06 | Resolved: 2024-08-15

## 2024-08-16 LAB
ALBUMIN SERPL ELPH-MCNC: 3.1 G/DL — LOW (ref 3.3–5)
ALP SERPL-CCNC: 98 U/L — SIGNIFICANT CHANGE UP (ref 40–120)
ALT FLD-CCNC: 13 U/L — SIGNIFICANT CHANGE UP (ref 10–45)
ANION GAP SERPL CALC-SCNC: 7 MMOL/L — SIGNIFICANT CHANGE UP (ref 5–17)
AST SERPL-CCNC: 20 U/L — SIGNIFICANT CHANGE UP (ref 10–40)
BASOPHILS # BLD AUTO: 0.05 K/UL — SIGNIFICANT CHANGE UP (ref 0–0.2)
BASOPHILS NFR BLD AUTO: 0.7 % — SIGNIFICANT CHANGE UP (ref 0–2)
BILIRUB SERPL-MCNC: 0.7 MG/DL — SIGNIFICANT CHANGE UP (ref 0.2–1.2)
BUN SERPL-MCNC: 12 MG/DL — SIGNIFICANT CHANGE UP (ref 7–23)
CALCIUM SERPL-MCNC: 9.3 MG/DL — SIGNIFICANT CHANGE UP (ref 8.4–10.5)
CHLORIDE SERPL-SCNC: 103 MMOL/L — SIGNIFICANT CHANGE UP (ref 96–108)
CO2 SERPL-SCNC: 28 MMOL/L — SIGNIFICANT CHANGE UP (ref 22–31)
CREAT SERPL-MCNC: 1.02 MG/DL — SIGNIFICANT CHANGE UP (ref 0.5–1.3)
CRP SERPL-MCNC: <3 MG/L — SIGNIFICANT CHANGE UP
EGFR: 54 ML/MIN/1.73M2 — LOW
EOSINOPHIL # BLD AUTO: 0.15 K/UL — SIGNIFICANT CHANGE UP (ref 0–0.5)
EOSINOPHIL NFR BLD AUTO: 2.2 % — SIGNIFICANT CHANGE UP (ref 0–6)
ERYTHROCYTE [SEDIMENTATION RATE] IN BLOOD: 18 MM/HR — SIGNIFICANT CHANGE UP (ref 0–20)
GLUCOSE SERPL-MCNC: 85 MG/DL — SIGNIFICANT CHANGE UP (ref 70–99)
HCT VFR BLD CALC: 40.6 % — SIGNIFICANT CHANGE UP (ref 34.5–45)
HCV AB S/CO SERPL IA: 0.1 S/CO — SIGNIFICANT CHANGE UP (ref 0–0.99)
HCV AB SERPL-IMP: SIGNIFICANT CHANGE UP
HGB BLD-MCNC: 13.6 G/DL — SIGNIFICANT CHANGE UP (ref 11.5–15.5)
IMM GRANULOCYTES NFR BLD AUTO: 0.3 % — SIGNIFICANT CHANGE UP (ref 0–0.9)
LYMPHOCYTES # BLD AUTO: 2.11 K/UL — SIGNIFICANT CHANGE UP (ref 1–3.3)
LYMPHOCYTES # BLD AUTO: 30.4 % — SIGNIFICANT CHANGE UP (ref 13–44)
MAGNESIUM SERPL-MCNC: 1.8 MG/DL — SIGNIFICANT CHANGE UP (ref 1.6–2.6)
MCHC RBC-ENTMCNC: 31.8 PG — SIGNIFICANT CHANGE UP (ref 27–34)
MCHC RBC-ENTMCNC: 33.5 GM/DL — SIGNIFICANT CHANGE UP (ref 32–36)
MCV RBC AUTO: 94.9 FL — SIGNIFICANT CHANGE UP (ref 80–100)
MONOCYTES # BLD AUTO: 0.71 K/UL — SIGNIFICANT CHANGE UP (ref 0–0.9)
MONOCYTES NFR BLD AUTO: 10.2 % — SIGNIFICANT CHANGE UP (ref 2–14)
NEUTROPHILS # BLD AUTO: 3.91 K/UL — SIGNIFICANT CHANGE UP (ref 1.8–7.4)
NEUTROPHILS NFR BLD AUTO: 56.2 % — SIGNIFICANT CHANGE UP (ref 43–77)
NRBC # BLD: 0 /100 WBCS — SIGNIFICANT CHANGE UP (ref 0–0)
PHOSPHATE SERPL-MCNC: 4 MG/DL — SIGNIFICANT CHANGE UP (ref 2.5–4.5)
PLATELET # BLD AUTO: 223 K/UL — SIGNIFICANT CHANGE UP (ref 150–400)
POTASSIUM SERPL-MCNC: 4 MMOL/L — SIGNIFICANT CHANGE UP (ref 3.5–5.3)
POTASSIUM SERPL-SCNC: 4 MMOL/L — SIGNIFICANT CHANGE UP (ref 3.5–5.3)
PROT SERPL-MCNC: 6.5 G/DL — SIGNIFICANT CHANGE UP (ref 6–8.3)
RBC # BLD: 4.28 M/UL — SIGNIFICANT CHANGE UP (ref 3.8–5.2)
RBC # FLD: 13.2 % — SIGNIFICANT CHANGE UP (ref 10.3–14.5)
SODIUM SERPL-SCNC: 138 MMOL/L — SIGNIFICANT CHANGE UP (ref 135–145)
WBC # BLD: 6.95 K/UL — SIGNIFICANT CHANGE UP (ref 3.8–10.5)
WBC # FLD AUTO: 6.95 K/UL — SIGNIFICANT CHANGE UP (ref 3.8–10.5)

## 2024-08-16 PROCEDURE — 99233 SBSQ HOSP IP/OBS HIGH 50: CPT

## 2024-08-16 RX ORDER — METOPROLOL TARTRATE 100 MG/1
25 TABLET ORAL EVERY 12 HOURS
Refills: 0 | Status: DISCONTINUED | OUTPATIENT
Start: 2024-08-16 | End: 2024-08-22

## 2024-08-16 RX ORDER — SIMVASTATIN 10 MG
40 TABLET ORAL AT BEDTIME
Refills: 0 | Status: DISCONTINUED | OUTPATIENT
Start: 2024-08-16 | End: 2024-08-22

## 2024-08-16 RX ORDER — LEVOTHYROXINE SODIUM 100 MCG
88 TABLET ORAL DAILY
Refills: 0 | Status: DISCONTINUED | OUTPATIENT
Start: 2024-08-16 | End: 2024-08-22

## 2024-08-16 RX ORDER — APIXABAN 5 MG/1
5 TABLET, FILM COATED ORAL EVERY 12 HOURS
Refills: 0 | Status: DISCONTINUED | OUTPATIENT
Start: 2024-08-16 | End: 2024-08-18

## 2024-08-16 RX ADMIN — METOPROLOL TARTRATE 25 MILLIGRAM(S): 100 TABLET ORAL at 07:26

## 2024-08-16 RX ADMIN — PIPERACILLIN SODIUM AND TAZOBACTAM SODIUM 25 GRAM(S): 3; .375 INJECTION, POWDER, FOR SOLUTION INTRAVENOUS at 07:11

## 2024-08-16 RX ADMIN — Medication 40 MILLIGRAM(S): at 22:00

## 2024-08-16 RX ADMIN — Medication 250 MILLIGRAM(S): at 18:17

## 2024-08-16 RX ADMIN — PIPERACILLIN SODIUM AND TAZOBACTAM SODIUM 25 GRAM(S): 3; .375 INJECTION, POWDER, FOR SOLUTION INTRAVENOUS at 23:31

## 2024-08-16 RX ADMIN — PIPERACILLIN SODIUM AND TAZOBACTAM SODIUM 25 GRAM(S): 3; .375 INJECTION, POWDER, FOR SOLUTION INTRAVENOUS at 15:11

## 2024-08-16 RX ADMIN — APIXABAN 5 MILLIGRAM(S): 5 TABLET, FILM COATED ORAL at 07:26

## 2024-08-16 RX ADMIN — PIPERACILLIN SODIUM AND TAZOBACTAM SODIUM 3.38 GRAM(S): 3; .375 INJECTION, POWDER, FOR SOLUTION INTRAVENOUS at 11:20

## 2024-08-16 RX ADMIN — Medication 250 MILLIGRAM(S): at 06:00

## 2024-08-16 RX ADMIN — Medication 88 MICROGRAM(S): at 07:26

## 2024-08-16 RX ADMIN — APIXABAN 5 MILLIGRAM(S): 5 TABLET, FILM COATED ORAL at 20:08

## 2024-08-16 RX ADMIN — METOPROLOL TARTRATE 25 MILLIGRAM(S): 100 TABLET ORAL at 20:08

## 2024-08-16 NOTE — CONSULT NOTE ADULT - ASSESSMENT
A:   1- Ulceration/OM left 2nd digit   2- Hammertoe b/l feet       PLan:   1- Patient seen and examined   2- Radiographs reviewed  3- No podiatric surgical intervention   4- Recommend IV antibiotic as per ID recc  5- Patient to follow up with her podiatrist once discharged   6- reconsult as needed

## 2024-08-16 NOTE — PHYSICAL THERAPY INITIAL EVALUATION ADULT - ADDITIONAL COMMENTS
Pt lives alone in a 3rd floor walk-up apartment. She owns a wide based SAC and negotiates steps/ ambulates independently. Pt reports she has help from other tenants in her building for higher level ADLs.

## 2024-08-16 NOTE — PHYSICAL THERAPY INITIAL EVALUATION ADULT - FOLLOWS COMMANDS/ANSWERS QUESTIONS, REHAB EVAL
100% of the time/able to follow multistep instructions Arava Counseling:  Patient counseled regarding adverse effects of Arava including but not limited to nausea, vomiting, abnormalities in liver function tests. Patients may develop mouth sores, rash, diarrhea, and abnormalities in blood counts. The patient understands that monitoring is required including LFTs and blood counts.  There is a rare possibility of scarring of the liver and lung problems that can occur when taking methotrexate. Persistent nausea, loss of appetite, pale stools, dark urine, cough, and shortness of breath should be reported immediately. Patient advised to discontinue Arava treatment and consult with a physician prior to attempting conception. The patient will have to undergo a treatment to eliminate Arava from the body prior to conception.

## 2024-08-16 NOTE — PATIENT PROFILE ADULT - NSPROHMSYMPCOND_GEN_A_NUR
osteomyelitis.    right foot 2nd and 3rd toe amputation.    left foot with small scabs on dorsum foot.  Left 1st MTP plantar wound 1-1.5 cm scab with mild abrasion nontender.  Left 2nd toe with no visible open wound.

## 2024-08-16 NOTE — PHYSICAL THERAPY INITIAL EVALUATION ADULT - PERTINENT HX OF CURRENT PROBLEM, REHAB EVAL
84F with PMHx Afib on eliquis, HTN, HLD, hypothyroidism, lung cancer, chronic right plantar foot ulcers/OM, sent to ED by Dr. Eric (podiatrist) for IV abx for left 2nd toe osteomyelitis as shown on MRI from 8/14/24.

## 2024-08-16 NOTE — PROGRESS NOTE ADULT - SUBJECTIVE AND OBJECTIVE BOX
Patient is a 84y old  Female who presents with a chief complaint of osteomyelitis (15 Aug 2024 20:06)    Reports feeling well  Denies chest pain, SOB  Tolerating diet  Denies pain in left foot    Patient seen and examined at bedside.    ALLERGIES:  doxycycline (Unknown)  linezolid (Unknown)  Cipro (Other)  Levaquin (Stomach Upset; Nausea)    MEDICATIONS  (STANDING):  apixaban 5 milliGRAM(s) Oral every 12 hours  levothyroxine 88 MICROGram(s) Oral daily  metoprolol tartrate 25 milliGRAM(s) Oral every 12 hours  piperacillin/tazobactam IVPB.. 3.375 Gram(s) IV Intermittent every 8 hours  simvastatin 40 milliGRAM(s) Oral at bedtime  vancomycin  IVPB 750 milliGRAM(s) IV Intermittent every 12 hours  vancomycin  IVPB        MEDICATIONS  (PRN):  acetaminophen     Tablet .. 650 milliGRAM(s) Oral every 6 hours PRN Temp greater or equal to 38C (100.4F), Mild Pain (1 - 3)  aluminum hydroxide/magnesium hydroxide/simethicone Suspension 30 milliLiter(s) Oral every 4 hours PRN Dyspepsia  melatonin 3 milliGRAM(s) Oral at bedtime PRN Insomnia  ondansetron Injectable 4 milliGRAM(s) IV Push every 8 hours PRN Nausea and/or Vomiting    Vital Signs Last 24 Hrs  T(F): 97.8 (16 Aug 2024 10:33), Max: 98.1 (15 Aug 2024 16:21)  HR: 54 (16 Aug 2024 10:33) (54 - 89)  BP: 128/81 (16 Aug 2024 10:33) (122/69 - 128/81)  RR: 20 (16 Aug 2024 10:33) (18 - 20)  SpO2: 99% (16 Aug 2024 10:33) (97% - 99%)  I&O's Summary    BMI (kg/m2): 21.5 (08-15-24 @ 16:21)  PHYSICAL EXAM:  General: NAD, A/O x 3  ENT: MMM, no scleral icterus  Neck: Supple, No JVD, no thyroidomegaly  Lungs: Clear to auscultation bilaterally, no wheezes, no rales, no rhonchi, good inspiratory effort  Cardio: RRR, S1/S2, No murmurs  Abdomen: Soft, Nontender, Nondistended; Bowel sounds present  Extremities: R foot s/p 2nd and 3rd toe amputatation with no open wounds. L foot with approx 1.5cm wound with scab at plantar aspect of 1st tarsometatarsal joint, closed wound on second digit distal phalanx, small scabs on dorsal aspect of L foot. No erythema or tenderness of feet.    LABS:                        13.6   6.95  )-----------( 223      ( 16 Aug 2024 05:41 )             40.6       08-16    138  |  103  |  12  ----------------------------<  85  4.0   |  28  |  1.02    Ca    9.3      16 Aug 2024 05:41  Phos  4.0     08-16  Mg     1.8     08-16    TPro  6.5  /  Alb  3.1  /  TBili  0.7  /  DBili  x   /  AST  20  /  ALT  13  /  AlkPhos  98  08-16     Urinalysis Basic - ( 16 Aug 2024 05:41 )    Color: x / Appearance: x / SG: x / pH: x  Gluc: 85 mg/dL / Ketone: x  / Bili: x / Urobili: x   Blood: x / Protein: x / Nitrite: x   Leuk Esterase: x / RBC: x / WBC x   Sq Epi: x / Non Sq Epi: x / Bacteria: x

## 2024-08-16 NOTE — PATIENT PROFILE ADULT - FALL HARM RISK - HARM RISK INTERVENTIONS
Assistance with ambulation/Assistance OOB with selected safe patient handling equipment/Communicate Risk of Fall with Harm to all staff/Discuss with provider need for PT consult/Monitor gait and stability/Provide patient with walking aids - walker, cane, crutches/Reinforce activity limits and safety measures with patient and family/Tailored Fall Risk Interventions/Use of alarms - bed, chair and/or voice tab/Visual Cue: Yellow wristband and red socks/Bed in lowest position, wheels locked, appropriate side rails in place/Call bell, personal items and telephone in reach/Instruct patient to call for assistance before getting out of bed or chair/Non-slip footwear when patient is out of bed/[place] to call system/Physically safe environment - no spills, clutter or unnecessary equipment/Purposeful Proactive Rounding/Room/bathroom lighting operational, light cord in reach

## 2024-08-16 NOTE — PROGRESS NOTE ADULT - ASSESSMENT
84F with PMHx Afib on eliquis, HTN, HLD, hypothyroidism, lung cancer, chronic right plantar foot ulcers/OM, sent to ED by Dr. Eric (podiatrist) for IV abx for left 2nd toe osteomyelitis as shown on MRI from 8/14/24.     #Acute osteomyelitis of the second digit distal phalanx  - Could not complete course of outpt abx Augmentin 875 and cefuroxime 500 bid 2/2 diarrhea  - MRI noted as above  - Will monitor F/WBC count, f/u cultures, consulted ID and podiatry  - Will c/w vancomycin and zosyn for now; vanco trough before 4th dose  - ESR 18, CRP <3  - PT eval done    #Paroxysmal Afib, rate controlled, on long term anticoagulation  #HTN, controlled  - c/w metoprolol tartrate 25mg q12hrs  - c/w eliquis 5mg BID    #Lung cancer  recently dx with lung cancer  CXR: COLTEN opacity  -f/u outpt with heme/onc    #Hypothyroidism  - c/w Synthroid 88mcg daily    #HLD  - c/w simvastatin 40mg qhs    VTE ppx: Eliquis  Code: Full code  AM labs  Pt states has no emergency contact to provide. She has an older sister who is in a nursing home without kids. Pt is not  and does not have kids. Extended family members are in [place].

## 2024-08-16 NOTE — CONSULT NOTE ADULT - SUBJECTIVE AND OBJECTIVE BOX
patient was seen at bedside today in no acute distress. Podiatry team consulted for left 2nd digit ulcer/OM. patient failed out patient antibiotic therapy, now on IV antibiotic on admission.                           13.6   6.95  )-----------( 223      ( 16 Aug 2024 05:41 )             40.6         Noted is decreased pedal pulses bilateral feet. Noted is hammertoe deformities bilateral feet. Noted is superficial ulceration distal tip 2nd digit left foot.           
HPI:   Patient is a 84y female with history of right foot ulcer and om s/p surgical resection who now developed a left foot ulcer about a month ago, follows with her podiatrist weekly, has been give oral abx but mri shows om of the second digit. No fever, no pain. She also was recently found to have lung cancer.     REVIEW OF SYSTEMS:  All other review of systems negative (Comprehensive ROS)    PAST MEDICAL & SURGICAL HISTORY:  HTN (hypertension)      Hyperlipidemia      Hypothyroid      Chronic atrial fibrillation      Back problem  s/p back surgery      S/P thyroid surgery      S/P hernia surgery      S/P rotator cuff surgery          Allergies    doxycycline (Unknown)  linezolid (Unknown)  Cipro (Other)    Intolerances    Levaquin (Stomach Upset; Nausea)      Antimicrobials Day #  :2  piperacillin/tazobactam IVPB.. 3.375 Gram(s) IV Intermittent every 8 hours  vancomycin  IVPB      vancomycin  IVPB 750 milliGRAM(s) IV Intermittent every 12 hours    Other Medications:  acetaminophen     Tablet .. 650 milliGRAM(s) Oral every 6 hours PRN  aluminum hydroxide/magnesium hydroxide/simethicone Suspension 30 milliLiter(s) Oral every 4 hours PRN  apixaban 5 milliGRAM(s) Oral every 12 hours  levothyroxine 88 MICROGram(s) Oral daily  melatonin 3 milliGRAM(s) Oral at bedtime PRN  metoprolol tartrate 25 milliGRAM(s) Oral every 12 hours  ondansetron Injectable 4 milliGRAM(s) IV Push every 8 hours PRN  simvastatin 40 milliGRAM(s) Oral at bedtime      FAMILY HISTORY:  Family history of early CAD (Father)        SOCIAL HISTORY:  Smoking: [ ]Yes [ x]No  ETOH: [ ]Yes [ x]No  Drug Use: [ ]Yes [x ]No   [ ] Single[ x]    PHYSICAL EXAM:  General: alert, no acute distress  Eyes:  anicteric, no conjunctival injection, no discharge  Oropharynx: no lesions or injection 	  Neck: supple, without adenopathy  Lungs: clear to auscultation  Heart: regular rate and rhythm; no murmur, rubs or gallops  Abdomen: soft, nondistended, nontender, without mass or organomegaly  Skin: no lesions  Extremities: no clubbing, cyanosis, or edema. left foot plantar ulcer over the protuberance of the 1st met  Neurologic: alert, oriented, moves all extremities    LAB RESULTS:                        13.6   6.95  )-----------( 223      ( 16 Aug 2024 05:41 )             40.6     08-16    138  |  103  |  12  ----------------------------<  85  4.0   |  28  |  1.02    Ca    9.3      16 Aug 2024 05:41  Phos  4.0     08-16  Mg     1.8     08-16    TPro  6.5  /  Alb  3.1<L>  /  TBili  0.7  /  DBili  x   /  AST  20  /  ALT  13  /  AlkPhos  98  08-16    LIVER FUNCTIONS - ( 16 Aug 2024 05:41 )  Alb: 3.1 g/dL / Pro: 6.5 g/dL / ALK PHOS: 98 U/L / ALT: 13 U/L / AST: 20 U/L / GGT: x           Urinalysis Basic - ( 16 Aug 2024 05:41 )    Color: x / Appearance: x / SG: x / pH: x  Gluc: 85 mg/dL / Ketone: x  / Bili: x / Urobili: x   Blood: x / Protein: x / Nitrite: x   Leuk Esterase: x / RBC: x / WBC x   Sq Epi: x / Non Sq Epi: x / Bacteria: x        MICROBIOLOGY:  RECENT CULTURES:        RADIOLOGY REVIEWED:    ACC: [id number] EXAM:  MR FOOT WAW IC LT   ORDERED BY: ELDA MG     PROCEDURE DATE:  08/14/2024          INTERPRETATION:  EXAMINATION: MR FOOT WITHOUT AND WITH IV CONTRAST LEFT    CLINICAL INDICATION:Evaluate for osteomyelitis    COMPARISON:None. Note there is extensive prior imaging of the   contralateral right foot.    TECHNIQUE: Multiplanar, multi-sequence MRI of the left forefoot was   performed without and with intravenous contrast. 7 mL Gadavist was   administered and 0.5 mL was discarded.    INTERPRETATION:    There is mild focal T1/T2 hyperintensity and postcontrast enhancement   involving the distal phalanx of the second digit concerning for acute   osteomyelitis, especially given overlying enhancing phlegmon/soft tissue   edema.    There is prominent subchondral edema associated with extensive cartilage   loss at the first tarsometatarsal joint with associated postcontrast   enhancement.    No localized abscess is identified. There is circumferential soft tissue   edema about the forefoot.    IMPRESSION:  1.  Acute osteomyelitis of the second digit distal phalanx.    2.  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    --- End of Report ---                Impression: patient with what I suspect is a neuropathic ulcer of the left foot with om of the distal phalanx of the second toe. I dont think antibiotics will have much impact long term. It looks like the osteo isnt even where the major  ulcer is to me. she has hammer toe and the distal tip of the second toe has a superficial ulcer. It is not infected looking and dry. Not convince antibiotics will be of much utility. More local care. May need some debridement of the ulcer on the 1st met area and amputation of the distal second toe and should do golden    Recommendations:  golden  monitor off abx  offload  review with podiatry, placed call to dr tarango

## 2024-08-16 NOTE — ED ADULT NURSE REASSESSMENT NOTE - NS ED NURSE REASSESS COMMENT FT1
p. walked to the bathroom with assistance .  placed pt on  hospital bed for comfort, VSS, venco started as order. nursing care continue.

## 2024-08-17 LAB — VANCOMYCIN TROUGH SERPL-MCNC: 13.8 UG/ML — SIGNIFICANT CHANGE UP (ref 10–20)

## 2024-08-17 PROCEDURE — 99233 SBSQ HOSP IP/OBS HIGH 50: CPT

## 2024-08-17 RX ADMIN — Medication 88 MICROGRAM(S): at 05:39

## 2024-08-17 RX ADMIN — Medication 250 MILLIGRAM(S): at 06:51

## 2024-08-17 RX ADMIN — Medication 40 MILLIGRAM(S): at 22:22

## 2024-08-17 RX ADMIN — APIXABAN 5 MILLIGRAM(S): 5 TABLET, FILM COATED ORAL at 08:50

## 2024-08-17 RX ADMIN — PIPERACILLIN SODIUM AND TAZOBACTAM SODIUM 25 GRAM(S): 3; .375 INJECTION, POWDER, FOR SOLUTION INTRAVENOUS at 06:51

## 2024-08-17 RX ADMIN — APIXABAN 5 MILLIGRAM(S): 5 TABLET, FILM COATED ORAL at 18:55

## 2024-08-17 NOTE — PROGRESS NOTE ADULT - ASSESSMENT
84 year old with PMHx Afib on eliquis, HTN, HLD, hypothyroidism, lung cancer, chronic right plantar foot ulcers/OM, sent to ED by Dr. Eric (podiatrist) for IV abx for left 2nd toe osteomyelitis as shown on MRI from 8/14/24.     #Acute Osteomyelitis of Left Second Digit Distal Phalanx  - Could not complete course of outpt abx Augmentin 875 and cefuroxime 500 bid 2/2 diarrhea  - MRI 8/14 showed Acute osteomyelitis of the second digit distal phalanx.  - ESR 18, CRP <3  - Podiatry consult noted and appreciated No podiatric surgical intervention   - ID consult noted and appreciated, monitor off abx as unlikely to have much utility, may need some debridement of the ulcer and amputation of distal second toe  - Will obtain DAVID  - Wound care consult placed   - Patient was encouraged to offload left forefoot   - PT eval appreciated, recommended home PT    #Paroxysmal Afib, rate controlled, on long term anticoagulation  #HTN, controlled  - c/w metoprolol tartrate 25mg q12hrs  - c/w eliquis 5mg BID    #Lung cancer  -recently dx with lung cancer  -CXR: COLTEN opacity  -f/u outpt with heme/onc    #Hypothyroidism  - c/w Synthroid 88mcg daily    #HLD  - c/w simvastatin 40mg qhs    VTE ppx: Eliquis  Code: Full code    Plan of care discussed with patient, she is in agreement, all questions were answered

## 2024-08-17 NOTE — PROGRESS NOTE ADULT - SUBJECTIVE AND OBJECTIVE BOX
Interval History  Patient seen and examined at bedside. No acute event noted.   Patient denies any acute complaints.     ALLERGIES:  doxycycline (Unknown)  linezolid (Unknown)  Cipro (Other)  Levaquin (Stomach Upset; Nausea)    MEDICATIONS  (STANDING):  apixaban 5 milliGRAM(s) Oral every 12 hours  levothyroxine 88 MICROGram(s) Oral daily  metoprolol tartrate 25 milliGRAM(s) Oral every 12 hours  simvastatin 40 milliGRAM(s) Oral at bedtime    MEDICATIONS  (PRN):  acetaminophen     Tablet .. 650 milliGRAM(s) Oral every 6 hours PRN Temp greater or equal to 38C (100.4F), Mild Pain (1 - 3)  aluminum hydroxide/magnesium hydroxide/simethicone Suspension 30 milliLiter(s) Oral every 4 hours PRN Dyspepsia  melatonin 3 milliGRAM(s) Oral at bedtime PRN Insomnia  ondansetron Injectable 4 milliGRAM(s) IV Push every 8 hours PRN Nausea and/or Vomiting    Vital Signs Last 24 Hrs  T(F): 97.9 (17 Aug 2024 06:06), Max: 98.1 (16 Aug 2024 14:45)  HR: 54 (17 Aug 2024 06:06) (54 - 63)  BP: 146/71 (17 Aug 2024 06:06) (125/71 - 146/71)  RR: 17 (17 Aug 2024 06:06) (17 - 18)  SpO2: 97% (17 Aug 2024 06:06) (97% - 100%)  I&O's Summary    BMI (kg/m2): 21.5 (08-15-24 @ 16:21)    PHYSICAL EXAM:  GENERAL: NAD  HEENT: NCAT  CHEST/LUNG: Clear to percussion bilaterally; No wheeze  HEART: Regular rate and rhythm  ABDOMEN: Soft, Nontender, Nondistended; Bowel sounds present  MUSCULOSKELETAL/EXTREMITIES:  2+ Peripheral Pulses, No LE edema  Right Foot: s/p 2nd and 3rd toe amputation  Left Foot: plantar surface ulcer at base of MTP joint, 2nd toe with dry scab on tip  No erythema   PSYCH: Appropriate affect  NEURO: Alert & Oriented x 3, non-focal     I personally reviewed the below data/images/labs:    LABS:                        13.6   6.95  )-----------( 223      ( 16 Aug 2024 05:41 )             40.6       08-16    138  |  103  |  12  ----------------------------<  85  4.0   |  28  |  1.02    Ca    9.3      16 Aug 2024 05:41  Phos  4.0     08-16  Mg     1.8     08-16    TPro  6.5  /  Alb  3.1  /  TBili  0.7  /  DBili  x   /  AST  20  /  ALT  13  /  AlkPhos  98  08-16     Urinalysis Basic - ( 16 Aug 2024 05:41 )    Color: x / Appearance: x / SG: x / pH: x  Gluc: 85 mg/dL / Ketone: x  / Bili: x / Urobili: x   Blood: x / Protein: x / Nitrite: x   Leuk Esterase: x / RBC: x / WBC x   Sq Epi: x / Non Sq Epi: x / Bacteria: x    Consultant(s) Notes Reviewed:   Care Discused with Consultants/Other Providers:  Imaging Personally Reviewed:

## 2024-08-18 PROCEDURE — 99233 SBSQ HOSP IP/OBS HIGH 50: CPT

## 2024-08-18 RX ORDER — ENOXAPARIN SODIUM 100 MG/ML
70 INJECTION SUBCUTANEOUS EVERY 12 HOURS
Refills: 0 | Status: DISCONTINUED | OUTPATIENT
Start: 2024-08-18 | End: 2024-08-21

## 2024-08-18 RX ADMIN — ENOXAPARIN SODIUM 70 MILLIGRAM(S): 100 INJECTION SUBCUTANEOUS at 18:03

## 2024-08-18 RX ADMIN — Medication 40 MILLIGRAM(S): at 21:58

## 2024-08-18 RX ADMIN — METOPROLOL TARTRATE 25 MILLIGRAM(S): 100 TABLET ORAL at 06:27

## 2024-08-18 RX ADMIN — Medication 88 MICROGRAM(S): at 06:27

## 2024-08-18 RX ADMIN — APIXABAN 5 MILLIGRAM(S): 5 TABLET, FILM COATED ORAL at 06:27

## 2024-08-18 NOTE — PROGRESS NOTE ADULT - ASSESSMENT
84 year old with PMHx Afib on eliquis, HTN, HLD, hypothyroidism, lung cancer, chronic right plantar foot ulcers/OM, sent to ED by Dr. Eric (podiatrist) for IV abx for left 2nd toe osteomyelitis as shown on MRI from 8/14/24.     #Acute Osteomyelitis of Left Second Digit Distal Phalanx  #Left Plantar Surface Foot Ulcer  - Could not complete course of outpt abx Augmentin 875 and cefuroxime 500 bid 2/2 diarrhea  - MRI 8/14 showed Acute osteomyelitis of the second digit distal phalanx.  - ESR 18, CRP <3  - Podiatry consult noted and appreciated No podiatric surgical intervention   - ID consult noted and appreciated, monitor off abx as unlikely to have much utility, may need some debridement of the ulcer and amputation of distal second toe  - Will touch base with Dr. Eric ([phone]) tomorrow   - Will obtain DAVID - pending   - Wound care consult placed   - Patient was encouraged to offload left forefoot   - PT eval appreciated, recommended home PT    #Paroxysmal Afib, rate controlled, on long term anticoagulation  #HTN, controlled  - c/w metoprolol tartrate 25mg q12hrs  - On Eliquis, will transition to Lovenox SC Q12H pending podiatry reeval in case surgical intervention planned     #Lung cancer  -recently dx with lung cancer  -CXR: COLTEN opacity  -f/u outpt with heme/onc    #Hypothyroidism  - c/w Synthroid 88mcg daily    #HLD  - c/w simvastatin 40mg qhs    VTE ppx: Eliquis  Code: Full code    Plan of care discussed with patient, she is in agreement, all questions were answered   She does not have any family or emergency contact

## 2024-08-18 NOTE — PROGRESS NOTE ADULT - SUBJECTIVE AND OBJECTIVE BOX
Interval History  Patient seen and examined at bedside. No acute events noted.  Interval History  Patient seen and examined at bedside. No acute events noted.   Patient denies any acute complaints, ROS is negative.     ALLERGIES:  doxycycline (Unknown)  linezolid (Unknown)  Cipro (Other)  Levaquin (Stomach Upset; Nausea)    MEDICATIONS  (STANDING):  apixaban 5 milliGRAM(s) Oral every 12 hours  levothyroxine 88 MICROGram(s) Oral daily  metoprolol tartrate 25 milliGRAM(s) Oral every 12 hours  simvastatin 40 milliGRAM(s) Oral at bedtime    MEDICATIONS  (PRN):  acetaminophen     Tablet .. 650 milliGRAM(s) Oral every 6 hours PRN Temp greater or equal to 38C (100.4F), Mild Pain (1 - 3)  aluminum hydroxide/magnesium hydroxide/simethicone Suspension 30 milliLiter(s) Oral every 4 hours PRN Dyspepsia  melatonin 3 milliGRAM(s) Oral at bedtime PRN Insomnia  ondansetron Injectable 4 milliGRAM(s) IV Push every 8 hours PRN Nausea and/or Vomiting    Vital Signs Last 24 Hrs  T(F): 97.5 (18 Aug 2024 05:16), Max: 97.8 (17 Aug 2024 22:20)  HR: 84 (18 Aug 2024 05:16) (55 - 86)  BP: 143/83 (18 Aug 2024 05:16) (116/71 - 143/83)  RR: 16 (18 Aug 2024 05:16) (16 - 16)  SpO2: 96% (18 Aug 2024 05:16) (95% - 98%)  I&O's Summary    BMI (kg/m2): 21.5 (08-18-24 @ 11:36)    PHYSICAL EXAM:  GENERAL: NAD  HEENT: NCAT  CHEST/LUNG: Clear to percussion bilaterally; No wheeze  HEART: Regular rate and rhythm  ABDOMEN: Soft, Nontender, Nondistended; Bowel sounds present  MUSCULOSKELETAL/EXTREMITIES:  2+ Peripheral Pulses, No LE edema  Right Foot: s/p 2nd and 3rd toe amputation  Left Foot: plantar surface ulcer at base of MTP joint, 2nd toe with dry scab on tip  No erythema   PSYCH: Appropriate affect  NEURO: Alert & Oriented x 3, non-focal     I personally reviewed the below data/images/labs:    LABS:                        13.6   6.95  )-----------( 223      ( 16 Aug 2024 05:41 )             40.6       08-16    138  |  103  |  12  ----------------------------<  85  4.0   |  28  |  1.02    Ca    9.3      16 Aug 2024 05:41  Phos  4.0     08-16  Mg     1.8     08-16    TPro  6.5  /  Alb  3.1  /  TBili  0.7  /  DBili  x   /  AST  20  /  ALT  13  /  AlkPhos  98  08-16    Urinalysis Basic - ( 16 Aug 2024 05:41 )    Color: x / Appearance: x / SG: x / pH: x  Gluc: 85 mg/dL / Ketone: x  / Bili: x / Urobili: x   Blood: x / Protein: x / Nitrite: x   Leuk Esterase: x / RBC: x / WBC x   Sq Epi: x / Non Sq Epi: x / Bacteria: x    Consultant(s) Notes Reviewed:   Care Discused with Consultants/Other Providers:  Imaging Personally Reviewed:

## 2024-08-19 LAB
ANION GAP SERPL CALC-SCNC: 11 MMOL/L — SIGNIFICANT CHANGE UP (ref 5–17)
BUN SERPL-MCNC: 17 MG/DL — SIGNIFICANT CHANGE UP (ref 7–23)
CALCIUM SERPL-MCNC: 9.6 MG/DL — SIGNIFICANT CHANGE UP (ref 8.4–10.5)
CHLORIDE SERPL-SCNC: 105 MMOL/L — SIGNIFICANT CHANGE UP (ref 96–108)
CO2 SERPL-SCNC: 24 MMOL/L — SIGNIFICANT CHANGE UP (ref 22–31)
CREAT SERPL-MCNC: 0.8 MG/DL — SIGNIFICANT CHANGE UP (ref 0.5–1.3)
EGFR: 72 ML/MIN/1.73M2 — SIGNIFICANT CHANGE UP
GLUCOSE SERPL-MCNC: 82 MG/DL — SIGNIFICANT CHANGE UP (ref 70–99)
HCT VFR BLD CALC: 43.4 % — SIGNIFICANT CHANGE UP (ref 34.5–45)
HGB BLD-MCNC: 14.6 G/DL — SIGNIFICANT CHANGE UP (ref 11.5–15.5)
MCHC RBC-ENTMCNC: 32.2 PG — SIGNIFICANT CHANGE UP (ref 27–34)
MCHC RBC-ENTMCNC: 33.6 GM/DL — SIGNIFICANT CHANGE UP (ref 32–36)
MCV RBC AUTO: 95.6 FL — SIGNIFICANT CHANGE UP (ref 80–100)
NRBC # BLD: 0 /100 WBCS — SIGNIFICANT CHANGE UP (ref 0–0)
PLATELET # BLD AUTO: 223 K/UL — SIGNIFICANT CHANGE UP (ref 150–400)
POTASSIUM SERPL-MCNC: 4.3 MMOL/L — SIGNIFICANT CHANGE UP (ref 3.5–5.3)
POTASSIUM SERPL-SCNC: 4.3 MMOL/L — SIGNIFICANT CHANGE UP (ref 3.5–5.3)
RBC # BLD: 4.54 M/UL — SIGNIFICANT CHANGE UP (ref 3.8–5.2)
RBC # FLD: 13 % — SIGNIFICANT CHANGE UP (ref 10.3–14.5)
SODIUM SERPL-SCNC: 140 MMOL/L — SIGNIFICANT CHANGE UP (ref 135–145)
WBC # BLD: 7.22 K/UL — SIGNIFICANT CHANGE UP (ref 3.8–10.5)
WBC # FLD AUTO: 7.22 K/UL — SIGNIFICANT CHANGE UP (ref 3.8–10.5)

## 2024-08-19 PROCEDURE — 99232 SBSQ HOSP IP/OBS MODERATE 35: CPT

## 2024-08-19 PROCEDURE — 93970 EXTREMITY STUDY: CPT | Mod: 26

## 2024-08-19 RX ORDER — NYSTATIN 100000/G
1 CREAM (GRAM) TOPICAL EVERY 12 HOURS
Refills: 0 | Status: DISCONTINUED | OUTPATIENT
Start: 2024-08-19 | End: 2024-08-22

## 2024-08-19 RX ADMIN — METOPROLOL TARTRATE 25 MILLIGRAM(S): 100 TABLET ORAL at 18:10

## 2024-08-19 RX ADMIN — ENOXAPARIN SODIUM 70 MILLIGRAM(S): 100 INJECTION SUBCUTANEOUS at 18:09

## 2024-08-19 RX ADMIN — Medication 40 MILLIGRAM(S): at 22:06

## 2024-08-19 RX ADMIN — METOPROLOL TARTRATE 25 MILLIGRAM(S): 100 TABLET ORAL at 05:35

## 2024-08-19 RX ADMIN — ENOXAPARIN SODIUM 70 MILLIGRAM(S): 100 INJECTION SUBCUTANEOUS at 05:35

## 2024-08-19 RX ADMIN — Medication 88 MICROGRAM(S): at 05:35

## 2024-08-19 RX ADMIN — Medication 1 APPLICATION(S): at 18:09

## 2024-08-19 NOTE — PROGRESS NOTE ADULT - ASSESSMENT
84 year old with PMHx Afib on eliquis, HTN, HLD, hypothyroidism, lung cancer, chronic right plantar foot ulcers/OM, sent to ED by Dr. Eric (podiatrist) for IV abx for left 2nd toe osteomyelitis as shown on MRI from 8/14/24.     #Acute Osteomyelitis of Left Second Digit Distal Phalanx  #Left Plantar Surface Foot Ulcer  - Could not complete course of outpt abx Augmentin 875 and cefuroxime 500 bid 2/2 diarrhea  - MRI 8/14 showed Acute osteomyelitis of the second digit distal phalanx.  - ESR 18, CRP <3  - Podiatry consult noted and appreciated No podiatric surgical intervention   - ID consult noted and appreciated, monitor off abx as unlikely to have much utility, may need some debridement of the ulcer and amputation of distal second toe  - Left message for Dr. Eric ([phone]) to call back to discuss case   - Will obtain DAVID - pending  - Wound care consult pending   - Patient was encouraged to offload left forefoot   - PT eval appreciated, recommended home PT    #Paroxysmal Afib, rate controlled, on long term anticoagulation  #HTN, controlled  - c/w metoprolol tartrate 25mg q12hrs  - On Eliquis, transitioned to Lovenox SC Q12H pending podiatry reeval in case surgical intervention planned     #Lung cancer  -recently dx with lung cancer  -CXR: COLTEN opacity  -f/u outpt with heme/onc    #Hypothyroidism  - c/w Synthroid 88mcg daily    #HLD  - c/w simvastatin 40mg qhs    VTE ppx: Lovenox SC   Code: Full code    Plan of care discussed with patient, she is in agreement, all questions were answered   She does not have any family or emergency contact     Will discuss with ID and pending call back from Dr. Eric

## 2024-08-19 NOTE — DIETITIAN INITIAL EVALUATION ADULT - PERTINENT MEDS FT
MEDICATIONS  (STANDING):  enoxaparin Injectable 70 milliGRAM(s) SubCutaneous every 12 hours  levothyroxine 88 MICROGram(s) Oral daily  metoprolol tartrate 25 milliGRAM(s) Oral every 12 hours  simvastatin 40 milliGRAM(s) Oral at bedtime    MEDICATIONS  (PRN):  acetaminophen     Tablet .. 650 milliGRAM(s) Oral every 6 hours PRN Temp greater or equal to 38C (100.4F), Mild Pain (1 - 3)  aluminum hydroxide/magnesium hydroxide/simethicone Suspension 30 milliLiter(s) Oral every 4 hours PRN Dyspepsia  melatonin 3 milliGRAM(s) Oral at bedtime PRN Insomnia  ondansetron Injectable 4 milliGRAM(s) IV Push every 8 hours PRN Nausea and/or Vomiting

## 2024-08-19 NOTE — DIETITIAN INITIAL EVALUATION ADULT - PERTINENT LABORATORY DATA
08-19    140  |  105  |  17  ----------------------------<  82  4.3   |  24  |  0.80    Ca    9.6      19 Aug 2024 06:55

## 2024-08-19 NOTE — GOALS OF CARE CONVERSATION - ADVANCED CARE PLANNING - CONVERSATION DETAILS
I met with patient at bedside to discuss HCP. She is A&Ox3. She is here from home with left 2nd toe ostemyelitis. She has recent hx. of left 2nd and 3rd toe amputations earlier this year. Pt. lives alone inan apartment. Her podiatrist Dr. Eric is also her friend, and her other emergency contact is the land lord of her building Felipe Fox. In an old note pt. had stated that a home care nurse by the name of Naima Cervantes was her HCP, but patient does not remember if she ever signed a HCP form to that effect, and in fact does not have Naima Cervantes's phone #.  She now signed a HCP form designating her friend Dr. Feliciano Eric as her HCP, and her land lord Felipe Fox as her alternate agent.  Pt. had signed a DNR/DNI MOLST on December 3, 2023. But at this admission she stated she was rescinding that GOC, and is Full Code.  I placed a copy of the HCP form in her chart and gave her the original.

## 2024-08-19 NOTE — PROGRESS NOTE ADULT - SUBJECTIVE AND OBJECTIVE BOX
Interval History  Patient seen and examined at bedside. No acute events noted.  Patient denies any acute complaints. Had diarrhea while on oral abx which has resolved, last BM 3 days ago, no N/V/abdominal pain. No fever/chills.    ALLERGIES:  doxycycline (Unknown)  linezolid (Unknown)  Cipro (Other)  Levaquin (Stomach Upset; Nausea)    MEDICATIONS  (STANDING):  enoxaparin Injectable 70 milliGRAM(s) SubCutaneous every 12 hours  levothyroxine 88 MICROGram(s) Oral daily  metoprolol tartrate 25 milliGRAM(s) Oral every 12 hours  nystatin Cream 1 Application(s) Topical every 12 hours  simvastatin 40 milliGRAM(s) Oral at bedtime    MEDICATIONS  (PRN):  acetaminophen     Tablet .. 650 milliGRAM(s) Oral every 6 hours PRN Temp greater or equal to 38C (100.4F), Mild Pain (1 - 3)  aluminum hydroxide/magnesium hydroxide/simethicone Suspension 30 milliLiter(s) Oral every 4 hours PRN Dyspepsia  melatonin 3 milliGRAM(s) Oral at bedtime PRN Insomnia  ondansetron Injectable 4 milliGRAM(s) IV Push every 8 hours PRN Nausea and/or Vomiting    Vital Signs Last 24 Hrs  T(F): 98 (19 Aug 2024 05:33), Max: 98.5 (18 Aug 2024 19:28)  HR: 80 (19 Aug 2024 05:33) (61 - 80)  BP: 164/96 (19 Aug 2024 05:33) (132/78 - 164/96)  RR: 16 (19 Aug 2024 05:33) (16 - 16)  SpO2: 95% (19 Aug 2024 05:33) (95% - 95%)  I&O's Summary    BMI (kg/m2): 21.5 (08-18-24 @ 11:36)    PHYSICAL EXAM:  GENERAL: NAD  HEENT: NCAT  CHEST/LUNG: Clear to percussion bilaterally; No wheeze  HEART: Regular rate and rhythm  ABDOMEN: Soft, Nontender, Nondistended; Bowel sounds present  MUSCULOSKELETAL/EXTREMITIES:  2+ Peripheral Pulses, No LE edema  Right Foot: s/p 2nd and 3rd toe amputation  Left Foot: plantar surface ulcer at base of MTP joint, 2nd toe with dry scab on tip  No erythema   PSYCH: Appropriate affect  NEURO: Alert & Oriented x 3, non-focal     I personally reviewed the below data/images/labs:    LABS:                        14.6   7.22  )-----------( 223      ( 19 Aug 2024 06:55 )             43.4       08-19    140  |  105  |  17  ----------------------------<  82  4.3   |  24  |  0.80    Ca    9.6      19 Aug 2024 06:55    Urinalysis Basic - ( 19 Aug 2024 06:55 )    Color: x / Appearance: x / SG: x / pH: x  Gluc: 82 mg/dL / Ketone: x  / Bili: x / Urobili: x   Blood: x / Protein: x / Nitrite: x   Leuk Esterase: x / RBC: x / WBC x   Sq Epi: x / Non Sq Epi: x / Bacteria: x    Consultant(s) Notes Reviewed:   Care Discused with Consultants/Other Providers:  Imaging Personally Reviewed:

## 2024-08-19 NOTE — DIETITIAN INITIAL EVALUATION ADULT - ORAL INTAKE PTA/DIET HISTORY
Patient endorses good appetite prior to admission. Patient does not follow any therapeutic meal patterns. No known food allergies/intolerances. Reports 30 lb weight gain over the past year.

## 2024-08-19 NOTE — DIETITIAN INITIAL EVALUATION ADULT - OTHER INFO
Patient with fairly good appetite, consuming % of meals as per nursing flowsheets. Denies any chewing/swallowing difficulties. Food preferences obtained and noted on patients file with nutrition office. Electrolytes stable at this time.

## 2024-08-20 PROCEDURE — 99232 SBSQ HOSP IP/OBS MODERATE 35: CPT

## 2024-08-20 RX ADMIN — ENOXAPARIN SODIUM 70 MILLIGRAM(S): 100 INJECTION SUBCUTANEOUS at 17:18

## 2024-08-20 RX ADMIN — METOPROLOL TARTRATE 25 MILLIGRAM(S): 100 TABLET ORAL at 17:19

## 2024-08-20 RX ADMIN — ENOXAPARIN SODIUM 70 MILLIGRAM(S): 100 INJECTION SUBCUTANEOUS at 05:50

## 2024-08-20 RX ADMIN — Medication 40 MILLIGRAM(S): at 22:25

## 2024-08-20 RX ADMIN — METOPROLOL TARTRATE 25 MILLIGRAM(S): 100 TABLET ORAL at 05:51

## 2024-08-20 RX ADMIN — Medication 1 APPLICATION(S): at 05:54

## 2024-08-20 RX ADMIN — Medication 88 MICROGRAM(S): at 05:51

## 2024-08-20 RX ADMIN — Medication 1 APPLICATION(S): at 17:19

## 2024-08-20 NOTE — PROGRESS NOTE ADULT - ASSESSMENT
84 year old with PMHx Afib on eliquis, HTN, HLD, hypothyroidism, lung cancer, chronic right plantar foot ulcers/OM, sent to ED by Dr. Eric (podiatrist) for IV abx for left 2nd toe osteomyelitis as shown on MRI from 8/14/24.     #Acute Osteomyelitis of Left Second Digit Distal Phalanx  #Left Plantar Surface Foot Ulcer  - Could not complete course of outpt abx Augmentin 875 and cefuroxime 500 bid 2/2 diarrhea  - MRI 8/14 showed Acute osteomyelitis of the second digit distal phalanx.  - ESR 18, CRP <3  - Podiatry consult (Dr. Raygoza) noted and appreciated No podiatric surgical intervention   - ID consult noted and appreciated, monitor off abx as unlikely to have much utility, may need some debridement of the ulcer and amputation of distal second toe  - Pending podiatry reeval (discussed with Dr. Viramontes)  - DAVID done and is pending read  - Wound care consult pending   - Patient was encouraged to offload left forefoot   - PT eval appreciated, recommended home PT    #Paroxysmal Afib, rate controlled, on long term anticoagulation  #HTN, controlled  - c/w metoprolol tartrate 25mg q12hrs  - On Eliquis, transitioned to Lovenox SC Q12H pending podiatry reeval in case surgical intervention planned     #Lung cancer  -recently dx with lung cancer  -CXR: COLTEN opacity  -f/u outpt with heme/onc    #Hypothyroidism  - c/w Synthroid 88mcg daily    #HLD  - c/w simvastatin 40mg qhs    VTE ppx: Lovenox SC   Code: Full code    Plan of care discussed with patient, she is in agreement, all questions were answered   She does not have any family or emergency contact

## 2024-08-20 NOTE — PROGRESS NOTE ADULT - SUBJECTIVE AND OBJECTIVE BOX
Interval History  Patient seen and examined at bedside. No acute events noted.  Patient denies any acute complaints.     ALLERGIES:  doxycycline (Unknown)  linezolid (Unknown)  Cipro (Other)  Levaquin (Stomach Upset; Nausea)    MEDICATIONS  (STANDING):  enoxaparin Injectable 70 milliGRAM(s) SubCutaneous every 12 hours  levothyroxine 88 MICROGram(s) Oral daily  metoprolol tartrate 25 milliGRAM(s) Oral every 12 hours  nystatin Cream 1 Application(s) Topical every 12 hours  simvastatin 40 milliGRAM(s) Oral at bedtime    MEDICATIONS  (PRN):  acetaminophen     Tablet .. 650 milliGRAM(s) Oral every 6 hours PRN Temp greater or equal to 38C (100.4F), Mild Pain (1 - 3)  aluminum hydroxide/magnesium hydroxide/simethicone Suspension 30 milliLiter(s) Oral every 4 hours PRN Dyspepsia  melatonin 3 milliGRAM(s) Oral at bedtime PRN Insomnia  ondansetron Injectable 4 milliGRAM(s) IV Push every 8 hours PRN Nausea and/or Vomiting    Vital Signs Last 24 Hrs  T(F): 98.2 (20 Aug 2024 05:16), Max: 98.2 (20 Aug 2024 05:16)  HR: 86 (20 Aug 2024 05:16) (86 - 91)  BP: 147/90 (20 Aug 2024 05:16) (147/90 - 156/83)  RR: 16 (20 Aug 2024 05:16) (16 - 16)  SpO2: 96% (20 Aug 2024 05:16) (96% - 98%)  I&O's Summary    BMI (kg/m2): 21.5 (08-19-24 @ 12:47)    PHYSICAL EXAM:  GENERAL: NAD  HEENT: NCAT  CHEST/LUNG: Clear to percussion bilaterally; No wheeze  HEART: Regular rate and rhythm  ABDOMEN: Soft, Nontender, Nondistended; Bowel sounds present  MUSCULOSKELETAL/EXTREMITIES:  2+ Peripheral Pulses, No LE edema  Right Foot: s/p 2nd and 3rd toe amputation  Left Foot: plantar surface ulcer at base of MTP joint, 2nd toe with dry scab on tip  No erythema   PSYCH: Appropriate affect  NEURO: Alert & Oriented x 3, non-focal     I personally reviewed the below data/images/labs:    LABS:                        14.6   7.22  )-----------( 223      ( 19 Aug 2024 06:55 )             43.4       08-19    140  |  105  |  17  ----------------------------<  82  4.3   |  24  |  0.80    Ca    9.6      19 Aug 2024 06:55    Urinalysis Basic - ( 19 Aug 2024 06:55 )    Color: x / Appearance: x / SG: x / pH: x  Gluc: 82 mg/dL / Ketone: x  / Bili: x / Urobili: x   Blood: x / Protein: x / Nitrite: x   Leuk Esterase: x / RBC: x / WBC x   Sq Epi: x / Non Sq Epi: x / Bacteria: x    Consultant(s) Notes Reviewed:   Care Discused with Consultants/Other Providers:  Imaging Personally Reviewed:        [Negative] : Heme/Lymph

## 2024-08-20 NOTE — PROGRESS NOTE ADULT - SUBJECTIVE AND OBJECTIVE BOX
CC: f/u for foot ulcer left     Patient reports  she is ok  REVIEW OF SYSTEMS:  All other review of systems negative (Comprehensive ROS)    Antimicrobials Day #  :    Other Medications Reviewed    T(F): 97.2 (08-20-24 @ 19:58), Max: 98.2 (08-20-24 @ 05:16)  HR: 61 (08-20-24 @ 19:58)  BP: 141/75 (08-20-24 @ 19:58)  RR: 16 (08-20-24 @ 19:58)  SpO2: 98% (08-20-24 @ 19:58)  Wt(kg): --    PHYSICAL EXAM:  General: alert, no acute distress  Eyes:  anicteric, no conjunctival injection, no discharge  Oropharynx: no lesions or injection 	  Neck: supple, without adenopathy  Lungs: clear to auscultation  Heart: regular rate and rhythm; no murmur, rubs or gallops  Abdomen: soft, nondistended, nontender, without mass or organomegaly  Skin: no lesions  Extremities: no clubbing, cyanosis, or edema. left foot ulcer over 1st met head plantar, no drainage, no redness, no odor. tip of second to dry scab  Neurologic: alert, oriented, moves all extremities    LAB RESULTS:                        14.6   7.22  )-----------( 223      ( 19 Aug 2024 06:55 )             43.4     08-19    140  |  105  |  17  ----------------------------<  82  4.3   |  24  |  0.80    Ca    9.6      19 Aug 2024 06:55        Urinalysis Basic - ( 19 Aug 2024 06:55 )    Color: x / Appearance: x / SG: x / pH: x  Gluc: 82 mg/dL / Ketone: x  / Bili: x / Urobili: x   Blood: x / Protein: x / Nitrite: x   Leuk Esterase: x / RBC: x / WBC x   Sq Epi: x / Non Sq Epi: x / Bacteria: x      MICROBIOLOGY:  RECENT CULTURES:      RADIOLOGY REVIEWED:  < from: [facility] Physiol Extremity Lower 3+ Level, BI (08.20.24 @ 11:16) >  ACC: [id number] EXAM:  PHYSIOL EXTREM LOW 3+ LEV BI   ORDERED BY:  JUANCARLOS ROLDAN     PROCEDURE DATE:  08/20/2024          INTERPRETATION:  History: Left leg pain, amputated right first toe    Risk factors: Smoking, hypertension    A prior examination, dated 12/6/2023, showed a right DAVID of 1.22 and a   left DAVID of 1.24.    On this examination, the right DAVID equals 1.07 and the left DAVID equals   1.00.    The blood pressure measurements at the right ankle are 161 mmHg in the   posterior tibial artery and 173 in the dorsal pedis artery.    The blood pressure measurements at the left ankle are 161 mmHg in the   posterior tibial artery and 152 in the dorsal pedis artery.    The amplitude of the pulse volume recordings at every level bilaterally   from the upper thighs through the ankles are normal.    The amplitude of the pulse volume recordings are barely pulsatile   bilaterally at the level of the toes.    Impression:    The right DAVID of 1.07 and the left DAVID of 1.00 are normal.    The nonpulsatile flow at the level of the toes is evidence for disease   affecting the small arteries of both feet.    --- End of Report ---      < end of copied text >              Assessment:  patient with what I suspect is a neuropathic ulcer of the left foot over plantar aspect of 1st met head and with om of the distal phalanx of the second toe. I dont think antibiotics will have much impact long term. It looks like the osteo isnt even where the major  ulcer is to me. she has hammer toe and the distal tip of the second toe has a superficial ulcer. It is not infected looking and dry. I dont think  antibiotics will be of much utility for the second toe or the ulcer on the plantar 1st met head area. . More local care. May need some debridement of the ulcer on the 1st met area .offsetting most critical.  david ok, just small vessel disease so no vascular intervention  Plan:  monitor off abx  offset weight on the left foot'  podiatry f/u

## 2024-08-21 ENCOUNTER — TRANSCRIPTION ENCOUNTER (OUTPATIENT)
Age: 84
End: 2024-08-21

## 2024-08-21 PROCEDURE — 99232 SBSQ HOSP IP/OBS MODERATE 35: CPT

## 2024-08-21 RX ORDER — APIXABAN 5 MG/1
5 TABLET, FILM COATED ORAL EVERY 12 HOURS
Refills: 0 | Status: DISCONTINUED | OUTPATIENT
Start: 2024-08-22 | End: 2024-08-22

## 2024-08-21 RX ORDER — POVIDONE-IODINE 10 %
1 SOLUTION, NON-ORAL TOPICAL DAILY
Refills: 0 | Status: DISCONTINUED | OUTPATIENT
Start: 2024-08-21 | End: 2024-08-22

## 2024-08-21 RX ADMIN — METOPROLOL TARTRATE 25 MILLIGRAM(S): 100 TABLET ORAL at 05:15

## 2024-08-21 RX ADMIN — Medication 1 APPLICATION(S): at 18:43

## 2024-08-21 RX ADMIN — ENOXAPARIN SODIUM 70 MILLIGRAM(S): 100 INJECTION SUBCUTANEOUS at 18:43

## 2024-08-21 RX ADMIN — Medication 88 MICROGRAM(S): at 05:15

## 2024-08-21 RX ADMIN — Medication 1 APPLICATION(S): at 18:44

## 2024-08-21 RX ADMIN — ENOXAPARIN SODIUM 70 MILLIGRAM(S): 100 INJECTION SUBCUTANEOUS at 05:15

## 2024-08-21 RX ADMIN — Medication 40 MILLIGRAM(S): at 22:19

## 2024-08-21 RX ADMIN — Medication 1 APPLICATION(S): at 05:16

## 2024-08-21 NOTE — PROGRESS NOTE ADULT - SUBJECTIVE AND OBJECTIVE BOX
FABIANA DURGA    [id number]    History:     Vascular surgery follow up  patient seen and examined this am with the surgical team  reports right arm/elbow pain/soreness  No reports of nausea, vomiting, chest pain, shortness of breath, abdominal pain or fever. No new complaints. No acute motor or sensory changes are reported.  Congolese speaking,  used     Vital Signs Last 24 Hrs  T(C): 36.3 (06 Mar 2023 08:00), Max: 37.2 (05 Mar 2023 16:00)  T(F): 97.4 (06 Mar 2023 08:00), Max: 99 (05 Mar 2023 16:00)  HR: 61 (06 Mar 2023 10:30) (61 - 86)  BP: 119/71 (06 Mar 2023 10:30) (103/63 - 153/67)  BP(mean): 91 (06 Mar 2023 10:30) (77 - 109)  RR: 20 (06 Mar 2023 09:45) (14 - 38)  SpO2: 97% (06 Mar 2023 10:30) (88% - 98%)    Parameters below as of 05 Mar 2023 20:00  Patient On (Oxygen Delivery Method): room air      I&O's Summary    05 Mar 2023 07:01  -  06 Mar 2023 07:00  --------------------------------------------------------  IN: 1848 mL / OUT: 1750 mL / NET: 98 mL                              11.3   5.57  )-----------( 153      ( 06 Mar 2023 02:58 )             35.9     03-06    140  |  106  |  21.0<H>  ----------------------------<  102<H>  3.6   |  21.0<L>  |  1.38<H>    Ca    9.0      06 Mar 2023 02:58  Mg     2.0     03-06        MEDICATIONS  (STANDING):  amLODIPine   Tablet 10 milliGRAM(s) Oral daily  doxazosin 4 milliGRAM(s) Oral <User Schedule>  heparin  Infusion 700 Unit(s)/Hr (12 mL/Hr) IV Continuous <Continuous>  influenza   Vaccine 0.5 milliLiter(s) IntraMuscular once  metoprolol tartrate 150 milliGRAM(s) Oral every 12 hours  niCARdipine Infusion 10 mG/Hr (50 mL/Hr) IV Continuous <Continuous>  pantoprazole    Tablet 40 milliGRAM(s) Oral before breakfast  senna 2 Tablet(s) Oral at bedtime  sodium chloride 0.9% lock flush 3 milliLiter(s) IV Push every 8 hours    MEDICATIONS  (PRN):  acetaminophen     Tablet .. 650 milliGRAM(s) Oral every 6 hours PRN Mild Pain (1 - 3)  oxyCODONE    IR 5 milliGRAM(s) Oral every 4 hours PRN Moderate Pain (4 - 6)  oxyCODONE    IR 10 milliGRAM(s) Oral every 4 hours PRN Severe Pain (7 - 10)      Physical exam:     No distress  alert and oriented   no labored breathing  right upper ext acewrap removed  compartments grossly compressible  radial and ulnar signal present   no wrist drop, able to do digital ROM  area of fluctuance and induration around the medial antecubital area     Capillary refill is less than 2 seconds. No cyanosis.    Primary  Assessment:  • access related right radial arterial localized thrombosis, with no significant perfusion deficit  - Superficial venous thrombosis secondary to prior IV access    •   Plan:   • may discontinue compressive ace wrap  - recommend warm compress and pain control as needed  - vascular surgery to sign off, please reconsult us if needed     Patient seen and examined at bedside.     HPI:  84F with PMHx Afib on eliquis, HTN, HLD, hypothyroidism, lung cancer, chronic right plantar foot ulcers/OM, sent to ED by Dr. Eric (podiatrist) for IV abx for left 2nd toe osteomyelitis as shown on MRI from 8/14/24. Pt states problem with L foot started approx 2 months ago, follow with podiatrist regularly. Pt states she hasn't had problems with her L foot prior to this. She was  started on augmentin and cefuroxime 6 days ago, which she took for 4 days but self dc'd the abx 2 days ago secondary to diarrhea. Pt states diarrhea resolved with discontinuation of abx. Denies fevers, chills, N/V, numbness or tingling of extremities, or foot pain. Pt lives by self, ambulates by self with cane.     Of note, pt was hospitalized in  2/2024 for acute on chronic right plantar foot OM with ESBL infection treated with meropenem. Hx toe amputations in 12/2023 and 2/2024 for R foot OM. Per pt, cannot take cipro due to medication affecting her kidney function. Pt was recently dx with lung cancer, being followed by Dr. Lopez at [facility]. FHx of mother with a lung mass, not cancer. Denies personal or FHx of DM.    In ED, temp 98.1, HR 62, /85, RR 18, SpO2 97% on RA. CBC and CMP largely unremarkable except for eGFR 57. ESR and CRP pending. CXR COLTEN opacity. Given rocephin x1, 500cc NS bolus,    (15 Aug 2024 20:06)      ICU Vital Signs Last 24 Hrs  T(C): 36.4 (21 Aug 2024 05:02), Max: 36.5 (20 Aug 2024 13:33)  T(F): 97.5 (21 Aug 2024 05:02), Max: 97.7 (20 Aug 2024 13:33)  HR: 61 (21 Aug 2024 05:02) (61 - 68)  BP: 154/83 (21 Aug 2024 05:02) (123/73 - 154/83)  BP(mean): --  ABP: --  ABP(mean): --  RR: 17 (21 Aug 2024 05:02) (16 - 17)  SpO2: 98% (21 Aug 2024 05:02) (98% - 98%)    O2 Parameters below as of 21 Aug 2024 05:02  Patient On (Oxygen Delivery Method): room air        O: Superficial ulceration noted to the distal aspect of the left 2nd digit. No edema, no erythema noted. Ulceration noted submetatarsal 1, left foot.

## 2024-08-21 NOTE — DISCHARGE NOTE PROVIDER - NSDCFUSCHEDAPPT_GEN_ALL_CORE_FT
Gustavo Peterson  [facility]  FAMILYMED [address]  Scheduled Appointment: 08/26/2024    [facility]  PULMMED [address]  Scheduled Appointment: 09/04/2024    Saskia Angeles  [facility]  GASTRO [address]  Scheduled Appointment: 09/27/2024    Kalie Hernandez  [facility]  [address]MMED [address]  Scheduled Appointment: 09/27/2024    Sebastian Lopez  [facility]  THORSURG [address]  Scheduled Appointment: 10/02/2024

## 2024-08-21 NOTE — DISCHARGE NOTE PROVIDER - NSDCCPCAREPLAN_GEN_ALL_CORE_FT
PRINCIPAL DISCHARGE DIAGNOSIS  Diagnosis: Toe osteomyelitis, left  Assessment and Plan of Treatment: you presented w/ concern for an acute infection in your left foot, however, you were evaluated by Infectious disease and it was decided to monitor you off antifbiotics

## 2024-08-21 NOTE — DISCHARGE NOTE PROVIDER - CARE PROVIDER_API CALL
Gustavo Peterson  [address] [address]  Phone: [phone]  Fax: [phone]  Established Patient  Follow Up Time: 1-3 days

## 2024-08-21 NOTE — PROGRESS NOTE ADULT - ASSESSMENT
84 year old with PMHx Afib on eliquis, HTN, HLD, hypothyroidism, lung cancer, chronic right plantar foot ulcers/OM, sent to ED by Dr. Eric (podiatrist) for IV abx for left 2nd toe osteomyelitis as shown on MRI from 8/14/24.     #Acute Osteomyelitis of Left Second Digit Distal Phalanx  #Left Plantar Surface Foot Ulcer  - Could not complete course of outpt abx Augmentin 875 and cefuroxime 500 bid 2/2 diarrhea  - MRI 8/14 showed Acute osteomyelitis of the second digit distal phalanx.  - ESR 18, CRP <3  - Podiatry consult (Dr. Raygoza) noted and appreciated No podiatric surgical intervention   - ID consult noted and appreciated, monitor off abx as unlikely to have much utility, may need some debridement of the ulcer and amputation of distal second toe  - Pending podiatry reeval (discussed with Dr. Viramontes 8/20)  - DAVID is normal, noted nonpulsatile flow at the level of the toes is evidence for disease affecting the small arteries of both feet.  - Wound care consult pending   - Patient was encouraged to offload left forefoot   - PT eval appreciated, recommended home PT    #Paroxysmal Afib, rate controlled, on long term anticoagulation  #HTN, controlled  - c/w metoprolol tartrate 25mg q12hrs  - On Eliquis, transitioned to Lovenox SC Q12H pending podiatry reeval in case surgical intervention planned     #Lung cancer  -recently dx with lung cancer  -CXR: COLTEN opacity  -f/u outpt with heme/onc    #Hypothyroidism  - c/w Synthroid 88mcg daily    #HLD  - c/w simvastatin 40mg qhs    VTE ppx: Lovenox SC   Code: Full code    Plan of care discussed with patient, she is in agreement, all questions were answered   She does not have any family or emergency contact      84 year old with PMHx Afib on eliquis, HTN, HLD, hypothyroidism, lung cancer, chronic right plantar foot ulcers/OM, sent to ED by Dr. Eric (podiatrist) for IV abx for left 2nd toe osteomyelitis as shown on MRI from 8/14/24.     #Acute Osteomyelitis of Left Second Digit Distal Phalanx  #Left Plantar Surface Foot Ulcer  - Could not complete course of outpt abx Augmentin 875 and cefuroxime 500 bid 2/2 diarrhea  - MRI 8/14 showed Acute osteomyelitis of the second digit distal phalanx.  - ESR 18, CRP <3  - Podiatry consult (Dr. Raygoza) noted and appreciated No podiatric surgical intervention   - ID consult noted and appreciated, monitor off abx as unlikely to have much utility, may need some debridement of the ulcer and amputation of distal second toe  - Pending podiatry reeval (discussed with Dr. Viramontes 8/20)  - DAVID is normal, noted nonpulsatile flow at the level of the toes is evidence for disease affecting the small arteries of both feet.  - Discussed with wound care nurse, recommended betadine paint to ulcer, allow to dry, cover with dry gauze dressing for comfort or may leave open to air   - Patient was encouraged to offload left forefoot   - PT eval appreciated, recommended home PT    #Paroxysmal Afib, rate controlled, on long term anticoagulation  #HTN, controlled  - c/w metoprolol tartrate 25mg q12hrs  - On Eliquis, transitioned to Lovenox SC Q12H pending podiatry reeval in case surgical intervention planned     #Lung cancer  -recently dx with lung cancer  -CXR: COLTEN opacity  -f/u outpt with heme/onc    #Hypothyroidism  - c/w Synthroid 88mcg daily    #HLD  - c/w simvastatin 40mg qhs    VTE ppx: Lovenox SC   Code: Full code    Plan of care discussed with patient, she is in agreement, all questions were answered   She does not have any family or emergency contact

## 2024-08-21 NOTE — CHART NOTE - NSCHARTNOTEFT_GEN_A_CORE
Podiatry reeval noted and appreciated.  Spoke with patient's podiatrist Dr. Eric over the phone today and updated him on patient's course as well as podiatry/ID recommendations. He recommended bone scan to reeval left 2nd toe and plantar ulcer which he feels is infected. He also recommended deep culture of left plantar ulcer.   Discussed with ID, will obtain bone scan to further evaluation, no indication for wound culture at this time since ulcer is dry/scab, will reeval in AM.   This was discussed with patient and she is in agreement.    Patient lives alone and has no family. There is concern about her able to manage her wound at home and able to ambulate safely while offloading her forefoot. There is also concern about her ability to follow up with wound care center/HBO centers given her lack of support.   CM/ on board. Podiatry reeval noted and appreciated.  Spoke with patient's podiatrist Dr. Eric over the phone today and updated him on patient's course as well as podiatry/ID recommendations. He recommended bone scan to reeval left 2nd toe and plantar ulcer which he feels is infected. He also recommended deep culture of left plantar ulcer.   Discussed with ID, will obtain bone scan to further evaluation, no indication for wound culture at this time since ulcer is dry/scab, will reeval in AM.   This was discussed with patient and she is in agreement.    Patient lives alone and has no family. There is concern about her able to manage her wound at home and able to ambulate safely while offloading her forefoot. There is also concern about her ability to follow up with wound care center/HBO centers given her lack of support.   CM/ on board.    Change AC back to eliquis since no surgical planned.

## 2024-08-21 NOTE — DISCHARGE NOTE PROVIDER - NSDCMRMEDTOKEN_GEN_ALL_CORE_FT
apixaban 5 mg oral tablet: 1 tab(s) orally every 12 hours  Augmentin 875 mg-125 mg oral tablet: 1 tab(s) orally 2 times a day  cefuroxime 500 mg oral tablet: 1 tab(s) orally 2 times a day  levothyroxine 88 mcg (0.088 mg) oral tablet: 1 tab(s) orally once a day  metoprolol tartrate 25 mg oral tablet: 1 tab(s) orally every 12 hours  simvastatin 40 mg oral tablet: 1 tab(s) orally once a day (at bedtime)   apixaban 5 mg oral tablet: 1 tab(s) orally every 12 hours  levothyroxine 88 mcg (0.088 mg) oral tablet: 1 tab(s) orally once a day  metoprolol tartrate 25 mg oral tablet: 1 tab(s) orally every 12 hours  simvastatin 40 mg oral tablet: 1 tab(s) orally once a day (at bedtime)

## 2024-08-21 NOTE — PROGRESS NOTE ADULT - TIME BILLING
Time spent includes direct patient care (interview and examination of patient), discussion with other providers, support staff and/or patient's family members, review of medical records, ordering diagnostic tests and analyzing results, and documentation
Time spent includes direct patient care (interview and examination of patient), discussion with other providers, support staff and/or patient's family members, review of medical records, ordering diagnostic tests and analyzing results, and documentation
- Ordering, reviewing, and interpreting labs, testing, and imaging.  - Independently obtaining a review of systems and performing a physical exam  - Reviewing consultant documentation/recommendations.  - Counselling and educating patient regarding interpretation of aforementioned items and plan of care.
Time spent includes direct patient care (interview and examination of patient), discussion with other providers, support staff and/or patient's family members, review of medical records, ordering diagnostic tests and analyzing results, and documentation

## 2024-08-21 NOTE — PROGRESS NOTE ADULT - SUBJECTIVE AND OBJECTIVE BOX
Interval History  Patient seen and examined at bedside. No acute events noted.  Patient denies any complaints, no pain.   Podiatry follow up pending.     ALLERGIES:  doxycycline (Unknown)  linezolid (Unknown)  Cipro (Other)  Levaquin (Stomach Upset; Nausea)    MEDICATIONS  (STANDING):  enoxaparin Injectable 70 milliGRAM(s) SubCutaneous every 12 hours  levothyroxine 88 MICROGram(s) Oral daily  metoprolol tartrate 25 milliGRAM(s) Oral every 12 hours  nystatin Cream 1 Application(s) Topical every 12 hours  simvastatin 40 milliGRAM(s) Oral at bedtime    MEDICATIONS  (PRN):  acetaminophen     Tablet .. 650 milliGRAM(s) Oral every 6 hours PRN Temp greater or equal to 38C (100.4F), Mild Pain (1 - 3)  aluminum hydroxide/magnesium hydroxide/simethicone Suspension 30 milliLiter(s) Oral every 4 hours PRN Dyspepsia  melatonin 3 milliGRAM(s) Oral at bedtime PRN Insomnia  ondansetron Injectable 4 milliGRAM(s) IV Push every 8 hours PRN Nausea and/or Vomiting    Vital Signs Last 24 Hrs  T(F): 97.5 (21 Aug 2024 05:02), Max: 97.7 (20 Aug 2024 13:33)  HR: 61 (21 Aug 2024 05:02) (61 - 68)  BP: 154/83 (21 Aug 2024 05:02) (123/73 - 154/83)  RR: 17 (21 Aug 2024 05:02) (16 - 17)  SpO2: 98% (21 Aug 2024 05:02) (98% - 98%)  I&O's Summary    20 Aug 2024 07:01  -  21 Aug 2024 07:00  --------------------------------------------------------  IN: 0 mL / OUT: 1700 mL / NET: -1700 mL    BMI (kg/m2): 21.5 (08-20-24 @ 13:11)    PHYSICAL EXAM:  GENERAL: NAD  HEENT: NCAT  CHEST/LUNG: Clear to percussion bilaterally; No wheeze  HEART: Regular rate and rhythm  ABDOMEN: Soft, Nontender, Nondistended; Bowel sounds present  MUSCULOSKELETAL/EXTREMITIES:  2+ Peripheral Pulses, No LE edema  Right Foot: s/p 2nd and 3rd toe amputation  Left Foot: plantar surface ulcer at base of MTP joint, 2nd toe with dry scab on tip  No erythema   PSYCH: Appropriate affect  NEURO: Alert & Oriented x 3, non-focal     I personally reviewed the below data/images/labs:    LABS:                        14.6   7.22  )-----------( 223      ( 19 Aug 2024 06:55 )             43.4       08-19    140  |  105  |  17  ----------------------------<  82  4.3   |  24  |  0.80    Ca    9.6      19 Aug 2024 06:55    Urinalysis Basic - ( 19 Aug 2024 06:55 )    Color: x / Appearance: x / SG: x / pH: x  Gluc: 82 mg/dL / Ketone: x  / Bili: x / Urobili: x   Blood: x / Protein: x / Nitrite: x   Leuk Esterase: x / RBC: x / WBC x   Sq Epi: x / Non Sq Epi: x / Bacteria: x    Consultant(s) Notes Reviewed:   Care Discused with Consultants/Other Providers:  Imaging Personally Reviewed:

## 2024-08-21 NOTE — PROGRESS NOTE ADULT - ASSESSMENT
A:   Ulceration 2nd digit, submetatarsal 1, left foot  Osteomyelitis, left foot      P:  1. Patient evaluated.  2. MRI reviewed  3. DAVID/PVR reviewed.  4. Discussed treatment options with the patient. Amputation of the 2nd digit is at high risk for nonhealing due to small vessel disease as evident on the DAVID/PVR. Given that there is no acute clinical signs of infection and no WBC at this time, will defer for conservative treatment with wound care center and HBO therapy. Patient to be monitored closely by her podiatrist.   5. Antibiotics per ID.   6. Local wound care.

## 2024-08-21 NOTE — PROGRESS NOTE ADULT - SUBJECTIVE AND OBJECTIVE BOX
CC: f/u for  plantar ulcer left foot  Patient reports no pain    REVIEW OF SYSTEMS:  All other review of systems negative (Comprehensive ROS)    Antimicrobials Day #  :    Other Medications Reviewed    T(F): 97.9 (08-21-24 @ 18:40), Max: 97.9 (08-21-24 @ 18:40)  HR: 62 (08-21-24 @ 18:40)  BP: 155/84 (08-21-24 @ 18:40)  RR: 16 (08-21-24 @ 18:40)  SpO2: 99% (08-21-24 @ 18:40)  Wt(kg): --    PHYSICAL EXAM:  General: alert, no acute distress  Eyes:  anicteric, no conjunctival injection, no discharge  Oropharynx: no lesions or injection 	  Neck: supple, without adenopathy  Lungs: clear to auscultation  Heart: regular rate and rhythm; no murmur, rubs or gallops  Abdomen: soft, nondistended, nontender, without mass or organomegaly  Skin: no lesions  Extremities: no clubbing, cyanosis, or edema. left foot dry scabbed ulcer over 1st met and tiny dry ulcer over 2nd toe tip  Neurologic: alert, oriented, moves all extremities    LAB RESULTS:              MICROBIOLOGY:  RECENT CULTURES:      RADIOLOGY REVIEWED:      < from: [facility] Physiol Extremity Lower 3+ Level, BI (08.20.24 @ 11:16) >    ACC: [id number] EXAM:  PHYSIOL EXTREM LOW 3+ LEV BI   ORDERED BY:  JUANCARLOS ROLDAN     PROCEDURE DATE:  08/20/2024          INTERPRETATION:  History: Left leg pain, amputated right first toe    Risk factors: Smoking, hypertension    A prior examination, dated 12/6/2023, showed a right GOLDEN of 1.22 and a   left GOLDEN of 1.24.    On this examination, the right GOLDEN equals 1.07 and the left GOLDEN equals   1.00.    The blood pressure measurements at the right ankle are 161 mmHg in the   posterior tibial artery and 173 in the dorsal pedis artery.    The blood pressure measurements at the left ankle are 161 mmHg in the   posterior tibial artery and 152 in the dorsal pedis artery.    The amplitude of the pulse volume recordings at every level bilaterally   from the upper thighs through the ankles are normal.    The amplitude of the pulse volume recordings are barely pulsatile   bilaterally at the level of the toes.    Impression:    The right GOLDEN of 1.07 and the left GOLDEN of 1.00 are normal.    The nonpulsatile flow at the level of the toes is evidence for disease   affecting the small arteries of both feet.    --- End of Report ---            DANISH LAMBERT MD; Attending Interventional Radiologist  This document has been electronically signed. Aug 20 2024  3:36PM    < end of copied text >          < from: MR Foot w/wo IV Cont, Left (08.14.24 @ 14:43) >    ACC: [id number] EXAM:  MR FOOT WAW IC LT   ORDERED BY: ELDA MG     PROCEDURE DATE:  08/14/2024          INTERPRETATION:  EXAMINATION: MR FOOT WITHOUT AND WITH IV CONTRAST LEFT    CLINICAL INDICATION:Evaluate for osteomyelitis    COMPARISON:None. Note there is extensive prior imaging of the   contralateral right foot.    TECHNIQUE: Multiplanar, multi-sequence MRI of the left forefoot was   performed without and with intravenous contrast. 7 mL Gadavist was   administered and 0.5 mL was discarded.    INTERPRETATION:    There is mild focal T1/T2 hyperintensity and postcontrast enhancement   involving the distal phalanx of the second digit concerning for acute   osteomyelitis, especially given overlying enhancing phlegmon/soft tissue   edema.    There is prominent subchondral edema associated with extensive cartilage   loss at the first tarsometatarsal joint with associated postcontrast   enhancement.    No localized abscess is identified. There is circumferential soft tissue   edema about the forefoot.    IMPRESSION:  1.  Acute osteomyelitis of the second digit distal phalanx.    2.  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    --- End of Report ---          < end of copied text >  Assessment:  patient with what I suspect is a neuropathic ulcer of the left foot over plantar aspect of 1st met head and with om of the distal phalanx of the second toe. I dont think antibiotics will have much impact long term. It looks like the osteo isnt even where the major  ulcer is to me. she has hammer toe and the distal tip of the second toe has a superficial ulcer. It is not infected looking and dry. I dont think  antibiotics will be of much utility for the second toe or the ulcer on the plantar 1st met head area. . More local care. May need some debridement of the ulcer on the 1st met area .offsetting most critical.  golden ok, just small vessel disease so no vascular intervention  Plan:  monitor off abx  offset weight on the left foot'  podiatry f/u appreciated  planned for bone scan per her primary dpm  suspect she will eventually develop om under the neuropathic plantar ulcer but abx will not stop that. She will need surgery at that point    Plan:

## 2024-08-21 NOTE — DISCHARGE NOTE PROVIDER - HOSPITAL COURSE
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 Eric (podiatrist) for IV abx for left 2nd toe osteomyelitis as shown on MRI from 8/14/24 and plantar ulcer. She was started on augmentin and cefuroxime 6 days ago but only took 4 days due to diarrhea which has since resolved. No leukocytosis on admission, ESR/CRP WNL.   She initially started on broad spectrum abx which was discontinued after ID evaluation as unlikely to have much utility, recommended podiatry consult for possible amputation of distal second toe and debridement of left plantar ulcer.   DAVID was noted and was noted to have nonpulsatile flow at the level of the toes is evidence for disease affecting the small arteries of both feet.  She was seen by Dr. Raygoza and again by Dr. Mcgarry, amputation of the 2nd digit is at high risk for nonhealing due to small vessel disease as evident on the DAVID/PVR. Given that there is no acute clinical signs of infection and no WBC at this time, will defer for conservative treatment with wound care center and HBO therapy. Patient to be monitored closely by her podiatrist.  Her case was discussed with her primary podiatrist Dr. Eric who recommended bone scan to further evaluate.        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 Eric (podiatrist) for IV abx for left 2nd toe osteomyelitis as shown on MRI from 8/14/24 and plantar ulcer. She was started on augmentin and cefuroxime 6 days ago but only took 4 days due to diarrhea which has since resolved. No leukocytosis on admission, ESR/CRP WNL.   She initially started on broad spectrum abx which was discontinued after ID evaluation as unlikely to have much utility, recommended podiatry consult for possible amputation of distal second toe and debridement of left plantar ulcer.   DAVID was done and was noted to have nonpulsatile flow at the level of the toes is evidence for disease affecting the small arteries of both feet.  She was seen by Dr. Raygoza and again by Dr. Mcgarry, amputation of the 2nd digit is at high risk for nonhealing due to small vessel disease as evident on the DAVID/PVR. Given that there is no acute clinical signs of infection and no WBC at this time, will defer for conservative treatment with wound care center and HBO therapy. Patient to be monitored closely by her podiatrist.  Her case was discussed with her primary podiatrist Dr. Eric who recommended bone scan to further evaluate.        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 Eric (podiatrist) for IV abx for left 2nd toe osteomyelitis as shown on MRI from 8/14/24 and plantar ulcer. She was started on augmentin and cefuroxime 6 days ago but only took 4 days due to diarrhea which has since resolved. No leukocytosis on admission, ESR/CRP WNL.   She initially started on broad spectrum abx which was discontinued after ID evaluation as unlikely to have much utility, recommended podiatry consult for possible amputation of distal second toe and debridement of left plantar ulcer.   DAVID was done and was noted to have nonpulsatile flow at the level of the toes is evidence for disease affecting the small arteries of both feet.  She was seen by Dr. Raygoza and again by Dr. Mcgarry, amputation of the 2nd digit is at high risk for nonhealing due to small vessel disease as evident on the DAVID/PVR. Given that there is no acute clinical signs of infection and no WBC at this time, will defer for conservative treatment with wound care center and HBO therapy. Patient to be monitored closely by her podiatrist.  Her case was discussed with her primary podiatrist Dr. Eric who recommended bone scan to further evaluation, however, after reviewing her case, examining her wound and discussing w/ ID, inpatient Bone scan not warranted as pt does not require IV or PO antibiotics. 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 Eric (podiatrist) for IV abx for left 2nd toe osteomyelitis as shown on MRI from 8/14/24 and plantar ulcer. She was started on augmentin and cefuroxime 6 days ago but only took 4 days due to diarrhea which has since resolved. No leukocytosis on admission, ESR/CRP WNL.   She initially started on broad spectrum abx which was discontinued after ID evaluation as unlikely to have much utility, recommended podiatry consult for possible amputation of distal second toe and debridement of left plantar ulcer.   DAVID was done and was noted to have nonpulsatile flow at the level of the toes is evidence for disease affecting the small arteries of both feet.  She was seen by Dr. Raygoza and again by Dr. Mcgarry, amputation of the 2nd digit is at high risk for nonhealing due to small vessel disease as evident on the DAVID/PVR. Given that there is no acute clinical signs of infection and no WBC at this time, will defer for conservative treatment with wound care center and HBO therapy. Patient to be monitored closely by her podiatrist.  Her case was discussed with her primary podiatrist Dr. Eric who recommended bone scan to further evaluation, however, after reviewing her case, examining her wound and discussing w/ ID, inpatient Bone scan not warranted as pt does not require IV or PO antibiotics. Pt is deemed stable for discharge home       Wound care recommendations: Paint with betadine Solution, allow to dry, cover with dry gauze dressing for comfort or may leave open to air

## 2024-08-22 ENCOUNTER — TRANSCRIPTION ENCOUNTER (OUTPATIENT)
Age: 84
End: 2024-08-22

## 2024-08-22 VITALS
RESPIRATION RATE: 16 BRPM | SYSTOLIC BLOOD PRESSURE: 142 MMHG | DIASTOLIC BLOOD PRESSURE: 81 MMHG | OXYGEN SATURATION: 100 % | HEART RATE: 60 BPM | TEMPERATURE: 98 F

## 2024-08-22 PROCEDURE — 99239 HOSP IP/OBS DSCHRG MGMT >30: CPT

## 2024-08-22 RX ORDER — AMOXICILLIN AND CLAVULANATE POTASSIUM 250; 125 MG/1; MG/1
1 TABLET, FILM COATED ORAL
Refills: 0 | DISCHARGE

## 2024-08-22 RX ORDER — CEFUROXIME SODIUM 1.5 G
1 VIAL (EA) INJECTION
Refills: 0 | DISCHARGE

## 2024-08-22 RX ADMIN — APIXABAN 5 MILLIGRAM(S): 5 TABLET, FILM COATED ORAL at 06:12

## 2024-08-22 RX ADMIN — Medication 1 APPLICATION(S): at 11:27

## 2024-08-22 RX ADMIN — Medication 88 MICROGRAM(S): at 06:12

## 2024-08-22 RX ADMIN — METOPROLOL TARTRATE 25 MILLIGRAM(S): 100 TABLET ORAL at 06:12

## 2024-08-22 RX ADMIN — Medication 1 APPLICATION(S): at 06:12

## 2024-08-22 NOTE — PROGRESS NOTE ADULT - PROVIDER SPECIALTY LIST ADULT
Podiatry
Hospitalist
Infectious Disease
Infectious Disease
Hospitalist

## 2024-08-22 NOTE — DISCHARGE NOTE NURSING/CASE MANAGEMENT/SOCIAL WORK - PATIENT PORTAL LINK FT
You can access the FollowMyHealth Patient Portal offered by [facility] by registering at the following website: [email or web address]. By joining [facility]’s FollowMyHealth portal, you will also be able to view your health information using other applications (apps) compatible with our system.

## 2024-08-22 NOTE — PROGRESS NOTE ADULT - SUBJECTIVE AND OBJECTIVE BOX
Patient is a 84y old  Female who presents with a chief complaint of osteomyelitis (21 Aug 2024 20:45)      SUBJECTIVE / OVERNIGHT EVENTS:  Pt seen and examined at bedside. No acute events overnight.  Pt denies cp, palpitations, sob, abd pain, N/V, fever, chills.    ROS:  All other review of systems negative    Allergies    doxycycline (Unknown)  linezolid (Unknown)  Cipro (Other)    Intolerances    Levaquin (Stomach Upset; Nausea)      MEDICATIONS  (STANDING):  apixaban 5 milliGRAM(s) Oral every 12 hours  levothyroxine 88 MICROGram(s) Oral daily  metoprolol tartrate 25 milliGRAM(s) Oral every 12 hours  nystatin Cream 1 Application(s) Topical every 12 hours  povidone iodine 10% Solution 1 Application(s) Topical daily  simvastatin 40 milliGRAM(s) Oral at bedtime    MEDICATIONS  (PRN):  acetaminophen     Tablet .. 650 milliGRAM(s) Oral every 6 hours PRN Temp greater or equal to 38C (100.4F), Mild Pain (1 - 3)  aluminum hydroxide/magnesium hydroxide/simethicone Suspension 30 milliLiter(s) Oral every 4 hours PRN Dyspepsia  melatonin 3 milliGRAM(s) Oral at bedtime PRN Insomnia  ondansetron Injectable 4 milliGRAM(s) IV Push every 8 hours PRN Nausea and/or Vomiting      Vital Signs Last 24 Hrs  T(C): 36.1 (22 Aug 2024 05:39), Max: 36.6 (21 Aug 2024 18:40)  T(F): 97 (22 Aug 2024 05:39), Max: 97.9 (21 Aug 2024 18:40)  HR: 72 (22 Aug 2024 05:39) (52 - 72)  BP: 144/74 (22 Aug 2024 05:39) (122/73 - 155/84)  BP(mean): --  RR: 15 (22 Aug 2024 05:39) (15 - 16)  SpO2: 98% (22 Aug 2024 05:39) (98% - 99%)    Parameters below as of 22 Aug 2024 05:39  Patient On (Oxygen Delivery Method): room air      CAPILLARY BLOOD GLUCOSE        I&O's Summary      PHYSICAL EXAM:  GENERAL: NAD, well-developed AAOx3 female   HEAD:  Atraumatic, Normocephalic  NECK: Supple, No JVD  CHEST/LUNG: Clear to auscultation bilaterally; No wheeze, nonlabored breathing  HEART: Regular rate and rhythm; No murmurs, rubs, or gallops  ABDOMEN: Soft, Nontender, Nondistended; Bowel sounds present  EXTREMITIES: No clubbing, cyanosis, or edema  PSYCH: calm, appropriate mood  SKIN: Left foot under great toe, unstageable ulcer w/ dry eschar. Nontender, no surrounding erythema, no fluctuation or drainage noted     LABS:                    RADIOLOGY & ADDITIONAL TESTS:  Results Reviewed:   Imaging Personally Reviewed:  Electrocardiogram Personally Reviewed:    COORDINATION OF CARE:  Care Discussed with Consultants/Other Providers [Y/N]:  Prior or Outpatient Records Reviewed [Y/N]:

## 2024-08-22 NOTE — PROGRESS NOTE ADULT - ASSESSMENT
84 year old with PMHx Afib on eliquis, HTN, HLD, hypothyroidism, lung cancer, chronic right plantar foot ulcers/OM, sent to ED by Dr. Eric (podiatrist) for IV abx for left 2nd toe osteomyelitis as shown on MRI from 8/14/24.     #Acute Osteomyelitis of Left Second Digit Distal Phalanx  #Left Plantar Surface Foot Ulcer  - MRI 8/14 showed Acute osteomyelitis of the second digit distal phalanx  - ESR 18, CRP <3 along w/ other blood work  - Podiatry consult (Dr. Raygoza) noted and appreciated No podiatric surgical intervention   - Discussed case w/ ID, Dr. Cloud; IV/PO abx will not be of any benefit to the pt. Not warranted to obtain wound cultures which would imply removing the intact eschar, increasing the risk of infection occurring  - Bone scan not warranted at this time as it will not   - Pt can follow up with her outpatient Podiatrist and undergo any further testing as outpatient   - Patient was encouraged to offload left forefoot   - PT eval appreciated, recommended home PT    #Paroxysmal Afib, rate controlled, on long term anticoagulation  #HTN, controlled  - c/w metoprolol tartrate 25mg q12hrs  - On Eliquis    #Lung cancer  -recently dx with lung cancer  -CXR: COLTEN opacity  -f/u outpt with heme/onc    #Hypothyroidism  - c/w Synthroid 88mcg daily    #HLD  - c/w simvastatin 40mg qhs    VTE ppx: Lovenox SC   Code: Full code    Plan of care discussed with patient, she is in agreement, all questions were answered   She does not have any family or emergency contact         Discharge home today

## 2024-08-26 ENCOUNTER — APPOINTMENT (OUTPATIENT)
Dept: FAMILY MEDICINE | Facility: CLINIC | Age: 84
End: 2024-08-26
Payer: MEDICARE

## 2024-08-26 VITALS
HEIGHT: 70.5 IN | RESPIRATION RATE: 18 BRPM | BODY MASS INDEX: 22.51 KG/M2 | TEMPERATURE: 96.8 F | SYSTOLIC BLOOD PRESSURE: 140 MMHG | WEIGHT: 159 LBS | HEART RATE: 101 BPM | OXYGEN SATURATION: 96 % | DIASTOLIC BLOOD PRESSURE: 78 MMHG

## 2024-08-26 VITALS — DIASTOLIC BLOOD PRESSURE: 76 MMHG | SYSTOLIC BLOOD PRESSURE: 136 MMHG

## 2024-08-26 DIAGNOSIS — I10 ESSENTIAL (PRIMARY) HYPERTENSION: ICD-10-CM

## 2024-08-26 DIAGNOSIS — Z89.421 ACQUIRED ABSENCE OF OTHER RIGHT TOE(S): ICD-10-CM

## 2024-08-26 DIAGNOSIS — R91.8 OTHER NONSPECIFIC ABNORMAL FINDING OF LUNG FIELD: ICD-10-CM

## 2024-08-26 DIAGNOSIS — J44.9 CHRONIC OBSTRUCTIVE PULMONARY DISEASE, UNSPECIFIED: ICD-10-CM

## 2024-08-26 DIAGNOSIS — I73.9 PERIPHERAL VASCULAR DISEASE, UNSPECIFIED: ICD-10-CM

## 2024-08-26 DIAGNOSIS — E03.9 HYPOTHYROIDISM, UNSPECIFIED: ICD-10-CM

## 2024-08-26 PROCEDURE — 99495 TRANSJ CARE MGMT MOD F2F 14D: CPT

## 2024-08-26 NOTE — HISTORY OF PRESENT ILLNESS
[Post-hospitalization from ___ Hospital] : Post-hospitalization from [unfilled] Hospital [Admitted on: ___] : The patient was admitted on [unfilled] [Discharged on ___] : discharged on [unfilled] [Patient Contacted By: ____] : and contacted by [unfilled] [FreeTextEntry2] : Patient here in follow-up on recent hospitalization for circulatory issues and possible infection in her left foot she has had right foot osteomyelitis in the past that was treated with amputation while in the hospital antibiotics were discontinued because of her development of diarrhea and she was discharged home without antibiotics the feeling being that she has mostly a circulatory problem which may require surgical intervention in the future she also is following up with pulmonary and chest surgery on a lung mass which is likely malignant she had a PET scan which showed this to take up activity and also there is an area in the esophagus where activity was evident this is under evaluation the patient states she does not wish to have surgery but this will be a topic of future conversation after workup is completed

## 2024-08-26 NOTE — ASSESSMENT
[FreeTextEntry1] : Patient stable with regard to her chronic disease of hypertension hypothyroidism she does have ongoing issues with peripheral artery disease of her lower extremities and status post amputation of toes on the right foot she is being followed by chest surgery for lung mass and undergoing continued testing she is being followed by podiatry for her bilateral podiatric issues she seems to be quite stable recheck of blood pressure was lower at 136/76 she is in no distress and now has a full-time aide with him she is very satisfied helping her in her activities of daily living follow-up visit in about 6 weeks or as needed no change in medications

## 2024-08-26 NOTE — REVIEW OF SYSTEMS
[Chills] : no chills [Fatigue] : no fatigue [Vision Problems] : no vision problems [Nasal Discharge] : no nasal discharge [Sore Throat] : no sore throat [Chest Pain] : no chest pain [Lower Ext Edema] : no lower extremity edema [Orthopnea] : no orthopnea [Wheezing] : no wheezing [Nausea] : no nausea [Vomiting] : no vomiting [Dysuria] : no dysuria [Incontinence] : no incontinence [Hematuria] : no hematuria [Joint Pain] : joint pain [Joint Stiffness] : joint stiffness [Headache] : no headache [Dizziness] : no dizziness [Fainting] : no fainting [Suicidal] : not suicidal [de-identified] : Some erythematous rash right cheek she says she has been scratching this area

## 2024-08-26 NOTE — PHYSICAL EXAM
[No Acute Distress] : no acute distress [Well Nourished] : well nourished [Well Developed] : well developed [Well-Appearing] : well-appearing [Normal Sclera/Conjunctiva] : normal sclera/conjunctiva [PERRL] : pupils equal round and reactive to light [Normal Outer Ear/Nose] : the outer ears and nose were normal in appearance [Normal Oropharynx] : the oropharynx was normal [Normal TMs] : both tympanic membranes were normal [No JVD] : no jugular venous distention [No Lymphadenopathy] : no lymphadenopathy [No Respiratory Distress] : no respiratory distress  [No Accessory Muscle Use] : no accessory muscle use [Clear to Auscultation] : lungs were clear to auscultation bilaterally [Normal Rate] : normal rate  [Regular Rhythm] : with a regular rhythm [No Murmur] : no murmur heard [No Edema] : there was no peripheral edema [Soft] : abdomen soft [Non Tender] : non-tender [No Masses] : no abdominal mass palpated [No HSM] : no HSM [Normal Supraclavicular Nodes] : no supraclavicular lymphadenopathy [Normal Posterior Cervical Nodes] : no posterior cervical lymphadenopathy [Normal Anterior Cervical Nodes] : no anterior cervical lymphadenopathy [Coordination Grossly Intact] : coordination grossly intact [de-identified] : Erythematous patchy raised rash right cheek patient said this has been pruritic actually she had not been aware of its presence other than feeling itchy [de-identified] : Patient will patient ambulates does have an aide who helps her she

## 2024-09-04 ENCOUNTER — APPOINTMENT (OUTPATIENT)
Dept: PULMONOLOGY | Facility: CLINIC | Age: 84
End: 2024-09-04
Payer: MEDICARE

## 2024-09-04 PROCEDURE — 94726 PLETHYSMOGRAPHY LUNG VOLUMES: CPT

## 2024-09-04 PROCEDURE — 94010 BREATHING CAPACITY TEST: CPT

## 2024-09-04 PROCEDURE — 94729 DIFFUSING CAPACITY: CPT

## 2024-09-26 NOTE — REASON FOR VISIT
No
[Follow-Up: _____] : a [unfilled] follow-up visit

## 2024-09-27 ENCOUNTER — APPOINTMENT (OUTPATIENT)
Dept: PULMONOLOGY | Facility: CLINIC | Age: 84
End: 2024-09-27
Payer: MEDICARE

## 2024-09-27 ENCOUNTER — APPOINTMENT (OUTPATIENT)
Dept: GASTROENTEROLOGY | Facility: CLINIC | Age: 84
End: 2024-09-27
Payer: MEDICARE

## 2024-09-27 ENCOUNTER — NON-APPOINTMENT (OUTPATIENT)
Age: 84
End: 2024-09-27

## 2024-09-27 VITALS
WEIGHT: 160 LBS | BODY MASS INDEX: 22.9 KG/M2 | DIASTOLIC BLOOD PRESSURE: 71 MMHG | HEIGHT: 70 IN | HEART RATE: 59 BPM | SYSTOLIC BLOOD PRESSURE: 175 MMHG | OXYGEN SATURATION: 98 %

## 2024-09-27 DIAGNOSIS — I10 ESSENTIAL (PRIMARY) HYPERTENSION: ICD-10-CM

## 2024-09-27 DIAGNOSIS — K59.00 CONSTIPATION, UNSPECIFIED: ICD-10-CM

## 2024-09-27 DIAGNOSIS — M25.512 PAIN IN LEFT SHOULDER: ICD-10-CM

## 2024-09-27 DIAGNOSIS — E78.5 HYPERLIPIDEMIA, UNSPECIFIED: ICD-10-CM

## 2024-09-27 DIAGNOSIS — D64.9 ANEMIA, UNSPECIFIED: ICD-10-CM

## 2024-09-27 DIAGNOSIS — K22.89 OTHER SPECIFIED DISEASE OF ESOPHAGUS: ICD-10-CM

## 2024-09-27 DIAGNOSIS — I73.9 PERIPHERAL VASCULAR DISEASE, UNSPECIFIED: ICD-10-CM

## 2024-09-27 PROBLEM — I48.20 CHRONIC ATRIAL FIBRILLATION, UNSPECIFIED: Chronic | Status: ACTIVE | Noted: 2024-08-15

## 2024-09-27 PROCEDURE — 99214 OFFICE O/P EST MOD 30 MIN: CPT | Mod: 25

## 2024-09-27 PROCEDURE — 99204 OFFICE O/P NEW MOD 45 MIN: CPT

## 2024-09-27 PROCEDURE — 94010 BREATHING CAPACITY TEST: CPT

## 2024-09-27 NOTE — ASSESSMENT
[FreeTextEntry1] : I agree the lung mass is concerning for malignancy.  Patient is aware of this.  I agree with her that surgery at her age can be very risky.    she will need to ultimately decide with  the approach.  No pulmonary objection to proceed with thoracic procedures if patient wishes.  fu prn  A total of 45 minutes was spent on this encounter including history taking, chart review, physical examination, testing interpretation and treatment plan.

## 2024-09-27 NOTE — CONSULT LETTER
[Dear  ___] : Dear  [unfilled], [Consult Letter:] : I had the pleasure of evaluating your patient, [unfilled]. [( Thank you for referring [unfilled] for consultation for _____ )] : Thank you for referring [unfilled] for consultation for [unfilled] [Please see my note below.] : Please see my note below. [Consult Closing:] : Thank you very much for allowing me to participate in the care of this patient.  If you have any questions, please do not hesitate to contact me. [Sincerely,] : Sincerely, [FreeTextEntry2] : Dr. Gustavo Peterson (PCP/Ref) [FreeTextEntry1] : Ms. KASSIE LOPEZ Is a very pleasant 84 year old female, former smoker (occasional smoker, quit over 40 yrs ago). She has excellent functional status. She does have HTN, HLD, AFib (on Eliquis), hypothyroidism (s/p partial thyroidectomy), chronic right plantar foot ulcers/osteomyelitis (s/p 2nd TMA on 12/08/2023 at [facility]). Post-op she presented to [facility] (12/21-12/28/23) for acute renal failure, workup imaging revealed incidental findings of COLTEN mass. Referred by Dr. Gustavo Peterson (PCP) and Dr Lopez CT surgeon. She was found to have a lung mass which appears to be contiguous with the esophagus, latter showed up positive on a recent PET scan.  We are asked to evaluate for esophageal involvement. Of note, she denies GI symptoms.  No blood in stool melena hematochezia abdominal pain nausea vomiting or dysphagia.  Mild reflux.  I had a long discussion with Ruslan about the findings on recent imaging including the PET scan that showed lung mass as well as periesophageal nodule and distal esophageal uptake.  She prefers avoiding invasive procedures and understands the implications of having a new mass on imaging.  In regards to accessibility, we can perform an upper endoscopy.  However, with the lack of GI symptoms, it is unlikely that there is an infiltrative process or mass involving the esophagus.  Although EGD biopsy would be the easiest way to confirm the diagnosis, she more likely would need an upper EUS for sampling of periesophageal node or even the lung mass.  She is reluctant to have this but will consider.  She understands that an EGD is lower yield.  Will discuss further with Sulema Lopez and Asif. [FreeTextEntry3] :  Saskia nAgeles MD Gastroenterology, Hepatology and Motility

## 2024-09-27 NOTE — HISTORY OF PRESENT ILLNESS
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

## 2024-09-27 NOTE — REASON FOR VISIT
[Initial Evaluation] : an initial evaluation [Formal Caregiver] : formal caregiver [FreeTextEntry1] : abnormal imaging of chest/ esophagus showing possible esophageal mass

## 2024-09-27 NOTE — ASSESSMENT
[FreeTextEntry1] : Ms. KASSIE LOPEZ Is a very pleasant 84 year old female, former smoker (occasional smoker, quit over 40 yrs ago). She has excellent functional status. She does have HTN, HLD, AFib (on Eliquis), hypothyroidism (s/p partial thyroidectomy), chronic right plantar foot ulcers/osteomyelitis (s/p 2nd TMA on 12/08/2023 at [facility]). Post-op she presented to [facility] (12/21-12/28/23) for acute renal failure, workup imaging revealed incidental findings of COLTEN mass. Referred by Dr. Gustavo Peterson (PCP) and Dr Lopez CT surgeon. She was found to have a lung mass which appears to be contiguous with the esophagus, latter showed up positive on a recent PET scan.  We are asked to evaluate for esophageal involvement. Of note, she denies GI symptoms.  No blood in stool melena hematochezia abdominal pain nausea vomiting or dysphagia.  Mild reflux.  I had a long discussion with Ruslan about the findings on recent imaging including the PET scan that showed lung mass as well as periesophageal nodule and distal esophageal uptake.  She prefers avoiding invasive procedures and understands the implications of having a new mass on imaging.  In regards to accessibility, we can perform an upper endoscopy.  However, with the lack of GI symptoms, it is unlikely that there is an infiltrative process or mass involving the esophagus.  Although EGD biopsy would be the easiest way to confirm the diagnosis, she more likely would need an upper EUS for sampling of periesophageal node or even the lung mass.  She is reluctant to have this but will consider.  She understands that an EGD is lower yield.  Will discuss further with Sulema Lopez and Asif.

## 2024-09-27 NOTE — HISTORY OF PRESENT ILLNESS
[FreeTextEntry1] : Ms. KASSIE LOPEZ Is a very pleasant 84 year old female, former smoker (occasional smoker, quit over 40 yrs ago). She has excellent functional status. She does have HTN, HLD, AFib (on Eliquis), hypothyroidism (s/p partial thyroidectomy), chronic right plantar foot ulcers/osteomyelitis (s/p 2nd TMA on 12/08/2023 at [facility]). Post-op she presented to [facility] (12/21-12/28/23) for acute renal failure, workup imaging revealed incidental findings of COLTEN mass. Referred by Dr. Gustavo Peterson (PCP) and Dr Lopez CT surgeon. She was found to have a lung mass which appears to be contiguous with the esophagus, latter showed up positive on a recent PET scan.  We are asked to evaluate for esophageal involvement. Of note, she denies GI symptoms.  No blood in stool melena hematochezia abdominal pain nausea vomiting or dysphagia.  Mild reflux.  CXR on 12/05/2023: - Faint left upper lung opacity may be secondary to atelectasis or pneumonia.  CXR on 12/21/2023: - COPD and bilateral chronic apical thickening again noted. - Increasing amorphous density possibly a mass in the left upper outer lung.  CT Chest on 12/21/2023: - 4.6 x 3 cm sized spiculated mass left upper lobe concerning for a primary pulmonary malignancy. - Pulmonary emphysema.  - Suspect bronchial impaction in the right lung base. - Atherosclerotic calcification including the coronary arteries.  CT Chest on 06/26/2024: - In the COLTEN is a spiculated 3.2 x 2.8 x 4.5 cm spiculated mass within the apical division of the apicoposterior segment of the COLTEN.  - The biapical opacities and calcifications are unchanged.  - There are bilateral peripheral reticular opacities and calcified peripheral right lower lobe opacities that are unchanged.  - Left hemithyroidectomy. The chest lymph nodes measure less than 10 mm in the short axis. - Aortic valve calcifications. Coronary artery calcifications.  07/24/2024: Initially seen, imaging reviewed with patient, COLTEN mass appears slightly bigger and denser when compared to previous scan, suspicious for malignancy. I discussed obtaining PET/CT and PFTs for further evaluation. RTC after to discuss findings and next step.   PET/CT on 07/29/2024: - The left apical lung mass corresponding to that seen on CT 6/26/2024 is FDG avid and measures 3.6 x 2.9 cm, SUV 6 (image 69). - FDG avid soft tissue nodule associated with the distal esophageal wall as well as linear FDG uptake at the gastroesophageal junction associated with a small hiatal hernia.  - Mildly FDG avid indistinct right parotid nodule, nonspecific. Consider attention on follow-up PET/CT scan.  Of Note: Reports she is being started on antibiotics today (8/7/24) for right foot ulcer and pending MRI Foot.   08/07/2024: PET/CT with uptake noted in the COLTEN suspicious for malignancy. I suspect it is early-stage lung cancer. However, there is also uptake in her esophagus, of unclear etiology. I would like her to undergo EGD for further evaluation, will refer to Dr. Gold. RTC after to discuss findings and next step. Instructed to hold Eliquis for 3 days prior to EGD. I also want her to obtain PFTs, will refer to Dr. Hua.  PFTs on 09/04/2024: FVC 3.63, 114%; FEV1 2.25, 95%; DLCO 14.3, 67%

## 2024-09-29 PROCEDURE — 86803 HEPATITIS C AB TEST: CPT

## 2024-09-29 PROCEDURE — 99285 EMERGENCY DEPT VISIT HI MDM: CPT

## 2024-09-29 PROCEDURE — 85027 COMPLETE CBC AUTOMATED: CPT

## 2024-09-29 PROCEDURE — 80053 COMPREHEN METABOLIC PANEL: CPT

## 2024-09-29 PROCEDURE — 84100 ASSAY OF PHOSPHORUS: CPT

## 2024-09-29 PROCEDURE — A9579: CPT

## 2024-09-29 PROCEDURE — 93005 ELECTROCARDIOGRAM TRACING: CPT

## 2024-09-29 PROCEDURE — 83735 ASSAY OF MAGNESIUM: CPT

## 2024-09-29 PROCEDURE — 96365 THER/PROPH/DIAG IV INF INIT: CPT

## 2024-09-29 PROCEDURE — 71045 X-RAY EXAM CHEST 1 VIEW: CPT

## 2024-09-29 PROCEDURE — 93923 UPR/LXTR ART STDY 3+ LVLS: CPT

## 2024-09-29 PROCEDURE — 36415 COLL VENOUS BLD VENIPUNCTURE: CPT

## 2024-09-29 PROCEDURE — 73720 MRI LWR EXTREMITY W/O&W/DYE: CPT | Mod: MH

## 2024-09-29 PROCEDURE — 85025 COMPLETE CBC W/AUTO DIFF WBC: CPT

## 2024-09-29 PROCEDURE — 85652 RBC SED RATE AUTOMATED: CPT

## 2024-09-29 PROCEDURE — 93970 EXTREMITY STUDY: CPT

## 2024-09-29 PROCEDURE — 80202 ASSAY OF VANCOMYCIN: CPT

## 2024-09-29 PROCEDURE — 86140 C-REACTIVE PROTEIN: CPT

## 2024-09-29 PROCEDURE — 80048 BASIC METABOLIC PNL TOTAL CA: CPT

## 2024-10-02 ENCOUNTER — APPOINTMENT (OUTPATIENT)
Dept: THORACIC SURGERY | Facility: CLINIC | Age: 84
End: 2024-10-02
Payer: MEDICARE

## 2024-10-02 VITALS
DIASTOLIC BLOOD PRESSURE: 78 MMHG | OXYGEN SATURATION: 100 % | HEART RATE: 58 BPM | WEIGHT: 162 LBS | BODY MASS INDEX: 23.19 KG/M2 | HEIGHT: 70 IN | SYSTOLIC BLOOD PRESSURE: 147 MMHG

## 2024-10-02 DIAGNOSIS — R91.8 OTHER NONSPECIFIC ABNORMAL FINDING OF LUNG FIELD: ICD-10-CM

## 2024-10-02 PROCEDURE — 99215 OFFICE O/P EST HI 40 MIN: CPT

## 2024-10-02 NOTE — CONSULT LETTER
[Dear  ___] : Dear  [unfilled], [Consult Letter:] : I had the pleasure of evaluating your patient, [unfilled]. [( Thank you for referring [unfilled] for consultation for _____ )] : Thank you for referring [unfilled] for consultation for [unfilled] [Please see my note below.] : Please see my note below. [Consult Closing:] : Thank you very much for allowing me to participate in the care of this patient.  If you have any questions, please do not hesitate to contact me. [Sincerely,] : Sincerely, [FreeTextEntry2] : Dr. Gustavo Peterson (PCP/Ref) [FreeTextEntry3] : Sebastian Lopez MD, FACS , Division of Thoracic Surgery [facility] Department of Cardiovascular & Thoracic Surgery  [employer] [facility]

## 2024-10-02 NOTE — CONSULT LETTER
[Dear  ___] : Dear  [unfilled], [Consult Letter:] : I had the pleasure of evaluating your patient, [unfilled]. [( Thank you for referring [unfilled] for consultation for _____ )] : Thank you for referring [unfilled] for consultation for [unfilled] [Please see my note below.] : Please see my note below. [Consult Closing:] : Thank you very much for allowing me to participate in the care of this patient.  If you have any questions, please do not hesitate to contact me. [Sincerely,] : Sincerely, [FreeTextEntry2] : Dr. Gustavo Peterson (PCP/Ref) [FreeTextEntry3] : Sebastian Lopez MD, FACS , Division of Thoracic Surgery [facility] Thoracic Surgery [facility] Department of Cardiovascular & Thoracic Surgery  [employer] at [facility]

## 2024-10-02 NOTE — HISTORY OF PRESENT ILLNESS
[FreeTextEntry1] : Ms. KASSIE LOPEZ, 84 year old female, former smoker (occasional smoker, quit over 40 yrs ago), w/ hx of HTN, HLD, AFib (on Eliquis), hypothyroidism (s/p partial thyroidectomy), chronic right plantar foot ulcers/osteomyelitis (s/p 2nd TMA on 12/08/2023 at [facility]). Post-op she presented to [facility] (12/21-12/28/23) for acute renal failure, workup imaging revealed incidental findings of COLTEN mass. Referred by Dr. Gustavo Peterson (PCP).   CXR on 12/05/2023: - Faint left upper lung opacity may be secondary to atelectasis or pneumonia.  CXR on 12/21/2023: - COPD and bilateral chronic apical thickening again noted. - Increasing amorphous density possibly a mass in the left upper outer lung.  CT Chest on 12/21/2023: - 4.6 x 3 cm sized spiculated mass left upper lobe concerning for a primary pulmonary malignancy. - Pulmonary emphysema.  - Suspect bronchial impaction in the right lung base. - Atherosclerotic calcification including the coronary arteries.  CT Chest on 06/26/2024: - In the COLTEN is a spiculated 3.2 x 2.8 x 4.5 cm spiculated mass within the apical division of the apicoposterior segment of the COLTEN.  - The biapical opacities and calcifications are unchanged.  - There are bilateral peripheral reticular opacities and calcified peripheral right lower lobe opacities that are unchanged.  - Left hemithyroidectomy. The chest lymph nodes measure less than 10 mm in the short axis. - Aortic valve calcifications. Coronary artery calcifications.  07/24/2024: Initially seen, imaging reviewed with patient, COLTEN mass appears slightly bigger and denser when compared to previous scan, suspicious for malignancy. I discussed obtaining PET/CT and PFTs for further evaluation. RTC after to discuss findings and next step.   PET/CT on 07/29/2024: - The left apical lung mass corresponding to that seen on CT 6/26/2024 is FDG avid and measures 3.6 x 2.9 cm, SUV 6 (image 69). - FDG avid soft tissue nodule associated with the distal esophageal wall as well as linear FDG uptake at the gastroesophageal junction associated with a small hiatal hernia.  - Mildly FDG avid indistinct right parotid nodule, nonspecific. Consider attention on follow-up PET/CT scan.  Of Note: Reports she is being started on antibiotics today (8/7/24) for right foot ulcer and pending MRI Foot.   08/07/2024: PET/CT with uptake noted in the COLTEN suspicious for malignancy. I suspect it is early-stage lung cancer. However, there is also uptake in her esophagus, of unclear etiology. I would like her to undergo EGD for further evaluation, will refer to Dr. Gold. RTC after to discuss findings and next step. Instructed to hold Eliquis for 3 days prior to EGD. I also want her to obtain PFTs, will refer to Dr. Hua.  PFTs on 09/04/2024: FVC 3.63, 114%; FEV1 2.25, 95%; DLCO 14.3, 67%  09/27/2024: Established care with Dr. Saskia Angeles (GI). Patient prefers avoiding invasive procedures and understands the implications of having a new mass on imaging. In regards to accessibility, we can perform an upper endoscopy. However, with the lack of GI symptoms, it is unlikely that there is an infiltrative process or mass involving the esophagus. Although EGD biopsy would be the easiest way to confirm the diagnosis, she more likely would need an upper EUS for sampling of periesophageal node or even the lung mass. She is reluctant to have this but will consider. She understands that an EGD is lower yield. Will discuss further with Sulema Lopez and Asif.    Patient is here today for follow up. Overall, she reports to be feeling well. Denies any chest pain, shortness of breath, cough, or hemoptysis.

## 2024-10-02 NOTE — ASSESSMENT
[FreeTextEntry1] : Ms. KASSIE LOPEZ, 84 year old female, former smoker (occasional smoker, quit over 40 yrs ago), w/ hx of HTN, HLD, AFib (on Eliquis), hypothyroidism (s/p partial thyroidectomy), chronic right plantar foot ulcers/osteomyelitis (s/p 2nd TMA on 12/08/2023 at [facility]). Post-op she presented to [facility] (12/21-12/28/23) for acute renal failure, workup imaging revealed incidental findings of COLTEN mass. Referred by Dr. Gustavo Peterson (PCP).   Patient is here today for follow up.    I have independently reviewed the medical records and imaging at the time of this office consultation. 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 Surgical approach reviewed with patient. Discussed risks in details, patient is agreeable to proceed if needed. Will schedule for Left VATS, robotic assisted, COLTEN lingula sparing resection, possible lobectomy. She will need cardiac clearance with stress test. She will need to hold Eliquis for 3 days prior to surgery.  - Additionally, I discussed the importance of doing EGD for the findings in her esophagus. She has pending EGD on 10/10/24.  - Advised to continue follow up with PCP for her foot ulcer.   Recommendations reviewed with patient during this office visit, and all questions answered; Patient instructed on the importance of follow up and verbalizes understanding.    I, FIDELIA Alex, personally performed the evaluation and management (E/M) services for this established patient. That E/M includes conducting the examination, assessing all new/exacerbated conditions, and establishing a new plan of care. Today, my ACP, Bubba Carr NP, was here to observe my evaluation and management services for this new problem/exacerbated condition to be followed going forward.

## 2024-10-02 NOTE — HISTORY OF PRESENT ILLNESS
[FreeTextEntry1] : Ms. KASSIE LOPEZ, 84 year old female, former smoker (occasional smoker, quit over 40 yrs ago), w/ hx of HTN, HLD, AFib (on Eliquis), hypothyroidism (s/p partial thyroidectomy), chronic right plantar foot ulcers/osteomyelitis (s/p 2nd TMA on 12/08/2023 at [facility]). Post-op she presented to [facility] (12/21-12/28/23) for acute renal failure, workup imaging revealed incidental findings of COLTEN mass. Referred by Dr. Gustavo Peterson (PCP).   CXR on 12/05/2023: - Faint left upper lung opacity may be secondary to atelectasis or pneumonia.  CXR on 12/21/2023: - COPD and bilateral chronic apical thickening again noted. - Increasing amorphous density possibly a mass in the left upper outer lung.  CT Chest on 12/21/2023: - 4.6 x 3 cm sized spiculated mass left upper lobe concerning for a primary pulmonary malignancy. - Pulmonary emphysema.  - Suspect bronchial impaction in the right lung base. - Atherosclerotic calcification including the coronary arteries.  CT Chest on 06/26/2024: - In the COLTEN is a spiculated 3.2 x 2.8 x 4.5 cm spiculated mass within the apical division of the apicoposterior segment of the COLTEN.  - The biapical opacities and calcifications are unchanged.  - There are bilateral peripheral reticular opacities and calcified peripheral right lower lobe opacities that are unchanged.  - Left hemithyroidectomy. The chest lymph nodes measure less than 10 mm in the short axis. - Aortic valve calcifications. Coronary artery calcifications.  07/24/2024: Initially seen, imaging reviewed with patient, COLTEN mass appears slightly bigger and denser when compared to previous scan, suspicious for malignancy. I discussed obtaining PET/CT and PFTs for further evaluation. RTC after to discuss findings and next step.   PET/CT on 07/29/2024: - The left apical lung mass corresponding to that seen on CT 6/26/2024 is FDG avid and measures 3.6 x 2.9 cm, SUV 6 (image 69). - FDG avid soft tissue nodule associated with the distal esophageal wall as well as linear FDG uptake at the gastroesophageal junction associated with a small hiatal hernia.  - Mildly FDG avid indistinct right parotid nodule, nonspecific. Consider attention on follow-up PET/CT scan.  Of Note: Reports she is being started on antibiotics today (8/7/24) for right foot ulcer and pending MRI Foot.   08/07/2024: PET/CT with uptake noted in the COLTEN suspicious for malignancy. I suspect it is early-stage lung cancer. However, there is also uptake in her esophagus, of unclear etiology. I would like her to undergo EGD for further evaluation, will refer to Dr. Gold. RTC after to discuss findings and next step. Instructed to hold Eliquis for 3 days prior to EGD. I also want her to obtain PFTs, will refer to Dr. Hua.  PFTs on 09/04/2024: FVC 3.63, 114%; FEV1 2.25, 95%; DLCO 14.3, 67%  09/27/2024: Established care with Dr. Saskia Angeles (GI). Patient prefers avoiding invasive procedures and understands the implications of having a new mass on imaging. In regards to accessibility, we can perform an upper endoscopy. However, with the lack of GI symptoms, it is unlikely that there is an infiltrative process or mass involving the esophagus. Although EGD biopsy would be the easiest way to confirm the diagnosis, she more likely would need an upper EUS for sampling of periesophageal node or even the lung mass. She is reluctant to have this but will consider. She understands that an EGD is lower yield. Will discuss further with Sulema Lopez and Asif.    Patient is here today for follow up. Overall, she reports to be feeling well. Denies any chest pain, shortness of breath, cough, or hemoptysis.

## 2024-10-02 NOTE — HISTORY OF PRESENT ILLNESS
[FreeTextEntry1] : Ms. KASSIE LOPEZ, 84 year old female, former smoker (occasional smoker, quit over 40 yrs ago), w/ hx of HTN, HLD, AFib (on Eliquis), hypothyroidism (s/p partial thyroidectomy), chronic right plantar foot ulcers/osteomyelitis (s/p 2nd TMA on 12/08/2023 at [facility]). Post-op she presented to [facility] (12/21-12/28/23) for acute renal failure, workup imaging revealed incidental findings of COLTEN mass. Referred by Dr. Gustavo Peterson (PCP).   CXR on 12/05/2023: - Faint left upper lung opacity may be secondary to atelectasis or pneumonia.  CXR on 12/21/2023: - COPD and bilateral chronic apical thickening again noted. - Increasing amorphous density possibly a mass in the left upper outer lung.  CT Chest on 12/21/2023: - 4.6 x 3 cm sized spiculated mass left upper lobe concerning for a primary pulmonary malignancy. - Pulmonary emphysema.  - Suspect bronchial impaction in the right lung base. - Atherosclerotic calcification including the coronary arteries.  CT Chest on 06/26/2024: - In the COLTEN is a spiculated 3.2 x 2.8 x 4.5 cm spiculated mass within the apical division of the apicoposterior segment of the COLTEN.  - The biapical opacities and calcifications are unchanged.  - There are bilateral peripheral reticular opacities and calcified peripheral right lower lobe opacities that are unchanged.  - Left hemithyroidectomy. The chest lymph nodes measure less than 10 mm in the short axis. - Aortic valve calcifications. Coronary artery calcifications.  07/24/2024: Initially seen, imaging reviewed with patient, COLTEN mass appears slightly bigger and denser when compared to previous scan, suspicious for malignancy. I discussed obtaining PET/CT and PFTs for further evaluation. RTC after to discuss findings and next step.   PET/CT on 07/29/2024: - The left apical lung mass corresponding to that seen on CT 6/26/2024 is FDG avid and measures 3.6 x 2.9 cm, SUV 6 (image 69). - FDG avid soft tissue nodule associated with the distal esophageal wall as well as linear FDG uptake at the gastroesophageal junction associated with a small hiatal hernia.  - Mildly FDG avid indistinct right parotid nodule, nonspecific. Consider attention on follow-up PET/CT scan.  Of Note: Reports she is being started on antibiotics today (8/7/24) for right foot ulcer and pending MRI Foot.   08/07/2024: PET/CT with uptake noted in the COLTEN suspicious for malignancy. I suspect it is early-stage lung cancer. However, there is also uptake in her esophagus, of unclear etiology. I would like her to undergo EGD for further evaluation, will refer to Dr. Gold. RTC after to discuss findings and next step. Instructed to hold Eliquis for 3 days prior to EGD. I also want her to obtain PFTs, will refer to Dr. Hua.  PFTs on 09/04/2024: FVC 3.63, 114%; FEV1 2.25, 95%; DLCO 14.3, 67%  09/27/2024: Established care with Dr. Saskia Angeles (GI). Patient prefers avoiding invasive procedures and understands the implications of having a new mass on imaging. In regards to accessibility, we can perform an upper endoscopy. However, with the lack of GI symptoms, it is unlikely that there is an infiltrative process or mass involving the esophagus. Although EGD biopsy would be the easiest way to confirm the diagnosis, she more likely would need an upper EUS for sampling of periesophageal node or even the lung mass. She is reluctant to have this but will consider. She understands that an EGD is lower yield. Will discuss further with Sulema Lopez and Asif.    Patient is here today for follow up. Overall, she reports to be feeling well. Denies any chest pain, shortness of breath, cough, or hemoptysis.

## 2024-10-02 NOTE — CONSULT LETTER
[Dear  ___] : Dear  [unfilled], [Consult Letter:] : I had the pleasure of evaluating your patient, [unfilled]. [( Thank you for referring [unfilled] for consultation for _____ )] : Thank you for referring [unfilled] for consultation for [unfilled] [Please see my note below.] : Please see my note below. [Consult Closing:] : Thank you very much for allowing me to participate in the care of this patient.  If you have any questions, please do not hesitate to contact me. [Sincerely,] : Sincerely, [FreeTextEntry2] : Dr. Gustavo Peterson (PCP/Ref) [FreeTextEntry3] : Sebastian Lopez MD, FACS , Division of Thoracic Surgery [facility] Thoracic Surgery [facility] Department of Cardiovascular & Thoracic Surgery  [employer] [facility]

## 2024-10-02 NOTE — H&P ADULT - NSHPLABSRESULTS_GEN_ALL_CORE
[FreeTextEntry1] : Mr Hancock is a 76 year old man presented for follow up of gross hematuria, urinary frequency and urgency. The patient takes tamsulosin 0.4 mg, one half hour after meal twice daily, finasteride 5 mg once daily, and desipramine 10 mg at bedtime. The patient returned to the office 03/20/17 having gross hematuria with blood clot. Dr Holly Bernardo started the patient on Bactrim DS. He eventually went to the ER 03/21/17 where he had a Spann catheter placed. His creatinine in the ER was 1.01. Cystoscopy 03/23/17 found catheter trauma. Prostate protruded modest distance into the bladder. Bladder has a small capacity. Diffuse oozing of blood from bladder neck and trigone. Scraped up mucosa most likely from introduction of telescope. No bladder stones or tumors. Bladder inflammation from catheter. CT urogram was normal and found no etiology of the gross hematuria. 3/1/18: The patient returned and reported he has pain in his rectum area and penis. PSA from 1/24/18 was 0.39 ng/mL. 2/15/18 Creatinine was 1.01 mg/dL, hemoglobin was 14.1 g/dL. He received and abdomen ultrasound which shows a fatty liver, gallstones, and obscured pancreas. He denies any gross hematuria. He noted one day ago his GI physician put banding on his hemorrhoids. From March 1, 2018 through April 4, 2019 patient had significant complaints of perirectal and rectal pain.  He did have issues with hemorrhoids and an anal fissure.  Some of the symptoms did seem referable to recurrent bouts of prostatitis exacerbation.  He was treated intermittently generally with Bactrim or Cipro and usually with some improvement in symptoms.  He had a May 9, 2019 transrectal ultrasound of the prostate which showed a prostate volume was 23.4 cm and a hypoechoic area at the left apex which was about 1.4 cm in transverse image.  The seminal vesicles were unremarkable and no rectal masses were seen.  The hypoechoic area was compatible with an increased risk of prostate cancer but the patient has a very low PSA being under 0.4 on recurrent readings and by palpation had not felt any suspicious findings in the past.  I asked for a transperineal needle biopsy of the prostate of the hypoechoic region but the patient declined because of his anal pain and the known anal fissure.  The patient underwent a anal fistula repair.  It helped his symptoms but symptoms persist.  And occasionally got worse is colorectal surgeon said that at the current time he had no fistulas no hemorrhoids and as far as the surgery could so there was no actual anal rectal pathology despite the perirectal pain and occasional rectal pain.  The patient I thought possibly that it was related to his residual chronic prostatitis.  On August 25, 2020 the patient reported increasing his nocturia.  He at that time was using metronidazole rectal gel for rectal pains which seem to help.  The November 17, 2020 digital rectal exam showed no a rectal mucosal nodule.  He was asked to follow-up with his gastrointestinal surgeon (colorectal surgeon) and her gastroenterologist.  The patient's colorectal surgeon is Dr. Mustapha cardoso.  A MRI of April 29, 2021 showed no evidence of a perianal fistula despite the patient's complaint of continued rectal pain.  In the past the patient be given desmopressin for his nocturia which did not seem to help and was discontinued.  He he saw his cardiologist  in the late spring 2021 who found no EKG problems.  By September 2021 he was complaining of strain to urinate and having to push at this point he wanted to resume finasteride and tamsulosin which he had stopped previously.  The patient was not on trospium at the time he had stopped tadalafil due to unpleasant feeling.   09/29/2021: Patient presents today for follow up. 9/1/21 UA showed 12 RBC/HPF and trace blood by dipstick. On repeat 9/9/21, he had only 3 RBC/HPF and small blood by dripstick which normal. Urine cytology 9/1/21 negative for high grade urothelial carcinoma. Provided blood work from 9/17/21 done by PCP which showed UA slightly cloudy, 3-5 RBC/HPF. PSA 0.2. Wakes 4x at night to urinate. States he has rectal pain when bladder is full. 02/02/2022: Patient presents today for follow up. Reviewed 12/29/21 lab work which showed MCV low at 75.2, WBC 6600, Hb 12.1, platelets 060323, potassium 4.9, creatinine 0.90 upper limits 1.18, BUN high at 25, AST minimally elevated 39 upper limits 34, alk phos normal 60, HbA1c 6.0, UA dipstick positive small blood, moderate leukocyte esterase, few squamous epithelial cells. No gross hematuria. Has pain when he sits and feels better when standing or laying down. States he must push to urinate. No pain on urination. Nocturia x3-4. Takes tamsulosin 0.4mg BID. Reports his EKG is normal. 03/14/2022: Patient presents today for follow up. Has been doing well, however does have to push urination. Nocturia x4. Erections are poor but not interested in medications. No dysuria. Stream is slow. Continues finasteride 5mg once daily and tamsulosin 0.4mg BID. Does not take tadalafil every day. Does not have any red itchy skin and has not needed to use clotrimazole-betamethasone cream. Reviewed 2/2/22 lab work. Urine culture grew 10,000-40,000 CFU/mL Enterococcus faecalis and patient was treated with amoxicillin 500mg TID and feels better now. PSA 0.24. Free testosterone low 3.9. Lab work of 2/11/22 showed brain natriuretic peptide normal, procalcitonin undetectable <0.10 ng/mL. Will be having evaluation for anorectal pain, with the proposed procedures being anorectal manometry, electromyography, pudendal nerve terminal motor latency, and endorectal ultrasound. Denies bladder pain. Will be having procedure for varicose veins of RLE. 03/30/2022: Patient presents today for follow up. Urination is good. Continues finasteride 5mg once daily and tamsulosin 0.4mg BID. Did not help with urination. Denies dysuria. Only has urinary urgency at night. Notes he eats spicy foods. No pain where he sits. Energy levels are good. Has tried Cialis in the past which did not help much. Erections had been poor since the biopsy. Reviewed 3/13/22 lab work which was WNL except free testosterone low 4.0, LH elevated 19, dihydrotestosterone low 4.1. Total testosterone normal 250. PSA was 0.20. UA normal. Urine cytology showed clusters of atypical urothelial cells with high nuclear cytoplasmic ratio, nuclear hyperchromasia and irregular nuclear contours in a background of moderate acute inflammation. States he went to rectal surgeon for anorectal pain, and had US which was reportedly normal and found muscles are weak, and was advised muscle dysfunction physical therapy which he is currently attending. Reviewed 3/13/22 lab work with patient. Urine cytology showed clusters of atypical urothelial cells with high nuclear cytoplasmic ratio, nuclear hyperchromasia and irregular nuclear contours in a background of moderate acute inflammation, and which we will continue to watch. If cells persist, we will consider imaging test of kidneys, and cystoscopy. Recommend avoid spicy foods as it will cause irritation of both bladder and rectum, but is not harmful. Continue finasteride 5mg once daily and tamsulosin 0.4mg BID. I prescribed the patient tadalafil 5mg once daily for BPH symptoms and ED. I informed the patient that they may experience tingling of the skin, headache, warm flushed feeling, heartburn, backache, and on rare occasion, visual or hearing disturbances. Informed the patient how to use [employer] to  tadalafil at a [employer] or [employer] for a discounted price. I explained it had been previously used as heart medication and that it is safe to take as long as it is not taken with nitroglycerin which would cause BP to drop too low. The patient produced a urine sample which will be sent for urinalysis, urine cytology, and urine culture. RTO in 3 months for follow up or sooner if new urinary symptoms develop.  04/08/2022: Patient presents today for a follow up telephone visit for which he gave permission for. The visit was mostly conducted with his wife who was present. The patient has been taking finasteride 5 mg once daily and tamsulosin 0.4 mg BID. I prescribed the patient tadalafil 5 mg once daily at the time of the last visit, however he has not taken it. The pt's wife states today that her  told me during his 3/30/2022 visit he had a problems taking tadalafil which included flatulence and severe muscle aches. Both my personal memory and my note from that day do not recall him ever saying that, however there must have been some sort of miscommunication.  04/28/2022: Patient presents today for a follow up visit. Yesterday, he was having pain, but today he is feeling better. On 4/27/22, his PSA was 0.21 ng/mL, creatinine was 1.04. He also is complaining of rectal pain. He currently is taking Silodosin, one capsule daily, Tamsulosin once daily, Proscar once daily and Cefadroxil. His symptoms have improved with Cefadroxil. Previously he took Cipro, but it did not work satisfactorily. He is not taking Tadalafil. He denies urgency, but is experiencing slight urge incontinence. He reports Gabapentin alleviated his pain.  The patient produced a urine sample which will be sent for urinalysis, urine cytology, and urine culture. I am prescribing Gabapentin, 300 mg, two capsules at night, one in the morning for his pain. RTO in 1 month, sooner if needed.  06/07/2022: 's wife called today and spoke to my nurse. She stated that I told her  to take tamsulosin 0.4 mg 2 capsules BID, however there was a misunderstanding and his wife wanted to speak to me. I informed her that it is tamsulosin 0.4 mg one capsule BID and she understood. His wife reports that he has been having difficulties urinating. Has an appt to see me on 6/30/2022.  Urine cytology of 4/28/2022 revealed rare atypical cells with nuclear enlargement, prominent nucleoli, and vacuolated cytoplasm. It was my recommendation after reviewing back in April that he schedule a cystoscopy to evaluate atypical urine cytology, however the pt has not scheduled one yet. Last cystoscopy was in 2017. His wife understood and informed me she would schedule him for a cystoscopy with me within the next two weeks. I am sending the patient for a CT Urogram w/wo IV contrast. Creatinine was 1.04 on 4/27/2022. Patient will RTO for cystoscopy and to go over the results of his CT.  07/20/2022:  presents today for a follow up. Patient obtained a CT Urogram w/wo contrast on 7/14/2022 to evaluate microscopic hematuria. CT revealed renal cysts and less than 1 cm low-attenuation lesions which are too small to characterize. No enhancing lesions, or renal stones are identified. On the delayed images, there is poor opacification of the proximal and distal ureter and the right distal ureter. No abnormal soft tissue or gross urothelial lesion is appreciated. Bladder was within normal limits. The anterior aspect of the bladder is not opacified on the delayed imaging. No lymphadenopathy was seen. The patient was pleased with the CT findings. We reviewed his prostate symptoms. Has a long hx of chronic prostatitis that initially responded to cipro but sometimes required bactrim. He currently has no acute problems. Denies pain when sitting for long periods of time. Has some minor urinary urgency and frequency at times but is overall pleased with his urologic state. Continues on finasteride 5 mg once daily, tamsulosin 0.4 mg once daily, tadalafil 5 mg once daily, and silodosin 8 mg once daily. Last PSA 4/27/2022 was 0.21. Creatinine at that time was 1.04.  Clinical findings and CT results were reviewed at length with the patient and his wife. He was pleased with the findings. The patient produced a urine sample which will be sent for urinalysis, urine cytology, and urine culture. Patient will RTO in 6 months or sooner if clinically indicated.  07/27/2022:  presents today for a telephone visit for which he gave permission for. He wanted to review the results of his most recent urine test results. UA was perfect. Culture was no growth. Cytology consisted of mainly mature squamous epithelial cells, acute inflammation, and rare benign urothelial cells. Cytology was negative for high grade urothelial carcinoma. His wife inquired on why he needs to proceed with a cystoscopy  Reviewed and discussed urine test results of 7/20/2022 in detail with the patient. Given that these tests were so good, he does not need to schedule a cystoscopy at this time. I previously requested a cysto based off of a concerning urine cytology back in April. Requested that the patient provide a urine specimen in 2 weeks and another one 2-4 weeks after that to ensure cytologies remain good. So long as they are favorable, he can proceed without cystoscopy. Both the patient and his wife who is a retired pathologist understood. Patient will follow up in January at his scheduled appointment time.  03/02/2023: Mr. VIVIENNE HANCOCK presents today for a follow up. The pt continues to work as a . Patient provided a urine specimen on 2/28/2023. UA was normal. Culture was no significant growth. Urine cytology was negative for high grade urothelial carcinoma. Pt denies ever smoking or using tobacco products. Pt recently went to gastroenterologist and was told his stomach was distended. He admits to pushing and straining upon urination. His gastroenterologist put in orders for an US which he has scheduled. Patient continues to take finasteride 5 mg once daily and tamsulosin 0.4 mg BID. No longer is taking tadalafil 5 mg once daily.  Reviewed outside lab results of 1/8/2023 that was done at an outside facility. Creatinine was 1.10. A1C was 6.0. Urine was considered abnormal yellow, could be due to vitamin. Specific gravity on the lower side. Patient's PSA back in April 2022 was 0.21. Blood work today included repeat PSA. Reviewed urine test results of 2/28/2023. Renewed finasteride 5 mg once daily. I am prescribing the pt Silodosin 8 mg once daily with food to replace one of the tamsulosin. I informed him of the possible side effects of lightheadedness, nasal congestion, and decreased amount of semen when ejaculating. The pt was hesitant to restart tadalafil 5 mg once daily as he feels he is on a lot of medications. I then offered surgical intervention as an option and suggested he talk to one of my colleagues about potential options. He then opted to try tadalafil 5 mg once daily and would consider surgical intervention if it did not help. Patient will RTO in 2-3 weeks for a ZENA and to review how he is doing on the changes in his medications.  05/22/2023: Mr. VIVIENNE HANCOCK was scheduled today for a follow up audio only telephone visit for which he gave permission for. I was unable to reach the pt but did reach his wife, Dr.Almas Benjamin who was located at their home, [address] [place] [address], and I was located in my office in [place], [place]. She informs me that the pt has a lot of pain in the rectum. It hurts when he lays down or when standing. The pain is all the time and not just when he moves the bowels. He has a rectal surgeon but he does not think it is a problem with the rectum. Pt has constipation but wife reports he manages it well.  I prescribed the patient Levofloxacin 500 mg, one tablet daily until finished. I instructed him to avoid strenuous exercise for two weeks as there is a risk of tendonitis. Up to 1-2 months after finishing it, if he experiences some tendonitis he should not work through it and should rest. I also advised him to avoid direct sunlight while taking this medication. Pt was instructed to not take this medication with dairy products. If the pain does not improve within a few days or worsens, they were instructed to call back sooner and we might consider adding a medication for neuropathic pain such as gabapentin. Patient will have a telehealth visit in 2 weeks for reassessment, sooner if clinically indicated.  06/15/2023: Mr. VIVIENNE HANCOCK presents today for a follow up and ZENA. He reports no significant changes in urination. Does c/o back pain. Had a CT at [facility]. Is planning on getting an epidural injection for pain. Pt reports he had lab work done yesterday, however the results are not yet back. He also reports perineal pain when he sits. His gastroenterologist is prescribing suppositories.  The knee-chest position was utilized for the ZENA. Digital rectal exam found no suspicious rectal masses. Normal seminal vesicles. Anal tone is normal. The prostate is non tender, with normal texture, slightly firm, discrete borders, and no nodules. It is a 20 gram transurethral resection size. No rectal mucosal lesions. No gross blood on the examining finger. Reviewed imaging results from the spine specialist which he brought with him today. Has spinal canal stenosis in lumbar spine. Also reviewed most recent US of the abdomen done back in March ordered by his gastroenterologist. Does have a fatty liver but no ascites. Both kidneys were normal with no hydro. Patient will schedule a telehealth visit tomorrow to review results of his lab work.  06/16/2023: Mr. HANCOCK is a 75 year old male presenting today via telehealth using real time 2 way audio only technology. The patient, Mr. HANCOCK, was located in his home at [address] [place] [address] at the time of the visit. The provider, FREDERIC BETH, was located in his office on [place], [place]. Verbal Consent was given by the patient on 06/16/2023 and my scribe served as a witness to the verbal consent. The patient provided blood sample on June 13 at [facility] but they seemed not available. I called to inquire and it was determined that an entry mistake occurred where the lab results was misdated and it was reported as being drawn as March 1st. The  was agreeable to correct the mistake. I called the patient again and explained the mishap to the patient. I explain that his CBC on 06/13/2023 shows that his blood count is low. HGB is low at 9.8. HCT low at 34.9. Mean Cell Volume also low at 69.7. He reports that his diet has not changed recently. He says his last colonoscopy was several years ago.  Plan: Pt will inform his PCP and Gastroenterologist about his low blood count. His last colonoscopy was about 5 years ago. In view of the progressive anemia and the small red blood cells the gastroenterologist might be interested in performing upper GI endoscopy as well as a repeat colonoscopy. If gastroenterology evaluation does not disclose any cause for the anemia I will ask that the primary care physician evaluate the anemia further. If primary care physician would prefer I will refer the patient to a hematologist. I have asked the patient to provide blood specimen for CBC with Iron level and urine sample for Urinalysis, Culture and cytology.  07/17/2023: Mr. VIVIENNE HANCOCK presents today for a follow up audio only telephone visit for which he gave permission for. The pt was located at home, [address] [place] [address], and I was located in my office in [place], [place]. The patient reports that he had some rectal and perineal pain last night. He admits a lot of constipation and hard stool. Pt has not yet spoken to his PCP or gastroenterologist yet about his recent lab work. Patient counseled on constipation management. Pt advised to take 2 docusate or colace and miralax if needed. He was advised to increase his water intake. I believe his perineal pain is from the constipation and not from prostatitis, however if relieving his constipation does not help, we will reassess. Patient will repeat lab work this week at his nearest  lab. Orders were put in today. Patient will have a telehealth visit next week for reassessment, sooner if clinically indicated.  09/11/2023: Mr. VIVIENNE HANCOCK presents today for a follow up. He reports ongoing problems with anal/ rectal pain. Denies any testicular pain. Has spoken to his colorectal surgeon and he feels he was not helped enough. Gabapentin 300 mg helps his pain overall. He has not tried taking it in the morning because it makes him a little tired and he is fearful of driving. Pt had lab work done at his PCP on 7/21/2023. Creatinine was normal. Urine tests were normal. PSA was not done. Pt is on an iron supplement which causes some constipation. Eats a diet rich in vegetables. Pt has a pacemaker. Admits pushing and straining on urination.  I increased the dosage of Tamsulosin 0.4 mg from once daily to twice daily. My recommendation is try taking gabapentin two at night and one in the morning for at least one month and be cautious with driving. If this does not work for him, we can look into a different medication for neuropathic pain. Reviewed and discussed lab work of 7/21/2023 in detail with the pt. Pt strongly advised to increase his water consumption to reduce constipation. I was considering ordering an MRI of the prostate given his perineal pain, however given the patient's pacemaker and that his pain has been found to be mostly neuropathic in origin, he will RTO for an US of the prostate to check for any abscesses.  12/4/23: patient here for urgent visit for rectal pain same as described to dr beth in Sept 2023 constipation betterseeing colorectal surgeon- to see in Jan 2024- normal eval by reportlast week urine done wiht PCP : ( nov 25) : nomal - took 3d of levaquinpsa 0.17creat 1.0normal white count 8.0no luts- good flow, no strain feels empty after void here for eval:1- check urine2- exam signif for right sided pelvic pain -3- to f/u with dr beth-consider PT of pelvic floor  12/12/2023: Mr. HANCOCK presents today for a follow up audio only telehealth visit for which they gave permission for. The patient was located at [place] [address] [address] and I was located in my office in [place], [place]. Patient's 12/4/23 UA showed Proteinuria 30 mg/dL, trace Ketonuria, moderate blood, 19/HPF of WBC, 64/HPF of RBC and mucus present.  Wife reports the patient is experiencing terrible pain. The patient did take took levofloxacin but the abx resulted in no relief. They desired a stronger antibiotic, but I explained to them, if the microscopic hematuria does not clear I believed it to be suppurative to obtain a cystoscopy. Also explained this may be symptoms of bladder cancer, so it is within their best interest to obtain a Cystoscopy.   Patient is not agreeable to obtaining a cystoscopy as he believes it was a waste of time. I assured patient obtaining one is strongly suggested as his symptoms are ones of bladder cancer. His last Cystoscopy was back in March 23 2017. As further intervention, I prescribed the patient doxycycline 100 mg, one capsule every 12 hours. I informed the patient to avoid excessive amounts of direct sunlight while on this medication. If Cystoscopy is negative and Doxycycline does not relieve pain, I will recommend Pelvic floor therapy.  Pt will schedule a telehealth visit in 2 weeks.   01/17/2024: Mr. VIVIENNE HANCOCK presents today for an audio visual telehealth visit for which he gave permission for. The patient was located at home at [address] [place] [address] and I was located at my office in [place], [place]. When I called, the patient was in physical therapy and gave permission for me to discuss with his wife by telephone call. She informed me that he is having a lot of pain in his prostate. At the time of his last suspected episode of prostatitis, I prescribed him doxycycline, however she informs me that it did not help him at all. The patient's last MRI of the pelvis was in April 2021. The patient now has a pacemaker. He is currently taking gabapentin.   Reviewed and discussed lab work of 12/28/2023 in detail with the pt's wife.  Patient will provide a new urine specimen at his nearest  lab. While he is there he will also have blood drawn for orders I have put in today. If he has pyuria, will consider abx. Advised him to take two gabapentin at night for pain. He was advised to not drive if he is drowsy from the gabapentin.  I am sending the patient for a CT abdomen and pelvis to evaluate prostate pain.  Patient will have a telehealth visit after obtaining the CT to review and discuss results. He will also have a telehealth visit this Friday to discuss urine test results.    01/22/2024: Mr. VIVIENNE HANCOCK presents today for a follow up audio only telephone visit for which he gave permission for. The pt was located at home, [address] [place] [address], and I was located in my office in [place], [place]. The patient has not yet obtained the CT I ordered, nor has he supplied a urine specimen to a [facility] lab. He reports that he is feeling okay this week.    Patient will obtain the CT abdomen and pelvis w/wo contrast.  Patient will have a telehealth visit after obtaining the CT to review and discuss results.    01/31/2024: Mr. VIVIENNE HANCOCK presents today for a follow up audio only telephone visit for which he gave permission for. The pt was located at home, [address] [place] [address], and I was located in my office in [place], [place]. Visit was conducted with patient's wife. Patient had lab work done on 1/29/2024. PSA was very good at 0.19. Creatinine was 0.99. UA was compatible with current infection. The urine appeared cloudy, with 30 mg/dl of protein, large blood by dipstick, moderate LEC, 198 WBC/HPF, 132 RBC/HPF and only 1 epithelial cell/HPF indicating it was a clean catch. However, urine culture was no significant growth. Urine cytology was negative for high grade urothelial carcinoma. Rare benign urothelial cells and squamous cells were seen in a background of severe acute inflammation and RBCs. Patient had PT yesterday which included ZENA and massage. She states that they felt something was wrong with his prostate. I assume they meant it was boggy.    Reviewed and discussed lab work of 1/29/2024 in detail with the pt.  I prescribed the patient Ciprofloxacin 500 mg, one tablet twice a day. I informed the patient that Ciprofloxacin may cause sensitivity to direct sunlight.  Will provide a urine specimen after completion of abx.  Will have a telehealth visit after providing a urine specimen to review and discuss results as well as how he is feeling.    02/29/2024: Mr. HANCOCK presents today for a follow up audio-only telehealth visit for which they gave permission for. The patient was located at home [address] [place] [address] and I was located in my office in [place], [place]. CT Abdomen and Pelvis 2/16/24 that revealed No renal calculus or hydronephrosis. Renal cysts, largest measuring 2.3 cm in the right upper pole. Subcentimeter lesions too small to characterize, likely additional cysts. Nonspecific wall thickening of the bladder with perivesicular fat stranding, which may be seen in the setting of cystitis. Post abx, patient endorses improvement. Denies taking any abx prior to urine testing. After 7 days he discontinued the Cipro due to diarrhea and loose stool. We will consider this for the future.  Clinical findings and CT Abdomen and Pelvis results were reviewed at length with the patient. I personally reviewed the imaging myself. Patient has evidence of bladder inflammation on CT scan but otherwise findings are quite good. Advised patient to increase fluid intake.  Pt will go to his nearest [facility] lab to provide a urine sample which will be sent for urinalysis, urine culture, and urine cytology. The patient will return to office in 3-4 months.  04/26/2024: Mr. HANCOCK presents today for a follow up audio-only telehealth visit for which they gave permission for. The patient was located at home [address] [place] [address] and I was located in my office in [place], [place].  The patient has a long history of irritative bladder symptoms with bladder pain and dysuria. He had a cystoscopy in 2017 but has declined any further cystoscopies. He declines cystoscopies in the office with lidocaine anesthesia and oral sedation. He declines cystoscopy under anesthesia in the operating room. I have explained to him the risks and benefits of cystoscopy and he adamantly refuses. I have explained things to his wife to administer help him persuading him to undergo cystoscopy. She is a retired pathologist but has not been able to persuade him. We have had a recent CT urogram which was done February 16, 2024 no urinary calculi or hydronephrosis was seen. There is no evidence of an enhancing renal mass or suspicious urothelial lesion. There was nonspecific wall thickening of the bladder with perivesical fat stranding.  The patient recently experienced dysuria which had been quiescent for a while. He provided a urine specimen on April 22, 2024 which demonstrated large blood by dipstick; small leukocyte esterase by dipstick. He had 97 white blood cells per high-power field and 187 red blood cells per high-power field. Nitrites were negative. There is only 1 epithelial cell per high-power field and microscopic examination was negative for bacteria. Urine cytology was negative for high-grade urothelial carcinoma. There was scattered increased red blood cells and white blood cells present in the cytology specimen. There was a background of benign urothelial cells and rare squamous cells. Interestingly urine culture was no growth.  The prior urinalysis was on January 29, 2024. It had 3 mg/dL of protein with specific gravity 1.021. The appearance was cloudy. Blood was large by dipstick. Leukocyte esterase was moderate by dipstick. It was 198 white blood cells per high-power field and 132 red blood cells per high-power field. No bacteria were seen on the microscope and there was 1 epithelial cell per high-power field. Nitrites were negative. In this case the urine culture showed less than 10,000 colony-forming units per milliliter of normal urogenital ok.  The reason for the pyuria is unclear CT scan urogram February 16, 2024 had not shown any evidence of chronic renal tuberculosis. There was no evidence in his urine testing of fungal infection though a urine culture specifically for fungus had not been done recently. The patient is from [place]. His wife denies that he had any known exposure to tuberculosis. Currently he no longer has dysuria and is at baseline symptomatically.  Reviewed and discussed laboratory work of 4/22/24 which He obtained prior to today's visit as requested. PSA is undetectable which is excellent.  4/22/24 UA showed trace proteinuria, large blood, small Leukocyte Esterase, 97/HPF of WBC, 187/HPF of RBC. Cytology is negative for high grade urothelial carcinoma. Scattered increased red blood cells and white blood cells are present in a background of benign urothelial cells and rare squamous cells. Culture showed no growth.   I have asked that the patient submit a first morning urine from 3 different days for acid-fast culture. I will also ask for urine to be cultured for fungus. I explained the reasoning as to why I consider these tests of value with the wife gave assurances that this would be done but again repeated that the patient would not have a cystoscopy. Patient was present in the room as we had this audio only telephone visit. He asked his questions of his wife to relay to me. I answered all their questions. He did ask for an in person appointment which we will provide.  10/02/2024: Mr. VIVIENNE HANCOCK presents today for an audio only telehealth visit for which he gave permission for. The patient was located at home at [address] [place] [address] and I was located at my office in [place], [place]. He is accompanied by his wife, Candido Benjamin. Patient reports that things are stable. Urination is quite good. Denies any pain when he sits. Denies any pain related to the prostate. Patient had lab work done on 9/25/2024. UA was normal other than a trace of LE. Urine culture was no growth. Urine cytology was negative for high grade urothelial carcinoma.    Reviewed and discussed lab work of 9/25/2024 in detail with the pt. We reviewed prior PSA values. Given that he has been stable and currently has no symptoms, I feel it is safe to get another PSA level in December.    Renewed finasteride 5 mg once daily and tamsulosin 0.4 mg BID.  Patient will have lab work done in December, followed by a telehealth visit to review the results.    Duration of telephone visit was 21 minutes.  EKG reviewed: Rapid A Fib

## 2024-10-02 NOTE — ASSESSMENT
[FreeTextEntry1] : Ms. KASSIE LOPEZ, 84 year old female, former smoker (occasional smoker, quit over 40 yrs ago), w/ hx of HTN, HLD, AFib (on Eliquis), hypothyroidism (s/p partial thyroidectomy), chronic right plantar foot ulcers/osteomyelitis (s/p 2nd TMA on 12/08/2023 at [facility]). Post-op she presented to [facility] (12/21-12/28/23) for acute renal failure, workup imaging revealed incidental findings of COLTEN mass. Referred by Dr. Gustavo Peterson (PCP).   Patient is here today for follow up.    I have independently reviewed the medical records and imaging at the time of this office consultation. 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 Surgical approach reviewed with patient. Discussed risks in details, patient is agreeable to proceed if needed. Will schedule for Left VATS, robotic assisted, COLTEN lingula sparing resection, possible lobectomy. She will need cardiac clearance with stress test. She will need to hold Eliquis for 3 days prior to surgery.  - Additionally, I discussed the importance of doing EGD for the findings in her esophagus. She has pending EGD on 10/10/24.  - Advised to continue follow up with PCP for her foot ulcer.   Recommendations reviewed with patient during this office visit, and all questions answered; Patient instructed on the importance of follow up and verbalizes understanding.    I, FIDELIA Alex, personally performed the evaluation and management (E/M) services for this established patient. That E/M includes conducting the examination, assessing all new/exacerbated conditions, and establishing a new plan of care. Today, my ACP, Bubba Carr NP, was here to observe my evaluation and management services for this new problem/exacerbated condition to be followed going forward.

## 2024-10-03 DIAGNOSIS — I35.0 NONRHEUMATIC AORTIC (VALVE) STENOSIS: ICD-10-CM

## 2024-10-03 DIAGNOSIS — I48.91 UNSPECIFIED ATRIAL FIBRILLATION: ICD-10-CM

## 2024-10-10 ENCOUNTER — APPOINTMENT (OUTPATIENT)
Dept: GASTROENTEROLOGY | Facility: HOSPITAL | Age: 84
End: 2024-10-10

## 2024-10-10 ENCOUNTER — OUTPATIENT (OUTPATIENT)
Dept: OUTPATIENT SERVICES | Facility: HOSPITAL | Age: 84
LOS: 1 days | End: 2024-10-10
Payer: MEDICARE

## 2024-10-10 ENCOUNTER — RESULT REVIEW (OUTPATIENT)
Age: 84
End: 2024-10-10

## 2024-10-10 DIAGNOSIS — Z98.89 OTHER SPECIFIED POSTPROCEDURAL STATES: Chronic | ICD-10-CM

## 2024-10-10 DIAGNOSIS — M53.9 DORSOPATHY, UNSPECIFIED: Chronic | ICD-10-CM

## 2024-10-10 DIAGNOSIS — K22.89 OTHER SPECIFIED DISEASE OF ESOPHAGUS: ICD-10-CM

## 2024-10-10 PROCEDURE — 88313 SPECIAL STAINS GROUP 2: CPT | Mod: 26

## 2024-10-10 PROCEDURE — 43239 EGD BIOPSY SINGLE/MULTIPLE: CPT

## 2024-10-10 PROCEDURE — 88312 SPECIAL STAINS GROUP 1: CPT | Mod: 26

## 2024-10-10 PROCEDURE — 88305 TISSUE EXAM BY PATHOLOGIST: CPT | Mod: 26

## 2024-10-10 DEVICE — HEMOSPRAY HEMOSTAT ENDO 7F: Type: IMPLANTABLE DEVICE | Status: FUNCTIONAL

## 2024-10-10 DEVICE — ESOPHAGEAL BALLOON CATH CRE FIXED WIRE 6-7-8MM: Type: IMPLANTABLE DEVICE | Status: FUNCTIONAL

## 2024-10-11 LAB — SURGICAL PATHOLOGY STUDY: SIGNIFICANT CHANGE UP

## 2024-10-14 ENCOUNTER — APPOINTMENT (OUTPATIENT)
Dept: CARDIOLOGY | Facility: CLINIC | Age: 84
End: 2024-10-14

## 2024-10-17 ENCOUNTER — OUTPATIENT (OUTPATIENT)
Dept: OUTPATIENT SERVICES | Facility: HOSPITAL | Age: 84
LOS: 1 days | End: 2024-10-17
Payer: MEDICARE

## 2024-10-17 VITALS
HEIGHT: 70 IN | DIASTOLIC BLOOD PRESSURE: 84 MMHG | WEIGHT: 171.96 LBS | RESPIRATION RATE: 18 BRPM | OXYGEN SATURATION: 99 % | TEMPERATURE: 98 F | SYSTOLIC BLOOD PRESSURE: 160 MMHG | HEART RATE: 93 BPM

## 2024-10-17 DIAGNOSIS — M53.9 DORSOPATHY, UNSPECIFIED: Chronic | ICD-10-CM

## 2024-10-17 DIAGNOSIS — Z98.89 OTHER SPECIFIED POSTPROCEDURAL STATES: Chronic | ICD-10-CM

## 2024-10-17 DIAGNOSIS — Z01.818 ENCOUNTER FOR OTHER PREPROCEDURAL EXAMINATION: ICD-10-CM

## 2024-10-17 DIAGNOSIS — Z90.89 ACQUIRED ABSENCE OF OTHER ORGANS: Chronic | ICD-10-CM

## 2024-10-17 DIAGNOSIS — Z98.890 OTHER SPECIFIED POSTPROCEDURAL STATES: Chronic | ICD-10-CM

## 2024-10-17 LAB
ABO RH CONFIRMATION: SIGNIFICANT CHANGE UP
ANION GAP SERPL CALC-SCNC: 5 MMOL/L — SIGNIFICANT CHANGE UP (ref 5–17)
BASOPHILS # BLD AUTO: 0.05 K/UL — SIGNIFICANT CHANGE UP (ref 0–0.2)
BASOPHILS NFR BLD AUTO: 0.8 % — SIGNIFICANT CHANGE UP (ref 0–2)
BLD GP AB SCN SERPL QL: SIGNIFICANT CHANGE UP
BUN SERPL-MCNC: 15 MG/DL — SIGNIFICANT CHANGE UP (ref 7–23)
CALCIUM SERPL-MCNC: 9.8 MG/DL — SIGNIFICANT CHANGE UP (ref 8.5–10.1)
CHLORIDE SERPL-SCNC: 105 MMOL/L — SIGNIFICANT CHANGE UP (ref 96–108)
CO2 SERPL-SCNC: 27 MMOL/L — SIGNIFICANT CHANGE UP (ref 22–31)
CREAT SERPL-MCNC: 1.09 MG/DL — SIGNIFICANT CHANGE UP (ref 0.5–1.3)
EGFR: 49 ML/MIN/1.73M2 — LOW
EOSINOPHIL # BLD AUTO: 0.17 K/UL — SIGNIFICANT CHANGE UP (ref 0–0.5)
EOSINOPHIL NFR BLD AUTO: 2.8 % — SIGNIFICANT CHANGE UP (ref 0–6)
GLUCOSE SERPL-MCNC: 101 MG/DL — HIGH (ref 70–99)
HCT VFR BLD CALC: 41.3 % — SIGNIFICANT CHANGE UP (ref 34.5–45)
HGB BLD-MCNC: 13.4 G/DL — SIGNIFICANT CHANGE UP (ref 11.5–15.5)
IMM GRANULOCYTES NFR BLD AUTO: 0.5 % — SIGNIFICANT CHANGE UP (ref 0–0.9)
LYMPHOCYTES # BLD AUTO: 1.24 K/UL — SIGNIFICANT CHANGE UP (ref 1–3.3)
LYMPHOCYTES # BLD AUTO: 20.3 % — SIGNIFICANT CHANGE UP (ref 13–44)
MCHC RBC-ENTMCNC: 32.4 GM/DL — SIGNIFICANT CHANGE UP (ref 32–36)
MCHC RBC-ENTMCNC: 32.4 PG — SIGNIFICANT CHANGE UP (ref 27–34)
MCV RBC AUTO: 99.8 FL — SIGNIFICANT CHANGE UP (ref 80–100)
MONOCYTES # BLD AUTO: 0.61 K/UL — SIGNIFICANT CHANGE UP (ref 0–0.9)
MONOCYTES NFR BLD AUTO: 10 % — SIGNIFICANT CHANGE UP (ref 2–14)
NEUTROPHILS # BLD AUTO: 4.01 K/UL — SIGNIFICANT CHANGE UP (ref 1.8–7.4)
NEUTROPHILS NFR BLD AUTO: 65.6 % — SIGNIFICANT CHANGE UP (ref 43–77)
PLATELET # BLD AUTO: 237 K/UL — SIGNIFICANT CHANGE UP (ref 150–400)
POTASSIUM SERPL-MCNC: 4.3 MMOL/L — SIGNIFICANT CHANGE UP (ref 3.5–5.3)
POTASSIUM SERPL-SCNC: 4.3 MMOL/L — SIGNIFICANT CHANGE UP (ref 3.5–5.3)
RBC # BLD: 4.14 M/UL — SIGNIFICANT CHANGE UP (ref 3.8–5.2)
RBC # FLD: 13.1 % — SIGNIFICANT CHANGE UP (ref 10.3–14.5)
SODIUM SERPL-SCNC: 137 MMOL/L — SIGNIFICANT CHANGE UP (ref 135–145)
WBC # BLD: 6.11 K/UL — SIGNIFICANT CHANGE UP (ref 3.8–10.5)
WBC # FLD AUTO: 6.11 K/UL — SIGNIFICANT CHANGE UP (ref 3.8–10.5)

## 2024-10-17 PROCEDURE — 36415 COLL VENOUS BLD VENIPUNCTURE: CPT

## 2024-10-17 PROCEDURE — 93010 ELECTROCARDIOGRAM REPORT: CPT

## 2024-10-17 PROCEDURE — 86920 COMPATIBILITY TEST SPIN: CPT

## 2024-10-17 PROCEDURE — 99214 OFFICE O/P EST MOD 30 MIN: CPT | Mod: 25

## 2024-10-17 PROCEDURE — 86850 RBC ANTIBODY SCREEN: CPT

## 2024-10-17 PROCEDURE — 86901 BLOOD TYPING SEROLOGIC RH(D): CPT

## 2024-10-17 PROCEDURE — 80048 BASIC METABOLIC PNL TOTAL CA: CPT

## 2024-10-17 PROCEDURE — 86900 BLOOD TYPING SEROLOGIC ABO: CPT

## 2024-10-17 PROCEDURE — 85025 COMPLETE CBC W/AUTO DIFF WBC: CPT

## 2024-10-17 PROCEDURE — 93005 ELECTROCARDIOGRAM TRACING: CPT

## 2024-10-17 NOTE — H&P PST ADULT - ASSESSMENT
83 y/o female with lung mass, PMH of atrial fib, HTN, HLD, hypothyroidism and foot ulcer. She is scheduled for left video assisted thoracoscopic surgery , robot assisted left upper lobe lingula sparing resection, possible lobectomy.  Plan  1. Stop all NSAIDS, herbal supplements and vitamins for 7 days.  2. NPO as per ASU instructions.  3. Take the following medications ( Metoprolol, Levothyroxine ) with small sips of water on the morning of your procedure/surgery.  4. Use EZ sponges as directed  5. CBC, BMP, EKG, T/S done  6. PMD/cardiologist visit for optimization prior to surgery as per surgeon  7. Preop Eliquis instructions as per cardiologist. Hold Losartan the morning of surgery.    CAPRINI SCORE Version 2013    AGE RELATED RISK FACTORS                                                             [ ] Age 41-60 years             [1 point]  [ ] Age: 61-74 years            [2 points]                 [ x] Age 75 years or over     [3 points]             DISEASE RELATED RISK FACTORS                                                       [ ] Current swollen legs (pitting edema of any level)     [1 point]                     [ ] Varicose veins (visible, bulging vein, not spider veins or surgically removed veins)   [1 point]                                 [x ] BMI > 25 Kg/m2                       [1 point]  [ ] BMI > 40 kg/m2                       [1 point]                                 [ ] Serious infection (within past month, requiring hospitalization and IV antibiotics)    [1 point]                     [ ] Lung disease ( interstitial: COPD, emphysema, sarcoid, 9-11 illness, NOT asthma)       [1 point]                                                                          [ ] Acute myocardial infarction (within past month)      [1 point]                  [ ] Congestive heart failure (episode within past month or taking CHF meds)                   [1 point]         [ ] Inflammatory bowel disease (Crohns disease or ulcerative colitis, not irritable bowel syndrome)   [1 point]                  [ ] Central venous access, PICC line or Port (within past month)     [2 points]                                                             [ ] Stroke (within past month)     [5 points]    [x ] Previous or present malignancy (includes melanoma but not basal cell carcinoma, each incidence of cancer scores 2 points-not metastases)  [2 points]                                                                                                                                                         HEMATOLOGY RELATED FACTORS                                                         [ ] History of DVT or PE (including superficial venous thrombosis)    [3 points]                    [ ] Positive family history for DVT or PE (includes first-, second-, and third-degree relatives)   [3 points]   [ ] Personal or family history of genetic thrombophilia (family history counts only if it has not been confirmed that patient does not have this genetic marker)                       [ ] Prothrombin 91021T mutation                   [3 points]                           [ ] Factor V Leiden                                               [3 points]            [ ] Antithrombin III deficiency                           [3 points]         [ ] Protein C & S deficiency                                [3 points]              [ ] Dysfibrinogenemia                                         [3 points]  [ ] Personal history of acquired thrombophilia                       [ ] Lupus anticoagulant                                       [3 points]                                                                  [ ] Anticardiolipin antibodies                             [3 points]              [ ] Antiphospholipid antibodies                         [3 points]                                                         [ ] High homocysteine in the blood                  [3 points]                                                    [ ] Myeloproliferative disorders (including thrombocytosis)    [3 points]            [ ] HIV                                                                     [3 points]                                                    [ ] Heparin induced thrombocytopenia            [3 points]                                        MOBILITY RELATED FACTORS  [ ] Bed rest or restricted mobility (inability to ambulate 30 feet continuously or removable leg brace) for less than 72 hours    [1 point]  [ ] Nonremovable plaster cast or mold that prevents calf muscle use   [2 points]  [ ] Bed bound  or restricted mobility for more than 72 hours                  [2 points]    GENDER SPECIFIC FACTORS  [ ] Pregnancy or had a baby within the last month   [1 point]  [ ] Hormone therapy  or oral contraception               [1 point]  [ ] Current use of estrogen-like drugs (raloxifine, tamoxifen, anastrozole, letrozole)                                [1 point]  [ ] History of unexplained stillborn infant, premature birth with toxemia or growth-restricted infant   [1 point]  [ ] Recurrent spontaneous abortions (3 or more)      [1 point]    OTHER RISK FACTORS                                         [ ] Current smoker (includes vaping and smoking marijuana)      [1 point]  [ ] Diabetes requiring insulin     [1 point]                   [ ] Chemotherapy (includes methotrexate for rheumatoid arthritis, hydroxyurea for thrombocytosis)    [1 point]  [ ] Blood transfusion(s)               [1 point]    SURGERY RELATED RISK FACTORS  [ ]  section within the last month                    [1 point]  [ ] Minor surgery is planned (less than 45 minutes)     [1 point]  [ ] Past major surgery (longer than 45 minutes) within past month  [2 points]  [x ] Planned major surgery lasting more than 45 minutes (includes laparoscopic and arthroscopic; do not add to the "5" for hip and knee replacement)    [2 points]  [ ] Length of surgery over 2 hours (includes anesthesia time; do not add to the "5" for hip and knee replacement)   [1 point]  [ ] Elective hip or knee joint replacement surgery         [5 points]                                               TRAUMA RELATED RISK FACTORS  [ ] Fracture of the hip, pelvis, or leg                       [5 points]  [ ] Spinal cord injury resulting in paralysis (within the past month)    [5 points]  [ ] Paralysis  (within the past month)                      [5 points]  [ ] Multiple trauma (within the past month)         [5 Points]    Total Score [     8   ]    Total hip and total knee replacement:  Caprini score 9 or less: LOW risk  Caprini score 10 or highter: HIGH RISK    Caprini score 0-2: Low Risk, NO VTE prophylaxis required for most patients, encourage ambulation  Caprini score 3-6: Moderate Risk , pharmacologic VTE prophylaxis is indicated for most patients (in the absence of contraindications)  Caprini score 7 or higher: High risk, pharmocologic VTE prophylaxis indicated for most patients (in the absence of contraindications)

## 2024-10-17 NOTE — H&P PST ADULT - NSICDXFAMILYHX_GEN_ALL_CORE_FT
FAMILY HISTORY:  Father  Still living? Unknown  Family history of early CAD, Age at diagnosis: Age Unknown  Family history of myocardial infarction, Age at diagnosis: Age Unknown

## 2024-10-17 NOTE — H&P PST ADULT - CARDIOVASCULAR
details… normal/regular rate and rhythm/S1 S2 present/no gallops/no rub/no murmur S1 S2 present/no gallops/no rub/no murmur

## 2024-10-17 NOTE — H&P PST ADULT - NSICDXPASTSURGICALHX_GEN_ALL_CORE_FT
PAST SURGICAL HISTORY:  S/P hernia surgery     S/P lumbar spine operation     S/P rotator cuff surgery     S/P thyroid surgery     S/P tonsillectomy

## 2024-10-17 NOTE — H&P PST ADULT - NSICDXPASTMEDICALHX_GEN_ALL_CORE_FT
PAST MEDICAL HISTORY:  Chronic atrial fibrillation     Healed foot ulcer     HTN (hypertension)     Hyperlipidemia     Hypothyroid     Lung mass     Urine incontinence

## 2024-10-17 NOTE — H&P PST ADULT - HISTORY OF PRESENT ILLNESS
85 y/o female with lung mass, PMH of  85 y/o female with lung mass, PMH of atrial fib, HTN, HLD, hypothyroidism and foot ulcer. Pt reports she was in the hospital for a foot ulcer, during her imaging work up, she was incidentally found to have a lung mass. Pt states she smoked socially, quit about 30 yrs ago. She denies SOB, chest pain or cough. She is scheduled for left video assisted thoracoscopic surgery , robot assisted left upper lobe lingula sparing resection, possible lobectomy.

## 2024-10-18 DIAGNOSIS — Z01.818 ENCOUNTER FOR OTHER PREPROCEDURAL EXAMINATION: ICD-10-CM

## 2024-10-21 ENCOUNTER — APPOINTMENT (OUTPATIENT)
Dept: CARDIOLOGY | Facility: CLINIC | Age: 84
End: 2024-10-21
Payer: MEDICARE

## 2024-10-21 ENCOUNTER — EMERGENCY (EMERGENCY)
Facility: HOSPITAL | Age: 84
LOS: 1 days | Discharge: HOME | End: 2024-10-21
Attending: STUDENT IN AN ORGANIZED HEALTH CARE EDUCATION/TRAINING PROGRAM | Admitting: STUDENT IN AN ORGANIZED HEALTH CARE EDUCATION/TRAINING PROGRAM
Payer: MEDICARE

## 2024-10-21 ENCOUNTER — NON-APPOINTMENT (OUTPATIENT)
Age: 84
End: 2024-10-21

## 2024-10-21 VITALS
HEIGHT: 70 IN | OXYGEN SATURATION: 99 % | HEART RATE: 86 BPM | WEIGHT: 162.04 LBS | DIASTOLIC BLOOD PRESSURE: 89 MMHG | TEMPERATURE: 98 F | SYSTOLIC BLOOD PRESSURE: 147 MMHG | RESPIRATION RATE: 16 BRPM

## 2024-10-21 VITALS
BODY MASS INDEX: 23.19 KG/M2 | HEART RATE: 109 BPM | RESPIRATION RATE: 19 BRPM | OXYGEN SATURATION: 96 % | SYSTOLIC BLOOD PRESSURE: 173 MMHG | DIASTOLIC BLOOD PRESSURE: 104 MMHG | HEIGHT: 70 IN | WEIGHT: 162 LBS

## 2024-10-21 VITALS
RESPIRATION RATE: 17 BRPM | OXYGEN SATURATION: 98 % | DIASTOLIC BLOOD PRESSURE: 92 MMHG | HEART RATE: 88 BPM | SYSTOLIC BLOOD PRESSURE: 153 MMHG

## 2024-10-21 DIAGNOSIS — R94.31 ABNORMAL ELECTROCARDIOGRAM [ECG] [EKG]: ICD-10-CM

## 2024-10-21 DIAGNOSIS — Z98.89 OTHER SPECIFIED POSTPROCEDURAL STATES: Chronic | ICD-10-CM

## 2024-10-21 DIAGNOSIS — Z98.890 OTHER SPECIFIED POSTPROCEDURAL STATES: Chronic | ICD-10-CM

## 2024-10-21 DIAGNOSIS — I48.91 UNSPECIFIED ATRIAL FIBRILLATION: ICD-10-CM

## 2024-10-21 DIAGNOSIS — Z90.89 ACQUIRED ABSENCE OF OTHER ORGANS: Chronic | ICD-10-CM

## 2024-10-21 PROBLEM — R32 UNSPECIFIED URINARY INCONTINENCE: Chronic | Status: ACTIVE | Noted: 2024-10-17

## 2024-10-21 PROBLEM — Z87.2 PERSONAL HISTORY OF DISEASES OF THE SKIN AND SUBCUTANEOUS TISSUE: Chronic | Status: ACTIVE | Noted: 2024-10-17

## 2024-10-21 PROBLEM — R91.8 OTHER NONSPECIFIC ABNORMAL FINDING OF LUNG FIELD: Chronic | Status: ACTIVE | Noted: 2024-10-17

## 2024-10-21 LAB
ALBUMIN SERPL ELPH-MCNC: 3.5 G/DL — SIGNIFICANT CHANGE UP (ref 3.3–5)
ALP SERPL-CCNC: 123 U/L — HIGH (ref 40–120)
ALT FLD-CCNC: 23 U/L — SIGNIFICANT CHANGE UP (ref 10–45)
ANION GAP SERPL CALC-SCNC: 10 MMOL/L — SIGNIFICANT CHANGE UP (ref 5–17)
APTT BLD: 37.9 SEC — HIGH (ref 24.5–35.6)
AST SERPL-CCNC: 47 U/L — HIGH (ref 10–40)
BASOPHILS # BLD AUTO: 0.05 K/UL — SIGNIFICANT CHANGE UP (ref 0–0.2)
BASOPHILS NFR BLD AUTO: 0.8 % — SIGNIFICANT CHANGE UP (ref 0–2)
BILIRUB SERPL-MCNC: 0.8 MG/DL — SIGNIFICANT CHANGE UP (ref 0.2–1.2)
BUN SERPL-MCNC: 15 MG/DL — SIGNIFICANT CHANGE UP (ref 7–23)
CALCIUM SERPL-MCNC: 9.6 MG/DL — SIGNIFICANT CHANGE UP (ref 8.4–10.5)
CHLORIDE SERPL-SCNC: 104 MMOL/L — SIGNIFICANT CHANGE UP (ref 96–108)
CO2 SERPL-SCNC: 25 MMOL/L — SIGNIFICANT CHANGE UP (ref 22–31)
CREAT SERPL-MCNC: 1.09 MG/DL — SIGNIFICANT CHANGE UP (ref 0.5–1.3)
EGFR: 49 ML/MIN/1.73M2 — LOW
EOSINOPHIL # BLD AUTO: 0.13 K/UL — SIGNIFICANT CHANGE UP (ref 0–0.5)
EOSINOPHIL NFR BLD AUTO: 2.1 % — SIGNIFICANT CHANGE UP (ref 0–6)
GLUCOSE SERPL-MCNC: 90 MG/DL — SIGNIFICANT CHANGE UP (ref 70–99)
HCT VFR BLD CALC: 40.5 % — SIGNIFICANT CHANGE UP (ref 34.5–45)
HGB BLD-MCNC: 13.7 G/DL — SIGNIFICANT CHANGE UP (ref 11.5–15.5)
IMM GRANULOCYTES NFR BLD AUTO: 0.3 % — SIGNIFICANT CHANGE UP (ref 0–0.9)
INR BLD: 1.03 RATIO — SIGNIFICANT CHANGE UP (ref 0.85–1.16)
LIDOCAIN IGE QN: 50 U/L — SIGNIFICANT CHANGE UP (ref 16–77)
LYMPHOCYTES # BLD AUTO: 1.34 K/UL — SIGNIFICANT CHANGE UP (ref 1–3.3)
LYMPHOCYTES # BLD AUTO: 22.1 % — SIGNIFICANT CHANGE UP (ref 13–44)
MAGNESIUM SERPL-MCNC: 2.1 MG/DL — SIGNIFICANT CHANGE UP (ref 1.6–2.6)
MCHC RBC-ENTMCNC: 32.7 PG — SIGNIFICANT CHANGE UP (ref 27–34)
MCHC RBC-ENTMCNC: 33.8 GM/DL — SIGNIFICANT CHANGE UP (ref 32–36)
MCV RBC AUTO: 96.7 FL — SIGNIFICANT CHANGE UP (ref 80–100)
MONOCYTES # BLD AUTO: 0.54 K/UL — SIGNIFICANT CHANGE UP (ref 0–0.9)
MONOCYTES NFR BLD AUTO: 8.9 % — SIGNIFICANT CHANGE UP (ref 2–14)
NEUTROPHILS # BLD AUTO: 3.99 K/UL — SIGNIFICANT CHANGE UP (ref 1.8–7.4)
NEUTROPHILS NFR BLD AUTO: 65.8 % — SIGNIFICANT CHANGE UP (ref 43–77)
NRBC # BLD: 0 /100 WBCS — SIGNIFICANT CHANGE UP (ref 0–0)
PLATELET # BLD AUTO: 242 K/UL — SIGNIFICANT CHANGE UP (ref 150–400)
POTASSIUM SERPL-MCNC: 5.5 MMOL/L — HIGH (ref 3.5–5.3)
POTASSIUM SERPL-SCNC: 5.5 MMOL/L — HIGH (ref 3.5–5.3)
PROT SERPL-MCNC: 7.4 G/DL — SIGNIFICANT CHANGE UP (ref 6–8.3)
PROTHROM AB SERPL-ACNC: 12.2 SEC — SIGNIFICANT CHANGE UP (ref 9.9–13.4)
RBC # BLD: 4.19 M/UL — SIGNIFICANT CHANGE UP (ref 3.8–5.2)
RBC # FLD: 12.8 % — SIGNIFICANT CHANGE UP (ref 10.3–14.5)
SODIUM SERPL-SCNC: 139 MMOL/L — SIGNIFICANT CHANGE UP (ref 135–145)
TROPONIN I, HIGH SENSITIVITY RESULT: 6.9 NG/L — SIGNIFICANT CHANGE UP
TSH SERPL-MCNC: 5.88 UIU/ML — HIGH (ref 0.36–3.74)
WBC # BLD: 6.07 K/UL — SIGNIFICANT CHANGE UP (ref 3.8–10.5)
WBC # FLD AUTO: 6.07 K/UL — SIGNIFICANT CHANGE UP (ref 3.8–10.5)

## 2024-10-21 PROCEDURE — 93000 ELECTROCARDIOGRAM COMPLETE: CPT

## 2024-10-21 PROCEDURE — 99215 OFFICE O/P EST HI 40 MIN: CPT

## 2024-10-21 PROCEDURE — 93010 ELECTROCARDIOGRAM REPORT: CPT

## 2024-10-21 PROCEDURE — 99285 EMERGENCY DEPT VISIT HI MDM: CPT

## 2024-10-21 PROCEDURE — 71046 X-RAY EXAM CHEST 2 VIEWS: CPT | Mod: 26

## 2024-10-21 PROCEDURE — 93306 TTE W/DOPPLER COMPLETE: CPT

## 2024-10-21 RX ORDER — SODIUM ZIRCONIUM CYCLOSILICATE 10 G/10G
10 POWDER, FOR SUSPENSION ORAL ONCE
Refills: 0 | Status: COMPLETED | OUTPATIENT
Start: 2024-10-21 | End: 2024-10-21

## 2024-10-21 RX ORDER — SODIUM CHLORIDE 0.9 % (FLUSH) 0.9 %
500 SYRINGE (ML) INJECTION ONCE
Refills: 0 | Status: COMPLETED | OUTPATIENT
Start: 2024-10-21 | End: 2024-10-21

## 2024-10-21 RX ADMIN — Medication 500 MILLILITER(S): at 11:00

## 2024-10-21 RX ADMIN — Medication 500 MILLILITER(S): at 11:52

## 2024-10-21 RX ADMIN — SODIUM ZIRCONIUM CYCLOSILICATE 10 GRAM(S): 10 POWDER, FOR SUSPENSION ORAL at 11:52

## 2024-10-21 NOTE — ED PROVIDER NOTE - PHYSICAL EXAMINATION
Vitals: I have reviewed the patients vital signs  General: Non-toxic appearing  HEENT: Atraumatic, normocephalic, airway patent  Eyes: EOMI, tracking appropriately  Neck: no tracheal deviation  Chest/Lungs: symmetric chest rise, speaking in complete sentences, no WOB  Heart: skin and extremities well perfused, regular rate and rhythm, no LE edema/swelling  Abdomen: soft, nontender and nondistended   Neuro: A+Ox3, ambulating without difficulty, CN grossly intact  MSK: strength at baseline in all extremities, no muscle wasting or atrophy  Skin: no new emergent lesions

## 2024-10-21 NOTE — ED PROVIDER NOTE - PATIENT PORTAL LINK FT
You can access the FollowMyHealth Patient Portal offered by [facility] by registering at the following website: [email or web address]. By joining [employer]’s FollowMyHealth portal, you will also be able to view your health information using other applications (apps) compatible with our system.

## 2024-10-21 NOTE — CHART NOTE - NSCHARTNOTEFT_GEN_A_CORE
Emergency Department Social Work Geriatric Initial Assessment    Cognitive Mental Status - Alert, oriented  Primary Caregiver Information –   Emergency Contact Information – Feliciano Eric other [phone]  Primary Care Physician / Specialists - Dr. Gustavo Peterson [phone]  Functional Status Prior to ED Visit - Independent with self care  Family and Social Support – Patient has the support of her tenants and friend  Living Arrangement - Patient lives alone in an apartment with other tenants on the building  Services Present on Admission – None  Assistive Devices (Durable Medical Equipment) – Patient uses a cane with ambulation  ADLs & Degree of [place] – Patient is independent with self-care and other complex tasks  Barriers to obtain medications - Friends collect her medication from the pharmacy  Barriers to attend medical appointments - Private paid help assist with transportation to medical appointments.  ISAR Score – 1  Advanced Directives –   Follow up care – Cardiology appointment  Discharge Transportation – Private paid help to transport patient home  Summary/Recommendations/Referrals -  84 y o female resides in her apartment building with tenants who assist her when needed.  Patient has a private paid help who transport her to medical appointments and picks up her medication from the pharmacy.  She uses a walker with ambulation.  ED Provider recommended follow up with her cardiologist, Dr. Pacheco.  Patient declined SW offered to schedule the appointment, because medical appointments are arranged according to availability of private paid help.  SW discuss fall prevention with patient and provide contact number for assistance if required.

## 2024-10-21 NOTE — ED PROVIDER NOTE - BIRTH SEX
Called Sami and let her know that Dr Buckley does not have her copy of the report from [facility] yet, but as soon as it comes through, Dr. Buckley will call Sami to discuss results.  FIDE SousaN, RN, [facility]  RN Care Coordinator  [facility]  08/23/24  2:05 PM  Phone: [phone]    
Requesting call back from RN regarding more lab questions.   
Female

## 2024-10-21 NOTE — ED PROVIDER NOTE - OBJECTIVE STATEMENT
Patient is a 84 year-old-female with history of HTN, HLD, hypothyroidism, atrial fibrillation on Eliquis and metoprolol presents with rapid heart rate from cardiology office. Reports that she is having pre-op testing done for thoracic surgery for lung mass and went to see Dr. Pacheco today for pre-op testing and noted to have elevated heart rate and sent here for evaluation. Reports that she is "excitable" and was in a situation that made her angry and her heart rate went up after that. Reports that she feels very well. Denies fever, chills, nausea, vomiting, chest pain, sob, abdominal pain, LE swelling, urinary or bowel complaints. She has not taking eliquis for 3 days in preparation for the surgery on Thursday.

## 2024-10-21 NOTE — ED ADULT TRIAGE NOTE - CHIEF COMPLAINT QUOTE
PT sent by Dr Pacheco for rapid heart rate - states she is supposed to have a lung ca surgery on Thursday and Dr Pacheco told her "her heart rate is too high"

## 2024-10-21 NOTE — ED PROVIDER NOTE - PROGRESS NOTE DETAILS
Evgeny Attending Physician   Bloodwork significant for mild hyperkalemia, however no ECG changes, likely secondary to dehydration, will give IV hydration and lokelma. Otherwise cardiac enzymes negative. Patient reassessed and reports feeling well. HR 80s. Discussed with Dr. Pacheco and agreeable that patient does not need admission at this time and he will follow up with patient. Discussed with patient and agreeable with plan. Return precautions reviewed. Evgeny Attending Physician   Bloodwork significant for mild hyperkalemia, however no ECG changes, likely secondary to dehydration, will give IV hydration and lokelma. Otherwise cardiac enzymes negative. Patient reassessed and reports feeling well. HR 80s. Discussed with Dr. Pacheco and agreeable that patient does not need admission at this time and he will follow up with patient. Discussed with patient and she expresses wishes to go home now. Return precautions reviewed and recommended PMD and cards follow up.

## 2024-10-21 NOTE — ED ADULT NURSE NOTE - NSFALLHARMRISKINTERV_ED_ALL_ED

## 2024-10-21 NOTE — ED ADULT NURSE NOTE - OBJECTIVE STATEMENT
84 year old Female comes in from [facility] office where she was for preop prior to arrival to ER. Patient is scheduled to have lung mass removed, however at appointment, found patient's heart rate to be elevated. Patient denies pain, chest pain, shortness of breath. Friend at bedside, In no acute distress. Bed locked and in lowest position for safety.

## 2024-10-21 NOTE — ED PROVIDER NOTE - CLINICAL SUMMARY MEDICAL DECISION MAKING FREE TEXT BOX
Patient is a 84 year-old-female with history of HTN, HLD, hypothyroidism, atrial fibrillation on Eliquis and metoprolol presents with rapid heart rate from cardiology office. Reports that she is having pre-op testing done for thoracic surgery for lung mass and went to see Dr. Pacheco today for pre-op testing and noted to have elevated heart rate and sent here for evaluation. Reports that she is "excitable" and was in a situation that made her angry and her heart rate went up after that. Reports that she feels very well. Denies fever, chills, nausea, vomiting, chest pain, sob, abdominal pain, LE swelling, urinary or bowel complaints. On exam patient is well appearing with normal heart/pulmonary exam. ECG showed sinus tachycardia with HR of 101. At this time, patient is not in rapid atrial fibrillation/flutter or concerning arrhythmia. Plan to check labs including cardiac enzymes and CXR. Disposition pending workup and reassessment. Patient agreeable with plan. Patient is a 84 year-old-female with history of HTN, HLD, hypothyroidism, atrial fibrillation on Eliquis and metoprolol presents with rapid heart rate from cardiology office. Reports that she is having pre-op testing done for thoracic surgery for lung mass and went to see Dr. Pacheco today for pre-op testing and noted to have elevated heart rate and sent here for evaluation. Reports that she is "excitable" and was in a situation that made her angry and her heart rate went up after that. Reports that she feels very well. Denies fever, chills, nausea, vomiting, chest pain, sob, abdominal pain, LE swelling, urinary or bowel complaints. On exam patient is well appearing with normal heart/pulmonary exam. ECG showed sinus tachycardia with HR of 101. At this time, patient is not in rapid atrial fibrillation/flutter or concerning arrhythmia. Sinus tachycardia likely 2/2 dehydration vs stress/agitation at home; no signs/symptoms suggestive of infectious etiology at this time. Plan to check labs including cardiac enzymes and CXR. Disposition pending workup and reassessment. Patient agreeable with plan.

## 2024-10-22 ENCOUNTER — APPOINTMENT (OUTPATIENT)
Dept: CARDIOLOGY | Facility: CLINIC | Age: 84
End: 2024-10-22
Payer: MEDICARE

## 2024-10-22 PROCEDURE — A9500: CPT

## 2024-10-22 PROCEDURE — 93015 CV STRESS TEST SUPVJ I&R: CPT

## 2024-10-22 PROCEDURE — 78452 HT MUSCLE IMAGE SPECT MULT: CPT

## 2024-10-24 ENCOUNTER — APPOINTMENT (OUTPATIENT)
Dept: THORACIC SURGERY | Facility: HOSPITAL | Age: 84
End: 2024-10-24

## 2024-10-24 ENCOUNTER — INPATIENT (INPATIENT)
Facility: HOSPITAL | Age: 84
LOS: 3 days | Discharge: NURSING, REHAB OR LONG-TERM CARE | DRG: 164 | End: 2024-10-28
Attending: THORACIC SURGERY (CARDIOTHORACIC VASCULAR SURGERY) | Admitting: THORACIC SURGERY (CARDIOTHORACIC VASCULAR SURGERY)
Payer: MEDICARE

## 2024-10-24 ENCOUNTER — RESULT REVIEW (OUTPATIENT)
Age: 84
End: 2024-10-24

## 2024-10-24 VITALS
WEIGHT: 171.96 LBS | DIASTOLIC BLOOD PRESSURE: 93 MMHG | SYSTOLIC BLOOD PRESSURE: 147 MMHG | TEMPERATURE: 99 F | HEART RATE: 96 BPM | RESPIRATION RATE: 14 BRPM | HEIGHT: 70 IN | OXYGEN SATURATION: 100 %

## 2024-10-24 DIAGNOSIS — Z90.89 ACQUIRED ABSENCE OF OTHER ORGANS: Chronic | ICD-10-CM

## 2024-10-24 DIAGNOSIS — M53.9 DORSOPATHY, UNSPECIFIED: Chronic | ICD-10-CM

## 2024-10-24 DIAGNOSIS — R91.8 OTHER NONSPECIFIC ABNORMAL FINDING OF LUNG FIELD: ICD-10-CM

## 2024-10-24 DIAGNOSIS — Z98.89 OTHER SPECIFIED POSTPROCEDURAL STATES: Chronic | ICD-10-CM

## 2024-10-24 DIAGNOSIS — Z98.890 OTHER SPECIFIED POSTPROCEDURAL STATES: Chronic | ICD-10-CM

## 2024-10-24 LAB
ANION GAP SERPL CALC-SCNC: 8 MMOL/L — SIGNIFICANT CHANGE UP (ref 5–17)
BASOPHILS # BLD AUTO: 0.03 K/UL — SIGNIFICANT CHANGE UP (ref 0–0.2)
BASOPHILS NFR BLD AUTO: 0.3 % — SIGNIFICANT CHANGE UP (ref 0–2)
BUN SERPL-MCNC: 17 MG/DL — SIGNIFICANT CHANGE UP (ref 7–23)
CALCIUM SERPL-MCNC: 9 MG/DL — SIGNIFICANT CHANGE UP (ref 8.5–10.1)
CHLORIDE SERPL-SCNC: 105 MMOL/L — SIGNIFICANT CHANGE UP (ref 96–108)
CO2 SERPL-SCNC: 25 MMOL/L — SIGNIFICANT CHANGE UP (ref 22–31)
CREAT SERPL-MCNC: 0.91 MG/DL — SIGNIFICANT CHANGE UP (ref 0.5–1.3)
EGFR: 61 ML/MIN/1.73M2 — SIGNIFICANT CHANGE UP
EOSINOPHIL # BLD AUTO: 0.01 K/UL — SIGNIFICANT CHANGE UP (ref 0–0.5)
EOSINOPHIL NFR BLD AUTO: 0.1 % — SIGNIFICANT CHANGE UP (ref 0–6)
GLUCOSE SERPL-MCNC: 165 MG/DL — HIGH (ref 70–99)
HCT VFR BLD CALC: 29.6 % — LOW (ref 34.5–45)
HGB BLD-MCNC: 9.9 G/DL — LOW (ref 11.5–15.5)
IMM GRANULOCYTES NFR BLD AUTO: 0.2 % — SIGNIFICANT CHANGE UP (ref 0–0.9)
LYMPHOCYTES # BLD AUTO: 0.41 K/UL — LOW (ref 1–3.3)
LYMPHOCYTES # BLD AUTO: 4.5 % — LOW (ref 13–44)
MANUAL SMEAR VERIFICATION: SIGNIFICANT CHANGE UP
MCHC RBC-ENTMCNC: 33.2 PG — SIGNIFICANT CHANGE UP (ref 27–34)
MCHC RBC-ENTMCNC: 33.4 GM/DL — SIGNIFICANT CHANGE UP (ref 32–36)
MCV RBC AUTO: 99.3 FL — SIGNIFICANT CHANGE UP (ref 80–100)
MONOCYTES # BLD AUTO: 0.45 K/UL — SIGNIFICANT CHANGE UP (ref 0–0.9)
MONOCYTES NFR BLD AUTO: 4.9 % — SIGNIFICANT CHANGE UP (ref 2–14)
NEUTROPHILS # BLD AUTO: 8.19 K/UL — HIGH (ref 1.8–7.4)
NEUTROPHILS NFR BLD AUTO: 90 % — HIGH (ref 43–77)
PLAT MORPH BLD: NORMAL — SIGNIFICANT CHANGE UP
PLATELET # BLD AUTO: 167 K/UL — SIGNIFICANT CHANGE UP (ref 150–400)
POTASSIUM SERPL-MCNC: 3.6 MMOL/L — SIGNIFICANT CHANGE UP (ref 3.5–5.3)
POTASSIUM SERPL-SCNC: 3.6 MMOL/L — SIGNIFICANT CHANGE UP (ref 3.5–5.3)
RBC # BLD: 2.98 M/UL — LOW (ref 3.8–5.2)
RBC # FLD: 13 % — SIGNIFICANT CHANGE UP (ref 10.3–14.5)
RBC BLD AUTO: NORMAL — SIGNIFICANT CHANGE UP
SODIUM SERPL-SCNC: 138 MMOL/L — SIGNIFICANT CHANGE UP (ref 135–145)
WBC # BLD: 9.11 K/UL — SIGNIFICANT CHANGE UP (ref 3.8–10.5)
WBC # FLD AUTO: 9.11 K/UL — SIGNIFICANT CHANGE UP (ref 3.8–10.5)

## 2024-10-24 PROCEDURE — 97530 THERAPEUTIC ACTIVITIES: CPT | Mod: GP

## 2024-10-24 PROCEDURE — C9399: CPT

## 2024-10-24 PROCEDURE — 88309 TISSUE EXAM BY PATHOLOGIST: CPT

## 2024-10-24 PROCEDURE — 36430 TRANSFUSION BLD/BLD COMPNT: CPT

## 2024-10-24 PROCEDURE — C1889: CPT

## 2024-10-24 PROCEDURE — 97166 OT EVAL MOD COMPLEX 45 MIN: CPT | Mod: GO

## 2024-10-24 PROCEDURE — P9059: CPT

## 2024-10-24 PROCEDURE — 32674 THORACOSCOPY LYMPH NODE EXC: CPT | Mod: 80

## 2024-10-24 PROCEDURE — 86965 POOLING BLOOD PLATELETS: CPT

## 2024-10-24 PROCEDURE — 88305 TISSUE EXAM BY PATHOLOGIST: CPT

## 2024-10-24 PROCEDURE — 88360 TUMOR IMMUNOHISTOCHEM/MANUAL: CPT | Mod: 26

## 2024-10-24 PROCEDURE — 80048 BASIC METABOLIC PNL TOTAL CA: CPT

## 2024-10-24 PROCEDURE — 31622 DX BRONCHOSCOPE/WASH: CPT | Mod: 59

## 2024-10-24 PROCEDURE — 88307 TISSUE EXAM BY PATHOLOGIST: CPT | Mod: 26

## 2024-10-24 PROCEDURE — 88305 TISSUE EXAM BY PATHOLOGIST: CPT | Mod: 26

## 2024-10-24 PROCEDURE — 32674 THORACOSCOPY LYMPH NODE EXC: CPT | Mod: AS

## 2024-10-24 PROCEDURE — 85027 COMPLETE CBC AUTOMATED: CPT

## 2024-10-24 PROCEDURE — 94640 AIRWAY INHALATION TREATMENT: CPT

## 2024-10-24 PROCEDURE — 36415 COLL VENOUS BLD VENIPUNCTURE: CPT

## 2024-10-24 PROCEDURE — 85025 COMPLETE CBC W/AUTO DIFF WBC: CPT

## 2024-10-24 PROCEDURE — 97162 PT EVAL MOD COMPLEX 30 MIN: CPT | Mod: GP

## 2024-10-24 PROCEDURE — 71045 X-RAY EXAM CHEST 1 VIEW: CPT

## 2024-10-24 PROCEDURE — C1729: CPT

## 2024-10-24 PROCEDURE — P9012: CPT

## 2024-10-24 PROCEDURE — 32663 THORACOSCOPY W/LOBECTOMY: CPT | Mod: 80,LT

## 2024-10-24 PROCEDURE — 84100 ASSAY OF PHOSPHORUS: CPT

## 2024-10-24 PROCEDURE — C9290: CPT

## 2024-10-24 PROCEDURE — 32663 THORACOSCOPY W/LOBECTOMY: CPT | Mod: AS,LT

## 2024-10-24 PROCEDURE — 83735 ASSAY OF MAGNESIUM: CPT

## 2024-10-24 PROCEDURE — 97535 SELF CARE MNGMENT TRAINING: CPT | Mod: GO

## 2024-10-24 PROCEDURE — 32663 THORACOSCOPY W/LOBECTOMY: CPT | Mod: LT

## 2024-10-24 PROCEDURE — S2900 ROBOTIC SURGICAL SYSTEM: CPT | Mod: NC

## 2024-10-24 PROCEDURE — S2900: CPT

## 2024-10-24 PROCEDURE — 32674 THORACOSCOPY LYMPH NODE EXC: CPT

## 2024-10-24 PROCEDURE — 71045 X-RAY EXAM CHEST 1 VIEW: CPT | Mod: 26

## 2024-10-24 PROCEDURE — 88307 TISSUE EXAM BY PATHOLOGIST: CPT

## 2024-10-24 PROCEDURE — 97116 GAIT TRAINING THERAPY: CPT | Mod: GP

## 2024-10-24 PROCEDURE — 80053 COMPREHEN METABOLIC PANEL: CPT

## 2024-10-24 PROCEDURE — 88309 TISSUE EXAM BY PATHOLOGIST: CPT | Mod: 26

## 2024-10-24 PROCEDURE — 88360 TUMOR IMMUNOHISTOCHEM/MANUAL: CPT

## 2024-10-24 RX ORDER — HYDROMORPHONE HCL/0.9% NACL/PF 6 MG/30 ML
0.5 PATIENT CONTROLLED ANALGESIA SYRINGE INTRAVENOUS EVERY 4 HOURS
Refills: 0 | Status: DISCONTINUED | OUTPATIENT
Start: 2024-10-24 | End: 2024-10-25

## 2024-10-24 RX ORDER — LOSARTAN POTASSIUM 100 MG/1
1 TABLET, FILM COATED ORAL
Refills: 0 | DISCHARGE

## 2024-10-24 RX ORDER — ACETAMINOPHEN 500 MG
1000 TABLET ORAL ONCE
Refills: 0 | Status: COMPLETED | OUTPATIENT
Start: 2024-10-24 | End: 2024-10-24

## 2024-10-24 RX ORDER — SODIUM CHLORIDE 9 MG/ML
1000 INJECTION, SOLUTION INTRAMUSCULAR; INTRAVENOUS; SUBCUTANEOUS
Refills: 0 | Status: DISCONTINUED | OUTPATIENT
Start: 2024-10-24 | End: 2024-10-25

## 2024-10-24 RX ORDER — LEVOTHYROXINE SODIUM 88 MCG
75 TABLET ORAL DAILY
Refills: 0 | Status: DISCONTINUED | OUTPATIENT
Start: 2024-10-24 | End: 2024-10-28

## 2024-10-24 RX ORDER — ANDEXANET ALFA 100 MG/10ML
400 INJECTION, POWDER, LYOPHILIZED, FOR SOLUTION INTRAVENOUS ONCE
Refills: 0 | Status: COMPLETED | OUTPATIENT
Start: 2024-10-24 | End: 2024-10-24

## 2024-10-24 RX ORDER — PROTHROMBIN COMPLEX CONCENTRATE (HUMAN) 25.5; 16.5; 24; 22; 22; 26 [IU]/ML; [IU]/ML; [IU]/ML; [IU]/ML; [IU]/ML; [IU]/ML
1500 POWDER, FOR SOLUTION INTRAVENOUS ONCE
Refills: 0 | Status: DISCONTINUED | OUTPATIENT
Start: 2024-10-24 | End: 2024-10-24

## 2024-10-24 RX ORDER — INFLUENZ VIR VAC TV P-SURF2003 15MCG/.5ML
0.5 SYRINGE (ML) INTRAMUSCULAR ONCE
Refills: 0 | Status: DISCONTINUED | OUTPATIENT
Start: 2024-10-24 | End: 2024-10-28

## 2024-10-24 RX ORDER — ANDEXANET ALFA 100 MG/10ML
480 INJECTION, POWDER, LYOPHILIZED, FOR SOLUTION INTRAVENOUS ONCE
Refills: 0 | Status: DISCONTINUED | OUTPATIENT
Start: 2024-10-24 | End: 2024-10-24

## 2024-10-24 RX ORDER — APIXABAN 5 MG/1
1 TABLET, FILM COATED ORAL
Refills: 0 | DISCHARGE

## 2024-10-24 RX ORDER — METOPROLOL TARTRATE 50 MG
25 TABLET ORAL
Refills: 0 | Status: DISCONTINUED | OUTPATIENT
Start: 2024-10-24 | End: 2024-10-28

## 2024-10-24 RX ORDER — ONDANSETRON HYDROCHLORIDE 2 MG/ML
4 INJECTION, SOLUTION INTRAMUSCULAR; INTRAVENOUS EVERY 6 HOURS
Refills: 0 | Status: DISCONTINUED | OUTPATIENT
Start: 2024-10-24 | End: 2024-10-28

## 2024-10-24 RX ORDER — ANDEXANET ALFA 100 MG/10ML
480 INJECTION, POWDER, LYOPHILIZED, FOR SOLUTION INTRAVENOUS ONCE
Refills: 0 | Status: COMPLETED | OUTPATIENT
Start: 2024-10-24 | End: 2024-10-24

## 2024-10-24 RX ORDER — LOSARTAN POTASSIUM 25 MG/1
25 TABLET ORAL DAILY
Refills: 0 | Status: DISCONTINUED | OUTPATIENT
Start: 2024-10-24 | End: 2024-10-28

## 2024-10-24 RX ORDER — SENNA 187 MG
2 TABLET ORAL AT BEDTIME
Refills: 0 | Status: DISCONTINUED | OUTPATIENT
Start: 2024-10-24 | End: 2024-10-28

## 2024-10-24 RX ORDER — ONDANSETRON HYDROCHLORIDE 2 MG/ML
4 INJECTION, SOLUTION INTRAMUSCULAR; INTRAVENOUS ONCE
Refills: 0 | Status: DISCONTINUED | OUTPATIENT
Start: 2024-10-24 | End: 2024-10-24

## 2024-10-24 RX ORDER — FENTANYL CITRAT/DEXTROSE 5%/PF 1250MCG/50
50 PATIENT CONTROLLED ANALGESIA SYRINGE INTRAVENOUS
Refills: 0 | Status: DISCONTINUED | OUTPATIENT
Start: 2024-10-24 | End: 2024-10-24

## 2024-10-24 RX ORDER — OXYCODONE HYDROCHLORIDE 30 MG/1
5 TABLET ORAL EVERY 6 HOURS
Refills: 0 | Status: DISCONTINUED | OUTPATIENT
Start: 2024-10-24 | End: 2024-10-25

## 2024-10-24 RX ORDER — OXYCODONE HYDROCHLORIDE 30 MG/1
5 TABLET ORAL ONCE
Refills: 0 | Status: DISCONTINUED | OUTPATIENT
Start: 2024-10-24 | End: 2024-10-24

## 2024-10-24 RX ORDER — IPRATROPIUM BROMIDE AND ALBUTEROL SULFATE .5; 2.5 MG/3ML; MG/3ML
3 SOLUTION RESPIRATORY (INHALATION) EVERY 6 HOURS
Refills: 0 | Status: DISCONTINUED | OUTPATIENT
Start: 2024-10-24 | End: 2024-10-28

## 2024-10-24 RX ORDER — HYDROMORPHONE HCL/0.9% NACL/PF 6 MG/30 ML
0.5 PATIENT CONTROLLED ANALGESIA SYRINGE INTRAVENOUS
Refills: 0 | Status: DISCONTINUED | OUTPATIENT
Start: 2024-10-24 | End: 2024-10-24

## 2024-10-24 RX ORDER — ANDEXANET ALFA 100 MG/10ML
400 INJECTION, POWDER, LYOPHILIZED, FOR SOLUTION INTRAVENOUS ONCE
Refills: 0 | Status: DISCONTINUED | OUTPATIENT
Start: 2024-10-24 | End: 2024-10-24

## 2024-10-24 RX ADMIN — Medication 75 MICROGRAM(S): at 22:32

## 2024-10-24 RX ADMIN — ANDEXANET ALFA 184.62 MILLIGRAM(S): 100 INJECTION, POWDER, LYOPHILIZED, FOR SOLUTION INTRAVENOUS at 11:15

## 2024-10-24 RX ADMIN — Medication 400 MILLIGRAM(S): at 12:00

## 2024-10-24 RX ADMIN — Medication 20 MILLIGRAM(S): at 22:32

## 2024-10-24 RX ADMIN — LOSARTAN POTASSIUM 25 MILLIGRAM(S): 25 TABLET ORAL at 22:32

## 2024-10-24 RX ADMIN — Medication 1000 MILLIGRAM(S): at 12:15

## 2024-10-24 RX ADMIN — ANDEXANET ALFA 24 MILLIGRAM(S): 100 INJECTION, POWDER, LYOPHILIZED, FOR SOLUTION INTRAVENOUS at 11:30

## 2024-10-24 RX ADMIN — Medication 0.5 MILLIGRAM(S): at 15:08

## 2024-10-24 RX ADMIN — Medication 25 MILLIGRAM(S): at 22:32

## 2024-10-24 RX ADMIN — IPRATROPIUM BROMIDE AND ALBUTEROL SULFATE 3 MILLILITER(S): .5; 2.5 SOLUTION RESPIRATORY (INHALATION) at 19:31

## 2024-10-24 RX ADMIN — SODIUM CHLORIDE 100 MILLILITER(S): 9 INJECTION, SOLUTION INTRAMUSCULAR; INTRAVENOUS; SUBCUTANEOUS at 14:43

## 2024-10-24 RX ADMIN — IPRATROPIUM BROMIDE AND ALBUTEROL SULFATE 3 MILLILITER(S): .5; 2.5 SOLUTION RESPIRATORY (INHALATION) at 15:12

## 2024-10-24 RX ADMIN — Medication 0.5 MILLIGRAM(S): at 15:38

## 2024-10-24 NOTE — CONSULT NOTE ADULT - SUBJECTIVE AND OBJECTIVE BOX
ICU Consult Note    HPI:    S:    Pt seen and examined  84F  PMHx of HTN, HLD, hypothyroidism, atrial fibrillation on Eliquis and metoprolol   presents today for elective thoracic surgery  POD # 0 s/p  Robotic wedge resection left upper lung lingular sparing with add'l left upper lobe margin  Refer to detailed OR report for details  CT x 2 in place  Extubated  In SDU for post operative care    10/24: lying in bed. + post operative pain, c/o of being cold. [place] in place, CT x 2 in place to sxn, CT # 2 with + AL, some blood in tubes, not high output.    ROS: + post operative pain  Remainder of systems reviewed, negative        Allergies    Cipro (Other)  doxycycline (Unknown)  linezolid (Unknown)    Intolerances    Levaquin (Stomach Upset; Nausea)      MEDICATIONS  (STANDING):  albuterol/ipratropium for Nebulization 3 milliLiter(s) Nebulizer every 6 hours  atorvastatin 20 milliGRAM(s) Oral at bedtime  influenza  Vaccine (HIGH DOSE) 0.5 milliLiter(s) IntraMuscular once  levothyroxine 75 MICROGram(s) Oral daily  losartan 25 milliGRAM(s) Oral daily  metoprolol tartrate 25 milliGRAM(s) Oral two times a day  sodium chloride 0.9%. 1000 milliLiter(s) (100 mL/Hr) IV Continuous <Continuous>    MEDICATIONS  (PRN):  HYDROmorphone  Injectable 0.5 milliGRAM(s) IV Push every 4 hours PRN Severe Pain (7 - 10)  ondansetron Injectable 4 milliGRAM(s) IV Push every 6 hours PRN Nausea  oxyCODONE    IR 5 milliGRAM(s) Oral every 6 hours PRN Moderate Pain (4 - 6)      Drug Dosing Weight  Height (cm): 177.8 (24 Oct 2024 07:02)  Weight (kg): 78 (24 Oct 2024 07:02)  BMI (kg/m2): 24.7 (24 Oct 2024 07:02)  BSA (m2): 1.96 (24 Oct 2024 07:02)    PAST MEDICAL & SURGICAL HISTORY:  HTN (hypertension)      Hyperlipidemia      Hypothyroid      Chronic atrial fibrillation      Lung mass      Urine incontinence      Healed foot ulcer      S/P thyroid surgery      S/P hernia surgery      S/P rotator cuff surgery      S/P tonsillectomy      S/P lumbar spine operation          FAMILY HISTORY:  Family history of early CAD (Father)    Family history of myocardial infarction (Father)            O:    ICU Vital Signs Last 24 Hrs  T(C): 36.1 (24 Oct 2024 15:04), Max: 37.1 (24 Oct 2024 07:02)  T(F): 97 (24 Oct 2024 15:04), Max: 98.7 (24 Oct 2024 07:02)  HR: 92 (24 Oct 2024 15:18) (66 - 96)  BP: 142/77 (24 Oct 2024 14:30) (96/79 - 147/93)  BP(mean): --  ABP: 115/88 (24 Oct 2024 14:30) (101/54 - 125/92)  ABP(mean): --  RR: 18 (24 Oct 2024 14:30) (14 - 25)  SpO2: 100% (24 Oct 2024 14:30) (97% - 100%)    O2 Parameters below as of 24 Oct 2024 15:18  Patient On (Oxygen Delivery Method): nasal cannula                I&O's Detail    24 Oct 2024 07:01  -  24 Oct 2024 15:28  --------------------------------------------------------  IN:    Frozen Plasma Cryoprecipitate Reduced: 902 mL    Other (mL): 2632 mL  Total IN: 3534 mL    OUT:    Chest Tube (mL): 30 mL    Chest Tube (mL): 65 mL    Indwelling Catheter - Urethral (mL): 950 mL  Total OUT: 1045 mL    Total NET: 2489 mL      PE:    Adult lying in bed  Uncomfortable post op, not in extremis, no resp distress  + NC in place  No JVD trachea midline  Normocephalic, atraumatic  S1S2+  Dec BS L side  + CT x 2 L side, hooked to sxn, CT # 2 with + AL  Abd soft NTND  No leg swelling/edema noted  Awake but sleepy  Skin pink, warm    LABS:    CBC Full  -  ( 24 Oct 2024 13:00 )  WBC Count : 9.11 K/uL  RBC Count : 2.98 M/uL  Hemoglobin : 9.9 g/dL  Hematocrit : 29.6 %  Platelet Count - Automated : 167 K/uL  Mean Cell Volume : 99.3 fl  Mean Cell Hemoglobin : 33.2 pg  Mean Cell Hemoglobin Concentration : 33.4 gm/dL  Auto Neutrophil # : 8.19 K/uL  Auto Lymphocyte # : 0.41 K/uL  Auto Monocyte # : 0.45 K/uL  Auto Eosinophil # : 0.01 K/uL  Auto Basophil # : 0.03 K/uL  Auto Neutrophil % : 90.0 %  Auto Lymphocyte % : 4.5 %  Auto Monocyte % : 4.9 %  Auto Eosinophil % : 0.1 %  Auto Basophil % : 0.3 %    10-24    138  |  105  |  17  ----------------------------<  165[H]  3.6   |  25  |  0.91    Ca    9.0      24 Oct 2024 13:00        Urinalysis Basic - ( 24 Oct 2024 13:00 )    Color: x / Appearance: x / SG: x / pH: x  Gluc: 165 mg/dL / Ketone: x  / Bili: x / Urobili: x   Blood: x / Protein: x / Nitrite: x   Leuk Esterase: x / RBC: x / WBC x   Sq Epi: x / Non Sq Epi: x / Bacteria: x      CAPILLARY BLOOD GLUCOSE

## 2024-10-24 NOTE — CHART NOTE - NSCHARTNOTEFT_GEN_A_CORE
Pt is an 83 y/o female presented to ED for rapid heart rate.  As per [facility], pt has scheduled cardiology appt with Jc Mckinnon on 10/22/24.

## 2024-10-24 NOTE — INPATIENT CERTIFICATION FOR MEDICARE PATIENTS - NS ICMP TWO DAYS INPATIENT
[FreeTextEntry1] : Ms. Barrios is a 73 y.o. 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  \par \par I have discussed the natural history and treatment options for Pcomm aneurysms with the patient. I explained the indications for observation and imaging surveillance, medical management endovascular therapies and surgery. I discussed the risks, benefits, possible complications and expected outcome related to each treatment option.  In the end, given that the patient cannot get an MRA due to incompatible PPM, II recommend follow up surveillance CTA head in 6 month's time to evaluate for growth or change in morphology of the aneurysm.. The patient was instructed that if she experiences new or worsening headaches, worst headache of her life, nausea/vomiting, visual changes, new weakness or numbness of extremities, she should go immediately to the emergency department or call 911.  The patient demonstrates understanding of the above.\par The patient understands the plan of care and is in agreement.  All questions answered to patient satisfaction.\par \par She should follow up with stroke neurology for her recent PCA infarct. \par \par 
No

## 2024-10-24 NOTE — CONSULT NOTE ADULT - ASSESSMENT
A:    84F    Here for:    1. Lung mass s/p lobectomy  2. ABLA  3. Hypoxia 2/2 above  4. Pain 2/2 above    This patient requires a moderate level of MDM due to the complexity and severity of their condition which increases the amount and complexity of data to be reviewed and analyzed in addition to the risk of complications, morbidity and mortality of patient management    P:    POD # 0 s/p extensive lobectomy  ABLA  Significant post op pain    Analgesia; sliding scale, dilaudid and oxycodone, ofirmev  HD monitoring  IS, OOB as able  CT x 2 to sxn, monitor output; post op CXR done, no PTX/trapped lung, CT in place  CXR in AM  Labs in AM  4g Hgb drop in OR; monitor, hold DOAC for now given blood loss, AM CBC  Renal fxn OK  IV hydration  Advance diet as able  OOB as able  BG < 180  No s/s of further active bleeding  Med reconcilliation    Dispo: Care in SDU in concert with CTS sv.     Time spent on this patient encounter: 75+ minutes    Date of entry of this note is equal to the date of services rendered    This includes assessment of the presenting problems with associated risks, reviewing the medical record to prepare for the encounter and meeting face to face with the patient to obtain additional history. I have also performed an appropriate physical exam, made interventions listed and ordered and interpreted appropriate diagnostic studies as documented. This also included communication and coordination of care with the multidisciplinary team including the bedside nurse, appropriate attending of record and consultants as needed.

## 2024-10-24 NOTE — BRIEF OPERATIVE NOTE - NSICDXBRIEFPROCEDURE_GEN_ALL_CORE_FT
PROCEDURES:  Wedge resection, lung, robot-assisted, thoracoscopic, w lobectomy and mediastinal lymph node dissection u robotic thoracoscopy if indicated 24-Oct-2024 12:53:39 Left lingular sparing upper lobectomy, with additional left upper lobe margin. Naag Atkins

## 2024-10-25 LAB
ANION GAP SERPL CALC-SCNC: 6 MMOL/L — SIGNIFICANT CHANGE UP (ref 5–17)
BASOPHILS # BLD AUTO: 0.03 K/UL — SIGNIFICANT CHANGE UP (ref 0–0.2)
BASOPHILS NFR BLD AUTO: 0.3 % — SIGNIFICANT CHANGE UP (ref 0–2)
BUN SERPL-MCNC: 16 MG/DL — SIGNIFICANT CHANGE UP (ref 7–23)
CALCIUM SERPL-MCNC: 9.2 MG/DL — SIGNIFICANT CHANGE UP (ref 8.5–10.1)
CHLORIDE SERPL-SCNC: 106 MMOL/L — SIGNIFICANT CHANGE UP (ref 96–108)
CO2 SERPL-SCNC: 27 MMOL/L — SIGNIFICANT CHANGE UP (ref 22–31)
CREAT SERPL-MCNC: 0.89 MG/DL — SIGNIFICANT CHANGE UP (ref 0.5–1.3)
EGFR: 63 ML/MIN/1.73M2 — SIGNIFICANT CHANGE UP
EOSINOPHIL # BLD AUTO: 0 K/UL — SIGNIFICANT CHANGE UP (ref 0–0.5)
EOSINOPHIL NFR BLD AUTO: 0 % — SIGNIFICANT CHANGE UP (ref 0–6)
GLUCOSE SERPL-MCNC: 116 MG/DL — HIGH (ref 70–99)
HCT VFR BLD CALC: 33.1 % — LOW (ref 34.5–45)
HGB BLD-MCNC: 11 G/DL — LOW (ref 11.5–15.5)
IMM GRANULOCYTES NFR BLD AUTO: 0.5 % — SIGNIFICANT CHANGE UP (ref 0–0.9)
LYMPHOCYTES # BLD AUTO: 0.55 K/UL — LOW (ref 1–3.3)
LYMPHOCYTES # BLD AUTO: 5 % — LOW (ref 13–44)
MCHC RBC-ENTMCNC: 32.8 PG — SIGNIFICANT CHANGE UP (ref 27–34)
MCHC RBC-ENTMCNC: 33.2 GM/DL — SIGNIFICANT CHANGE UP (ref 32–36)
MCV RBC AUTO: 98.8 FL — SIGNIFICANT CHANGE UP (ref 80–100)
MONOCYTES # BLD AUTO: 0.64 K/UL — SIGNIFICANT CHANGE UP (ref 0–0.9)
MONOCYTES NFR BLD AUTO: 5.8 % — SIGNIFICANT CHANGE UP (ref 2–14)
NEUTROPHILS # BLD AUTO: 9.83 K/UL — HIGH (ref 1.8–7.4)
NEUTROPHILS NFR BLD AUTO: 88.4 % — HIGH (ref 43–77)
PLATELET # BLD AUTO: 190 K/UL — SIGNIFICANT CHANGE UP (ref 150–400)
POTASSIUM SERPL-MCNC: 3.9 MMOL/L — SIGNIFICANT CHANGE UP (ref 3.5–5.3)
POTASSIUM SERPL-SCNC: 3.9 MMOL/L — SIGNIFICANT CHANGE UP (ref 3.5–5.3)
RBC # BLD: 3.35 M/UL — LOW (ref 3.8–5.2)
RBC # FLD: 12.8 % — SIGNIFICANT CHANGE UP (ref 10.3–14.5)
SODIUM SERPL-SCNC: 139 MMOL/L — SIGNIFICANT CHANGE UP (ref 135–145)
WBC # BLD: 11.11 K/UL — HIGH (ref 3.8–10.5)
WBC # FLD AUTO: 11.11 K/UL — HIGH (ref 3.8–10.5)

## 2024-10-25 PROCEDURE — 99024 POSTOP FOLLOW-UP VISIT: CPT

## 2024-10-25 PROCEDURE — 71045 X-RAY EXAM CHEST 1 VIEW: CPT | Mod: 26

## 2024-10-25 RX ORDER — TRAMADOL HYDROCHLORIDE 50 MG/1
25 TABLET, COATED ORAL EVERY 6 HOURS
Refills: 0 | Status: DISCONTINUED | OUTPATIENT
Start: 2024-10-25 | End: 2024-10-28

## 2024-10-25 RX ORDER — ACETAMINOPHEN 500 MG
650 TABLET ORAL EVERY 6 HOURS
Refills: 0 | Status: DISCONTINUED | OUTPATIENT
Start: 2024-10-25 | End: 2024-10-28

## 2024-10-25 RX ORDER — TRAMADOL HYDROCHLORIDE 50 MG/1
50 TABLET, COATED ORAL EVERY 6 HOURS
Refills: 0 | Status: DISCONTINUED | OUTPATIENT
Start: 2024-10-25 | End: 2024-10-28

## 2024-10-25 RX ORDER — CHLORHEXIDINE GLUCONATE 40 MG/ML
1 SOLUTION TOPICAL
Refills: 0 | Status: DISCONTINUED | OUTPATIENT
Start: 2024-10-25 | End: 2024-10-28

## 2024-10-25 RX ADMIN — Medication 75 MILLILITER(S): at 18:17

## 2024-10-25 RX ADMIN — OXYCODONE HYDROCHLORIDE 5 MILLIGRAM(S): 30 TABLET ORAL at 09:07

## 2024-10-25 RX ADMIN — Medication 25 MILLIGRAM(S): at 22:15

## 2024-10-25 RX ADMIN — IPRATROPIUM BROMIDE AND ALBUTEROL SULFATE 3 MILLILITER(S): .5; 2.5 SOLUTION RESPIRATORY (INHALATION) at 20:14

## 2024-10-25 RX ADMIN — IPRATROPIUM BROMIDE AND ALBUTEROL SULFATE 3 MILLILITER(S): .5; 2.5 SOLUTION RESPIRATORY (INHALATION) at 07:55

## 2024-10-25 RX ADMIN — IPRATROPIUM BROMIDE AND ALBUTEROL SULFATE 3 MILLILITER(S): .5; 2.5 SOLUTION RESPIRATORY (INHALATION) at 13:50

## 2024-10-25 RX ADMIN — OXYCODONE HYDROCHLORIDE 5 MILLIGRAM(S): 30 TABLET ORAL at 08:37

## 2024-10-25 RX ADMIN — TRAMADOL HYDROCHLORIDE 25 MILLIGRAM(S): 50 TABLET, COATED ORAL at 14:47

## 2024-10-25 RX ADMIN — Medication 25 MILLIGRAM(S): at 08:37

## 2024-10-25 RX ADMIN — Medication 20 MILLIGRAM(S): at 22:15

## 2024-10-25 RX ADMIN — Medication 75 MICROGRAM(S): at 05:37

## 2024-10-25 RX ADMIN — LOSARTAN POTASSIUM 25 MILLIGRAM(S): 25 TABLET ORAL at 10:47

## 2024-10-25 NOTE — PROGRESS NOTE ADULT - ASSESSMENT
85 y/o female with lung mass, PMH of atrial fib, HTN, HLD, hypothyroidism and foot ulcer. Pt reports she was in the hospital for a foot ulcer, during her imaging work up, she was incidentally found to have 10/24 s/p  robot assisted left upper lobe lingula sparing  lobectomy, MSLND intrap bleeding requiring Platelets and     Plan   Ambulate PT/ OT   IS  Pain medication   both CT to WS today   CXR in am  maintain CT this weekend- Dr Lopez will instruct when to remove them  hold Eliquis for now   SCD boots for DVTP   keep Pt in SDU this weekend please   OR path P  labs in am- trend H&H  DW DR Lopez

## 2024-10-25 NOTE — PHYSICAL THERAPY INITIAL EVALUATION ADULT - DID THE PATIENT HAVE SURGERY?
Wedge resection, lung, robot-assisted, thoracoscopic, w lobectomy and mediastinal lymph node dissection u robotic thoracoscopy, Left lingular sparing upper lobectomy, with additional left upper lobe margin./yes
Family informed

## 2024-10-25 NOTE — PHYSICAL THERAPY INITIAL EVALUATION ADULT - GENERAL OBSERVATIONS, REHAB EVAL
The Pt was received on SICU seated up in bedside chair pleasant, calm, cooperative, and willing to participate with PT, +Chest tube 2x, +SICU monitors. Pt responded well to functional mobility trng, ambulation tx, and thera ex review. Required  SBA for sit to stand and CGA to ambulate 100ft with RW support. Assisted back into bedside chair and adjusted for comfort, +alarm. The Pt was in NAD at end of tx.  Call bell, tray, and phone in place and within reach. NSG made aware.

## 2024-10-25 NOTE — PHYSICAL THERAPY INITIAL EVALUATION ADULT - LIVES WITH, PROFILE
Multiple tenants in building/alone Has outside support from people within same apartment building./alone

## 2024-10-25 NOTE — PROGRESS NOTE ADULT - ASSESSMENT
A:    84F    Here for:    1. Lung mass s/p lobectomy  2. ABLA  3. Hypoxia 2/2 above  4. Pain 2/2 above    This patient requires a moderate level of MDM due to the complexity and severity of their condition which increases the amount and complexity of data to be reviewed and analyzed in addition to the risk of complications, morbidity and mortality of patient management    P:    POD # 1 s/p extensive lobectomy  ABLA, improved    Analgesia; sliding scale, dilaudid and oxycodone, Ofirmev  HD monitoring  IS, OOB as able; IS emphasized, as well as ambulation as much as safety allows  CT x 2 to sxn, monitor output; post op CXR done, no PTX/trapped lung, CT in place  CXR in AM  Labs in AM  4g Hgb drop in OR; monitor, hold DOAC for now given blood loss,; Hgb rebounded ~2g this AM, no further bleeding  Renal fxn OK  IV hydration  Advance diet as able  OOB as able  BG < 180  No s/s of further active bleeding  Med reconcilliation    Dispo: Care in SDU in concert with [facility].     Time spent on this patient encounter: 35+ minutes    Date of entry of this note is equal to the date of services rendered    This includes assessment of the presenting problems with associated risks, reviewing the medical record to prepare for the encounter and meeting face to face with the patient to obtain additional history. I have also performed an appropriate physical exam, made interventions listed and ordered and interpreted appropriate diagnostic studies as documented. This also included communication and coordination of care with the multidisciplinary team including the bedside nurse, appropriate attending of record and consultants as needed.

## 2024-10-25 NOTE — PHYSICAL THERAPY INITIAL EVALUATION ADULT - STANDING BALANCE: DYNAMIC, REHAB EVAL
fair minus Azathioprine Pregnancy And Lactation Text: This medication is Pregnancy Category D and isn't considered safe during pregnancy. It is unknown if this medication is excreted in breast milk.

## 2024-10-25 NOTE — PHYSICAL THERAPY INITIAL EVALUATION ADULT - ADDITIONAL COMMENTS
Pt lives alone in a 3rd floor walk-up apartment. She owns a wide based SAC and negotiates steps/ ambulates independently. Pt reports she has help from other tenants in her building for higher level ADLs. Pt lives alone in a 3rd floor walk-up apartment, 33 steps to enter. She owns a wide based SAC and negotiates steps/ ambulates independently. Pt reports she has help from other tenants in her building for higher level ADLs.

## 2024-10-25 NOTE — PROGRESS NOTE ADULT - SUBJECTIVE AND OBJECTIVE BOX
SDU Consult Note    HPI:    S:    Pt seen and examined  84F  PMHx of HTN, HLD, hypothyroidism, atrial fibrillation on Eliquis and metoprolol   presents today for elective thoracic surgery  POD # 0 s/p  Robotic wedge resection left upper lung lingular sparing with add'l left upper lobe margin  Refer to detailed OR report for details  CT x 2 in place  Extubated  In SDU for post operative care    10/24: lying in bed. + post operative pain, c/o of being cold. [place] in place, CT x 2 in place to sxn, CT # 2 with + AL, some blood in tubes, not high output.  10/25: In chair, looks and feels better. Post op pain, which medication helps. CTs remain in place to sxn, output dropping; CT # 1 200 @# 200cc each; AL resolved; ambulating with walker.    ROS: + post operative pain  Remainder of systems reviewed, negative        Allergies    Cipro (Other)  doxycycline (Unknown)  linezolid (Unknown)    Intolerances    Levaquin (Stomach Upset; Nausea)      MEDICATIONS  (STANDING):  albuterol/ipratropium for Nebulization 3 milliLiter(s) Nebulizer every 6 hours  atorvastatin 20 milliGRAM(s) Oral at bedtime  chlorhexidine 4% Liquid 1 Application(s) Topical <User Schedule>  influenza  Vaccine (HIGH DOSE) 0.5 milliLiter(s) IntraMuscular once  levothyroxine 75 MICROGram(s) Oral daily  losartan 25 milliGRAM(s) Oral daily  metoprolol tartrate 25 milliGRAM(s) Oral two times a day    MEDICATIONS  (PRN):  acetaminophen     Tablet .. 650 milliGRAM(s) Oral every 6 hours PRN Mild Pain (1 - 3)  ondansetron Injectable 4 milliGRAM(s) IV Push every 6 hours PRN Nausea  senna 2 Tablet(s) Oral at bedtime PRN Constipation  traMADol 25 milliGRAM(s) Oral every 6 hours PRN Moderate Pain (4 - 6)  traMADol 50 milliGRAM(s) Oral every 6 hours PRN Severe Pain (7 - 10)      Drug Dosing Weight  Height (cm): 177.8 (24 Oct 2024 07:02)  Weight (kg): 78 (24 Oct 2024 07:02)  BMI (kg/m2): 24.7 (24 Oct 2024 07:02)  BSA (m2): 1.96 (24 Oct 2024 07:02)    PAST MEDICAL & SURGICAL HISTORY:  HTN (hypertension)      Hyperlipidemia      Hypothyroid      Chronic atrial fibrillation      Lung mass      Urine incontinence      Healed foot ulcer      S/P thyroid surgery      S/P hernia surgery      S/P rotator cuff surgery      S/P tonsillectomy      S/P lumbar spine operation          FAMILY HISTORY:  Family history of early CAD (Father)    Family history of myocardial infarction (Father)        UNLESS OTHERWISE NOTED IN HPI above:    Constitutional:  No Weight Change, No Fever, No Chills, No Night Sweats, No Fatigue, No Malaise  ENT/Mouth:  No Hearing Changes, No Ear Pain, No Nasal Congestion, No  Sinus Pain, No Hoarseness, No sore throat, No Rhinorrhea, No Swallowing  Difficulty  Eyes:  No Eye Pain, No Swelling, No Redness, No Foreign Body, No Discharge, No Vision Changes  Cardiovascular:  No Chest Pain, No SOB, No PND, No Dyspnea on Exertion,  No Orthopnea, No Claudication, No Edema, No Palpitations  Respiratory:  No Cough, No Sputum, No Wheezing, No Smoke Exposure, No Dyspnea  Gastrointestinal:  No Nausea, No Vomiting, No Diarrhea, No  Constipation, No Pain, No Heartburn, No Anorexia, No Dysphagia, No  Hematochezia, No Melena, No Flatulence, No Jaundice  Genitourinary:  No Dysmenorrhea, No DUB, No Dyspareunia, No Dysuria, No  Urinary Frequency, No Hematuria, No Urinary Incontinence, No Urgency,  No Flank Pain, No Urinary Flow Changes, No Hesitancy  Musculoskeletal:  No Arthralgias, No Myalgias, No Joint Swelling, No  Joint Stiffness, No Back Pain, No Neck Pain, No Injury History  Skin:  No Skin Lesions, No Pruritis, No Hair Changes, No Breast/Skin Changes, No Nipple Discharge  Neuro:  No Weakness, No Numbness, No Paresthesias, No Loss of  Consciousness, No Syncope, No Dizziness, No Headache, No Coordination  Changes, No Recent Falls  Psych:  No Anxiety/Panic, No Depression, No Insomnia, No Personality  Changes, No Delusions, No Rumination, No SI/HI/AH/VH, No Social Issues,  No Memory Changes, No Violence/Abuse Hx., No Eating Concerns  Heme/Lymph:  No Bruising, No Bleeding, No Transfusions History, No Lymphadenopathy  Endocrine:  No Polyuria, No Polydipsia, No Temperature Intolerance    O:    ICU Vital Signs Last 24 Hrs  T(C): 36.6 (25 Oct 2024 14:02), Max: 36.8 (25 Oct 2024 06:00)  T(F): 97.8 (25 Oct 2024 14:02), Max: 98.2 (25 Oct 2024 06:00)  HR: 102 (25 Oct 2024 13:55) (63 - 105)  BP: 115/64 (25 Oct 2024 14:00) (97/56 - 154/83)  BP(mean): 80 (25 Oct 2024 14:00) (70 - 107)  ABP: --  ABP(mean): --  RR: 25 (25 Oct 2024 12:00) (9 - 28)  SpO2: 98% (25 Oct 2024 13:55) (98% - 100%)    O2 Parameters below as of 25 Oct 2024 13:55  Patient On (Oxygen Delivery Method): room air                I&O's Detail    24 Oct 2024 07:01  -  25 Oct 2024 07:00  --------------------------------------------------------  IN:    Frozen Plasma Cryoprecipitate Reduced: 902 mL    Other (mL): 2632 mL    sodium chloride 0.9%: 1300 mL  Total IN: 4834 mL    OUT:    Chest Tube (mL): 195 mL    Chest Tube (mL): 250 mL    Indwelling Catheter - Urethral (mL): 3350 mL  Total OUT: 3795 mL    Total NET: 1039 mL              PE:    Adult sitting in recliner  + NC in place  No JVD trachea midline  Normocephalic, atraumatic  S1S2+  Dec BS L side  + CT x 2 L side, hooked to sxn, CT # 2 AL resolved  Abd soft NTND  No leg swelling/edema noted  Awake, alert, IS at bedside  Skin pink, warm    LABS:    CBC Full  -  ( 25 Oct 2024 05:46 )  WBC Count : 11.11 K/uL  RBC Count : 3.35 M/uL  Hemoglobin : 11.0 g/dL  Hematocrit : 33.1 %  Platelet Count - Automated : 190 K/uL  Mean Cell Volume : 98.8 fl  Mean Cell Hemoglobin : 32.8 pg  Mean Cell Hemoglobin Concentration : 33.2 gm/dL  Auto Neutrophil # : 9.83 K/uL  Auto Lymphocyte # : 0.55 K/uL  Auto Monocyte # : 0.64 K/uL  Auto Eosinophil # : 0.00 K/uL  Auto Basophil # : 0.03 K/uL  Auto Neutrophil % : 88.4 %  Auto Lymphocyte % : 5.0 %  Auto Monocyte % : 5.8 %  Auto Eosinophil % : 0.0 %  Auto Basophil % : 0.3 %    10-25    139  |  106  |  16  ----------------------------<  116[H]  3.9   |  27  |  0.89    Ca    9.2      25 Oct 2024 05:46        Urinalysis Basic - ( 25 Oct 2024 05:46 )    Color: x / Appearance: x / SG: x / pH: x  Gluc: 116 mg/dL / Ketone: x  / Bili: x / Urobili: x   Blood: x / Protein: x / Nitrite: x   Leuk Esterase: x / RBC: x / WBC x   Sq Epi: x / Non Sq Epi: x / Bacteria: x      CAPILLARY BLOOD GLUCOSE

## 2024-10-25 NOTE — PROGRESS NOTE ADULT - SUBJECTIVE AND OBJECTIVE BOX
Subjective:  Pt in bed NAD. Walked with PT this am with a walker concern for discharge home being lives alone and has 2 flights of stairs to get up to her apartment     T(C): 36.7 (10-25-24 @ 09:35), Max: 36.8 (10-25-24 @ 06:00)  HR: 87 (10-25-24 @ 08:11) (66 - 97)  BP: 149/91 (10-25-24 @ 08:00) (96/79 - 154/83)  ABP: 115/88 (10-24-24 @ 14:30) (101/54 - 125/92)  ABP(mean): --  RR: 26 (10-25-24 @ 08:00) (9 - 26)  SpO2: 100% (10-25-24 @ 08:11) (97% - 100%) 2 L NC   Wt(kg): --  CVP(mm Hg): --  CO: --  CI: --  PA: --                                              Tele: Afib     CHEST TUBE: #1 250 cc #2 195cc                              OUTPUT:     per 24 hours    AIR LEAKS:  [ ] YES [ x] NO          10-25    139  |  106  |  16  ----------------------------<  116[H]  3.9   |  27  |  0.89    Ca    9.2      25 Oct 2024 05:46                                 11.0   11.11 )-----------( 190      ( 25 Oct 2024 05:46 )             33.1                 CAPILLARY BLOOD GLUCOSE               CXR:        Assessment  Neuro:   Pulm:   CV:  Abd:   Extremities:         Assessment:  84yFemale    with PAST MEDICAL & SURGICAL HISTORY:  HTN (hypertension)      Hyperlipidemia      Hypothyroid      Chronic atrial fibrillation      Lung mass      Urine incontinence      Healed foot ulcer      S/P thyroid surgery      S/P hernia surgery      S/P rotator cuff surgery      S/P tonsillectomy      S/P lumbar spine operation            Plan:     Subjective:  Pt in bed NAD. Walked with PT this am with a walker concern for discharge home being lives alone and has 2 flights of stairs to get up to her apartment     T(C): 36.7 (10-25-24 @ 09:35), Max: 36.8 (10-25-24 @ 06:00)  HR: 87 (10-25-24 @ 08:11) (66 - 97)  BP: 149/91 (10-25-24 @ 08:00) (96/79 - 154/83)  ABP: 115/88 (10-24-24 @ 14:30) (101/54 - 125/92)  ABP(mean): --  RR: 26 (10-25-24 @ 08:00) (9 - 26)  SpO2: 100% (10-25-24 @ 08:11) (97% - 100%) 2 L NC   Wt(kg): --  CVP(mm Hg): --  CO: --  CI: --  PA: --                                              Tele: Afib     CHEST TUBE: #1 250 cc #2 195cc                              OUTPUT:     per 24 hours    AIR LEAKS:  [ ] YES [ x] NO          10-25    139  |  106  |  16  ----------------------------<  116[H]  3.9   |  27  |  0.89    Ca    9.2      25 Oct 2024 05:46                                 11.0   11.11 )-----------( 190      ( 25 Oct 2024 05:46 )             33.1                 CAPILLARY BLOOD GLUCOSE               CXR:  < from: Xray Chest 1 View-PORTABLE IMMEDIATE (Xray Chest 1 View-PORTABLE IMMEDIATE .) (10.24.24 @ 13:05) >  On October 21 there is a vaguely defined mass density in the left upper   outer lung. This is no longer evident. 2 left chest tubes are in place.   No pneumothorax.    IMPRESSION: Postop findings left chest.    < end of copied text >          Exam   Neuro:  Alert Awake NAD   Pulm: decreased at bases + left CT x 2   CV: RRR  Abd: soft   Extremities: warm          Assessment:  84yFemale    with PAST MEDICAL & SURGICAL HISTORY:  HTN (hypertension)      Hyperlipidemia      Hypothyroid      Chronic atrial fibrillation      Lung mass      Urine incontinence      Healed foot ulcer      S/P thyroid surgery      S/P hernia surgery      S/P rotator cuff surgery      S/P tonsillectomy      S/P lumbar spine operation

## 2024-10-25 NOTE — PHYSICAL THERAPY INITIAL EVALUATION ADULT - GAIT TRAINING, PT EVAL
Patient will ambulate 300ft with least restrictive assistive device and navigate up/down 12 steps with Supervision by discharge.

## 2024-10-25 NOTE — PHYSICAL THERAPY INITIAL EVALUATION ADULT - PERTINENT HX OF CURRENT PROBLEM, REHAB EVAL
Pt 84F PMHx of HTN, HLD, hypothyroidism, atrial fibrillation on Eliquis and metoprolol presents today for elective thoracic surgery.  POD #1 s/p  Robotic wedge resection left upper lung lingular sparing with add'l left upper lobe margin.

## 2024-10-26 LAB
ANION GAP SERPL CALC-SCNC: 6 MMOL/L — SIGNIFICANT CHANGE UP (ref 5–17)
BUN SERPL-MCNC: 16 MG/DL — SIGNIFICANT CHANGE UP (ref 7–23)
CALCIUM SERPL-MCNC: 9.4 MG/DL — SIGNIFICANT CHANGE UP (ref 8.5–10.1)
CHLORIDE SERPL-SCNC: 104 MMOL/L — SIGNIFICANT CHANGE UP (ref 96–108)
CO2 SERPL-SCNC: 25 MMOL/L — SIGNIFICANT CHANGE UP (ref 22–31)
CREAT SERPL-MCNC: 0.7 MG/DL — SIGNIFICANT CHANGE UP (ref 0.5–1.3)
EGFR: 84 ML/MIN/1.73M2 — SIGNIFICANT CHANGE UP
GLUCOSE SERPL-MCNC: 101 MG/DL — HIGH (ref 70–99)
HCT VFR BLD CALC: 31.7 % — LOW (ref 34.5–45)
HGB BLD-MCNC: 10.5 G/DL — LOW (ref 11.5–15.5)
MAGNESIUM SERPL-MCNC: 1.8 MG/DL — SIGNIFICANT CHANGE UP (ref 1.6–2.6)
MCHC RBC-ENTMCNC: 32.3 PG — SIGNIFICANT CHANGE UP (ref 27–34)
MCHC RBC-ENTMCNC: 33.1 GM/DL — SIGNIFICANT CHANGE UP (ref 32–36)
MCV RBC AUTO: 97.5 FL — SIGNIFICANT CHANGE UP (ref 80–100)
PLATELET # BLD AUTO: 198 K/UL — SIGNIFICANT CHANGE UP (ref 150–400)
POTASSIUM SERPL-MCNC: 3.5 MMOL/L — SIGNIFICANT CHANGE UP (ref 3.5–5.3)
POTASSIUM SERPL-SCNC: 3.5 MMOL/L — SIGNIFICANT CHANGE UP (ref 3.5–5.3)
RBC # BLD: 3.25 M/UL — LOW (ref 3.8–5.2)
RBC # FLD: 13.1 % — SIGNIFICANT CHANGE UP (ref 10.3–14.5)
SODIUM SERPL-SCNC: 135 MMOL/L — SIGNIFICANT CHANGE UP (ref 135–145)
WBC # BLD: 10.77 K/UL — HIGH (ref 3.8–10.5)
WBC # FLD AUTO: 10.77 K/UL — HIGH (ref 3.8–10.5)

## 2024-10-26 PROCEDURE — 71045 X-RAY EXAM CHEST 1 VIEW: CPT | Mod: 26

## 2024-10-26 RX ORDER — MAGNESIUM SULFATE IN 0.9% NACL 2 G/50 ML
1 INTRAVENOUS SOLUTION, PIGGYBACK (ML) INTRAVENOUS ONCE
Refills: 0 | Status: COMPLETED | OUTPATIENT
Start: 2024-10-26 | End: 2024-10-26

## 2024-10-26 RX ORDER — POTASSIUM CHLORIDE 10 MEQ
40 TABLET, EXTENDED RELEASE ORAL ONCE
Refills: 0 | Status: COMPLETED | OUTPATIENT
Start: 2024-10-26 | End: 2024-10-26

## 2024-10-26 RX ADMIN — TRAMADOL HYDROCHLORIDE 25 MILLIGRAM(S): 50 TABLET, COATED ORAL at 05:30

## 2024-10-26 RX ADMIN — Medication 75 MICROGRAM(S): at 05:25

## 2024-10-26 RX ADMIN — Medication 100 GRAM(S): at 15:40

## 2024-10-26 RX ADMIN — IPRATROPIUM BROMIDE AND ALBUTEROL SULFATE 3 MILLILITER(S): .5; 2.5 SOLUTION RESPIRATORY (INHALATION) at 01:32

## 2024-10-26 RX ADMIN — Medication 40 MILLIEQUIVALENT(S): at 15:39

## 2024-10-26 RX ADMIN — IPRATROPIUM BROMIDE AND ALBUTEROL SULFATE 3 MILLILITER(S): .5; 2.5 SOLUTION RESPIRATORY (INHALATION) at 08:46

## 2024-10-26 RX ADMIN — LOSARTAN POTASSIUM 25 MILLIGRAM(S): 25 TABLET ORAL at 09:59

## 2024-10-26 RX ADMIN — TRAMADOL HYDROCHLORIDE 25 MILLIGRAM(S): 50 TABLET, COATED ORAL at 04:32

## 2024-10-26 RX ADMIN — IPRATROPIUM BROMIDE AND ALBUTEROL SULFATE 3 MILLILITER(S): .5; 2.5 SOLUTION RESPIRATORY (INHALATION) at 20:58

## 2024-10-26 RX ADMIN — Medication 25 MILLIGRAM(S): at 21:48

## 2024-10-26 RX ADMIN — Medication 25 MILLIGRAM(S): at 09:59

## 2024-10-26 RX ADMIN — CHLORHEXIDINE GLUCONATE 1 APPLICATION(S): 40 SOLUTION TOPICAL at 05:44

## 2024-10-26 RX ADMIN — Medication 20 MILLIGRAM(S): at 21:48

## 2024-10-26 RX ADMIN — IPRATROPIUM BROMIDE AND ALBUTEROL SULFATE 3 MILLILITER(S): .5; 2.5 SOLUTION RESPIRATORY (INHALATION) at 14:21

## 2024-10-26 RX ADMIN — Medication 75 MILLILITER(S): at 05:30

## 2024-10-26 NOTE — OCCUPATIONAL THERAPY INITIAL EVALUATION ADULT - LIVES WITH, PROFILE
Has outside support from people within same apartment building, RW, cane, SC, RTS, live alone but supportive tentants her live there and assist her, commode, walk in shower with sc and GB/alone

## 2024-10-26 NOTE — PROGRESS NOTE ADULT - ASSESSMENT
83 y/o Female with PMH of AFib, HTN, HLD, Hypothyroidism presents to  for scheduled L lung mass resection with:    1. Robotic Assisted L Lobectomy      -POD 2 s/p Robotic VATS with LLL lobectomy. CT x2 in place. Remains to WS. Apical with no drainage overnight, anterior with ~150 drainage overnight. Daily CXR for placement.  -Encourage IS/OOB. Pain control PRN. Pain medication   -Hypomagnesemia and hypokalemia, repleted with KCl and MgSO4. Trend labs, replete lytes as needed to maintain K>4, Mg>2 and Phos >3. Avoid nephrotoxic medications.   -H/H stable. Trend H/H, transfuse for hgb <7.0 if necessary. Hold Full AC with Eliquis. SCDs in place.       Plan discussed with Dr. Lopez and Dr. Caro. Patient understanding and agreeable to plan.  85 y/o Female with PMH of AFib, HTN, HLD, Hypothyroidism presents to  for scheduled L lung mass resection with:    1. Robotic Assisted L Lobectomy  2. Hypokalemia   3. Hypomagnesemia       -POD 2 s/p Robotic VATS with LLL lobectomy. CT x2 in place. Remains to WS. Apical with no drainage overnight, anterior with ~150 drainage overnight. Daily CXR for placement.  -Encourage IS/OOB. Pain control PRN. Pain medication   -Hypomagnesemia and hypokalemia, repleted with KCl and MgSO4. Trend labs, replete lytes as needed to maintain K>4, Mg>2 and Phos >3. Avoid nephrotoxic medications.   -H/H stable. Trend H/H, transfuse for hgb <7.0 if necessary. Hold Full AC with Eliquis. SCDs in place.       Plan discussed with Dr. Lopez and Dr. Caro. Patient understanding and agreeable to plan.  85 y/o Female with PMH of AFib, HTN, HLD, Hypothyroidism presents to  for scheduled L lung mass resection with:    1. Robotic Assisted L Lobectomy  2. Hypokalemia   3. Hypomagnesemia       -POD 2 s/p Robotic VATS with LLL lobectomy. CT x2 in place. Remains to . CTx2 with no drainage overnight, CT x1 with ~150 drainage overnight. Daily CXR for placement.  -Encourage IS/OOB. Pain control PRN. Pain medication   -Hypomagnesemia and hypokalemia, repleted with KCl and MgSO4. Trend labs, replete lytes as needed to maintain K>4, Mg>2 and Phos >3. Avoid nephrotoxic medications.   -H/H stable. Trend H/H, transfuse for hgb <7.0 if necessary. Hold Full AC with Eliquis. SCDs in place.       Plan discussed with Dr. Lopez and Dr. Caro. Patient understanding and agreeable to plan.

## 2024-10-26 NOTE — ED ADULT NURSE NOTE - NSNEUBEH_NEU_P_CORE
Documented history of hypertension but not on any antihypertensives at home.  Blood pressure is acceptable this admission  Patient and chart review, patient usually taking multipin 5 mg and losartan 50 mg daily before being discontinued in June 2024    BP overnight high 120s/70s     Plan:  - Continue amlodipine 5 mg     no

## 2024-10-26 NOTE — OCCUPATIONAL THERAPY INITIAL EVALUATION ADULT - NSOTDISCHREC_GEN_A_CORE
AR vs home with family support and home OT pending progress. Pt refusing AR at this time/Acute Inpatient Rehab/Home OT

## 2024-10-26 NOTE — OCCUPATIONAL THERAPY INITIAL EVALUATION ADULT - ADL RETRAINING, OT EVAL
Patient will participate in lower body dressing with MOD I   in 2 weeks  Patient will participate in toilet transfer with   MOD I in 2 weeks  Patient will participate in toileting with   MOD I   in 2 weeks  Patient will participate in grooming standing at the sink with MOD I   in 2 weeks

## 2024-10-26 NOTE — PROGRESS NOTE ADULT - SUBJECTIVE AND OBJECTIVE BOX
Patient is a 84y old  Female who presents with a chief complaint of     BRIEF HOSPITAL COURSE-  85 y/o Female with PMH of AFib, HTN, HLD, Hypothyroidism presents to  for scheduled L lung mass resection.     Events last 24 hours:   POD 2 s/p Robotic Assisted L Lobectomy. Apical CT with no drainage overnight.       Review of Systems:  CONSTITUTIONAL: No fever, chills, or fatigue.  EYES: No eye pain, visual disturbances, or discharge.  ENMT: No difficulty hearing, tinnitus, vertigo. No sinus or throat pain.  NECK: No pain or stiffness.  RESPIRATORY: No cough, wheezing, chills or hemoptysis. No shortness of breath.  CARDIOVASCULAR: No chest pain, palpitations, dizziness, or leg swelling.  GASTROINTESTINAL: No abdominal or epigastric pain. No nausea, vomiting, or hematemesis. No diarrhea or constipation. No melena or hematochezia.  GENITOURINARY: No dysuria, frequency, hematuria, or incontinence.  NEUROLOGICAL: No headaches, memory loss, loss of strength, numbness, or tremors.  SKIN: No itching, burning, rashes, or lesions.  MUSCULOSKELETAL: No joint pain or swelling; No muscle, back, or extremity pain.  PSYCHIATRIC: No depression, anxiety, mood swings, or difficulty sleeping.        PAST MEDICAL & SURGICAL HISTORY-  HTN (hypertension)      Hyperlipidemia      Hypothyroid      Chronic atrial fibrillation      Lung mass      Urine incontinence      Healed foot ulcer      S/P thyroid surgery      S/P hernia surgery      S/P rotator cuff surgery      S/P tonsillectomy      S/P lumbar spine operation          Medications:    losartan 25 milliGRAM(s) Oral daily  metoprolol tartrate 25 milliGRAM(s) Oral two times a day    albuterol/ipratropium for Nebulization 3 milliLiter(s) Nebulizer every 6 hours    acetaminophen     Tablet .. 650 milliGRAM(s) Oral every 6 hours PRN  ondansetron Injectable 4 milliGRAM(s) IV Push every 6 hours PRN  traMADol 25 milliGRAM(s) Oral every 6 hours PRN  traMADol 50 milliGRAM(s) Oral every 6 hours PRN    senna 2 Tablet(s) Oral at bedtime PRN    atorvastatin 20 milliGRAM(s) Oral at bedtime  levothyroxine 75 MICROGram(s) Oral daily    lactated ringers. 1000 milliLiter(s) IV Continuous <Continuous>    influenza  Vaccine (HIGH DOSE) 0.5 milliLiter(s) IntraMuscular once    chlorhexidine 4% Liquid 1 Application(s) Topical <User Schedule>          ICU Vital Signs Last 24 Hrs  T(C): 36.7 (26 Oct 2024 12:00), Max: 36.8 (25 Oct 2024 21:41)  T(F): 98 (26 Oct 2024 12:00), Max: 98.2 (25 Oct 2024 21:41)  HR: 91 (26 Oct 2024 12:00) (79 - 109)  BP: 141/86 (26 Oct 2024 12:00) (122/68 - 143/85)  BP(mean): 103 (26 Oct 2024 12:00) (84 - 110)  ABP: --  ABP(mean): --  RR: 26 (26 Oct 2024 12:00) (15 - 27)  SpO2: 98% (26 Oct 2024 12:00) (95% - 100%)    O2 Parameters below as of 26 Oct 2024 08:57  Patient On (Oxygen Delivery Method): room air        I&O's Detail    25 Oct 2024 07:01  -  26 Oct 2024 07:00  --------------------------------------------------------  IN:    Lactated Ringers: 900 mL  Total IN: 900 mL    OUT:    Chest Tube (mL): 15 mL    Chest Tube (mL): 325 mL    Indwelling Catheter - Urethral (mL): 300 mL    Voided (mL): 1200 mL  Total OUT: 1840 mL    Total NET: -940 mL          LABS:                        10.5   10.77 )-----------( 198      ( 26 Oct 2024 05:24 )             31.7     135  |  104  |  16  ----------------------------<  101[H]  3.5   |  25  |  0.70    Ca    9.4      26 Oct 2024 05:24  Mg     1.8     10-26        CAPILLARY BLOOD GLUCOSE        Urinalysis Basic - ( 26 Oct 2024 05:24 )  Color: x / Appearance: x / SG: x / pH: x  Gluc: 101 mg/dL / Ketone: x  / Bili: x / Urobili: x   Blood: x / Protein: x / Nitrite: x   Leuk Esterase: x / RBC: x / WBC x   Sq Epi: x / Non Sq Epi: x / Bacteria: x        CULTURES:        Physical Examination:  General: Elderly female, chronically ill appearing. In no acute distress.    HEENT: Pupils equal, reactive to light. Symmetric.  PULM: Clear to auscultation bilaterally, no adventitious sounds. No significant sputum production. CT x2 in place.   NECK: Supple, no lymphadenopathy, trachea midline.  CVS: Regular rate and rhythm. No murmurs, rubs, or gallops. S1, S2 intact.  ABD: Soft, nondistended, nontender, normoactive bowel sounds. No masses palpable.  EXT: No edema, nontender  SKIN: Warm and well perfused. No rashes noted.  NEURO: Alert, oriented, interactive. Nonfocal  DEVICES:         RADIOLOGY-    < from: Xray Chest 1 View-PORTABLE IMMEDIATE (Xray Chest 1 View-PORTABLE IMMEDIATE .) (10.24.24 @ 13:05) >  ACC: [id number] EXAM:  XR CHEST PORTABLE IMMED 1V   ORDERED BY: JAY CEDILLO     PROCEDURE DATE:  10/24/2024      INTERPRETATION:  AP chest on October 24, 2024 at 12:58 PM. Patient had   left robotic lobectomy.    Heart magnified by technique.    On October 21 there is a vaguely defined mass density in the left upper   outer lung. This is no longer evident. 2 left chest tubes are in place.   No pneumothorax.    IMPRESSION: Postop findings left chest.    --- End of Report ---      JEANINE BUTLER MD; Attending Radiologist  This document has been electronically signed. Oct 24 2024  1:09PM    < end of copied text >        Time spent on this patient encounter, which includes documenting this note in the electronic medical record, was 75+ minutes including assessing the presenting problems with associated risks, reviewing the medical record to prepare for the encounter, and meeting face to face with the patient to obtain additional history.  I have also performed an appropriate physical exam, made interventions listed and ordered and interpreted appropriate diagnostic studies as documented.     To improve communication and patient safety, I have coordinated care with the multidisciplinary team including the bedside nurse, appropriate attending of record and consultants as needed.        DATE OF ENTRY OF THIS NOTE IS EQUAL TO THE DATE OF SERVICES RENDERED Patient is a 84y old  Female who presents with a chief complaint of     BRIEF HOSPITAL COURSE-  83 y/o Female with PMH of AFib, HTN, HLD, Hypothyroidism presents to  for scheduled L lung mass resection.     Events last 24 hours:   POD 2 s/p Robotic Assisted L Lobectomy. CTx2 with no drainage overnight.       Review of Systems:  CONSTITUTIONAL: No fever, chills, or fatigue.  EYES: No eye pain, visual disturbances, or discharge.  ENMT: No difficulty hearing, tinnitus, vertigo. No sinus or throat pain.  NECK: No pain or stiffness.  RESPIRATORY: No cough, wheezing, chills or hemoptysis. No shortness of breath.  CARDIOVASCULAR: No chest pain, palpitations, dizziness, or leg swelling.  GASTROINTESTINAL: No abdominal or epigastric pain. No nausea, vomiting, or hematemesis. No diarrhea or constipation. No melena or hematochezia.  GENITOURINARY: No dysuria, frequency, hematuria, or incontinence.  NEUROLOGICAL: No headaches, memory loss, loss of strength, numbness, or tremors.  SKIN: No itching, burning, rashes, or lesions.  MUSCULOSKELETAL: No joint pain or swelling; No muscle, back, or extremity pain.  PSYCHIATRIC: No depression, anxiety, mood swings, or difficulty sleeping.        PAST MEDICAL & SURGICAL HISTORY-  HTN (hypertension)      Hyperlipidemia      Hypothyroid      Chronic atrial fibrillation      Lung mass      Urine incontinence      Healed foot ulcer      S/P thyroid surgery      S/P hernia surgery      S/P rotator cuff surgery      S/P tonsillectomy      S/P lumbar spine operation          Medications:    losartan 25 milliGRAM(s) Oral daily  metoprolol tartrate 25 milliGRAM(s) Oral two times a day    albuterol/ipratropium for Nebulization 3 milliLiter(s) Nebulizer every 6 hours    acetaminophen     Tablet .. 650 milliGRAM(s) Oral every 6 hours PRN  ondansetron Injectable 4 milliGRAM(s) IV Push every 6 hours PRN  traMADol 25 milliGRAM(s) Oral every 6 hours PRN  traMADol 50 milliGRAM(s) Oral every 6 hours PRN    senna 2 Tablet(s) Oral at bedtime PRN    atorvastatin 20 milliGRAM(s) Oral at bedtime  levothyroxine 75 MICROGram(s) Oral daily    lactated ringers. 1000 milliLiter(s) IV Continuous <Continuous>    influenza  Vaccine (HIGH DOSE) 0.5 milliLiter(s) IntraMuscular once    chlorhexidine 4% Liquid 1 Application(s) Topical <User Schedule>          ICU Vital Signs Last 24 Hrs  T(C): 36.7 (26 Oct 2024 12:00), Max: 36.8 (25 Oct 2024 21:41)  T(F): 98 (26 Oct 2024 12:00), Max: 98.2 (25 Oct 2024 21:41)  HR: 91 (26 Oct 2024 12:00) (79 - 109)  BP: 141/86 (26 Oct 2024 12:00) (122/68 - 143/85)  BP(mean): 103 (26 Oct 2024 12:00) (84 - 110)  ABP: --  ABP(mean): --  RR: 26 (26 Oct 2024 12:00) (15 - 27)  SpO2: 98% (26 Oct 2024 12:00) (95% - 100%)    O2 Parameters below as of 26 Oct 2024 08:57  Patient On (Oxygen Delivery Method): room air        I&O's Detail    25 Oct 2024 07:01  -  26 Oct 2024 07:00  --------------------------------------------------------  IN:    Lactated Ringers: 900 mL  Total IN: 900 mL    OUT:    Chest Tube (mL): 15 mL    Chest Tube (mL): 325 mL    Indwelling Catheter - Urethral (mL): 300 mL    Voided (mL): 1200 mL  Total OUT: 1840 mL    Total NET: -940 mL          LABS:                        10.5   10.77 )-----------( 198      ( 26 Oct 2024 05:24 )             31.7     135  |  104  |  16  ----------------------------<  101[H]  3.5   |  25  |  0.70    Ca    9.4      26 Oct 2024 05:24  Mg     1.8     10-26        CAPILLARY BLOOD GLUCOSE        Urinalysis Basic - ( 26 Oct 2024 05:24 )  Color: x / Appearance: x / SG: x / pH: x  Gluc: 101 mg/dL / Ketone: x  / Bili: x / Urobili: x   Blood: x / Protein: x / Nitrite: x   Leuk Esterase: x / RBC: x / WBC x   Sq Epi: x / Non Sq Epi: x / Bacteria: x        CULTURES:        Physical Examination:  General: Elderly female, chronically ill appearing. In no acute distress.    HEENT: Pupils equal, reactive to light. Symmetric.  PULM: Clear to auscultation bilaterally, no adventitious sounds. No significant sputum production. CT x2 in place.   NECK: Supple, no lymphadenopathy, trachea midline.  CVS: Regular rate and rhythm. No murmurs, rubs, or gallops. S1, S2 intact.  ABD: Soft, nondistended, nontender, normoactive bowel sounds. No masses palpable.  EXT: No edema, nontender  SKIN: Warm and well perfused. No rashes noted.  NEURO: Alert, oriented, interactive. Nonfocal  DEVICES:         RADIOLOGY-    < from: Xray Chest 1 View-PORTABLE IMMEDIATE (Xray Chest 1 View-PORTABLE IMMEDIATE .) (10.24.24 @ 13:05) >  ACC: [id number] EXAM:  XR CHEST PORTABLE IMMED 1V   ORDERED BY: JAY CEDILLO     PROCEDURE DATE:  10/24/2024      INTERPRETATION:  AP chest on October 24, 2024 at 12:58 PM. Patient had   left robotic lobectomy.    Heart magnified by technique.    On October 21 there is a vaguely defined mass density in the left upper   outer lung. This is no longer evident. 2 left chest tubes are in place.   No pneumothorax.    IMPRESSION: Postop findings left chest.    --- End of Report ---      JEANINE BUTLER MD; Attending Radiologist  This document has been electronically signed. Oct 24 2024  1:09PM    < end of copied text >        Time spent on this patient encounter, which includes documenting this note in the electronic medical record, was 75+ minutes including assessing the presenting problems with associated risks, reviewing the medical record to prepare for the encounter, and meeting face to face with the patient to obtain additional history.  I have also performed an appropriate physical exam, made interventions listed and ordered and interpreted appropriate diagnostic studies as documented.     To improve communication and patient safety, I have coordinated care with the multidisciplinary team including the bedside nurse, appropriate attending of record and consultants as needed.        DATE OF ENTRY OF THIS NOTE IS EQUAL TO THE DATE OF SERVICES RENDERED

## 2024-10-27 LAB
ALBUMIN SERPL ELPH-MCNC: 2.7 G/DL — LOW (ref 3.3–5)
ALP SERPL-CCNC: 85 U/L — SIGNIFICANT CHANGE UP (ref 40–120)
ALT FLD-CCNC: 17 U/L — SIGNIFICANT CHANGE UP (ref 12–78)
ANION GAP SERPL CALC-SCNC: 5 MMOL/L — SIGNIFICANT CHANGE UP (ref 5–17)
AST SERPL-CCNC: 14 U/L — LOW (ref 15–37)
BASOPHILS # BLD AUTO: 0.04 K/UL — SIGNIFICANT CHANGE UP (ref 0–0.2)
BASOPHILS NFR BLD AUTO: 0.5 % — SIGNIFICANT CHANGE UP (ref 0–2)
BILIRUB SERPL-MCNC: 0.9 MG/DL — SIGNIFICANT CHANGE UP (ref 0.2–1.2)
BUN SERPL-MCNC: 17 MG/DL — SIGNIFICANT CHANGE UP (ref 7–23)
CALCIUM SERPL-MCNC: 9.3 MG/DL — SIGNIFICANT CHANGE UP (ref 8.5–10.1)
CHLORIDE SERPL-SCNC: 105 MMOL/L — SIGNIFICANT CHANGE UP (ref 96–108)
CO2 SERPL-SCNC: 26 MMOL/L — SIGNIFICANT CHANGE UP (ref 22–31)
CREAT SERPL-MCNC: 0.81 MG/DL — SIGNIFICANT CHANGE UP (ref 0.5–1.3)
EGFR: 71 ML/MIN/1.73M2 — SIGNIFICANT CHANGE UP
EOSINOPHIL # BLD AUTO: 0.17 K/UL — SIGNIFICANT CHANGE UP (ref 0–0.5)
EOSINOPHIL NFR BLD AUTO: 2.2 % — SIGNIFICANT CHANGE UP (ref 0–6)
GLUCOSE SERPL-MCNC: 92 MG/DL — SIGNIFICANT CHANGE UP (ref 70–99)
HCT VFR BLD CALC: 33.3 % — LOW (ref 34.5–45)
HGB BLD-MCNC: 11.2 G/DL — LOW (ref 11.5–15.5)
IMM GRANULOCYTES NFR BLD AUTO: 0.5 % — SIGNIFICANT CHANGE UP (ref 0–0.9)
LYMPHOCYTES # BLD AUTO: 1.47 K/UL — SIGNIFICANT CHANGE UP (ref 1–3.3)
LYMPHOCYTES # BLD AUTO: 18.8 % — SIGNIFICANT CHANGE UP (ref 13–44)
MAGNESIUM SERPL-MCNC: 2.1 MG/DL — SIGNIFICANT CHANGE UP (ref 1.6–2.6)
MCHC RBC-ENTMCNC: 32.9 PG — SIGNIFICANT CHANGE UP (ref 27–34)
MCHC RBC-ENTMCNC: 33.6 GM/DL — SIGNIFICANT CHANGE UP (ref 32–36)
MCV RBC AUTO: 97.9 FL — SIGNIFICANT CHANGE UP (ref 80–100)
MONOCYTES # BLD AUTO: 0.63 K/UL — SIGNIFICANT CHANGE UP (ref 0–0.9)
MONOCYTES NFR BLD AUTO: 8 % — SIGNIFICANT CHANGE UP (ref 2–14)
NEUTROPHILS # BLD AUTO: 5.49 K/UL — SIGNIFICANT CHANGE UP (ref 1.8–7.4)
NEUTROPHILS NFR BLD AUTO: 70 % — SIGNIFICANT CHANGE UP (ref 43–77)
PHOSPHATE SERPL-MCNC: 2.3 MG/DL — LOW (ref 2.5–4.5)
PLATELET # BLD AUTO: 200 K/UL — SIGNIFICANT CHANGE UP (ref 150–400)
POTASSIUM SERPL-MCNC: 4 MMOL/L — SIGNIFICANT CHANGE UP (ref 3.5–5.3)
POTASSIUM SERPL-SCNC: 4 MMOL/L — SIGNIFICANT CHANGE UP (ref 3.5–5.3)
PROT SERPL-MCNC: 6.3 GM/DL — SIGNIFICANT CHANGE UP (ref 6–8.3)
RBC # BLD: 3.4 M/UL — LOW (ref 3.8–5.2)
RBC # FLD: 13.1 % — SIGNIFICANT CHANGE UP (ref 10.3–14.5)
SODIUM SERPL-SCNC: 136 MMOL/L — SIGNIFICANT CHANGE UP (ref 135–145)
WBC # BLD: 7.84 K/UL — SIGNIFICANT CHANGE UP (ref 3.8–10.5)
WBC # FLD AUTO: 7.84 K/UL — SIGNIFICANT CHANGE UP (ref 3.8–10.5)

## 2024-10-27 PROCEDURE — 71045 X-RAY EXAM CHEST 1 VIEW: CPT | Mod: 26,77

## 2024-10-27 PROCEDURE — 71045 X-RAY EXAM CHEST 1 VIEW: CPT | Mod: 26

## 2024-10-27 RX ORDER — APIXABAN 5 MG/1
5 TABLET, FILM COATED ORAL EVERY 12 HOURS
Refills: 0 | Status: DISCONTINUED | OUTPATIENT
Start: 2024-10-28 | End: 2024-10-28

## 2024-10-27 RX ORDER — DIBASIC SODIUM PHOSPHATE, MONOBASIC POTASSIUM PHOSPHATE AND MONOBASIC SODIUM PHOSPHATE 852; 155; 130 MG/1; MG/1; MG/1
1 TABLET ORAL ONCE
Refills: 0 | Status: COMPLETED | OUTPATIENT
Start: 2024-10-27 | End: 2024-10-27

## 2024-10-27 RX ADMIN — IPRATROPIUM BROMIDE AND ALBUTEROL SULFATE 3 MILLILITER(S): .5; 2.5 SOLUTION RESPIRATORY (INHALATION) at 09:38

## 2024-10-27 RX ADMIN — IPRATROPIUM BROMIDE AND ALBUTEROL SULFATE 3 MILLILITER(S): .5; 2.5 SOLUTION RESPIRATORY (INHALATION) at 14:09

## 2024-10-27 RX ADMIN — CHLORHEXIDINE GLUCONATE 1 APPLICATION(S): 40 SOLUTION TOPICAL at 06:36

## 2024-10-27 RX ADMIN — Medication 25 MILLIGRAM(S): at 21:33

## 2024-10-27 RX ADMIN — Medication 25 MILLIGRAM(S): at 09:53

## 2024-10-27 RX ADMIN — TRAMADOL HYDROCHLORIDE 25 MILLIGRAM(S): 50 TABLET, COATED ORAL at 12:36

## 2024-10-27 RX ADMIN — DIBASIC SODIUM PHOSPHATE, MONOBASIC POTASSIUM PHOSPHATE AND MONOBASIC SODIUM PHOSPHATE 1 PACKET(S): 852; 155; 130 TABLET ORAL at 12:36

## 2024-10-27 RX ADMIN — LOSARTAN POTASSIUM 25 MILLIGRAM(S): 25 TABLET ORAL at 09:53

## 2024-10-27 RX ADMIN — Medication 20 MILLIGRAM(S): at 21:33

## 2024-10-27 RX ADMIN — IPRATROPIUM BROMIDE AND ALBUTEROL SULFATE 3 MILLILITER(S): .5; 2.5 SOLUTION RESPIRATORY (INHALATION) at 21:30

## 2024-10-27 RX ADMIN — Medication 75 MICROGRAM(S): at 05:13

## 2024-10-27 NOTE — PROGRESS NOTE ADULT - SUBJECTIVE AND OBJECTIVE BOX
Patient is a 84y old  Female who presents with a chief complaint of     BRIEF HOSPITAL COURSE-  83 y/o Female with PMH of AFib, HTN, HLD, Hypothyroidism presents to  for scheduled L lung mass resection.     Events last 24 hours:   POD 3 s/p Robotic Assisted L Lobectomy. Apical CT removed yesterday afternoon. Remaining CT with ~50cc drainage overnight. Worsening delirium overnight       Review of Systems:  CONSTITUTIONAL: No fever, chills, or fatigue.  EYES: No eye pain, visual disturbances, or discharge.  ENMT: No difficulty hearing, tinnitus, vertigo. No sinus or throat pain.  NECK: No pain or stiffness.  RESPIRATORY: No cough, wheezing, chills or hemoptysis. No shortness of breath.  CARDIOVASCULAR: No chest pain, palpitations, dizziness, or leg swelling.  GASTROINTESTINAL: No abdominal or epigastric pain. No nausea, vomiting, or hematemesis. No diarrhea or constipation. No melena or hematochezia.  GENITOURINARY: No dysuria, frequency, hematuria, or incontinence.  NEUROLOGICAL: No headaches, memory loss, loss of strength, numbness, or tremors.  SKIN: No itching, burning, rashes, or lesions.  MUSCULOSKELETAL: No joint pain or swelling; No muscle, back, or extremity pain.  PSYCHIATRIC: No depression, anxiety, mood swings, or difficulty sleeping.      PAST MEDICAL & SURGICAL HISTORY-  HTN (hypertension)      Hyperlipidemia      Hypothyroid      Chronic atrial fibrillation      Lung mass      Urine incontinence      Healed foot ulcer      S/P thyroid surgery      S/P hernia surgery      S/P rotator cuff surgery      S/P tonsillectomy      S/P lumbar spine operation          Medications:    losartan 25 milliGRAM(s) Oral daily  metoprolol tartrate 25 milliGRAM(s) Oral two times a day    albuterol/ipratropium for Nebulization 3 milliLiter(s) Nebulizer every 6 hours    acetaminophen     Tablet .. 650 milliGRAM(s) Oral every 6 hours PRN  ondansetron Injectable 4 milliGRAM(s) IV Push every 6 hours PRN  traMADol 25 milliGRAM(s) Oral every 6 hours PRN  traMADol 50 milliGRAM(s) Oral every 6 hours PRN    senna 2 Tablet(s) Oral at bedtime PRN    atorvastatin 20 milliGRAM(s) Oral at bedtime  levothyroxine 75 MICROGram(s) Oral daily    lactated ringers. 1000 milliLiter(s) IV Continuous <Continuous>    influenza  Vaccine (HIGH DOSE) 0.5 milliLiter(s) IntraMuscular once    chlorhexidine 4% Liquid 1 Application(s) Topical <User Schedule>        ICU Vital Signs Last 24 Hrs  T(C): 36.6 (27 Oct 2024 09:11), Max: 36.7 (26 Oct 2024 12:00)  T(F): 97.8 (27 Oct 2024 09:11), Max: 98 (26 Oct 2024 12:00)  HR: 89 (27 Oct 2024 10:07) (85 - 132)  BP: 90/56 (27 Oct 2024 10:00) (90/56 - 151/77)  BP(mean): 66 (27 Oct 2024 10:00) (66 - 105)  ABP: --  ABP(mean): --  RR: 22 (27 Oct 2024 10:00) (17 - 26)  SpO2: 97% (27 Oct 2024 10:07) (95% - 100%)    O2 Parameters below as of 27 Oct 2024 10:07  Patient On (Oxygen Delivery Method): room air        I&O's Detail    26 Oct 2024 07:01  -  27 Oct 2024 07:00  --------------------------------------------------------  IN:  Total IN: 0 mL    OUT:    Chest Tube (mL): 110 mL    Voided (mL): 1800 mL  Total OUT: 1910 mL    Total NET: -1910 mL        LABS:                        11.2   7.84  )-----------( 200      ( 27 Oct 2024 05:54 )             33.3     136  |  105  |  17  ----------------------------<  92  4.0   |  26  |  0.81    Ca    9.3      27 Oct 2024 05:54  Phos  2.3     10-27  Mg     2.1     10-27    TPro  6.3  /  Alb  2.7[L]  /  TBili  0.9  /  DBili  x   /  AST  14[L]  /  ALT  17  /  AlkPhos  85  10-27        CAPILLARY BLOOD GLUCOSE        Urinalysis Basic - ( 27 Oct 2024 05:54 )  Color: x / Appearance: x / SG: x / pH: x  Gluc: 92 mg/dL / Ketone: x  / Bili: x / Urobili: x   Blood: x / Protein: x / Nitrite: x   Leuk Esterase: x / RBC: x / WBC x   Sq Epi: x / Non Sq Epi: x / Bacteria: x      CULTURES:      Physical Examination:  General: Elderly female, chronically ill appearing. In no acute distress.    HEENT: Pupils equal, reactive to light. Symmetric.  PULM: Clear to auscultation bilaterally, no adventitious sounds. No significant sputum production. CT x1 in place.   NECK: Supple, no lymphadenopathy, trachea midline.  CVS: Regular rate and rhythm. No murmurs, rubs, or gallops. S1, S2 intact.  ABD: Soft, nondistended, nontender, normoactive bowel sounds. No masses palpable.  EXT: No edema, nontender  SKIN: Warm and well perfused. No rashes noted.  NEURO: Alert, oriented, interactive. Nonfocal  DEVICES:       RADIOLOGY-    < from: Xray Chest 1 View-PORTABLE IMMEDIATE (Xray Chest 1 View-PORTABLE IMMEDIATE .) (10.24.24 @ 13:05) >    ACC: [id number] EXAM:  XR CHEST PORTABLE IMMED 1V   ORDERED BY: JAY CEDILLO     PROCEDURE DATE:  10/24/2024      INTERPRETATION:  AP chest on October 24, 2024 at 12:58 PM. Patient had   left robotic lobectomy.    Heart magnified by technique.    On October 21 there is a vaguely defined mass density in the left upper   outer lung. This is no longer evident. 2 left chest tubes are in place.   No pneumothorax.    IMPRESSION: Postop findings left chest.    --- End of Report ---    JEANINE BUTLER MD; Attending Radiologist  This document has been electronically signed. Oct 24 2024  1:09PM    < end of copied text >      Time spent on this patient encounter, which includes documenting this note in the electronic medical record, was 75+ minutes including assessing the presenting problems with associated risks, reviewing the medical record to prepare for the encounter, and meeting face to face with the patient to obtain additional history.  I have also performed an appropriate physical exam, made interventions listed and ordered and interpreted appropriate diagnostic studies as documented.     To improve communication and patient safety, I have coordinated care with the multidisciplinary team including the bedside nurse, appropriate attending of record and consultants as needed.        DATE OF ENTRY OF THIS NOTE IS EQUAL TO THE DATE OF SERVICES RENDERED Patient is a 84y old  Female who presents with a chief complaint of     BRIEF HOSPITAL COURSE-  85 y/o Female with PMH of AFib, HTN, HLD, Hypothyroidism presents to  for scheduled L lung mass resection.     Events last 24 hours:   POD 3 s/p Robotic Assisted L Lobectomy. CTx2 removed yesterday afternoon. CTx1 with ~50cc drainage overnight. Worsening delirium overnight       Review of Systems:  CONSTITUTIONAL: No fever, chills, or fatigue.  EYES: No eye pain, visual disturbances, or discharge.  ENMT: No difficulty hearing, tinnitus, vertigo. No sinus or throat pain.  NECK: No pain or stiffness.  RESPIRATORY: No cough, wheezing, chills or hemoptysis. No shortness of breath.  CARDIOVASCULAR: No chest pain, palpitations, dizziness, or leg swelling.  GASTROINTESTINAL: No abdominal or epigastric pain. No nausea, vomiting, or hematemesis. No diarrhea or constipation. No melena or hematochezia.  GENITOURINARY: No dysuria, frequency, hematuria, or incontinence.  NEUROLOGICAL: No headaches, memory loss, loss of strength, numbness, or tremors.  SKIN: No itching, burning, rashes, or lesions.  MUSCULOSKELETAL: No joint pain or swelling; No muscle, back, or extremity pain.  PSYCHIATRIC: No depression, anxiety, mood swings, or difficulty sleeping.      PAST MEDICAL & SURGICAL HISTORY-  HTN (hypertension)      Hyperlipidemia      Hypothyroid      Chronic atrial fibrillation      Lung mass      Urine incontinence      Healed foot ulcer      S/P thyroid surgery      S/P hernia surgery      S/P rotator cuff surgery      S/P tonsillectomy      S/P lumbar spine operation          Medications:    losartan 25 milliGRAM(s) Oral daily  metoprolol tartrate 25 milliGRAM(s) Oral two times a day    albuterol/ipratropium for Nebulization 3 milliLiter(s) Nebulizer every 6 hours    acetaminophen     Tablet .. 650 milliGRAM(s) Oral every 6 hours PRN  ondansetron Injectable 4 milliGRAM(s) IV Push every 6 hours PRN  traMADol 25 milliGRAM(s) Oral every 6 hours PRN  traMADol 50 milliGRAM(s) Oral every 6 hours PRN    senna 2 Tablet(s) Oral at bedtime PRN    atorvastatin 20 milliGRAM(s) Oral at bedtime  levothyroxine 75 MICROGram(s) Oral daily    lactated ringers. 1000 milliLiter(s) IV Continuous <Continuous>    influenza  Vaccine (HIGH DOSE) 0.5 milliLiter(s) IntraMuscular once    chlorhexidine 4% Liquid 1 Application(s) Topical <User Schedule>        ICU Vital Signs Last 24 Hrs  T(C): 36.6 (27 Oct 2024 09:11), Max: 36.7 (26 Oct 2024 12:00)  T(F): 97.8 (27 Oct 2024 09:11), Max: 98 (26 Oct 2024 12:00)  HR: 89 (27 Oct 2024 10:07) (85 - 132)  BP: 90/56 (27 Oct 2024 10:00) (90/56 - 151/77)  BP(mean): 66 (27 Oct 2024 10:00) (66 - 105)  ABP: --  ABP(mean): --  RR: 22 (27 Oct 2024 10:00) (17 - 26)  SpO2: 97% (27 Oct 2024 10:07) (95% - 100%)    O2 Parameters below as of 27 Oct 2024 10:07  Patient On (Oxygen Delivery Method): room air        I&O's Detail    26 Oct 2024 07:01  -  27 Oct 2024 07:00  --------------------------------------------------------  IN:  Total IN: 0 mL    OUT:    Chest Tube (mL): 110 mL    Voided (mL): 1800 mL  Total OUT: 1910 mL    Total NET: -1910 mL        LABS:                        11.2   7.84  )-----------( 200      ( 27 Oct 2024 05:54 )             33.3     136  |  105  |  17  ----------------------------<  92  4.0   |  26  |  0.81    Ca    9.3      27 Oct 2024 05:54  Phos  2.3     10-27  Mg     2.1     10-27    TPro  6.3  /  Alb  2.7[L]  /  TBili  0.9  /  DBili  x   /  AST  14[L]  /  ALT  17  /  AlkPhos  85  10-27        CAPILLARY BLOOD GLUCOSE        Urinalysis Basic - ( 27 Oct 2024 05:54 )  Color: x / Appearance: x / SG: x / pH: x  Gluc: 92 mg/dL / Ketone: x  / Bili: x / Urobili: x   Blood: x / Protein: x / Nitrite: x   Leuk Esterase: x / RBC: x / WBC x   Sq Epi: x / Non Sq Epi: x / Bacteria: x      CULTURES:      Physical Examination:  General: Elderly female, chronically ill appearing. In no acute distress.    HEENT: Pupils equal, reactive to light. Symmetric.  PULM: Clear to auscultation bilaterally, no adventitious sounds. No significant sputum production. CT x1 in place.   NECK: Supple, no lymphadenopathy, trachea midline.  CVS: Regular rate and rhythm. No murmurs, rubs, or gallops. S1, S2 intact.  ABD: Soft, nondistended, nontender, normoactive bowel sounds. No masses palpable.  EXT: No edema, nontender  SKIN: Warm and well perfused. No rashes noted.  NEURO: Alert, oriented, interactive. Nonfocal  DEVICES:       RADIOLOGY-    < from: Xray Chest 1 View-PORTABLE IMMEDIATE (Xray Chest 1 View-PORTABLE IMMEDIATE .) (10.24.24 @ 13:05) >    ACC: [id number] EXAM:  XR CHEST PORTABLE IMMED 1V   ORDERED BY: JAY CEDILLO     PROCEDURE DATE:  10/24/2024      INTERPRETATION:  AP chest on October 24, 2024 at 12:58 PM. Patient had   left robotic lobectomy.    Heart magnified by technique.    On October 21 there is a vaguely defined mass density in the left upper   outer lung. This is no longer evident. 2 left chest tubes are in place.   No pneumothorax.    IMPRESSION: Postop findings left chest.    --- End of Report ---    JEANINE BUTLER MD; Attending Radiologist  This document has been electronically signed. Oct 24 2024  1:09PM    < end of copied text >      Time spent on this patient encounter, which includes documenting this note in the electronic medical record, was 75+ minutes including assessing the presenting problems with associated risks, reviewing the medical record to prepare for the encounter, and meeting face to face with the patient to obtain additional history.  I have also performed an appropriate physical exam, made interventions listed and ordered and interpreted appropriate diagnostic studies as documented.     To improve communication and patient safety, I have coordinated care with the multidisciplinary team including the bedside nurse, appropriate attending of record and consultants as needed.        DATE OF ENTRY OF THIS NOTE IS EQUAL TO THE DATE OF SERVICES RENDERED

## 2024-10-27 NOTE — PROGRESS NOTE ADULT - ASSESSMENT
85 y/o Female with PMH of AFib, HTN, HLD, Hypothyroidism presents to  for scheduled L lung mass resection with:    1. Robotic Assisted L Lobectomy  2. Hypokalemia   3. Hypomagnesemia       -POD 3 s/p Robotic VATS with LLL lobectomy. CT x1 in place. Remains to WS. Apical removed yesterday afternoon. Anterior with ~50 drainage overnight. Daily CXR for placement.  -Encourage IS/OOB. Pain control PRN. Pain medication   -Hypophosphatemia, repleted with KPhos. Trend labs, replete lytes as needed to maintain K>4, Mg>2 and Phos >3. Avoid nephrotoxic medications.   -H/H stable. Trend H/H, transfuse for hgb <7.0 if necessary. Will restart Full AC with Eliquis. SCDs in place.       Plan discussed with Dr. Lopez and Dr. Ford. Patient understanding and agreeable to plan.  85 y/o Female with PMH of AFib, HTN, HLD, Hypothyroidism presents to  for scheduled L lung mass resection with:    1. Robotic Assisted L Lobectomy  2. Hypokalemia   3. Hypomagnesemia       -POD 3 s/p Robotic VATS with LLL lobectomy. CT x1 in place. Remains to WS. Apical removed yesterday afternoon. Anterior with ~50 drainage overnight. Will plan to remove this afternoon.   -Encourage IS/OOB. Pain control PRN. Pain medication   -Hypophosphatemia, repleted with KPhos. Trend labs, replete lytes as needed to maintain K>4, Mg>2 and Phos >3. Avoid nephrotoxic medications.   -H/H stable. Trend H/H, transfuse for hgb <7.0 if necessary. Will restart Full AC with Eliquis. SCDs in place.       Plan discussed with Dr. Lopez and Dr. Ford. Patient understanding and agreeable to plan.  85 y/o Female with PMH of AFib, HTN, HLD, Hypothyroidism presents to  for scheduled L lung mass resection with:    1. Robotic Assisted L Lobectomy  2. Hypokalemia   3. Hypomagnesemia       -POD 3 s/p Robotic VATS with LLL lobectomy. CTx1 in place. Remains to . CTx2 removed yesterday afternoon. Anterior with ~50 drainage overnight. Will plan to remove this afternoon.   -Encourage IS/OOB. Pain control PRN. Pain medication   -Hypophosphatemia, repleted with KPhos. Trend labs, replete lytes as needed to maintain K>4, Mg>2 and Phos >3. Avoid nephrotoxic medications.   -H/H stable. Trend H/H, transfuse for hgb <7.0 if necessary. Will restart Full AC with Eliquis. SCDs in place.       Plan discussed with Dr. Lopez and Dr. Ford. Patient understanding and agreeable to plan.  83 y/o Female with PMH of AFib, HTN, HLD, Hypothyroidism presents to  for scheduled L lung mass resection with:    1. Robotic Assisted L Lobectomy  2. Hypokalemia   3. Hypomagnesemia       -POD 3 s/p Robotic VATS with LLL lobectomy. CTx1 in place. Remains to . CTx2 removed yesterday afternoon. Anterior with ~50 drainage overnight. Will plan to remove this afternoon.   -Encourage IS/OOB. Pain control PRN. Pain medication   -Hypophosphatemia, repleted with KPhos. Trend labs, replete lytes as needed to maintain K>4, Mg>2 and Phos >3. Avoid nephrotoxic medications.   -H/H stable. Trend H/H, transfuse for hgb <7.0 if necessary. Will restart Full AC with Eliquis in AM. SCDs in place.       Plan discussed with Dr. Lopez and Dr. oFrd. Patient understanding and agreeable to plan.

## 2024-10-28 ENCOUNTER — TRANSCRIPTION ENCOUNTER (OUTPATIENT)
Age: 84
End: 2024-10-28

## 2024-10-28 VITALS
SYSTOLIC BLOOD PRESSURE: 125 MMHG | TEMPERATURE: 98 F | HEART RATE: 98 BPM | RESPIRATION RATE: 21 BRPM | DIASTOLIC BLOOD PRESSURE: 66 MMHG | OXYGEN SATURATION: 98 %

## 2024-10-28 LAB
ALBUMIN SERPL ELPH-MCNC: 2.6 G/DL — LOW (ref 3.3–5)
ALP SERPL-CCNC: 89 U/L — SIGNIFICANT CHANGE UP (ref 40–120)
ALT FLD-CCNC: 18 U/L — SIGNIFICANT CHANGE UP (ref 12–78)
ANION GAP SERPL CALC-SCNC: 6 MMOL/L — SIGNIFICANT CHANGE UP (ref 5–17)
AST SERPL-CCNC: 12 U/L — LOW (ref 15–37)
BASOPHILS # BLD AUTO: 0.05 K/UL — SIGNIFICANT CHANGE UP (ref 0–0.2)
BASOPHILS NFR BLD AUTO: 0.6 % — SIGNIFICANT CHANGE UP (ref 0–2)
BILIRUB SERPL-MCNC: 0.7 MG/DL — SIGNIFICANT CHANGE UP (ref 0.2–1.2)
BUN SERPL-MCNC: 21 MG/DL — SIGNIFICANT CHANGE UP (ref 7–23)
CALCIUM SERPL-MCNC: 9.2 MG/DL — SIGNIFICANT CHANGE UP (ref 8.5–10.1)
CHLORIDE SERPL-SCNC: 104 MMOL/L — SIGNIFICANT CHANGE UP (ref 96–108)
CO2 SERPL-SCNC: 25 MMOL/L — SIGNIFICANT CHANGE UP (ref 22–31)
CREAT SERPL-MCNC: 0.82 MG/DL — SIGNIFICANT CHANGE UP (ref 0.5–1.3)
EGFR: 70 ML/MIN/1.73M2 — SIGNIFICANT CHANGE UP
EOSINOPHIL # BLD AUTO: 0.21 K/UL — SIGNIFICANT CHANGE UP (ref 0–0.5)
EOSINOPHIL NFR BLD AUTO: 2.5 % — SIGNIFICANT CHANGE UP (ref 0–6)
GLUCOSE SERPL-MCNC: 100 MG/DL — HIGH (ref 70–99)
HCT VFR BLD CALC: 34.1 % — LOW (ref 34.5–45)
HGB BLD-MCNC: 11.4 G/DL — LOW (ref 11.5–15.5)
IMM GRANULOCYTES NFR BLD AUTO: 1 % — HIGH (ref 0–0.9)
LYMPHOCYTES # BLD AUTO: 1.85 K/UL — SIGNIFICANT CHANGE UP (ref 1–3.3)
LYMPHOCYTES # BLD AUTO: 22.1 % — SIGNIFICANT CHANGE UP (ref 13–44)
MAGNESIUM SERPL-MCNC: 2.1 MG/DL — SIGNIFICANT CHANGE UP (ref 1.6–2.6)
MCHC RBC-ENTMCNC: 32.9 PG — SIGNIFICANT CHANGE UP (ref 27–34)
MCHC RBC-ENTMCNC: 33.4 GM/DL — SIGNIFICANT CHANGE UP (ref 32–36)
MCV RBC AUTO: 98.3 FL — SIGNIFICANT CHANGE UP (ref 80–100)
MONOCYTES # BLD AUTO: 0.71 K/UL — SIGNIFICANT CHANGE UP (ref 0–0.9)
MONOCYTES NFR BLD AUTO: 8.5 % — SIGNIFICANT CHANGE UP (ref 2–14)
NEUTROPHILS # BLD AUTO: 5.47 K/UL — SIGNIFICANT CHANGE UP (ref 1.8–7.4)
NEUTROPHILS NFR BLD AUTO: 65.3 % — SIGNIFICANT CHANGE UP (ref 43–77)
PHOSPHATE SERPL-MCNC: 3.5 MG/DL — SIGNIFICANT CHANGE UP (ref 2.5–4.5)
PLATELET # BLD AUTO: 217 K/UL — SIGNIFICANT CHANGE UP (ref 150–400)
POTASSIUM SERPL-MCNC: 4 MMOL/L — SIGNIFICANT CHANGE UP (ref 3.5–5.3)
POTASSIUM SERPL-SCNC: 4 MMOL/L — SIGNIFICANT CHANGE UP (ref 3.5–5.3)
PROT SERPL-MCNC: 6.2 GM/DL — SIGNIFICANT CHANGE UP (ref 6–8.3)
RBC # BLD: 3.47 M/UL — LOW (ref 3.8–5.2)
RBC # FLD: 13.2 % — SIGNIFICANT CHANGE UP (ref 10.3–14.5)
SODIUM SERPL-SCNC: 135 MMOL/L — SIGNIFICANT CHANGE UP (ref 135–145)
WBC # BLD: 8.37 K/UL — SIGNIFICANT CHANGE UP (ref 3.8–10.5)
WBC # FLD AUTO: 8.37 K/UL — SIGNIFICANT CHANGE UP (ref 3.8–10.5)

## 2024-10-28 PROCEDURE — 71045 X-RAY EXAM CHEST 1 VIEW: CPT | Mod: 26

## 2024-10-28 PROCEDURE — 99238 HOSP IP/OBS DSCHRG MGMT 30/<: CPT

## 2024-10-28 RX ORDER — TRAMADOL HYDROCHLORIDE 50 MG/1
0.5 TABLET, COATED ORAL
Qty: 0 | Refills: 0 | DISCHARGE
Start: 2024-10-28

## 2024-10-28 RX ORDER — ACETAMINOPHEN 500 MG
2 TABLET ORAL
Qty: 0 | Refills: 0 | DISCHARGE
Start: 2024-10-28

## 2024-10-28 RX ORDER — SENNA 187 MG
2 TABLET ORAL
Qty: 0 | Refills: 0 | DISCHARGE
Start: 2024-10-28

## 2024-10-28 RX ORDER — METOPROLOL TARTRATE 50 MG
1 TABLET ORAL
Qty: 0 | Refills: 0 | DISCHARGE
Start: 2024-10-28

## 2024-10-28 RX ADMIN — Medication 75 MICROGRAM(S): at 05:04

## 2024-10-28 RX ADMIN — LOSARTAN POTASSIUM 25 MILLIGRAM(S): 25 TABLET ORAL at 09:55

## 2024-10-28 RX ADMIN — Medication 25 MILLIGRAM(S): at 21:08

## 2024-10-28 RX ADMIN — IPRATROPIUM BROMIDE AND ALBUTEROL SULFATE 3 MILLILITER(S): .5; 2.5 SOLUTION RESPIRATORY (INHALATION) at 22:06

## 2024-10-28 RX ADMIN — Medication 20 MILLIGRAM(S): at 21:08

## 2024-10-28 RX ADMIN — IPRATROPIUM BROMIDE AND ALBUTEROL SULFATE 3 MILLILITER(S): .5; 2.5 SOLUTION RESPIRATORY (INHALATION) at 14:29

## 2024-10-28 RX ADMIN — APIXABAN 5 MILLIGRAM(S): 5 TABLET, FILM COATED ORAL at 09:55

## 2024-10-28 RX ADMIN — Medication 25 MILLIGRAM(S): at 09:55

## 2024-10-28 RX ADMIN — CHLORHEXIDINE GLUCONATE 1 APPLICATION(S): 40 SOLUTION TOPICAL at 05:59

## 2024-10-28 RX ADMIN — APIXABAN 5 MILLIGRAM(S): 5 TABLET, FILM COATED ORAL at 21:08

## 2024-10-28 NOTE — DISCHARGE NOTE PROVIDER - PROVIDER TOKENS
FREE:[LAST:[John],FIRST:[Fran],PHONE:[phone]],FAX:[(   [phone]    -],ADDRESS:[[address] [place], [place] [address]  [facility]]

## 2024-10-28 NOTE — DISCHARGE NOTE NURSING/CASE MANAGEMENT/SOCIAL WORK - FINANCIAL ASSISTANCE
[facility] provides services at a reduced cost to those who are determined to be eligible through [facility]’s financial assistance program. Information regarding [facility]’s financial assistance program can be found by going to [email or web address] or by calling [phone].

## 2024-10-28 NOTE — DISCHARGE NOTE PROVIDER - NSDCMRMEDTOKEN_GEN_ALL_CORE_FT
acetaminophen 325 mg oral tablet: 2 tab(s) orally every 6 hours As needed Mild Pain (1 - 3)  Eliquis 5 mg oral tablet: 1 tab(s) orally 2 times a day preop instructions as per cardiologist  levothyroxine 75 mcg (0.075 mg) oral tablet: 1 tab(s) orally once a day  losartan 25 mg oral tablet: 1 tab(s) orally once a day  metoprolol tartrate 25 mg oral tablet: 1 tab(s) orally 2 times a day  senna leaf extract oral tablet: 2 tab(s) orally once a day (at bedtime) As needed Constipation  simvastatin 40 mg oral tablet: 1 tab(s) orally once a day (at bedtime)  traMADol 50 mg oral tablet: 0.5 tab(s) orally every 6 hours As needed Moderate Pain (4 - 6)

## 2024-10-28 NOTE — DISCHARGE NOTE PROVIDER - NSDCCPTREATMENT_GEN_ALL_CORE_FT
PRINCIPAL PROCEDURE  Procedure: Wedge resection, lung, robot-assisted, thoracoscopic, w lobectomy and mediastinal lymph node dissection u robotic thoracoscopy if indicated  Findings and Treatment: Left lingular sparing upper lobectomy, with additional left upper lobe margin.

## 2024-10-28 NOTE — DISCHARGE NOTE NURSING/CASE MANAGEMENT/SOCIAL WORK - PATIENT PORTAL LINK FT
You can access the FollowMyHealth Patient Portal offered by [facility] by registering at the following website: [email or web address]. By joining [employer]’s FollowMyHealth portal, you will also be able to view your health information using other applications (apps) compatible with our system.

## 2024-10-28 NOTE — DISCHARGE NOTE PROVIDER - NSDCFUADDAPPT_GEN_ALL_CORE_FT
Upon discharge from rehab, make a follow up appointment with Dr Ceballos by calling   .  Follow up with your cardiologist and primary care doctor within 7-10 days after discharge.  daily shower,  PT/OT Rehab  per rehab facility. BMP, CBC as per routine of rehab CXR weekly. Vitals per routine.

## 2024-10-28 NOTE — DISCHARGE NOTE PROVIDER - NSDCPNSUBOBJ_GEN_ALL_CORE
Subjective:  Pt in chair NAD. Upset about going to rehab but understands its the best for her    T(C): 36.6 (10-28-24 @ 12:00), Max: 36.9 (10-28-24 @ 05:00)  HR: 94 (10-28-24 @ 14:00) (81 - 122)  BP: 114/71 (10-28-24 @ 14:00) (92/58 - 140/77)  ABP: --  ABP(mean): --  RR: 19 (10-28-24 @ 14:00) (15 - 25)  SpO2: 99% (10-28-24 @ 14:00) (94% - 100%) RA   Wt(kg): --  CVP(mm Hg): --  CO: --  CI: --  PA: --                                              Tele: SR             10-28    135  |  104  |  21  ----------------------------<  100[H]  4.0   |  25  |  0.82    Ca    9.2      28 Oct 2024 06:03  Phos  3.5     10-28  Mg     2.1     10-28    TPro  6.2  /  Alb  2.6[L]  /  TBili  0.7  /  DBili  x   /  AST  12[L]  /  ALT  18  /  AlkPhos  89  10-28                               11.4   8.37  )-----------( 217      ( 28 Oct 2024 06:03 )             34.1                 CAPILLARY BLOOD GLUCOSE        Exam   Neuro:  Alert Awake NAD   Pulm: decreased at bases   CV: RRR  Abd: soft   Extremities: warm       Inc: C/D/I  + sutures CT insertion site             Assessment:  84yFemale    with PAST MEDICAL & SURGICAL HISTORY:  HTN (hypertension)      Hyperlipidemia      Hypothyroid      Chronic atrial fibrillation      Lung mass      Urine incontinence      Healed foot ulcer      S/P thyroid surgery      S/P hernia surgery      S/P rotator cuff surgery      S/P tonsillectomy      S/P lumbar spine operation        85 y/o female with lung mass, PMH of atrial fib, HTN, HLD, hypothyroidism and foot ulcer. Pt reports she was in the hospital for a foot ulcer, during her imaging work up, she was incidentally found to have 10/24 s/p  robot assisted left upper lobe lingula sparing  lobectomy, MSLND intrap bleeding requiring Platelets and     Plan   [facility] PT/ OT   IS  Pain medication   Chest tubes removed this weekend   OR path P  labs in am- trend H&H  Cont Eliquis   Plan to discharge to [facility] today   pt to follow up with Dr Lopez after discharge from rehab   DW DR Lopez

## 2024-10-28 NOTE — DISCHARGE NOTE PROVIDER - HOSPITAL COURSE
83 y/o female with lung mass, PMH of atrial fib, HTN, HLD, hypothyroidism and foot ulcer. Pt reports she was in the hospital for a foot ulcer, during her imaging work up, she was incidentally found to have 10/24 s/p  robot assisted left upper lobe lingula sparing  lobectomy, MSLND intraop bleeding requiring Platelets

## 2024-10-30 LAB — SURGICAL PATHOLOGY STUDY: SIGNIFICANT CHANGE UP

## 2024-11-08 DIAGNOSIS — E03.9 HYPOTHYROIDISM, UNSPECIFIED: ICD-10-CM

## 2024-11-08 DIAGNOSIS — E83.42 HYPOMAGNESEMIA: ICD-10-CM

## 2024-11-08 DIAGNOSIS — C34.12 MALIGNANT NEOPLASM OF UPPER LOBE, LEFT BRONCHUS OR LUNG: ICD-10-CM

## 2024-11-08 DIAGNOSIS — E78.5 HYPERLIPIDEMIA, UNSPECIFIED: ICD-10-CM

## 2024-11-08 DIAGNOSIS — Z98.890 OTHER SPECIFIED POSTPROCEDURAL STATES: ICD-10-CM

## 2024-11-08 DIAGNOSIS — Z88.8 ALLERGY STATUS TO OTHER DRUGS, MEDICAMENTS AND BIOLOGICAL SUBSTANCES: ICD-10-CM

## 2024-11-08 DIAGNOSIS — I10 ESSENTIAL (PRIMARY) HYPERTENSION: ICD-10-CM

## 2024-11-08 DIAGNOSIS — R09.02 HYPOXEMIA: ICD-10-CM

## 2024-11-08 DIAGNOSIS — E87.6 HYPOKALEMIA: ICD-10-CM

## 2024-11-08 DIAGNOSIS — J43.9 EMPHYSEMA, UNSPECIFIED: ICD-10-CM

## 2024-11-08 DIAGNOSIS — D62 ACUTE POSTHEMORRHAGIC ANEMIA: ICD-10-CM
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
## 2024-11-08 DIAGNOSIS — Z79.01 LONG TERM (CURRENT) USE OF ANTICOAGULANTS: ICD-10-CM

## 2024-11-08 DIAGNOSIS — I48.20 CHRONIC ATRIAL FIBRILLATION, UNSPECIFIED: ICD-10-CM

## 2024-11-08 DIAGNOSIS — G89.12 ACUTE POST-THORACOTOMY PAIN: ICD-10-CM

## 2024-11-08 DIAGNOSIS — D68.32 HEMORRHAGIC DISORDER DUE TO EXTRINSIC CIRCULATING ANTICOAGULANTS: ICD-10-CM

## 2024-11-18 ENCOUNTER — RX RENEWAL (OUTPATIENT)
Age: 84
End: 2024-11-18

## 2024-11-20 PROCEDURE — 88305 TISSUE EXAM BY PATHOLOGIST: CPT

## 2024-11-20 PROCEDURE — 80053 COMPREHEN METABOLIC PANEL: CPT

## 2024-11-20 PROCEDURE — 85025 COMPLETE CBC W/AUTO DIFF WBC: CPT

## 2024-11-20 PROCEDURE — 71046 X-RAY EXAM CHEST 2 VIEWS: CPT

## 2024-11-20 PROCEDURE — 93005 ELECTROCARDIOGRAM TRACING: CPT

## 2024-11-20 PROCEDURE — 84443 ASSAY THYROID STIM HORMONE: CPT

## 2024-11-20 PROCEDURE — 43239 EGD BIOPSY SINGLE/MULTIPLE: CPT

## 2024-11-20 PROCEDURE — 99285 EMERGENCY DEPT VISIT HI MDM: CPT | Mod: 25

## 2024-11-20 PROCEDURE — 83690 ASSAY OF LIPASE: CPT

## 2024-11-20 PROCEDURE — 36415 COLL VENOUS BLD VENIPUNCTURE: CPT

## 2024-11-20 PROCEDURE — 84484 ASSAY OF TROPONIN QUANT: CPT

## 2024-11-20 PROCEDURE — 85610 PROTHROMBIN TIME: CPT

## 2024-11-20 PROCEDURE — 88312 SPECIAL STAINS GROUP 1: CPT

## 2024-11-20 PROCEDURE — 88313 SPECIAL STAINS GROUP 2: CPT

## 2024-11-20 PROCEDURE — 83735 ASSAY OF MAGNESIUM: CPT

## 2024-11-20 PROCEDURE — 96360 HYDRATION IV INFUSION INIT: CPT

## 2024-11-20 PROCEDURE — 85730 THROMBOPLASTIN TIME PARTIAL: CPT

## 2024-11-21 PROBLEM — C34.90 ADENOCARCINOMA, LUNG: Status: ACTIVE | Noted: 2024-11-21

## 2024-12-04 ENCOUNTER — NON-APPOINTMENT (OUTPATIENT)
Age: 84
End: 2024-12-04

## 2024-12-04 ENCOUNTER — APPOINTMENT (OUTPATIENT)
Dept: THORACIC SURGERY | Facility: CLINIC | Age: 84
End: 2024-12-04

## 2024-12-05 ENCOUNTER — NON-APPOINTMENT (OUTPATIENT)
Age: 84
End: 2024-12-05

## 2024-12-11 ENCOUNTER — APPOINTMENT (OUTPATIENT)
Dept: THORACIC SURGERY | Facility: CLINIC | Age: 84
End: 2024-12-11
Payer: MEDICARE

## 2024-12-11 VITALS
BODY MASS INDEX: 23.19 KG/M2 | HEART RATE: 67 BPM | WEIGHT: 162 LBS | RESPIRATION RATE: 16 BRPM | SYSTOLIC BLOOD PRESSURE: 144 MMHG | OXYGEN SATURATION: 97 % | DIASTOLIC BLOOD PRESSURE: 71 MMHG | HEIGHT: 70 IN

## 2024-12-11 DIAGNOSIS — C34.90 MALIGNANT NEOPLASM OF UNSPECIFIED PART OF UNSPECIFIED BRONCHUS OR LUNG: ICD-10-CM

## 2024-12-11 PROCEDURE — 99024 POSTOP FOLLOW-UP VISIT: CPT

## 2024-12-13 ENCOUNTER — RX RENEWAL (OUTPATIENT)
Age: 84
End: 2024-12-13

## 2025-01-10 ENCOUNTER — TRANSCRIPTION ENCOUNTER (OUTPATIENT)
Age: 85
End: 2025-01-10

## 2025-01-17 ENCOUNTER — TRANSCRIPTION ENCOUNTER (OUTPATIENT)
Age: 85
End: 2025-01-17

## 2025-02-06 ENCOUNTER — NON-APPOINTMENT (OUTPATIENT)
Age: 85
End: 2025-02-06

## 2025-02-26 NOTE — PROGRESS NOTE ADULT - PROBLEM SELECTOR PROBLEM 1
Elevated amylase and lipase
Acute on chronic osteomyelitis
Elevated amylase and lipase
Acute on chronic osteomyelitis
Elevated amylase and lipase
Elevated amylase and lipase
Acute on chronic osteomyelitis
Acute on chronic osteomyelitis
no chest pain, no cough, and no shortness of breath.
Acute on chronic osteomyelitis

## 2025-04-17 ENCOUNTER — EMERGENCY (EMERGENCY)
Facility: HOSPITAL | Age: 85
LOS: 1 days | End: 2025-04-17
Attending: EMERGENCY MEDICINE | Admitting: EMERGENCY MEDICINE
Payer: MEDICARE

## 2025-04-17 VITALS
HEART RATE: 79 BPM | HEIGHT: 70.5 IN | SYSTOLIC BLOOD PRESSURE: 133 MMHG | RESPIRATION RATE: 18 BRPM | WEIGHT: 173.06 LBS | OXYGEN SATURATION: 95 % | DIASTOLIC BLOOD PRESSURE: 79 MMHG | TEMPERATURE: 98 F

## 2025-04-17 VITALS
HEART RATE: 81 BPM | SYSTOLIC BLOOD PRESSURE: 126 MMHG | RESPIRATION RATE: 16 BRPM | OXYGEN SATURATION: 100 % | TEMPERATURE: 98 F | DIASTOLIC BLOOD PRESSURE: 72 MMHG

## 2025-04-17 DIAGNOSIS — Z98.89 OTHER SPECIFIED POSTPROCEDURAL STATES: Chronic | ICD-10-CM

## 2025-04-17 DIAGNOSIS — Z90.89 ACQUIRED ABSENCE OF OTHER ORGANS: Chronic | ICD-10-CM

## 2025-04-17 DIAGNOSIS — Z98.890 OTHER SPECIFIED POSTPROCEDURAL STATES: Chronic | ICD-10-CM

## 2025-04-17 PROCEDURE — 99284 EMERGENCY DEPT VISIT MOD MDM: CPT

## 2025-04-17 PROCEDURE — 97161 PT EVAL LOW COMPLEX 20 MIN: CPT

## 2025-04-17 NOTE — ED ADULT TRIAGE NOTE - CHIEF COMPLAINT QUOTE
fall yesterday morning from bilateral leg weakness, last week got discharged from rehab after one month, wants to be readmitted to go back to rehab

## 2025-04-17 NOTE — ED PROVIDER NOTE - CLINICAL SUMMARY MEDICAL DECISION MAKING FREE TEXT BOX
Patient walking well with walker. PT eval pt in ED. Home with PT recommended. Will d/w SW for assistance.   Pt has aide/help at home. Stable for dc.     1) Follow up with your doctor in 1-2 days  2) Return to the ER for worsening or concerning symptoms 84F presents to the ED reporting fall at home. Says she was d/c from rehab yesterday and fell this morning. The circumstances behind the fall were that she was getting off of the toilet and only had her cane, not her walker. She says her legs buckled beneath her and she fell to the ground. Denies injury. Denies LOC. States that she was in rehab for about 5 months after having foot surgery and was rehabilitating well. Walking twice daily with the walker via physical therapy. At home, she has a live-in aide who, at the time of her fall, stepped out to get coffee. It was this aide who returned and saw pt on the floor. Per triage note, pt wants to return to rehab. However, pt is not stating this. Denies pain. No LOC. No vomiting. No back pain or weakness reported.     Patient walking well with walker. PT eval pt in ED. Home with PT recommended. Will d/w SW for assistance.   Pt has aide/help at home. SW contacted aide and family. Stable for dc. They will pick her up.    1) Follow up with your doctor in 1-2 days  2) Return to the ER for worsening or concerning symptoms

## 2025-04-17 NOTE — ED PROVIDER NOTE - NSFOLLOWUPINSTRUCTIONS_ED_ALL_ED_FT
You are walking well with the walker. The physical therapy you received has been helpful and we feel you should continue to have physical therapy at home. A referral will be placed.     1) Follow up with your doctor in 1-2 days  2) Return to the ER for worsening or concerning symptoms

## 2025-04-17 NOTE — ED PROVIDER NOTE - OBJECTIVE STATEMENT
84F presents to the ED reporting fall at home. Says she was d/c from rehab yesterday and fell this morning. The circumstances behind the fall were that she was getting off of the toilet and only had her cane, not her walker. She says her legs buckled beneath her and she fell to the ground. Denies injury. Denies LOC. States that she was in rehab for about 5 months after having foot surgery and was rehabilitating well. Walking twice daily with the walker via physical therapy. At home, she has a live-in aide who, at the time of her fall, stepped out to get coffee. It was this aide who returned and saw pt on the floor. Per triage note, pt wants to return to rehab. However, pt is not stating this. Denies pain. No LOC. No vomiting. No back pain or weakness reported.

## 2025-04-17 NOTE — ED PROVIDER NOTE - PATIENT PORTAL LINK FT
You can access the FollowMyHealth Patient Portal offered by [facility] by registering at the following website: [email or web address]. By joining [employer]’s FollowMyHealth portal, you will also be able to view your health information using other applications (apps) compatible with our system.

## 2025-04-17 NOTE — ED PROVIDER NOTE - PHYSICAL EXAMINATION
Vitals: I have reviewed the patients vital signs  General: nontoxic appearing  HEENT: Atraumatic, normocephalic, airway patent  Eyes: EOMI, tracking appropriately  Neck: no tracheal deviation  Chest/Lungs: no trauma, symmetric chest rise, speaking in complete sentences,  no resp distress  Heart: skin and extremities well perfused, regular rate and rhythm  Abd: soft NT ND  Neuro: A+Ox3, ambulating without difficulty using the walker (PT team here), appears non focal  MSK: strength at baseline in all extremities, no muscle wasting or atrophy  Skin: no cyanosis, no jaundice

## 2025-04-17 NOTE — CHART NOTE - NSCHARTNOTEFT_GEN_A_CORE
Emergency Department Social Work Geriatric Initial Assessment    Cognitive Mental Status - Alert, oriented x4  Primary Caregiver Information –  Emergency Contact Information – Hernesto Wiggins son [phone]  Primary Care Physician / Specialists - Dr. Gustavo Peterson [phone]  Functional Status Prior to ED Visit - Patient has standby assist  Family and Social Support – Children support  Living Arrangement - Patient lives alone  Services Present on Admission – 24 hr. private aide  Assistive Devices (Durable Medical Equipment) – Patient uses a walker with ambulation  ADLs & Degree of [place] – Standby assist with ADLs  Barriers to obtain medications - No barrier with obtaining medication  Barriers to attend medical appointments - Private aide transport to medical appointments  ISAR Score – No ISAR score indicated  Advanced Directives –   Follow up care – Physical Therapy  Discharge Transportation – Aide to transport patient to home  Summary/Recommendations/Referrals -  84 y o female presenting to the ED with bilateral leg weakness as per chart.  SW met with patient at bedside to assess for safety at home and assist with connecting the patient with home PT.  The patient lives alone and has a full-time private aide. Emergency Department Social Work Geriatric Initial Assessment    Cognitive Mental Status - Alert, oriented x4  Primary Caregiver Information –  Emergency Contact Information – Hernesto Wiggins son [phone]  Primary Care Physician / Specialists - Dr. Gustavo Peterson [phone]  Functional Status Prior to ED Visit - Patient has standby assist  Family and Social Support – Children support  Living Arrangement - Patient lives alone  Services Present on Admission – 24 hr. private aide  Assistive Devices (Durable Medical Equipment) – Patient uses a walker with ambulation  ADLs & Degree of [place] – Standby assist with ADLs  Barriers to obtain medications - No barrier with obtaining medication  Barriers to attend medical appointments - Private aide transport to medical appointments  ISAR Score – No ISAR score indicated  Advanced Directives –   Follow up care – Physical Therapy  Discharge Transportation – Aide to transport patient to home  Summary/Recommendations/Referrals -  84 y o female presenting to the ED with bilateral leg weakness as per chart.  SW met with patient at bedside to assess for safety at home and assist with connecting the patient with home PT.  The patient lives alone and has a full-time private aide.  Home PT referral was placed with [facility].  SW will continue to follow up.

## 2025-04-17 NOTE — ED ADULT NURSE NOTE - IN THE PAST 12 MONTHS HAVE YOU USED DRUGS OTHER THAN THOSE REQUIRED FOR MEDICAL REASON?
Health Maintenance Due   Topic Date Due   â¢ HIB Vaccine (3 of 3 - PRP-OMP Series) 12/25/2019   â¢ Hepatitis A Vaccine (1 of 2 - 2-dose series) 12/25/2019   â¢ Pneumococcal Vaccine 0-64 (4 of 4) 12/25/2019       Patient is due for topics as listed above but is not proceeding with Immunization(s) Hep A, HIB and Pneumococcal at this time. No

## 2025-04-17 NOTE — ED ADULT NURSE NOTE - OBJECTIVE STATEMENT
Patient presents to ED for fall yesterday morning from bilateral leg weakness, last week got discharged from rehab after one month, wants to be readmitted to go back to rehab. Awake and follows simple commands.

## 2025-04-17 NOTE — PHYSICAL THERAPY INITIAL EVALUATION ADULT - ADDITIONAL COMMENTS
pt lives in [facility] w/13 dennise w/rail. pt uses rw to ambulate, has [facility] to assist w/adls

## 2025-06-02 NOTE — PHYSICAL THERAPY INITIAL EVALUATION ADULT - THERAPY FREQUENCY, PT EVAL
Pt  is on 50mg Nortriptyline at night   Can pt increase and take 25mg in morning  To help keep food down and to eat    Call pt     3-5x/week

## 2025-06-11 NOTE — PHYSICAL THERAPY INITIAL EVALUATION ADULT - DIAGNOSIS, PT EVAL
[Time Spent: ___ minutes] : I have spent [unfilled] minutes of time on the encounter which excludes teaching and separately reported services.
Decreased functional mobility/capacity secondary to impairments listed below
pt is independent

## 2025-06-12 ENCOUNTER — NON-APPOINTMENT (OUTPATIENT)
Age: 85
End: 2025-06-12

## 2025-08-26 ENCOUNTER — OUTPATIENT (OUTPATIENT)
Dept: OUTPATIENT SERVICES | Facility: HOSPITAL | Age: 85
LOS: 1 days | End: 2025-08-26
Payer: MEDICARE

## 2025-08-26 ENCOUNTER — APPOINTMENT (OUTPATIENT)
Dept: MRI IMAGING | Facility: HOSPITAL | Age: 85
End: 2025-08-26
Payer: MEDICARE

## 2025-08-26 DIAGNOSIS — Z98.89 OTHER SPECIFIED POSTPROCEDURAL STATES: Chronic | ICD-10-CM

## 2025-08-26 DIAGNOSIS — Z90.89 ACQUIRED ABSENCE OF OTHER ORGANS: Chronic | ICD-10-CM

## 2025-08-26 DIAGNOSIS — Z00.8 ENCOUNTER FOR OTHER GENERAL EXAMINATION: ICD-10-CM

## 2025-08-26 DIAGNOSIS — Z98.890 OTHER SPECIFIED POSTPROCEDURAL STATES: Chronic | ICD-10-CM

## 2025-08-26 PROCEDURE — 73721 MRI JNT OF LWR EXTRE W/O DYE: CPT | Mod: 26,LT

## 2025-08-26 PROCEDURE — 73721 MRI JNT OF LWR EXTRE W/O DYE: CPT

## (undated) DEVICE — SUT VICRYL 3-0 18" PS-2 UNDYED

## (undated) DEVICE — NDL INJ SCLERO INTERJECT 23G

## (undated) DEVICE — STAPLER SKIN VISI-STAT 35 WIDE

## (undated) DEVICE — DRAPE INSTRUMENT POUCH 6.75" X 11"

## (undated) DEVICE — ELCTR GROUNDING PAD ADULT COVIDIEN

## (undated) DEVICE — CATH ELECHMSTAT  INJ 7FR 210CM

## (undated) DEVICE — STERIS DEFENDO 3-PIECE KIT (AIR/WATER, SUCTION & BIOPSY VALVES)

## (undated) DEVICE — SYR LUER LOK 10CC

## (undated) DEVICE — SUT PLAIN GUT 4-0 18" P-12

## (undated) DEVICE — RADIAL JAW 4 LG CAPACITY WITH NDL

## (undated) DEVICE — DRSG CURITY GAUZE SPONGE 4 X 4" 12-PLY

## (undated) DEVICE — CATH IV SAFE BC 22G X 1" (BLUE)

## (undated) DEVICE — SYR LUER LOK 30CC

## (undated) DEVICE — TUBING SUCTION CONN 6FT STERILE

## (undated) DEVICE — SET IV PUMP BLOOD 1VALVE 180FILTER NON-DEHP

## (undated) DEVICE — DRSG ACE BANDAGE 6"

## (undated) DEVICE — DRSG KLING 4"

## (undated) DEVICE — DRSG STOCKINETTE IMPERVIOUS XL

## (undated) DEVICE — NDL HYPO REGULAR BEVEL 25G X 1.5" (BLUE)

## (undated) DEVICE — SUT POLYSORB 2-0 30" GS-21 UNDYED

## (undated) DEVICE — LAP PAD 18 X 18"

## (undated) DEVICE — CATH ELCTR GLIDE PRB 7FR

## (undated) DEVICE — MEDICATION LABELS W MARKER

## (undated) DEVICE — TUBING IV SET SECOND 34" W/O LOK-BLUNT

## (undated) DEVICE — SUT VICRYL 4-0 18" PS-2 UNDYED

## (undated) DEVICE — VENODYNE/SCD SLEEVE CALF MEDIUM

## (undated) DEVICE — DRAPE TOWEL BLUE 17" X 24"

## (undated) DEVICE — STRYKER INTERPULSE HANDPIECE W IRR SUCTION TUBE

## (undated) DEVICE — PACK EXTREMITY

## (undated) DEVICE — SAW BLADE MICROAIRE OSCILATING 25.4MM X 90MM X 1.27MM

## (undated) DEVICE — DRSG STOCKINETTE TUBULAR 6"

## (undated) DEVICE — DRAPE 3/4 SHEET W REINFORCEMENT 56X77"

## (undated) DEVICE — SAW BLADE MICROAIRE SAGITTAL 5.8X25.4X0.6 MM

## (undated) DEVICE — CANISTER DISPOSABLE THIN WALL 3000CC

## (undated) DEVICE — LABELS BLANK W PEN

## (undated) DEVICE — TOURNIQUET ESMARK 6"

## (undated) DEVICE — BLADE SURGICAL #15 CARBON

## (undated) DEVICE — MASK O2 NON REBREATH 3IN1 ADULT

## (undated) DEVICE — SUT ETHILON 4-0 18" PS-2

## (undated) DEVICE — MARKING PEN W RULER

## (undated) DEVICE — WARMING BLANKET UPPER ADULT

## (undated) DEVICE — SYR LUER SLIP TIP 30CC

## (undated) DEVICE — HANDPIECE INTERPULSE W/ COAXIAL FAN SPRAY TIP

## (undated) DEVICE — SYR IV POSIFLUSH NS 3ML 30/TY

## (undated) DEVICE — DRSG STERISTRIPS 0.5 X 4"

## (undated) DEVICE — SNARE POLYP SENS 27MM 240CM

## (undated) DEVICE — SENSOR O2 FINGER XL ADULT 24/BX 6BX/CA

## (undated) DEVICE — SAW BLADE MICROAIRE SAGITTAL 9.4MMX25.4MMX0.6MM

## (undated) DEVICE — BRUSH COLONOSCOPY CYTOLOGY

## (undated) DEVICE — TOURNIQUET CUFF 18" DUAL PORT DUAL BLADDER W PLC (BLACK)

## (undated) DEVICE — FOLEY TRAY 16FR 5CC LTX UMETER CLOSED

## (undated) DEVICE — FORCEP RADIAL JAW 4 W NDL 2.2MM 2.8MM 160CM YELLOW DISP

## (undated) DEVICE — DRSG STERISTRIPS 0.25 X 3"

## (undated) DEVICE — DRAIN PENROSE .25" X 12" SILICONE

## (undated) DEVICE — FRAZIER SUCTION TIP 8FR

## (undated) DEVICE — DRAPE LIGHT HANDLE COVER (BLUE)

## (undated) DEVICE — SOL IRR POUR H2O 500ML

## (undated) DEVICE — BUR STRYKER OVAL SOLID CARBIDE MED 4MM

## (undated) DEVICE — SPECIMEN CONTAINER 100ML

## (undated) DEVICE — SYR ALLIANCE II INFLATION 60ML

## (undated) DEVICE — DRAPE SURGICAL #1010

## (undated) DEVICE — PACKING GAUZE IODOFORM 2"

## (undated) DEVICE — SUCTION YANKAUER TAPERED BULBOUS NO VENT

## (undated) DEVICE — TUBING IV SET GRAVITY 3Y 100" MACRO

## (undated) DEVICE — DRSG ACE BANDAGE 4" NS

## (undated) DEVICE — DRSG COMBINE 5X9"

## (undated) DEVICE — TUBING CANNULA SALTER LABS NASAL ADULT 7FT

## (undated) DEVICE — SOL IRR NS 0.9% 250ML

## (undated) DEVICE — VENODYNE/SCD SLEEVE CALF LARGE

## (undated) DEVICE — PACKING GAUZE PLAIN 2"

## (undated) DEVICE — PACK LIJ BASIC ORTHO

## (undated) DEVICE — CATH IV SAFE BC 20G X 1.16" (PINK)

## (undated) DEVICE — CANISTER SUCTION 1200CC 10/SL

## (undated) DEVICE — ELCTR BOVIE TIP BLADE INSULATED 2.8" EDGE WITH SAFETY

## (undated) DEVICE — BLADE SCALPEL SAFETYLOCK #15

## (undated) DEVICE — PACK IV START WITH CHG

## (undated) DEVICE — SOL IRR POUR NS 0.9% 500ML

## (undated) DEVICE — MARKER ENDO SPOT EX

## (undated) DEVICE — BITE BLOCK MAXI RUBBER STAMP

## (undated) DEVICE — DRSG XEROFORM 1 X 8"

## (undated) DEVICE — BRUSH CYTO ENDO

## (undated) DEVICE — TRAP QUICK CATCH  SINGL CHAMBER

## (undated) DEVICE — GLV 8 PROTEXIS (WHITE)

## (undated) DEVICE — FORMALIN CUPS 10% BUFFERED

## (undated) DEVICE — POSITIONER FOAM EGG CRATE ULNAR 2PCS (PINK)

## (undated) DEVICE — VALVE BIOPSY

## (undated) DEVICE — SOL IRR POUR H2O 250ML

## (undated) DEVICE — PREP BETADINE KIT

## (undated) DEVICE — Device

## (undated) DEVICE — PREP CHLORAPREP HI-LITE ORANGE 26ML

## (undated) DEVICE — DRAPE 1/2 SHEET 40X57"

## (undated) DEVICE — DRAPE MAGNETIC INSTRUMENT MEDIUM

## (undated) DEVICE — DRSG STOCKINETTE IMPERVIOUS MED

## (undated) DEVICE — DRAPE ISOLATION BAG 20X20"

## (undated) DEVICE — PACKING GAUZE PLAIN 0.5"

## (undated) DEVICE — TUBE O2 SUPL CRUSH RESIS CONN SOUTHSIDE ONLY